# Patient Record
Sex: MALE | Race: WHITE | NOT HISPANIC OR LATINO | Employment: OTHER | URBAN - METROPOLITAN AREA
[De-identification: names, ages, dates, MRNs, and addresses within clinical notes are randomized per-mention and may not be internally consistent; named-entity substitution may affect disease eponyms.]

---

## 2018-07-20 ENCOUNTER — OFFICE VISIT (OUTPATIENT)
Dept: FAMILY MEDICINE | Facility: CLINIC | Age: 58
End: 2018-07-20
Payer: MEDICAID

## 2018-07-20 VITALS
WEIGHT: 233 LBS | RESPIRATION RATE: 12 BRPM | BODY MASS INDEX: 31.56 KG/M2 | TEMPERATURE: 98 F | OXYGEN SATURATION: 95 % | SYSTOLIC BLOOD PRESSURE: 136 MMHG | HEIGHT: 72 IN | DIASTOLIC BLOOD PRESSURE: 72 MMHG | HEART RATE: 103 BPM

## 2018-07-20 DIAGNOSIS — L97.422 DIABETIC ULCER OF LEFT MIDFOOT ASSOCIATED WITH TYPE 2 DIABETES MELLITUS, WITH FAT LAYER EXPOSED: ICD-10-CM

## 2018-07-20 DIAGNOSIS — E11.42 TYPE 2 DIABETES MELLITUS WITH DIABETIC POLYNEUROPATHY, WITHOUT LONG-TERM CURRENT USE OF INSULIN: ICD-10-CM

## 2018-07-20 DIAGNOSIS — E11.621 DIABETIC ULCER OF LEFT MIDFOOT ASSOCIATED WITH TYPE 2 DIABETES MELLITUS, WITH FAT LAYER EXPOSED: ICD-10-CM

## 2018-07-20 DIAGNOSIS — Z00.00 VISIT FOR WELL MAN HEALTH CHECK: Primary | ICD-10-CM

## 2018-07-20 PROBLEM — E11.9 TYPE 2 DIABETES MELLITUS WITHOUT COMPLICATION, WITHOUT LONG-TERM CURRENT USE OF INSULIN: Status: ACTIVE | Noted: 2018-07-20

## 2018-07-20 PROCEDURE — 99386 PREV VISIT NEW AGE 40-64: CPT | Mod: S$PBB,,, | Performed by: FAMILY MEDICINE

## 2018-07-20 PROCEDURE — 99203 OFFICE O/P NEW LOW 30 MIN: CPT | Mod: PBBFAC,PN | Performed by: FAMILY MEDICINE

## 2018-07-20 PROCEDURE — 99999 PR PBB SHADOW E&M-NEW PATIENT-LVL III: CPT | Mod: PBBFAC,,, | Performed by: FAMILY MEDICINE

## 2018-07-20 RX ORDER — PHENYLEPHRINE HCL 10 MG
500 TABLET ORAL DAILY
COMMUNITY

## 2018-07-20 RX ORDER — AMOXICILLIN 500 MG
CAPSULE ORAL DAILY
COMMUNITY

## 2018-07-20 RX ORDER — MELATONIN 10 MG
CAPSULE ORAL
COMMUNITY

## 2018-07-20 RX ORDER — METFORMIN HYDROCHLORIDE 850 MG/1
TABLET ORAL
COMMUNITY
End: 2018-08-29 | Stop reason: SDUPTHER

## 2018-07-20 RX ORDER — LOSARTAN POTASSIUM 25 MG/1
25 TABLET ORAL DAILY
Qty: 90 TABLET | Refills: 3 | Status: SHIPPED | OUTPATIENT
Start: 2018-07-20 | End: 2019-07-01 | Stop reason: SDUPTHER

## 2018-07-20 RX ORDER — PIOGLITAZONEHYDROCHLORIDE 15 MG/1
TABLET ORAL
COMMUNITY
End: 2019-03-07 | Stop reason: SDUPTHER

## 2018-07-20 RX ORDER — MULTIVITAMIN
1 TABLET ORAL DAILY
COMMUNITY

## 2018-07-20 RX ORDER — SIMVASTATIN 20 MG/1
TABLET, FILM COATED ORAL
COMMUNITY
End: 2019-01-25 | Stop reason: SDUPTHER

## 2018-07-20 RX ORDER — FERROUS SULFATE 325(65) MG
325 TABLET ORAL
COMMUNITY

## 2018-07-20 NOTE — PROGRESS NOTES
"Well Man VISIT      CHIEF COMPLAINT  Chief Complaint   Patient presents with    \[facility]\"" Care       HPI  Fermin Henson is a 58 y.o. male who presents for physical.     Social Factors  Tobacco use: No  Ready to Quit: No  Alcohol: No  Intimate partner violence screening  "Do you feel safe in your current relationship?" single  "Have you ever been in a relationship in which your partner frightened you or hurt you?" no  Living Will/POA: No  Regular Exercise: No    Depression  Over the past two weeks, have you felt down, depressed, or hopeless? No  Over the past two weeks, have you felt little interest or pleasure in doing things? No    Reproductive Health  STD screening in last year: deferred  HIV screening: deferred    Screen for Chronic Disease  CHD Risk Factors: advanced age (older than 55 for men, 65 for women), diabetes mellitus, dyslipidemia and obesity (BMI >= 30 kg/m2)  Estimated body mass index is 31.6 kg/m² as calculated from the following:    Height as of this encounter: 6' (1.829 m).    Weight as of this encounter: 105.7 kg (233 lb 0.4 oz).  Dyslipidemia screening needed: order 7/20/18  T2DM screening needed: order 7/20/18  Colonoscopy needed: utd per patient  PSA needed: n/a  AAA screening needed:n/a  Screen men 35 years and older, and men 20 to 34 years of age who have cardiovascular risk factors for dyslipidemia  Begin screening colonoscopies at 50 years of age in men of average risk, and continue until 75 years of age; offer fecal occult blood testing every year, flexible sigmoidoscopy every five years combined with fecal occult blood testing every three years, or colonoscopy every 10 years   The American Urological Association recommends offering PSA testing and digital rectal examination to well-informed men beginning at 40 years of age and continuing until life expectancy is less than 10 years  Screen once with ultrasonography in men 65 to 75 years of age if they have a family history or " have smoked at [id number] cigarettes in their lifetime  Screen men with a sustained blood pressure greater than 135/80 mm Hg for T2DM      Immunizations  stated as up to date, no records available    ALLERGIES and MEDS were verified.   PMHx, PSHx, FHx, SOCIALHx were updated as pertinent.    REVIEW OF SYSTEMS  Review of Systems   Constitutional: Negative.    HENT: Negative.    Eyes: Negative.    Respiratory: Negative.    Cardiovascular: Negative.    Gastrointestinal: Negative.    Genitourinary: Negative.    Musculoskeletal: Negative.    Skin: Negative.    Neurological: Positive for sensory change.         PHYSICAL EXAM  VITAL SIGNS: /72 (BP Location: Right arm, Patient Position: Sitting, BP Method: Medium (Manual))   Pulse 103   Temp 97.8 °F (36.6 °C) (Oral)   Resp 12   Ht 6' (1.829 m)   Wt 105.7 kg (233 lb 0.4 oz)   SpO2 95%   BMI 31.60 kg/m²   GEN: Well developed, Well nourished, No acute distress.  HENT: Normocephalic, Atraumatic, Bilateral external ears normal, Nose normal, Oropharynx moist, No oral exudates.   Eyes: PERRL, EOMI, Conjunctiva normal, No discharge.   Neck: Supple, No tenderness.  Lymphatic: No cervical or supraclavicular lymphadenopathy noted.   Cardiovascular: Normal heart rate, Normal rhythm, No murmurs, No rubs, No gallops.   Thorax & Lungs: Normal breath sounds, No respiratory distress, No wheezing.  Abdomen: Soft, No tenderness, Bowel sounds normal.  Genital: deferred  Skin: Warm, Dry, No erythema, No rash.   Extremities: No edema, No tenderness.       ASSESSMENT/PLAN    Fermin was seen today for establish care.    Diagnoses and all orders for this visit:    Visit for [facility] check  -     CBC auto differential; Future  -     Comprehensive metabolic panel; Future  -     Lipid panel; Future  -     Hemoglobin A1c; Future  -     Microalbumin/creatinine urine ratio; Future  -     losartan (COZAAR) 25 MG tablet; Take 1 tablet (25 mg total) by mouth once daily.    Type 2 diabetes  mellitus with diabetic polyneuropathy, without long-term current use of insulin  -     Comprehensive metabolic panel; Future  -     Lipid panel; Future  -     Hemoglobin A1c; Future  -     Microalbumin/creatinine urine ratio; Future  -     losartan (COZAAR) 25 MG tablet; Take 1 tablet (25 mg total) by mouth once daily.    Diabetic ulcer of left midfoot associated with type 2 diabetes mellitus, with fat layer exposed         FOLLOW UP: 3 months or sooner if needed    See Jean-Baptiste MD

## 2018-07-24 ENCOUNTER — LAB VISIT (OUTPATIENT)
Dept: LAB | Facility: HOSPITAL | Age: 58
End: 2018-07-24
Attending: FAMILY MEDICINE
Payer: MEDICAID

## 2018-07-24 DIAGNOSIS — E11.42 TYPE 2 DIABETES MELLITUS WITH DIABETIC POLYNEUROPATHY, WITHOUT LONG-TERM CURRENT USE OF INSULIN: ICD-10-CM

## 2018-07-24 DIAGNOSIS — Z00.00 VISIT FOR WELL MAN HEALTH CHECK: ICD-10-CM

## 2018-07-24 LAB
ALBUMIN SERPL BCP-MCNC: 3.9 G/DL
ALP SERPL-CCNC: 52 U/L
ALT SERPL W/O P-5'-P-CCNC: 25 U/L
ANION GAP SERPL CALC-SCNC: 8 MMOL/L
AST SERPL-CCNC: 18 U/L
BASOPHILS # BLD AUTO: 0.03 K/UL
BASOPHILS NFR BLD: 0.4 %
BILIRUB SERPL-MCNC: 0.4 MG/DL
BUN SERPL-MCNC: 12 MG/DL
CALCIUM SERPL-MCNC: 9.7 MG/DL
CHLORIDE SERPL-SCNC: 105 MMOL/L
CHOLEST SERPL-MCNC: 130 MG/DL
CHOLEST/HDLC SERPL: 3 {RATIO}
CO2 SERPL-SCNC: 27 MMOL/L
CREAT SERPL-MCNC: 0.9 MG/DL
DIFFERENTIAL METHOD: ABNORMAL
EOSINOPHIL # BLD AUTO: 0.4 K/UL
EOSINOPHIL NFR BLD: 5.7 %
ERYTHROCYTE [DISTWIDTH] IN BLOOD BY AUTOMATED COUNT: 13.8 %
EST. GFR  (AFRICAN AMERICAN): >60 ML/MIN/1.73 M^2
EST. GFR  (NON AFRICAN AMERICAN): >60 ML/MIN/1.73 M^2
ESTIMATED AVG GLUCOSE: 151 MG/DL
GLUCOSE SERPL-MCNC: 148 MG/DL
HBA1C MFR BLD HPLC: 6.9 %
HCT VFR BLD AUTO: 39 %
HDLC SERPL-MCNC: 43 MG/DL
HDLC SERPL: 33.1 %
HGB BLD-MCNC: 13.5 G/DL
LDLC SERPL CALC-MCNC: 59.4 MG/DL
LYMPHOCYTES # BLD AUTO: 2.1 K/UL
LYMPHOCYTES NFR BLD: 31.2 %
MCH RBC QN AUTO: 32.3 PG
MCHC RBC AUTO-ENTMCNC: 34.6 G/DL
MCV RBC AUTO: 93 FL
MONOCYTES # BLD AUTO: 0.6 K/UL
MONOCYTES NFR BLD: 9.1 %
NEUTROPHILS # BLD AUTO: 3.6 K/UL
NEUTROPHILS NFR BLD: 53.6 %
NONHDLC SERPL-MCNC: 87 MG/DL
PLATELET # BLD AUTO: 209 K/UL
PMV BLD AUTO: 9.7 FL
POTASSIUM SERPL-SCNC: 3.8 MMOL/L
PROT SERPL-MCNC: 7.5 G/DL
RBC # BLD AUTO: 4.18 M/UL
SODIUM SERPL-SCNC: 140 MMOL/L
TRIGL SERPL-MCNC: 138 MG/DL
WBC # BLD AUTO: 6.67 K/UL

## 2018-07-24 PROCEDURE — 80061 LIPID PANEL: CPT

## 2018-07-24 PROCEDURE — 83036 HEMOGLOBIN GLYCOSYLATED A1C: CPT

## 2018-07-24 PROCEDURE — 85025 COMPLETE CBC W/AUTO DIFF WBC: CPT

## 2018-07-24 PROCEDURE — 80053 COMPREHEN METABOLIC PANEL: CPT

## 2018-07-24 PROCEDURE — 36415 COLL VENOUS BLD VENIPUNCTURE: CPT | Mod: PN

## 2018-07-26 DIAGNOSIS — Z12.11 COLON CANCER SCREENING: ICD-10-CM

## 2018-07-31 NOTE — PROGRESS NOTES
Please let patient know that he is mildly anemic.  He needs to do his fit kit or get colonoscopy to make sure he is not bleeding from the GI tract    Dr. Jean-Baptiste

## 2018-08-02 ENCOUNTER — TELEPHONE (OUTPATIENT)
Dept: FAMILY MEDICINE | Facility: CLINIC | Age: 58
End: 2018-08-02

## 2018-08-02 NOTE — TELEPHONE ENCOUNTER
----- Message from See Jean-Baptiste MD sent at 7/31/2018  8:32 AM CDT -----  Please let patient know that he is mildly anemic.  He needs to do his fit kit or get colonoscopy to make sure he is not bleeding from the GI tract    Dr. Jean-Baptiste

## 2018-08-02 NOTE — TELEPHONE ENCOUNTER
Patient notified of charted test result with charted recommendation. Patient said that he would prefer to do the FitKit and will  today at the clinic.  .FitKit was given to patient on 8/2/2018 9:56 AM

## 2018-08-07 ENCOUNTER — LAB VISIT (OUTPATIENT)
Dept: LAB | Facility: HOSPITAL | Age: 58
End: 2018-08-07
Attending: FAMILY MEDICINE
Payer: MEDICAID

## 2018-08-07 DIAGNOSIS — Z12.11 COLON CANCER SCREENING: ICD-10-CM

## 2018-08-07 LAB — HEMOCCULT STL QL IA: NEGATIVE

## 2018-08-07 PROCEDURE — 82274 ASSAY TEST FOR BLOOD FECAL: CPT

## 2018-08-29 RX ORDER — METFORMIN HYDROCHLORIDE 850 MG/1
TABLET ORAL
Qty: 90 TABLET | Refills: 0 | Status: SHIPPED | OUTPATIENT
Start: 2018-08-29 | End: 2018-09-24 | Stop reason: SDUPTHER

## 2018-08-29 NOTE — TELEPHONE ENCOUNTER
Spoke with Mechelle Grajeda re: instructions for Metformin.850mg Ok to disp #90  For 30 days. Pt. Is to continue taking 2 tabs in am and 1 tab in pm Per Dr. Jean-Baptiste.

## 2018-08-29 NOTE — TELEPHONE ENCOUNTER
----- Message from Terri Pollard sent at 8/29/2018  9:22 AM CDT -----  Contact: [facility]- [place]   [facility] refill request: [phone]    Metformin     [facility] on [address]

## 2018-09-24 RX ORDER — METFORMIN HYDROCHLORIDE 850 MG/1
TABLET ORAL
Qty: 90 TABLET | Refills: 0 | Status: SHIPPED | OUTPATIENT
Start: 2018-09-24 | End: 2018-10-29 | Stop reason: SDUPTHER

## 2018-10-15 ENCOUNTER — OFFICE VISIT (OUTPATIENT)
Dept: FAMILY MEDICINE | Facility: CLINIC | Age: 58
End: 2018-10-15
Payer: MEDICAID

## 2018-10-15 VITALS
OXYGEN SATURATION: 98 % | RESPIRATION RATE: 16 BRPM | SYSTOLIC BLOOD PRESSURE: 122 MMHG | HEIGHT: 72 IN | TEMPERATURE: 98 F | WEIGHT: 234.38 LBS | DIASTOLIC BLOOD PRESSURE: 68 MMHG | BODY MASS INDEX: 31.74 KG/M2 | HEART RATE: 80 BPM

## 2018-10-15 DIAGNOSIS — E11.42 TYPE 2 DIABETES MELLITUS WITH DIABETIC POLYNEUROPATHY, WITHOUT LONG-TERM CURRENT USE OF INSULIN: Primary | ICD-10-CM

## 2018-10-15 DIAGNOSIS — Z23 IMMUNIZATION DUE: ICD-10-CM

## 2018-10-15 DIAGNOSIS — E11.621 DIABETIC ULCER OF LEFT MIDFOOT ASSOCIATED WITH TYPE 2 DIABETES MELLITUS, WITH FAT LAYER EXPOSED: ICD-10-CM

## 2018-10-15 DIAGNOSIS — L98.9 SKIN LESION: ICD-10-CM

## 2018-10-15 DIAGNOSIS — L97.422 DIABETIC ULCER OF LEFT MIDFOOT ASSOCIATED WITH TYPE 2 DIABETES MELLITUS, WITH FAT LAYER EXPOSED: ICD-10-CM

## 2018-10-15 PROCEDURE — 99214 OFFICE O/P EST MOD 30 MIN: CPT | Mod: S$PBB,,, | Performed by: FAMILY MEDICINE

## 2018-10-15 PROCEDURE — 99214 OFFICE O/P EST MOD 30 MIN: CPT | Mod: PBBFAC,PN | Performed by: FAMILY MEDICINE

## 2018-10-15 PROCEDURE — 99999 PR PBB SHADOW E&M-EST. PATIENT-LVL IV: CPT | Mod: PBBFAC,,, | Performed by: FAMILY MEDICINE

## 2018-10-15 NOTE — PROGRESS NOTES
Routine Office Visit    Patient Name: Fermin Metcalf Peer    : 1960  MRN: [id number]    Subjective:  Fermin is a 58 y.o. male who presents today for:    1. Type 2 dm  Patient presenting today for follow up for type 2 DM.  He has not had any hypoglycemic episodes.  He has been sticking with his diet and taking all of his medications as prescribed.  He was found to be anemic (mildly on last blood work), but did fitkit and it was negative.  No other complaints today    Past Medical History  Past Medical History:   Diagnosis Date    Diabetes mellitus, type 2        Past Surgical History  Past Surgical History:   Procedure Laterality Date    eye  surgeries      several     TOE AMPUTATION      several toes on R foot       Family History  Family History   Problem Relation Age of Onset    No Known Problems Mother     Diabetes Father        Social History  Social History     Socioeconomic History    Marital status: Single     Spouse name: Not on file    Number of children: Not on file    Years of education: Not on file    Highest education level: Not on file   Social Needs    Financial resource strain: Not on file    Food insecurity - worry: Not on file    Food insecurity - inability: Not on file    Transportation needs - medical: Not on file    Transportation needs - non-medical: Not on file   Occupational History    Not on file   Tobacco Use    Smoking status: Never Smoker    Smokeless tobacco: Never Used   Substance and Sexual Activity    Alcohol use: No    Drug use: No    Sexual activity: Not Currently     Partners: Female   Other Topics Concern    Not on file   Social History Narrative    Not on file       Current Medications  Current Outpatient Medications on File Prior to Visit   Medication Sig Dispense Refill    cinnamon bark (CINNAMON) 500 mg capsule Take 500 mg by mouth once daily.      ferrous sulfate 325 mg (65 mg iron) Tab tablet Take 325 mg by mouth daily with breakfast.      fish  oil-omega-3 fatty acids 300-1,000 mg capsule Take by mouth once daily.      losartan (COZAAR) 25 MG tablet Take 1 tablet (25 mg total) by mouth once daily. 90 tablet 3    melatonin 10 mg Cap Take by mouth.      metFORMIN (GLUCOPHAGE) 850 MG tablet TAKE 2 TABLETS BY MOUTH IN THE MORNING AND 1 IN THE EVENING WITH MEALS 90 tablet 0    multivitamin (THERAGRAN) per tablet Take 1 tablet by mouth once daily.      pioglitazone (ACTOS) 15 MG tablet pioglitazone 15 mg tablet      simvastatin (ZOCOR) 20 MG tablet simvastatin 20 mg tablet       No current facility-administered medications on file prior to visit.        Allergies   Review of patient's allergies indicates:  No Known Allergies    Review of Systems (Pertinent positives)  Review of Systems   Constitutional: Negative.    HENT: Negative.    Eyes: Negative.    Respiratory: Negative.    Cardiovascular: Negative.    Gastrointestinal: Negative.    Skin:        +bilateral skin ulcers   Neurological: Positive for sensory change.         /68 (BP Location: Left arm, Patient Position: Sitting, BP Method: Medium (Manual))   Pulse 80   Temp 97.7 °F (36.5 °C) (Oral)   Resp 16   Ht 6' (1.829 m)   Wt 106.3 kg (234 lb 5.6 oz)   SpO2 98%   BMI 31.78 kg/m²     GENERAL APPEARANCE: in no apparent distress and well developed and well nourished  HEENT: PERRLA, EOMI, Sclera clear, anicteric, Oropharynx clear, no lesions, Neck supple with midline trachea  NECK: normal, supple, no adenopathy, thyroid normal in size  RESPIRATORY: appears well, vitals normal, no respiratory distress, acyanotic, normal RR, chest clear, no wheezing, crepitations, rhonchi, normal symmetric air entry  HEART: regular rate and rhythm, S1, S2 normal, no murmur, click, rub or gallop.    ABDOMEN: abdomen is soft without tenderness, no masses, no hernias, no organomegaly, no rebound, no guarding. Suprapubic tenderness absent. No CVA tenderness.  NEUROLOGIC: normal without focal findings, CN II-XII are  intact.    SKIN: no rashes, no wounds, no other lesions  PSYCH: Alert, oriented x 3, thought content appropriate, speech normal, pleasant and cooperative, good eye contact, well groomed    Protective Sensation (w/ 10 gram monofilament):  Right: Decreased  Left: Decreased    Visual Inspection:  Ulcer wrapped on right foot    Pedal Pulses:   Right: Present  Left: Present    Posterior tibialis:   Right:Present  Left: Present      Assessment/Plan:  Fermin Henson is a 58 y.o. male who presents today for :    Fermin was seen today for follow-up.    Diagnoses and all orders for this visit:    Type 2 diabetes mellitus with diabetic polyneuropathy, without long-term current use of insulin  -     Hepatitis C antibody; Future  -     Foot Exam Performed  -     Hemoglobin A1c; Future  -     MICROALBUMIN / CREATININE RATIO URINE; Future    Immunization due  -     (In Office Administered) Pneumococcal Polysaccharide Vaccine (23 Valent) (SQ/IM)  -     Influenza - Quadrivalent (3 years & older) (PF)    Diabetic ulcer of left midfoot associated with type 2 diabetes mellitus, with fat layer exposed    Skin lesion  -     Ambulatory referral to Dermatology    Other orders  -     Cancel: Influenza - Quadrivalent (3 years & older) (PF)      1.  Labs and immunizations to be done next week  2.  Follow up 6 months or sooner if needed  3.  Foot ulcer to be followed by podiatry  4.  Call with concerns    See Jean-Baptiste MD

## 2018-10-19 ENCOUNTER — CLINICAL SUPPORT (OUTPATIENT)
Dept: FAMILY MEDICINE | Facility: CLINIC | Age: 58
End: 2018-10-19
Payer: MEDICAID

## 2018-10-19 ENCOUNTER — LAB VISIT (OUTPATIENT)
Dept: LAB | Facility: HOSPITAL | Age: 58
End: 2018-10-19
Attending: FAMILY MEDICINE
Payer: MEDICAID

## 2018-10-19 DIAGNOSIS — Z23 ENCOUNTER FOR VACCINATION: Primary | ICD-10-CM

## 2018-10-19 DIAGNOSIS — E11.42 TYPE 2 DIABETES MELLITUS WITH DIABETIC POLYNEUROPATHY, WITHOUT LONG-TERM CURRENT USE OF INSULIN: ICD-10-CM

## 2018-10-19 LAB
ESTIMATED AVG GLUCOSE: 151 MG/DL
HBA1C MFR BLD HPLC: 6.9 %

## 2018-10-19 PROCEDURE — 36415 COLL VENOUS BLD VENIPUNCTURE: CPT | Mod: PN

## 2018-10-19 PROCEDURE — 90472 IMMUNIZATION ADMIN EACH ADD: CPT | Mod: PBBFAC,PN

## 2018-10-19 PROCEDURE — 86803 HEPATITIS C AB TEST: CPT

## 2018-10-19 PROCEDURE — 90732 PPSV23 VACC 2 YRS+ SUBQ/IM: CPT | Mod: PBBFAC,PN

## 2018-10-19 PROCEDURE — 83036 HEMOGLOBIN GLYCOSYLATED A1C: CPT

## 2018-10-19 PROCEDURE — 99499 UNLISTED E&M SERVICE: CPT | Mod: S$PBB,,, | Performed by: FAMILY MEDICINE

## 2018-10-19 PROCEDURE — 90686 IIV4 VACC NO PRSV 0.5 ML IM: CPT | Mod: PBBFAC,PN

## 2018-10-22 LAB — HCV AB SERPL QL IA: NEGATIVE

## 2018-10-25 ENCOUNTER — TELEPHONE (OUTPATIENT)
Dept: FAMILY MEDICINE | Facility: CLINIC | Age: 58
End: 2018-10-25

## 2018-10-25 PROBLEM — R80.8 OTHER PROTEINURIA: Status: ACTIVE | Noted: 2018-10-25

## 2018-10-25 NOTE — PROGRESS NOTES
Please let patient know that labs looked stable.  No changes to blood sugars.    Thanks  Dr. Jean-Baptiste

## 2018-10-25 NOTE — TELEPHONE ENCOUNTER
----- Message from See Jean-Baptiste MD sent at 10/25/2018 11:52 AM CDT -----  Please let patient know that labs looked stable.  No changes to blood sugars.    Thanks  Dr. Jean-Baptiste

## 2018-10-29 RX ORDER — METFORMIN HYDROCHLORIDE 850 MG/1
TABLET ORAL
Qty: 90 TABLET | Refills: 0 | Status: SHIPPED | OUTPATIENT
Start: 2018-10-29 | End: 2018-11-26 | Stop reason: SDUPTHER

## 2018-11-02 ENCOUNTER — TELEPHONE (OUTPATIENT)
Dept: FAMILY MEDICINE | Facility: CLINIC | Age: 58
End: 2018-11-02

## 2018-11-26 RX ORDER — METFORMIN HYDROCHLORIDE 850 MG/1
TABLET ORAL
Qty: 90 TABLET | Refills: 0 | Status: SHIPPED | OUTPATIENT
Start: 2018-11-26 | End: 2018-12-31 | Stop reason: SDUPTHER

## 2018-12-11 ENCOUNTER — HOSPITAL ENCOUNTER (EMERGENCY)
Facility: HOSPITAL | Age: 58
Discharge: HOME | End: 2018-12-11
Attending: EMERGENCY MEDICINE
Payer: MEDICAID

## 2018-12-11 VITALS
WEIGHT: 220 LBS | BODY MASS INDEX: 29.8 KG/M2 | HEIGHT: 72 IN | SYSTOLIC BLOOD PRESSURE: 139 MMHG | HEART RATE: 78 BPM | TEMPERATURE: 98 F | RESPIRATION RATE: 18 BRPM | OXYGEN SATURATION: 95 % | DIASTOLIC BLOOD PRESSURE: 70 MMHG

## 2018-12-11 DIAGNOSIS — N30.01 ACUTE CYSTITIS WITH HEMATURIA: ICD-10-CM

## 2018-12-11 DIAGNOSIS — N20.0 RIGHT NEPHROLITHIASIS: Primary | ICD-10-CM

## 2018-12-11 LAB
ALBUMIN SERPL BCP-MCNC: 4.2 G/DL
ALP SERPL-CCNC: 45 U/L
ALT SERPL W/O P-5'-P-CCNC: 28 U/L
ANION GAP SERPL CALC-SCNC: 11 MMOL/L
AST SERPL-CCNC: 24 U/L
BACTERIA #/AREA URNS HPF: ABNORMAL /HPF
BASOPHILS # BLD AUTO: 0.02 K/UL
BASOPHILS NFR BLD: 0.2 %
BILIRUB SERPL-MCNC: 0.6 MG/DL
BILIRUB UR QL STRIP: NEGATIVE
BUN SERPL-MCNC: 15 MG/DL
CALCIUM SERPL-MCNC: 9.4 MG/DL
CHLORIDE SERPL-SCNC: 102 MMOL/L
CLARITY UR: ABNORMAL
CO2 SERPL-SCNC: 23 MMOL/L
COLOR UR: YELLOW
CREAT SERPL-MCNC: 1 MG/DL
DIFFERENTIAL METHOD: ABNORMAL
EOSINOPHIL # BLD AUTO: 0.1 K/UL
EOSINOPHIL NFR BLD: 1 %
ERYTHROCYTE [DISTWIDTH] IN BLOOD BY AUTOMATED COUNT: 13.4 %
EST. GFR  (AFRICAN AMERICAN): >60 ML/MIN/1.73 M^2
EST. GFR  (NON AFRICAN AMERICAN): >60 ML/MIN/1.73 M^2
GLUCOSE SERPL-MCNC: 154 MG/DL
GLUCOSE UR QL STRIP: ABNORMAL
HCT VFR BLD AUTO: 39.8 %
HGB BLD-MCNC: 13.7 G/DL
HGB UR QL STRIP: ABNORMAL
HYALINE CASTS #/AREA URNS LPF: 0 /LPF
KETONES UR QL STRIP: ABNORMAL
LEUKOCYTE ESTERASE UR QL STRIP: ABNORMAL
LIPASE SERPL-CCNC: 32 U/L
LYMPHOCYTES # BLD AUTO: 0.9 K/UL
LYMPHOCYTES NFR BLD: 8.9 %
MCH RBC QN AUTO: 31.7 PG
MCHC RBC AUTO-ENTMCNC: 34.4 G/DL
MCV RBC AUTO: 92 FL
MICROSCOPIC COMMENT: ABNORMAL
MONOCYTES # BLD AUTO: 0.6 K/UL
MONOCYTES NFR BLD: 6.2 %
NEUTROPHILS # BLD AUTO: 8.1 K/UL
NEUTROPHILS NFR BLD: 83.5 %
NITRITE UR QL STRIP: NEGATIVE
PH UR STRIP: 5 [PH] (ref 5–8)
PLATELET # BLD AUTO: 217 K/UL
PMV BLD AUTO: 9.3 FL
POCT GLUCOSE: 156 MG/DL (ref 70–110)
POTASSIUM SERPL-SCNC: 4.4 MMOL/L
PROT SERPL-MCNC: 7.9 G/DL
PROT UR QL STRIP: ABNORMAL
RBC # BLD AUTO: 4.32 M/UL
RBC #/AREA URNS HPF: 55 /HPF (ref 0–4)
SODIUM SERPL-SCNC: 136 MMOL/L
SP GR UR STRIP: 1.02 (ref 1–1.03)
SQUAMOUS #/AREA URNS HPF: 3 /HPF
URN SPEC COLLECT METH UR: ABNORMAL
UROBILINOGEN UR STRIP-ACNC: NEGATIVE EU/DL
WBC # BLD AUTO: 9.65 K/UL
WBC #/AREA URNS HPF: 3 /HPF (ref 0–5)
YEAST URNS QL MICRO: ABNORMAL

## 2018-12-11 PROCEDURE — 25000003 PHARM REV CODE 250: Performed by: NURSE PRACTITIONER

## 2018-12-11 PROCEDURE — 85025 COMPLETE CBC W/AUTO DIFF WBC: CPT

## 2018-12-11 PROCEDURE — 96375 TX/PRO/DX INJ NEW DRUG ADDON: CPT

## 2018-12-11 PROCEDURE — 82962 GLUCOSE BLOOD TEST: CPT

## 2018-12-11 PROCEDURE — 96365 THER/PROPH/DIAG IV INF INIT: CPT

## 2018-12-11 PROCEDURE — 96361 HYDRATE IV INFUSION ADD-ON: CPT

## 2018-12-11 PROCEDURE — 99284 EMERGENCY DEPT VISIT MOD MDM: CPT | Mod: 25

## 2018-12-11 PROCEDURE — 83690 ASSAY OF LIPASE: CPT

## 2018-12-11 PROCEDURE — 80053 COMPREHEN METABOLIC PANEL: CPT

## 2018-12-11 PROCEDURE — 87086 URINE CULTURE/COLONY COUNT: CPT

## 2018-12-11 PROCEDURE — 81000 URINALYSIS NONAUTO W/SCOPE: CPT

## 2018-12-11 PROCEDURE — 63600175 PHARM REV CODE 636 W HCPCS: Performed by: NURSE PRACTITIONER

## 2018-12-11 RX ORDER — HYDROCODONE BITARTRATE AND ACETAMINOPHEN 5; 325 MG/1; MG/1
1 TABLET ORAL EVERY 4 HOURS PRN
Qty: 12 TABLET | Refills: 0 | Status: SHIPPED | OUTPATIENT
Start: 2018-12-11 | End: 2018-12-15 | Stop reason: ALTCHOICE

## 2018-12-11 RX ORDER — CIPROFLOXACIN 500 MG/1
500 TABLET ORAL 2 TIMES DAILY
Qty: 14 TABLET | Refills: 0 | Status: SHIPPED | OUTPATIENT
Start: 2018-12-11 | End: 2018-12-18

## 2018-12-11 RX ORDER — MORPHINE SULFATE 4 MG/ML
4 INJECTION, SOLUTION INTRAMUSCULAR; INTRAVENOUS
Status: COMPLETED | OUTPATIENT
Start: 2018-12-11 | End: 2018-12-11

## 2018-12-11 RX ORDER — TAMSULOSIN HYDROCHLORIDE 0.4 MG/1
0.4 CAPSULE ORAL
Status: COMPLETED | OUTPATIENT
Start: 2018-12-11 | End: 2018-12-11

## 2018-12-11 RX ORDER — ONDANSETRON 2 MG/ML
4 INJECTION INTRAMUSCULAR; INTRAVENOUS
Status: COMPLETED | OUTPATIENT
Start: 2018-12-11 | End: 2018-12-11

## 2018-12-11 RX ORDER — ONDANSETRON 4 MG/1
4 TABLET, FILM COATED ORAL EVERY 6 HOURS PRN
Qty: 12 TABLET | Refills: 0 | OUTPATIENT
Start: 2018-12-11 | End: 2018-12-15

## 2018-12-11 RX ORDER — TAMSULOSIN HYDROCHLORIDE 0.4 MG/1
0.4 CAPSULE ORAL DAILY
Qty: 10 CAPSULE | Refills: 0 | Status: SHIPPED | OUTPATIENT
Start: 2018-12-12 | End: 2019-02-01

## 2018-12-11 RX ADMIN — ONDANSETRON 4 MG: 2 INJECTION INTRAMUSCULAR; INTRAVENOUS at 06:12

## 2018-12-11 RX ADMIN — MORPHINE SULFATE 4 MG: 4 INJECTION INTRAVENOUS at 07:12

## 2018-12-11 RX ADMIN — SODIUM CHLORIDE 1000 ML: 0.9 INJECTION, SOLUTION INTRAVENOUS at 06:12

## 2018-12-11 RX ADMIN — TAMSULOSIN HYDROCHLORIDE 0.4 MG: 0.4 CAPSULE ORAL at 07:12

## 2018-12-11 RX ADMIN — CEFTRIAXONE 1 G: 1 INJECTION, SOLUTION INTRAVENOUS at 06:12

## 2018-12-11 NOTE — ED TRIAGE NOTES
The pt states his vomiting started on Monday. Today the pt had one regular and one diarrhea stool. Denies fever.

## 2018-12-12 DIAGNOSIS — N20.0 NEPHROLITHIASIS: Primary | ICD-10-CM

## 2018-12-12 NOTE — DISCHARGE INSTRUCTIONS
Please follow-up with Urology as soon as possible.      Please return to the Emergency Department for any new or worsening symptoms including:  Difficulty urinating, abdominal pain, fever, chest pain, shortness of breath, loss of consciousness, dizziness, weakness, or any other concerns.     Please follow up with your Primary Care Provider within in the week. If you do not have one, you may contact the one listed on your discharge paperwork or you may also call the [facility] Appointment Desk at [phone] to schedule an appointment with one.     Please take all medication as prescribed.

## 2018-12-13 LAB — BACTERIA UR CULT: NORMAL

## 2018-12-15 ENCOUNTER — HOSPITAL ENCOUNTER (EMERGENCY)
Facility: HOSPITAL | Age: 58
Discharge: HOME | End: 2018-12-15
Attending: EMERGENCY MEDICINE
Payer: MEDICAID

## 2018-12-15 VITALS
HEART RATE: 86 BPM | WEIGHT: 220 LBS | HEIGHT: 72 IN | SYSTOLIC BLOOD PRESSURE: 169 MMHG | DIASTOLIC BLOOD PRESSURE: 84 MMHG | OXYGEN SATURATION: 98 % | RESPIRATION RATE: 19 BRPM | BODY MASS INDEX: 29.8 KG/M2 | TEMPERATURE: 98 F

## 2018-12-15 DIAGNOSIS — N20.1 RIGHT URETERAL CALCULUS: ICD-10-CM

## 2018-12-15 DIAGNOSIS — R10.9 RIGHT FLANK PAIN: Primary | ICD-10-CM

## 2018-12-15 LAB
ALBUMIN SERPL BCP-MCNC: 4.4 G/DL
ALP SERPL-CCNC: 46 U/L
ALT SERPL W/O P-5'-P-CCNC: 27 U/L
ANION GAP SERPL CALC-SCNC: 14 MMOL/L
AST SERPL-CCNC: 20 U/L
BASOPHILS # BLD AUTO: 0.03 K/UL
BASOPHILS NFR BLD: 0.4 %
BILIRUB SERPL-MCNC: 0.5 MG/DL
BUN SERPL-MCNC: 15 MG/DL
CALCIUM SERPL-MCNC: 10 MG/DL
CHLORIDE SERPL-SCNC: 103 MMOL/L
CO2 SERPL-SCNC: 25 MMOL/L
CREAT SERPL-MCNC: 1.1 MG/DL
DIFFERENTIAL METHOD: ABNORMAL
EOSINOPHIL # BLD AUTO: 0.3 K/UL
EOSINOPHIL NFR BLD: 3 %
ERYTHROCYTE [DISTWIDTH] IN BLOOD BY AUTOMATED COUNT: 13.6 %
EST. GFR  (AFRICAN AMERICAN): >60 ML/MIN/1.73 M^2
EST. GFR  (NON AFRICAN AMERICAN): >60 ML/MIN/1.73 M^2
GLUCOSE SERPL-MCNC: 164 MG/DL
HCT VFR BLD AUTO: 39.8 %
HGB BLD-MCNC: 13.6 G/DL
LYMPHOCYTES # BLD AUTO: 1.5 K/UL
LYMPHOCYTES NFR BLD: 18 %
MCH RBC QN AUTO: 31.9 PG
MCHC RBC AUTO-ENTMCNC: 34.2 G/DL
MCV RBC AUTO: 93 FL
MONOCYTES # BLD AUTO: 0.8 K/UL
MONOCYTES NFR BLD: 9.3 %
NEUTROPHILS # BLD AUTO: 5.8 K/UL
NEUTROPHILS NFR BLD: 69.3 %
PLATELET # BLD AUTO: 241 K/UL
PMV BLD AUTO: 9.5 FL
POTASSIUM SERPL-SCNC: 4 MMOL/L
PROT SERPL-MCNC: 8.1 G/DL
RBC # BLD AUTO: 4.26 M/UL
SODIUM SERPL-SCNC: 142 MMOL/L
WBC # BLD AUTO: 8.37 K/UL

## 2018-12-15 PROCEDURE — 96375 TX/PRO/DX INJ NEW DRUG ADDON: CPT

## 2018-12-15 PROCEDURE — 96374 THER/PROPH/DIAG INJ IV PUSH: CPT

## 2018-12-15 PROCEDURE — 85025 COMPLETE CBC W/AUTO DIFF WBC: CPT

## 2018-12-15 PROCEDURE — 63600175 PHARM REV CODE 636 W HCPCS: Performed by: EMERGENCY MEDICINE

## 2018-12-15 PROCEDURE — 80053 COMPREHEN METABOLIC PANEL: CPT

## 2018-12-15 PROCEDURE — 99284 EMERGENCY DEPT VISIT MOD MDM: CPT | Mod: 25

## 2018-12-15 PROCEDURE — 96376 TX/PRO/DX INJ SAME DRUG ADON: CPT

## 2018-12-15 RX ORDER — ONDANSETRON 2 MG/ML
4 INJECTION INTRAMUSCULAR; INTRAVENOUS
Status: COMPLETED | OUTPATIENT
Start: 2018-12-15 | End: 2018-12-15

## 2018-12-15 RX ORDER — HYDROMORPHONE HYDROCHLORIDE 2 MG/ML
1 INJECTION, SOLUTION INTRAMUSCULAR; INTRAVENOUS; SUBCUTANEOUS
Status: COMPLETED | OUTPATIENT
Start: 2018-12-15 | End: 2018-12-15

## 2018-12-15 RX ORDER — OXYCODONE AND ACETAMINOPHEN 10; 325 MG/1; MG/1
1 TABLET ORAL EVERY 4 HOURS PRN
Qty: 18 TABLET | Refills: 0 | Status: SHIPPED | OUTPATIENT
Start: 2018-12-15 | End: 2019-02-01

## 2018-12-15 RX ORDER — ONDANSETRON 4 MG/1
4 TABLET, FILM COATED ORAL EVERY 8 HOURS PRN
Qty: 12 TABLET | Refills: 0 | Status: SHIPPED | OUTPATIENT
Start: 2018-12-15 | End: 2019-02-01

## 2018-12-15 RX ORDER — KETOROLAC TROMETHAMINE 30 MG/ML
15 INJECTION, SOLUTION INTRAMUSCULAR; INTRAVENOUS
Status: COMPLETED | OUTPATIENT
Start: 2018-12-15 | End: 2018-12-15

## 2018-12-15 RX ADMIN — HYDROMORPHONE HYDROCHLORIDE 1 MG: 2 INJECTION INTRAMUSCULAR; INTRAVENOUS; SUBCUTANEOUS at 06:12

## 2018-12-15 RX ADMIN — ONDANSETRON 4 MG: 2 INJECTION INTRAMUSCULAR; INTRAVENOUS at 05:12

## 2018-12-15 RX ADMIN — KETOROLAC TROMETHAMINE 15 MG: 30 INJECTION, SOLUTION INTRAMUSCULAR at 05:12

## 2018-12-15 RX ADMIN — HYDROMORPHONE HYDROCHLORIDE 1 MG: 2 INJECTION INTRAMUSCULAR; INTRAVENOUS; SUBCUTANEOUS at 05:12

## 2018-12-15 NOTE — ED PROVIDER NOTES
"Encounter Date: 12/15/2018    This is a SORT/MSE of a 58 y.o. male presenting to the ED with c/o right ureteral stone. Seen here 12/11 and discharged with medication. Pain worsening. Patient appears uncomfortable. DC'd with Cipro, Flomax, and Norco. Care will be transferred to an alternate provider when patient is roomed for a full evaluation and final disposition. RORO Ordaz, FNP-C 12/15/2018 4:51 PM    SCRIBE #1 NOTE: I, Marcel Ames, am scribing for, and in the presence of,  Taran Almanzar MD. I have scribed the following portions of the note - Other sections scribed: HPI, ROS.       History     Chief Complaint   Patient presents with    Flank Pain     right side flank pain; diagnosed with kidney stone this past week; since last Sunday     CC: Flank Pain    This 58 y.o Male with pmhx of DM and pshx of toe amputation presents to the ED c/o severe (10/10) flank pain with abdominal tenderness and nausea. Pt states that he was diagnosed with kidney stones on 12/11/18 but presented to day because of the severity of his pain. Pt notes that he took his narco at 3:25pm and that "it doesn't do a thing". Pt denies smoking and drinking. Pt denies emesis, fever, chills, headache, chest pain, cough, dysuria and rash. No other symptoms to note.       The history is provided by the patient. No  was used.     Review of patient's allergies indicates:  No Known Allergies  Past Medical History:   Diagnosis Date    Diabetes mellitus, type 2      Past Surgical History:   Procedure Laterality Date    eye  surgeries      several     TOE AMPUTATION      several toes on R foot     Family History   Problem Relation Age of Onset    No Known Problems Mother     Diabetes Father      Social History     Tobacco Use    Smoking status: Never Smoker    Smokeless tobacco: Never Used   Substance Use Topics    Alcohol use: No    Drug use: No     Review of Systems   Constitutional: Negative for chills and fever. "   HENT: Negative for congestion and sore throat.    Eyes: Negative for redness.   Respiratory: Negative for cough.    Cardiovascular: Negative for chest pain.   Gastrointestinal: Positive for nausea. Negative for vomiting.        (+) abdominal tenderness   Genitourinary: Positive for flank pain. Negative for dysuria.   Musculoskeletal: Negative for back pain and neck pain.   Skin: Negative for rash.   Neurological: Negative for headaches.       Physical Exam     Initial Vitals [12/15/18 1652]   BP Pulse Resp Temp SpO2   (!) 199/95 90 20 97.7 °F (36.5 °C) 98 %      MAP       --         Physical Exam  The patient was examined specifically for the following:   General:No significant distress, Good color, Warm and dry. Head and neck:Scalp atraumatic, Neck supple. Neurological:Appropriate conversation, Gross motor deficits. Eyes:Conjugate gaze, Clear corneas. ENT: No epistaxis. Cardiac: Regular rate and rhythm, Grossly normal heart tones. Pulmonary: Wheezing, Rales. Gastrointestinal: Abdominal tenderness, Abdominal distention. Musculoskeletal: Extremity deformity, Apparent pain with range of motion of the joints. Skin: Rash.   The findings on examination were normal except for the following:  There is minimal vague diffuse abdominal tenderness.  There is no guarding rebound mass or distention. Patient's blood pressure is 199/95.  He is afebrile.  The heart rate is 90.  The respiratory rate is 20.  ED Course   Procedures  Labs Reviewed   CBC W/ AUTO DIFFERENTIAL - Abnormal; Notable for the following components:       Result Value    RBC 4.26 (*)     Hemoglobin 13.6 (*)     Hematocrit 39.8 (*)     MCH 31.9 (*)     All other components within normal limits   COMPREHENSIVE METABOLIC PANEL - Abnormal; Notable for the following components:    Glucose 164 (*)     Alkaline Phosphatase 46 (*)     All other components within normal limits          Imaging Results    None       Medical decision making:  Given the above this patient  has a right ureteral calculus.  He has uncontrolled pain.  He is only on hydrocodone.  I will switch him to Percocet 10.  I will refill is nausea medicine.  I will refer him to follow up with Urology.  I find no evidence of infection or renal failure.  The patient has no leukocytosis.  I checked the urine culture from his last visit.  It is negative.                Scribe Attestation:   Scribe #1: I performed the above scribed service and the documentation accurately describes the services I performed. I attest to the accuracy of the note.    Attending Attestation:           Physician Attestation for Scribe:  Physician Attestation Statement for Scribe #1: I, Taran Almanzar MD, reviewed documentation, as scribed by Marcel Ames in my presence, and it is both accurate and complete.                    Clinical Impression:   The primary encounter diagnosis was Right flank pain. A diagnosis of Right ureteral calculus was also pertinent to this visit.                             Taran Almanzar MD  12/15/18 3957

## 2018-12-16 NOTE — DISCHARGE INSTRUCTIONS
Please see the urologist this week.  Please see your primary care doctor on Monday to get a referral for the visit.  Return immediately if you get worse or if new problems develop, especially fever.  Percocet for pain.  Zofran for nausea.  Rest.

## 2018-12-21 ENCOUNTER — OFFICE VISIT (OUTPATIENT)
Dept: FAMILY MEDICINE | Facility: CLINIC | Age: 58
End: 2018-12-21
Payer: MEDICAID

## 2018-12-21 VITALS
HEART RATE: 81 BPM | OXYGEN SATURATION: 96 % | DIASTOLIC BLOOD PRESSURE: 80 MMHG | SYSTOLIC BLOOD PRESSURE: 136 MMHG | TEMPERATURE: 98 F | WEIGHT: 228.81 LBS | BODY MASS INDEX: 30.99 KG/M2 | RESPIRATION RATE: 16 BRPM | HEIGHT: 72 IN

## 2018-12-21 DIAGNOSIS — E11.42 TYPE 2 DIABETES MELLITUS WITH DIABETIC POLYNEUROPATHY, WITHOUT LONG-TERM CURRENT USE OF INSULIN: Primary | ICD-10-CM

## 2018-12-21 PROBLEM — E11.621 DIABETIC ULCER OF LEFT MIDFOOT ASSOCIATED WITH TYPE 2 DIABETES MELLITUS, WITH FAT LAYER EXPOSED: Status: RESOLVED | Noted: 2018-07-20 | Resolved: 2018-12-21

## 2018-12-21 PROBLEM — L97.422 DIABETIC ULCER OF LEFT MIDFOOT ASSOCIATED WITH TYPE 2 DIABETES MELLITUS, WITH FAT LAYER EXPOSED: Status: RESOLVED | Noted: 2018-07-20 | Resolved: 2018-12-21

## 2018-12-21 PROCEDURE — 99999 PR PBB SHADOW E&M-EST. PATIENT-LVL III: CPT | Mod: PBBFAC,,, | Performed by: FAMILY MEDICINE

## 2018-12-21 PROCEDURE — 99213 OFFICE O/P EST LOW 20 MIN: CPT | Mod: PBBFAC,PN | Performed by: FAMILY MEDICINE

## 2018-12-21 PROCEDURE — 99214 OFFICE O/P EST MOD 30 MIN: CPT | Mod: S$PBB,,, | Performed by: FAMILY MEDICINE

## 2018-12-21 NOTE — PROGRESS NOTES
Routine Office Visit    Patient Name: Fermin Metcalf Peer    : 1960  MRN: [id number]    Subjective:  Fermin is a 58 y.o. male who presents today for:    1. Diabetic shoes  Patient presenting today for diabetic shoes.  He was seen by podiatry at [facility] and has recently healed from a diabetic foot ulcer.  He has been taking all medications as prescribed and states blood sugars are doing well.  They have been slightly elevated recently as he currently has a kidney stone.      Past Medical History  Past Medical History:   Diagnosis Date    Diabetes mellitus, type 2        Past Surgical History  Past Surgical History:   Procedure Laterality Date    eye  surgeries      several     TOE AMPUTATION      several toes on R foot       Family History  Family History   Problem Relation Age of Onset    No Known Problems Mother     Diabetes Father        Social History  Social History     Socioeconomic History    Marital status: Single     Spouse name: Not on file    Number of children: Not on file    Years of education: Not on file    Highest education level: Not on file   Social Needs    Financial resource strain: Not on file    Food insecurity - worry: Not on file    Food insecurity - inability: Not on file    Transportation needs - medical: Not on file    Transportation needs - non-medical: Not on file   Occupational History    Not on file   Tobacco Use    Smoking status: Never Smoker    Smokeless tobacco: Never Used   Substance and Sexual Activity    Alcohol use: No    Drug use: No    Sexual activity: Not Currently     Partners: Female   Other Topics Concern    Not on file   Social History Narrative    Not on file       Current Medications  Current Outpatient Medications on File Prior to Visit   Medication Sig Dispense Refill    antiox #8/om3/dha/epa/lut/zeax (PRESERVISION AREDS 2, OMEGA-3, ORAL) Take by mouth.      cinnamon bark (CINNAMON) 500 mg capsule Take 500 mg by mouth once  daily.      ferrous sulfate 325 mg (65 mg iron) Tab tablet Take 325 mg by mouth daily with breakfast.      fish oil-omega-3 fatty acids 300-1,000 mg capsule Take by mouth once daily.      losartan (COZAAR) 25 MG tablet Take 1 tablet (25 mg total) by mouth once daily. 90 tablet 3    melatonin 10 mg Cap Take by mouth.      metFORMIN (GLUCOPHAGE) 850 MG tablet TAKE 2 TABLETS BY MOUTH IN THE MORNING AND 1 IN THE EVENING WITH MEALS 90 tablet 0    multivitamin (THERAGRAN) per tablet Take 1 tablet by mouth once daily.      ondansetron (ZOFRAN) 4 MG tablet Take 1 tablet (4 mg total) by mouth every 8 (eight) hours as needed (Nausea and vomiting). 12 tablet 0    oxyCODONE-acetaminophen (PERCOCET)  mg per tablet Take 1 tablet by mouth every 4 (four) hours as needed for Pain. 18 tablet 0    pioglitazone (ACTOS) 15 MG tablet pioglitazone 15 mg tablet      simvastatin (ZOCOR) 20 MG tablet simvastatin 20 mg tablet      tamsulosin (FLOMAX) 0.4 mg Cap Take 1 capsule (0.4 mg total) by mouth once daily. 10 capsule 0     No current facility-administered medications on file prior to visit.        Allergies   Review of patient's allergies indicates:  No Known Allergies    Review of Systems (Pertinent positives)  Review of Systems   Constitutional: Negative.    HENT: Negative.    Eyes: Negative.    Respiratory: Negative.    Cardiovascular: Negative.    Gastrointestinal: Negative.    Skin: Negative.          /80 (BP Location: Right arm, Patient Position: Sitting, BP Method: Medium (Manual))   Pulse 81   Temp 97.6 °F (36.4 °C) (Oral)   Resp 16   Ht 6' (1.829 m)   Wt 103.8 kg (228 lb 13.4 oz)   SpO2 96%   BMI 31.04 kg/m²     GENERAL APPEARANCE: in no apparent distress and well developed and well nourished  HEENT: PERRL, EOMI, Sclera clear, anicteric, Oropharynx clear, no lesions, Neck supple with midline trachea  NECK: normal, supple, no adenopathy, thyroid normal in size  RESPIRATORY: appears well, vitals  normal, no respiratory distress, acyanotic, normal RR, chest clear, no wheezing, crepitations, rhonchi, normal symmetric air entry  HEART: regular rate and rhythm, S1, S2 normal, no murmur, click, rub or gallop.    ABDOMEN: abdomen is soft without tenderness, no masses, no hernias, no organomegaly, no rebound, no guarding. Suprapubic tenderness absent. No CVA tenderness.  SKIN: no rashes, no wounds, no other lesions  PSYCH: Alert, oriented x 3, thought content appropriate, speech normal, pleasant and cooperative, good eye contact, well groomed    Assessment/Plan:  Fermin Henson is a 58 y.o. male who presents today for :    Fermin was seen today for orders.    Diagnoses and all orders for this visit:    Type 2 diabetes mellitus with diabetic polyneuropathy, without long-term current use of insulin  -     DIABETIC SHOES FOR HOME USE      1.  Cosigned podiatry prescription for diabetic shoes  2.  Follow up with urology for renal stone  3.  Take all medications as prescribed  4.  Follow up as needed      See Jean-Baptiste MD

## 2018-12-28 ENCOUNTER — OFFICE VISIT (OUTPATIENT)
Dept: URGENT CARE | Facility: CLINIC | Age: 58
End: 2018-12-28
Payer: MEDICAID

## 2018-12-28 VITALS
DIASTOLIC BLOOD PRESSURE: 88 MMHG | HEIGHT: 72 IN | SYSTOLIC BLOOD PRESSURE: 150 MMHG | OXYGEN SATURATION: 98 % | BODY MASS INDEX: 30.88 KG/M2 | WEIGHT: 228 LBS | HEART RATE: 107 BPM | TEMPERATURE: 97 F

## 2018-12-28 DIAGNOSIS — J34.0 ABSCESS OF EXTERNAL NOSE: Primary | ICD-10-CM

## 2018-12-28 PROCEDURE — 99214 OFFICE O/P EST MOD 30 MIN: CPT | Mod: S$GLB,,, | Performed by: SURGERY

## 2018-12-28 RX ORDER — MUPIROCIN 20 MG/G
OINTMENT TOPICAL 3 TIMES DAILY
Qty: 1 TUBE | Refills: 0 | Status: SHIPPED | OUTPATIENT
Start: 2018-12-28 | End: 2019-02-01

## 2018-12-28 RX ORDER — SULFAMETHOXAZOLE AND TRIMETHOPRIM 800; 160 MG/1; MG/1
2 TABLET ORAL 2 TIMES DAILY
Qty: 28 TABLET | Refills: 0 | Status: SHIPPED | OUTPATIENT
Start: 2018-12-28 | End: 2019-01-04

## 2018-12-28 RX ORDER — IBUPROFEN 800 MG/1
800 TABLET ORAL EVERY 8 HOURS PRN
Qty: 60 TABLET | Refills: 0 | Status: SHIPPED | OUTPATIENT
Start: 2018-12-28 | End: 2019-03-25 | Stop reason: CLARIF

## 2018-12-28 NOTE — PROGRESS NOTES
Subjective:       Patient ID: Fermin Henson is a 58 y.o. male.    Vitals:  height is 6' (1.829 m) and weight is 103.4 kg (228 lb). His temperature is 97.2 °F (36.2 °C). His blood pressure is 150/88 (abnormal) and his pulse is 107. His oxygen saturation is 98%.     Chief Complaint: Abscess (nose)    Pt reports having an abscess on his nose with redness and tenderness and pain       Abscess   Chronicity:  New  Location:  Face  Associated Symptoms: no fever, no chills  Pain Scale:  6/10  Ineffective treatments: acne med.      Constitution: Negative for chills, fatigue and fever.   HENT: Negative for congestion and sore throat.    Neck: Negative for painful lymph nodes.   Cardiovascular: Negative for chest pain and leg swelling.   Eyes: Negative for double vision and blurred vision.   Respiratory: Negative for cough and shortness of breath.    Gastrointestinal: Negative for nausea, vomiting and diarrhea.   Genitourinary: Negative for dysuria, frequency and urgency.   Musculoskeletal: Negative for joint pain, joint swelling, muscle cramps and muscle ache.   Skin: Positive for abscess. Negative for color change, pale, rash and erythema.   Allergic/Immunologic: Negative for seasonal allergies.   Neurological: Negative for dizziness, history of vertigo, light-headedness, passing out and headaches.   Hematologic/Lymphatic: Negative for swollen lymph nodes, easy bruising/bleeding and history of blood clots. Does not bruise/bleed easily.   Psychiatric/Behavioral: Negative for nervous/anxious, sleep disturbance and depression. The patient is not nervous/anxious.        Objective:      Physical Exam   Constitutional: He is oriented to person, place, and time. He appears well-developed and well-nourished.   HENT:   Head: Normocephalic and atraumatic. Head is without abrasion, without contusion and without laceration.       Right Ear: External ear normal.   Left Ear: External ear normal.   Nose: Nose normal.   Mouth/Throat:  Oropharynx is clear and moist.   Eyes: Conjunctivae, EOM and lids are normal. Pupils are equal, round, and reactive to light.   Neck: Trachea normal, full passive range of motion without pain and phonation normal. Neck supple.   Cardiovascular: Normal rate, regular rhythm and normal heart sounds.   Pulmonary/Chest: Effort normal and breath sounds normal. No stridor. No respiratory distress.   Musculoskeletal: Normal range of motion.   Neurological: He is alert and oriented to person, place, and time.   Skin: Skin is warm, dry and intact. Capillary refill takes less than 2 seconds. No abrasion, no bruising, no burn, no ecchymosis, no laceration, no lesion and no rash noted. No erythema.   Psychiatric: He has a normal mood and affect. His speech is normal and behavior is normal. Judgment and thought content normal. Cognition and memory are normal.   Nursing note and vitals reviewed.      Assessment:       1. Abscess of external nose        Plan:         Abscess of external nose  -     ibuprofen (ADVIL,MOTRIN) 800 MG tablet; Take 1 tablet (800 mg total) by mouth every 8 (eight) hours as needed for Pain.  Dispense: 60 tablet; Refill: 0  -     sulfamethoxazole-trimethoprim 800-160mg (BACTRIM DS) 800-160 mg Tab; Take 2 tablets by mouth 2 (two) times daily. for 7 days  Dispense: 28 tablet; Refill: 0  -     mupirocin (BACTROBAN) 2 % ointment; Apply topically 3 (three) times daily.  Dispense: 1 Tube; Refill: 0            Patient Instructions     Abscess (Antibiotic Treatment Only)  An abscess (sometimes called a boil) happens when bacteria get trapped under the skin and start to grow. Pus forms inside the abscess as the body responds to the bacteria. An abscess can happen with an insect bite, ingrown hair, blocked oil gland, pimple, cyst, or puncture wound.  In the early stages, your wound may be red and tender. For this stage, you may get antibiotics. If the abscess does not get better with antibiotics, it will need to be  drained with a small cut.  Home care  These tips will help you care for your abscess at home:  · Soak the wound in hot water or apply hot packs (small towel soaked in hot water) to the area for 20 minutes at a time. Do this 3 to 4 times a day.  · Do not cut, squeeze, or pop the boil yourself.  · Apply antibiotic cream or ointment to the skin 3 to 4 times a day, unless something else was prescribed. Some ointments include an antibiotic plus a pain reliever.  · If your doctor prescribed antibiotics, do not stop taking them until you have finished the medicine or the doctor tells you to stop.  · You may use an over-the-counter pain medicine to control pain, unless another pain medicine was prescribed. If you have chronic liver or kidney disease or ever had a stomach ulcer or gastrointestinal bleeding, talk with your doctor before using these any of these.  Follow-up care  Follow up with your healthcare provider, or as advised. Check your wound each day for the signs of worsening infection listed below.  When to seek medical advice  Get prompt medical attention if any of these occur:  · An increase in redness or swelling  · Red streaks in the skin leading away from the abscess  · An increase in local pain or swelling  · Fever of 100.4ºF (38ºC) or higher, or as directed by your healthcare provider  · Pus or fluid coming from the abscess  · Boil returns after getting better  Date Last Reviewed: 9/1/2016  © 9710-8437 [employer]. [address] [place] [address], [place] [address]. All rights reserved. This information is not intended as a substitute for professional medical care. Always follow your healthcare professional's instructions.

## 2018-12-28 NOTE — PATIENT INSTRUCTIONS
Abscess (Antibiotic Treatment Only)  An abscess (sometimes called a boil) happens when bacteria get trapped under the skin and start to grow. Pus forms inside the abscess as the body responds to the bacteria. An abscess can happen with an insect bite, ingrown hair, blocked oil gland, pimple, cyst, or puncture wound.  In the early stages, your wound may be red and tender. For this stage, you may get antibiotics. If the abscess does not get better with antibiotics, it will need to be drained with a small cut.  Home care  These tips will help you care for your abscess at home:  · Soak the wound in hot water or apply hot packs (small towel soaked in hot water) to the area for 20 minutes at a time. Do this 3 to 4 times a day.  · Do not cut, squeeze, or pop the boil yourself.  · Apply antibiotic cream or ointment to the skin 3 to 4 times a day, unless something else was prescribed. Some ointments include an antibiotic plus a pain reliever.  · If your doctor prescribed antibiotics, do not stop taking them until you have finished the medicine or the doctor tells you to stop.  · You may use an over-the-counter pain medicine to control pain, unless another pain medicine was prescribed. If you have chronic liver or kidney disease or ever had a stomach ulcer or gastrointestinal bleeding, talk with your doctor before using these any of these.  Follow-up care  Follow up with your healthcare provider, or as advised. Check your wound each day for the signs of worsening infection listed below.  When to seek medical advice  Get prompt medical attention if any of these occur:  · An increase in redness or swelling  · Red streaks in the skin leading away from the abscess  · An increase in local pain or swelling  · Fever of 100.4ºF (38ºC) or higher, or as directed by your healthcare provider  · Pus or fluid coming from the abscess  · Boil returns after getting better  Date Last Reviewed: 9/1/2016  © 0121-7523 [employer] [address] [place] [address]. All rights reserved. This information is not intended as a substitute for professional medical care. Always follow your healthcare professional's instructions.

## 2018-12-31 RX ORDER — METFORMIN HYDROCHLORIDE 850 MG/1
TABLET ORAL
Qty: 90 TABLET | Refills: 0 | Status: SHIPPED | OUTPATIENT
Start: 2018-12-31 | End: 2019-01-26 | Stop reason: SDUPTHER

## 2019-01-25 ENCOUNTER — TELEPHONE (OUTPATIENT)
Dept: FAMILY MEDICINE | Facility: CLINIC | Age: 59
End: 2019-01-25

## 2019-01-25 RX ORDER — SIMVASTATIN 20 MG/1
TABLET, FILM COATED ORAL
Qty: 90 TABLET | Refills: 1 | Status: SHIPPED | OUTPATIENT
Start: 2019-01-25 | End: 2019-01-29 | Stop reason: SDUPTHER

## 2019-01-25 NOTE — TELEPHONE ENCOUNTER
----- Message from Alok Reynolds sent at 1/25/2019 11:20 AM CST -----  Contact: [facility] [phone]  REFILL: simvastatin (ZOCOR) 20 MG tablet    PHARMACY: [id number]  [place] [address]

## 2019-01-25 NOTE — TELEPHONE ENCOUNTER
----- Message from Maliha Don sent at 1/25/2019 12:04 PM CST -----  Contact: [facility]  Is calling to speak with staff to get correct dosage for medication simvastatin (ZOCOR) 20 MG tablet. Please call [phone]

## 2019-01-28 RX ORDER — METFORMIN HYDROCHLORIDE 850 MG/1
TABLET ORAL
Qty: 90 TABLET | Refills: 0 | Status: SHIPPED | OUTPATIENT
Start: 2019-01-28 | End: 2019-02-01

## 2019-01-28 RX ORDER — METFORMIN HYDROCHLORIDE 850 MG/1
TABLET ORAL
Qty: 90 TABLET | Refills: 0 | Status: SHIPPED | OUTPATIENT
Start: 2019-01-28 | End: 2020-07-29

## 2019-01-28 NOTE — TELEPHONE ENCOUNTER
----- Message from Tuyet Goodwin sent at 1/28/2019 10:19 AM CST -----  Contact: [facility] [phone]  Requesting clarification on simvastatin (ZOCOR) 20 MG tablet  .  [facility] - [place] - [address] [facility].  [facility] [facility].  [place] [address]  Phone: [phone] Fax: [phone]

## 2019-01-28 NOTE — TELEPHONE ENCOUNTER
[facility] needs to confirm dosage and directions for Simvastatin. Please advise.

## 2019-01-29 ENCOUNTER — TELEPHONE (OUTPATIENT)
Dept: FAMILY MEDICINE | Facility: CLINIC | Age: 59
End: 2019-01-29

## 2019-01-29 RX ORDER — SIMVASTATIN 20 MG/1
TABLET, FILM COATED ORAL
Qty: 90 TABLET | Refills: 1 | Status: CANCELLED | OUTPATIENT
Start: 2019-01-29

## 2019-01-29 RX ORDER — PIOGLITAZONEHYDROCHLORIDE 15 MG/1
TABLET ORAL
Status: CANCELLED | OUTPATIENT
Start: 2019-01-29

## 2019-01-29 RX ORDER — SIMVASTATIN 20 MG/1
TABLET, FILM COATED ORAL
Qty: 90 TABLET | Refills: 1 | Status: SHIPPED | OUTPATIENT
Start: 2019-01-29 | End: 2019-01-30

## 2019-01-29 NOTE — TELEPHONE ENCOUNTER
----- Message from Priscilla Chacon sent at 1/29/2019  9:53 AM CST -----  Contact: [facility] [phone]  Can the clinic reply in [facility]:       Please refill the medication(s) listed below. The patient can be reached at this phone number (_____) once it is called into the pharmacy.      Medication #1simvastatin (ZOCOR) 20 MG tablet - needs instructions on how to take it. Call pharmacy      Medication #2      Preferred Pharmacy:

## 2019-01-29 NOTE — TELEPHONE ENCOUNTER
----- Message from Esperanza Gambino sent at 1/29/2019  1:46 PM CST -----  Contact: pt  Patient need refill sent to [facility]........ Pioglitazone 15 mg tab   Simvastatin 20mg, pt states [facility] does not have script showing sent 1-25-19.....please advise

## 2019-01-30 ENCOUNTER — TELEPHONE (OUTPATIENT)
Dept: FAMILY MEDICINE | Facility: CLINIC | Age: 59
End: 2019-01-30

## 2019-01-30 DIAGNOSIS — E11.42 TYPE 2 DIABETES MELLITUS WITH DIABETIC POLYNEUROPATHY, WITHOUT LONG-TERM CURRENT USE OF INSULIN: Primary | ICD-10-CM

## 2019-01-30 RX ORDER — SIMVASTATIN 20 MG/1
20 TABLET, FILM COATED ORAL NIGHTLY
Qty: 90 TABLET | Refills: 1 | Status: SHIPPED | OUTPATIENT
Start: 2019-01-30 | End: 2019-08-05 | Stop reason: SDUPTHER

## 2019-01-30 NOTE — TELEPHONE ENCOUNTER
----- Message from Priscilla Dennis sent at 1/30/2019  9:57 AM CST -----  Contact: Pharmacist with [facility] [phone]  Second request    Pharmacist calling to request new RX with directions for [simvastatin (ZOCOR) 20 MG tablet]. Please call to advise. Thank you.    [facility] - [place] - [address] [facility].  [facility] [facility].  [place] [address]  Phone: [phone] Fax: [phone]

## 2019-02-01 ENCOUNTER — LAB VISIT (OUTPATIENT)
Dept: LAB | Facility: HOSPITAL | Age: 59
End: 2019-02-01
Attending: UROLOGY
Payer: MEDICAID

## 2019-02-01 ENCOUNTER — OFFICE VISIT (OUTPATIENT)
Dept: UROLOGY | Facility: CLINIC | Age: 59
End: 2019-02-01
Payer: MEDICAID

## 2019-02-01 VITALS
DIASTOLIC BLOOD PRESSURE: 82 MMHG | SYSTOLIC BLOOD PRESSURE: 132 MMHG | BODY MASS INDEX: 31.62 KG/M2 | WEIGHT: 233.44 LBS | HEIGHT: 72 IN | HEART RATE: 100 BPM

## 2019-02-01 DIAGNOSIS — N13.8 BPH WITH URINARY OBSTRUCTION: ICD-10-CM

## 2019-02-01 DIAGNOSIS — R35.1 NOCTURIA: ICD-10-CM

## 2019-02-01 DIAGNOSIS — N40.1 BPH WITH URINARY OBSTRUCTION: ICD-10-CM

## 2019-02-01 DIAGNOSIS — N20.0 KIDNEY STONE: Primary | ICD-10-CM

## 2019-02-01 DIAGNOSIS — N52.9 ED (ERECTILE DYSFUNCTION) OF ORGANIC ORIGIN: ICD-10-CM

## 2019-02-01 LAB
BILIRUB SERPL-MCNC: NORMAL MG/DL
BLOOD URINE, POC: NORMAL
COLOR, POC UA: YELLOW
GLUCOSE UR QL STRIP: NORMAL
KETONES UR QL STRIP: NORMAL
LEUKOCYTE ESTERASE URINE, POC: NORMAL
NITRITE, POC UA: NORMAL
PH, POC UA: 5
PROTEIN, POC: NORMAL
SPECIFIC GRAVITY, POC UA: 1000
UROBILINOGEN, POC UA: NORMAL

## 2019-02-01 PROCEDURE — 99999 PR PBB SHADOW E&M-EST. PATIENT-LVL III: CPT | Mod: PBBFAC,,, | Performed by: UROLOGY

## 2019-02-01 PROCEDURE — 99204 PR OFFICE/OUTPT VISIT, NEW, LEVL IV, 45-59 MIN: ICD-10-PCS | Mod: S$PBB,,, | Performed by: UROLOGY

## 2019-02-01 PROCEDURE — 81001 URINALYSIS AUTO W/SCOPE: CPT | Mod: PBBFAC | Performed by: UROLOGY

## 2019-02-01 PROCEDURE — 99999 PR PBB SHADOW E&M-EST. PATIENT-LVL III: ICD-10-PCS | Mod: PBBFAC,,, | Performed by: UROLOGY

## 2019-02-01 PROCEDURE — 99213 OFFICE O/P EST LOW 20 MIN: CPT | Mod: PBBFAC | Performed by: UROLOGY

## 2019-02-01 PROCEDURE — 99204 OFFICE O/P NEW MOD 45 MIN: CPT | Mod: S$PBB,,, | Performed by: UROLOGY

## 2019-02-01 PROCEDURE — 84153 ASSAY OF PSA TOTAL: CPT

## 2019-02-01 PROCEDURE — 36415 COLL VENOUS BLD VENIPUNCTURE: CPT

## 2019-02-01 NOTE — PROGRESS NOTES
Subjective:       Patient ID: Fermin Henson is a 58 y.o. male who was referred by See Jean-Baptiste MD    Chief Complaint:   Chief Complaint   Patient presents with    Nephrolithiasis     new pt coming in for kidney stones pain an symptoms now has resolved       Urolithiasis  Patient complains of right flank pain with radiation to the abdomen. Onset of symptoms was abrupt starting 1 month ago with completely resolved course since that time. Patient describes the pain as aching, intermittent and rated as mild. The patient has had no nausea/no vomiting and no diaphoresis. There has been no fever or chills. The patient is not complaining of dysuria or frequency. Risk factors for urolithiasis: family history of stone disease and poor fluid intake.    Benign Prostatic Hyperplasia  He patient reports frequency and nocturia three times a night. He denies urgency. The patient states symptoms are of mild severity. Onset of symptoms was several years ago and was gradual in onset.  He has no personal history and no family history of prostate cancer. He reports a history of kidney stones. He denies flank pain, gross hematuria and recurrent UTI.  He is currently taking no prostate medications.    Erectile Dysfunction  Patient complains of erectile dysfunction. Onset of dysfunction was several years ago and was gradual in onset.  Patient states the nature of difficulty is both attaining and maintaining erection. Full erections occur never. Partial erections occur with intercourse. Libido is not affected. Risk factors for ED include diabetes mellitus. Patient denies history of pelvic radiation. Previous treatment of ED includes none.        ACTIVE MEDICAL ISSUES:  Patient Active Problem List   Diagnosis    Type 2 diabetes mellitus with diabetic polyneuropathy, without long-term current use of insulin    Other proteinuria       PAST MEDICAL HISTORY  Past Medical History:   Diagnosis Date    Diabetes mellitus, type 2      Kidney stones        PAST SURGICAL HISTORY:  Past Surgical History:   Procedure Laterality Date    eye  surgeries      several     TOE AMPUTATION      several toes on R foot       SOCIAL HISTORY:  Social History     Tobacco Use    Smoking status: Never Smoker    Smokeless tobacco: Never Used   Substance Use Topics    Alcohol use: No    Drug use: No       FAMILY HISTORY:  Family History   Problem Relation Age of Onset    No Known Problems Mother     Diabetes Father        ALLERGIES AND MEDICATIONS: updated and reviewed.  Review of patient's allergies indicates:  No Known Allergies  Current Outpatient Medications   Medication Sig    antiox #8/om3/dha/epa/lut/zeax (PRESERVISION AREDS 2, OMEGA-3, ORAL) Take by mouth.    cinnamon bark (CINNAMON) 500 mg capsule Take 500 mg by mouth once daily.    ferrous sulfate 325 mg (65 mg iron) Tab tablet Take 325 mg by mouth daily with breakfast.    fish oil-omega-3 fatty acids 300-1,000 mg capsule Take by mouth once daily.    ibuprofen (ADVIL,MOTRIN) 800 MG tablet Take 1 tablet (800 mg total) by mouth every 8 (eight) hours as needed for Pain.    losartan (COZAAR) 25 MG tablet Take 1 tablet (25 mg total) by mouth once daily.    melatonin 10 mg Cap Take by mouth.    metFORMIN (GLUCOPHAGE) 850 MG tablet TAKE 2 TABLETS BY MOUTH IN THE MORNING AND 1 IN THE EVENING WITH MEALS    pioglitazone (ACTOS) 15 MG tablet pioglitazone 15 mg tablet    simvastatin (ZOCOR) 20 MG tablet Take 1 tablet (20 mg total) by mouth every evening. simvastatin 20 mg tablet    multivitamin (THERAGRAN) per tablet Take 1 tablet by mouth once daily.     No current facility-administered medications for this visit.        Review of Systems   Constitutional: Negative for activity change, fatigue, fever and unexpected weight change.   HENT: Negative for congestion.    Eyes: Negative for redness.   Respiratory: Negative for chest tightness and shortness of breath.    Cardiovascular: Negative for chest  pain and leg swelling.   Gastrointestinal: Negative for abdominal pain, constipation, diarrhea, nausea and vomiting.   Genitourinary: Negative for dysuria, flank pain, frequency, hematuria, penile pain, penile swelling, scrotal swelling, testicular pain and urgency.   Musculoskeletal: Negative for arthralgias and back pain.   Neurological: Negative for dizziness and light-headedness.   Psychiatric/Behavioral: Negative for behavioral problems and confusion. The patient is not nervous/anxious.    All other systems reviewed and are negative.      Objective:      Vitals:    02/01/19 1449   BP: 132/82   Pulse: 100   Weight: 105.9 kg (233 lb 7.5 oz)   Height: 6' (1.829 m)     Physical Exam   Nursing note and vitals reviewed.  Constitutional: He is oriented to person, place, and time. He appears well-developed and well-nourished.   HENT:   Head: Normocephalic.   Eyes: Conjunctivae are normal.   Neck: Normal range of motion. No tracheal deviation present. No thyromegaly present.   Cardiovascular: Normal rate and normal heart sounds.    Pulmonary/Chest: Effort normal and breath sounds normal. No respiratory distress. He has no wheezes.   Abdominal: Soft. He exhibits no distension and no mass. There is no hepatosplenomegaly. There is no tenderness. There is no rebound, no guarding and no CVA tenderness. No hernia. Hernia confirmed negative in the right inguinal area and confirmed negative in the left inguinal area.   Genitourinary: Rectum normal, testes normal and penis normal. Rectal exam shows no external hemorrhoid, no mass and no tenderness. Prostate is enlarged. Prostate is not tender. Right testis shows no mass and no tenderness. Left testis shows no mass and no tenderness.       Musculoskeletal: He exhibits no edema or tenderness.   Lymphadenopathy: No inguinal adenopathy noted on the right or left side.   Neurological: He is alert and oriented to person, place, and time.   Skin: Skin is warm and dry. No rash noted. No  erythema.     Psychiatric: He has a normal mood and affect. His behavior is normal. Judgment and thought content normal.       Urine dipstick shows negative for all components.  Micro exam: negative for WBC's or RBC's.      CT reviewed: right renal pelvic stone, 1 cm. 3mm mid ureteral stone with mild hydroneprhosis    Assessment:       1. Kidney stone    2. BPH with urinary obstruction    3. Nocturia    4. ED (erectile dysfunction) of organic origin          Plan:       1. Kidney stone  TAHIR and KUB to see if he is a candidate for ESWL.    - POCT urinalysis, dipstick or tablet reag  - US Retroperitoneal Complete (Kidney and; Future  - X-Ray Abdomen AP 1 View; Future    2. BPH with urinary obstruction  Flomax when needed  - Prostate Specific Antigen, Diagnostic; Future    3. Nocturia  Limit evening fluids    4. ED (erectile dysfunction) of organic origin  Declines treatment now, Viagra when needed            Follow-up in about 1 week (around 2/8/2019) for Follow up, Review X-ray, Review labs.

## 2019-02-01 NOTE — LETTER
February 1, 2019      See Jean-Baptiste MD  [address] [facility]  [place] [address]           [facility]  [address] [facility] [address]  [place] [address]  Phone: [phone]  Fax: [phone]          Patient: Fermin Henson   MR Number: [id number]   YOB: 1960   Date of Visit: 2/1/2019       Dear Dr. See RAND Page:    Thank you for referring Fermin Henson to me for evaluation. Attached you will find relevant portions of my assessment and plan of care.    If you have questions, please do not hesitate to call me. I look forward to following Fermin Henson along with you.    Sincerely,    WHITNEY Bryant MD    Enclosure  CC:  No Recipients    If you would like to receive this communication electronically, please contact [email or web address] or [phone] to request more information on [employer] Link access.    For providers and/or their staff who would like to refer a patient to [facility], please contact us through our one-stop-shop provider referral line, [facility], at [phone].    If you feel you have received this communication in error or would no longer like to receive these types of communications, please e-mail [email or web address]

## 2019-02-02 LAB — COMPLEXED PSA SERPL-MCNC: 0.33 NG/ML

## 2019-02-07 ENCOUNTER — HOSPITAL ENCOUNTER (OUTPATIENT)
Dept: RADIOLOGY | Facility: HOSPITAL | Age: 59
Discharge: HOME | End: 2019-02-07
Attending: UROLOGY
Payer: MEDICAID

## 2019-02-07 DIAGNOSIS — N20.0 KIDNEY STONE: ICD-10-CM

## 2019-02-07 PROCEDURE — 74018 RADEX ABDOMEN 1 VIEW: CPT | Mod: 26,,, | Performed by: RADIOLOGY

## 2019-02-07 PROCEDURE — 76770 US EXAM ABDO BACK WALL COMP: CPT | Mod: TC

## 2019-02-07 PROCEDURE — 76770 US EXAM ABDO BACK WALL COMP: CPT | Mod: 26,,, | Performed by: RADIOLOGY

## 2019-02-07 PROCEDURE — 74018 XR ABDOMEN AP 1 VIEW: ICD-10-PCS | Mod: 26,,, | Performed by: RADIOLOGY

## 2019-02-07 PROCEDURE — 76770 US RETROPERITONEAL COMPLETE: ICD-10-PCS | Mod: 26,,, | Performed by: RADIOLOGY

## 2019-02-07 PROCEDURE — 74018 RADEX ABDOMEN 1 VIEW: CPT | Mod: TC,FY

## 2019-02-15 ENCOUNTER — OFFICE VISIT (OUTPATIENT)
Dept: UROLOGY | Facility: CLINIC | Age: 59
End: 2019-02-15
Payer: MEDICAID

## 2019-02-15 VITALS
WEIGHT: 233.69 LBS | SYSTOLIC BLOOD PRESSURE: 122 MMHG | BODY MASS INDEX: 31.65 KG/M2 | DIASTOLIC BLOOD PRESSURE: 70 MMHG | HEART RATE: 68 BPM | RESPIRATION RATE: 14 BRPM | HEIGHT: 72 IN

## 2019-02-15 DIAGNOSIS — N13.8 BPH WITH OBSTRUCTION/LOWER URINARY TRACT SYMPTOMS: ICD-10-CM

## 2019-02-15 DIAGNOSIS — N20.0 KIDNEY STONES: Primary | ICD-10-CM

## 2019-02-15 DIAGNOSIS — N40.1 BPH WITH OBSTRUCTION/LOWER URINARY TRACT SYMPTOMS: ICD-10-CM

## 2019-02-15 DIAGNOSIS — R35.1 NOCTURIA MORE THAN TWICE PER NIGHT: ICD-10-CM

## 2019-02-15 PROCEDURE — 99999 PR PBB SHADOW E&M-EST. PATIENT-LVL IV: CPT | Mod: PBBFAC,,, | Performed by: UROLOGY

## 2019-02-15 PROCEDURE — 99214 PR OFFICE/OUTPT VISIT, EST, LEVL IV, 30-39 MIN: ICD-10-PCS | Mod: S$PBB,,, | Performed by: UROLOGY

## 2019-02-15 PROCEDURE — 99214 OFFICE O/P EST MOD 30 MIN: CPT | Mod: S$PBB,,, | Performed by: UROLOGY

## 2019-02-15 PROCEDURE — 99999 PR PBB SHADOW E&M-EST. PATIENT-LVL IV: ICD-10-PCS | Mod: PBBFAC,,, | Performed by: UROLOGY

## 2019-02-15 PROCEDURE — 99214 OFFICE O/P EST MOD 30 MIN: CPT | Mod: PBBFAC | Performed by: UROLOGY

## 2019-02-15 RX ORDER — CIPROFLOXACIN 2 MG/ML
400 INJECTION, SOLUTION INTRAVENOUS
Status: CANCELLED | OUTPATIENT
Start: 2019-02-15

## 2019-02-15 NOTE — H&P
Subjective:       Patient ID: Fermin Henson is a 58 y.o. male who was referred by No ref. provider found    Chief Complaint:   Chief Complaint   Patient presents with    Follow-up     U/S and KUB results       Urolithiasis  Patient complains of right flank pain with radiation to the abdomen. Onset of symptoms was abrupt starting 1 month ago with completely resolved course since that time. Patient describes the pain as aching, intermittent and rated as mild. The patient has had no nausea/no vomiting and no diaphoresis. There has been no fever or chills. The patient is not complaining of dysuria or frequency. Risk factors for urolithiasis: family history of stone disease and poor fluid intake.    He is back with an ultrasound and KUB.  He is no longer having pain.    Benign Prostatic Hyperplasia  He patient reports frequency and nocturia three times a night. He denies urgency. The patient states symptoms are of mild severity. Onset of symptoms was several years ago and was gradual in onset.  He has no personal history and no family history of prostate cancer. He reports a history of kidney stones. He denies flank pain, gross hematuria and recurrent UTI.  He is currently taking no prostate medications.    Erectile Dysfunction  Patient complains of erectile dysfunction. Onset of dysfunction was several years ago and was gradual in onset.  Patient states the nature of difficulty is both attaining and maintaining erection. Full erections occur never. Partial erections occur with intercourse. Libido is not affected. Risk factors for ED include diabetes mellitus. Patient denies history of pelvic radiation. Previous treatment of ED includes none.    ACTIVE MEDICAL ISSUES:  Patient Active Problem List   Diagnosis    Type 2 diabetes mellitus with diabetic polyneuropathy, without long-term current use of insulin    Other proteinuria       PAST MEDICAL HISTORY  Past Medical History:   Diagnosis Date    Diabetes mellitus,  type 2     Kidney stones        PAST SURGICAL HISTORY:  Past Surgical History:   Procedure Laterality Date    eye  surgeries      several     TOE AMPUTATION      several toes on R foot       SOCIAL HISTORY:  Social History     Tobacco Use    Smoking status: Never Smoker    Smokeless tobacco: Never Used   Substance Use Topics    Alcohol use: No    Drug use: No       FAMILY HISTORY:  Family History   Problem Relation Age of Onset    No Known Problems Mother     Diabetes Father        ALLERGIES AND MEDICATIONS: updated and reviewed.  Review of patient's allergies indicates:  No Known Allergies  Current Outpatient Medications   Medication Sig    antiox #8/om3/dha/epa/lut/zeax (PRESERVISION AREDS 2, OMEGA-3, ORAL) Take by mouth.    cinnamon bark (CINNAMON) 500 mg capsule Take 500 mg by mouth once daily.    ferrous sulfate 325 mg (65 mg iron) Tab tablet Take 325 mg by mouth daily with breakfast.    fish oil-omega-3 fatty acids 300-1,000 mg capsule Take by mouth once daily.    ibuprofen (ADVIL,MOTRIN) 800 MG tablet Take 1 tablet (800 mg total) by mouth every 8 (eight) hours as needed for Pain.    losartan (COZAAR) 25 MG tablet Take 1 tablet (25 mg total) by mouth once daily.    melatonin 10 mg Cap Take by mouth.    metFORMIN (GLUCOPHAGE) 850 MG tablet TAKE 2 TABLETS BY MOUTH IN THE MORNING AND 1 IN THE EVENING WITH MEALS    multivitamin (THERAGRAN) per tablet Take 1 tablet by mouth once daily.    pioglitazone (ACTOS) 15 MG tablet pioglitazone 15 mg tablet    simvastatin (ZOCOR) 20 MG tablet Take 1 tablet (20 mg total) by mouth every evening. simvastatin 20 mg tablet     No current facility-administered medications for this visit.        Review of Systems   Constitutional: Negative for activity change, fatigue, fever and unexpected weight change.   HENT: Negative for congestion.    Eyes: Negative for redness.   Respiratory: Negative for chest tightness and shortness of breath.    Cardiovascular: Negative  for chest pain and leg swelling.   Gastrointestinal: Negative for abdominal pain, constipation, diarrhea, nausea and vomiting.   Genitourinary: Negative for dysuria, flank pain, frequency, hematuria, penile pain, penile swelling, scrotal swelling, testicular pain and urgency.   Musculoskeletal: Negative for arthralgias and back pain.   Neurological: Negative for dizziness and light-headedness.   Psychiatric/Behavioral: Negative for behavioral problems and confusion. The patient is not nervous/anxious.    All other systems reviewed and are negative.      Objective:      Vitals:    02/15/19 1419   BP: 122/70   Pulse: 68   Resp: 14   Weight: 106 kg (233 lb 11 oz)   Height: 6' (1.829 m)     Physical Exam   Nursing note and vitals reviewed.  Constitutional: He is oriented to person, place, and time. He appears well-developed and well-nourished.   HENT:   Head: Normocephalic.   Eyes: Conjunctivae are normal.   Neck: Normal range of motion. Neck supple. No tracheal deviation present. No thyromegaly present.   Cardiovascular: Normal rate and normal heart sounds.    Pulmonary/Chest: Effort normal and breath sounds normal. No respiratory distress. He has no wheezes.   Abdominal: Soft. Bowel sounds are normal. There is no hepatosplenomegaly. There is no tenderness. There is no rebound and no CVA tenderness. No hernia.   Musculoskeletal: Normal range of motion. He exhibits no edema or tenderness.   Lymphadenopathy:     He has no cervical adenopathy.   Neurological: He is alert and oriented to person, place, and time.   Skin: Skin is warm and dry. No rash noted. No erythema.     Psychiatric: He has a normal mood and affect. His behavior is normal. Judgment and thought content normal.       Urine dipstick shows negative for all components.  Micro exam: negative for WBC's or RBC's.    TAHIR: hydronephrosis resolved  KUB: hyperdensity on right side consistent with the right renal pelvic stone seen on CT in December.      Assessment:        1. Kidney stones    2. BPH with obstruction/lower urinary tract symptoms    3. Nocturia more than twice per night          Plan:       1. Kidney stones  Right ESWL on Monday 3/18/2019    2. BPH with obstruction/lower urinary tract symptoms  stable    3. Nocturia more than twice per night  Limit evening fluids            Follow-up in about 7 weeks (around 4/5/2019) for Follow up, Review X-ray.

## 2019-02-18 ENCOUNTER — TELEPHONE (OUTPATIENT)
Dept: UROLOGY | Facility: CLINIC | Age: 59
End: 2019-02-18

## 2019-02-19 ENCOUNTER — TELEPHONE (OUTPATIENT)
Dept: UROLOGY | Facility: CLINIC | Age: 59
End: 2019-02-19

## 2019-02-19 NOTE — TELEPHONE ENCOUNTER
----- Message from Jazmyn Juares sent at 2/18/2019 10:54 AM CST -----  Contact: self - [phone]  Pt asking for a call back to ask to r/s procedure on 03/18. He states his ride has jury duty on this date.   
Please advise on a new date for ESWL. Thank you.   
patient accepted 4/1- surgery notified   
Name band;

## 2019-03-07 RX ORDER — PIOGLITAZONEHYDROCHLORIDE 15 MG/1
TABLET ORAL
Qty: 90 TABLET | Refills: 0 | Status: SHIPPED | OUTPATIENT
Start: 2019-03-07 | End: 2019-03-08 | Stop reason: SDUPTHER

## 2019-03-07 NOTE — TELEPHONE ENCOUNTER
----- Message from Brandee Sanders sent at 3/7/2019  8:49 AM CST -----  Contact: [facility]   Patient's pharmacy is requesting a refill on his medication. They need a new script for it.       pioglitazone (ACTOS) 15 MG tablet        [id number]  [address]   [phone] (Phone)  [phone] (Fax)

## 2019-03-07 NOTE — TELEPHONE ENCOUNTER
----- Message from Tyesha Morales sent at 3/7/2019  9:22 AM CST -----  Contact: [facility]  Philip called to clarify dosing instructions. Please call to verify at [phone]        pioglitazone (ACTOS) 15 MG tablet 90 tablet             [id number]  [place], [place] - [address] [facility].

## 2019-03-08 ENCOUNTER — TELEPHONE (OUTPATIENT)
Dept: FAMILY MEDICINE | Facility: CLINIC | Age: 59
End: 2019-03-08

## 2019-03-08 DIAGNOSIS — E11.9 TYPE 2 DIABETES MELLITUS WITHOUT COMPLICATION, WITHOUT LONG-TERM CURRENT USE OF INSULIN: Primary | ICD-10-CM

## 2019-03-08 RX ORDER — PIOGLITAZONEHYDROCHLORIDE 15 MG/1
TABLET ORAL
Qty: 90 TABLET | Refills: 0 | Status: SHIPPED | OUTPATIENT
Start: 2019-03-08 | End: 2019-03-12

## 2019-03-11 ENCOUNTER — TELEPHONE (OUTPATIENT)
Dept: FAMILY MEDICINE | Facility: CLINIC | Age: 59
End: 2019-03-11

## 2019-03-11 DIAGNOSIS — E11.9 TYPE 2 DIABETES MELLITUS WITHOUT COMPLICATION, WITHOUT LONG-TERM CURRENT USE OF INSULIN: ICD-10-CM

## 2019-03-11 NOTE — TELEPHONE ENCOUNTER
----- Message from Brandee Sanders sent at 3/11/2019 11:21 AM CDT -----  Contact: [facility]   Patient's pharmacy need the doctor to call in the directions for patient's medication.         pioglitazone (ACTOS) 15 MG tablet      [id number] - [place]  [address] [facility]

## 2019-03-12 RX ORDER — PIOGLITAZONEHYDROCHLORIDE 15 MG/1
15 TABLET ORAL DAILY
Qty: 90 TABLET | Refills: 0 | Status: SHIPPED | OUTPATIENT
Start: 2019-03-12 | End: 2019-04-17 | Stop reason: SDUPTHER

## 2019-03-25 ENCOUNTER — HOSPITAL ENCOUNTER (OUTPATIENT)
Dept: PREADMISSION TESTING | Facility: HOSPITAL | Age: 59
Discharge: HOME | End: 2019-03-25
Attending: UROLOGY
Payer: MEDICAID

## 2019-03-25 VITALS
SYSTOLIC BLOOD PRESSURE: 141 MMHG | BODY MASS INDEX: 31.18 KG/M2 | HEART RATE: 100 BPM | HEIGHT: 72 IN | WEIGHT: 230.19 LBS | TEMPERATURE: 97 F | OXYGEN SATURATION: 97 % | RESPIRATION RATE: 18 BRPM | DIASTOLIC BLOOD PRESSURE: 85 MMHG

## 2019-03-25 DIAGNOSIS — Z01.818 PREOP TESTING: Primary | ICD-10-CM

## 2019-03-25 DIAGNOSIS — N20.0 KIDNEY STONES: ICD-10-CM

## 2019-03-25 LAB
ANION GAP SERPL CALC-SCNC: 11 MMOL/L (ref 8–16)
BASOPHILS # BLD AUTO: 0.03 K/UL (ref 0–0.2)
BASOPHILS NFR BLD: 0.4 % (ref 0–1.9)
BUN SERPL-MCNC: 16 MG/DL (ref 8–23)
CALCIUM SERPL-MCNC: 10.1 MG/DL (ref 8.7–10.5)
CHLORIDE SERPL-SCNC: 104 MMOL/L (ref 95–110)
CO2 SERPL-SCNC: 26 MMOL/L (ref 23–29)
CREAT SERPL-MCNC: 1 MG/DL (ref 0.5–1.4)
DIFFERENTIAL METHOD: ABNORMAL
EOSINOPHIL # BLD AUTO: 0.4 K/UL (ref 0–0.5)
EOSINOPHIL NFR BLD: 5.9 % (ref 0–8)
ERYTHROCYTE [DISTWIDTH] IN BLOOD BY AUTOMATED COUNT: 13.7 % (ref 11.5–14.5)
EST. GFR  (AFRICAN AMERICAN): >60 ML/MIN/1.73 M^2
EST. GFR  (NON AFRICAN AMERICAN): >60 ML/MIN/1.73 M^2
GLUCOSE SERPL-MCNC: 230 MG/DL (ref 70–110)
HCT VFR BLD AUTO: 39 % (ref 40–54)
HGB BLD-MCNC: 13 G/DL (ref 14–18)
LYMPHOCYTES # BLD AUTO: 1.5 K/UL (ref 1–4.8)
LYMPHOCYTES NFR BLD: 21.1 % (ref 18–48)
MCH RBC QN AUTO: 31.9 PG (ref 27–31)
MCHC RBC AUTO-ENTMCNC: 33.3 G/DL (ref 32–36)
MCV RBC AUTO: 96 FL (ref 82–98)
MONOCYTES # BLD AUTO: 0.7 K/UL (ref 0.3–1)
MONOCYTES NFR BLD: 9.8 % (ref 4–15)
NEUTROPHILS # BLD AUTO: 4.3 K/UL (ref 1.8–7.7)
NEUTROPHILS NFR BLD: 62.8 % (ref 38–73)
PLATELET # BLD AUTO: 206 K/UL (ref 150–350)
PMV BLD AUTO: 9.1 FL (ref 9.2–12.9)
POTASSIUM SERPL-SCNC: 4.6 MMOL/L (ref 3.5–5.1)
RBC # BLD AUTO: 4.08 M/UL (ref 4.6–6.2)
SODIUM SERPL-SCNC: 141 MMOL/L (ref 136–145)
WBC # BLD AUTO: 6.92 K/UL (ref 3.9–12.7)

## 2019-03-25 PROCEDURE — 93005 ELECTROCARDIOGRAM TRACING: CPT

## 2019-03-25 PROCEDURE — 85025 COMPLETE CBC W/AUTO DIFF WBC: CPT

## 2019-03-25 PROCEDURE — 93010 EKG 12-LEAD: ICD-10-PCS | Mod: ,,, | Performed by: INTERNAL MEDICINE

## 2019-03-25 PROCEDURE — 80048 BASIC METABOLIC PNL TOTAL CA: CPT

## 2019-03-25 PROCEDURE — 93010 ELECTROCARDIOGRAM REPORT: CPT | Mod: ,,, | Performed by: INTERNAL MEDICINE

## 2019-03-25 PROCEDURE — 36415 COLL VENOUS BLD VENIPUNCTURE: CPT

## 2019-03-25 NOTE — DISCHARGE INSTRUCTIONS
"Your procedure  is scheduled for _4/1/2019_________.    Call [phone] between 2pm and 5pm on __3/29/2019_____to find out your arrival time for the day of surgery.    Report to Same Day Surgery Unit at ____ AM on the 2nd floor of the hospital.  Use the front entrance of the hospital.  The front doors of the hospital open promptly at 5:30am.  If you need wheelchair assistance, call [phone] from your cell phone, or call "0" from the courtesy phone in the lobby.    Important instructions:   Do not eat or drink after 12 midnight, including water.  It is okay to brush your teeth.  Do not have gum, candy or mints.     Take only these medications with a small swallow of water on the morning of your surgery __none____________    Do not take any diabetic medication on the morning of surgery unless instructed to do so by your doctor or pre op nurse.    Stop taking Aspirin, Ibuprofen, Motrin and Aleve , Fish oil, and Vitamin E for at least 7 days before your surgery. You may use Tylenol unless otherwise instructed by your doctor.         Please shower the night before and the morning of your surgery.       No shaving of procedural area at least 4-5 days before surgery due to increased risk of skin irritation and/or possible infection.     You may wear deodorant only.      Do not wear powder, body lotion or perfume/cologne.     Do not wear any jewelry or have any metal on your body.     You will be asked to remove any dentures or partials for the procedure.     Please bring any documents given to you by your doctor.     If you are going home on the same day of surgery, you must arrange for a family member or a friend to drive you home.  Public transportation is prohibited.  You will not be able to drive home if you were given anesthesia or sedation.     Wear loose fitting clothes allowing for bandages.     Please leave money and valuables home.       You may bring your cell phone.     Call the doctor if fever or " illness should occur before your surgery.    Call [phone] to contact us here if needed.

## 2019-03-31 ENCOUNTER — ANESTHESIA EVENT (OUTPATIENT)
Dept: SURGERY | Facility: HOSPITAL | Age: 59
End: 2019-03-31
Payer: MEDICAID

## 2019-04-01 ENCOUNTER — HOSPITAL ENCOUNTER (OUTPATIENT)
Facility: HOSPITAL | Age: 59
Discharge: HOME | End: 2019-04-01
Attending: UROLOGY | Admitting: UROLOGY
Payer: MEDICAID

## 2019-04-01 ENCOUNTER — ANESTHESIA (OUTPATIENT)
Dept: SURGERY | Facility: HOSPITAL | Age: 59
End: 2019-04-01
Payer: MEDICAID

## 2019-04-01 VITALS
RESPIRATION RATE: 16 BRPM | HEART RATE: 78 BPM | SYSTOLIC BLOOD PRESSURE: 165 MMHG | HEIGHT: 72 IN | OXYGEN SATURATION: 96 % | WEIGHT: 230.19 LBS | BODY MASS INDEX: 31.18 KG/M2 | TEMPERATURE: 98 F | DIASTOLIC BLOOD PRESSURE: 86 MMHG

## 2019-04-01 DIAGNOSIS — Z01.818 PREOP TESTING: ICD-10-CM

## 2019-04-01 DIAGNOSIS — N20.0 KIDNEY STONES: Primary | ICD-10-CM

## 2019-04-01 LAB — POCT GLUCOSE: 162 MG/DL (ref 70–110)

## 2019-04-01 PROCEDURE — 25000003 PHARM REV CODE 250: Performed by: UROLOGY

## 2019-04-01 PROCEDURE — 25000003 PHARM REV CODE 250: Performed by: ANESTHESIOLOGY

## 2019-04-01 PROCEDURE — 50590 PR FRAGMENT KIDNEY STONE/ ESWL: ICD-10-PCS | Mod: RT,,, | Performed by: UROLOGY

## 2019-04-01 PROCEDURE — D9220A PRA ANESTHESIA: Mod: ANES,,, | Performed by: ANESTHESIOLOGY

## 2019-04-01 PROCEDURE — D9220A PRA ANESTHESIA: ICD-10-PCS | Mod: CRNA,,, | Performed by: NURSE ANESTHETIST, CERTIFIED REGISTERED

## 2019-04-01 PROCEDURE — 36000705 HC OR TIME LEV I EA ADD 15 MIN: Performed by: UROLOGY

## 2019-04-01 PROCEDURE — 63600175 PHARM REV CODE 636 W HCPCS: Performed by: UROLOGY

## 2019-04-01 PROCEDURE — 00873 ANES LITHOTRP ESW WO WTR BTH: CPT | Performed by: UROLOGY

## 2019-04-01 PROCEDURE — 71000015 HC POSTOP RECOV 1ST HR: Performed by: UROLOGY

## 2019-04-01 PROCEDURE — 37000008 HC ANESTHESIA 1ST 15 MINUTES: Performed by: UROLOGY

## 2019-04-01 PROCEDURE — 27200651 HC AIRWAY, LMA: Performed by: NURSE ANESTHETIST, CERTIFIED REGISTERED

## 2019-04-01 PROCEDURE — 25000003 PHARM REV CODE 250: Performed by: NURSE ANESTHETIST, CERTIFIED REGISTERED

## 2019-04-01 PROCEDURE — 36000704 HC OR TIME LEV I 1ST 15 MIN: Performed by: UROLOGY

## 2019-04-01 PROCEDURE — 50590 FRAGMENTING OF KIDNEY STONE: CPT | Mod: RT,,, | Performed by: UROLOGY

## 2019-04-01 PROCEDURE — 37000009 HC ANESTHESIA EA ADD 15 MINS: Performed by: UROLOGY

## 2019-04-01 PROCEDURE — D9220A PRA ANESTHESIA: Mod: CRNA,,, | Performed by: NURSE ANESTHETIST, CERTIFIED REGISTERED

## 2019-04-01 PROCEDURE — D9220A PRA ANESTHESIA: ICD-10-PCS | Mod: ANES,,, | Performed by: ANESTHESIOLOGY

## 2019-04-01 PROCEDURE — 82962 GLUCOSE BLOOD TEST: CPT | Performed by: UROLOGY

## 2019-04-01 PROCEDURE — 63600175 PHARM REV CODE 636 W HCPCS: Performed by: NURSE ANESTHETIST, CERTIFIED REGISTERED

## 2019-04-01 PROCEDURE — 71000033 HC RECOVERY, INTIAL HOUR: Performed by: UROLOGY

## 2019-04-01 RX ORDER — LIDOCAINE HCL/PF 100 MG/5ML
SYRINGE (ML) INTRAVENOUS
Status: DISCONTINUED | OUTPATIENT
Start: 2019-04-01 | End: 2019-04-01

## 2019-04-01 RX ORDER — GLYCOPYRROLATE 0.2 MG/ML
INJECTION INTRAMUSCULAR; INTRAVENOUS
Status: DISCONTINUED | OUTPATIENT
Start: 2019-04-01 | End: 2019-04-01

## 2019-04-01 RX ORDER — MIDAZOLAM HYDROCHLORIDE 1 MG/ML
INJECTION, SOLUTION INTRAMUSCULAR; INTRAVENOUS
Status: DISCONTINUED | OUTPATIENT
Start: 2019-04-01 | End: 2019-04-01

## 2019-04-01 RX ORDER — FENTANYL CITRATE 50 UG/ML
INJECTION, SOLUTION INTRAMUSCULAR; INTRAVENOUS
Status: DISCONTINUED | OUTPATIENT
Start: 2019-04-01 | End: 2019-04-01

## 2019-04-01 RX ORDER — PHENAZOPYRIDINE HYDROCHLORIDE 100 MG/1
200 TABLET, FILM COATED ORAL ONCE
Status: COMPLETED | OUTPATIENT
Start: 2019-04-01 | End: 2019-04-01

## 2019-04-01 RX ORDER — METOCLOPRAMIDE HYDROCHLORIDE 5 MG/ML
INJECTION INTRAMUSCULAR; INTRAVENOUS
Status: DISCONTINUED | OUTPATIENT
Start: 2019-04-01 | End: 2019-04-01

## 2019-04-01 RX ORDER — PHENYLEPHRINE HYDROCHLORIDE 10 MG/ML
INJECTION INTRAVENOUS
Status: DISCONTINUED | OUTPATIENT
Start: 2019-04-01 | End: 2019-04-01

## 2019-04-01 RX ORDER — SODIUM CHLORIDE 0.9 % (FLUSH) 0.9 %
10 SYRINGE (ML) INJECTION
Status: DISCONTINUED | OUTPATIENT
Start: 2019-04-01 | End: 2019-04-04 | Stop reason: HOSPADM

## 2019-04-01 RX ORDER — SODIUM CHLORIDE, SODIUM LACTATE, POTASSIUM CHLORIDE, CALCIUM CHLORIDE 600; 310; 30; 20 MG/100ML; MG/100ML; MG/100ML; MG/100ML
INJECTION, SOLUTION INTRAVENOUS CONTINUOUS
Status: DISCONTINUED | OUTPATIENT
Start: 2019-04-01 | End: 2019-04-04 | Stop reason: HOSPADM

## 2019-04-01 RX ORDER — CIPROFLOXACIN 2 MG/ML
400 INJECTION, SOLUTION INTRAVENOUS
Status: COMPLETED | OUTPATIENT
Start: 2019-04-01 | End: 2019-04-01

## 2019-04-01 RX ORDER — HYDROCODONE BITARTRATE AND ACETAMINOPHEN 5; 325 MG/1; MG/1
1 TABLET ORAL EVERY 4 HOURS PRN
Status: DISCONTINUED | OUTPATIENT
Start: 2019-04-01 | End: 2019-04-04 | Stop reason: HOSPADM

## 2019-04-01 RX ORDER — METFORMIN HYDROCHLORIDE 850 MG/1
TABLET ORAL
Qty: 90 TABLET | Refills: 0 | Status: SHIPPED | OUTPATIENT
Start: 2019-04-01 | End: 2019-04-29 | Stop reason: SDUPTHER

## 2019-04-01 RX ORDER — HYDROCODONE BITARTRATE AND ACETAMINOPHEN 5; 325 MG/1; MG/1
1 TABLET ORAL EVERY 6 HOURS PRN
Qty: 30 TABLET | Refills: 0 | Status: SHIPPED | OUTPATIENT
Start: 2019-04-01 | End: 2020-09-11

## 2019-04-01 RX ORDER — PROPOFOL 10 MG/ML
VIAL (ML) INTRAVENOUS
Status: DISCONTINUED | OUTPATIENT
Start: 2019-04-01 | End: 2019-04-01

## 2019-04-01 RX ORDER — HYDROMORPHONE HYDROCHLORIDE 2 MG/ML
0.2 INJECTION, SOLUTION INTRAMUSCULAR; INTRAVENOUS; SUBCUTANEOUS EVERY 5 MIN PRN
Status: DISCONTINUED | OUTPATIENT
Start: 2019-04-01 | End: 2019-04-04 | Stop reason: HOSPADM

## 2019-04-01 RX ADMIN — GLYCOPYRROLATE 0.2 MG: 0.2 INJECTION, SOLUTION INTRAMUSCULAR; INTRAVENOUS at 07:04

## 2019-04-01 RX ADMIN — PROPOFOL 180 MG: 10 INJECTION, EMULSION INTRAVENOUS at 07:04

## 2019-04-01 RX ADMIN — FENTANYL CITRATE 50 MCG: 50 INJECTION INTRAMUSCULAR; INTRAVENOUS at 07:04

## 2019-04-01 RX ADMIN — SODIUM CHLORIDE, SODIUM LACTATE, POTASSIUM CHLORIDE, AND CALCIUM CHLORIDE: .6; .31; .03; .02 INJECTION, SOLUTION INTRAVENOUS at 07:04

## 2019-04-01 RX ADMIN — MIDAZOLAM HYDROCHLORIDE 2 MG: 1 INJECTION, SOLUTION INTRAMUSCULAR; INTRAVENOUS at 07:04

## 2019-04-01 RX ADMIN — LIDOCAINE HYDROCHLORIDE 100 MG: 20 INJECTION, SOLUTION INTRAVENOUS at 07:04

## 2019-04-01 RX ADMIN — GLYCOPYRROLATE 0.1 MG: 0.2 INJECTION, SOLUTION INTRAMUSCULAR; INTRAVENOUS at 07:04

## 2019-04-01 RX ADMIN — PHENAZOPYRIDINE HYDROCHLORIDE 200 MG: 100 TABLET ORAL at 08:04

## 2019-04-01 RX ADMIN — METOCLOPRAMIDE 10 MG: 5 INJECTION, SOLUTION INTRAMUSCULAR; INTRAVENOUS at 07:04

## 2019-04-01 RX ADMIN — CIPROFLOXACIN 400 MG: 2 INJECTION, SOLUTION INTRAVENOUS at 07:04

## 2019-04-01 RX ADMIN — PHENYLEPHRINE HYDROCHLORIDE 100 MG: 10 INJECTION INTRAVENOUS at 07:04

## 2019-04-01 NOTE — ANESTHESIA PREPROCEDURE EVALUATION
04/01/2019    Pre-operative evaluation for Procedure(s) (LRB):  LITHOTRIPSY, ESWL (Right)    Fermin Henson is a 59 y.o. male     Patient Active Problem List   Diagnosis    Type 2 diabetes mellitus with diabetic polyneuropathy, without long-term current use of insulin    Other proteinuria    Kidney stones       Review of patient's allergies indicates:  No Known Allergies    No current facility-administered medications on file prior to encounter.      Current Outpatient Medications on File Prior to Encounter   Medication Sig Dispense Refill    cinnamon bark (CINNAMON) 500 mg capsule Take 500 mg by mouth once daily.      ferrous sulfate 325 mg (65 mg iron) Tab tablet Take 325 mg by mouth daily with breakfast.      losartan (COZAAR) 25 MG tablet Take 1 tablet (25 mg total) by mouth once daily. 90 tablet 3    melatonin 10 mg Cap Take by mouth.      metFORMIN (GLUCOPHAGE) 850 MG tablet TAKE 2 TABLETS BY MOUTH IN THE MORNING AND 1 IN THE EVENING WITH MEALS 90 tablet 0    simvastatin (ZOCOR) 20 MG tablet Take 1 tablet (20 mg total) by mouth every evening. simvastatin 20 mg tablet 90 tablet 1    antiox #8/om3/dha/epa/lut/zeax (PRESERVISION AREDS 2, OMEGA-3, ORAL) Take by mouth.      fish oil-omega-3 fatty acids 300-1,000 mg capsule Take by mouth once daily.      multivitamin (THERAGRAN) per tablet Take 1 tablet by mouth once daily.         Past Surgical History:   Procedure Laterality Date    eye  surgeries      several     TOE AMPUTATION      several toes on R foot       VITALS    Vitals:    04/01/19 0607   BP: (!) 141/74   Pulse: 74   Resp: 18   Temp: 36.9 °C (98.4 °F)         Lab Results   Component Value Date    WBC 6.92 03/25/2019    HGB 13.0 (L) 03/25/2019    HCT 39.0 (L) 03/25/2019    MCV 96 03/25/2019     03/25/2019     BMP  Lab Results   Component Value Date     03/25/2019    K 4.6 03/25/2019     03/25/2019    CO2 26 03/25/2019    BUN 16 03/25/2019    CREATININE 1.0 03/25/2019     CALCIUM 10.1 03/25/2019    ANIONGAP 11 03/25/2019    ESTGFRAFRICA >60 03/25/2019    EGFRNONAA >60 03/25/2019         Anesthesia Evaluation    I have reviewed the Patient Summary Reports.     I have reviewed the Medications.     Review of Systems  Anesthesia Hx:  No problems with previous Anesthesia  History of prior surgery of interest to airway management or planning: Denies Family Hx of Anesthesia complications.   Denies Personal Hx of Anesthesia complications.   Social:  Non-Smoker, No Alcohol Use    EENT/Dental:EENT/Dental Normal   Cardiovascular:   Hypertension Denies MI.    Denies Angina. hyperlipidemia    Renal/:   renal calculi    Hepatic/GI:  Hepatic/GI Normal    Musculoskeletal:  Musculoskeletal Normal    Neurological:   Denies Peripheral Neuropathy    Endocrine:   Diabetes, type 2    Psych:  Psychiatric Normal           Physical Exam  General:  Well nourished    Airway/Jaw/Neck:  Airway Findings: Mouth Opening: Normal Tongue: Normal  Mallampati: III  TM Distance: Normal, at least 6 cm  Jaw/Neck Findings:  Neck ROM: Normal ROM      Dental:  Dental Findings: In tact   Chest/Lungs:  Chest/Lungs Findings: Clear to auscultation, Normal Respiratory Rate     Heart/Vascular:  Heart Findings: Rate: Normal        Mental Status:  Mental Status Findings:  Cooperative, Alert and Oriented         Anesthesia Plan  Type of Anesthesia, risks & benefits discussed:  Anesthesia Type:  general  Patient's Preference:   Intra-op Monitoring Plan: standard ASA monitors  Intra-op Monitoring Plan Comments:   Post Op Pain Control Plan: IV/PO Opioids PRN  Post Op Pain Control Plan Comments:   Induction:   IV  Beta Blocker:  Patient is not currently on a Beta-Blocker (No further documentation required).       Informed Consent: Patient understands risks and agrees with Anesthesia plan.  Questions answered.   ASA Score: 2     Day of Surgery Review of History & Physical: I have interviewed and examined the patient. I have reviewed the  patient's H&P dated:  There are no significant changes.          Ready For Surgery From Anesthesia Perspective.

## 2019-04-01 NOTE — H&P
Subjective:       Patient ID: Fermin Henson is a 58 y.o. male who was referred by No ref. provider found    Chief Complaint:   Chief Complaint   Patient presents with    Follow-up     U/S and KUB results       Urolithiasis  Patient complains of right flank pain with radiation to the abdomen. Onset of symptoms was abrupt starting 1 month ago with completely resolved course since that time. Patient describes the pain as aching, intermittent and rated as mild. The patient has had no nausea/no vomiting and no diaphoresis. There has been no fever or chills. The patient is not complaining of dysuria or frequency. Risk factors for urolithiasis: family history of stone disease and poor fluid intake.    He is back with an ultrasound and KUB.  He is no longer having pain.    Benign Prostatic Hyperplasia  He patient reports frequency and nocturia three times a night. He denies urgency. The patient states symptoms are of mild severity. Onset of symptoms was several years ago and was gradual in onset.  He has no personal history and no family history of prostate cancer. He reports a history of kidney stones. He denies flank pain, gross hematuria and recurrent UTI.  He is currently taking no prostate medications.    Erectile Dysfunction  Patient complains of erectile dysfunction. Onset of dysfunction was several years ago and was gradual in onset.  Patient states the nature of difficulty is both attaining and maintaining erection. Full erections occur never. Partial erections occur with intercourse. Libido is not affected. Risk factors for ED include diabetes mellitus. Patient denies history of pelvic radiation. Previous treatment of ED includes none.    ACTIVE MEDICAL ISSUES:  Patient Active Problem List   Diagnosis    Type 2 diabetes mellitus with diabetic polyneuropathy, without long-term current use of insulin    Other proteinuria       PAST MEDICAL HISTORY  Past Medical History:   Diagnosis Date    Diabetes mellitus,  type 2     Kidney stones        PAST SURGICAL HISTORY:  Past Surgical History:   Procedure Laterality Date    eye  surgeries      several     TOE AMPUTATION      several toes on R foot       SOCIAL HISTORY:  Social History     Tobacco Use    Smoking status: Never Smoker    Smokeless tobacco: Never Used   Substance Use Topics    Alcohol use: No    Drug use: No       FAMILY HISTORY:  Family History   Problem Relation Age of Onset    No Known Problems Mother     Diabetes Father        ALLERGIES AND MEDICATIONS: updated and reviewed.  Review of patient's allergies indicates:  No Known Allergies  Current Outpatient Medications   Medication Sig    antiox #8/om3/dha/epa/lut/zeax (PRESERVISION AREDS 2, OMEGA-3, ORAL) Take by mouth.    cinnamon bark (CINNAMON) 500 mg capsule Take 500 mg by mouth once daily.    ferrous sulfate 325 mg (65 mg iron) Tab tablet Take 325 mg by mouth daily with breakfast.    fish oil-omega-3 fatty acids 300-1,000 mg capsule Take by mouth once daily.    ibuprofen (ADVIL,MOTRIN) 800 MG tablet Take 1 tablet (800 mg total) by mouth every 8 (eight) hours as needed for Pain.    losartan (COZAAR) 25 MG tablet Take 1 tablet (25 mg total) by mouth once daily.    melatonin 10 mg Cap Take by mouth.    metFORMIN (GLUCOPHAGE) 850 MG tablet TAKE 2 TABLETS BY MOUTH IN THE MORNING AND 1 IN THE EVENING WITH MEALS    multivitamin (THERAGRAN) per tablet Take 1 tablet by mouth once daily.    pioglitazone (ACTOS) 15 MG tablet pioglitazone 15 mg tablet    simvastatin (ZOCOR) 20 MG tablet Take 1 tablet (20 mg total) by mouth every evening. simvastatin 20 mg tablet     No current facility-administered medications for this visit.        Review of Systems   Constitutional: Negative for activity change, fatigue, fever and unexpected weight change.   HENT: Negative for congestion.    Eyes: Negative for redness.   Respiratory: Negative for chest tightness and shortness of breath.    Cardiovascular: Negative  for chest pain and leg swelling.   Gastrointestinal: Negative for abdominal pain, constipation, diarrhea, nausea and vomiting.   Genitourinary: Negative for dysuria, flank pain, frequency, hematuria, penile pain, penile swelling, scrotal swelling, testicular pain and urgency.   Musculoskeletal: Negative for arthralgias and back pain.   Neurological: Negative for dizziness and light-headedness.   Psychiatric/Behavioral: Negative for behavioral problems and confusion. The patient is not nervous/anxious.    All other systems reviewed and are negative.      Objective:      Vitals:    02/15/19 1419   BP: 122/70   Pulse: 68   Resp: 14   Weight: 106 kg (233 lb 11 oz)   Height: 6' (1.829 m)     Physical Exam   Nursing note and vitals reviewed.  Constitutional: He is oriented to person, place, and time. He appears well-developed and well-nourished.   HENT:   Head: Normocephalic.   Eyes: Conjunctivae are normal.   Neck: Normal range of motion. Neck supple. No tracheal deviation present. No thyromegaly present.   Cardiovascular: Normal rate and normal heart sounds.    Pulmonary/Chest: Effort normal and breath sounds normal. No respiratory distress. He has no wheezes.   Abdominal: Soft. Bowel sounds are normal. There is no hepatosplenomegaly. There is no tenderness. There is no rebound and no CVA tenderness. No hernia.   Musculoskeletal: Normal range of motion. He exhibits no edema or tenderness.   Lymphadenopathy:     He has no cervical adenopathy.   Neurological: He is alert and oriented to person, place, and time.   Skin: Skin is warm and dry. No rash noted. No erythema.     Psychiatric: He has a normal mood and affect. His behavior is normal. Judgment and thought content normal.       Urine dipstick shows negative for all components.  Micro exam: negative for WBC's or RBC's.    TAHIR: hydronephrosis resolved  KUB: hyperdensity on right side consistent with the right renal pelvic stone seen on CT in December.      Assessment:        1. Kidney stones    2. BPH with obstruction/lower urinary tract symptoms    3. Nocturia more than twice per night          Plan:       1. Kidney stones  Right ESWL on Monday 4/01/2019    2. BPH with obstruction/lower urinary tract symptoms  stable    3. Nocturia more than twice per night  Limit evening fluids            Follow-up in about 7 weeks (around 4/5/2019) for Follow up, Review X-ray.

## 2019-04-01 NOTE — TRANSFER OF CARE
Anesthesia Transfer of Care Note    Patient: Fermin Henson    Procedure(s) Performed: Procedure(s) (LRB):  LITHOTRIPSY, ESWL (Right)    Patient location: PACU    Anesthesia Type: general    Transport from OR: Transported from OR on room air with adequate spontaneous ventilation    Post pain: adequate analgesia    Post assessment: no apparent anesthetic complications and tolerated procedure well    Post vital signs: stable    Level of consciousness: sedated    Nausea/Vomiting: no nausea/vomiting    Complications: none    Transfer of care protocol was followed      Last vitals:   Visit Vitals  Blood Pressure (Abnormal) 152/83 (BP Location: Left arm, Patient Position: Lying)   Pulse 104   Temperature 36.5 °C (97.7 °F) (Oral)   Respiration 16   Height 6' (1.829 m)   Weight 104.4 kg (230 lb 2.6 oz)   Oxygen Saturation 98%   Body Mass Index 31.22 kg/m²

## 2019-04-01 NOTE — DISCHARGE SUMMARY
[facility]  Discharge Note  Short Stay    Admit Date: 4/1/2019    Discharge Date and Time: 04/01/2019 8:07 AM      Attending Physician: WHITNEY Bryant MD     Discharge Provider: EVELIO Bryant    Diagnoses:  Active Hospital Problems    Diagnosis  POA    *Kidney stones [N20.0]  Yes      Resolved Hospital Problems   No resolved problems to display.       Discharged Condition: stable    Hospital Course: Patient was admitted for an outpatient procedure and tolerated the procedure well with no complications.    Final Diagnoses: Same as principal problem.    Disposition: Home or Self Care    Follow up/Patient Instructions:    Medications:  Reconciled Home Medications:      Medication List      START taking these medications    HYDROcodone-acetaminophen 5-325 mg per tablet  Commonly known as:  NORCO  Take 1 tablet by mouth every 6 (six) hours as needed for Pain.        CHANGE how you take these medications    * metFORMIN 850 MG tablet  Commonly known as:  GLUCOPHAGE  TAKE 2 TABLETS BY MOUTH IN THE MORNING AND 1 IN THE EVENING WITH MEALS  What changed:  Another medication with the same name was added. Make sure you understand how and when to take each.     * metFORMIN 850 MG tablet  Commonly known as:  GLUCOPHAGE  TAKE 2 TABLETS BY MOUTH IN THE MORNING AND 1 IN THE EVENING WITH MEALS  What changed:  You were already taking a medication with the same name, and this prescription was added. Make sure you understand how and when to take each.         * This list has 2 medication(s) that are the same as other medications prescribed for you. Read the directions carefully, and ask your doctor or other care provider to review them with you.            CONTINUE taking these medications    CINNAMON 500 mg capsule  Generic drug:  cinnamon bark  Take 500 mg by mouth once daily.     ferrous sulfate 325 mg (65 mg iron) Tab tablet  Commonly known as:  FEOSOL  Take 325 mg by mouth daily with breakfast.     fish oil-omega-3  fatty acids 300-1,000 mg capsule  Take by mouth once daily.     losartan 25 MG tablet  Commonly known as:  COZAAR  Take 1 tablet (25 mg total) by mouth once daily.     melatonin 10 mg Cap  Take by mouth.     multivitamin per tablet  Commonly known as:  THERAGRAN  Take 1 tablet by mouth once daily.     pioglitazone 15 MG tablet  Commonly known as:  ACTOS  Take 1 tablet (15 mg total) by mouth once daily. pioglitazone 15 mg tablet     PRESERVISION AREDS 2 (OMEGA-3) ORAL  Take by mouth.     simvastatin 20 MG tablet  Commonly known as:  ZOCOR  Take 1 tablet (20 mg total) by mouth every evening. simvastatin 20 mg tablet          Discharge Procedure Orders   X-Ray KUB   Standing Status: Future Standing Exp. Date: 04/01/20     Order Specific Question Answer Comments   May the Radiologist modify the order per protocol to meet the clinical needs of the patient? Yes      Diet general     Call MD for:   Order Comments: Significant Hematuria     Follow-up Information     EVELIO Bryant MD. Schedule an appointment as soon as possible for a visit in 3 weeks.    Specialty:  Urology  Why:  Post op Check  Contact information:  [address]  [address] [place], [place] [id number] [phone]                   Discharge Procedure Orders (must include Diet, Follow-up, Activity):   Discharge Procedure Orders (must include Diet, Follow-up, Activity)   X-Ray KUB   Standing Status: Future Standing Exp. Date: 04/01/20     Order Specific Question Answer Comments   May the Radiologist modify the order per protocol to meet the clinical needs of the patient? Yes      Diet general     Call MD for:   Order Comments: Significant Hematuria

## 2019-04-01 NOTE — BRIEF OP NOTE
[facility]  Brief Operative Note     SUMMARY     Surgery Date: 4/1/2019     Surgeon(s) and Role:     * WHITNEY Bryant MD - Primary    Assisting Surgeon: None    Pre-op Diagnosis:  Kidney stones [N20.0]    Post-op Diagnosis:  Post-Op Diagnosis Codes:     * Kidney stones [N20.0]    Procedure(s) (LRB):  LITHOTRIPSY, ESWL (Right)    Anesthesia: General/MAC    Description of the findings of the procedure: Right stone fragmented    Findings/Key Components: as above    Estimated Blood Loss: * No values recorded between 4/1/2019  7:34 AM and 4/1/2019  8:05 AM *         Specimens:   Specimen (12h ago, onward)    None          Discharge Note    SUMMARY     Admit Date: 4/1/2019    Discharge Date and Time:  04/01/2019 8:06 AM    Hospital Course (synopsis of major diagnoses, care, treatment, and services provided during the course of the hospital stay): Patient was admitted for an outpatient procedure and tolerated the procedure well with no complications.      Final Diagnosis: Post-Op Diagnosis Codes:     * Kidney stones [N20.0]    Disposition: Home or Self Care    Follow Up/Patient Instructions:     Medications:  Reconciled Home Medications:      Medication List      START taking these medications    HYDROcodone-acetaminophen 5-325 mg per tablet  Commonly known as:  NORCO  Take 1 tablet by mouth every 6 (six) hours as needed for Pain.        CHANGE how you take these medications    * metFORMIN 850 MG tablet  Commonly known as:  GLUCOPHAGE  TAKE 2 TABLETS BY MOUTH IN THE MORNING AND 1 IN THE EVENING WITH MEALS  What changed:  Another medication with the same name was added. Make sure you understand how and when to take each.     * metFORMIN 850 MG tablet  Commonly known as:  GLUCOPHAGE  TAKE 2 TABLETS BY MOUTH IN THE MORNING AND 1 IN THE EVENING WITH MEALS  What changed:  You were already taking a medication with the same name, and this prescription was added. Make sure you understand how and when to take  each.         * This list has 2 medication(s) that are the same as other medications prescribed for you. Read the directions carefully, and ask your doctor or other care provider to review them with you.            CONTINUE taking these medications    CINNAMON 500 mg capsule  Generic drug:  cinnamon bark  Take 500 mg by mouth once daily.     ferrous sulfate 325 mg (65 mg iron) Tab tablet  Commonly known as:  FEOSOL  Take 325 mg by mouth daily with breakfast.     fish oil-omega-3 fatty acids 300-1,000 mg capsule  Take by mouth once daily.     losartan 25 MG tablet  Commonly known as:  COZAAR  Take 1 tablet (25 mg total) by mouth once daily.     melatonin 10 mg Cap  Take by mouth.     multivitamin per tablet  Commonly known as:  THERAGRAN  Take 1 tablet by mouth once daily.     pioglitazone 15 MG tablet  Commonly known as:  ACTOS  Take 1 tablet (15 mg total) by mouth once daily. pioglitazone 15 mg tablet     PRESERVISION AREDS 2 (OMEGA-3) ORAL  Take by mouth.     simvastatin 20 MG tablet  Commonly known as:  ZOCOR  Take 1 tablet (20 mg total) by mouth every evening. simvastatin 20 mg tablet          Discharge Procedure Orders   X-Ray KUB   Standing Status: Future Standing Exp. Date: 04/01/20     Order Specific Question Answer Comments   May the Radiologist modify the order per protocol to meet the clinical needs of the patient? Yes      Diet general     Call MD for:   Order Comments: Significant Hematuria     Follow-up Information     W Gerry Bryant MD. Schedule an appointment as soon as possible for a visit in 3 weeks.    Specialty:  Urology  Why:  Post op Check  Contact information:  [address]  [address]  [facility] [id number] [phone]

## 2019-04-01 NOTE — DISCHARGE INSTRUCTIONS
DIET: You may resume your home diet. If nausea is present, increase your diet gradually with fluids and bland foods    ACTIVITY LEVEL: You have received sedation or an anesthetic, you may feel sleepy for several hours. Rest until you are more awake. Gradually resume your normal activities    Medications: Pain medication should be taken only if needed and as directed. If antibiotics are prescribed, the medication should be taken until completed. You will be given an updated list of you medications.    No driving, alcoholic beverages or signing legal documents for next 24 hours or while taking pain medication.       CALL THE DOCTOR:    For any obvious bleeding (some dried blood over the incision is normal).      Redness, swelling, foul smell around incision or fever over 101.   Shortness of breath, Coughing up Bloody sputum, Pains or Swelling in your Calves .   Persistent pain or nausea not relieved by medication.    If any unusual problems or difficulties occur contact your doctor. If you cannot contact your doctor but feel your signs and symptoms warrant a physicians attention return to the emergency room.        Fall Prevention  Millions of people fall every year and injure themselves. You may have had anesthesia or sedation which may increase your risk of falling. You may have health issues that put you at an increased risk of falling.     Here are ways to reduce your risk of falling.  ·   · Make your home safe by keeping walkways clear of objects you may trip over.  · Use non-slip pads under rugs. Do not use area rugs or small throw rugs.  · Use non-slip mats in bathtubs and showers.  · Install handrails and lights on staircases.  · Do not walk in poorly lit areas.  · Do not stand on chairs or wobbly ladders.  · Use caution when reaching overhead or looking upward. This position can cause a loss of balance.  · Be sure your shoes fit properly, have non-slip bottoms and are in good condition.   · Wear shoes  both inside and out. Avoid going barefoot or wearing slippers.  · Be cautious when going up and down stairs, curbs, and when walking on uneven sidewalks.  · If your balance is poor, consider using a cane or walker.  · If your fall was related to alcohol use, stop or limit alcohol intake.   · If your fall was related to use of sleeping medicines, talk to your doctor about this. You may need to reduce your dosage at bedtime if you awaken during the night to go to the bathroom.    · To reduce the need for nighttime bathroom trips:  ¨ Avoid drinking fluids for several hours before going to bed  ¨ Empty your bladder before going to bed  ¨ Men can keep a urinal at the bedside  · Stay as active as you can. Balance, flexibility, strength, and endurance all come from exercise. They all play a role in preventing falls. Ask your healthcare provider which types of activity are right for you.  · Get your vision checked on a regular basis.  · If you have pets, know where they are before you stand up or walk so you don't trip over them.  · Use night lights.

## 2019-04-01 NOTE — ANESTHESIA POSTPROCEDURE EVALUATION
Anesthesia Post Evaluation    Patient: Fermin Henson    Procedure(s) Performed: Procedure(s) (LRB):  LITHOTRIPSY, ESWL (Right)    Final Anesthesia Type: general  Patient location during evaluation: PACU  Patient participation: Yes- Able to Participate  Level of consciousness: awake and alert and oriented  Post-procedure vital signs: reviewed and stable  Pain management: adequate  Airway patency: patent  PONV status at discharge: No PONV  Anesthetic complications: no      Cardiovascular status: blood pressure returned to baseline, hemodynamically stable and stable  Respiratory status: unassisted, spontaneous ventilation and room air  Hydration status: euvolemic  Follow-up not needed.          Vitals Value Taken Time   /86 4/1/2019  8:55 AM   Temp 36.4 °C (97.6 °F) 4/1/2019  8:55 AM   Pulse 78 4/1/2019  8:55 AM   Resp 16 4/1/2019  8:55 AM   SpO2 96 % 4/1/2019  8:55 AM         Event Time     Out of Recovery 08:54:40          Pain/Musa Score: Pain Rating Prior to Med Admin: 0 (4/1/2019  8:50 AM)  Musa Score: 10 (4/1/2019  9:45 AM)  Modified Musa Score: 20 (4/1/2019  9:45 AM)

## 2019-04-04 ENCOUNTER — TELEPHONE (OUTPATIENT)
Dept: UROLOGY | Facility: CLINIC | Age: 59
End: 2019-04-04

## 2019-04-04 NOTE — TELEPHONE ENCOUNTER
----- Message from WHITNEY Bryant MD sent at 4/4/2019 10:37 AM CDT -----  Contact: Self   He can take 2 at a time.  Ibuprofen can also help    ----- Message -----  From: Joby Covarrubias, Patient Care Assistant  Sent: 4/4/2019  10:08 AM  To: WHITNEY Bryant MD        ----- Message -----  From: Brandee Sanders  Sent: 4/4/2019   9:35 AM  To: Annette Atkinson Staff    Type:  Needs Medical Advice    Who Called: Self     Symptoms (please be specific): in pain medication not working HYDROcodone-acetaminophen (NORCO) 5-325 mg per tablet    Pharmacy name and phone #:  [id number]  [place] - [address] [facility]. [phone] (Phone)  [phone] (Fax)    Would the patient rather a call back or a response via [facility]? Call     Best Call Back Number: [phone]    Additional Information: patient says the medication is working was given to him at the ED   Oxycodone 10-325mg TB

## 2019-04-04 NOTE — TELEPHONE ENCOUNTER
Spoke to patient to relay message from Dr. Bryant.. Stated he understood and was fine with that.. HAZEL

## 2019-04-05 ENCOUNTER — HOSPITAL ENCOUNTER (OUTPATIENT)
Dept: RADIOLOGY | Facility: HOSPITAL | Age: 59
Discharge: HOME | End: 2019-04-05
Attending: UROLOGY
Payer: MEDICAID

## 2019-04-05 DIAGNOSIS — N20.0 KIDNEY STONES: ICD-10-CM

## 2019-04-05 PROCEDURE — 74018 XR KUB: ICD-10-PCS | Mod: 26,,, | Performed by: RADIOLOGY

## 2019-04-05 PROCEDURE — 74018 RADEX ABDOMEN 1 VIEW: CPT | Mod: TC,FY

## 2019-04-05 PROCEDURE — 74018 RADEX ABDOMEN 1 VIEW: CPT | Mod: 26,,, | Performed by: RADIOLOGY

## 2019-04-08 ENCOUNTER — OFFICE VISIT (OUTPATIENT)
Dept: UROLOGY | Facility: CLINIC | Age: 59
End: 2019-04-08
Payer: MEDICAID

## 2019-04-08 VITALS — HEIGHT: 72 IN | WEIGHT: 230 LBS | BODY MASS INDEX: 31.15 KG/M2

## 2019-04-08 DIAGNOSIS — R35.1 NOCTURIA MORE THAN TWICE PER NIGHT: ICD-10-CM

## 2019-04-08 DIAGNOSIS — N40.0 BPH WITHOUT OBSTRUCTION/LOWER URINARY TRACT SYMPTOMS: ICD-10-CM

## 2019-04-08 DIAGNOSIS — N20.0 KIDNEY STONES: Primary | ICD-10-CM

## 2019-04-08 PROCEDURE — 99999 PR PBB SHADOW E&M-EST. PATIENT-LVL III: CPT | Mod: PBBFAC,,, | Performed by: UROLOGY

## 2019-04-08 PROCEDURE — 99999 PR PBB SHADOW E&M-EST. PATIENT-LVL III: ICD-10-PCS | Mod: PBBFAC,,, | Performed by: UROLOGY

## 2019-04-08 PROCEDURE — 99024 POSTOP FOLLOW-UP VISIT: CPT | Mod: ,,, | Performed by: UROLOGY

## 2019-04-08 PROCEDURE — 81001 URINALYSIS AUTO W/SCOPE: CPT | Mod: PBBFAC | Performed by: UROLOGY

## 2019-04-08 PROCEDURE — 99213 OFFICE O/P EST LOW 20 MIN: CPT | Mod: PBBFAC | Performed by: UROLOGY

## 2019-04-08 PROCEDURE — 99024 PR POST-OP FOLLOW-UP VISIT: ICD-10-PCS | Mod: ,,, | Performed by: UROLOGY

## 2019-04-08 NOTE — PROGRESS NOTES
Subjective:       Patient ID: Fermin Henson is a 59 y.o. male who was referred by No ref. provider found    Chief Complaint:   Chief Complaint   Patient presents with    Nephrolithiasis     post op from eswl with kub       Post-Operative Follow-up  Patient here for post-op follow-up. Patient is 1 week status post Right ESWL.   The patient reports no problems with eating, bowel movements, voiding, or their wound. The patient is not having any pain.      ACTIVE MEDICAL ISSUES:  Patient Active Problem List   Diagnosis    Type 2 diabetes mellitus with diabetic polyneuropathy, without long-term current use of insulin    Other proteinuria    Kidney stones       PAST MEDICAL HISTORY  Past Medical History:   Diagnosis Date    Diabetes mellitus, type 2     Kidney stones        PAST SURGICAL HISTORY:  Past Surgical History:   Procedure Laterality Date    eye  surgeries      several     LITHOTRIPSY, ESWL Right 4/1/2019    [facility]    TOE AMPUTATION      several toes on R foot       SOCIAL HISTORY:  Social History     Tobacco Use    Smoking status: Never Smoker    Smokeless tobacco: Never Used   Substance Use Topics    Alcohol use: No    Drug use: No       FAMILY HISTORY:  Family History   Problem Relation Age of Onset    No Known Problems Mother     Diabetes Father        ALLERGIES AND MEDICATIONS: updated and reviewed.  Review of patient's allergies indicates:  No Known Allergies  Current Outpatient Medications   Medication Sig    antiox #8/om3/dha/epa/lut/zeax (PRESERVISION AREDS 2, OMEGA-3, ORAL) Take by mouth.    cinnamon bark (CINNAMON) 500 mg capsule Take 500 mg by mouth once daily.    ferrous sulfate 325 mg (65 mg iron) Tab tablet Take 325 mg by mouth daily with breakfast.    fish oil-omega-3 fatty acids 300-1,000 mg capsule Take by mouth once daily.    HYDROcodone-acetaminophen (NORCO) 5-325 mg per tablet Take 1 tablet by mouth every 6 (six) hours as needed  for Pain.    losartan (COZAAR) 25 MG tablet Take 1 tablet (25 mg total) by mouth once daily.    melatonin 10 mg Cap Take by mouth.    metFORMIN (GLUCOPHAGE) 850 MG tablet TAKE 2 TABLETS BY MOUTH IN THE MORNING AND 1 IN THE EVENING WITH MEALS    metFORMIN (GLUCOPHAGE) 850 MG tablet TAKE 2 TABLETS BY MOUTH IN THE MORNING AND 1 IN THE EVENING WITH MEALS    multivitamin (THERAGRAN) per tablet Take 1 tablet by mouth once daily.    pioglitazone (ACTOS) 15 MG tablet Take 1 tablet (15 mg total) by mouth once daily. pioglitazone 15 mg tablet    simvastatin (ZOCOR) 20 MG tablet Take 1 tablet (20 mg total) by mouth every evening. simvastatin 20 mg tablet     No current facility-administered medications for this visit.        Review of Systems   Constitutional: Negative for activity change, fatigue, fever and unexpected weight change.   HENT: Negative for congestion.    Eyes: Negative for redness.   Respiratory: Negative for chest tightness and shortness of breath.    Cardiovascular: Negative for chest pain and leg swelling.   Gastrointestinal: Negative for abdominal pain, constipation, diarrhea, nausea and vomiting.   Genitourinary: Negative for dysuria, flank pain, frequency, hematuria, penile pain, penile swelling, scrotal swelling, testicular pain and urgency.   Musculoskeletal: Negative for arthralgias and back pain.   Neurological: Negative for dizziness and light-headedness.   Psychiatric/Behavioral: Negative for behavioral problems and confusion. The patient is not nervous/anxious.    All other systems reviewed and are negative.      Objective:      Vitals:    04/08/19 1431   Weight: 104.3 kg (230 lb)   Height: 6' (1.829 m)     Physical Exam   Nursing note and vitals reviewed.  Constitutional: He is oriented to person, place, and time. He appears well-developed and well-nourished.   HENT:   Head: Normocephalic.   Eyes: Conjunctivae are normal.   Neck: Normal range of motion. Neck supple. No tracheal deviation  present. No thyromegaly present.   Cardiovascular: Normal rate and normal heart sounds.    Pulmonary/Chest: Effort normal and breath sounds normal. No respiratory distress. He has no wheezes.   Abdominal: Soft. Bowel sounds are normal. There is no hepatosplenomegaly. There is no tenderness. There is no rebound and no CVA tenderness. No hernia.   Musculoskeletal: Normal range of motion. He exhibits no edema or tenderness.   Lymphadenopathy:     He has no cervical adenopathy.   Neurological: He is alert and oriented to person, place, and time.   Skin: Skin is warm and dry. No rash noted. No erythema.     Psychiatric: He has a normal mood and affect. His behavior is normal. Judgment and thought content normal.       Urine dipstick shows positive for RBC's.  Micro exam: 50 RBC's per HPF.    KUB reviewed:  No stones    Assessment:       1. Kidney stones    2. BPH without obstruction/lower urinary tract symptoms    3. Nocturia more than twice per night          Plan:       1. Kidney stones  S/p ESWL  TAHIR in a year    2. BPH without obstruction/lower urinary tract symptoms  STELLA and PSA in year    3. Nocturia more than twice per night  stable            Follow up in about 1 year (around 4/8/2020) for Follow up, Review X-ray, Review PSA.

## 2019-04-17 DIAGNOSIS — E11.9 TYPE 2 DIABETES MELLITUS WITHOUT COMPLICATION, WITHOUT LONG-TERM CURRENT USE OF INSULIN: ICD-10-CM

## 2019-04-17 RX ORDER — PIOGLITAZONEHYDROCHLORIDE 15 MG/1
15 TABLET ORAL DAILY
Qty: 90 TABLET | Refills: 0 | Status: SHIPPED | OUTPATIENT
Start: 2019-04-17 | End: 2019-09-03 | Stop reason: SDUPTHER

## 2019-04-18 DIAGNOSIS — E11.9 TYPE 2 DIABETES MELLITUS WITHOUT COMPLICATION, UNSPECIFIED WHETHER LONG TERM INSULIN USE: ICD-10-CM

## 2019-04-29 RX ORDER — METFORMIN HYDROCHLORIDE 850 MG/1
TABLET ORAL
Qty: 90 TABLET | Refills: 0 | Status: SHIPPED | OUTPATIENT
Start: 2019-04-29 | End: 2019-05-13 | Stop reason: SDUPTHER

## 2019-05-03 DIAGNOSIS — E11.9 TYPE 2 DIABETES MELLITUS WITHOUT COMPLICATION: ICD-10-CM

## 2019-05-13 ENCOUNTER — OFFICE VISIT (OUTPATIENT)
Dept: FAMILY MEDICINE | Facility: CLINIC | Age: 59
End: 2019-05-13
Payer: MEDICAID

## 2019-05-13 ENCOUNTER — LAB VISIT (OUTPATIENT)
Dept: LAB | Facility: HOSPITAL | Age: 59
End: 2019-05-13
Attending: FAMILY MEDICINE
Payer: MEDICAID

## 2019-05-13 VITALS
OXYGEN SATURATION: 96 % | WEIGHT: 233.44 LBS | HEART RATE: 105 BPM | SYSTOLIC BLOOD PRESSURE: 128 MMHG | BODY MASS INDEX: 31.62 KG/M2 | DIASTOLIC BLOOD PRESSURE: 77 MMHG | TEMPERATURE: 98 F | RESPIRATION RATE: 17 BRPM | HEIGHT: 72 IN

## 2019-05-13 DIAGNOSIS — Z79.4 TYPE 2 DIABETES MELLITUS WITH MICROALBUMINURIA, WITH LONG-TERM CURRENT USE OF INSULIN: ICD-10-CM

## 2019-05-13 DIAGNOSIS — E11.42 TYPE 2 DIABETES MELLITUS WITH DIABETIC POLYNEUROPATHY, WITHOUT LONG-TERM CURRENT USE OF INSULIN: ICD-10-CM

## 2019-05-13 DIAGNOSIS — E11.42 TYPE 2 DIABETES MELLITUS WITH DIABETIC POLYNEUROPATHY, WITHOUT LONG-TERM CURRENT USE OF INSULIN: Primary | ICD-10-CM

## 2019-05-13 DIAGNOSIS — E11.29 TYPE 2 DIABETES MELLITUS WITH MICROALBUMINURIA, WITH LONG-TERM CURRENT USE OF INSULIN: ICD-10-CM

## 2019-05-13 DIAGNOSIS — R80.9 TYPE 2 DIABETES MELLITUS WITH MICROALBUMINURIA, WITHOUT LONG-TERM CURRENT USE OF INSULIN: ICD-10-CM

## 2019-05-13 DIAGNOSIS — E11.29 TYPE 2 DIABETES MELLITUS WITH MICROALBUMINURIA, WITHOUT LONG-TERM CURRENT USE OF INSULIN: ICD-10-CM

## 2019-05-13 DIAGNOSIS — R80.9 TYPE 2 DIABETES MELLITUS WITH MICROALBUMINURIA, WITH LONG-TERM CURRENT USE OF INSULIN: ICD-10-CM

## 2019-05-13 LAB
ALBUMIN SERPL BCP-MCNC: 4.2 G/DL (ref 3.5–5.2)
ALP SERPL-CCNC: 49 U/L (ref 55–135)
ALT SERPL W/O P-5'-P-CCNC: 23 U/L (ref 10–44)
ANION GAP SERPL CALC-SCNC: 9 MMOL/L (ref 8–16)
AST SERPL-CCNC: 18 U/L (ref 10–40)
BILIRUB SERPL-MCNC: 0.5 MG/DL (ref 0.1–1)
BUN SERPL-MCNC: 12 MG/DL (ref 8–23)
CALCIUM SERPL-MCNC: 10 MG/DL (ref 8.7–10.5)
CHLORIDE SERPL-SCNC: 102 MMOL/L (ref 95–110)
CO2 SERPL-SCNC: 28 MMOL/L (ref 23–29)
CREAT SERPL-MCNC: 1 MG/DL (ref 0.5–1.4)
EST. GFR  (AFRICAN AMERICAN): >60 ML/MIN/1.73 M^2
EST. GFR  (NON AFRICAN AMERICAN): >60 ML/MIN/1.73 M^2
GLUCOSE SERPL-MCNC: 243 MG/DL (ref 70–110)
POTASSIUM SERPL-SCNC: 4.8 MMOL/L (ref 3.5–5.1)
PROT SERPL-MCNC: 7.6 G/DL (ref 6–8.4)
PTH-INTACT SERPL-MCNC: 35.7 PG/ML (ref 9–77)
SODIUM SERPL-SCNC: 139 MMOL/L (ref 136–145)

## 2019-05-13 PROCEDURE — 36415 COLL VENOUS BLD VENIPUNCTURE: CPT | Mod: PN

## 2019-05-13 PROCEDURE — 99214 PR OFFICE/OUTPT VISIT, EST, LEVL IV, 30-39 MIN: ICD-10-PCS | Mod: S$PBB,,, | Performed by: FAMILY MEDICINE

## 2019-05-13 PROCEDURE — 99213 OFFICE O/P EST LOW 20 MIN: CPT | Mod: PBBFAC,PN | Performed by: FAMILY MEDICINE

## 2019-05-13 PROCEDURE — 99999 PR PBB SHADOW E&M-EST. PATIENT-LVL III: ICD-10-PCS | Mod: PBBFAC,,, | Performed by: FAMILY MEDICINE

## 2019-05-13 PROCEDURE — 83970 ASSAY OF PARATHORMONE: CPT

## 2019-05-13 PROCEDURE — 83036 HEMOGLOBIN GLYCOSYLATED A1C: CPT

## 2019-05-13 PROCEDURE — 99999 PR PBB SHADOW E&M-EST. PATIENT-LVL III: CPT | Mod: PBBFAC,,, | Performed by: FAMILY MEDICINE

## 2019-05-13 PROCEDURE — 99214 OFFICE O/P EST MOD 30 MIN: CPT | Mod: S$PBB,,, | Performed by: FAMILY MEDICINE

## 2019-05-13 PROCEDURE — 80053 COMPREHEN METABOLIC PANEL: CPT

## 2019-05-13 NOTE — PROGRESS NOTES
Routine Office Visit    Patient Name: Fermin Metcalf Peer    : 1960  MRN: [id number]    Subjective:  Fermin is a 59 y.o. male who presents today for:    1. Type 2 dm  Patient presenting today for follow up for type 2 DM.  He has not had any hypoglycemic episodes.  He has been sticking with his diet and taking all of his medications as prescribed.  He was found to be anemic (mildly on last blood work), but did fitkit and it was negative.  No other complaints today.  Last labs showed proteinuria.  He states blood sugars were doing well except while having a kidney stone.    Past Medical History  Past Medical History:   Diagnosis Date    Diabetes mellitus, type 2     Kidney stones        Past Surgical History  Past Surgical History:   Procedure Laterality Date    eye  surgeries      several     LITHOTRIPSY, ESWL Right 2019    [facility]    TOE AMPUTATION      several toes on R foot       Family History  Family History   Problem Relation Age of Onset    No Known Problems Mother     Diabetes Father        Social History  Social History     Socioeconomic History    Marital status: Single     Spouse name: Not on file    Number of children: Not on file    Years of education: Not on file    Highest education level: Not on file   Occupational History    Not on file   Social Needs    Financial resource strain: Not on file    Food insecurity:     Worry: Not on file     Inability: Not on file    Transportation needs:     Medical: Not on file     Non-medical: Not on file   Tobacco Use    Smoking status: Never Smoker    Smokeless tobacco: Never Used   Substance and Sexual Activity    Alcohol use: No    Drug use: No    Sexual activity: Not Currently     Partners: Female   Lifestyle    Physical activity:     Days per week: Not on file     Minutes per session: Not on file    Stress: Not on file   Relationships    Social connections:     Talks on phone: Not on file      Gets together: Not on file     Attends [employer] service: Not on file     Active member of club or organization: Not on file     Attends meetings of clubs or organizations: Not on file     Relationship status: Not on file   Other Topics Concern    Not on file   Social History Narrative    Not on file       Current Medications  Current Outpatient Medications on File Prior to Visit   Medication Sig Dispense Refill    antiox #8/om3/dha/epa/lut/zeax (PRESERVISION AREDS 2, OMEGA-3, ORAL) Take by mouth.      cinnamon bark (CINNAMON) 500 mg capsule Take 500 mg by mouth once daily.      ferrous sulfate 325 mg (65 mg iron) Tab tablet Take 325 mg by mouth daily with breakfast.      fish oil-omega-3 fatty acids 300-1,000 mg capsule Take by mouth once daily.      losartan (COZAAR) 25 MG tablet Take 1 tablet (25 mg total) by mouth once daily. 90 tablet 3    melatonin 10 mg Cap Take by mouth.      metFORMIN (GLUCOPHAGE) 850 MG tablet TAKE 2 TABLETS BY MOUTH IN THE MORNING AND 1 IN THE EVENING WITH MEALS 90 tablet 0    multivitamin (THERAGRAN) per tablet Take 1 tablet by mouth once daily.      pioglitazone (ACTOS) 15 MG tablet Take 1 tablet (15 mg total) by mouth once daily. pioglitazone 15 mg tablet 90 tablet 0    simvastatin (ZOCOR) 20 MG tablet Take 1 tablet (20 mg total) by mouth every evening. simvastatin 20 mg tablet 90 tablet 1    HYDROcodone-acetaminophen (NORCO) 5-325 mg per tablet Take 1 tablet by mouth every 6 (six) hours as needed for Pain. 30 tablet 0    [DISCONTINUED] metFORMIN (GLUCOPHAGE) 850 MG tablet TAKE 2 TABLETS BY MOUTH IN THE MORNING AND 1 IN THE EVENING WITH MEALS 90 tablet 0     No current facility-administered medications on file prior to visit.        Allergies   Review of patient's allergies indicates:  No Known Allergies    Review of Systems (Pertinent positives)  Review of Systems   Constitutional: Negative.    HENT: Negative.    Eyes: Negative.    Respiratory: Negative.   "  Cardiovascular: Negative.    Gastrointestinal: Negative.    Genitourinary: Negative.    Skin:        +bilateral skin ulcers   Neurological: Positive for sensory change.         /77 (BP Location: Left arm, Patient Position: Sitting, BP Method: Medium (Automatic))   Pulse 105   Temp 97.9 °F (36.6 °C) (Oral)   Resp 17   Ht 6' 0.01" (1.829 m)   Wt 105.9 kg (233 lb 7.5 oz)   SpO2 96%   BMI 31.66 kg/m²     GENERAL APPEARANCE: in no apparent distress and well developed and well nourished  HEENT: PERRL, EOMI, Sclera clear, anicteric, Oropharynx clear, no lesions, Neck supple with midline trachea  NECK: normal, supple, no adenopathy, thyroid normal in size  RESPIRATORY: appears well, vitals normal, no respiratory distress, acyanotic, normal RR, chest clear, no wheezing, crepitations, rhonchi, normal symmetric air entry  HEART: regular rate and rhythm, S1, S2 normal, no murmur, click, rub or gallop.    ABDOMEN: abdomen is soft without tenderness, no masses, no hernias, no organomegaly, no rebound, no guarding. Suprapubic tenderness absent. No CVA tenderness.  NEUROLOGIC: normal without focal findings, CN II-XII are intact.    SKIN: no rashes, no wounds, no other lesions  PSYCH: Alert, oriented x 3, thought content appropriate, speech normal, pleasant and cooperative, good eye contact, well groomed        Assessment/Plan:  Fermin Henson is a 59 y.o. male who presents today for :    Fermin was seen today for follow-up.    Diagnoses and all orders for this visit:    Type 2 diabetes mellitus with diabetic polyneuropathy, without long-term current use of insulin  -     Comprehensive metabolic panel; Future  -     Hemoglobin A1c; Future  -     Microalbumin/creatinine urine ratio; Future  -     PTH, intact; Future    Type 2 diabetes mellitus with microalbuminuria, with long-term current use of insulin  -     Comprehensive metabolic panel; Future  -     Hemoglobin A1c; Future  -     Microalbumin/creatinine urine " ratio; Future  -     PTH, intact; Future      1.  Labs and immunizations to be done next week  2.  Follow up 6 months or sooner if needed  3.  Renal stones followed by urology  4.  Call with concerns    See Jean-Baptiste MD

## 2019-05-14 LAB
ESTIMATED AVG GLUCOSE: 160 MG/DL (ref 68–131)
HBA1C MFR BLD HPLC: 7.2 % (ref 4–5.6)

## 2019-05-26 NOTE — PROGRESS NOTES
Please let patient know that his blood sugars are running a little higher than normal, but still controlled.  He needs to continue his medication as prescribed and watch his intake of sugars/carbohydrates      Thanks  Dr. Jean-Baptiste

## 2019-05-27 ENCOUNTER — TELEPHONE (OUTPATIENT)
Dept: FAMILY MEDICINE | Facility: CLINIC | Age: 59
End: 2019-05-27

## 2019-05-27 RX ORDER — METFORMIN HYDROCHLORIDE 850 MG/1
TABLET ORAL
Qty: 90 TABLET | Refills: 0 | Status: SHIPPED | OUTPATIENT
Start: 2019-05-27 | End: 2019-06-24 | Stop reason: SDUPTHER

## 2019-05-27 NOTE — TELEPHONE ENCOUNTER
----- Message from See Jean-Baptiste MD sent at 5/26/2019  4:57 PM CDT -----  Please let patient know that his blood sugars are running a little higher than normal, but still controlled.  He needs to continue his medication as prescribed and watch his intake of sugars/carbohydrates      Thanks  Dr. Jean-Baptiste

## 2019-05-27 NOTE — TELEPHONE ENCOUNTER
Spoke with pt informed of results and information. Pt states no further questions or concerns at this time.

## 2019-06-24 RX ORDER — METFORMIN HYDROCHLORIDE 850 MG/1
TABLET ORAL
Qty: 90 TABLET | Refills: 0 | Status: SHIPPED | OUTPATIENT
Start: 2019-06-24 | End: 2019-07-27 | Stop reason: SDUPTHER

## 2019-07-01 DIAGNOSIS — E11.42 TYPE 2 DIABETES MELLITUS WITH DIABETIC POLYNEUROPATHY, WITHOUT LONG-TERM CURRENT USE OF INSULIN: ICD-10-CM

## 2019-07-01 DIAGNOSIS — Z00.00 VISIT FOR WELL MAN HEALTH CHECK: ICD-10-CM

## 2019-07-01 RX ORDER — LOSARTAN POTASSIUM 25 MG/1
TABLET ORAL
Qty: 90 TABLET | Refills: 3 | Status: SHIPPED | OUTPATIENT
Start: 2019-07-01 | End: 2020-07-06

## 2019-07-26 DIAGNOSIS — E11.9 TYPE 2 DIABETES MELLITUS WITHOUT COMPLICATION: ICD-10-CM

## 2019-07-29 RX ORDER — METFORMIN HYDROCHLORIDE 850 MG/1
TABLET ORAL
Qty: 90 TABLET | Refills: 0 | Status: SHIPPED | OUTPATIENT
Start: 2019-07-29 | End: 2019-08-19 | Stop reason: SDUPTHER

## 2019-08-05 ENCOUNTER — PATIENT OUTREACH (OUTPATIENT)
Dept: ADMINISTRATIVE | Facility: HOSPITAL | Age: 59
End: 2019-08-05

## 2019-08-05 DIAGNOSIS — E11.42 TYPE 2 DIABETES MELLITUS WITH DIABETIC POLYNEUROPATHY, WITHOUT LONG-TERM CURRENT USE OF INSULIN: ICD-10-CM

## 2019-08-05 RX ORDER — SIMVASTATIN 20 MG/1
TABLET, FILM COATED ORAL
Qty: 90 TABLET | Refills: 1 | Status: SHIPPED | OUTPATIENT
Start: 2019-08-05 | End: 2020-02-03

## 2019-08-19 ENCOUNTER — OFFICE VISIT (OUTPATIENT)
Dept: FAMILY MEDICINE | Facility: CLINIC | Age: 59
End: 2019-08-19
Payer: MEDICAID

## 2019-08-19 VITALS
TEMPERATURE: 98 F | DIASTOLIC BLOOD PRESSURE: 80 MMHG | BODY MASS INDEX: 32.19 KG/M2 | WEIGHT: 237.63 LBS | OXYGEN SATURATION: 96 % | SYSTOLIC BLOOD PRESSURE: 131 MMHG | HEART RATE: 101 BPM | RESPIRATION RATE: 17 BRPM | HEIGHT: 72 IN

## 2019-08-19 DIAGNOSIS — R80.9 TYPE 2 DIABETES MELLITUS WITH MICROALBUMINURIA, WITHOUT LONG-TERM CURRENT USE OF INSULIN: ICD-10-CM

## 2019-08-19 DIAGNOSIS — E11.29 TYPE 2 DIABETES MELLITUS WITH MICROALBUMINURIA, WITHOUT LONG-TERM CURRENT USE OF INSULIN: ICD-10-CM

## 2019-08-19 DIAGNOSIS — Z00.00 VISIT FOR WELL MAN HEALTH CHECK: Primary | ICD-10-CM

## 2019-08-19 DIAGNOSIS — E11.42 TYPE 2 DIABETES MELLITUS WITH DIABETIC POLYNEUROPATHY, WITHOUT LONG-TERM CURRENT USE OF INSULIN: ICD-10-CM

## 2019-08-19 PROCEDURE — 99999 PR PBB SHADOW E&M-EST. PATIENT-LVL III: CPT | Mod: PBBFAC,,, | Performed by: FAMILY MEDICINE

## 2019-08-19 PROCEDURE — 99999 PR PBB SHADOW E&M-EST. PATIENT-LVL III: ICD-10-PCS | Mod: PBBFAC,,, | Performed by: FAMILY MEDICINE

## 2019-08-19 PROCEDURE — 99213 OFFICE O/P EST LOW 20 MIN: CPT | Mod: PBBFAC,PN | Performed by: FAMILY MEDICINE

## 2019-08-19 PROCEDURE — 99396 PR PREVENTIVE VISIT,EST,40-64: ICD-10-PCS | Mod: S$PBB,,, | Performed by: FAMILY MEDICINE

## 2019-08-19 PROCEDURE — 99396 PREV VISIT EST AGE 40-64: CPT | Mod: S$PBB,,, | Performed by: FAMILY MEDICINE

## 2019-08-19 NOTE — PROGRESS NOTES
"Well Man VISIT      CHIEF COMPLAINT  Chief Complaint   Patient presents with    Follow-up     6 month       HPI  Fermin Henson is a 59 y.o. male who presents for physical.     Social Factors  Tobacco use: No  Ready to Quit: No  Alcohol: No  Intimate partner violence screening  "Do you feel safe in your current relationship?" single  "Have you ever been in a relationship in which your partner frightened you or hurt you?" no  Living Will/POA: No  Regular Exercise: No    Depression  Over the past two weeks, have you felt down, depressed, or hopeless? No  Over the past two weeks, have you felt little interest or pleasure in doing things? No    Reproductive Health  STD screening in last year: deferred  HIV screening: deferred    Screen for Chronic Disease  CHD Risk Factors: advanced age (older than 55 for men, 65 for women), diabetes mellitus, dyslipidemia and obesity (BMI >= 30 kg/m2)  Estimated body mass index is 32.22 kg/m² as calculated from the following:    Height as of this encounter: 6' 0.01" (1.829 m).    Weight as of this encounter: 107.8 kg (237 lb 10.5 oz).  Dyslipidemia screening needed: ordered  T2DM screening needed: ordered  Colonoscopy needed: utd per patient  PSA needed: n/a  AAA screening needed:n/a  Screen men 35 years and older, and men 20 to 34 years of age who have cardiovascular risk factors for dyslipidemia  Begin screening colonoscopies at 50 years of age in men of average risk, and continue until 75 years of age; offer fecal occult blood testing every year, flexible sigmoidoscopy every five years combined with fecal occult blood testing every three years, or colonoscopy every 10 years   The American U[employer] Association recommends offering PSA testing and digital rectal examination to well-informed men beginning at 40 years of age and continuing until life expectancy is less than 10 years  Screen once with ultrasonography in men 65 to 75 years of age if they have a family history or " "have smoked at [id number] cigarettes in their lifetime  Screen men with a sustained blood pressure greater than 135/80 mm Hg for T2DM      Immunizations  stated as up to date, no records available    ALLERGIES and MEDS were verified.   PMHx, PSHx, FHx, SOCIALHx were updated as pertinent.    REVIEW OF SYSTEMS  Review of Systems   Constitutional: Negative.    HENT: Negative.    Eyes: Negative.    Respiratory: Negative.    Cardiovascular: Negative.    Gastrointestinal: Negative.    Genitourinary: Negative.    Musculoskeletal: Negative.    Skin: Negative.    Neurological: Positive for sensory change.         PHYSICAL EXAM  VITAL SIGNS: /80 (BP Location: Left arm, Patient Position: Sitting, BP Method: Large (Automatic))   Pulse 101   Temp 97.6 °F (36.4 °C) (Oral)   Resp 17   Ht 6' 0.01" (1.829 m)   Wt 107.8 kg (237 lb 10.5 oz)   SpO2 96%   BMI 32.22 kg/m²   GEN: Well developed, Well nourished, No acute distress.  HENT: Normocephalic, Atraumatic, Bilateral external ears normal, Nose normal, Oropharynx moist, No oral exudates.   Eyes: PERRL, EOMI, Conjunctiva normal, No discharge.   Neck: Supple, No tenderness.  Lymphatic: No cervical or supraclavicular lymphadenopathy noted.   Cardiovascular: Normal heart rate, Normal rhythm, No murmurs, No rubs, No gallops.   Thorax & Lungs: Normal breath sounds, No respiratory distress, No wheezing.  Abdomen: Soft, No tenderness, Bowel sounds normal.  Genital: deferred  Skin: Warm, Dry, No erythema, No rash.   Extremities: No edema, No tenderness.       ASSESSMENT/PLAN    Fermin was seen today for follow-up.    Diagnoses and all orders for this visit:    Visit for [facility] health check  -     CBC auto differential; Future  -     Comprehensive metabolic panel; Future  -     Lipid panel; Future  -     Urinalysis; Future  -     Microalbumin/creatinine urine ratio; Future  -     Hemoglobin A1c; Future  -     Fecal Immunochemical Test (iFOBT); Future    Type 2 diabetes mellitus " with diabetic polyneuropathy, without long-term current use of insulin    Type 2 diabetes mellitus with microalbuminuria, without long-term current use of insulin  -     Microalbumin/creatinine urine ratio; Future  -     Hemoglobin A1c; Future         FOLLOW UP: 3 months or sooner if needed    See Jean-Baptiste MD

## 2019-08-20 ENCOUNTER — LAB VISIT (OUTPATIENT)
Dept: LAB | Facility: HOSPITAL | Age: 59
End: 2019-08-20
Attending: FAMILY MEDICINE
Payer: MEDICAID

## 2019-08-20 DIAGNOSIS — R80.9 TYPE 2 DIABETES MELLITUS WITH MICROALBUMINURIA, WITHOUT LONG-TERM CURRENT USE OF INSULIN: ICD-10-CM

## 2019-08-20 DIAGNOSIS — Z00.00 VISIT FOR WELL MAN HEALTH CHECK: ICD-10-CM

## 2019-08-20 DIAGNOSIS — E11.29 TYPE 2 DIABETES MELLITUS WITH MICROALBUMINURIA, WITHOUT LONG-TERM CURRENT USE OF INSULIN: ICD-10-CM

## 2019-08-20 LAB
ALBUMIN SERPL BCP-MCNC: 4.2 G/DL (ref 3.5–5.2)
ALP SERPL-CCNC: 47 U/L (ref 55–135)
ALT SERPL W/O P-5'-P-CCNC: 29 U/L (ref 10–44)
ANION GAP SERPL CALC-SCNC: 6 MMOL/L (ref 8–16)
AST SERPL-CCNC: 21 U/L (ref 10–40)
BASOPHILS # BLD AUTO: 0.02 K/UL (ref 0–0.2)
BASOPHILS NFR BLD: 0.3 % (ref 0–1.9)
BILIRUB SERPL-MCNC: 0.6 MG/DL (ref 0.1–1)
BUN SERPL-MCNC: 15 MG/DL (ref 8–23)
CALCIUM SERPL-MCNC: 10 MG/DL (ref 8.7–10.5)
CHLORIDE SERPL-SCNC: 103 MMOL/L (ref 95–110)
CHOLEST SERPL-MCNC: 136 MG/DL (ref 120–199)
CHOLEST/HDLC SERPL: 3.1 {RATIO} (ref 2–5)
CO2 SERPL-SCNC: 31 MMOL/L (ref 23–29)
CREAT SERPL-MCNC: 1 MG/DL (ref 0.5–1.4)
DIFFERENTIAL METHOD: ABNORMAL
EOSINOPHIL # BLD AUTO: 0.3 K/UL (ref 0–0.5)
EOSINOPHIL NFR BLD: 4.7 % (ref 0–8)
ERYTHROCYTE [DISTWIDTH] IN BLOOD BY AUTOMATED COUNT: 13.1 % (ref 11.5–14.5)
EST. GFR  (AFRICAN AMERICAN): >60 ML/MIN/1.73 M^2
EST. GFR  (NON AFRICAN AMERICAN): >60 ML/MIN/1.73 M^2
ESTIMATED AVG GLUCOSE: 171 MG/DL (ref 68–131)
GLUCOSE SERPL-MCNC: 143 MG/DL (ref 70–110)
HBA1C MFR BLD HPLC: 7.6 % (ref 4–5.6)
HCT VFR BLD AUTO: 39.9 % (ref 40–54)
HDLC SERPL-MCNC: 44 MG/DL (ref 40–75)
HDLC SERPL: 32.4 % (ref 20–50)
HGB BLD-MCNC: 13.3 G/DL (ref 14–18)
LDLC SERPL CALC-MCNC: 58.8 MG/DL (ref 63–159)
LYMPHOCYTES # BLD AUTO: 1.9 K/UL (ref 1–4.8)
LYMPHOCYTES NFR BLD: 26.5 % (ref 18–48)
MCH RBC QN AUTO: 32 PG (ref 27–31)
MCHC RBC AUTO-ENTMCNC: 33.3 G/DL (ref 32–36)
MCV RBC AUTO: 96 FL (ref 82–98)
MONOCYTES # BLD AUTO: 0.6 K/UL (ref 0.3–1)
MONOCYTES NFR BLD: 9 % (ref 4–15)
NEUTROPHILS # BLD AUTO: 4.2 K/UL (ref 1.8–7.7)
NEUTROPHILS NFR BLD: 59.8 % (ref 38–73)
NONHDLC SERPL-MCNC: 92 MG/DL
PLATELET # BLD AUTO: 241 K/UL (ref 150–350)
PMV BLD AUTO: 9.6 FL (ref 9.2–12.9)
POTASSIUM SERPL-SCNC: 4.5 MMOL/L (ref 3.5–5.1)
PROT SERPL-MCNC: 7.7 G/DL (ref 6–8.4)
RBC # BLD AUTO: 4.16 M/UL (ref 4.6–6.2)
SODIUM SERPL-SCNC: 140 MMOL/L (ref 136–145)
TRIGL SERPL-MCNC: 166 MG/DL (ref 30–150)
WBC # BLD AUTO: 7.01 K/UL (ref 3.9–12.7)

## 2019-08-20 PROCEDURE — 80061 LIPID PANEL: CPT

## 2019-08-20 PROCEDURE — 83036 HEMOGLOBIN GLYCOSYLATED A1C: CPT

## 2019-08-20 PROCEDURE — 85025 COMPLETE CBC W/AUTO DIFF WBC: CPT

## 2019-08-20 PROCEDURE — 80053 COMPREHEN METABOLIC PANEL: CPT

## 2019-08-20 PROCEDURE — 36415 COLL VENOUS BLD VENIPUNCTURE: CPT | Mod: PN

## 2019-08-21 ENCOUNTER — LAB VISIT (OUTPATIENT)
Dept: LAB | Facility: HOSPITAL | Age: 59
End: 2019-08-21
Attending: FAMILY MEDICINE
Payer: MEDICAID

## 2019-08-21 ENCOUNTER — TELEPHONE (OUTPATIENT)
Dept: FAMILY MEDICINE | Facility: CLINIC | Age: 59
End: 2019-08-21

## 2019-08-21 DIAGNOSIS — Z00.00 VISIT FOR WELL MAN HEALTH CHECK: ICD-10-CM

## 2019-08-21 LAB
LEFT EYE DM RETINOPATHY: NEGATIVE
RIGHT EYE DM RETINOPATHY: NEGATIVE

## 2019-08-21 PROCEDURE — 82274 ASSAY TEST FOR BLOOD FECAL: CPT

## 2019-08-21 NOTE — PROGRESS NOTES
Please let patient know that overall his labs are doing ok, but his blood sugars are trending up.  I need him to resume his walking and to watch his carbohydrate intake.  I would like to see him back in 3 months or sooner if he needs something.  Please remind him he will need a flu shot in September.    Thanks  Dr. Jean-Baptiste

## 2019-08-21 NOTE — TELEPHONE ENCOUNTER
Pt was notified of results and had verbalized understanding. Pt is aware of flu vaccine due in Sept. , and has also schedule 3 month follow up.

## 2019-08-21 NOTE — TELEPHONE ENCOUNTER
----- Message from See Jean-Baptiste MD sent at 8/21/2019  7:45 AM CDT -----  Please let patient know that overall his labs are doing ok, but his blood sugars are trending up.  I need him to resume his walking and to watch his carbohydrate intake.  I would like to see him back in 3 months or sooner if he needs something.  Please remind him he will need a flu shot in September.    Thanks  Dr. Jean-Baptiste

## 2019-08-27 LAB — HEMOCCULT STL QL IA: NEGATIVE

## 2019-09-03 DIAGNOSIS — E11.9 TYPE 2 DIABETES MELLITUS WITHOUT COMPLICATION, WITHOUT LONG-TERM CURRENT USE OF INSULIN: ICD-10-CM

## 2019-09-03 RX ORDER — PIOGLITAZONEHYDROCHLORIDE 15 MG/1
TABLET ORAL
Qty: 90 TABLET | Refills: 0 | Status: SHIPPED | OUTPATIENT
Start: 2019-09-03 | End: 2019-11-12 | Stop reason: SDUPTHER

## 2019-10-23 LAB
LEFT EYE DM RETINOPATHY: NEGATIVE
RIGHT EYE DM RETINOPATHY: NEGATIVE

## 2019-10-25 ENCOUNTER — PATIENT OUTREACH (OUTPATIENT)
Dept: ADMINISTRATIVE | Facility: HOSPITAL | Age: 59
End: 2019-10-25

## 2019-10-28 RX ORDER — METFORMIN HYDROCHLORIDE 850 MG/1
TABLET ORAL
Qty: 90 TABLET | Refills: 2 | Status: SHIPPED | OUTPATIENT
Start: 2019-10-28 | End: 2020-01-27

## 2019-11-12 DIAGNOSIS — E11.9 TYPE 2 DIABETES MELLITUS WITHOUT COMPLICATION, WITHOUT LONG-TERM CURRENT USE OF INSULIN: ICD-10-CM

## 2019-11-12 RX ORDER — PIOGLITAZONEHYDROCHLORIDE 15 MG/1
15 TABLET ORAL DAILY
Qty: 90 TABLET | Refills: 0 | Status: SHIPPED | OUTPATIENT
Start: 2019-11-12 | End: 2020-05-31

## 2019-11-22 ENCOUNTER — OFFICE VISIT (OUTPATIENT)
Dept: FAMILY MEDICINE | Facility: CLINIC | Age: 59
End: 2019-11-22
Payer: MEDICAID

## 2019-11-22 VITALS
RESPIRATION RATE: 17 BRPM | DIASTOLIC BLOOD PRESSURE: 81 MMHG | HEIGHT: 72 IN | HEART RATE: 89 BPM | TEMPERATURE: 98 F | OXYGEN SATURATION: 97 % | BODY MASS INDEX: 31.71 KG/M2 | WEIGHT: 234.13 LBS | SYSTOLIC BLOOD PRESSURE: 137 MMHG

## 2019-11-22 DIAGNOSIS — Z23 IMMUNIZATION DUE: ICD-10-CM

## 2019-11-22 DIAGNOSIS — E11.29 TYPE 2 DIABETES MELLITUS WITH MICROALBUMINURIA, WITHOUT LONG-TERM CURRENT USE OF INSULIN: ICD-10-CM

## 2019-11-22 DIAGNOSIS — R80.9 TYPE 2 DIABETES MELLITUS WITH MICROALBUMINURIA, WITHOUT LONG-TERM CURRENT USE OF INSULIN: ICD-10-CM

## 2019-11-22 DIAGNOSIS — E11.42 TYPE 2 DIABETES MELLITUS WITH DIABETIC POLYNEUROPATHY, WITHOUT LONG-TERM CURRENT USE OF INSULIN: Primary | ICD-10-CM

## 2019-11-22 PROCEDURE — 99999 PR PBB SHADOW E&M-EST. PATIENT-LVL IV: CPT | Mod: PBBFAC,,, | Performed by: FAMILY MEDICINE

## 2019-11-22 PROCEDURE — 99214 PR OFFICE/OUTPT VISIT, EST, LEVL IV, 30-39 MIN: ICD-10-PCS | Mod: S$PBB,,, | Performed by: FAMILY MEDICINE

## 2019-11-22 PROCEDURE — 99999 PR PBB SHADOW E&M-EST. PATIENT-LVL IV: ICD-10-PCS | Mod: PBBFAC,,, | Performed by: FAMILY MEDICINE

## 2019-11-22 PROCEDURE — 99214 OFFICE O/P EST MOD 30 MIN: CPT | Mod: S$PBB,,, | Performed by: FAMILY MEDICINE

## 2019-11-22 PROCEDURE — 99214 OFFICE O/P EST MOD 30 MIN: CPT | Mod: PBBFAC,PN | Performed by: FAMILY MEDICINE

## 2019-11-22 RX ORDER — MUPIROCIN 20 MG/G
OINTMENT TOPICAL
COMMUNITY
End: 2020-09-11

## 2019-11-22 RX ORDER — ATORVASTATIN CALCIUM 40 MG/1
TABLET, FILM COATED ORAL
COMMUNITY
End: 2020-02-03

## 2019-11-22 NOTE — PROGRESS NOTES
Routine Office Visit    Patient Name: Fermin Metcalf Peer    : 1960  MRN: [id number]    Subjective:  Fermin is a 59 y.o. male who presents today for:    1. Type 2 dm  Patient presenting today for follow up for type 2 DM.  He has not had any hypoglycemic episodes.  He has been sticking with his diet and taking all of his medications as prescribed.  No other complaints today.  Last labs showed proteinuria.  He states blood sugars have been elevated at night time with the highest being 188 per patient.    Past Medical History  Past Medical History:   Diagnosis Date    Diabetes mellitus, type 2     Kidney stones        Past Surgical History  Past Surgical History:   Procedure Laterality Date    EXTRACORPOREAL SHOCK WAVE LITHOTRIPSY Right 2019    Procedure: LITHOTRIPSY, ESWL;  Surgeon: WHITNEY Bryant MD;  Location: [facility];  Service: Urology;  Laterality: Right;  RN PREOP 3/25/2019---NEED H/P-----KUB    eye  surgeries      several     TOE AMPUTATION      several toes on R foot       Family History  Family History   Problem Relation Age of Onset    No Known Problems Mother     Diabetes Father        Social History  Social History     Socioeconomic History    Marital status: Single     Spouse name: Not on file    Number of children: Not on file    Years of education: Not on file    Highest education level: Not on file   Occupational History    Not on file   Social Needs    Financial resource strain: Not on file    Food insecurity:     Worry: Not on file     Inability: Not on file    Transportation needs:     Medical: Not on file     Non-medical: Not on file   Tobacco Use    Smoking status: Never Smoker    Smokeless tobacco: Never Used   Substance and Sexual Activity    Alcohol use: No    Drug use: No    Sexual activity: Not Currently     Partners: Female   Lifestyle    Physical activity:     Days per week: Not on file     Minutes per session: Not on file    Stress: Not on file   Relationships     Social connections:     Talks on phone: Not on file     Gets together: Not on file     Attends [employer] service: Not on file     Active member of club or organization: Not on file     Attends meetings of clubs or organizations: Not on file     Relationship status: Not on file   Other Topics Concern    Not on file   Social History Narrative    Not on file       Current Medications  Current Outpatient Medications on File Prior to Visit   Medication Sig Dispense Refill    antiox #8/om3/dha/epa/lut/zeax (PRESERVISION AREDS 2, OMEGA-3, ORAL) Take by mouth.      atorvastatin (LIPITOR) 40 MG tablet atorvastatin 40 mg tablet   Take one tab po QHS for cholesterol; stop Simvastatin      cinnamon bark (CINNAMON) 500 mg capsule Take 500 mg by mouth once daily.      ferrous sulfate 325 mg (65 mg iron) Tab tablet Take 325 mg by mouth daily with breakfast.      fish oil-omega-3 fatty acids 300-1,000 mg capsule Take by mouth once daily.      HYDROcodone-acetaminophen (NORCO) 5-325 mg per tablet Take 1 tablet by mouth every 6 (six) hours as needed for Pain. 30 tablet 0    losartan (COZAAR) 25 MG tablet TAKE 1 TABLET BY MOUTH ONCE DAILY 90 tablet 3    melatonin 10 mg Cap Take by mouth.      metFORMIN (GLUCOPHAGE) 850 MG tablet TAKE 2 TABLETS BY MOUTH IN THE MORNING AND 1 IN THE EVENING WITH MEALS 90 tablet 0    metFORMIN (GLUCOPHAGE) 850 MG tablet TAKE 2 TABLETS BY MOUTH IN THE MORNING AND 1 TABLET  IN THE EVENING WITH MEALS 90 tablet 2    multivitamin (THERAGRAN) per tablet Take 1 tablet by mouth once daily.      mupirocin (BACTROBAN) 2 % ointment 3 (three) times daily Apply as directed to nose      pioglitazone (ACTOS) 15 MG tablet Take 1 tablet (15 mg total) by mouth once daily. 90 tablet 0    simvastatin (ZOCOR) 20 MG tablet TAKE 1 TABLET BY MOUTH ONCE DAILY IN THE EVENING 90 tablet 1     No current facility-administered medications on file prior to visit.        Allergies   Review of patient's allergies  indicates:  No Known Allergies    Review of Systems (Pertinent positives)  Review of Systems   Constitutional: Negative.    HENT: Negative.    Eyes: Negative.    Respiratory: Negative.    Cardiovascular: Negative.    Gastrointestinal: Negative.    Genitourinary: Negative.    Skin:        +bilateral skin ulcers   Neurological: Positive for sensory change.         /81 (BP Location: Left arm, Patient Position: Sitting, BP Method: Medium (Automatic))   Pulse 89   Temp 97.8 °F (36.6 °C) (Oral)   Resp 17   Ht 6' (1.829 m)   Wt 106.2 kg (234 lb 2.1 oz)   SpO2 97%   BMI 31.75 kg/m²     GENERAL APPEARANCE: in no apparent distress and well developed and well nourished  HEENT: PERRL, EOMI, Sclera clear, anicteric, Oropharynx clear, no lesions, Neck supple with midline trachea  NECK: normal, supple, no adenopathy, thyroid normal in size  RESPIRATORY: appears well, vitals normal, no respiratory distress, acyanotic, normal RR, chest clear, no wheezing, crepitations, rhonchi, normal symmetric air entry  HEART: regular rate and rhythm, S1, S2 normal, no murmur, click, rub or gallop.    ABDOMEN: abdomen is soft without tenderness, no masses, no hernias, no organomegaly, no rebound, no guarding. Suprapubic tenderness absent. No CVA tenderness.  NEUROLOGIC: normal without focal findings, CN II-XII are intact.    SKIN: no rashes, no wounds, no other lesions  PSYCH: Alert, oriented x 3, thought content appropriate, speech normal, pleasant and cooperative, good eye contact, well groomed        Assessment/Plan:  Fermin Henson is a 59 y.o. male who presents today for :    Fermin was seen today for follow-up.    Diagnoses and all orders for this visit:    Type 2 diabetes mellitus with diabetic polyneuropathy, without long-term current use of insulin  -     Comprehensive metabolic panel; Future  -     Hemoglobin A1c; Future    Type 2 diabetes mellitus with microalbuminuria, without long-term current use of insulin  -      Comprehensive metabolic panel; Future  -     Hemoglobin A1c; Future      1.  Labs to be done today  2.  Follow up 6 months or sooner if needed  3.  He is to take all medications as needed and will consider trulicity if a1c is no better or if worse  4.  Call with concerns    See Jean-Baptiste MD

## 2019-12-03 ENCOUNTER — TELEPHONE (OUTPATIENT)
Dept: FAMILY MEDICINE | Facility: CLINIC | Age: 59
End: 2019-12-03

## 2019-12-03 NOTE — TELEPHONE ENCOUNTER
Pt contacted in regards to lab results. Pt verbalizes understanding. Pt also educated on the importance of watching carbohydrate intake and taking in enough water throughout the day. Pt verbalizes understanding. 3 month f/u appointment scheduled for 03/09/2020.

## 2019-12-09 DIAGNOSIS — E11.9 TYPE 2 DIABETES MELLITUS WITHOUT COMPLICATION, WITHOUT LONG-TERM CURRENT USE OF INSULIN: ICD-10-CM

## 2019-12-09 RX ORDER — PIOGLITAZONEHYDROCHLORIDE 15 MG/1
TABLET ORAL
Qty: 90 TABLET | Refills: 0 | Status: SHIPPED | OUTPATIENT
Start: 2019-12-09 | End: 2020-09-11

## 2019-12-20 ENCOUNTER — OFFICE VISIT (OUTPATIENT)
Dept: FAMILY MEDICINE | Facility: CLINIC | Age: 59
End: 2019-12-20
Payer: MEDICAID

## 2019-12-20 VITALS
BODY MASS INDEX: 31.83 KG/M2 | DIASTOLIC BLOOD PRESSURE: 80 MMHG | OXYGEN SATURATION: 95 % | HEART RATE: 95 BPM | WEIGHT: 235 LBS | TEMPERATURE: 98 F | SYSTOLIC BLOOD PRESSURE: 138 MMHG | HEIGHT: 72 IN | RESPIRATION RATE: 17 BRPM

## 2019-12-20 DIAGNOSIS — L97.509 TYPE 2 DIABETES MELLITUS WITH FOOT ULCER, WITH LONG-TERM CURRENT USE OF INSULIN: Primary | ICD-10-CM

## 2019-12-20 DIAGNOSIS — E11.42 TYPE 2 DIABETES MELLITUS WITH DIABETIC POLYNEUROPATHY, WITHOUT LONG-TERM CURRENT USE OF INSULIN: ICD-10-CM

## 2019-12-20 DIAGNOSIS — E11.621 TYPE 2 DIABETES MELLITUS WITH FOOT ULCER, WITH LONG-TERM CURRENT USE OF INSULIN: Primary | ICD-10-CM

## 2019-12-20 DIAGNOSIS — Z79.4 TYPE 2 DIABETES MELLITUS WITH FOOT ULCER, WITH LONG-TERM CURRENT USE OF INSULIN: Primary | ICD-10-CM

## 2019-12-20 PROCEDURE — 99999 PR PBB SHADOW E&M-EST. PATIENT-LVL IV: ICD-10-PCS | Mod: PBBFAC,,, | Performed by: FAMILY MEDICINE

## 2019-12-20 PROCEDURE — 99999 PR PBB SHADOW E&M-EST. PATIENT-LVL IV: CPT | Mod: PBBFAC,,, | Performed by: FAMILY MEDICINE

## 2019-12-20 PROCEDURE — 99214 PR OFFICE/OUTPT VISIT, EST, LEVL IV, 30-39 MIN: ICD-10-PCS | Mod: S$PBB,,, | Performed by: FAMILY MEDICINE

## 2019-12-20 PROCEDURE — 99214 OFFICE O/P EST MOD 30 MIN: CPT | Mod: PBBFAC,25,PN | Performed by: FAMILY MEDICINE

## 2019-12-20 PROCEDURE — 99214 OFFICE O/P EST MOD 30 MIN: CPT | Mod: S$PBB,,, | Performed by: FAMILY MEDICINE

## 2019-12-20 RX ORDER — SULFAMETHOXAZOLE AND TRIMETHOPRIM 800; 160 MG/1; MG/1
1 TABLET ORAL 2 TIMES DAILY
Qty: 20 TABLET | Refills: 0 | Status: SHIPPED | OUTPATIENT
Start: 2019-12-20 | End: 2019-12-30

## 2019-12-20 NOTE — PROGRESS NOTES
Routine Office Visit    Patient Name: Fermin Metcalf Peer    : 1960  MRN: [id number]    Subjective:  Fermin is a 59 y.o. male who presents today for:    1. Foot wound  Patient is a 59 year old with type 2 IDDM with ulcer to right foot.  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  He states that almost every year around this time he gets an abscess on his foot.  There has been drainage, but he states it was clear.  No redness of the foot.  He denies fevers, chills, or sweats.  Patient is insensate from neuropathy.  He states that he is concerned because the wound is wet.  He has been putting neosporin on the wound.  He denies any odor from the wound    Past Medical History  Past Medical History:   Diagnosis Date    Diabetes mellitus, type 2     Kidney stones        Past Surgical History  Past Surgical History:   Procedure Laterality Date    EXTRACORPOREAL SHOCK WAVE LITHOTRIPSY Right 2019    Procedure: LITHOTRIPSY, ESWL;  Surgeon: WHITNEY Bryant MD;  Location: [facility];  Service: Urology;  Laterality: Right;  RN PREOP 3/25/2019---NEED H/P-----KUB    eye  surgeries      several     TOE AMPUTATION      several toes on R foot       Family History  Family History   Problem Relation Age of Onset    No Known Problems Mother     Diabetes Father        Social History  Social History     Socioeconomic History    Marital status: Single     Spouse name: Not on file    Number of children: Not on file    Years of education: Not on file    Highest education level: Not on file   Occupational History    Not on file   Social Needs    Financial resource strain: Not on file    Food insecurity:     Worry: Not on file     Inability: Not on file    Transportation needs:     Medical: Not on file     Non-medical: Not on file   Tobacco Use    Smoking status: Never Smoker    Smokeless tobacco: Never Used   Substance and Sexual Activity    Alcohol use: No    Drug use: No     Sexual activity: Not Currently     Partners: Female   Lifestyle    Physical activity:     Days per week: Not on file     Minutes per session: Not on file    Stress: Not on file   Relationships    Social connections:     Talks on phone: Not on file     Gets together: Not on file     Attends [employer] service: Not on file     Active member of club or organization: Not on file     Attends meetings of clubs or organizations: Not on file     Relationship status: Not on file   Other Topics Concern    Not on file   Social History Narrative    Not on file       Current Medications  Current Outpatient Medications on File Prior to Visit   Medication Sig Dispense Refill    antiox #8/om3/dha/epa/lut/zeax (PRESERVISION AREDS 2, OMEGA-3, ORAL) Take by mouth.      atorvastatin (LIPITOR) 40 MG tablet atorvastatin 40 mg tablet   Take one tab po QHS for cholesterol; stop Simvastatin      cinnamon bark (CINNAMON) 500 mg capsule Take 500 mg by mouth once daily.      ferrous sulfate 325 mg (65 mg iron) Tab tablet Take 325 mg by mouth daily with breakfast.      fish oil-omega-3 fatty acids 300-1,000 mg capsule Take by mouth once daily.      HYDROcodone-acetaminophen (NORCO) 5-325 mg per tablet Take 1 tablet by mouth every 6 (six) hours as needed for Pain. 30 tablet 0    losartan (COZAAR) 25 MG tablet TAKE 1 TABLET BY MOUTH ONCE DAILY 90 tablet 3    melatonin 10 mg Cap Take by mouth.      metFORMIN (GLUCOPHAGE) 850 MG tablet TAKE 2 TABLETS BY MOUTH IN THE MORNING AND 1 IN THE EVENING WITH MEALS 90 tablet 0    metFORMIN (GLUCOPHAGE) 850 MG tablet TAKE 2 TABLETS BY MOUTH IN THE MORNING AND 1 TABLET  IN THE EVENING WITH MEALS 90 tablet 2    multivitamin (THERAGRAN) per tablet Take 1 tablet by mouth once daily.      mupirocin (BACTROBAN) 2 % ointment 3 (three) times daily Apply as directed to nose      pioglitazone (ACTOS) 15 MG tablet Take 1 tablet (15 mg total) by mouth once daily. 90 tablet 0    pioglitazone (ACTOS) 15  "MG tablet TAKE 1 TABLET BY MOUTH ONCE DAILY 90 tablet 0    simvastatin (ZOCOR) 20 MG tablet TAKE 1 TABLET BY MOUTH ONCE DAILY IN THE EVENING 90 tablet 1     No current facility-administered medications on file prior to visit.        Allergies   Review of patient's allergies indicates:  No Known Allergies    Review of Systems (Pertinent positives)  Review of Systems   Constitutional: Negative.    HENT: Negative.    Eyes: Negative.    Respiratory: Negative.    Gastrointestinal: Negative.    Musculoskeletal: Negative.    Skin:        +right foot ulcer   Neurological: Positive for sensory change.         /80 (BP Location: Left arm, Patient Position: Sitting, BP Method: Medium (Automatic))   Pulse 95   Temp 97.7 °F (36.5 °C) (Oral)   Resp 17   Ht 6' 0.01" (1.829 m)   Wt 106.6 kg (235 lb 0.2 oz)   SpO2 95%   BMI 31.87 kg/m²     GENERAL APPEARANCE: in no apparent distress and well developed and well nourished  HEENT: PERRL, EOMI, Sclera clear, anicteric, Oropharynx clear, no lesions, Neck supple with midline trachea  NECK: normal, supple, no adenopathy, thyroid normal in size  RESPIRATORY: appears well, vitals normal, no respiratory distress, acyanotic, normal RR, chest clear, no wheezing, crepitations, rhonchi, normal symmetric air entry  HEART: regular rate and rhythm, S1, S2 normal, no murmur, click, rub or gallop.    ABDOMEN: abdomen is soft without tenderness, no masses, no hernias, no organomegaly, no rebound, no guarding. Suprapubic tenderness absent. No CVA tenderness.  NEUROLOGIC: normal without focal findings, CN II-XII are intact.  Insensate to bilateral feet up to superior ankles  Extremities: warm/well perfused.  No abnormal hair patterns.  No clubbing, cyanosis or edema.  n  SKIN: 2 wounds present on plantar surface of right foot just under amputation site.  No active drainage present.  Wound is macerated  PSYCH: Alert, oriented x 3, thought content appropriate, speech normal, pleasant and " cooperative, good eye contact, well groomed; no foul odor    Assessment/Plan:  Fermin Henson is a 59 y.o. male who presents today for :    Fermin was seen today for recurrent skin infections.    Diagnoses and all orders for this visit:    Type 2 diabetes mellitus with foot ulcer, with long-term current use of insulin  -     X-Ray Foot Complete Right; Future  -     Ambulatory referral to Wound Clinic  -     C-reactive protein; Future  -     Sedimentation rate; Future  -     CBC auto differential; Future  -     PREALBUMIN; Future  -     sulfamethoxazole-trimethoprim 800-160mg (BACTRIM DS) 800-160 mg Tab; Take 1 tablet by mouth 2 (two) times daily. for 10 days    Type 2 diabetes mellitus with diabetic polyneuropathy, without long-term current use of insulin      1.  Xray to be done to evaluate bone involvement  2.  Labs to be done today  3.  Bactrim as he states there was an abscess present  4.  Referred to wound care to have wound evaluated  5.  He is to check with [facility] for Vashe or check amazon and clean wounds daily with it  6.  He is to got to the ED should wounds worsen      See Jean-Baptiste MD

## 2020-01-21 ENCOUNTER — HOSPITAL ENCOUNTER (OUTPATIENT)
Dept: WOUND CARE | Facility: HOSPITAL | Age: 60
Discharge: HOME | End: 2020-01-21
Attending: FAMILY MEDICINE
Payer: MEDICAID

## 2020-01-21 DIAGNOSIS — L97.509 TYPE 2 DIABETES WITH SKIN ULCER OF FOOT: ICD-10-CM

## 2020-01-21 DIAGNOSIS — E11.621 TYPE 2 DIABETES WITH SKIN ULCER OF FOOT: ICD-10-CM

## 2020-01-21 PROCEDURE — 87070 CULTURE OTHR SPECIMN AEROBIC: CPT

## 2020-01-21 PROCEDURE — 27201912 HC WOUND CARE DEBRIDEMENT SUPPLIES: Performed by: FAMILY MEDICINE

## 2020-01-21 PROCEDURE — 11042 DBRDMT SUBQ TIS 1ST 20SQCM/<: CPT | Performed by: FAMILY MEDICINE

## 2020-01-21 PROCEDURE — 87186 SC STD MICRODIL/AGAR DIL: CPT

## 2020-01-21 PROCEDURE — 11042 DBRDMT SUBQ TIS 1ST 20SQCM/<: CPT | Mod: ,,, | Performed by: FAMILY MEDICINE

## 2020-01-21 PROCEDURE — 99215 OFFICE O/P EST HI 40 MIN: CPT | Mod: 25,,, | Performed by: FAMILY MEDICINE

## 2020-01-21 PROCEDURE — 99213 OFFICE O/P EST LOW 20 MIN: CPT | Performed by: FAMILY MEDICINE

## 2020-01-21 PROCEDURE — 11042 PR DEBRIDEMENT, SKIN, SUB-Q TISSUE,=<20 SQ CM: ICD-10-PCS | Mod: ,,, | Performed by: FAMILY MEDICINE

## 2020-01-21 PROCEDURE — 87077 CULTURE AEROBIC IDENTIFY: CPT

## 2020-01-21 PROCEDURE — 99215 PR OFFICE/OUTPT VISIT, EST, LEVL V, 40-54 MIN: ICD-10-PCS | Mod: 25,,, | Performed by: FAMILY MEDICINE

## 2020-01-24 LAB — BACTERIA SPEC AEROBE CULT: ABNORMAL

## 2020-01-27 RX ORDER — METFORMIN HYDROCHLORIDE 850 MG/1
TABLET ORAL
Qty: 90 TABLET | Refills: 2 | Status: SHIPPED | OUTPATIENT
Start: 2020-01-27 | End: 2020-04-20

## 2020-01-28 ENCOUNTER — HOSPITAL ENCOUNTER (OUTPATIENT)
Dept: WOUND CARE | Facility: HOSPITAL | Age: 60
Discharge: HOME | End: 2020-01-28
Attending: FAMILY MEDICINE
Payer: MEDICAID

## 2020-01-28 DIAGNOSIS — E11.621 TYPE 2 DIABETES WITH SKIN ULCER OF FOOT: Primary | ICD-10-CM

## 2020-01-28 DIAGNOSIS — L97.509 TYPE 2 DIABETES WITH SKIN ULCER OF FOOT: Primary | ICD-10-CM

## 2020-01-28 PROCEDURE — 99214 PR OFFICE/OUTPT VISIT, EST, LEVL IV, 30-39 MIN: ICD-10-PCS | Mod: ,,, | Performed by: FAMILY MEDICINE

## 2020-01-28 PROCEDURE — 25000003 PHARM REV CODE 250

## 2020-01-28 PROCEDURE — 99214 OFFICE O/P EST MOD 30 MIN: CPT | Mod: ,,, | Performed by: FAMILY MEDICINE

## 2020-01-28 PROCEDURE — 99213 OFFICE O/P EST LOW 20 MIN: CPT | Performed by: FAMILY MEDICINE

## 2020-01-29 ENCOUNTER — PATIENT OUTREACH (OUTPATIENT)
Dept: ADMINISTRATIVE | Facility: HOSPITAL | Age: 60
End: 2020-01-29

## 2020-01-29 ENCOUNTER — TELEPHONE (OUTPATIENT)
Dept: FAMILY MEDICINE | Facility: CLINIC | Age: 60
End: 2020-01-29

## 2020-01-29 DIAGNOSIS — L97.509 TYPE 2 DIABETES WITH SKIN ULCER OF FOOT: Primary | ICD-10-CM

## 2020-01-29 DIAGNOSIS — E11.621 TYPE 2 DIABETES WITH SKIN ULCER OF FOOT: Primary | ICD-10-CM

## 2020-01-29 RX ORDER — SULFAMETHOXAZOLE AND TRIMETHOPRIM 800; 160 MG/1; MG/1
1 TABLET ORAL 2 TIMES DAILY
Qty: 20 TABLET | Refills: 0 | Status: SHIPPED | OUTPATIENT
Start: 2020-01-29 | End: 2020-02-08

## 2020-01-29 NOTE — PROGRESS NOTES
Please let patient know that his culture was positive for staph.  I will send in antibiotics for him to take.    Thanks  Dr. Jean-Baptiste

## 2020-01-29 NOTE — TELEPHONE ENCOUNTER
----- Message from See Jean-Baptiste MD sent at 1/29/2020 10:25 AM CST -----  Please let patient know that his culture was positive for staph.  I will send in antibiotics for him to take.    Thanks  Dr. Jean-Baptiste

## 2020-02-01 DIAGNOSIS — E11.42 TYPE 2 DIABETES MELLITUS WITH DIABETIC POLYNEUROPATHY, WITHOUT LONG-TERM CURRENT USE OF INSULIN: ICD-10-CM

## 2020-02-03 RX ORDER — SIMVASTATIN 20 MG/1
TABLET, FILM COATED ORAL
Qty: 90 TABLET | Refills: 1 | Status: SHIPPED | OUTPATIENT
Start: 2020-02-03 | End: 2020-07-29

## 2020-02-04 ENCOUNTER — HOSPITAL ENCOUNTER (OUTPATIENT)
Dept: WOUND CARE | Facility: HOSPITAL | Age: 60
Discharge: HOME | End: 2020-02-04
Attending: FAMILY MEDICINE
Payer: MEDICAID

## 2020-02-04 DIAGNOSIS — E11.621 TYPE 2 DIABETES WITH SKIN ULCER OF FOOT: Primary | ICD-10-CM

## 2020-02-04 DIAGNOSIS — L97.509 TYPE 2 DIABETES WITH SKIN ULCER OF FOOT: Primary | ICD-10-CM

## 2020-02-04 PROCEDURE — 93922 PR NON-INVAS PHYSIOLOGIC STD EXTREMITY ART 1-2 LEVEL: ICD-10-PCS | Mod: 26,,, | Performed by: FAMILY MEDICINE

## 2020-02-04 PROCEDURE — 11055 PR TRIM HYPERKERATOTIC SKIN LESION, ONE: ICD-10-PCS | Mod: ,,, | Performed by: FAMILY MEDICINE

## 2020-02-04 PROCEDURE — 99214 PR OFFICE/OUTPT VISIT, EST, LEVL IV, 30-39 MIN: ICD-10-PCS | Mod: 25,,, | Performed by: FAMILY MEDICINE

## 2020-02-04 PROCEDURE — 93922 UPR/L XTREMITY ART 2 LEVELS: CPT | Mod: 26,,, | Performed by: FAMILY MEDICINE

## 2020-02-04 PROCEDURE — 93922 UPR/L XTREMITY ART 2 LEVELS: CPT | Performed by: FAMILY MEDICINE

## 2020-02-04 PROCEDURE — 11055 PARING/CUTG B9 HYPRKER LES 1: CPT | Performed by: FAMILY MEDICINE

## 2020-02-04 PROCEDURE — 99214 OFFICE O/P EST MOD 30 MIN: CPT | Mod: 25,,, | Performed by: FAMILY MEDICINE

## 2020-02-04 PROCEDURE — 11055 PARING/CUTG B9 HYPRKER LES 1: CPT | Mod: ,,, | Performed by: FAMILY MEDICINE

## 2020-02-11 ENCOUNTER — HOSPITAL ENCOUNTER (OUTPATIENT)
Dept: WOUND CARE | Facility: HOSPITAL | Age: 60
Discharge: HOME | End: 2020-02-11
Attending: FAMILY MEDICINE
Payer: MEDICAID

## 2020-02-11 DIAGNOSIS — L97.509 TYPE 2 DIABETES WITH SKIN ULCER OF FOOT: Primary | ICD-10-CM

## 2020-02-11 DIAGNOSIS — E11.621 TYPE 2 DIABETES WITH SKIN ULCER OF FOOT: Primary | ICD-10-CM

## 2020-02-11 PROCEDURE — 99214 OFFICE O/P EST MOD 30 MIN: CPT | Mod: 25,,, | Performed by: FAMILY MEDICINE

## 2020-02-11 PROCEDURE — 11042 DBRDMT SUBQ TIS 1ST 20SQCM/<: CPT | Mod: ,,, | Performed by: FAMILY MEDICINE

## 2020-02-11 PROCEDURE — 27201912 HC WOUND CARE DEBRIDEMENT SUPPLIES: Performed by: FAMILY MEDICINE

## 2020-02-11 PROCEDURE — 99214 PR OFFICE/OUTPT VISIT, EST, LEVL IV, 30-39 MIN: ICD-10-PCS | Mod: 25,,, | Performed by: FAMILY MEDICINE

## 2020-02-11 PROCEDURE — 11042 DBRDMT SUBQ TIS 1ST 20SQCM/<: CPT | Performed by: FAMILY MEDICINE

## 2020-02-11 PROCEDURE — 11042 PR DEBRIDEMENT, SKIN, SUB-Q TISSUE,=<20 SQ CM: ICD-10-PCS | Mod: ,,, | Performed by: FAMILY MEDICINE

## 2020-02-18 ENCOUNTER — HOSPITAL ENCOUNTER (OUTPATIENT)
Dept: WOUND CARE | Facility: HOSPITAL | Age: 60
Discharge: HOME | End: 2020-02-18
Attending: FAMILY MEDICINE
Payer: MEDICAID

## 2020-02-18 DIAGNOSIS — E11.621 TYPE 2 DIABETES WITH SKIN ULCER OF FOOT: Primary | ICD-10-CM

## 2020-02-18 DIAGNOSIS — L97.509 TYPE 2 DIABETES WITH SKIN ULCER OF FOOT: Primary | ICD-10-CM

## 2020-02-18 PROCEDURE — 99214 PR OFFICE/OUTPT VISIT, EST, LEVL IV, 30-39 MIN: ICD-10-PCS | Mod: 25,,, | Performed by: FAMILY MEDICINE

## 2020-02-18 PROCEDURE — 11042 PR DEBRIDEMENT, SKIN, SUB-Q TISSUE,=<20 SQ CM: ICD-10-PCS | Mod: ,,, | Performed by: FAMILY MEDICINE

## 2020-02-18 PROCEDURE — 99214 OFFICE O/P EST MOD 30 MIN: CPT | Mod: 25,,, | Performed by: FAMILY MEDICINE

## 2020-02-18 PROCEDURE — 11042 DBRDMT SUBQ TIS 1ST 20SQCM/<: CPT | Mod: ,,, | Performed by: FAMILY MEDICINE

## 2020-02-18 PROCEDURE — 27201912 HC WOUND CARE DEBRIDEMENT SUPPLIES: Performed by: FAMILY MEDICINE

## 2020-02-18 PROCEDURE — 11042 DBRDMT SUBQ TIS 1ST 20SQCM/<: CPT | Performed by: FAMILY MEDICINE

## 2020-02-19 LAB
LEFT EYE DM RETINOPATHY: POSITIVE
RIGHT EYE DM RETINOPATHY: POSITIVE

## 2020-02-24 ENCOUNTER — HOSPITAL ENCOUNTER (OUTPATIENT)
Dept: WOUND CARE | Facility: HOSPITAL | Age: 60
Discharge: HOME | End: 2020-02-24
Attending: FAMILY MEDICINE
Payer: MEDICAID

## 2020-02-24 PROCEDURE — 99213 OFFICE O/P EST LOW 20 MIN: CPT | Performed by: FAMILY MEDICINE

## 2020-03-03 ENCOUNTER — HOSPITAL ENCOUNTER (OUTPATIENT)
Dept: WOUND CARE | Facility: HOSPITAL | Age: 60
Discharge: HOME | End: 2020-03-03
Attending: FAMILY MEDICINE
Payer: MEDICAID

## 2020-03-03 DIAGNOSIS — E11.621 TYPE 2 DIABETES WITH SKIN ULCER OF FOOT: Primary | ICD-10-CM

## 2020-03-03 DIAGNOSIS — L97.509 TYPE 2 DIABETES WITH SKIN ULCER OF FOOT: Primary | ICD-10-CM

## 2020-03-03 PROCEDURE — 11055 PARING/CUTG B9 HYPRKER LES 1: CPT | Performed by: FAMILY MEDICINE

## 2020-03-03 PROCEDURE — 99214 PR OFFICE/OUTPT VISIT, EST, LEVL IV, 30-39 MIN: ICD-10-PCS | Mod: 25,,, | Performed by: FAMILY MEDICINE

## 2020-03-03 PROCEDURE — 99214 OFFICE O/P EST MOD 30 MIN: CPT | Mod: 25,,, | Performed by: FAMILY MEDICINE

## 2020-03-03 PROCEDURE — 11055 PARING/CUTG B9 HYPRKER LES 1: CPT | Mod: RT,,, | Performed by: FAMILY MEDICINE

## 2020-03-03 PROCEDURE — 11055 PR TRIM HYPERKERATOTIC SKIN LESION, ONE: ICD-10-PCS | Mod: RT,,, | Performed by: FAMILY MEDICINE

## 2020-03-04 ENCOUNTER — PATIENT OUTREACH (OUTPATIENT)
Dept: ADMINISTRATIVE | Facility: HOSPITAL | Age: 60
End: 2020-03-04

## 2020-03-09 ENCOUNTER — LAB VISIT (OUTPATIENT)
Dept: LAB | Facility: HOSPITAL | Age: 60
End: 2020-03-09
Attending: FAMILY MEDICINE
Payer: MEDICAID

## 2020-03-09 ENCOUNTER — OFFICE VISIT (OUTPATIENT)
Dept: FAMILY MEDICINE | Facility: CLINIC | Age: 60
End: 2020-03-09
Payer: MEDICAID

## 2020-03-09 VITALS
RESPIRATION RATE: 18 BRPM | HEIGHT: 72 IN | BODY MASS INDEX: 32.25 KG/M2 | OXYGEN SATURATION: 95 % | SYSTOLIC BLOOD PRESSURE: 132 MMHG | WEIGHT: 238.13 LBS | HEART RATE: 85 BPM | DIASTOLIC BLOOD PRESSURE: 84 MMHG

## 2020-03-09 DIAGNOSIS — E11.29 TYPE 2 DIABETES MELLITUS WITH MICROALBUMINURIA, WITHOUT LONG-TERM CURRENT USE OF INSULIN: ICD-10-CM

## 2020-03-09 DIAGNOSIS — E11.42 TYPE 2 DIABETES MELLITUS WITH DIABETIC POLYNEUROPATHY, WITHOUT LONG-TERM CURRENT USE OF INSULIN: ICD-10-CM

## 2020-03-09 DIAGNOSIS — R80.9 TYPE 2 DIABETES MELLITUS WITH MICROALBUMINURIA, WITHOUT LONG-TERM CURRENT USE OF INSULIN: ICD-10-CM

## 2020-03-09 DIAGNOSIS — E11.42 TYPE 2 DIABETES MELLITUS WITH DIABETIC POLYNEUROPATHY, WITHOUT LONG-TERM CURRENT USE OF INSULIN: Primary | ICD-10-CM

## 2020-03-09 DIAGNOSIS — E11.3393 CONTROLLED TYPE 2 DIABETES MELLITUS WITH BOTH EYES AFFECTED BY MODERATE NONPROLIFERATIVE RETINOPATHY WITHOUT MACULAR EDEMA, WITHOUT LONG-TERM CURRENT USE OF INSULIN: ICD-10-CM

## 2020-03-09 LAB
ALBUMIN SERPL BCP-MCNC: 3.9 G/DL (ref 3.5–5.2)
ALP SERPL-CCNC: 57 U/L (ref 55–135)
ALT SERPL W/O P-5'-P-CCNC: 23 U/L (ref 10–44)
ANION GAP SERPL CALC-SCNC: 11 MMOL/L (ref 8–16)
AST SERPL-CCNC: 18 U/L (ref 10–40)
BILIRUB SERPL-MCNC: 0.4 MG/DL (ref 0.1–1)
BUN SERPL-MCNC: 11 MG/DL (ref 8–23)
CALCIUM SERPL-MCNC: 9.5 MG/DL (ref 8.7–10.5)
CHLORIDE SERPL-SCNC: 103 MMOL/L (ref 95–110)
CO2 SERPL-SCNC: 27 MMOL/L (ref 23–29)
CREAT SERPL-MCNC: 1 MG/DL (ref 0.5–1.4)
EST. GFR  (AFRICAN AMERICAN): >60 ML/MIN/1.73 M^2
EST. GFR  (NON AFRICAN AMERICAN): >60 ML/MIN/1.73 M^2
GLUCOSE SERPL-MCNC: 246 MG/DL (ref 70–110)
POTASSIUM SERPL-SCNC: 4.2 MMOL/L (ref 3.5–5.1)
PROT SERPL-MCNC: 7.3 G/DL (ref 6–8.4)
SODIUM SERPL-SCNC: 141 MMOL/L (ref 136–145)

## 2020-03-09 PROCEDURE — 83036 HEMOGLOBIN GLYCOSYLATED A1C: CPT

## 2020-03-09 PROCEDURE — 80053 COMPREHEN METABOLIC PANEL: CPT

## 2020-03-09 PROCEDURE — 99999 PR PBB SHADOW E&M-EST. PATIENT-LVL III: CPT | Mod: PBBFAC,,, | Performed by: FAMILY MEDICINE

## 2020-03-09 PROCEDURE — 99999 PR PBB SHADOW E&M-EST. PATIENT-LVL III: ICD-10-PCS | Mod: PBBFAC,,, | Performed by: FAMILY MEDICINE

## 2020-03-09 PROCEDURE — 99214 OFFICE O/P EST MOD 30 MIN: CPT | Mod: S$PBB,,, | Performed by: FAMILY MEDICINE

## 2020-03-09 PROCEDURE — 99214 PR OFFICE/OUTPT VISIT, EST, LEVL IV, 30-39 MIN: ICD-10-PCS | Mod: S$PBB,,, | Performed by: FAMILY MEDICINE

## 2020-03-09 PROCEDURE — 36415 COLL VENOUS BLD VENIPUNCTURE: CPT | Mod: PN

## 2020-03-09 PROCEDURE — 99213 OFFICE O/P EST LOW 20 MIN: CPT | Mod: PBBFAC,PN | Performed by: FAMILY MEDICINE

## 2020-03-09 NOTE — PROGRESS NOTES
Routine Office Visit    Patient Name: Fermin Metcalf Peer    : 1960  MRN: [id number]    Subjective:  Fermin is a 60 y.o. male who presents today for:    1. Type 2 dm  Patient presenting today for follow up for type 2 DM.  He has not had any hypoglycemic episodes.  He has been sticking with his diet and taking all of his medications as prescribed.  No other complaints today.  Last labs showed stable a1c of 7.6.  He states blood sugars have been elevated over the weekend due to having company in town and not eating properly    Past Medical History  Past Medical History:   Diagnosis Date    Diabetes mellitus, type 2     Kidney stones        Past Surgical History  Past Surgical History:   Procedure Laterality Date    EXTRACORPOREAL SHOCK WAVE LITHOTRIPSY Right 2019    Procedure: LITHOTRIPSY, ESWL;  Surgeon: WHITNEY Bryant MD;  Location: [facility];  Service: Urology;  Laterality: Right;  RN PREOP 3/25/2019---NEED H/P-----KUB    eye  surgeries      several     TOE AMPUTATION      several toes on R foot       Family History  Family History   Problem Relation Age of Onset    No Known Problems Mother     Diabetes Father        Social History  Social History     Socioeconomic History    Marital status: Single     Spouse name: Not on file    Number of children: Not on file    Years of education: Not on file    Highest education level: Not on file   Occupational History    Not on file   Social Needs    Financial resource strain: Not on file    Food insecurity:     Worry: Not on file     Inability: Not on file    Transportation needs:     Medical: Not on file     Non-medical: Not on file   Tobacco Use    Smoking status: Never Smoker    Smokeless tobacco: Never Used   Substance and Sexual Activity    Alcohol use: No    Drug use: No    Sexual activity: Not Currently     Partners: Female   Lifestyle    Physical activity:     Days per week: Not on file     Minutes per session: Not on file    Stress: Not  on file   Relationships    Social connections:     Talks on phone: Not on file     Gets together: Not on file     Attends Jainism service: Not on file     Active member of club or organization: Not on file     Attends meetings of clubs or organizations: Not on file     Relationship status: Not on file   Other Topics Concern    Not on file   Social History Narrative    Not on file       Current Medications  Current Outpatient Medications on File Prior to Visit   Medication Sig Dispense Refill    antiox #8/om3/dha/epa/lut/zeax (PRESERVISION AREDS 2, OMEGA-3, ORAL) Take by mouth.      cinnamon bark (CINNAMON) 500 mg capsule Take 500 mg by mouth once daily.      ferrous sulfate 325 mg (65 mg iron) Tab tablet Take 325 mg by mouth daily with breakfast.      fish oil-omega-3 fatty acids 300-1,000 mg capsule Take by mouth once daily.      HYDROcodone-acetaminophen (NORCO) 5-325 mg per tablet Take 1 tablet by mouth every 6 (six) hours as needed for Pain. 30 tablet 0    losartan (COZAAR) 25 MG tablet TAKE 1 TABLET BY MOUTH ONCE DAILY 90 tablet 3    melatonin 10 mg Cap Take by mouth.      metFORMIN (GLUCOPHAGE) 850 MG tablet TAKE 2 TABLETS BY MOUTH IN THE MORNING AND 1 IN THE EVENING WITH MEALS 90 tablet 0    metFORMIN (GLUCOPHAGE) 850 MG tablet TAKE 2 TABLETS BY MOUTH IN THE MORNING AND 1 IN THE EVENING WITH MEALS 90 tablet 2    multivitamin (THERAGRAN) per tablet Take 1 tablet by mouth once daily.      pioglitazone (ACTOS) 15 MG tablet Take 1 tablet (15 mg total) by mouth once daily. 90 tablet 0    pioglitazone (ACTOS) 15 MG tablet TAKE 1 TABLET BY MOUTH ONCE DAILY 90 tablet 0    simvastatin (ZOCOR) 20 MG tablet TAKE 1 TABLET BY MOUTH ONCE DAILY IN THE EVENING 90 tablet 1    mupirocin (BACTROBAN) 2 % ointment 3 (three) times daily Apply as directed to nose       No current facility-administered medications on file prior to visit.        Allergies   Review of patient's allergies indicates:  No Known  Allergies    Review of Systems (Pertinent positives)  Review of Systems   Constitutional: Negative.    HENT: Negative.    Eyes: Negative.    Respiratory: Negative.    Cardiovascular: Negative.    Gastrointestinal: Negative.    Genitourinary: Negative.    Skin:        +right foot ulcer   Neurological: Positive for sensory change.         /84 (BP Location: Right arm, Patient Position: Sitting, BP Method: Large (Manual))   Pulse 85   Resp 18   Ht 6' (1.829 m)   Wt 108 kg (238 lb 1.6 oz)   SpO2 95%   BMI 32.29 kg/m²     GENERAL APPEARANCE: in no apparent distress and well developed and well nourished  HEENT: PERRL, EOMI, Sclera clear, anicteric, Oropharynx clear, no lesions, Neck supple with midline trachea  NECK: normal, supple, no adenopathy, thyroid normal in size  RESPIRATORY: appears well, vitals normal, no respiratory distress, acyanotic, normal RR, chest clear, no wheezing, crepitations, rhonchi, normal symmetric air entry  HEART: regular rate and rhythm, S1, S2 normal, no murmur, click, rub or gallop.    ABDOMEN: abdomen is soft without tenderness, no masses, no hernias, no organomegaly, no rebound, no guarding. Suprapubic tenderness absent. No CVA tenderness.  NEUROLOGIC: normal without focal findings, CN II-XII are intact.    SKIN: no rashes, no wounds, no other lesions  PSYCH: Alert, oriented x 3, thought content appropriate, speech normal, pleasant and cooperative, good eye contact, well groomed        Assessment/Plan:  Fermin Henson is a 60 y.o. male who presents today for :    Fermin was seen today for follow-up.    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  Labs to be done today  2.  Follow up 6 months or sooner if needed  3.  He is to take all medications as needed and will consider trulicity if a1c is no better or if worse  4.  Call with concerns    See Jean-Baptiste MD

## 2020-03-10 ENCOUNTER — HOSPITAL ENCOUNTER (OUTPATIENT)
Dept: WOUND CARE | Facility: HOSPITAL | Age: 60
Discharge: HOME | End: 2020-03-10
Attending: FAMILY MEDICINE
Payer: MEDICAID

## 2020-03-10 VITALS — TEMPERATURE: 96 F | DIASTOLIC BLOOD PRESSURE: 79 MMHG | SYSTOLIC BLOOD PRESSURE: 132 MMHG | HEART RATE: 70 BPM

## 2020-03-10 DIAGNOSIS — L97.509 TYPE 2 DIABETES WITH SKIN ULCER OF FOOT: Primary | ICD-10-CM

## 2020-03-10 DIAGNOSIS — E11.621 TYPE 2 DIABETES WITH SKIN ULCER OF FOOT: Primary | ICD-10-CM

## 2020-03-10 LAB
ESTIMATED AVG GLUCOSE: 163 MG/DL (ref 68–131)
HBA1C MFR BLD HPLC: 7.3 % (ref 4–5.6)

## 2020-03-10 PROCEDURE — 99214 OFFICE O/P EST MOD 30 MIN: CPT | Mod: 25,,, | Performed by: FAMILY MEDICINE

## 2020-03-10 PROCEDURE — 11055 PR TRIM HYPERKERATOTIC SKIN LESION, ONE: ICD-10-PCS | Mod: ,,, | Performed by: FAMILY MEDICINE

## 2020-03-10 PROCEDURE — 99214 PR OFFICE/OUTPT VISIT, EST, LEVL IV, 30-39 MIN: ICD-10-PCS | Mod: 25,,, | Performed by: FAMILY MEDICINE

## 2020-03-10 PROCEDURE — 99202 OFFICE O/P NEW SF 15 MIN: CPT

## 2020-03-10 PROCEDURE — 11055 PARING/CUTG B9 HYPRKER LES 1: CPT | Mod: ,,, | Performed by: FAMILY MEDICINE

## 2020-03-10 NOTE — PROGRESS NOTES
:[facility]                Progress Note    Subjective:       Patient ID: Fermin Henson is a 60 y.o. male.    Chief Complaint: No chief complaint on file.    This is an established patient in wound care.   Patient presents in the office today for evaluation of the chronic wound.  Updated HPI is noted below.    The periwound appears no maceration noted    The wound appears wound healing, excess callous    Patient denies wound pain, wound drainage    Patients reports no new complaints    Lymphedema status is noncontributory to wound status    Pain at the site of the wound is absent due to being insensate    Contributing factors to current wound state include Diabetes Type 2      Review of Systems   Constitutional: Negative for activity change, appetite change, chills and diaphoresis.   HENT: Negative.    Eyes: Negative.    Respiratory: Negative.    Endocrine: Negative.    Genitourinary: Negative.    Musculoskeletal: Negative.    Neurological: Positive for numbness.   Psychiatric/Behavioral: Negative.          Objective:        Physical Exam   Constitutional: He is oriented to person, place, and time. He appears well-developed and well-nourished.   HENT:   Head: Normocephalic and atraumatic.   Eyes: Pupils are equal, round, and reactive to light. EOM are normal.   Neck: Normal range of motion. Neck supple.   Cardiovascular: Normal rate and regular rhythm.   Pulmonary/Chest: Effort normal. He exhibits no tenderness.   Abdominal: Soft. He exhibits no distension.   Musculoskeletal: Normal range of motion.   Neurological: He is alert and oriented to person, place, and time. A sensory deficit is present.   Skin: Skin is warm and dry.   +DFU       Vitals:    03/10/20 1045   BP: 132/79   Pulse: 70   Temp: 96 °F (35.6 °C)       Assessment:         No diagnosis found.         Wound Diabetic Ulcer plantar Foot #1 (Active)        Pre-existing:    Primary Wound Type: Diabetic ulc    Side:    Orientation: plantar   Location: Foot   Wound/PI Number (optional): #1   Ankle-Brachial Index:    Pulses: presant   Removal Indication and Assessment:    Wound Outcome: Other   (Retired) Wound Type:    (Retired) Wound Length (cm):    (Retired) Wound Width (cm):    (Retired) Depth (cm):    Wound Description (Comments):    Removal Indications:    Dressing Appearance Dry;Intact 3/10/2020 10:46 AM   Drainage Characteristics/Odor Serosanguineous 3/10/2020 10:46 AM   Appearance Pink 3/10/2020 10:46 AM   Tissue loss description Full thickness 3/10/2020 10:46 AM   Periwound Area Intact 3/10/2020 10:46 AM   Wound Edges Open 3/10/2020 10:46 AM   Wound Length (cm) 3 cm 3/10/2020 10:46 AM   Wound Width (cm) 0.7 cm 3/10/2020 10:46 AM   Wound Depth (cm) 0.2 cm 3/10/2020 10:46 AM   Wound Volume (cm^3) 0.42 cm^3 3/10/2020 10:46 AM   Wound Surface Area (cm^2) 2.1 cm^2 3/10/2020 10:46 AM   Care Sterile normal saline;Antimicrobial agent 3/10/2020 10:46 AM   Dressing Sodium chloride impregnated;Collagen;Foam;Cast padding;Elastic bandage 3/10/2020 10:46 AM   Periwound Care Dry periwound area maintained 3/10/2020 10:46 AM   Compression Other (see comments) 3/10/2020 10:46 AM   Off Loading Football dressing 3/10/2020 10:46 AM   Dressing Change Due 03/17/20 3/10/2020 10:46 AM         Paring  Patient wound had excess callous formation, so using an 11 blade excess callous was removed.  No skin was disrupted as there was no bleeding.  Patient tolerated well.    Plan:          Cleanse wound with:  Lidocaine:  Silver nitrate:  Periwound care:  Primary dressing:endoform  Secondary dressing:aquacel foam  Offloading: Football/coban  Edema control:   Frequency:  Home Health:  Other Orders      1.  Patient to leave dressings in place  2.  Monitor blood sugars (last a1c done this week was 7.3)  3.  Follow up 1 week or sooner if needed      See Jean-Baptiste MD

## 2020-03-10 NOTE — PROGRESS NOTES
[facility]                Progress Note    Subjective:       Patient ID: Fermin Henson is a 60 y.o. male.    Chief Complaint: No chief complaint on file.    Pt walked to room no assistance needed. No hair bottom half of lower leg, warm to touch cir <3, pulses palpable anterior and posterior foot. Calf measures 45, ankle 22, no edema, leg colour WNL.Hard callous to medial side of wound pared by       Review of Systems      Objective:        Physical Exam    Vitals:    03/10/20 1045   BP: 132/79   Pulse: 70   Temp: 96 °F (35.6 °C)       Assessment:         No diagnosis found.         Wound Diabetic Ulcer plantar Foot #1 (Active)        Pre-existing:    Primary Wound Type: Diabetic ulc   Side:    Orientation: plantar   Location: Foot   Wound/PI Number (optional): #1   Ankle-Brachial Index:    Pulses: presant   Removal Indication and Assessment:    Wound Outcome: Other   (Retired) Wound Type:    (Retired) Wound Length (cm):    (Retired) Wound Width (cm):    (Retired) Depth (cm):    Wound Description (Comments):    Removal Indications:    Dressing Appearance Dry;Intact 3/10/2020 10:46 AM   Drainage Characteristics/Odor Serosanguineous 3/10/2020 10:46 AM   Appearance Pink 3/10/2020 10:46 AM   Tissue loss description Full thickness 3/10/2020 10:46 AM   Periwound Area Intact 3/10/2020 10:46 AM   Wound Edges Open 3/10/2020 10:46 AM   Wound Length (cm) 3 cm 3/10/2020 10:46 AM   Wound Width (cm) 0.7 cm 3/10/2020 10:46 AM   Wound Depth (cm) 0.2 cm 3/10/2020 10:46 AM   Wound Volume (cm^3) 0.42 cm^3 3/10/2020 10:46 AM   Wound Surface Area (cm^2) 2.1 cm^2 3/10/2020 10:46 AM   Care Sterile normal saline;Antimicrobial agent 3/10/2020 10:46 AM   Dressing Sodium chloride impregnated;Collagen;Foam;Cast padding;Elastic bandage 3/10/2020 10:46 AM   Periwound Care Dry periwound area maintained 3/10/2020 10:46 AM   Compression Other (see comments) 3/10/2020 10:46 AM   Off Loading  Football dressing 3/10/2020 10:46 AM   Dressing Change Due 03/17/20 3/10/2020 10:46 AM           Plan:                    No orders of the defined types were placed in this encounter.

## 2020-03-17 ENCOUNTER — HOSPITAL ENCOUNTER (OUTPATIENT)
Dept: WOUND CARE | Facility: HOSPITAL | Age: 60
Discharge: HOME | End: 2020-03-17
Attending: FAMILY MEDICINE
Payer: MEDICAID

## 2020-03-17 VITALS
RESPIRATION RATE: 16 BRPM | SYSTOLIC BLOOD PRESSURE: 144 MMHG | OXYGEN SATURATION: 97 % | HEART RATE: 97 BPM | DIASTOLIC BLOOD PRESSURE: 76 MMHG | TEMPERATURE: 97 F

## 2020-03-17 DIAGNOSIS — E11.621 TYPE 2 DIABETES WITH SKIN ULCER OF FOOT: ICD-10-CM

## 2020-03-17 DIAGNOSIS — E11.42 TYPE 2 DIABETES MELLITUS WITH DIABETIC POLYNEUROPATHY, WITHOUT LONG-TERM CURRENT USE OF INSULIN: Primary | ICD-10-CM

## 2020-03-17 DIAGNOSIS — L97.509 TYPE 2 DIABETES WITH SKIN ULCER OF FOOT: ICD-10-CM

## 2020-03-17 PROCEDURE — 99214 PR OFFICE/OUTPT VISIT, EST, LEVL IV, 30-39 MIN: ICD-10-PCS | Mod: ,,, | Performed by: FAMILY MEDICINE

## 2020-03-17 PROCEDURE — 99203 OFFICE O/P NEW LOW 30 MIN: CPT | Performed by: FAMILY MEDICINE

## 2020-03-17 PROCEDURE — 99214 OFFICE O/P EST MOD 30 MIN: CPT | Mod: ,,, | Performed by: FAMILY MEDICINE

## 2020-03-24 ENCOUNTER — HOSPITAL ENCOUNTER (OUTPATIENT)
Dept: WOUND CARE | Facility: HOSPITAL | Age: 60
Discharge: HOME | End: 2020-03-24
Attending: FAMILY MEDICINE
Payer: MEDICAID

## 2020-03-24 VITALS — SYSTOLIC BLOOD PRESSURE: 117 MMHG | HEART RATE: 133 BPM | DIASTOLIC BLOOD PRESSURE: 75 MMHG | TEMPERATURE: 97 F

## 2020-03-24 DIAGNOSIS — L97.509 TYPE 2 DIABETES WITH SKIN ULCER OF FOOT: ICD-10-CM

## 2020-03-24 DIAGNOSIS — E11.621 TYPE 2 DIABETES WITH SKIN ULCER OF FOOT: ICD-10-CM

## 2020-03-24 DIAGNOSIS — E11.42 TYPE 2 DIABETES MELLITUS WITH DIABETIC POLYNEUROPATHY, WITHOUT LONG-TERM CURRENT USE OF INSULIN: Primary | ICD-10-CM

## 2020-03-24 PROCEDURE — 11042 DBRDMT SUBQ TIS 1ST 20SQCM/<: CPT | Mod: ,,, | Performed by: FAMILY MEDICINE

## 2020-03-24 PROCEDURE — 99214 OFFICE O/P EST MOD 30 MIN: CPT | Mod: 25,,, | Performed by: FAMILY MEDICINE

## 2020-03-24 PROCEDURE — 99214 PR OFFICE/OUTPT VISIT, EST, LEVL IV, 30-39 MIN: ICD-10-PCS | Mod: 25,,, | Performed by: FAMILY MEDICINE

## 2020-03-24 PROCEDURE — 11042 DEBRIDEMENT: ICD-10-PCS | Mod: ,,, | Performed by: FAMILY MEDICINE

## 2020-03-24 NOTE — PROGRESS NOTES
"[facility]                Progress Note    Subjective:       Patient ID: Fermin Henson is a 60 y.o. male.    Chief Complaint: No chief complaint on file.    Callous reformed periwound.  wounds into two areas during assessment as original area of breakdown (wound #1) deeper. Both areas debrided. Continuing endoform and foam football to offload.   He denies any fevers, chills, or sweats.  There is some slough requiring debridement.      Review of Systems   Constitutional: Negative for activity change, appetite change, chills and fatigue.   HENT: Negative.    Respiratory: Negative.    Cardiovascular: Negative.    Gastrointestinal: Negative for constipation, diarrhea and nausea.   Genitourinary: Negative.    Skin: Positive for wound.   Neurological: Positive for numbness.         Objective:        Physical Exam   Constitutional: He is oriented to person, place, and time. He appears well-developed and well-nourished.   HENT:   Head: Normocephalic and atraumatic.   Eyes: Pupils are equal, round, and reactive to light. Conjunctivae and EOM are normal.   Neck: Normal range of motion. Neck supple.   Cardiovascular: Normal rate and regular rhythm.   Pulmonary/Chest: Effort normal. No stridor. No respiratory distress.   Abdominal: Soft. Bowel sounds are normal.   Musculoskeletal: Normal range of motion.   Neurological: He is alert and oriented to person, place, and time.   Skin:   +DFU to right plantar foot   Psychiatric: He has a normal mood and affect.         Vitals:    03/24/20 1009   BP: 117/75   Pulse: (!) 133   Temp: 96.8 °F (36 °C)     Debridement  Date/Time: 3/24/2020 10:43 AM  Performed by: See Jean-Baptiste MD  Authorized by: See Jean-Baptiste MD     Time out: Immediately prior to procedure a "time out" was called to verify the correct patient, procedure, equipment, support staff and site/side marked as required.    Consent Done?:  Yes (Written)  Local " anesthesia used?: Yes    Local anesthetic:  Topical anesthetic  Anesthetic total (ml):  5    Wound Details:    Location:  Right foot    Location:  Right 1st Metatarsal Head    Type of Debridement:  Excisional       Length (cm):  0.3       Area (sq cm):  0.33       Width (cm):  1.1       Percent Debrided (%):  100       Depth (cm):  0.3       Total Area Debrided (sq cm):  0.33    Depth of debridement:  Subcutaneous tissue    Tissue debrided:  Dermis, Epidermis and Subcutaneous    Devitalized tissue debrided:  Biofilm, Exudate and Slough    Instruments:  Curette    Bleeding:  Minimal  Hemostasis Achieved: Yes    Method Used:  Pressure  Patient tolerance:  Patient tolerated the procedure well with no immediate complications      Assessment:           ICD-10-CM ICD-9-CM   1. Type 2 diabetes mellitus with diabetic polyneuropathy, without long-term current use of insulin E11.42 250.60     357.2   2. Type 2 diabetes with skin ulcer of foot E11.621 250.80    L97.509 707.15            Wound 03/24/20 1019 Diabetic Ulcer plantar Foot #2 (Active)   03/24/20 1019    Pre-existing: Yes   Primary Wound Type: Diabetic ulc   Side: Right   Orientation: plantar   Location: Foot   Wound/PI Number (optional): #2   Ankle-Brachial Index:    Pulses: presant   Removal Indication and Assessment:    Wound Outcome: Other   (Retired) Wound Type:    (Retired) Wound Length (cm):    (Retired) Wound Width (cm):    (Retired) Depth (cm):    Wound Description (Comments):    Removal Indications:    Wound Image   3/24/2020 10:15 AM   Wound WDL ex 3/24/2020 10:15 AM   Dressing Appearance Dry;Intact 3/24/2020 10:15 AM   Drainage Amount Small 3/24/2020 10:15 AM   Drainage Characteristics/Odor Serosanguineous 3/24/2020 10:15 AM   Appearance Red;Pink 3/24/2020 10:15 AM   Tissue loss description Full thickness 3/24/2020 10:15 AM   Black (%), Wound Tissue Color 0 % 3/24/2020 10:15 AM   Red (%), Wound Tissue Color 100 % 3/24/2020 10:15 AM   Yellow (%), Wound  Tissue Color 0 % 3/24/2020 10:15 AM   Periwound Area Intact 3/24/2020 10:15 AM   Wound Edges Callused 3/24/2020 10:15 AM   Wound Length (cm) 0.3 cm 3/24/2020 10:15 AM   Wound Width (cm) 1.1 cm 3/24/2020 10:15 AM   Wound Depth (cm) 0.3 cm 3/24/2020 10:15 AM   Wound Volume (cm^3) 0.1 cm^3 3/24/2020 10:15 AM   Wound Surface Area (cm^2) 0.33 cm^2 3/24/2020 10:15 AM   Care Cleansed with:;Soap and water;Sterile normal saline 3/24/2020 10:15 AM   Dressing Changed 3/24/2020 10:15 AM            Wound 03/24/20 1021 Diabetic Ulcer plantar Foot #1 (Active)   03/24/20 1021    Pre-existing: No   Primary Wound Type: Diabetic ulc   Side: Right   Orientation: plantar   Location: Foot   Wound/PI Number (optional): #1   Ankle-Brachial Index:    Pulses:    Removal Indication and Assessment:    Wound Outcome:    (Retired) Wound Type:    (Retired) Wound Length (cm):    (Retired) Wound Width (cm):    (Retired) Depth (cm):    Wound Description (Comments):    Removal Indications:    Wound Image   3/24/2020 10:21 AM   Wound WDL ex 3/24/2020 10:21 AM   Dressing Appearance Dry;Intact 3/24/2020 10:21 AM   Drainage Amount Small 3/24/2020 10:21 AM   Drainage Characteristics/Odor Serosanguineous 3/24/2020 10:21 AM   Appearance Red 3/24/2020 10:21 AM   Tissue loss description Full thickness 3/24/2020 10:21 AM   Black (%), Wound Tissue Color 0 % 3/24/2020 10:21 AM   Red (%), Wound Tissue Color 100 % 3/24/2020 10:21 AM   Yellow (%), Wound Tissue Color 0 % 3/24/2020 10:21 AM   Periwound Area Intact 3/24/2020 10:21 AM   Wound Edges Callused 3/24/2020 10:21 AM   Wound Length (cm) 0.2 cm 3/24/2020 10:21 AM   Wound Width (cm) 0.5 cm 3/24/2020 10:21 AM   Wound Depth (cm) 0.6 cm 3/24/2020 10:21 AM   Wound Volume (cm^3) 0.06 cm^3 3/24/2020 10:21 AM   Wound Surface Area (cm^2) 0.1 cm^2 3/24/2020 10:21 AM   Care Cleansed with:;Soap and water;Sterile normal saline 3/24/2020 10:21 AM   Dressing Changed 3/24/2020 10:21 AM           Plan:                No  follow-ups on file.     Orders Placed This Encounter   Procedures    Change dressing     Cavilon to periwound. Endoform to wound bed, cover with aquacel foam x 1, dl foam x 1.  Calmoseptine/aquacel foam to dorsal foot. Football drsg (cast padding x3, coban x1).        1.  Patient to keep dressings in place  2.  Call with any concerns  3.  Follow up 1 week  4.  He is to notify clinic before coming of any fevers, chills, sweats, cough, or shortness of breath

## 2020-03-31 ENCOUNTER — HOSPITAL ENCOUNTER (OUTPATIENT)
Dept: WOUND CARE | Facility: HOSPITAL | Age: 60
Discharge: HOME | End: 2020-03-31
Attending: FAMILY MEDICINE
Payer: MEDICAID

## 2020-03-31 VITALS
RESPIRATION RATE: 17 BRPM | TEMPERATURE: 98 F | HEART RATE: 105 BPM | DIASTOLIC BLOOD PRESSURE: 89 MMHG | SYSTOLIC BLOOD PRESSURE: 143 MMHG

## 2020-03-31 DIAGNOSIS — E11.621 TYPE 2 DIABETES WITH SKIN ULCER OF FOOT: Primary | ICD-10-CM

## 2020-03-31 DIAGNOSIS — L97.509 TYPE 2 DIABETES WITH SKIN ULCER OF FOOT: Primary | ICD-10-CM

## 2020-03-31 PROCEDURE — 99214 PR OFFICE/OUTPT VISIT, EST, LEVL IV, 30-39 MIN: ICD-10-PCS | Mod: 25,,, | Performed by: FAMILY MEDICINE

## 2020-03-31 PROCEDURE — 11042 DBRDMT SUBQ TIS 1ST 20SQCM/<: CPT | Performed by: FAMILY MEDICINE

## 2020-03-31 PROCEDURE — 11042 DBRDMT SUBQ TIS 1ST 20SQCM/<: CPT | Mod: ,,, | Performed by: FAMILY MEDICINE

## 2020-03-31 PROCEDURE — 99214 OFFICE O/P EST MOD 30 MIN: CPT | Mod: 25,,, | Performed by: FAMILY MEDICINE

## 2020-03-31 PROCEDURE — 11042 DEBRIDEMENT: ICD-10-PCS | Mod: ,,, | Performed by: FAMILY MEDICINE

## 2020-03-31 PROCEDURE — 27201912 HC WOUND CARE DEBRIDEMENT SUPPLIES: Performed by: FAMILY MEDICINE

## 2020-03-31 NOTE — PROGRESS NOTES
[facility]                Progress Note    Subjective:       Patient ID: Fermin Henson is a 60 y.o. male.    Chief Complaint: Non-healing Wound Follow Up and Diabetic Foot Ulcer    Patient presenting for follow up of DFU on right plantar surface.  There has been no pain as he is insensate.  No drainage or odor.  He has been keeping dressings in place.  He states blood sugars have been stable.  No fevers, chills, sweats, or dizziness      Review of Systems   Constitutional: Negative for activity change, appetite change, chills and diaphoresis.   HENT: Negative.    Eyes: Negative.    Respiratory: Negative.    Cardiovascular: Negative.    Musculoskeletal: Negative.    Skin: Positive for wound.   Neurological: Negative for numbness.   Psychiatric/Behavioral: Negative.          Objective:        Physical Exam   Constitutional: He is oriented to person, place, and time. He appears well-developed and well-nourished.   HENT:   Head: Normocephalic and atraumatic.   Eyes: Pupils are equal, round, and reactive to light. Conjunctivae and EOM are normal.   Neck: Normal range of motion. Neck supple.   Cardiovascular: Normal rate and regular rhythm.   Pulmonary/Chest: Effort normal and breath sounds normal. No respiratory distress.   Abdominal: Soft. He exhibits no distension. There is no tenderness.   Musculoskeletal: Normal range of motion.   Neurological: He is alert and oriented to person, place, and time.   Skin: Skin is warm and dry.   +DFU to right plantar surface   Psychiatric: He has a normal mood and affect.         Vitals:    03/31/20 1001   BP: (!) 143/89   Pulse: 105   Resp: 17   Temp: 97.6 °F (36.4 °C)       Assessment:         No diagnosis found.         Wound 03/24/20 1019 Diabetic Ulcer plantar Foot #2 (Active)   03/24/20 1019    Pre-existing: Yes   Primary Wound Type: Diabetic ulc   Side: Right   Orientation: plantar   Location: Foot   Wound/PI Number (optional):  #2   Ankle-Brachial Index:    Pulses: presant   Removal Indication and Assessment:    Wound Outcome: Other   (Retired) Wound Type:    (Retired) Wound Length (cm):    (Retired) Wound Width (cm):    (Retired) Depth (cm):    Wound Description (Comments):    Removal Indications:    Wound WDL WDL 3/31/2020 10:00 AM   Dressing Appearance Moist drainage;Dry;Intact;Clean 3/31/2020 10:00 AM   Drainage Amount Small 3/31/2020 10:00 AM   Drainage Characteristics/Odor Serosanguineous 3/31/2020 10:00 AM   Appearance Red;Pink;Slough 3/31/2020 10:00 AM   Tissue loss description Full thickness 3/31/2020 10:00 AM   Black (%), Wound Tissue Color 0 % 3/31/2020 10:00 AM   Red (%), Wound Tissue Color 95 % 3/31/2020 10:00 AM   Yellow (%), Wound Tissue Color 5 % 3/31/2020 10:00 AM   Periwound Area Dry 3/31/2020 10:00 AM   Wound Edges Open;Callused 3/31/2020 10:00 AM   Wound Length (cm) 1.3 cm 3/31/2020 10:00 AM   Wound Width (cm) 0.4 cm 3/31/2020 10:00 AM   Wound Depth (cm) 0.3 cm 3/31/2020 10:00 AM   Wound Volume (cm^3) 0.16 cm^3 3/31/2020 10:00 AM   Wound Surface Area (cm^2) 0.52 cm^2 3/31/2020 10:00 AM   Tunneling (depth (cm)/location) 0 3/31/2020 10:00 AM   Undermining (depth (cm)/location) 0 3/31/2020 10:00 AM   Care Soap and water;Sterile normal saline;Wound cleanser 3/31/2020 10:00 AM   Dressing Applied;Collagen;Foam 3/31/2020 10:00 AM   Off Loading Football dressing 3/31/2020 10:00 AM   Dressing Change Due 04/07/20 3/31/2020 10:00 AM            Wound 03/24/20 1021 Diabetic Ulcer plantar Foot #1 (Active)   03/24/20 1021    Pre-existing: No   Primary Wound Type: Diabetic ulc   Side: Right   Orientation: plantar   Location: Foot   Wound/PI Number (optional): #1   Ankle-Brachial Index:    Pulses:    Removal Indication and Assessment:    Wound Outcome:    (Retired) Wound Type:    (Retired) Wound Length (cm):    (Retired) Wound Width (cm):    (Retired) Depth (cm):    Wound Description (Comments):    Removal Indications:    Wound Image    "3/31/2020 10:00 AM   Wound WDL WDL 3/31/2020 10:00 AM   Dressing Appearance Intact;Dry;Clean;Moist drainage 3/31/2020 10:00 AM   Drainage Amount Small 3/31/2020 10:00 AM   Drainage Characteristics/Odor Serosanguineous 3/31/2020 10:00 AM   Appearance Pink;Red;Slough 3/31/2020 10:00 AM   Tissue loss description Full thickness 3/31/2020 10:00 AM   Black (%), Wound Tissue Color 0 % 3/31/2020 10:00 AM   Red (%), Wound Tissue Color 95 % 3/31/2020 10:00 AM   Yellow (%), Wound Tissue Color 5 % 3/31/2020 10:00 AM   Periwound Area Dry 3/31/2020 10:00 AM   Wound Edges Open;Callused 3/31/2020 10:00 AM   Wound Length (cm) 1 cm 3/31/2020 10:00 AM   Wound Width (cm) 0.2 cm 3/31/2020 10:00 AM   Wound Depth (cm) 0.2 cm 3/31/2020 10:00 AM   Wound Volume (cm^3) 0.04 cm^3 3/31/2020 10:00 AM   Wound Surface Area (cm^2) 0.2 cm^2 3/31/2020 10:00 AM   Tunneling (depth (cm)/location) 0 3/31/2020 10:00 AM   Undermining (depth (cm)/location) 0 3/31/2020 10:00 AM   Care Soap and water;Sterile normal saline;Wound cleanser 3/31/2020 10:00 AM   Dressing Applied;Collagen;Foam 3/31/2020 10:00 AM   Off Loading Football dressing 3/31/2020 10:00 AM   Dressing Change Due 04/07/20 3/31/2020 10:00 AM         Debridement  Date/Time: 3/31/2020 10:30 AM  Performed by: See Jean-Baptiste MD  Authorized by: See Jean-Baptiste MD     Time out: Immediately prior to procedure a "time out" was called to verify the correct patient, procedure, equipment, support staff and site/side marked as required.    Consent Done?:  Yes (Written)  Local anesthesia used?: No      Wound Details:    Location:  Right foot    Location:  Right Plantar    Type of Debridement:  Excisional       Length (cm):  1.3       Area (sq cm):  0.52       Width (cm):  0.4       Percent Debrided (%):  100       Depth (cm):  0.3       Total Area Debrided (sq cm):  0.52    Depth of debridement:  Subcutaneous tissue    Tissue debrided:  Dermis and Epidermis    Devitalized tissue debrided:  Callus, Biofilm " and Slough    Instruments:  Curette    Additional wounds:  1    2nd Wound Details:     Location:  Right foot    Location:  Right Plantar    Location:  Right Plantar    Type of Debridement:  Excisional       Length (cm):  1       Area (sq cm):  0.2       Width (cm):  0.2       Percent Debrided (%):  100       Depth (cm):  0.2       Total Area Debrided (sq cm):  0.2    Depth of debridement:  Subcutaneous tissue    Tissue debrided:  Dermis and Epidermis    Devitalized tissue debrided:  Biofilm, Fibrin and Slough    Instruments:  Curette    Bleeding:  None  Patient tolerance:  Patient tolerated the procedure well with no immediate complications      Plan:                    Orders Placed This Encounter   Procedures    Debridement     This order was created via procedure documentation     Standing Status:   Standing     Number of Occurrences:   1        1.  endoform to wound beds  2.  Cover with aquacel foam  3.  Football wrap  4.  Change in 1 week or sooner if needed      See Jean-Baptiste MD

## 2020-03-31 NOTE — PROGRESS NOTES
[facility]                Progress Note    Subjective:       Patient ID: Fermin Henson is a 60 y.o. male.    Chief Complaint: Non-healing Wound Follow Up and Diabetic Foot Ulcer    3/31/2020: F/U visit. Wound improving. Debridement per MD Page. Discussed offloading as much as possible.      Review of Systems      Objective:        Physical Exam    Vitals:    03/31/20 1001   BP: (!) 143/89   Pulse: 105   Resp: 17   Temp: 97.6 °F (36.4 °C)       Assessment:           ICD-10-CM ICD-9-CM   1. Type 2 diabetes with skin ulcer of foot E11.621 250.80    L97.509 707.15            Wound 03/24/20 1019 Diabetic Ulcer plantar Foot #2 (Active)   03/24/20 1019    Pre-existing: Yes   Primary Wound Type: Diabetic ulc   Side: Right   Orientation: plantar   Location: Foot   Wound/PI Number (optional): #2   Ankle-Brachial Index:    Pulses: presant   Removal Indication and Assessment:    Wound Outcome: Other   (Retired) Wound Type:    (Retired) Wound Length (cm):    (Retired) Wound Width (cm):    (Retired) Depth (cm):    Wound Description (Comments):    Removal Indications:    Wound WDL WDL 3/31/2020 10:00 AM   Dressing Appearance Moist drainage;Dry;Intact;Clean 3/31/2020 10:00 AM   Drainage Amount Small 3/31/2020 10:00 AM   Drainage Characteristics/Odor Serosanguineous 3/31/2020 10:00 AM   Appearance Red;Pink;Slough 3/31/2020 10:00 AM   Tissue loss description Full thickness 3/31/2020 10:00 AM   Black (%), Wound Tissue Color 0 % 3/31/2020 10:00 AM   Red (%), Wound Tissue Color 95 % 3/31/2020 10:00 AM   Yellow (%), Wound Tissue Color 5 % 3/31/2020 10:00 AM   Periwound Area Dry 3/31/2020 10:00 AM   Wound Edges Open;Callused 3/31/2020 10:00 AM   Wound Length (cm) 1.3 cm 3/31/2020 10:00 AM   Wound Width (cm) 0.4 cm 3/31/2020 10:00 AM   Wound Depth (cm) 0.3 cm 3/31/2020 10:00 AM   Wound Volume (cm^3) 0.16 cm^3 3/31/2020 10:00 AM   Wound Surface Area (cm^2) 0.52 cm^2 3/31/2020 10:00 AM    Tunneling (depth (cm)/location) 0 3/31/2020 10:00 AM   Undermining (depth (cm)/location) 0 3/31/2020 10:00 AM   Care Soap and water;Sterile normal saline;Wound cleanser 3/31/2020 10:00 AM   Dressing Applied;Collagen;Foam 3/31/2020 10:00 AM   Off Loading Football dressing 3/31/2020 10:00 AM   Dressing Change Due 04/07/20 3/31/2020 10:00 AM            Wound 03/24/20 1021 Diabetic Ulcer plantar Foot #1 (Active)   03/24/20 1021    Pre-existing: No   Primary Wound Type: Diabetic ulc   Side: Right   Orientation: plantar   Location: Foot   Wound/PI Number (optional): #1   Ankle-Brachial Index:    Pulses:    Removal Indication and Assessment:    Wound Outcome:    (Retired) Wound Type:    (Retired) Wound Length (cm):    (Retired) Wound Width (cm):    (Retired) Depth (cm):    Wound Description (Comments):    Removal Indications:    Wound Image   3/31/2020 10:00 AM   Wound WDL WDL 3/31/2020 10:00 AM   Dressing Appearance Intact;Dry;Clean;Moist drainage 3/31/2020 10:00 AM   Drainage Amount Small 3/31/2020 10:00 AM   Drainage Characteristics/Odor Serosanguineous 3/31/2020 10:00 AM   Appearance Pink;Red;Slough 3/31/2020 10:00 AM   Tissue loss description Full thickness 3/31/2020 10:00 AM   Black (%), Wound Tissue Color 0 % 3/31/2020 10:00 AM   Red (%), Wound Tissue Color 95 % 3/31/2020 10:00 AM   Yellow (%), Wound Tissue Color 5 % 3/31/2020 10:00 AM   Periwound Area Dry 3/31/2020 10:00 AM   Wound Edges Open;Callused 3/31/2020 10:00 AM   Wound Length (cm) 1 cm 3/31/2020 10:00 AM   Wound Width (cm) 0.2 cm 3/31/2020 10:00 AM   Wound Depth (cm) 0.2 cm 3/31/2020 10:00 AM   Wound Volume (cm^3) 0.04 cm^3 3/31/2020 10:00 AM   Wound Surface Area (cm^2) 0.2 cm^2 3/31/2020 10:00 AM   Tunneling (depth (cm)/location) 0 3/31/2020 10:00 AM   Undermining (depth (cm)/location) 0 3/31/2020 10:00 AM   Care Soap and water;Sterile normal saline;Wound cleanser 3/31/2020 10:00 AM   Dressing Applied;Collagen;Foam 3/31/2020 10:00 AM   Off Loading  Football dressing 3/31/2020 10:00 AM   Dressing Change Due 04/07/20 3/31/2020 10:00 AM           Plan:                Orders Placed This Encounter   Procedures    Debridement     This order was created via procedure documentation     Standing Status:   Standing     Number of Occurrences:   1    Change dressing     Endoform, aquacel foam X1, celeste foam long X1 to plantar, football dressing (cast padding X3, coban)        No follow-ups on file.

## 2020-04-06 PROCEDURE — 11042 DEBRIDEMENT: ICD-10-PCS | Mod: ,,, | Performed by: FAMILY MEDICINE

## 2020-04-06 PROCEDURE — 11042 DBRDMT SUBQ TIS 1ST 20SQCM/<: CPT | Mod: ,,, | Performed by: FAMILY MEDICINE

## 2020-04-06 NOTE — PROGRESS NOTES
Subjective:       Patient ID: Fermin Henson is a 60 y.o. male.    Chief Complaint: Non-healing Wound Follow Up and Diabetic Foot Ulcer    Patient following up for DFU that has been recurring for years.  He has not been sticking with his diabetic diet, but is taking all medications as directed.  Patient is totally insensate from neuropathy..  Last a1c did show him to be controlled.  Wound has excess callous formation today and slough in wound bed.  No redness or drainage.  No odor from wound.  There has been no fevers, chills, or sweats.  He has been keeping dressings in place, clean and dry.    Review of Systems   Constitutional: Negative.    HENT: Negative.    Eyes: Negative.    Respiratory: Negative.    Cardiovascular: Negative.    Gastrointestinal: Negative.    Genitourinary: Negative.    Musculoskeletal: Negative.    Skin: Positive for wound.   Psychiatric/Behavioral: Negative.        Objective:      Physical Exam   Constitutional: He is oriented to person, place, and time. He appears well-developed and well-nourished.   HENT:   Head: Normocephalic and atraumatic.   Eyes: EOM are normal.   Neck: Normal range of motion. Neck supple.   Cardiovascular: Normal rate and regular rhythm.   Pulmonary/Chest: Effort normal. No stridor. No respiratory distress.   Abdominal: Soft. He exhibits no distension. There is no tenderness.   Musculoskeletal: Normal range of motion.   Neurological: He is alert and oriented to person, place, and time. A sensory deficit is present.   Skin: Skin is warm and dry.   +dfu to right plantar and anterior foot   Psychiatric: He has a normal mood and affect.         Assessment:       1. Type 2 diabetes mellitus with diabetic polyneuropathy, without long-term current use of insulin    2. Type 2 diabetes with skin ulcer of foot           Wound 03/24/20 1019 Diabetic Ulcer plantar Foot #2 (Active)   03/24/20 1019    Pre-existing: Yes   Primary Wound Type: Diabetic ulc   Side: Right  "  Orientation: plantar   Location: Foot   Wound/PI Number (optional): #2   Ankle-Brachial Index:    Pulses: presant   Removal Indication and Assessment:    Wound Outcome: Other   (Retired) Wound Type:    (Retired) Wound Length (cm):    (Retired) Wound Width (cm):    (Retired) Depth (cm):    Wound Description (Comments):    Removal Indications:    Wound Image   3/17/2020 10:00 AM   Dressing Appearance Dry;Intact 3/17/2020 10:00 AM   Drainage Amount Small 3/17/2020 10:00 AM   Drainage Characteristics/Odor Serosanguineous 3/17/2020 10:00 AM   Appearance Red 3/17/2020 10:00 AM   Tissue loss description Full thickness 3/17/2020 10:00 AM   Red (%), Wound Tissue Color 90 % 3/17/2020 10:00 AM   Yellow (%), Wound Tissue Color 10 % 3/17/2020 10:00 AM   Periwound Area Dry;Intact 3/17/2020 10:00 AM   Wound Edges Callused;Defined 3/17/2020 10:00 AM   Wound Length (cm) 2 cm 3/17/2020 10:00 AM   Wound Width (cm) 1 cm 3/17/2020 10:00 AM   Wound Depth (cm) 0.4 cm 3/17/2020 10:00 AM   Wound Volume (cm^3) 0.8 cm^3 3/17/2020 10:00 AM   Wound Surface Area (cm^2) 2 cm^2 3/17/2020 10:00 AM   Care Cleansed with:;Sterile normal saline 3/17/2020 10:00 AM   Dressing Applied;Collagen;Foam 3/17/2020 10:00 AM   Periwound Care Moisture barrier applied 3/17/2020 10:00 AM   Off Loading Football dressing;Off loading shoe 3/17/2020 10:00 AM   Dressing Change Due 03/24/20 3/17/2020 10:00 AM         Debridement  Date/Time: 4/6/2020 9:48 AM  Performed by: See Jean-Baptiste MD  Authorized by: See Jean-Baptiste MD     Time out: Immediately prior to procedure a "time out" was called to verify the correct patient, procedure, equipment, support staff and site/side marked as required.    Consent Done?:  Yes (Written)  Local anesthesia used?: No      Wound Details:    Location:  Right foot    Location:  Right Plantar    Type of Debridement:  Excisional       Length (cm):  2       Area (sq cm):  2       Width (cm):  1       Percent Debrided (%):  100       Depth " (cm):  0.4       Total Area Debrided (sq cm):  2    Depth of debridement:  Subcutaneous tissue    Tissue debrided:  Dermis, Epidermis and Subcutaneous    Devitalized tissue debrided:  Biofilm, Fibrin and Slough    Instruments:  Curette    Bleeding:  Minimal  Hemostasis Achieved: Yes    Method Used:  Pressure  Patient tolerance:  Patient tolerated the procedure well with no immediate complications      Plan:                1.  Dressings applied  2.  Patient tolerated procedure well  3.  Encouraged cutting back on carb intake  4.  Increase protein and healthy fat intake  5.  Call with any concerns  6.  Follow up 1 week or sooner if needed

## 2020-04-07 ENCOUNTER — HOSPITAL ENCOUNTER (OUTPATIENT)
Dept: WOUND CARE | Facility: HOSPITAL | Age: 60
Discharge: HOME | End: 2020-04-07
Attending: FAMILY MEDICINE
Payer: MEDICAID

## 2020-04-07 VITALS
DIASTOLIC BLOOD PRESSURE: 76 MMHG | SYSTOLIC BLOOD PRESSURE: 142 MMHG | RESPIRATION RATE: 20 BRPM | TEMPERATURE: 98 F | HEART RATE: 107 BPM

## 2020-04-07 DIAGNOSIS — L97.509 TYPE 2 DIABETES WITH SKIN ULCER OF FOOT: Primary | ICD-10-CM

## 2020-04-07 DIAGNOSIS — E11.621 TYPE 2 DIABETES WITH SKIN ULCER OF FOOT: Primary | ICD-10-CM

## 2020-04-07 PROCEDURE — 99214 PR OFFICE/OUTPT VISIT, EST, LEVL IV, 30-39 MIN: ICD-10-PCS | Mod: 25,,, | Performed by: FAMILY MEDICINE

## 2020-04-07 PROCEDURE — 99214 OFFICE O/P EST MOD 30 MIN: CPT | Mod: 25,,, | Performed by: FAMILY MEDICINE

## 2020-04-07 PROCEDURE — 11042 DBRDMT SUBQ TIS 1ST 20SQCM/<: CPT | Mod: ,,, | Performed by: FAMILY MEDICINE

## 2020-04-07 PROCEDURE — 11042 DBRDMT SUBQ TIS 1ST 20SQCM/<: CPT | Performed by: FAMILY MEDICINE

## 2020-04-07 PROCEDURE — 11042 DEBRIDEMENT: ICD-10-PCS | Mod: ,,, | Performed by: FAMILY MEDICINE

## 2020-04-07 PROCEDURE — 27201912 HC WOUND CARE DEBRIDEMENT SUPPLIES: Performed by: FAMILY MEDICINE

## 2020-04-07 NOTE — PROGRESS NOTES
"[facility]                Progress Note    Subjective:       Patient ID: Fermin Henson is a 60 y.o. male.    Chief Complaint: Non-healing Wound Follow Up and Diabetic Foot Ulcer    4/7/2020: F/U visit. Discussed offloading as much as possible. Debridement per MD Page.  This is an established patient in wound care.   Patient presents in the office today for evaluation of the chronic wound.  Updated HPI is noted below.    The periwound appears macerated    The wound appears no sign of infection    Patient denies wound pain, wound drainage, fever, chills, sweats    Patients reports no acute changes    Lymphedema status is noncontributory to wound status    Pain at the site of the wound is not present    Contributing factors to current wound state include Diabetes Type 2        Review of Systems   Constitutional: Negative.    HENT: Negative.    Eyes: Negative.    Respiratory: Negative.    Cardiovascular: Negative.    Musculoskeletal: Negative.    Skin: Positive for wound.   Psychiatric/Behavioral: Negative.            Objective:        Physical Exam      Vitals:    04/07/20 1035   BP: (!) 142/76   Pulse: 107   Resp: 20   Temp: 98 °F (36.7 °C)       Assessment:         Debridement  Date/Time: 4/7/2020 3:13 PM  Performed by: See Jean-Baptiste MD  Authorized by: See Jean-Baptiste MD     Time out: Immediately prior to procedure a "time out" was called to verify the correct patient, procedure, equipment, support staff and site/side marked as required.    Consent Done?:  Yes (Written)  Local anesthesia used?: No      Wound Details:    Location:  Right foot    Location:  Right 1st Metatarsal Head    Type of Debridement:  Excisional       Length (cm):  2.5       Area (sq cm):  7.5       Width (cm):  3       Percent Debrided (%):  100       Depth (cm):  0.6       Total Area Debrided (sq cm):  7.5    Depth of debridement:  Subcutaneous tissue    Tissue debrided:  Dermis, Epidermis " and Subcutaneous    Devitalized tissue debrided:  Biofilm, Slough, Fibrin and Callus    Instruments:  Curette    Bleeding:  Minimal  Hemostasis Achieved: Yes    Method Used:  Pressure  Patient tolerance:  Patient tolerated the procedure well with no immediate complications        ICD-10-CM ICD-9-CM   1. Type 2 diabetes with skin ulcer of foot E11.621 250.80    L97.509 707.15            Wound 03/24/20 1021 Diabetic Ulcer plantar Foot #1 (Active)   03/24/20 1021    Pre-existing: No   Primary Wound Type: Diabetic ulc   Side: Right   Orientation: plantar   Location: Foot   Wound/PI Number (optional): #1   Ankle-Brachial Index:    Pulses:    Removal Indication and Assessment:    Wound Outcome:    (Retired) Wound Type:    (Retired) Wound Length (cm):    (Retired) Wound Width (cm):    (Retired) Depth (cm):    Wound Description (Comments):    Removal Indications:    Wound Image    4/7/2020 10:00 AM   Wound WDL WDL 4/7/2020 10:00 AM   Dressing Appearance Intact;Dry;Clean;Moist drainage 4/7/2020 10:00 AM   Drainage Amount Small 4/7/2020 10:00 AM   Drainage Characteristics/Odor Serosanguineous 4/7/2020 10:00 AM   Appearance Pink;Red;Slough 4/7/2020 10:00 AM   Tissue loss description Full thickness 4/7/2020 10:00 AM   Black (%), Wound Tissue Color 0 % 4/7/2020 10:00 AM   Red (%), Wound Tissue Color 90 % 4/7/2020 10:00 AM   Yellow (%), Wound Tissue Color 10 % 4/7/2020 10:00 AM   Periwound Area Macerated 4/7/2020 10:00 AM   Wound Edges Callused;Open 4/7/2020 10:00 AM   Wound Length (cm) 2.5 cm 4/7/2020 10:00 AM   Wound Width (cm) 3 cm 4/7/2020 10:00 AM   Wound Depth (cm) 0.6 cm 4/7/2020 10:00 AM   Wound Volume (cm^3) 4.5 cm^3 4/7/2020 10:00 AM   Wound Surface Area (cm^2) 7.5 cm^2 4/7/2020 10:00 AM   Tunneling (depth (cm)/location) 0 4/7/2020 10:00 AM   Undermining (depth (cm)/location) 0 4/7/2020 10:00 AM   Care Soap and water;Sterile normal saline;Wound cleanser 4/7/2020 10:00 AM   Dressing Applied;Collagen;Foam 4/7/2020 10:00  AM   Off Loading Football dressing 4/7/2020 10:00 AM   Dressing Change Due 04/13/20 4/7/2020 10:00 AM           Plan:                Orders Placed This Encounter   Procedures    Debridement     This order was created via procedure documentation     Standing Status:   Standing     Number of Occurrences:   1    Change dressing     Endoform, aquacel ag foam, celeste foam short X1, football dressing (cast padding X3, coban)        1.  Wound is larger today, but likely has never fully healed as this same area reopens yearly  2.  No odor from wound and no active drainage  3.  No fevers, chill, or sweats  4.  Patient to keep dressings in place and call with any concerns  Follow up in about 6 days (around 4/13/2020) for wound care.

## 2020-04-13 ENCOUNTER — HOSPITAL ENCOUNTER (OUTPATIENT)
Dept: WOUND CARE | Facility: HOSPITAL | Age: 60
Discharge: HOME | End: 2020-04-13
Attending: FAMILY MEDICINE
Payer: MEDICAID

## 2020-04-13 VITALS
RESPIRATION RATE: 18 BRPM | HEART RATE: 88 BPM | TEMPERATURE: 98 F | DIASTOLIC BLOOD PRESSURE: 77 MMHG | SYSTOLIC BLOOD PRESSURE: 120 MMHG

## 2020-04-13 DIAGNOSIS — L97.509 TYPE 2 DIABETES WITH SKIN ULCER OF FOOT: Primary | ICD-10-CM

## 2020-04-13 DIAGNOSIS — E11.621 TYPE 2 DIABETES WITH SKIN ULCER OF FOOT: Primary | ICD-10-CM

## 2020-04-13 PROCEDURE — 99214 OFFICE O/P EST MOD 30 MIN: CPT | Mod: 25,,, | Performed by: FAMILY MEDICINE

## 2020-04-13 PROCEDURE — 11042 DBRDMT SUBQ TIS 1ST 20SQCM/<: CPT | Mod: ,,, | Performed by: FAMILY MEDICINE

## 2020-04-13 PROCEDURE — 11042 DEBRIDEMENT: ICD-10-PCS | Mod: ,,, | Performed by: FAMILY MEDICINE

## 2020-04-13 PROCEDURE — 27201912 HC WOUND CARE DEBRIDEMENT SUPPLIES: Mod: PN | Performed by: FAMILY MEDICINE

## 2020-04-13 PROCEDURE — 99214 PR OFFICE/OUTPT VISIT, EST, LEVL IV, 30-39 MIN: ICD-10-PCS | Mod: 25,,, | Performed by: FAMILY MEDICINE

## 2020-04-13 PROCEDURE — 11042 DBRDMT SUBQ TIS 1ST 20SQCM/<: CPT | Mod: PN | Performed by: FAMILY MEDICINE

## 2020-04-13 NOTE — PROGRESS NOTES
[facility]                Progress Note    Subjective:       Patient ID: Fermin Henson is a 60 y.o. male.    Chief Complaint: No chief complaint on file.    4/13/2020: F/U visit. Callused periwound. Debridement per MD Mahoney.     This is an established patient in wound care.   Patient presents in the office today for evaluation of the chronic wound.  Updated HPI is noted below.    The periwound appears non-erythematous, no maceration noted, non-edematous    The wound appears wound healing, no sign of infection    Patient denies wound warmth, wound drainage, fever, chills, sweats    Patients reports no new symptoms    Lymphedema status is contributory to wound status    Pain at the site of the wound is aching    Contributing factors to current wound state include off-loading limitations        Review of Systems   Constitutional: Negative.    HENT: Negative.    Eyes: Negative.    Respiratory: Negative.    Cardiovascular: Negative.    Musculoskeletal: Negative.    Skin: Positive for wound.   Neurological: Negative.    Hematological: Negative.    All other systems reviewed and are negative.        Objective:        Physical Exam   Constitutional: He is oriented to person, place, and time. He appears well-developed and well-nourished.   HENT:   Head: Normocephalic and atraumatic.   Eyes: Pupils are equal, round, and reactive to light. Conjunctivae and EOM are normal.   Neck: Normal range of motion.   Neurological: He is alert and oriented to person, place, and time.   Skin: Skin is warm and dry.   Please see wound description   Psychiatric: He has a normal mood and affect. His behavior is normal.   Vitals reviewed.        Vitals:    04/13/20 0953   BP: 120/77   Pulse: 88   Resp: 18   Temp: 97.5 °F (36.4 °C)     Debridement  Date/Time: 4/13/2020 1:28 PM  Performed by: Tracy Mahoney MD  Authorized by: Tracy Mahoney MD     Time out: Immediately prior to  "procedure a "time out" was called to verify the correct patient, procedure, equipment, support staff and site/side marked as required.    Consent Done?:  Yes (Written)    Preparation: Patient was prepped and draped in usual sterile fashion    Local anesthesia used?: Yes    Local anesthetic:  Topical anesthetic    Wound Details:    Location:  Right foot    Location:  Right 1st Metatarsal Head    Type of Debridement:  Excisional       Length (cm):  3       Area (sq cm):  6       Width (cm):  2       Percent Debrided (%):  100       Depth (cm):  0.2       Total Area Debrided (sq cm):  6    Depth of debridement:  Subcutaneous tissue    Tissue debrided:  Epidermis and Dermis    Devitalized tissue debrided:  Exudate, Biofilm and Slough    Instruments:  Curette    Bleeding:  Minimal  Hemostasis Achieved: Yes    Method Used:  Pressure  Patient tolerance:  Patient tolerated the procedure well with no immediate complications      Assessment:           ICD-10-CM ICD-9-CM   1. Type 2 diabetes with skin ulcer of foot E11.621 250.80    L97.509 707.15            Wound 03/24/20 1021 Diabetic Ulcer plantar Foot #1 (Active)   03/24/20 1021    Pre-existing: No   Primary Wound Type: Diabetic ulc   Side: Right   Orientation: plantar   Location: Foot   Wound/PI Number (optional): #1   Ankle-Brachial Index:    Pulses:    Removal Indication and Assessment:    Wound Outcome:    (Retired) Wound Type:    (Retired) Wound Length (cm):    (Retired) Wound Width (cm):    (Retired) Depth (cm):    Wound Description (Comments):    Removal Indications:    Wound Image    4/13/2020 10:00 AM   Wound WDL WDL 4/13/2020 10:00 AM   Dressing Appearance Dry;Intact;Clean;Moist drainage 4/13/2020 10:00 AM   Drainage Amount Small 4/13/2020 10:00 AM   Drainage Characteristics/Odor Serosanguineous 4/13/2020 10:00 AM   Appearance Pink;Red 4/13/2020 10:00 AM   Tissue loss description Partial thickness 4/13/2020 10:00 AM   Black (%), Wound Tissue Color 0 % 4/13/2020 " 10:00 AM   Red (%), Wound Tissue Color 100 % 4/13/2020 10:00 AM   Yellow (%), Wound Tissue Color 0 % 4/13/2020 10:00 AM   Periwound Area Dry 4/13/2020 10:00 AM   Wound Edges Callused 4/13/2020 10:00 AM   Wound Length (cm) 3 cm 4/13/2020 10:00 AM   Wound Width (cm) 2 cm 4/13/2020 10:00 AM   Wound Depth (cm) 0.2 cm 4/13/2020 10:00 AM   Wound Volume (cm^3) 1.2 cm^3 4/13/2020 10:00 AM   Wound Surface Area (cm^2) 6 cm^2 4/13/2020 10:00 AM   Tunneling (depth (cm)/location) 0 4/13/2020 10:00 AM   Undermining (depth (cm)/location) 0 4/13/2020 10:00 AM   Care Soap and water;Sterile normal saline;Wound cleanser 4/13/2020 10:00 AM   Dressing Applied;Collagen;Other (see comments);Foam 4/13/2020 10:00 AM   Off Loading Football dressing 4/13/2020 10:00 AM   Dressing Change Due 04/21/20 4/13/2020 10:00 AM           Plan:              Debridement in office.    Continue with current wound therapy     Orders Placed This Encounter   Procedures    Debridement     This order was created via procedure documentation     Standing Status:   Standing     Number of Occurrences:   1    Change dressing     Endoform, mextra short X1, celeste long X1, football dressing (cast padding X3, coban)        Follow up in about 8 days (around 4/21/2020) for wound care.

## 2020-04-20 RX ORDER — METFORMIN HYDROCHLORIDE 850 MG/1
TABLET ORAL
Qty: 90 TABLET | Refills: 0 | Status: SHIPPED | OUTPATIENT
Start: 2020-04-20 | End: 2020-05-25

## 2020-04-21 ENCOUNTER — HOSPITAL ENCOUNTER (OUTPATIENT)
Dept: WOUND CARE | Facility: HOSPITAL | Age: 60
Discharge: HOME | End: 2020-04-21
Attending: FAMILY MEDICINE
Payer: MEDICAID

## 2020-04-21 VITALS
DIASTOLIC BLOOD PRESSURE: 88 MMHG | TEMPERATURE: 98 F | SYSTOLIC BLOOD PRESSURE: 146 MMHG | RESPIRATION RATE: 20 BRPM | HEART RATE: 108 BPM

## 2020-04-21 DIAGNOSIS — E11.621 TYPE 2 DIABETES WITH SKIN ULCER OF FOOT: Primary | ICD-10-CM

## 2020-04-21 DIAGNOSIS — L97.509 TYPE 2 DIABETES WITH SKIN ULCER OF FOOT: Primary | ICD-10-CM

## 2020-04-21 PROCEDURE — 99214 PR OFFICE/OUTPT VISIT, EST, LEVL IV, 30-39 MIN: ICD-10-PCS | Mod: ,,, | Performed by: FAMILY MEDICINE

## 2020-04-21 PROCEDURE — 99213 OFFICE O/P EST LOW 20 MIN: CPT | Mod: PN | Performed by: FAMILY MEDICINE

## 2020-04-21 PROCEDURE — 99214 OFFICE O/P EST MOD 30 MIN: CPT | Mod: ,,, | Performed by: FAMILY MEDICINE

## 2020-04-21 NOTE — PROGRESS NOTES
[facility]                Progress Note    Subjective:       Patient ID: Fermin Henson is a 60 y.o. male.    Chief Complaint: No chief complaint on file.    F/u wound care visit with MD Jean-Baptiste. Wound unchanged. MD Jean-Baptiste assessed wound. Wound healed. Patient referred to podiatrist. RTC for wound check in one week.  Patient has been offloading as directed and keeping football dressing in place.  He states blood sugars are staying in 130's to 140's mg/dl.  He is insensate from diabetic neuropathy      Review of Systems   Constitutional: Negative.    HENT: Negative.    Eyes: Negative.    Respiratory: Negative.    Cardiovascular: Negative.    Gastrointestinal: Negative.    Musculoskeletal: Negative.    Skin: Positive for wound.   Neurological: Positive for numbness.   Psychiatric/Behavioral: Negative.          Objective:        Physical Exam   Constitutional: He appears well-developed and well-nourished.   HENT:   Head: Normocephalic and atraumatic.   Eyes: Pupils are equal, round, and reactive to light. Conjunctivae and EOM are normal.   Neck: Normal range of motion.   Cardiovascular: Normal rate and regular rhythm.   Pulmonary/Chest: Effort normal and breath sounds normal.   Abdominal: Soft. Bowel sounds are normal.   Musculoskeletal: Normal range of motion.   Neurological: He is alert. A sensory deficit is present.   Skin: Skin is warm and dry.   +DFU to right foot mostly healed, but will need close monitoring to make sure it has not reopened         Vitals:    04/21/20 1028   BP: (!) 146/88   Pulse: 108   Resp: 20   Temp: 97.5 °F (36.4 °C)       Assessment:           ICD-10-CM ICD-9-CM   1. Type 2 diabetes with skin ulcer of foot E11.621 250.80    L97.509 707.15       [REMOVED]      Wound 03/24/20 1021 Diabetic Ulcer Right plantar Foot #1 (Removed)   03/24/20 1021    Pre-existing: No   Primary Wound Type: Diabetic [facility]   Side: Right   Orientation: plantar   Location:  Foot   Wound/PI Number (optional): #1   Ankle-Brachial Index:    Pulses:    Removal Indication and Assessment:    Wound Outcome: Healed   (Retired) Wound Type:    (Retired) Wound Length (cm):    (Retired) Wound Width (cm):    (Retired) Depth (cm):    Wound Description (Comments):    Removal Indications:    Removed 04/21/20 1044   Wound Image   4/21/2020 10:00 AM   Dressing Appearance Dried drainage;Intact 4/21/2020 10:00 AM   Drainage Amount Moderate 4/21/2020 10:00 AM   Drainage Characteristics/Odor Serosanguineous 4/21/2020 10:00 AM   Appearance Black;Red;Yellow 4/21/2020 10:00 AM   Tissue loss description Full thickness 4/21/2020 10:00 AM   Black (%), Wound Tissue Color 10 % 4/21/2020 10:00 AM   Red (%), Wound Tissue Color 70 % 4/21/2020 10:00 AM   Yellow (%), Wound Tissue Color 20 % 4/21/2020 10:00 AM   Periwound Area Intact 4/21/2020 10:00 AM   Wound Edges Irregular 4/21/2020 10:00 AM   Care Cleansed with:;Soap and water;Sterile normal saline 4/21/2020 10:00 AM   Dressing Changed 4/21/2020 10:00 AM           Plan:                No orders of the defined types were placed in this encounter.  1.  Football foot for protection  2.  Follow up 1 week to make sure wound stays closed  3.  Patient will need to see podiatry for routine diabetic foot care     Follow up in about 6 days (around 4/27/2020).

## 2020-04-28 ENCOUNTER — HOSPITAL ENCOUNTER (OUTPATIENT)
Dept: WOUND CARE | Facility: HOSPITAL | Age: 60
Discharge: HOME | End: 2020-04-28
Attending: FAMILY MEDICINE
Payer: MEDICAID

## 2020-04-28 VITALS
DIASTOLIC BLOOD PRESSURE: 79 MMHG | HEART RATE: 95 BPM | RESPIRATION RATE: 17 BRPM | SYSTOLIC BLOOD PRESSURE: 135 MMHG | TEMPERATURE: 96 F

## 2020-04-28 DIAGNOSIS — L97.509 TYPE 2 DIABETES WITH SKIN ULCER OF FOOT: Primary | ICD-10-CM

## 2020-04-28 DIAGNOSIS — E11.621 TYPE 2 DIABETES WITH SKIN ULCER OF FOOT: Primary | ICD-10-CM

## 2020-04-28 PROCEDURE — 99214 PR OFFICE/OUTPT VISIT, EST, LEVL IV, 30-39 MIN: ICD-10-PCS | Mod: ,,, | Performed by: FAMILY MEDICINE

## 2020-04-28 PROCEDURE — 11055 PARING/CUTG B9 HYPRKER LES 1: CPT | Performed by: FAMILY MEDICINE

## 2020-04-28 PROCEDURE — 99214 OFFICE O/P EST MOD 30 MIN: CPT | Mod: ,,, | Performed by: FAMILY MEDICINE

## 2020-04-28 NOTE — PROGRESS NOTES
"[facility]                Progress Note    Subjective:       Patient ID: Fermin Henson is a 60 y.o. male.    Chief Complaint: Non-healing Wound Follow Up and Diabetic Foot Ulcer    4/28/2020: F/U visit. Pared callus. Wound underneath, macerated periwound. Discussed offloading as much as possible. Excess callous hid plantar wound that continues to be superficial.  Mild serous drainage.  No odor and no pus.  No fevers, chills, sweats, or increased pain.        Review of Systems   Constitutional: Negative.    HENT: Negative.    Eyes: Negative.    Respiratory: Negative.    Cardiovascular: Negative.    Gastrointestinal: Negative.    Genitourinary: Negative.    Musculoskeletal: Negative.    Skin: Positive for wound.         Objective:        Physical Exam   Constitutional: He is oriented to person, place, and time. He appears well-developed and well-nourished.   HENT:   Head: Normocephalic and atraumatic.   Eyes: Pupils are equal, round, and reactive to light. EOM are normal.   Neck: Normal range of motion.   Cardiovascular: Normal rate and regular rhythm.   Abdominal: Soft. Bowel sounds are normal.   Musculoskeletal: He exhibits edema and deformity.   Neurological: He is alert and oriented to person, place, and time. A sensory deficit is present.   Skin: Skin is warm and dry.   +DFU to right foot        Vitals:    04/28/20 1027   BP: 135/79   Pulse: 95   Resp: 17   Temp: 96.2 °F (35.7 °C)     Debridement  Date/Time: 5/1/2020 3:01 PM  Performed by: See Jean-Baptiste MD  Authorized by: See Jean-Baptiste MD     Time out: Immediately prior to procedure a "time out" was called to verify the correct patient, procedure, equipment, support staff and site/side marked as required.    Consent Done?:  Yes (Written)  Local anesthesia used?: No      Wound Details:    Location:  Right foot    Location:  Right 1st Metatarsal Head    Type of Debridement:  Non-excisional       Length (cm):  " 2.1       Area (sq cm):  2.1       Width (cm):  1       Percent Debrided (%):  100       Depth (cm):  0.1       Total Area Debrided (sq cm):  2.1    Depth of debridement:  Epidermis/Dermis    Tissue debrided:  Dermis and Epidermis    Instruments:  Curette    Bleeding:  None  Patient tolerance:  Patient tolerated the procedure well with no immediate complications        Assessment:           ICD-10-CM ICD-9-CM   1. Type 2 diabetes with skin ulcer of foot E11.621 250.80    L97.509 707.15            Wound Diabetic Ulcer Right plantar Toe, first #1 (Active)        Pre-existing: Yes   Primary Wound Type: Diabetic ulc   Side: Right   Orientation: plantar   Location: Toe, first   Wound/PI Number (optional): #1   Ankle-Brachial Index:    Pulses:    Removal Indication and Assessment:    Wound Outcome:    (Retired) Wound Type:    (Retired) Wound Length (cm):    (Retired) Wound Width (cm):    (Retired) Depth (cm):    Wound Description (Comments):    Removal Indications:    Wound Image   4/28/2020 10:00 AM   Wound WDL WDL 4/28/2020 10:00 AM   Dressing Appearance Dry;Intact;Moist drainage;Clean 4/28/2020 10:00 AM   Drainage Amount Small 4/28/2020 10:00 AM   Drainage Characteristics/Odor Serosanguineous 4/28/2020 10:00 AM   Appearance Pink;Red;White 4/28/2020 10:00 AM   Tissue loss description Partial thickness 4/28/2020 10:00 AM   Black (%), Wound Tissue Color 0 % 4/28/2020 10:00 AM   Red (%), Wound Tissue Color 100 % 4/28/2020 10:00 AM   Yellow (%), Wound Tissue Color 0 % 4/28/2020 10:00 AM   Periwound Area Macerated 4/28/2020 10:00 AM   Wound Edges Callused 4/28/2020 10:00 AM   Wound Length (cm) 0.6 cm 4/28/2020 10:00 AM   Wound Width (cm) 1 cm 4/28/2020 10:00 AM   Wound Depth (cm) 0.1 cm 4/28/2020 10:00 AM   Wound Volume (cm^3) 0.06 cm^3 4/28/2020 10:00 AM   Wound Surface Area (cm^2) 0.6 cm^2 4/28/2020 10:00 AM   Tunneling (depth (cm)/location) 0 4/28/2020 10:00 AM   Undermining (depth (cm)/location) 0 4/28/2020 10:00 AM    Care Soap and water;Sterile normal saline;Wound cleanser 4/28/2020 10:00 AM   Dressing Applied;Other (see comments);Collagen;Foam 4/28/2020 10:00 AM   Periwound Care Other (see comments) 4/28/2020 10:00 AM   Off Loading Football dressing 4/28/2020 10:00 AM   Dressing Change Due 05/05/20 4/28/2020 10:00 AM           Plan:                Orders Placed This Encounter   Procedures    Debridement     This order was created via procedure documentation     Standing Status:   Standing     Number of Occurrences:   1    Change dressing     Gentian violet, U-shaped jamil pads X2, endoform, iodosorb, aquacel foam X1, celeste foam long X2, football dressing (cast padding X3, coban)        Follow up in about 1 week (around 5/5/2020) for wound care.     1.  Wound offloaded with jamil pad and covered with football  2.  Call if symptoms worsen  3.  Follow up as needed    See Jean-Baptiste MD

## 2020-05-01 PROCEDURE — 97597 DEBRIDEMENT: ICD-10-PCS | Mod: ,,, | Performed by: FAMILY MEDICINE

## 2020-05-01 PROCEDURE — 97597 DBRDMT OPN WND 1ST 20 CM/<: CPT | Mod: ,,, | Performed by: FAMILY MEDICINE

## 2020-05-05 ENCOUNTER — HOSPITAL ENCOUNTER (OUTPATIENT)
Dept: WOUND CARE | Facility: HOSPITAL | Age: 60
Discharge: HOME | End: 2020-05-05
Attending: FAMILY MEDICINE
Payer: MEDICAID

## 2020-05-05 VITALS
RESPIRATION RATE: 17 BRPM | TEMPERATURE: 98 F | SYSTOLIC BLOOD PRESSURE: 164 MMHG | DIASTOLIC BLOOD PRESSURE: 85 MMHG | HEART RATE: 107 BPM

## 2020-05-05 DIAGNOSIS — E11.621 TYPE 2 DIABETES WITH SKIN ULCER OF FOOT: Primary | ICD-10-CM

## 2020-05-05 DIAGNOSIS — L97.509 TYPE 2 DIABETES WITH SKIN ULCER OF FOOT: Primary | ICD-10-CM

## 2020-05-05 PROCEDURE — 99214 PR OFFICE/OUTPT VISIT, EST, LEVL IV, 30-39 MIN: ICD-10-PCS | Mod: 25,,, | Performed by: FAMILY MEDICINE

## 2020-05-05 PROCEDURE — 11055 PARING/CUTG B9 HYPRKER LES 1: CPT | Performed by: FAMILY MEDICINE

## 2020-05-05 PROCEDURE — 99214 OFFICE O/P EST MOD 30 MIN: CPT | Mod: 25,,, | Performed by: FAMILY MEDICINE

## 2020-05-05 PROCEDURE — 97597 DEBRIDEMENT: ICD-10-PCS | Mod: ,,, | Performed by: FAMILY MEDICINE

## 2020-05-05 PROCEDURE — 97597 DBRDMT OPN WND 1ST 20 CM/<: CPT | Mod: ,,, | Performed by: FAMILY MEDICINE

## 2020-05-05 NOTE — PROGRESS NOTES
"[facility]                Progress Note    Subjective:       Patient ID: Fermin Henson is a 60 y.o. male.    Chief Complaint: Non-healing Wound Follow Up and Diabetic Foot Ulcer    5/5/2020: F/U visit. Wound is healed. Paring of callus. Pt is discharged from clinic. He has no new wounds or rashes.  He has cut back on processed sugars such as orange juice and has been offloading as directed.  He still has not called to schedule an appointment with podiatry      Review of Systems   Constitutional: Negative.    HENT: Negative.    Eyes: Negative.    Respiratory: Negative.    Gastrointestinal: Negative.    Endocrine: Negative for polydipsia, polyphagia and polyuria.   Musculoskeletal: Negative.    Neurological: Positive for numbness.   Psychiatric/Behavioral: Negative.          Objective:        Physical Exam   Constitutional: He is oriented to person, place, and time. He appears well-developed and well-nourished.   HENT:   Head: Normocephalic and atraumatic.   Eyes: Pupils are equal, round, and reactive to light. Conjunctivae and EOM are normal.   Neck: Normal range of motion. Neck supple.   Cardiovascular: Normal rate and regular rhythm.   Pulmonary/Chest: Effort normal. No stridor. No respiratory distress.   Abdominal: Soft. He exhibits no distension. There is no tenderness.   Musculoskeletal: Normal range of motion.   Neurological: He is alert and oriented to person, place, and time. A sensory deficit is present.   Skin: Skin is warm and dry.   +callous to right plantar surface       Vitals:    05/05/20 0952   BP: (!) 164/85   Pulse: 107   Resp: 17   Temp: 97.6 °F (36.4 °C)     Debridement  Date/Time: 5/5/2020 3:03 PM  Performed by: See Jean-Baptiste MD  Authorized by: See Jean-Baptiste MD     Time out: Immediately prior to procedure a "time out" was called to verify the correct patient, procedure, equipment, support staff and site/side marked as required.    Consent " Done?:  Yes (Written)  Local anesthesia used?: No      Wound Details:    Location:  Right foot    Location:  Right Plantar    Type of Debridement:  Non-excisional       Length (cm):  2       Area (sq cm):  6       Width (cm):  3       Percent Debrided (%):  100       Depth (cm):  0       Total Area Debrided (sq cm):  6    Depth of debridement:  Epidermis/Dermis    Devitalized tissue debrided:  Callus    Instruments:  Blade    Bleeding:  None  Patient tolerance:  Patient tolerated the procedure well with no immediate complications        Assessment:           ICD-10-CM ICD-9-CM   1. Type 2 diabetes with skin ulcer of foot E11.621 250.80    L97.509 707.15       [REMOVED]      Wound Diabetic Ulcer Right plantar Toe, first #1 (Removed)        Pre-existing: Yes   Primary Wound Type: Diabetic ulc   Side: Right   Orientation: plantar   Location: Toe, first   Wound/PI Number (optional): #1   Ankle-Brachial Index:    Pulses:    Removal Indication and Assessment:    Wound Outcome: Healed   (Retired) Wound Type:    (Retired) Wound Length (cm):    (Retired) Wound Width (cm):    (Retired) Depth (cm):    Wound Description (Comments):    Removal Indications:    Removed 05/05/20 1026   Wound Image   5/5/2020 10:00 AM   Wound WDL WDL 5/5/2020 10:00 AM   Dressing Appearance Dry 5/5/2020 10:00 AM   Drainage Amount None 5/5/2020 10:00 AM   Appearance Epithelialization 5/5/2020 10:00 AM   Tissue loss description Not applicable 5/5/2020 10:00 AM   Black (%), Wound Tissue Color 0 % 5/5/2020 10:00 AM   Red (%), Wound Tissue Color 0 % 5/5/2020 10:00 AM   Yellow (%), Wound Tissue Color 0 % 5/5/2020 10:00 AM   Periwound Area Dry 5/5/2020 10:00 AM   Wound Edges Callused 5/5/2020 10:00 AM   Wound Length (cm) 0 cm 5/5/2020 10:00 AM   Wound Width (cm) 0 cm 5/5/2020 10:00 AM   Wound Depth (cm) 0 cm 5/5/2020 10:00 AM   Wound Volume (cm^3) 0 cm^3 5/5/2020 10:00 AM   Wound Surface Area (cm^2) 0 cm^2 5/5/2020 10:00 AM   Tunneling (depth (cm)/location)  0 5/5/2020 10:00 AM   Undermining (depth (cm)/location) 0 5/5/2020 10:00 AM   Care Soap and water;Sterile normal saline;Wound cleanser 5/5/2020 10:00 AM           Plan:            1.  Wound healed  2.  Follow up with podiatry  3.  Follow up with PCP next month for routine labs  4.  Call with any concerns  5.  Continue monitoring  Blood sugars as directed      See Jean-Baptiste MD

## 2020-05-25 RX ORDER — METFORMIN HYDROCHLORIDE 850 MG/1
TABLET ORAL
Qty: 90 TABLET | Refills: 0 | Status: SHIPPED | OUTPATIENT
Start: 2020-05-25 | End: 2020-07-01

## 2020-05-30 DIAGNOSIS — E11.9 TYPE 2 DIABETES MELLITUS WITHOUT COMPLICATION, WITHOUT LONG-TERM CURRENT USE OF INSULIN: ICD-10-CM

## 2020-05-31 RX ORDER — PIOGLITAZONEHYDROCHLORIDE 15 MG/1
TABLET ORAL
Qty: 90 TABLET | Refills: 0 | Status: SHIPPED | OUTPATIENT
Start: 2020-05-31 | End: 2020-08-31

## 2020-08-04 ENCOUNTER — HOSPITAL ENCOUNTER (OUTPATIENT)
Dept: WOUND CARE | Facility: HOSPITAL | Age: 60
Discharge: HOME | End: 2020-08-04
Attending: FAMILY MEDICINE
Payer: MEDICAID

## 2020-08-04 VITALS — HEART RATE: 102 BPM | SYSTOLIC BLOOD PRESSURE: 149 MMHG | DIASTOLIC BLOOD PRESSURE: 75 MMHG | TEMPERATURE: 98 F

## 2020-08-04 DIAGNOSIS — E11.621 TYPE 2 DIABETES WITH SKIN ULCER OF FOOT: ICD-10-CM

## 2020-08-04 DIAGNOSIS — L97.509 TYPE 2 DIABETES WITH SKIN ULCER OF FOOT: ICD-10-CM

## 2020-08-04 DIAGNOSIS — E11.42 TYPE 2 DIABETES MELLITUS WITH DIABETIC POLYNEUROPATHY, WITHOUT LONG-TERM CURRENT USE OF INSULIN: ICD-10-CM

## 2020-08-04 PROCEDURE — 27201912 HC WOUND CARE DEBRIDEMENT SUPPLIES: Performed by: FAMILY MEDICINE

## 2020-08-04 PROCEDURE — 11042 DBRDMT SUBQ TIS 1ST 20SQCM/<: CPT | Performed by: FAMILY MEDICINE

## 2020-08-04 PROCEDURE — 11042 DBRDMT SUBQ TIS 1ST 20SQCM/<: CPT | Mod: ,,, | Performed by: FAMILY MEDICINE

## 2020-08-04 PROCEDURE — 11042 DEBRIDEMENT: ICD-10-PCS | Mod: ,,, | Performed by: FAMILY MEDICINE

## 2020-08-04 PROCEDURE — 99214 OFFICE O/P EST MOD 30 MIN: CPT | Mod: 25,,, | Performed by: FAMILY MEDICINE

## 2020-08-04 PROCEDURE — 99214 PR OFFICE/OUTPT VISIT, EST, LEVL IV, 30-39 MIN: ICD-10-PCS | Mod: 25,,, | Performed by: FAMILY MEDICINE

## 2020-08-04 NOTE — PROGRESS NOTES
[facility]                Progress Note    Subjective:       Patient ID: Fermin Henson is a 60 y.o. male.    Chief Complaint: No chief complaint on file.    Patient ambulated to exam chair without assistance or pain. Neuropathy. Healed Right Plantar foot in wound clinic previously. Re-opened some time in June per patient. Sock in place without dressing. Some drainage noted to sock. No malodor. Red tissue without avascular growth. 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 Patient unsure how any of the wounds started. Will start collagen with focus on offloading.  Last a1c was 7.3 in March of this year.  He states his blood sugars have been staying between 120-140mg/dl.        Review of Systems   Constitutional: Negative.    HENT: Negative.    Eyes: Negative.    Respiratory: Negative.    Cardiovascular: Negative.    Gastrointestinal: Negative.    Musculoskeletal: Negative.    Neurological: Negative.    Psychiatric/Behavioral: Negative.          Objective:        Physical Exam  Vitals signs reviewed.   Constitutional:       Appearance: Normal appearance.   HENT:      Head: Normocephalic and atraumatic.      Right Ear: External ear normal.      Left Ear: External ear normal.      Nose: Nose normal.      Mouth/Throat:      Mouth: Mucous membranes are moist.   Eyes:      Extraocular Movements: Extraocular movements intact.      Pupils: Pupils are equal, round, and reactive to light.   Neck:      Musculoskeletal: Normal range of motion and neck supple.   Cardiovascular:      Rate and Rhythm: Normal rate and regular rhythm.      Pulses: Normal pulses.      Heart sounds: Normal heart sounds.   Pulmonary:      Effort: Pulmonary effort is normal. No respiratory distress.      Breath sounds: No wheezing.   Abdominal:      General: There is no distension.      Palpations: Abdomen is soft.      Tenderness: There is no abdominal tenderness.  "  Musculoskeletal: Normal range of motion.   Skin:     General: Skin is warm and dry.      Comments: +dfu to plantar surface of right foot with excess callous formation and slough in wound bed   Neurological:      Mental Status: He is alert and oriented to person, place, and time.      Sensory: Sensory deficit present.   Psychiatric:         Mood and Affect: Mood normal.         Behavior: Behavior normal.           Vitals:    08/04/20 1311   BP: (!) 149/75   Pulse: 102   Temp: 97.7 °F (36.5 °C)     Debridement    Date/Time: 8/4/2020 2:01 PM  Performed by: See Jean-Baptiste MD  Authorized by: See Jean-Baptiste MD     Time out: Immediately prior to procedure a "time out" was called to verify the correct patient, procedure, equipment, support staff and site/side marked as required.    Consent Done?:  Yes (Written)  Local anesthesia used?: No      Wound Details:    Location:  Right foot    Location:  Right Plantar       Length (cm):  1.1       Area (sq cm):  0.99       Width (cm):  0.9       Percent Debrided (%):  100       Depth (cm):  0.3       Total Area Debrided (sq cm):  0.99    Depth of debridement:  Subcutaneous tissue    Tissue debrided:  Epidermis, Dermis and Subcutaneous    Devitalized tissue debrided:  Biofilm, Fibrin and Slough    Instruments:  Curette    Bleeding:  Minimal  Hemostasis Achieved: Yes    Method Used:  Pressure  Patient tolerance:  Patient tolerated the procedure well with no immediate complications      Assessment:           ICD-10-CM ICD-9-CM   1. Type 2 diabetes mellitus with diabetic polyneuropathy, without long-term current use of insulin  E11.42 250.60     357.2   2. Type 2 diabetes with skin ulcer of foot  E11.621 250.80    L97.509 707.15            Wound 08/04/20 1312 Diabetic Ulcer Right posterior Foot (Active)   08/04/20 1312    Pre-existing: No   Primary Wound Type: Diabetic ulc   Side: Right   Orientation: posterior   Location: Foot   Wound Number (optional):    Ankle-Brachial Index:  "   Pulses:    Removal Indication and Assessment:    Wound Outcome:    (Retired) Wound Type:    (Retired) Wound Length (cm):    (Retired) Wound Width (cm):    (Retired) Depth (cm):    Wound Description (Comments):    Removal Indications:    Wound Image   08/04/20 1300   Wound WDL ex 08/04/20 1300   Dressing Appearance No dressing 08/04/20 1300   Drainage Amount Small 08/04/20 1300   Drainage Characteristics/Odor Serosanguineous 08/04/20 1300   Appearance Red 08/04/20 1300   Tissue loss description Full thickness 08/04/20 1300   Black (%), Wound Tissue Color 0 % 08/04/20 1300   Red (%), Wound Tissue Color 100 % 08/04/20 1300   Yellow (%), Wound Tissue Color 0 % 08/04/20 1300   Periwound Area Intact 08/04/20 1300   Wound Edges Callused 08/04/20 1300   Wound Length (cm) 1.1 cm 08/04/20 1300   Wound Width (cm) 0.9 cm 08/04/20 1300   Wound Depth (cm) 0.25 cm 08/04/20 1300   Wound Volume (cm^3) 0.25 cm^3 08/04/20 1300   Wound Surface Area (cm^2) 0.99 cm^2 08/04/20 1300   Care Cleansed with:;Soap and water;Sterile normal saline 08/04/20 1300   Dressing Applied 08/04/20 1300   Off Loading Football dressing 08/04/20 1300            Wound 08/04/20 1318 Diabetic Ulcer Right distal Toe, first (Active)   08/04/20 1318    Pre-existing: Yes   Primary Wound Type: Diabetic ulc   Side: Right   Orientation: distal   Location: Toe, first   Wound Number (optional):    Ankle-Brachial Index:    Pulses:    Removal Indication and Assessment:    Wound Outcome:    (Retired) Wound Type:    (Retired) Wound Length (cm):    (Retired) Wound Width (cm):    (Retired) Depth (cm):    Wound Description (Comments):    Removal Indications:    Wound Image   08/04/20 1300   Wound WDL ex 08/04/20 1300   Dressing Appearance Dried drainage 08/04/20 1300   Drainage Amount Small 08/04/20 1300   Drainage Characteristics/Odor Serosanguineous 08/04/20 1300   Appearance Pink;Red;Black;Eschar 08/04/20 1300   Tissue loss description Partial thickness 08/04/20 1300    Black (%), Wound Tissue Color 30 % 08/04/20 1300   Red (%), Wound Tissue Color 70 % 08/04/20 1300   Yellow (%), Wound Tissue Color 0 % 08/04/20 1300   Periwound Area Scar tissue 08/04/20 1300   Wound Edges Undefined 08/04/20 1300   Wound Length (cm) 0.5 cm 08/04/20 1300   Wound Width (cm) 0.3 cm 08/04/20 1300   Wound Depth (cm) 0.15 cm 08/04/20 1300   Wound Volume (cm^3) 0.02 cm^3 08/04/20 1300   Wound Surface Area (cm^2) 0.15 cm^2 08/04/20 1300   Care Cleansed with:;Soap and water;Sterile normal saline 08/04/20 1300   Dressing Applied 08/04/20 1300   Off Loading Football dressing 08/04/20 1300           Plan:                Orders Placed This Encounter   Procedures    Ambulatory referral/consult to Wound Clinic     Standing Status:   Standing     Number of Occurrences:   1     Referral Priority:   Routine     Referral Type:   Consultation     Referral Reason:   Specialty Services Required     Requested Specialty:   Wound Care     Number of Visits Requested:   1    Change dressing     Clean wounds with saline. Apply promogran. Cover with aquacel foam x 1 and dl foam long x 2 wrapped around from plantar to dorsal foot and secure with cast padding x 3 and coban. Leave in place x 1 week.        Follow up in about 1 week (around 8/11/2020) for wound care.     See Jean-Baptiste MD

## 2020-08-11 ENCOUNTER — HOSPITAL ENCOUNTER (OUTPATIENT)
Dept: WOUND CARE | Facility: HOSPITAL | Age: 60
Discharge: HOME | End: 2020-08-11
Attending: FAMILY MEDICINE
Payer: MEDICAID

## 2020-08-11 VITALS
TEMPERATURE: 98 F | SYSTOLIC BLOOD PRESSURE: 129 MMHG | RESPIRATION RATE: 17 BRPM | HEART RATE: 101 BPM | DIASTOLIC BLOOD PRESSURE: 74 MMHG

## 2020-08-11 DIAGNOSIS — L97.509 TYPE 2 DIABETES WITH SKIN ULCER OF FOOT: Primary | ICD-10-CM

## 2020-08-11 DIAGNOSIS — E11.621 TYPE 2 DIABETES WITH SKIN ULCER OF FOOT: Primary | ICD-10-CM

## 2020-08-11 PROCEDURE — 11042 DBRDMT SUBQ TIS 1ST 20SQCM/<: CPT | Mod: ,,, | Performed by: FAMILY MEDICINE

## 2020-08-11 PROCEDURE — 99214 PR OFFICE/OUTPT VISIT, EST, LEVL IV, 30-39 MIN: ICD-10-PCS | Mod: 25,,, | Performed by: FAMILY MEDICINE

## 2020-08-11 PROCEDURE — 99214 OFFICE O/P EST MOD 30 MIN: CPT | Mod: 25,,, | Performed by: FAMILY MEDICINE

## 2020-08-11 PROCEDURE — 11042 DBRDMT SUBQ TIS 1ST 20SQCM/<: CPT | Performed by: FAMILY MEDICINE

## 2020-08-11 PROCEDURE — 27201912 HC WOUND CARE DEBRIDEMENT SUPPLIES: Performed by: FAMILY MEDICINE

## 2020-08-11 PROCEDURE — 11042 DEBRIDEMENT: ICD-10-PCS | Mod: ,,, | Performed by: FAMILY MEDICINE

## 2020-08-11 NOTE — PROGRESS NOTES
"[facility]                Progress Note    Subjective:       Patient ID: Fermin Henson is a 60 y.o. male.    Chief Complaint: Non-healing Wound Follow Up and Diabetic Foot Ulcer    08/11/2020: F/U visit. Patient denies any pain in wound.  There has been no fevers, chills, or sweats.  He states blood sugars have been "fine" at home.  He does not offload as directed stating he has to take care of his mother.  He is using his adali when ambulating.  Dressings have stayed, clean, dry, and in tact.      Review of Systems   Constitutional: Negative.    HENT: Negative.    Eyes: Negative.    Respiratory: Negative.    Cardiovascular: Negative.    Gastrointestinal: Negative.    Musculoskeletal: Negative for arthralgias.   Skin: Positive for wound.   Neurological: Positive for numbness.   Psychiatric/Behavioral: Negative.          Objective:        Physical Exam  Vitals signs reviewed.   Constitutional:       Appearance: Normal appearance.   HENT:      Head: Normocephalic and atraumatic.      Right Ear: External ear normal.      Left Ear: External ear normal.      Nose: Nose normal.      Mouth/Throat:      Mouth: Mucous membranes are moist.      Pharynx: Oropharynx is clear.   Eyes:      Extraocular Movements: Extraocular movements intact.      Pupils: Pupils are equal, round, and reactive to light.   Neck:      Musculoskeletal: Normal range of motion and neck supple.   Cardiovascular:      Rate and Rhythm: Normal rate and regular rhythm.   Pulmonary:      Effort: Pulmonary effort is normal. No respiratory distress.      Breath sounds: No wheezing.   Abdominal:      General: There is no distension.      Palpations: Abdomen is soft.      Tenderness: There is no abdominal tenderness.   Skin:     General: Skin is warm and dry.      Comments: +DFU to plantar surface of right foot with excess slough and callous formation   Neurological:      Mental Status: He is alert and oriented " "to person, place, and time.      Sensory: Sensory deficit present.   Psychiatric:         Mood and Affect: Mood normal.         Behavior: Behavior normal.           Vitals:    08/11/20 0853   BP: 129/74   Pulse: 101   Resp: 17   Temp: 98.2 °F (36.8 °C)     Debridement    Date/Time: 8/11/2020 10:20 AM  Performed by: See Jean-Baptiste MD  Authorized by: See Jean-Baptiste MD     Time out: Immediately prior to procedure a "time out" was called to verify the correct patient, procedure, equipment, support staff and site/side marked as required.    Consent Done?:  Yes (Verbal)  Local anesthesia used?: No      Wound Details:    Location:  Right foot    Location:  Right Plantar       Length (cm):  1       Area (sq cm):  1       Width (cm):  1       Percent Debrided (%):  100       Depth (cm):  0.1       Total Area Debrided (sq cm):  1    Depth of debridement:  Subcutaneous tissue    Tissue debrided:  Dermis, Epidermis and Subcutaneous    Devitalized tissue debrided:  Biofilm, Fibrin and Slough    Instruments:  Curette    Bleeding:  Minimal  Hemostasis Achieved: Yes    Method Used:  Pressure and Silver Nitrate  Patient tolerance:  Patient tolerated the procedure well with no immediate complications      Assessment:           ICD-10-CM ICD-9-CM   1. Type 2 diabetes with skin ulcer of foot  E11.621 250.80    L97.509 707.15            Wound 08/04/20 1312 Diabetic Ulcer Right posterior Foot (Active)   08/04/20 1312    Pre-existing: No   Primary Wound Type: Diabetic ulc   Side: Right   Orientation: posterior   Location: Foot   Wound Number (optional):    Ankle-Brachial Index:    Pulses:    Removal Indication and Assessment:    Wound Outcome:    (Retired) Wound Type:    (Retired) Wound Length (cm):    (Retired) Wound Width (cm):    (Retired) Depth (cm):    Wound Description (Comments):    Removal Indications:    Wound Image   08/11/20 0800   Wound WDL ex 08/11/20 0800   Dressing Appearance Intact;Moist drainage 08/11/20 0800 "   Drainage Amount Small 08/11/20 0800   Drainage Characteristics/Odor Serosanguineous 08/11/20 0800   Appearance Pink;Red;White;Yellow 08/11/20 0800   Tissue loss description Partial thickness 08/11/20 0800   Black (%), Wound Tissue Color 0 % 08/11/20 0800   Red (%), Wound Tissue Color 90 % 08/11/20 0800   Yellow (%), Wound Tissue Color 10 % 08/11/20 0800   Periwound Area Intact;Macerated 08/11/20 0800   Wound Edges Callused 08/11/20 0800   Wound Length (cm) 1 cm 08/11/20 0800   Wound Width (cm) 1 cm 08/11/20 0800   Wound Depth (cm) 0.1 cm 08/11/20 0800   Wound Volume (cm^3) 0.1 cm^3 08/11/20 0800   Wound Surface Area (cm^2) 1 cm^2 08/11/20 0800   Tunneling (depth (cm)/location) 0 08/11/20 0800   Undermining (depth (cm)/location) 0 08/11/20 0800   Care Soap and water;Sterile normal saline;Wound cleanser 08/11/20 0800   Dressing Applied;Collagen;Foam 08/11/20 0800   Off Loading Football dressing 08/11/20 0800   Dressing Change Due 08/18/20 08/11/20 0800       [REMOVED]      Wound 08/04/20 1318 Diabetic Ulcer Right distal Toe, first (Removed)   08/04/20 1318    Pre-existing: Yes   Primary Wound Type: Diabetic ulc   Side: Right   Orientation: distal   Location: Toe, first   Wound Number (optional):    Ankle-Brachial Index:    Pulses:    Removal Indication and Assessment: closed/resurfaced   Wound Outcome: Healed   (Retired) Wound Type:    (Retired) Wound Length (cm):    (Retired) Wound Width (cm):    (Retired) Depth (cm):    Wound Description (Comments):    Removal Indications:    Removed 08/11/20 0930   Wound Image   08/11/20 0800   Wound WDL ex 08/11/20 0800   Dressing Appearance Intact;Moist drainage 08/11/20 0800   Drainage Amount None 08/11/20 0800   Appearance Dry 08/11/20 0800   Tissue loss description Not applicable 08/11/20 0800   Black (%), Wound Tissue Color 0 % 08/11/20 0800   Red (%), Wound Tissue Color 0 % 08/11/20 0800   Yellow (%), Wound Tissue Color 0 % 08/11/20 0800   Periwound Area Dry 08/11/20 0800    Wound Edges Callused 08/11/20 0800   Wound Length (cm) 0 cm 08/11/20 0800   Wound Width (cm) 0 cm 08/11/20 0800   Wound Depth (cm) 0 cm 08/11/20 0800   Wound Volume (cm^3) 0 cm^3 08/11/20 0800   Wound Surface Area (cm^2) 0 cm^2 08/11/20 0800   Tunneling (depth (cm)/location) 0 08/11/20 0800   Undermining (depth (cm)/location) 0 08/11/20 0800   Care Soap and water;Sterile normal saline;Wound cleanser 08/11/20 0800           Plan:                Orders Placed This Encounter   Procedures    Debridement     This order was created via procedure documentation     Standing Status:   Standing     Number of Occurrences:   1    Change dressing     Liz, aquacel foam X1, celeste foam long X2, football dressing (cast padding X3, coban)        Follow up in about 1 week (around 8/18/2020) for wound care.     See Jean-Baptiste MD

## 2020-08-18 ENCOUNTER — HOSPITAL ENCOUNTER (OUTPATIENT)
Dept: WOUND CARE | Facility: HOSPITAL | Age: 60
Discharge: HOME | End: 2020-08-18
Attending: FAMILY MEDICINE
Payer: MEDICAID

## 2020-08-18 VITALS — SYSTOLIC BLOOD PRESSURE: 131 MMHG | TEMPERATURE: 97 F | HEART RATE: 89 BPM | DIASTOLIC BLOOD PRESSURE: 71 MMHG

## 2020-08-18 DIAGNOSIS — L97.509 TYPE 2 DIABETES WITH SKIN ULCER OF FOOT: Primary | ICD-10-CM

## 2020-08-18 DIAGNOSIS — E11.621 TYPE 2 DIABETES WITH SKIN ULCER OF FOOT: Primary | ICD-10-CM

## 2020-08-18 PROCEDURE — 99214 PR OFFICE/OUTPT VISIT, EST, LEVL IV, 30-39 MIN: ICD-10-PCS | Mod: 25,,, | Performed by: FAMILY MEDICINE

## 2020-08-18 PROCEDURE — 11043 DBRDMT MUSC&/FSCA 1ST 20/<: CPT | Mod: ,,, | Performed by: FAMILY MEDICINE

## 2020-08-18 PROCEDURE — 11043 DEBRIDEMENT: ICD-10-PCS | Mod: ,,, | Performed by: FAMILY MEDICINE

## 2020-08-18 PROCEDURE — 27201912 HC WOUND CARE DEBRIDEMENT SUPPLIES: Performed by: FAMILY MEDICINE

## 2020-08-18 PROCEDURE — 99214 OFFICE O/P EST MOD 30 MIN: CPT | Mod: 25,,, | Performed by: FAMILY MEDICINE

## 2020-08-18 PROCEDURE — 11042 DBRDMT SUBQ TIS 1ST 20SQCM/<: CPT | Performed by: FAMILY MEDICINE

## 2020-08-18 NOTE — PROGRESS NOTES
[facility]                Progress Note    Subjective:       Patient ID: Fermin Henson is a 60 y.o. male.    Chief Complaint: No chief complaint on file.    Walked to clinic unaided. Complains of back pain after twisting wrong last weekend but no pain in wound. There has been no excess drainage seen on outside of dressing.  He continues to ambulate regularly.  No fevers, chills, or sweats.  No URI symptoms.  No odor from wound      Review of Systems   Constitutional: Negative.    HENT: Negative.    Eyes: Negative.    Respiratory: Negative.    Cardiovascular: Negative.    Gastrointestinal: Negative.    Neurological: Positive for numbness.   Psychiatric/Behavioral: Negative.          Objective:        Physical Exam  Vitals signs reviewed.   Constitutional:       Appearance: Normal appearance.   HENT:      Head: Normocephalic and atraumatic.      Right Ear: External ear normal.      Left Ear: External ear normal.      Nose: Nose normal.      Mouth/Throat:      Mouth: Mucous membranes are moist.      Pharynx: Oropharynx is clear.   Eyes:      Extraocular Movements: Extraocular movements intact.      Pupils: Pupils are equal, round, and reactive to light.   Neck:      Musculoskeletal: Normal range of motion.   Cardiovascular:      Rate and Rhythm: Normal rate and regular rhythm.      Pulses: Normal pulses.      Heart sounds: Normal heart sounds.   Pulmonary:      Effort: Pulmonary effort is normal. No respiratory distress.      Breath sounds: No wheezing.   Abdominal:      General: There is no distension.      Palpations: Abdomen is soft.      Tenderness: There is no abdominal tenderness.   Musculoskeletal: Normal range of motion.   Skin:     General: Skin is warm and dry.      Comments: +DFU to plantar surface of right foot with excess slough and callous formation   Neurological:      Mental Status: He is alert.           Vitals:    08/18/20 0858   BP: 131/71   Pulse:  "89   Temp: 96.8 °F (36 °C)     Debridement    Date/Time: 8/18/2020 10:49 AM  Performed by: See Jean-Baptiste MD  Authorized by: See Jean-Baptiste MD     Time out: Immediately prior to procedure a "time out" was called to verify the correct patient, procedure, equipment, support staff and site/side marked as required.    Consent Done?:  Yes (Written)  Local anesthesia used?: No      Wound Details:    Location:  Right foot    Location:  Right Plantar    Type of Debridement:  Excisional       Length (cm):  0.8       Area (sq cm):  0.64       Width (cm):  0.8       Percent Debrided (%):  100       Depth (cm):  0.3       Total Area Debrided (sq cm):  0.64    Depth of debridement:  Subcutaneous tissue    Tissue debrided:  Bone, Dermis and Subcutaneous    Devitalized tissue debrided:  Biofilm, Callus, Slough and Fibrin    Instruments:  Curette    Bleeding:  Minimal  Hemostasis Achieved: Yes    Method Used:  Pressure  Patient tolerance:  Patient tolerated the procedure well with no immediate complications      Assessment:           ICD-10-CM ICD-9-CM   1. Type 2 diabetes with skin ulcer of foot  E11.621 250.80    L97.509 707.15            Wound 08/04/20 1312 Diabetic Ulcer Right posterior Foot (Active)   08/04/20 1312    Pre-existing: No   Primary Wound Type: Diabetic ulc   Side: Right   Orientation: posterior   Location: Foot   Wound Number (optional):    Ankle-Brachial Index:    Pulses:    Removal Indication and Assessment:    Wound Outcome:    (Retired) Wound Type:    (Retired) Wound Length (cm):    (Retired) Wound Width (cm):    (Retired) Depth (cm):    Wound Description (Comments):    Removal Indications:    Wound Image   08/18/20 0900   Wound WDL ex 08/18/20 0900   Dressing Appearance Intact;Clean 08/18/20 0900   Drainage Amount Scant 08/18/20 0900   Drainage Characteristics/Odor Serosanguineous 08/18/20 0900   Appearance Red 08/18/20 0900   Tissue loss description Partial thickness 08/18/20 0900   Black (%), Wound " Tissue Color 10 % 08/18/20 0900   Red (%), Wound Tissue Color 90 % 08/18/20 0900   Periwound Area Dry 08/18/20 0900   Wound Edges Callused 08/18/20 0900   Wound Length (cm) 0.8 cm 08/18/20 0900   Wound Width (cm) 0.8 cm 08/18/20 0900   Wound Depth (cm) 0.3 cm 08/18/20 0900   Wound Volume (cm^3) 0.19 cm^3 08/18/20 0900   Wound Surface Area (cm^2) 0.64 cm^2 08/18/20 0900   Care Cleansed with:;Antimicrobial agent;Sterile normal saline 08/18/20 0900   Off Loading Football dressing;Off loading shoe 08/18/20 0900   Dressing Change Due 08/25/20 08/18/20 0900           Plan:                Orders Placed This Encounter   Procedures    Change dressing     Liz, aquacel foam X1, celeste foam long X2, football dressing (cast padding X3, coban)        Follow up in about 1 week (around 8/25/2020).     See Jean-Baptiste MD

## 2020-08-19 PROBLEM — L97.412 DIABETIC ULCER OF RIGHT MIDFOOT ASSOCIATED WITH TYPE 2 DIABETES MELLITUS, WITH FAT LAYER EXPOSED: Status: ACTIVE | Noted: 2020-01-21

## 2020-08-25 ENCOUNTER — HOSPITAL ENCOUNTER (OUTPATIENT)
Dept: WOUND CARE | Facility: HOSPITAL | Age: 60
Discharge: HOME | End: 2020-08-25
Attending: FAMILY MEDICINE
Payer: MEDICAID

## 2020-08-25 VITALS
RESPIRATION RATE: 18 BRPM | HEART RATE: 73 BPM | SYSTOLIC BLOOD PRESSURE: 158 MMHG | TEMPERATURE: 98 F | DIASTOLIC BLOOD PRESSURE: 77 MMHG

## 2020-08-25 DIAGNOSIS — L97.412 DIABETIC ULCER OF RIGHT MIDFOOT ASSOCIATED WITH TYPE 2 DIABETES MELLITUS, WITH FAT LAYER EXPOSED: Primary | ICD-10-CM

## 2020-08-25 DIAGNOSIS — E11.621 DIABETIC ULCER OF RIGHT MIDFOOT ASSOCIATED WITH TYPE 2 DIABETES MELLITUS, WITH FAT LAYER EXPOSED: Primary | ICD-10-CM

## 2020-08-25 PROCEDURE — 97597 DBRDMT OPN WND 1ST 20 CM/<: CPT | Mod: ,,, | Performed by: FAMILY MEDICINE

## 2020-08-25 PROCEDURE — 99214 OFFICE O/P EST MOD 30 MIN: CPT | Mod: 25,,, | Performed by: FAMILY MEDICINE

## 2020-08-25 PROCEDURE — 11055 PARING/CUTG B9 HYPRKER LES 1: CPT | Performed by: FAMILY MEDICINE

## 2020-08-25 PROCEDURE — 97597 DEBRIDEMENT: ICD-10-PCS | Mod: ,,, | Performed by: FAMILY MEDICINE

## 2020-08-25 PROCEDURE — 99214 PR OFFICE/OUTPT VISIT, EST, LEVL IV, 30-39 MIN: ICD-10-PCS | Mod: 25,,, | Performed by: FAMILY MEDICINE

## 2020-08-25 NOTE — PROGRESS NOTES
[facility]                Progress Note    Subjective:       Patient ID: Fermin Henson is a 60 y.o. male.    Chief Complaint: Non-healing Wound Follow Up    Pt arrived to [facility]; ambulating with out any issues. Pt denies any fever, chills, flu-like symptoms, cough, sob, cp, ha, gi symptoms, or loss of taste/smell; pt reports practicing social distancing as much as possible & only going out into public when needed. Pt reports football drsg got wet on Saturday when taking a shower & his cast cover leaked. Pt removed the football drsg & was covering wound with bandaid; changing bandaid out multiple times a day.      Review of Systems   Constitutional: Negative.    HENT: Negative.    Eyes: Negative.    Respiratory: Negative.    Cardiovascular: Negative.    Gastrointestinal: Negative.    Musculoskeletal: Negative.    Skin: Positive for wound.   Neurological: Positive for numbness.   Psychiatric/Behavioral: Negative.          Objective:        Physical Exam  Constitutional:       Appearance: Normal appearance.   HENT:      Head: Normocephalic and atraumatic.      Nose: Nose normal.      Mouth/Throat:      Mouth: Mucous membranes are moist.      Pharynx: Oropharynx is clear.   Eyes:      Extraocular Movements: Extraocular movements intact.      Pupils: Pupils are equal, round, and reactive to light.   Neck:      Musculoskeletal: Normal range of motion and neck supple.   Cardiovascular:      Rate and Rhythm: Normal rate and regular rhythm.   Pulmonary:      Effort: Pulmonary effort is normal. No respiratory distress.      Breath sounds: No wheezing.   Abdominal:      General: There is no distension.      Palpations: Abdomen is soft.      Tenderness: There is no abdominal tenderness.   Musculoskeletal: Normal range of motion.      Comments: +diabetic FU to plantar surface of right foot with excess callous formation requiring paring.    Skin:     General: Skin is warm and dry.  "  Neurological:      Mental Status: He is alert.           Vitals:    08/25/20 0902   BP: (!) 158/77   Pulse: 73   Resp: 18   Temp: 97.5 °F (36.4 °C)     Debridement    Date/Time: 8/25/2020 10:10 AM  Performed by: See Jean-Baptiste MD  Authorized by: See Jean-Baptiste MD     Time out: Immediately prior to procedure a "time out" was called to verify the correct patient, procedure, equipment, support staff and site/side marked as required.    Consent Done?:  Yes (Written)  Local anesthesia used?: No      Wound Details:    Location:  Right foot    Location:  Right Plantar    Type of Debridement:  Non-excisional       Length (cm):  4       Area (sq cm):  12       Width (cm):  3       Percent Debrided (%):  100       Depth (cm):  0       Total Area Debrided (sq cm):  12    Depth of debridement:  Epidermis/Dermis    Devitalized tissue debrided:  Callus    Instruments:  Blade    Bleeding:  None  Patient tolerance:  Patient tolerated the procedure well with no immediate complications      Assessment:           ICD-10-CM ICD-9-CM   1. Diabetic ulcer of right midfoot associated with type 2 diabetes mellitus, with fat layer exposed  E11.621 250.80    L97.412 707.14            Wound 08/04/20 1312 Diabetic Ulcer Right posterior Foot (Active)   08/04/20 1312    Pre-existing: No   Primary Wound Type: Diabetic ulc   Side: Right   Orientation: posterior   Location: Foot   Wound Number (optional):    Ankle-Brachial Index:    Pulses:    Removal Indication and Assessment:    Wound Outcome:    (Retired) Wound Type:    (Retired) Wound Length (cm):    (Retired) Wound Width (cm):    (Retired) Depth (cm):    Wound Description (Comments):    Removal Indications:    Wound Image   08/25/20 0900   Wound WDL ex 08/25/20 0900   Dressing Appearance No dressing;other (see comments) 08/25/20 0900   Drainage Amount Moderate 08/25/20 0900   Drainage Characteristics/Odor Serosanguineous 08/25/20 0900   Appearance Red 08/25/20 0900   Tissue loss " description Full thickness 08/25/20 0900   Black (%), Wound Tissue Color 0 % 08/25/20 0900   Red (%), Wound Tissue Color 100 % 08/25/20 0900   Yellow (%), Wound Tissue Color 0 % 08/25/20 0900   Periwound Area Intact 08/25/20 0900   Wound Edges Callused;Defined 08/25/20 0900   Wound Length (cm) 0.8 cm 08/25/20 0900   Wound Width (cm) 0.8 cm 08/25/20 0900   Wound Depth (cm) 0.2 cm 08/25/20 0900   Wound Volume (cm^3) 0.13 cm^3 08/25/20 0900   Wound Surface Area (cm^2) 0.64 cm^2 08/25/20 0900   Tunneling (depth (cm)/location) 0 08/25/20 0900   Undermining (depth (cm)/location) 0 08/25/20 0900   Care Cleansed with:;Sterile normal saline 08/25/20 0900   Dressing Applied;Collagen;Foam 08/25/20 0900   Off Loading Football dressing;Off loading shoe 08/25/20 0900           Plan:                Orders Placed This Encounter   Procedures    Change dressing     Clean wound with NS. Endoform to wound bed, cover with Mextra, Foam x2-3. Football drsg (cast padding x3, coban x1). Pt to return in 1 wk.        Follow up in about 1 week (around 9/1/2020) for Wound Care.     See Jean-Baptiste MD

## 2020-08-28 DIAGNOSIS — E11.9 TYPE 2 DIABETES MELLITUS WITHOUT COMPLICATION: ICD-10-CM

## 2020-09-01 ENCOUNTER — HOSPITAL ENCOUNTER (OUTPATIENT)
Dept: WOUND CARE | Facility: HOSPITAL | Age: 60
Discharge: HOME | End: 2020-09-01
Attending: FAMILY MEDICINE
Payer: MEDICAID

## 2020-09-01 VITALS — DIASTOLIC BLOOD PRESSURE: 76 MMHG | SYSTOLIC BLOOD PRESSURE: 159 MMHG | TEMPERATURE: 96 F | HEART RATE: 80 BPM

## 2020-09-01 DIAGNOSIS — L97.412 DIABETIC ULCER OF RIGHT MIDFOOT ASSOCIATED WITH TYPE 2 DIABETES MELLITUS, WITH FAT LAYER EXPOSED: Primary | ICD-10-CM

## 2020-09-01 DIAGNOSIS — E11.621 DIABETIC ULCER OF RIGHT MIDFOOT ASSOCIATED WITH TYPE 2 DIABETES MELLITUS, WITH FAT LAYER EXPOSED: Primary | ICD-10-CM

## 2020-09-01 PROCEDURE — 11042 DBRDMT SUBQ TIS 1ST 20SQCM/<: CPT | Performed by: FAMILY MEDICINE

## 2020-09-01 PROCEDURE — 11042 DEBRIDEMENT: ICD-10-PCS | Mod: ,,, | Performed by: FAMILY MEDICINE

## 2020-09-01 PROCEDURE — 99214 PR OFFICE/OUTPT VISIT, EST, LEVL IV, 30-39 MIN: ICD-10-PCS | Mod: 25,,, | Performed by: FAMILY MEDICINE

## 2020-09-01 PROCEDURE — 99214 OFFICE O/P EST MOD 30 MIN: CPT | Mod: 25,,, | Performed by: FAMILY MEDICINE

## 2020-09-01 PROCEDURE — 11042 DBRDMT SUBQ TIS 1ST 20SQCM/<: CPT | Mod: ,,, | Performed by: FAMILY MEDICINE

## 2020-09-01 PROCEDURE — 99213 OFFICE O/P EST LOW 20 MIN: CPT | Performed by: FAMILY MEDICINE

## 2020-09-01 NOTE — PROGRESS NOTES
[facility]                Progress Note    Subjective:       Patient ID: Fermin Henson is a 60 y.o. male.    Chief Complaint: No chief complaint on file.    Walked to clinic unaided. Football intact with scant drainage. No edema. No new complaints.  He continues to not offload as directed.  No pain as he in insensate.         Review of Systems   Constitutional: Negative.    HENT: Negative.    Eyes: Negative.    Respiratory: Negative.    Cardiovascular: Negative.    Gastrointestinal: Negative.    Skin: Positive for wound.   Neurological: Positive for numbness.         Objective:        Physical Exam  Vitals signs reviewed.   Constitutional:       Appearance: Normal appearance.   HENT:      Head: Normocephalic and atraumatic.      Right Ear: External ear normal.      Left Ear: External ear normal.      Nose: Nose normal.      Mouth/Throat:      Mouth: Mucous membranes are moist.      Pharynx: Oropharynx is clear.   Eyes:      Extraocular Movements: Extraocular movements intact.      Pupils: Pupils are equal, round, and reactive to light.   Neck:      Musculoskeletal: Normal range of motion and neck supple.   Cardiovascular:      Rate and Rhythm: Normal rate and regular rhythm.   Pulmonary:      Effort: Pulmonary effort is normal. No respiratory distress.      Breath sounds: No wheezing.   Abdominal:      General: There is no distension.      Palpations: Abdomen is soft.      Tenderness: There is no abdominal tenderness.   Musculoskeletal:      Right lower leg: No edema.      Left lower leg: No edema.   Skin:     General: Skin is warm and dry.      Comments: +DFU to plantar surface with excess callous and slough   Neurological:      Mental Status: He is alert and oriented to person, place, and time.   Psychiatric:         Mood and Affect: Mood normal.         Behavior: Behavior normal.           Vitals:    09/01/20 0912   BP: (!) 159/76   Pulse: 80   Temp: 96.2 °F  "(35.7 °C)     Debridement    Date/Time: 9/1/2020 12:05 PM  Performed by: See Jean-Baptiste MD  Authorized by: See Jean-Baptiste MD     Time out: Immediately prior to procedure a "time out" was called to verify the correct patient, procedure, equipment, support staff and site/side marked as required.    Consent Done?:  Yes (Written)  Local anesthesia used?: No      Wound Details:    Location:  Right foot    Location:  Right Plantar    Type of Debridement:  Excisional       Length (cm):  0.9       Area (sq cm):  0.9       Width (cm):  1       Percent Debrided (%):  100       Depth (cm):  0.2       Total Area Debrided (sq cm):  0.9    Depth of debridement:  Subcutaneous tissue    Tissue debrided:  Subcutaneous, Epidermis and Dermis    Devitalized tissue debrided:  Biofilm, Callus, Fibrin and Slough    Instruments:  Curette    Bleeding:  Minimal  Hemostasis Achieved: Yes    Method Used:  Pressure  Patient tolerance:  Patient tolerated the procedure well with no immediate complications      Assessment:           ICD-10-CM ICD-9-CM   1. Diabetic ulcer of right midfoot associated with type 2 diabetes mellitus, with fat layer exposed  E11.621 250.80    L97.412 707.14            Wound 08/04/20 1312 Diabetic Ulcer Right posterior Foot (Active)   08/04/20 1312    Pre-existing: No   Primary Wound Type: Diabetic ulc   Side: Right   Orientation: posterior   Location: Foot   Wound Number (optional):    Ankle-Brachial Index:    Pulses:    Removal Indication and Assessment:    Wound Outcome:    (Retired) Wound Type:    (Retired) Wound Length (cm):    (Retired) Wound Width (cm):    (Retired) Depth (cm):    Wound Description (Comments):    Removal Indications:    Wound Image   09/01/20 0900   Wound WDL ex 09/01/20 0900   Dressing Appearance Dry;Intact 09/01/20 0900   Drainage Amount Moderate 09/01/20 0900   Drainage Characteristics/Odor Clear 09/01/20 0900   Appearance Pink 09/01/20 0900   Tissue loss description Partial thickness " 09/01/20 0900   Red (%), Wound Tissue Color 100 % 09/01/20 0900   Periwound Area Dry 09/01/20 0900   Wound Edges Callused 09/01/20 0900   Wound Length (cm) 0.9 cm 09/01/20 0900   Wound Width (cm) 1 cm 09/01/20 0900   Wound Depth (cm) 0.2 cm 09/01/20 0900   Wound Volume (cm^3) 0.18 cm^3 09/01/20 0900   Wound Surface Area (cm^2) 0.9 cm^2 09/01/20 0900   Care Cleansed with:;Antimicrobial agent;Sterile normal saline 09/01/20 0900   Dressing Changed 09/01/20 0900   Off Loading Off loading shoe 09/01/20 0900   Dressing Change Due 09/08/20 09/01/20 0900           Plan:                Orders Placed This Encounter   Procedures    Debridement     This order was created via procedure documentation     Standing Status:   Standing     Number of Occurrences:   1    Change dressing     Clean wound with NS. Liz to wound bed Foam x2. Football drsg (cast padding x3, coban x1). Pt to return in 1 wk.        Follow up in about 1 week (around 9/8/2020).

## 2020-09-04 DIAGNOSIS — Z12.11 COLON CANCER SCREENING: ICD-10-CM

## 2020-09-08 ENCOUNTER — HOSPITAL ENCOUNTER (OUTPATIENT)
Dept: WOUND CARE | Facility: HOSPITAL | Age: 60
Discharge: HOME | End: 2020-09-08
Attending: FAMILY MEDICINE
Payer: MEDICAID

## 2020-09-08 VITALS
SYSTOLIC BLOOD PRESSURE: 154 MMHG | DIASTOLIC BLOOD PRESSURE: 80 MMHG | RESPIRATION RATE: 20 BRPM | HEART RATE: 80 BPM | TEMPERATURE: 96 F

## 2020-09-08 DIAGNOSIS — L97.412 DIABETIC ULCER OF RIGHT MIDFOOT ASSOCIATED WITH TYPE 2 DIABETES MELLITUS, WITH FAT LAYER EXPOSED: Primary | ICD-10-CM

## 2020-09-08 DIAGNOSIS — E11.621 DIABETIC ULCER OF RIGHT MIDFOOT ASSOCIATED WITH TYPE 2 DIABETES MELLITUS, WITH FAT LAYER EXPOSED: Primary | ICD-10-CM

## 2020-09-08 PROCEDURE — 11042 DEBRIDEMENT: ICD-10-PCS | Mod: ,,, | Performed by: FAMILY MEDICINE

## 2020-09-08 PROCEDURE — 11042 DBRDMT SUBQ TIS 1ST 20SQCM/<: CPT | Mod: ,,, | Performed by: FAMILY MEDICINE

## 2020-09-08 PROCEDURE — 27201912 HC WOUND CARE DEBRIDEMENT SUPPLIES: Performed by: FAMILY MEDICINE

## 2020-09-08 PROCEDURE — 99214 OFFICE O/P EST MOD 30 MIN: CPT | Mod: 25,,, | Performed by: FAMILY MEDICINE

## 2020-09-08 PROCEDURE — 99214 PR OFFICE/OUTPT VISIT, EST, LEVL IV, 30-39 MIN: ICD-10-PCS | Mod: 25,,, | Performed by: FAMILY MEDICINE

## 2020-09-08 PROCEDURE — 11042 DBRDMT SUBQ TIS 1ST 20SQCM/<: CPT | Performed by: FAMILY MEDICINE

## 2020-09-08 NOTE — PROGRESS NOTES
[facility]                Progress Note    Subjective:       Patient ID: Fermin Henson is a 60 y.o. male.    Chief Complaint: Wound Check    F/u wound care visit. Patient ambulatory with no c/o pain at present. Dressing to right foot intact with moderate amount of serosanguineous drainage noted. Wound improving.   This is an established patient in wound care.   Patient presents in the office today for evaluation of the chronic wound.  Updated HPI is noted below.    The periwound appears non-erythematous, no maceration noted, non-edematous    The wound appears wound healing    Patient denies wound pain, fever, chills, sweats, nausea, vomiting    Patients reports no acute changes in symptoms    Lymphedema status is noncontributory to wound status    Pain at the site of the wound is none at this time    Contributing factors to current wound state include Diabetes Type 2      Review of Systems      Objective:        Physical Exam      Vitals:    09/08/20 0953   BP: (!) 154/80   Pulse: 80   Resp: 20   Temp: 96.2 °F (35.7 °C)     Debridement    Date/Time: 9/8/2020 10:30 AM  Performed by: See Jean-Baptiste MD  Authorized by: See Jean-Baptiste MD     Local anesthesia used?: No      Wound Details:    Location:  Right foot    Location:  Right Plantar       Length (cm):  0.6       Area (sq cm):  0.6       Width (cm):  1       Percent Debrided (%):  100       Depth (cm):  0.3       Total Area Debrided (sq cm):  0.6    Depth of debridement:  Subcutaneous tissue    Tissue debrided:  Dermis, Epidermis and Subcutaneous    Devitalized tissue debrided:  Biofilm, Fibrin, Slough and Callus    Instruments:  Curette and Blade    Bleeding:  Minimal  Hemostasis Achieved: Yes    Method Used:  Pressure  Patient tolerance:  Patient tolerated the procedure well with no immediate complications      Assessment:           ICD-10-CM ICD-9-CM   1. Diabetic ulcer of right midfoot associated with type  2 diabetes mellitus, with fat layer exposed  E11.621 250.80    L97.412 707.14            Wound 08/04/20 1312 Diabetic Ulcer Right posterior Foot (Active)   08/04/20 1312    Pre-existing: No   Primary Wound Type: Diabetic ulc   Side: Right   Orientation: posterior   Location: Foot   Wound Number (optional):    Ankle-Brachial Index:    Pulses:    Removal Indication and Assessment:    Wound Outcome:    (Retired) Wound Type:    (Retired) Wound Length (cm):    (Retired) Wound Width (cm):    (Retired) Depth (cm):    Wound Description (Comments):    Removal Indications:    Wound Image   09/08/20 0900   Wound WDL ex 09/08/20 0900   Dressing Appearance Intact;Dried drainage 09/08/20 0900   Drainage Amount Moderate 09/08/20 0900   Drainage Characteristics/Odor Serosanguineous 09/08/20 0900   Appearance Pink;Red 09/08/20 0900   Tissue loss description Partial thickness 09/08/20 0900   Black (%), Wound Tissue Color 0 % 09/08/20 0900   Red (%), Wound Tissue Color 100 % 09/08/20 0900   Yellow (%), Wound Tissue Color 0 % 09/08/20 0900   Periwound Area Macerated 09/08/20 0900   Wound Edges Open 09/08/20 0900   Wound Length (cm) 0.6 cm 09/08/20 0900   Wound Width (cm) 1 cm 09/08/20 0900   Wound Depth (cm) 0.3 cm 09/08/20 0900   Wound Volume (cm^3) 0.18 cm^3 09/08/20 0900   Wound Surface Area (cm^2) 0.6 cm^2 09/08/20 0900   Care Cleansed with:;Soap and water;Sterile normal saline 09/08/20 0900   Dressing Applied;Foam 09/08/20 0900   Off Loading Football dressing 09/08/20 0900   Dressing Change Due 09/15/20 09/08/20 0900           Plan:                Orders Placed This Encounter   Procedures    Debridement     This order was created via procedure documentation     Standing Status:   Standing     Number of Occurrences:   1    Change dressing     Clean wound with NS. Endoform to wound bed, aquacel foam x1, Gigi Foam long x2. Football drsg (cast padding x3, coban x1).        Follow up in about 1 week (around 9/15/2020), or if  symptoms worsen or fail to improve.     See Jean-Baptiste MD

## 2020-09-11 ENCOUNTER — OFFICE VISIT (OUTPATIENT)
Dept: FAMILY MEDICINE | Facility: CLINIC | Age: 60
End: 2020-09-11
Payer: MEDICAID

## 2020-09-11 VITALS
HEART RATE: 93 BPM | TEMPERATURE: 97 F | WEIGHT: 231.5 LBS | HEIGHT: 72 IN | OXYGEN SATURATION: 97 % | SYSTOLIC BLOOD PRESSURE: 132 MMHG | BODY MASS INDEX: 31.36 KG/M2 | DIASTOLIC BLOOD PRESSURE: 82 MMHG | RESPIRATION RATE: 18 BRPM

## 2020-09-11 DIAGNOSIS — E11.3393 CONTROLLED TYPE 2 DIABETES MELLITUS WITH BOTH EYES AFFECTED BY MODERATE NONPROLIFERATIVE RETINOPATHY WITHOUT MACULAR EDEMA, WITHOUT LONG-TERM CURRENT USE OF INSULIN: ICD-10-CM

## 2020-09-11 DIAGNOSIS — E11.42 TYPE 2 DIABETES MELLITUS WITH DIABETIC POLYNEUROPATHY, WITHOUT LONG-TERM CURRENT USE OF INSULIN: ICD-10-CM

## 2020-09-11 DIAGNOSIS — Z00.00 VISIT FOR WELL MAN HEALTH CHECK: Primary | ICD-10-CM

## 2020-09-11 DIAGNOSIS — E11.29 TYPE 2 DIABETES MELLITUS WITH MICROALBUMINURIA, WITHOUT LONG-TERM CURRENT USE OF INSULIN: ICD-10-CM

## 2020-09-11 DIAGNOSIS — L97.412 DIABETIC ULCER OF RIGHT MIDFOOT ASSOCIATED WITH TYPE 2 DIABETES MELLITUS, WITH FAT LAYER EXPOSED: ICD-10-CM

## 2020-09-11 DIAGNOSIS — R80.9 TYPE 2 DIABETES MELLITUS WITH MICROALBUMINURIA, WITHOUT LONG-TERM CURRENT USE OF INSULIN: ICD-10-CM

## 2020-09-11 DIAGNOSIS — E11.621 DIABETIC ULCER OF RIGHT MIDFOOT ASSOCIATED WITH TYPE 2 DIABETES MELLITUS, WITH FAT LAYER EXPOSED: ICD-10-CM

## 2020-09-11 PROCEDURE — 99999 PR PBB SHADOW E&M-EST. PATIENT-LVL IV: ICD-10-PCS | Mod: PBBFAC,,, | Performed by: FAMILY MEDICINE

## 2020-09-11 PROCEDURE — 99396 PR PREVENTIVE VISIT,EST,40-64: ICD-10-PCS | Mod: S$PBB,,, | Performed by: FAMILY MEDICINE

## 2020-09-11 PROCEDURE — 99999 PR PBB SHADOW E&M-EST. PATIENT-LVL IV: CPT | Mod: PBBFAC,,, | Performed by: FAMILY MEDICINE

## 2020-09-11 PROCEDURE — 99396 PREV VISIT EST AGE 40-64: CPT | Mod: S$PBB,,, | Performed by: FAMILY MEDICINE

## 2020-09-11 PROCEDURE — 99214 OFFICE O/P EST MOD 30 MIN: CPT | Mod: PBBFAC,PN | Performed by: FAMILY MEDICINE

## 2020-09-11 NOTE — PROGRESS NOTES
"Well Man VISIT      CHIEF COMPLAINT  Chief Complaint   Patient presents with    Follow-up       HPI  Fermin Henson is a 60 y.o. male who presents for physical.     Social Factors  Tobacco use: No  Ready to Quit: No  Alcohol: No  Intimate partner violence screening  "Do you feel safe in your current relationship?" single  "Have you ever been in a relationship in which your partner frightened you or hurt you?" no  Living Will/POA: No  Regular Exercise: No    Depression  Over the past two weeks, have you felt down, depressed, or hopeless? No  Over the past two weeks, have you felt little interest or pleasure in doing things? No    Reproductive Health  STD screening in last year: deferred  HIV screening: deferred    Screen for Chronic Disease  CHD Risk Factors: advanced age (older than 55 for men, 65 for women), diabetes mellitus, dyslipidemia and obesity (BMI >= 30 kg/m2)  Estimated body mass index is 31.39 kg/m² as calculated from the following:    Height as of this encounter: 6' (1.829 m).    Weight as of this encounter: 105 kg (231 lb 7.7 oz).  Dyslipidemia screening needed: ordered  T2DM screening needed: ordered  Colonoscopy needed: fitkit ordered  PSA needed: ordered  AAA screening needed:n/a  Screen men 35 years and older, and men 20 to 34 years of age who have cardiovascular risk factors for dyslipidemia  Begin screening colonoscopies at 50 years of age in men of average risk, and continue until 75 years of age; offer fecal occult blood testing every year, flexible sigmoidoscopy every five years combined with fecal occult blood testing every three years, or colonoscopy every 10 years   The American U[employer] Association recommends offering PSA testing and digital rectal examination to well-informed men beginning at 40 years of age and continuing until life expectancy is less than 10 years  Screen once with ultrasonography in men 65 to 75 years of age if they have a family history or have smoked at " [id number] cigarettes in their lifetime  Screen men with a sustained blood pressure greater than 135/80 mm Hg for T2DM      Immunizations  stated as up to date, no records available    ALLERGIES and MEDS were verified.   PMHx, PSHx, FHx, SOCIALHx were updated as pertinent.    REVIEW OF SYSTEMS  Review of Systems   Constitutional: Negative.    HENT: Negative.    Eyes: Negative.    Respiratory: Negative.    Cardiovascular: Negative.    Gastrointestinal: Negative.    Genitourinary: Negative.    Musculoskeletal: Negative.    Skin: Negative.    Neurological: Positive for sensory change.         PHYSICAL EXAM  VITAL SIGNS: /82   Pulse 93   Temp 97.1 °F (36.2 °C)   Resp 18   Ht 6' (1.829 m)   Wt 105 kg (231 lb 7.7 oz)   SpO2 97%   BMI 31.39 kg/m²   GEN: Well developed, Well nourished, No acute distress.  HENT: Normocephalic, Atraumatic, Bilateral external ears normal, Nose normal, Oropharynx moist, No oral exudates.   Eyes: PERRL, EOMI, Conjunctiva normal, No discharge.   Neck: Supple, No tenderness.  Lymphatic: No cervical or supraclavicular lymphadenopathy noted.   Cardiovascular: Normal heart rate, Normal rhythm, No murmurs, No rubs, No gallops.   Thorax & Lungs: Normal breath sounds, No respiratory distress, No wheezing.  Abdomen: Soft, No tenderness, Bowel sounds normal.  Genital: deferred  Skin: Warm, Dry, No erythema, No rash.   Extremities: No edema, No tenderness.       ASSESSMENT/PLAN    Fermin was seen today for follow-up.    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

## 2020-09-15 ENCOUNTER — HOSPITAL ENCOUNTER (OUTPATIENT)
Dept: WOUND CARE | Facility: HOSPITAL | Age: 60
Discharge: HOME | End: 2020-09-15
Attending: FAMILY MEDICINE
Payer: MEDICAID

## 2020-09-15 VITALS — HEART RATE: 79 BPM | TEMPERATURE: 98 F | SYSTOLIC BLOOD PRESSURE: 147 MMHG | DIASTOLIC BLOOD PRESSURE: 70 MMHG

## 2020-09-15 DIAGNOSIS — E11.621 DIABETIC ULCER OF RIGHT MIDFOOT ASSOCIATED WITH TYPE 2 DIABETES MELLITUS, WITH FAT LAYER EXPOSED: Primary | ICD-10-CM

## 2020-09-15 DIAGNOSIS — L97.412 DIABETIC ULCER OF RIGHT MIDFOOT ASSOCIATED WITH TYPE 2 DIABETES MELLITUS, WITH FAT LAYER EXPOSED: Primary | ICD-10-CM

## 2020-09-15 PROCEDURE — 99214 OFFICE O/P EST MOD 30 MIN: CPT | Mod: 25,,, | Performed by: FAMILY MEDICINE

## 2020-09-15 PROCEDURE — 99214 PR OFFICE/OUTPT VISIT, EST, LEVL IV, 30-39 MIN: ICD-10-PCS | Mod: 25,,, | Performed by: FAMILY MEDICINE

## 2020-09-15 PROCEDURE — 11042 DEBRIDEMENT: ICD-10-PCS | Mod: ,,, | Performed by: FAMILY MEDICINE

## 2020-09-15 PROCEDURE — 27201912 HC WOUND CARE DEBRIDEMENT SUPPLIES: Performed by: FAMILY MEDICINE

## 2020-09-15 PROCEDURE — 11042 DBRDMT SUBQ TIS 1ST 20SQCM/<: CPT | Mod: ,,, | Performed by: FAMILY MEDICINE

## 2020-09-15 PROCEDURE — 11042 DBRDMT SUBQ TIS 1ST 20SQCM/<: CPT | Performed by: FAMILY MEDICINE

## 2020-09-15 NOTE — PROGRESS NOTES
[facility]                Progress Note    Subjective:       Patient ID: Fermin Henson is a 60 y.o. male.    Chief Complaint: No chief complaint on file.    Walked to clinic after having bld drawn. No complaints of pain. Wound measures smaller and less callus around plantar area, Wound debrided. Continue football.  There has been no increased drainage.  No fevers, chills, or sweats      Review of Systems   Constitutional: Negative.    HENT: Negative.    Eyes: Negative.    Respiratory: Negative.    Cardiovascular: Negative.    Gastrointestinal: Negative.    Musculoskeletal: Negative.    Neurological: Positive for numbness.         Objective:        Physical Exam  Constitutional:       Appearance: Normal appearance.   HENT:      Head: Normocephalic and atraumatic.      Right Ear: External ear normal.      Left Ear: External ear normal.      Nose: Nose normal.      Mouth/Throat:      Mouth: Mucous membranes are dry.   Eyes:      Extraocular Movements: Extraocular movements intact.      Pupils: Pupils are equal, round, and reactive to light.   Neck:      Musculoskeletal: Normal range of motion and neck supple.   Cardiovascular:      Rate and Rhythm: Normal rate and regular rhythm.   Pulmonary:      Effort: Pulmonary effort is normal. No respiratory distress.      Breath sounds: No wheezing.   Abdominal:      General: There is no distension.      Palpations: Abdomen is soft.      Tenderness: There is no abdominal tenderness.   Musculoskeletal:      Right lower leg: No edema.   Skin:     General: Skin is warm and dry.      Comments: +DFU to right plantar with slough and callous present   Neurological:      General: No focal deficit present.      Mental Status: He is alert and oriented to person, place, and time.         Vitals:    09/15/20 0956   BP: (!) 147/70   Pulse: 79   Temp: 97.7 °F (36.5 °C)     Debridement    Date/Time: 9/15/2020 10:50 AM  Performed by: See RAND  "MD Jerilyn  Authorized by: See Jean-Baptiste MD     Time out: Immediately prior to procedure a "time out" was called to verify the correct patient, procedure, equipment, support staff and site/side marked as required.    Consent Done?:  Yes (Written)  Local anesthesia used?: No      Wound Details:    Location:  Right foot    Location:  Right Plantar    Type of Debridement:  Excisional       Length (cm):  0.5       Area (sq cm):  0.35       Width (cm):  0.7       Percent Debrided (%):  100       Depth (cm):  0.2       Total Area Debrided (sq cm):  0.35    Depth of debridement:  Subcutaneous tissue    Tissue debrided:  Dermis, Epidermis and Subcutaneous    Devitalized tissue debrided:  Biofilm, Callus, Slough and Fibrin    Instruments:  Curette    Bleeding:  Minimal  Hemostasis Achieved: Yes    Method Used:  Pressure  Patient tolerance:  Patient tolerated the procedure well with no immediate complications        Assessment:           ICD-10-CM ICD-9-CM   1. Diabetic ulcer of right midfoot associated with type 2 diabetes mellitus, with fat layer exposed  E11.621 250.80    L97.412 707.14            Wound 08/04/20 1312 Diabetic Ulcer Right posterior Foot (Active)   08/04/20 1312    Pre-existing: No   Primary Wound Type: Diabetic ulc   Side: Right   Orientation: posterior   Location: Foot   Wound Number (optional):    Ankle-Brachial Index:    Pulses:    Removal Indication and Assessment:    Wound Outcome:    (Retired) Wound Type:    (Retired) Wound Length (cm):    (Retired) Wound Width (cm):    (Retired) Depth (cm):    Wound Description (Comments):    Removal Indications:    Wound Image   09/15/20 1000   Wound WDL ex 09/15/20 1000   Dressing Appearance Dry 09/15/20 1000   Drainage Amount Small 09/15/20 1000   Drainage Characteristics/Odor Clear 09/15/20 1000   Appearance Pink 09/15/20 1000   Tissue loss description Partial thickness 09/15/20 1000   Red (%), Wound Tissue Color 100 % 09/15/20 1000   Periwound Area Intact " 09/15/20 1000   Wound Length (cm) 0.5 cm 09/15/20 1000   Wound Width (cm) 0.7 cm 09/15/20 1000   Wound Depth (cm) 0.2 cm 09/15/20 1000   Wound Volume (cm^3) 0.07 cm^3 09/15/20 1000   Wound Surface Area (cm^2) 0.35 cm^2 09/15/20 1000   Care Cleansed with:;Antimicrobial agent;Sterile normal saline 09/15/20 1000   Off Loading Football dressing;Off loading shoe 09/15/20 1000   Dressing Change Due 09/22/20 09/15/20 1000           Plan:                Orders Placed This Encounter   Procedures    Debridement     This order was created via procedure documentation     Standing Status:   Standing     Number of Occurrences:   1    Change dressing     Endoform, Aquacel foam, Gigi foam long and x 3 cast padding/Coban football. Lou        Follow up in about 1 week (around 9/22/2020).     See Jean-Baptiste MD

## 2020-09-22 ENCOUNTER — HOSPITAL ENCOUNTER (OUTPATIENT)
Dept: WOUND CARE | Facility: HOSPITAL | Age: 60
Discharge: HOME | End: 2020-09-22
Attending: FAMILY MEDICINE
Payer: MEDICAID

## 2020-09-22 VITALS
SYSTOLIC BLOOD PRESSURE: 157 MMHG | RESPIRATION RATE: 20 BRPM | TEMPERATURE: 98 F | HEART RATE: 85 BPM | DIASTOLIC BLOOD PRESSURE: 75 MMHG

## 2020-09-22 DIAGNOSIS — E11.621 DIABETIC ULCER OF RIGHT MIDFOOT ASSOCIATED WITH TYPE 2 DIABETES MELLITUS, WITH FAT LAYER EXPOSED: Primary | ICD-10-CM

## 2020-09-22 DIAGNOSIS — L97.412 DIABETIC ULCER OF RIGHT MIDFOOT ASSOCIATED WITH TYPE 2 DIABETES MELLITUS, WITH FAT LAYER EXPOSED: Primary | ICD-10-CM

## 2020-09-22 PROCEDURE — 27201912 HC WOUND CARE DEBRIDEMENT SUPPLIES: Performed by: FAMILY MEDICINE

## 2020-09-22 PROCEDURE — 99214 OFFICE O/P EST MOD 30 MIN: CPT | Mod: ,,, | Performed by: FAMILY MEDICINE

## 2020-09-22 PROCEDURE — 99214 PR OFFICE/OUTPT VISIT, EST, LEVL IV, 30-39 MIN: ICD-10-PCS | Mod: ,,, | Performed by: FAMILY MEDICINE

## 2020-09-22 PROCEDURE — 11042 DBRDMT SUBQ TIS 1ST 20SQCM/<: CPT | Performed by: FAMILY MEDICINE

## 2020-09-22 NOTE — PROGRESS NOTES
[facility]                Progress Note    Subjective:       Patient ID: Fermin Henson is a 60 y.o. male.    Chief Complaint: Wound Check    F/u wound care visit. Patient ambulatory with no c/o pain or discomfort at present. Wound dressing to right foot intact with moderate amount of serosanguineous drainage noted. He denies any new symptoms.  He does not offload as directed.  No fevers, chills, or sweats.  No odor from wound that he has noticed.      Review of Systems   Constitutional: Negative.    HENT: Negative.    Eyes: Negative.    Respiratory: Negative.    Cardiovascular: Negative.    Gastrointestinal: Negative.    Skin: Positive for wound.   Neurological: Positive for numbness.   Psychiatric/Behavioral: Negative.          Objective:        Physical Exam  Vitals signs reviewed.   Constitutional:       Appearance: Normal appearance.   HENT:      Head: Normocephalic and atraumatic.      Right Ear: External ear normal.      Left Ear: External ear normal.      Nose: Nose normal.      Mouth/Throat:      Mouth: Mucous membranes are moist.      Pharynx: Oropharynx is clear.   Eyes:      Extraocular Movements: Extraocular movements intact.      Pupils: Pupils are equal, round, and reactive to light.   Neck:      Musculoskeletal: Normal range of motion and neck supple.   Cardiovascular:      Rate and Rhythm: Normal rate and regular rhythm.   Pulmonary:      Effort: Pulmonary effort is normal. No respiratory distress.      Breath sounds: No wheezing.   Abdominal:      General: There is no distension.      Palpations: Abdomen is soft.      Tenderness: There is no abdominal tenderness.   Skin:     General: Skin is warm and dry.      Comments: +rifht DFU to plantar surface with excess slough and callous   Neurological:      Mental Status: He is alert and oriented to person, place, and time.      Sensory: Sensory deficit present.           Vitals:    09/22/20 0959   BP: (!)  "157/75   Pulse: 85   Resp: 20   Temp: 97.9 °F (36.6 °C)     Debridement    Date/Time: 9/22/2020 9:25 PM  Performed by: See Jean-Baptiste MD  Authorized by: See Jean-Baptiste MD     Time out: Immediately prior to procedure a "time out" was called to verify the correct patient, procedure, equipment, support staff and site/side marked as required.    Consent Done?:  Yes (Written)  Local anesthesia used?: No      Wound Details:    Location:  Right foot    Location:  Right Plantar       Length (cm):  0.3       Area (sq cm):  0.21       Width (cm):  0.7       Percent Debrided (%):  0       Depth (cm):  0.2       Total Area Debrided (sq cm):  0    Depth of debridement:  Subcutaneous tissue    Tissue debrided:  Dermis and Epidermis    Devitalized tissue debrided:  Callus, Fibrin, Slough and Biofilm    Instruments:  Curette    Bleeding:  Minimal  Hemostasis Achieved: Yes    Method Used:  Pressure  Patient tolerance:  Patient tolerated the procedure well with no immediate complications      Assessment:           ICD-10-CM ICD-9-CM   1. Diabetic ulcer of right midfoot associated with type 2 diabetes mellitus, with fat layer exposed  E11.621 250.80    L97.412 707.14            Wound 08/04/20 1312 Diabetic Ulcer Right posterior Foot (Active)   08/04/20 1312    Pre-existing: No   Primary Wound Type: Diabetic ulc   Side: Right   Orientation: posterior   Location: Foot   Wound Number (optional):    Ankle-Brachial Index:    Pulses:    Removal Indication and Assessment:    Wound Outcome:    (Retired) Wound Type:    (Retired) Wound Length (cm):    (Retired) Wound Width (cm):    (Retired) Depth (cm):    Wound Description (Comments):    Removal Indications:    Wound Image   09/22/20 1000   Wound WDL ex 09/22/20 1000   Dressing Appearance Intact;Dried drainage 09/22/20 1000   Drainage Amount Moderate 09/22/20 1000   Drainage Characteristics/Odor Serosanguineous 09/22/20 1000   Appearance Pink;Red 09/22/20 1000   Tissue loss description " Partial thickness 09/22/20 1000   Black (%), Wound Tissue Color 0 % 09/22/20 1000   Red (%), Wound Tissue Color 100 % 09/22/20 1000   Yellow (%), Wound Tissue Color 0 % 09/22/20 1000   Periwound Area Dry 09/22/20 1000   Wound Edges Callused 09/22/20 1000   Wound Length (cm) 0.3 cm 09/22/20 1000   Wound Width (cm) 0.7 cm 09/22/20 1000   Wound Depth (cm) 0.2 cm 09/22/20 1000   Wound Volume (cm^3) 0.04 cm^3 09/22/20 1000   Wound Surface Area (cm^2) 0.21 cm^2 09/22/20 1000   Care Cleansed with:;Soap and water;Sterile normal saline 09/22/20 1000   Dressing Applied;Collagen;Foam 09/22/20 1000   Off Loading Football dressing 09/22/20 1000   Dressing Change Due 09/29/20 09/22/20 1000           Plan:                Orders Placed This Encounter   Procedures    Change dressing     Clean with NS apply Endoform to wound bed, cover with Aquacel foam, Gigi foam long, football dressing (cast padding x3, coban), darco        Follow up in about 1 week (around 9/29/2020) for wound care.       See Jean-Baptiste MD

## 2020-09-29 ENCOUNTER — HOSPITAL ENCOUNTER (OUTPATIENT)
Dept: WOUND CARE | Facility: HOSPITAL | Age: 60
Discharge: HOME | End: 2020-09-29
Attending: FAMILY MEDICINE
Payer: MEDICAID

## 2020-09-29 VITALS
RESPIRATION RATE: 17 BRPM | HEART RATE: 94 BPM | SYSTOLIC BLOOD PRESSURE: 133 MMHG | TEMPERATURE: 97 F | DIASTOLIC BLOOD PRESSURE: 74 MMHG

## 2020-09-29 DIAGNOSIS — L97.412 DIABETIC ULCER OF RIGHT MIDFOOT ASSOCIATED WITH TYPE 2 DIABETES MELLITUS, WITH FAT LAYER EXPOSED: Primary | ICD-10-CM

## 2020-09-29 DIAGNOSIS — E11.621 DIABETIC ULCER OF RIGHT MIDFOOT ASSOCIATED WITH TYPE 2 DIABETES MELLITUS, WITH FAT LAYER EXPOSED: Primary | ICD-10-CM

## 2020-09-29 PROCEDURE — 27201912 HC WOUND CARE DEBRIDEMENT SUPPLIES: Performed by: FAMILY MEDICINE

## 2020-09-29 PROCEDURE — 11042 DBRDMT SUBQ TIS 1ST 20SQCM/<: CPT | Performed by: FAMILY MEDICINE

## 2020-09-29 PROCEDURE — 11042 DBRDMT SUBQ TIS 1ST 20SQCM/<: CPT | Mod: ,,, | Performed by: FAMILY MEDICINE

## 2020-09-29 PROCEDURE — 99214 PR OFFICE/OUTPT VISIT, EST, LEVL IV, 30-39 MIN: ICD-10-PCS | Mod: 25,,, | Performed by: FAMILY MEDICINE

## 2020-09-29 PROCEDURE — 11042 DEBRIDEMENT: ICD-10-PCS | Mod: ,,, | Performed by: FAMILY MEDICINE

## 2020-09-29 PROCEDURE — 99214 OFFICE O/P EST MOD 30 MIN: CPT | Mod: 25,,, | Performed by: FAMILY MEDICINE

## 2020-09-29 NOTE — PROGRESS NOTES
[facility]                Progress Note    Subjective:       Patient ID: Fermin Henson is a 60 y.o. male.    Chief Complaint: Non-healing Wound Follow Up and Diabetic Foot Ulcer    09/29/2020: F/U visit. Wound is stable. Callused periwound as well as macerated under callous.  Slough present requiring debridement Tunnel at 1.8cm located at 9. Debridement performed. Discussed offloading as much as possible.      Review of Systems   Constitutional: Negative.    HENT: Negative.    Eyes: Negative.    Respiratory: Negative.    Cardiovascular: Negative.    Gastrointestinal: Negative.    Musculoskeletal: Negative for myalgias.   Skin: Positive for wound.   Neurological: Positive for numbness.   Psychiatric/Behavioral: Negative.          Objective:        Physical Exam  Constitutional:       Appearance: Normal appearance.   HENT:      Head: Normocephalic and atraumatic.      Right Ear: External ear normal.      Left Ear: External ear normal.      Nose: Nose normal.      Mouth/Throat:      Pharynx: Oropharynx is clear.   Eyes:      Extraocular Movements: Extraocular movements intact.      Pupils: Pupils are equal, round, and reactive to light.   Neck:      Musculoskeletal: Normal range of motion and neck supple.   Cardiovascular:      Rate and Rhythm: Normal rate and regular rhythm.      Pulses: Normal pulses.   Pulmonary:      Effort: Pulmonary effort is normal.      Breath sounds: No wheezing.   Abdominal:      General: There is no distension.      Palpations: Abdomen is soft.   Musculoskeletal: Normal range of motion.      Right lower leg: No edema.      Left lower leg: No edema.   Skin:     General: Skin is warm and dry.      Comments: +DFU plantar surface of right foot with maceration, slough, and callous   Neurological:      Mental Status: He is alert and oriented to person, place, and time.      Sensory: Sensory deficit present.   Psychiatric:         Mood and Affect:  "Mood normal.         Behavior: Behavior normal.           Vitals:    09/29/20 0957   BP: 133/74   Pulse: 94   Resp: 17   Temp: 97.3 °F (36.3 °C)     Debridement    Date/Time: 9/29/2020 10:24 AM  Performed by: See Jean-Baptiste MD  Authorized by: See Jean-Baptiste MD     Time out: Immediately prior to procedure a "time out" was called to verify the correct patient, procedure, equipment, support staff and site/side marked as required.    Consent Done?:  Yes (Written)  Local anesthesia used?: No      Wound Details:    Location:  Right foot    Location:  Right Plantar    Type of Debridement:  Excisional       Length (cm):  0.4       Area (sq cm):  0.24       Width (cm):  0.6       Percent Debrided (%):  100       Depth (cm):  0.4       Total Area Debrided (sq cm):  0.24    Depth of debridement:  Subcutaneous tissue    Tissue debrided:  Dermis, Epidermis and Subcutaneous    Devitalized tissue debrided:  Biofilm, Fibrin, Slough and Callus    Instruments:  Curette    Bleeding:  Minimal  Hemostasis Achieved: Yes    Method Used:  Pressure  Patient tolerance:  Patient tolerated the procedure well with no immediate complications      Assessment:           ICD-10-CM ICD-9-CM   1. Diabetic ulcer of right midfoot associated with type 2 diabetes mellitus, with fat layer exposed  E11.621 250.80    L97.412 707.14            Wound 08/04/20 1312 Diabetic Ulcer Right posterior Foot (Active)   08/04/20 1312    Pre-existing: No   Primary Wound Type: Diabetic ulc   Side: Right   Orientation: posterior   Location: Foot   Wound Number (optional):    Ankle-Brachial Index:    Pulses:    Removal Indication and Assessment:    Wound Outcome:    (Retired) Wound Type:    (Retired) Wound Length (cm):    (Retired) Wound Width (cm):    (Retired) Depth (cm):    Wound Description (Comments):    Removal Indications:    Wound Image   09/29/20 1000   Wound WDL ex 09/29/20 1000   Dressing Appearance Intact;Moist drainage 09/29/20 1000   Drainage Amount " Moderate 09/29/20 1000   Drainage Characteristics/Odor Serosanguineous 09/29/20 1000   Appearance Red;Pink;Dry 09/29/20 1000   Tissue loss description Partial thickness 09/29/20 1000   Black (%), Wound Tissue Color 0 % 09/29/20 1000   Red (%), Wound Tissue Color 100 % 09/29/20 1000   Yellow (%), Wound Tissue Color 0 % 09/29/20 1000   Periwound Area Dry 09/29/20 1000   Wound Edges Callused 09/29/20 1000   Wound Length (cm) 0.4 cm 09/29/20 1000   Wound Width (cm) 0.6 cm 09/29/20 1000   Wound Depth (cm) 0.4 cm 09/29/20 1000   Wound Volume (cm^3) 0.1 cm^3 09/29/20 1000   Wound Surface Area (cm^2) 0.24 cm^2 09/29/20 1000   Tunneling (depth (cm)/location) 1.8cm depth located at 9 09/29/20 1000   Undermining (depth (cm)/location) 0 09/29/20 1000   Care Soap and water;Sterile normal saline;Wound cleanser 09/29/20 1000   Dressing Applied;Collagen;Foam 09/29/20 1000   Periwound Care Other (see comments) 09/29/20 1000   Off Loading Football dressing 09/29/20 1000   Dressing Change Due 10/06/20 09/29/20 1000           Plan:                Orders Placed This Encounter   Procedures    Debridement     This order was created via procedure documentation     Standing Status:   Standing     Number of Occurrences:   1    Change dressing     Gentian violet periwound, endoform to wound bed, aquacel foam X1, celeste foam long X1, football dressing (cast padding X3, coban)        Follow up in about 1 week (around 10/6/2020) for wound care.     See Jean-Baptiste MD

## 2020-10-06 ENCOUNTER — HOSPITAL ENCOUNTER (OUTPATIENT)
Dept: WOUND CARE | Facility: HOSPITAL | Age: 60
Discharge: HOME | End: 2020-10-06
Attending: FAMILY MEDICINE
Payer: MEDICAID

## 2020-10-06 VITALS
TEMPERATURE: 98 F | DIASTOLIC BLOOD PRESSURE: 73 MMHG | SYSTOLIC BLOOD PRESSURE: 144 MMHG | HEART RATE: 105 BPM | RESPIRATION RATE: 17 BRPM

## 2020-10-06 DIAGNOSIS — L97.412 DIABETIC ULCER OF RIGHT MIDFOOT ASSOCIATED WITH TYPE 2 DIABETES MELLITUS, WITH FAT LAYER EXPOSED: Primary | ICD-10-CM

## 2020-10-06 DIAGNOSIS — E11.621 DIABETIC ULCER OF RIGHT MIDFOOT ASSOCIATED WITH TYPE 2 DIABETES MELLITUS, WITH FAT LAYER EXPOSED: Primary | ICD-10-CM

## 2020-10-06 PROCEDURE — 27201912 HC WOUND CARE DEBRIDEMENT SUPPLIES: Performed by: FAMILY MEDICINE

## 2020-10-06 PROCEDURE — 99214 PR OFFICE/OUTPT VISIT, EST, LEVL IV, 30-39 MIN: ICD-10-PCS | Mod: 25,,, | Performed by: FAMILY MEDICINE

## 2020-10-06 PROCEDURE — 11042 DBRDMT SUBQ TIS 1ST 20SQCM/<: CPT | Mod: ,,, | Performed by: FAMILY MEDICINE

## 2020-10-06 PROCEDURE — 11042 DBRDMT SUBQ TIS 1ST 20SQCM/<: CPT | Performed by: FAMILY MEDICINE

## 2020-10-06 PROCEDURE — 11042 DEBRIDEMENT: ICD-10-PCS | Mod: ,,, | Performed by: FAMILY MEDICINE

## 2020-10-06 PROCEDURE — 99214 OFFICE O/P EST MOD 30 MIN: CPT | Mod: 25,,, | Performed by: FAMILY MEDICINE

## 2020-10-06 NOTE — PROGRESS NOTES
[facility]                Progress Note    Subjective:       Patient ID: Fermin Henson is a 60 y.o. male.    Chief Complaint: Non-healing Wound Follow Up and Diabetic Foot Ulcer    10/6/2020: F/U visit. Wound is improving and measuring smaller. Callused periwound. Discussed offloading as much as possible.  No pain as he is insensate.  No fevers, chills, or sweats.  He has kept dressings in place, clean, and dry.  Most recent A1c shows him to be well controlled at 6.6.        Review of Systems   Constitutional: Negative.    HENT: Negative.    Eyes: Negative.    Respiratory: Negative.    Cardiovascular: Negative.    Gastrointestinal: Negative.    Musculoskeletal: Negative.    Skin: Positive for wound.   Neurological: Positive for numbness.   Psychiatric/Behavioral: Negative.          Objective:        Physical Exam  Constitutional:       Appearance: Normal appearance.   HENT:      Head: Normocephalic and atraumatic.      Right Ear: External ear normal.      Left Ear: External ear normal.      Nose: Nose normal.      Mouth/Throat:      Mouth: Mucous membranes are moist.      Pharynx: Oropharynx is clear.   Eyes:      Extraocular Movements: Extraocular movements intact.      Pupils: Pupils are equal, round, and reactive to light.   Neck:      Musculoskeletal: Normal range of motion and neck supple.   Cardiovascular:      Rate and Rhythm: Normal rate and regular rhythm.   Pulmonary:      Effort: Pulmonary effort is normal. No respiratory distress.      Breath sounds: No wheezing.   Abdominal:      General: There is no distension.      Palpations: Abdomen is soft.      Tenderness: There is no abdominal tenderness.   Musculoskeletal: Normal range of motion.      Right lower leg: Edema present.      Left lower leg: Edema present.   Skin:     General: Skin is warm and dry.      Comments: +right plantar DFU with callous formation and slough; no pus present.  No odor  "  Neurological:      Mental Status: He is alert and oriented to person, place, and time. Mental status is at baseline.      Sensory: Sensory deficit present.           Vitals:    10/06/20 0956   BP: (!) 144/73   Pulse: 105   Resp: 17   Temp: 97.7 °F (36.5 °C)     Debridement    Date/Time: 10/6/2020 12:50 PM  Performed by: See Jean-Baptiste MD  Authorized by: See Jean-Baptiste MD     Time out: Immediately prior to procedure a "time out" was called to verify the correct patient, procedure, equipment, support staff and site/side marked as required.    Consent Done?:  Yes (Written)  Local anesthesia used?: Yes    Local anesthetic:  Topical anesthetic  Anesthetic total (ml):  5    Wound Details:    Location:  Right foot    Location:  Right Plantar    Type of Debridement:  Excisional       Length (cm):  0.4       Area (sq cm):  0.24       Width (cm):  0.6       Percent Debrided (%):  100       Depth (cm):  0.2       Total Area Debrided (sq cm):  0.24    Depth of debridement:  Subcutaneous tissue    Tissue debrided:  Dermis, Epidermis and Subcutaneous    Devitalized tissue debrided:  Biofilm, Fibrin and Slough    Instruments:  Curette    Bleeding:  Minimal  Hemostasis Achieved: Yes    Method Used:  Pressure  Patient tolerance:  Patient tolerated the procedure well with no immediate complications      Assessment:           ICD-10-CM ICD-9-CM   1. Diabetic ulcer of right midfoot associated with type 2 diabetes mellitus, with fat layer exposed  E11.621 250.80    L97.412 707.14            Wound 08/04/20 1312 Diabetic Ulcer Right plantar Foot (Active)   08/04/20 1312    Pre-existing: No   Primary Wound Type: Diabetic ulc   Side: Right   Orientation: plantar   Location: Foot   Wound Number (optional):    Ankle-Brachial Index:    Pulses:    Removal Indication and Assessment:    Wound Outcome:    (Retired) Wound Type:    (Retired) Wound Length (cm):    (Retired) Wound Width (cm):    (Retired) Depth (cm):    Wound Description " (Comments):    Removal Indications:    Wound Image   10/06/20 0900   Wound WDL ex 10/06/20 0900   Dressing Appearance Intact;Moist drainage 10/06/20 0900   Drainage Amount Moderate 10/06/20 0900   Drainage Characteristics/Odor Serosanguineous 10/06/20 0900   Appearance Red;Pink;White 10/06/20 0900   Tissue loss description Full thickness 10/06/20 0900   Black (%), Wound Tissue Color 0 % 10/06/20 0900   Red (%), Wound Tissue Color 100 % 10/06/20 0900   Yellow (%), Wound Tissue Color 0 % 10/06/20 0900   Periwound Area Macerated 10/06/20 0900   Wound Edges Callused 10/06/20 0900   Wound Length (cm) 0.4 cm 10/06/20 0900   Wound Width (cm) 0.6 cm 10/06/20 0900   Wound Depth (cm) 0.15 cm 10/06/20 0900   Wound Volume (cm^3) 0.04 cm^3 10/06/20 0900   Wound Surface Area (cm^2) 0.24 cm^2 10/06/20 0900   Tunneling (depth (cm)/location) 0 10/06/20 0900   Undermining (depth (cm)/location) 0 10/06/20 0900   Care Soap and water;Sterile normal saline;Wound cleanser 10/06/20 0900   Dressing Applied;Collagen;Foam 10/06/20 0900   Off Loading Football dressing 10/06/20 0900   Dressing Change Due 10/13/20 10/06/20 0900           Plan:                Orders Placed This Encounter   Procedures    Debridement     This order was created via procedure documentation     Standing Status:   Standing     Number of Occurrences:   1    Change dressing     endoform to wound bed, aquacel foam X1, celeste foam long perpendicular X2, football dressing (cast padding X3, coban)        Follow up in about 1 week (around 10/13/2020) for wound care.     See Jean-Baptiste MD

## 2020-10-13 ENCOUNTER — HOSPITAL ENCOUNTER (OUTPATIENT)
Dept: WOUND CARE | Facility: HOSPITAL | Age: 60
Discharge: HOME | End: 2020-10-13
Attending: FAMILY MEDICINE
Payer: MEDICAID

## 2020-10-13 VITALS — DIASTOLIC BLOOD PRESSURE: 76 MMHG | HEART RATE: 86 BPM | TEMPERATURE: 97 F | SYSTOLIC BLOOD PRESSURE: 148 MMHG

## 2020-10-13 DIAGNOSIS — L97.412 DIABETIC ULCER OF RIGHT MIDFOOT ASSOCIATED WITH TYPE 2 DIABETES MELLITUS, WITH FAT LAYER EXPOSED: Primary | ICD-10-CM

## 2020-10-13 DIAGNOSIS — E11.621 DIABETIC ULCER OF RIGHT MIDFOOT ASSOCIATED WITH TYPE 2 DIABETES MELLITUS, WITH FAT LAYER EXPOSED: Primary | ICD-10-CM

## 2020-10-13 PROCEDURE — 11042 DEBRIDEMENT: ICD-10-PCS | Mod: ,,, | Performed by: FAMILY MEDICINE

## 2020-10-13 PROCEDURE — 99214 PR OFFICE/OUTPT VISIT, EST, LEVL IV, 30-39 MIN: ICD-10-PCS | Mod: 25,,, | Performed by: FAMILY MEDICINE

## 2020-10-13 PROCEDURE — 11042 DBRDMT SUBQ TIS 1ST 20SQCM/<: CPT | Mod: ,,, | Performed by: FAMILY MEDICINE

## 2020-10-13 PROCEDURE — 11042 DBRDMT SUBQ TIS 1ST 20SQCM/<: CPT | Performed by: FAMILY MEDICINE

## 2020-10-13 PROCEDURE — 99214 OFFICE O/P EST MOD 30 MIN: CPT | Mod: 25,,, | Performed by: FAMILY MEDICINE

## 2020-10-13 PROCEDURE — 27201912 HC WOUND CARE DEBRIDEMENT SUPPLIES: Performed by: FAMILY MEDICINE

## 2020-10-13 NOTE — PROGRESS NOTES
[facility]                Progress Note    Subjective:       Patient ID: Fermin Henson is a 60 y.o. male.    Chief Complaint: No chief complaint on file.    Walked to clinic unaided. Dressing intact no drainage through cast padding. No feeling in foot.  He denies any fevers, chills or sweats.  He has not gotten dressings wet.  He has been trying to do better at offloading      Review of Systems   Constitutional: Negative.    HENT: Negative.    Eyes: Negative.    Respiratory: Negative.    Cardiovascular: Negative.    Gastrointestinal: Negative.    Musculoskeletal: Negative.    Skin: Positive for wound.   Neurological: Positive for numbness.         Objective:        Physical Exam  Vitals signs reviewed.   Constitutional:       Appearance: Normal appearance.   HENT:      Head: Normocephalic and atraumatic.      Right Ear: External ear normal.      Left Ear: External ear normal.      Nose: Nose normal.      Mouth/Throat:      Mouth: Mucous membranes are moist.      Pharynx: Oropharynx is clear.   Eyes:      Extraocular Movements: Extraocular movements intact.      Pupils: Pupils are equal, round, and reactive to light.   Neck:      Musculoskeletal: Normal range of motion and neck supple.   Cardiovascular:      Rate and Rhythm: Normal rate and regular rhythm.   Pulmonary:      Effort: Pulmonary effort is normal. No respiratory distress.      Breath sounds: No wheezing.   Abdominal:      General: There is no distension.      Palpations: Abdomen is soft.      Tenderness: There is no abdominal tenderness.   Musculoskeletal:         General: No tenderness or deformity.   Skin:     General: Skin is warm and dry.      Comments: +DFU to plantar surface of right foot with slough, but no maceration   Neurological:      Mental Status: He is alert and oriented to person, place, and time.      Sensory: Sensory deficit present.   Psychiatric:         Mood and Affect: Mood normal.     "     Behavior: Behavior normal.           Vitals:    10/13/20 0950   BP: (!) 148/76   Pulse: 86   Temp: 97.2 °F (36.2 °C)     Debridement    Date/Time: 10/13/2020 1:55 PM  Performed by: See Jean-Baptiste MD  Authorized by: See Jean-Baptiste MD     Time out: Immediately prior to procedure a "time out" was called to verify the correct patient, procedure, equipment, support staff and site/side marked as required.    Consent Done?:  Yes (Written)  Local anesthesia used?: No      Wound Details:    Location:  Right foot    Location:  Right Plantar    Type of Debridement:  Excisional       Length (cm):  0.3       Area (sq cm):  0.15       Width (cm):  0.5       Percent Debrided (%):  100       Depth (cm):  0.2       Total Area Debrided (sq cm):  0.15    Depth of debridement:  Subcutaneous tissue    Tissue debrided:  Dermis, Epidermis and Subcutaneous    Devitalized tissue debrided:  Biofilm, Fibrin and Slough    Instruments:  Curette    Bleeding:  Minimal  Hemostasis Achieved: Yes    Method Used:  Pressure  Patient tolerance:  Patient tolerated the procedure well with no immediate complications      Assessment:           ICD-10-CM ICD-9-CM   1. Diabetic ulcer of right midfoot associated with type 2 diabetes mellitus, with fat layer exposed  E11.621 250.80    L97.412 707.14            Wound 08/04/20 1312 Diabetic Ulcer Right plantar Foot (Active)   08/04/20 1312    Pre-existing: No   Primary Wound Type: Diabetic ulc   Side: Right   Orientation: plantar   Location: Foot   Wound Number (optional):    Ankle-Brachial Index:    Pulses:    Removal Indication and Assessment:    Wound Outcome:    (Retired) Wound Type:    (Retired) Wound Length (cm):    (Retired) Wound Width (cm):    (Retired) Depth (cm):    Wound Description (Comments):    Removal Indications:    Wound Image   10/13/20 0900   Wound WDL ex 10/13/20 0900   Dressing Appearance Dry 10/13/20 0900   Drainage Amount Small 10/13/20 0900   Drainage Characteristics/Odor Clear " 10/13/20 0900   Appearance Pink 10/13/20 0900   Tissue loss description Partial thickness 10/13/20 0900   Red (%), Wound Tissue Color 100 % 10/13/20 0900   Periwound Area Intact 10/13/20 0900   Wound Edges Defined 10/13/20 0900   Wound Length (cm) 0.3 cm 10/13/20 0900   Wound Width (cm) 0.5 cm 10/13/20 0900   Wound Depth (cm) 0.2 cm 10/13/20 0900   Wound Volume (cm^3) 0.03 cm^3 10/13/20 0900   Wound Surface Area (cm^2) 0.15 cm^2 10/13/20 0900   Care Cleansed with:;Antimicrobial agent;Sterile normal saline 10/13/20 0900   Dressing Changed 10/13/20 0900   Off Loading Off loading shoe 10/13/20 0900   Dressing Change Due 10/20/20 10/13/20 0900           Plan:                Orders Placed This Encounter   Procedures    Debridement     This order was created via procedure documentation     Standing Status:   Standing     Number of Occurrences:   1    Change dressing     Liz, Aquacel foam, two long foam adarsh perpindicular in fashion with three cast padding and Coban, Maryann        Follow up in about 1 week (around 10/20/2020).     See Jean-Baptiste MD

## 2020-10-15 ENCOUNTER — TELEPHONE (OUTPATIENT)
Dept: FAMILY MEDICINE | Facility: CLINIC | Age: 60
End: 2020-10-15

## 2020-10-15 DIAGNOSIS — Z20.822 CLOSE EXPOSURE TO COVID-19 VIRUS: Primary | ICD-10-CM

## 2020-10-15 NOTE — TELEPHONE ENCOUNTER
----- Message from Sarah Beth Hutton sent at 10/15/2020  2:57 PM CDT -----  Regarding: Patient Call Back  Contact: Patient  Type: Patient Call Back    Who called: Patient     What is the request in detail: Pt is requesting a covid test. 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     Can the clinic reply by [facility]?    Would the patient rather a call back or a response via [facility]? Call back     Best call back number: [phone]

## 2020-10-16 ENCOUNTER — LAB VISIT (OUTPATIENT)
Dept: FAMILY MEDICINE | Facility: CLINIC | Age: 60
End: 2020-10-16
Payer: MEDICAID

## 2020-10-16 DIAGNOSIS — Z20.822 CLOSE EXPOSURE TO COVID-19 VIRUS: ICD-10-CM

## 2020-10-16 PROCEDURE — U0003 INFECTIOUS AGENT DETECTION BY NUCLEIC ACID (DNA OR RNA); SEVERE ACUTE RESPIRATORY SYNDROME CORONAVIRUS 2 (SARS-COV-2) (CORONAVIRUS DISEASE [COVID-19]), AMPLIFIED PROBE TECHNIQUE, MAKING USE OF HIGH THROUGHPUT TECHNOLOGIES AS DESCRIBED BY CMS-2020-01-R: HCPCS

## 2020-10-17 DIAGNOSIS — U07.1 COVID-19 VIRUS DETECTED: ICD-10-CM

## 2020-10-17 LAB — SARS-COV-2 RNA RESP QL NAA+PROBE: DETECTED

## 2020-10-18 NOTE — PROGRESS NOTES
Good morning,  Please let patient know that his covid test was positive.    Thanks  Dr. Jean-Baptiste

## 2020-10-19 ENCOUNTER — TELEPHONE (OUTPATIENT)
Dept: FAMILY MEDICINE | Facility: CLINIC | Age: 60
End: 2020-10-19

## 2020-10-19 NOTE — TELEPHONE ENCOUNTER
----- Message from See Jean-Baptiste MD sent at 10/18/2020  3:17 PM CDT -----  Good morning,  Please let patient know that his covid test was positive.    Thanks  Dr. Jean-Baptiste

## 2020-10-19 NOTE — TELEPHONE ENCOUNTER
Pt voice understanding. Pt aware to drink a lot of fluids and contact office if symptoms get worse  AND  go to the ER if he have serve SOB

## 2020-11-03 ENCOUNTER — HOSPITAL ENCOUNTER (OUTPATIENT)
Dept: WOUND CARE | Facility: HOSPITAL | Age: 60
Discharge: HOME | End: 2020-11-03
Attending: FAMILY MEDICINE
Payer: MEDICAID

## 2020-11-03 VITALS
TEMPERATURE: 98 F | RESPIRATION RATE: 20 BRPM | HEART RATE: 75 BPM | DIASTOLIC BLOOD PRESSURE: 79 MMHG | SYSTOLIC BLOOD PRESSURE: 159 MMHG

## 2020-11-03 DIAGNOSIS — L97.412 DIABETIC ULCER OF RIGHT MIDFOOT ASSOCIATED WITH TYPE 2 DIABETES MELLITUS, WITH FAT LAYER EXPOSED: Primary | ICD-10-CM

## 2020-11-03 DIAGNOSIS — E11.621 DIABETIC ULCER OF RIGHT MIDFOOT ASSOCIATED WITH TYPE 2 DIABETES MELLITUS, WITH FAT LAYER EXPOSED: Primary | ICD-10-CM

## 2020-11-03 PROCEDURE — 27201912 HC WOUND CARE DEBRIDEMENT SUPPLIES: Performed by: FAMILY MEDICINE

## 2020-11-03 PROCEDURE — 11042 DBRDMT SUBQ TIS 1ST 20SQCM/<: CPT | Performed by: FAMILY MEDICINE

## 2020-11-03 PROCEDURE — 99214 PR OFFICE/OUTPT VISIT, EST, LEVL IV, 30-39 MIN: ICD-10-PCS | Mod: ,,, | Performed by: FAMILY MEDICINE

## 2020-11-03 PROCEDURE — 11042 DEBRIDEMENT: ICD-10-PCS | Mod: ,,, | Performed by: FAMILY MEDICINE

## 2020-11-03 PROCEDURE — 11042 DBRDMT SUBQ TIS 1ST 20SQCM/<: CPT | Mod: ,,, | Performed by: FAMILY MEDICINE

## 2020-11-03 PROCEDURE — 99214 OFFICE O/P EST MOD 30 MIN: CPT | Mod: ,,, | Performed by: FAMILY MEDICINE
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
## 2020-11-10 ENCOUNTER — CLINICAL SUPPORT (OUTPATIENT)
Dept: FAMILY MEDICINE | Facility: CLINIC | Age: 60
End: 2020-11-10
Payer: MEDICAID

## 2020-11-10 ENCOUNTER — HOSPITAL ENCOUNTER (OUTPATIENT)
Dept: WOUND CARE | Facility: HOSPITAL | Age: 60
Discharge: HOME | End: 2020-11-10
Attending: FAMILY MEDICINE
Payer: MEDICAID

## 2020-11-10 VITALS — HEART RATE: 95 BPM | TEMPERATURE: 97 F | DIASTOLIC BLOOD PRESSURE: 78 MMHG | SYSTOLIC BLOOD PRESSURE: 167 MMHG

## 2020-11-10 DIAGNOSIS — E11.621 DIABETIC ULCER OF RIGHT MIDFOOT ASSOCIATED WITH TYPE 2 DIABETES MELLITUS, WITH FAT LAYER EXPOSED: Primary | ICD-10-CM

## 2020-11-10 DIAGNOSIS — Z23 NEED FOR INFLUENZA VACCINATION: Primary | ICD-10-CM

## 2020-11-10 DIAGNOSIS — L97.412 DIABETIC ULCER OF RIGHT MIDFOOT ASSOCIATED WITH TYPE 2 DIABETES MELLITUS, WITH FAT LAYER EXPOSED: Primary | ICD-10-CM

## 2020-11-10 PROCEDURE — 99499 NO LOS: ICD-10-PCS | Mod: S$PBB,,, | Performed by: FAMILY MEDICINE

## 2020-11-10 PROCEDURE — 99211 OFF/OP EST MAY X REQ PHY/QHP: CPT | Mod: PBBFAC,25,PN

## 2020-11-10 PROCEDURE — 99214 OFFICE O/P EST MOD 30 MIN: CPT | Mod: 25,,, | Performed by: FAMILY MEDICINE

## 2020-11-10 PROCEDURE — 11042 DBRDMT SUBQ TIS 1ST 20SQCM/<: CPT | Performed by: FAMILY MEDICINE

## 2020-11-10 PROCEDURE — 90686 IIV4 VACC NO PRSV 0.5 ML IM: CPT | Mod: PBBFAC,PN

## 2020-11-10 PROCEDURE — 11042 DBRDMT SUBQ TIS 1ST 20SQCM/<: CPT | Mod: ,,, | Performed by: FAMILY MEDICINE

## 2020-11-10 PROCEDURE — 99999 PR PBB SHADOW E&M-EST. PATIENT-LVL I: CPT | Mod: PBBFAC,,,

## 2020-11-10 PROCEDURE — 99214 PR OFFICE/OUTPT VISIT, EST, LEVL IV, 30-39 MIN: ICD-10-PCS | Mod: 25,,, | Performed by: FAMILY MEDICINE

## 2020-11-10 PROCEDURE — 99499 UNLISTED E&M SERVICE: CPT | Mod: S$PBB,,, | Performed by: FAMILY MEDICINE

## 2020-11-10 PROCEDURE — 99999 PR PBB SHADOW E&M-EST. PATIENT-LVL I: ICD-10-PCS | Mod: PBBFAC,,,

## 2020-11-10 PROCEDURE — 27201912 HC WOUND CARE DEBRIDEMENT SUPPLIES: Performed by: FAMILY MEDICINE

## 2020-11-10 PROCEDURE — 11042 DEBRIDEMENT: ICD-10-PCS | Mod: ,,, | Performed by: FAMILY MEDICINE

## 2020-11-10 NOTE — PROGRESS NOTES
[facility]                Progress Note    Subjective:       Patient ID: Fermin Henson is a 60 y.o. male.    Chief Complaint: No chief complaint on file.    Walked to unit unaided. Drainage brown and minimum. Epithelial tissue migrating in on bottom of wound Base of wound pink. Two wounds measured as clustered as close without any undermining or tunneling.  No pain in wounds as he is insensate.  No fevers, chills, or sweats.  He has kept dressings clean, dry, and in place      Review of Systems   Constitutional: Negative.    HENT: Negative.    Eyes: Negative.    Respiratory: Negative.    Cardiovascular: Negative.    Gastrointestinal: Negative.    Genitourinary: Negative.    Skin: Positive for wound.   Neurological: Positive for numbness.   Psychiatric/Behavioral: Negative.          Objective:        Physical Exam  Constitutional:       Appearance: Normal appearance.   HENT:      Head: Normocephalic and atraumatic.      Right Ear: External ear normal.      Left Ear: External ear normal.      Mouth/Throat:      Mouth: Mucous membranes are moist.      Pharynx: Oropharynx is clear.   Eyes:      Extraocular Movements: Extraocular movements intact.      Pupils: Pupils are equal, round, and reactive to light.   Neck:      Musculoskeletal: Normal range of motion and neck supple.   Cardiovascular:      Rate and Rhythm: Normal rate and regular rhythm.   Pulmonary:      Effort: Pulmonary effort is normal. No respiratory distress.      Breath sounds: No wheezing.   Abdominal:      General: There is no distension.      Palpations: Abdomen is soft.      Tenderness: There is no abdominal tenderness.   Musculoskeletal:      Right lower leg: No edema.      Left lower leg: No edema.   Skin:     General: Skin is warm and dry.      Comments: +DFU to right foot with excess slough and minimal maceration   Neurological:      Mental Status: He is alert and oriented to person, place, and  "time.      Sensory: Sensory deficit present.           Vitals:    11/10/20 0952   BP: (!) 167/78   Pulse: 95   Temp: 97.4 °F (36.3 °C)     Debridement    Date/Time: 11/10/2020 1:34 PM  Performed by: See Jean-Baptiste MD  Authorized by: See Jean-Baptiste MD     Time out: Immediately prior to procedure a "time out" was called to verify the correct patient, procedure, equipment, support staff and site/side marked as required.    Consent Done?:  Yes (Written)  Local anesthesia used?: No      Wound Details:    Location:  Right foot    Location:  Right 1st Metatarsal Head    Type of Debridement:  Excisional       Length (cm):  3       Area (sq cm):  3       Width (cm):  1       Percent Debrided (%):  100       Depth (cm):  0.3       Total Area Debrided (sq cm):  3    Depth of debridement:  Subcutaneous tissue    Tissue debrided:  Dermis, Epidermis and Subcutaneous    Devitalized tissue debrided:  Biofilm, Fibrin and Slough    Instruments:  Curette    Bleeding:  Minimal  Hemostasis Achieved: Yes    Method Used:  Pressure  Patient tolerance:  Patient tolerated the procedure well with no immediate complications      Assessment:           ICD-10-CM ICD-9-CM   1. Diabetic ulcer of right midfoot associated with type 2 diabetes mellitus, with fat layer exposed  E11.621 250.80    L97.412 707.14            Wound 08/04/20 1312 Diabetic Ulcer Right plantar Foot (Active)   08/04/20 1312    Pre-existing: No   Primary Wound Type: Diabetic ulc   Side: Right   Orientation: plantar   Location: Foot   Wound Number (optional):    Ankle-Brachial Index:    Pulses:    Removal Indication and Assessment:    Wound Outcome:    (Retired) Wound Type:    (Retired) Wound Length (cm):    (Retired) Wound Width (cm):    (Retired) Depth (cm):    Wound Description (Comments):    Removal Indications:    Wound Image   11/10/20 1000   Wound WDL ex 11/10/20 1000   Dressing Appearance Dry 11/10/20 1000   Drainage Amount Small 11/10/20 1000   Drainage " Characteristics/Odor Brown 11/10/20 1000   Appearance Pink 11/10/20 1000   Tissue loss description Partial thickness 11/10/20 1000   Red (%), Wound Tissue Color 100 % 11/10/20 1000   Periwound Area Dry 11/10/20 1000   Wound Length (cm) 3 cm 11/10/20 1000   Wound Width (cm) 1 cm 11/10/20 1000   Wound Depth (cm) 0.3 cm 11/10/20 1000   Wound Volume (cm^3) 0.9 cm^3 11/10/20 1000   Wound Surface Area (cm^2) 3 cm^2 11/10/20 1000   Care Cleansed with:;Antimicrobial agent;Sterile normal saline 11/10/20 1000   Dressing Changed 11/10/20 1000   Off Loading Football dressing;Off loading shoe 11/10/20 1000   Dressing Change Due 11/17/20 11/10/20 1000           Plan:                Orders Placed This Encounter   Procedures    Change dressing     Clean with NS, gentian violet to white area, Liz to wound bed, mextra, dl foam short x2, football dressing (cast padding x3, coban), darco        Follow up in about 1 week (around 11/17/2020).       See Jean-Baptiste MD

## 2020-11-10 NOTE — PROGRESS NOTES
Pt in clinic for flu vaccine. Name and  verified. Allergies reviewed. Administered flu vaccine to pt in right deltoid. Pt tolerated well.

## 2020-11-17 ENCOUNTER — HOSPITAL ENCOUNTER (OUTPATIENT)
Dept: WOUND CARE | Facility: HOSPITAL | Age: 60
Discharge: HOME | End: 2020-11-17
Attending: FAMILY MEDICINE
Payer: MEDICAID

## 2020-11-17 VITALS — HEART RATE: 99 BPM | TEMPERATURE: 97 F | SYSTOLIC BLOOD PRESSURE: 139 MMHG | DIASTOLIC BLOOD PRESSURE: 83 MMHG

## 2020-11-17 DIAGNOSIS — E11.621 DIABETIC ULCER OF RIGHT MIDFOOT ASSOCIATED WITH TYPE 2 DIABETES MELLITUS, WITH FAT LAYER EXPOSED: Primary | ICD-10-CM

## 2020-11-17 DIAGNOSIS — L97.412 DIABETIC ULCER OF RIGHT MIDFOOT ASSOCIATED WITH TYPE 2 DIABETES MELLITUS, WITH FAT LAYER EXPOSED: Primary | ICD-10-CM

## 2020-11-17 PROCEDURE — 11042 DEBRIDEMENT: ICD-10-PCS | Mod: ,,, | Performed by: FAMILY MEDICINE

## 2020-11-17 PROCEDURE — 99214 OFFICE O/P EST MOD 30 MIN: CPT | Mod: ,,, | Performed by: FAMILY MEDICINE

## 2020-11-17 PROCEDURE — 11042 DBRDMT SUBQ TIS 1ST 20SQCM/<: CPT | Mod: ,,, | Performed by: FAMILY MEDICINE

## 2020-11-17 PROCEDURE — 99214 PR OFFICE/OUTPT VISIT, EST, LEVL IV, 30-39 MIN: ICD-10-PCS | Mod: ,,, | Performed by: FAMILY MEDICINE

## 2020-11-17 NOTE — PROGRESS NOTES
[facility]                Progress Note    Subjective:       Patient ID: Fermin Henson is a 60 y.o. male.    Chief Complaint: No chief complaint on file.    Walked to clinic unaided. Football intact no excessive drainage. Heavy callus around wound with small crack throughout. Slight odor from wound with no redness.  No pain as he is insensate.  He has been on his feet due to taking care of his mom, but states he is offloading when possible.        Review of Systems   Constitutional: Negative.    HENT: Negative.    Eyes: Negative.    Respiratory: Negative.    Cardiovascular: Negative.    Gastrointestinal: Negative.    Endocrine: Negative.    Musculoskeletal: Negative.    Skin: Positive for wound.   Neurological: Positive for numbness.   Psychiatric/Behavioral: Negative.          Objective:        Physical Exam  Vitals signs reviewed.   Constitutional:       Appearance: Normal appearance.   HENT:      Head: Normocephalic and atraumatic.      Right Ear: External ear normal.      Left Ear: External ear normal.      Mouth/Throat:      Mouth: Mucous membranes are moist.      Pharynx: Oropharynx is clear.   Eyes:      Extraocular Movements: Extraocular movements intact.      Conjunctiva/sclera: Conjunctivae normal.      Pupils: Pupils are equal, round, and reactive to light.   Neck:      Musculoskeletal: Normal range of motion and neck supple.   Cardiovascular:      Rate and Rhythm: Normal rate and regular rhythm.   Pulmonary:      Effort: Pulmonary effort is normal. No respiratory distress.      Breath sounds: No wheezing.   Abdominal:      General: There is no distension.      Palpations: Abdomen is soft.      Tenderness: There is no abdominal tenderness.   Skin:     General: Skin is warm and dry.      Comments: +right plantar DFU with excess callous and slough   Neurological:      Mental Status: He is alert and oriented to person, place, and time.      Sensory: Sensory  "deficit present.           Vitals:    11/17/20 1006   BP: 139/83   Pulse: 99   Temp: 97.1 °F (36.2 °C)     Debridement    Date/Time: 11/17/2020 2:08 PM  Performed by: See Jean-Baptiste MD  Authorized by: See Jean-Baptiste MD     Time out: Immediately prior to procedure a "time out" was called to verify the correct patient, procedure, equipment, support staff and site/side marked as required.    Consent Done?:  Yes (Written)  Local anesthesia used?: No      Wound Details:    Location:  Right foot    Location:  Right Plantar       Length (cm):  0.7       Area (sq cm):  0.49       Width (cm):  0.7       Percent Debrided (%):  100       Depth (cm):  0.3       Total Area Debrided (sq cm):  0.49    Depth of debridement:  Subcutaneous tissue    Tissue debrided:  Dermis, Epidermis and Subcutaneous    Devitalized tissue debrided:  Callus, Fibrin and Slough    Instruments:  Curette    Bleeding:  Minimal  Hemostasis Achieved: Yes    Method Used:  Pressure  Patient tolerance:  Patient tolerated the procedure well with no immediate complications      Assessment:           ICD-10-CM ICD-9-CM   1. Diabetic ulcer of right midfoot associated with type 2 diabetes mellitus, with fat layer exposed  E11.621 250.80    L97.412 707.14            Wound 08/04/20 1312 Diabetic Ulcer Right plantar Foot (Active)   08/04/20 1312    Pre-existing: No   Primary Wound Type: Diabetic ulc   Side: Right   Orientation: plantar   Location: Foot   Wound Number (optional):    Ankle-Brachial Index:    Pulses:    Removal Indication and Assessment:    Wound Outcome:    (Retired) Wound Type:    (Retired) Wound Length (cm):    (Retired) Wound Width (cm):    (Retired) Depth (cm):    Wound Description (Comments):    Removal Indications:    Wound Image   11/17/20 1000   Wound WDL ex 11/17/20 1000   Dressing Appearance Dry 11/17/20 1000   Drainage Characteristics/Odor Clear 11/17/20 1000   Appearance Pink 11/17/20 1000   Tissue loss description Partial thickness " 11/17/20 1000   Red (%), Wound Tissue Color 100 % 11/17/20 1000   Periwound Area Dry 11/17/20 1000   Wound Edges Defined 11/17/20 1000   Wound Length (cm) 0.7 cm 11/17/20 1000   Wound Width (cm) 0.7 cm 11/17/20 1000   Wound Depth (cm) 0.3 cm 11/17/20 1000   Wound Volume (cm^3) 0.15 cm^3 11/17/20 1000   Wound Surface Area (cm^2) 0.49 cm^2 11/17/20 1000   Undermining (depth (cm)/location) 6-8 0.2 11/17/20 1000   Care Cleansed with:;Antimicrobial agent;Sterile normal saline 11/17/20 1000   Dressing Changed 11/17/20 1000   Dressing Change Due 11/24/20 11/17/20 1000           Plan:                Orders Placed This Encounter   Procedures    Change dressing     Endoform, Aquacel foam, Micah foam x2 cast padding/coban        Follow up in about 1 week (around 11/24/2020).     See Jean-Baptiste MD

## 2020-11-24 ENCOUNTER — HOSPITAL ENCOUNTER (OUTPATIENT)
Dept: WOUND CARE | Facility: HOSPITAL | Age: 60
Discharge: HOME | End: 2020-11-24
Attending: FAMILY MEDICINE
Payer: MEDICAID

## 2020-11-24 VITALS — DIASTOLIC BLOOD PRESSURE: 77 MMHG | TEMPERATURE: 98 F | SYSTOLIC BLOOD PRESSURE: 155 MMHG | HEART RATE: 89 BPM

## 2020-11-24 DIAGNOSIS — L97.412 DIABETIC ULCER OF RIGHT MIDFOOT ASSOCIATED WITH TYPE 2 DIABETES MELLITUS, WITH FAT LAYER EXPOSED: Primary | ICD-10-CM

## 2020-11-24 DIAGNOSIS — E11.621 DIABETIC ULCER OF RIGHT MIDFOOT ASSOCIATED WITH TYPE 2 DIABETES MELLITUS, WITH FAT LAYER EXPOSED: Primary | ICD-10-CM

## 2020-11-24 PROCEDURE — 11042 DBRDMT SUBQ TIS 1ST 20SQCM/<: CPT | Mod: ,,, | Performed by: FAMILY MEDICINE

## 2020-11-24 PROCEDURE — 27201912 HC WOUND CARE DEBRIDEMENT SUPPLIES: Performed by: FAMILY MEDICINE

## 2020-11-24 PROCEDURE — 99214 OFFICE O/P EST MOD 30 MIN: CPT | Mod: 25,,, | Performed by: FAMILY MEDICINE

## 2020-11-24 PROCEDURE — 11042 DEBRIDEMENT: ICD-10-PCS | Mod: ,,, | Performed by: FAMILY MEDICINE

## 2020-11-24 PROCEDURE — 11042 DBRDMT SUBQ TIS 1ST 20SQCM/<: CPT | Performed by: FAMILY MEDICINE

## 2020-11-24 PROCEDURE — 99214 PR OFFICE/OUTPT VISIT, EST, LEVL IV, 30-39 MIN: ICD-10-PCS | Mod: 25,,, | Performed by: FAMILY MEDICINE

## 2020-11-24 NOTE — PROGRESS NOTES
[facility]                Progress Note    Subjective:       Patient ID: Fermin Henson is a 60 y.o. male.    Chief Complaint: No chief complaint on file.    Walked to unit unaided. No complaints of pain or discomfort. Thick callus around wound, some undermining 6-9 on clock. Scant drainage clear in nature. Crack in skin, radiating from wound, healing. Base mof wound red and unchanged from last week. No edema or maceration. No fevers, chills, or sweats    n        Review of Systems   Constitutional: Negative.    HENT: Negative.    Eyes: Negative.    Respiratory: Negative.    Cardiovascular: Negative.    Genitourinary: Negative.    Musculoskeletal: Negative.    Skin: Positive for wound.   Neurological: Positive for numbness.         Objective:        Physical Exam  Vitals signs reviewed.   Constitutional:       Appearance: Normal appearance.   HENT:      Head: Normocephalic.      Right Ear: External ear normal.      Left Ear: External ear normal.      Mouth/Throat:      Mouth: Mucous membranes are moist.      Pharynx: Oropharynx is clear.   Eyes:      Conjunctiva/sclera: Conjunctivae normal.      Pupils: Pupils are equal, round, and reactive to light.   Neck:      Musculoskeletal: Normal range of motion and neck supple.   Cardiovascular:      Rate and Rhythm: Normal rate.   Pulmonary:      Effort: Pulmonary effort is normal. No respiratory distress.      Breath sounds: No wheezing.   Abdominal:      General: There is no distension.      Palpations: Abdomen is soft.      Tenderness: There is no abdominal tenderness.   Skin:     General: Skin is warm and dry.      Comments: +DFU to right plantar with excess slough and callous   Neurological:      Mental Status: He is alert and oriented to person, place, and time.      Sensory: Sensory deficit present.           Vitals:    11/24/20 1001   BP: (!) 155/77   Pulse: 89   Temp: 98 °F (36.7 °C)       Assessment:            "ICD-10-CM ICD-9-CM   1. Diabetic ulcer of right midfoot associated with type 2 diabetes mellitus, with fat layer exposed  E11.621 250.80    L97.412 707.14            Wound 08/04/20 1312 Diabetic Ulcer Right plantar Foot (Active)   08/04/20 1312    Pre-existing: No   Primary Wound Type: Diabetic ulc   Side: Right   Orientation: plantar   Location: Foot   Wound Number (optional):    Ankle-Brachial Index:    Pulses:    Removal Indication and Assessment:    Wound Outcome:    (Retired) Wound Type:    (Retired) Wound Length (cm):    (Retired) Wound Width (cm):    (Retired) Depth (cm):    Wound Description (Comments):    Removal Indications:    Wound Image   11/24/20 1000   Wound WDL ex 11/24/20 1000   Dressing Appearance Dry 11/24/20 1000   Drainage Amount Small 11/24/20 1000   Drainage Characteristics/Odor Clear 11/24/20 1000   Appearance Red 11/24/20 1000   Tissue loss description Partial thickness 11/24/20 1000   Red (%), Wound Tissue Color 100 % 11/24/20 1000   Periwound Area Dry 11/24/20 1000   Wound Edges Defined 11/24/20 1000   Wound Length (cm) 0.5 cm 11/24/20 1000   Wound Width (cm) 0.5 cm 11/24/20 1000   Wound Depth (cm) 0.3 cm 11/24/20 1000   Wound Volume (cm^3) 0.08 cm^3 11/24/20 1000   Wound Surface Area (cm^2) 0.25 cm^2 11/24/20 1000   Care Cleansed with:;Antimicrobial agent;Sterile normal saline 11/24/20 1000   Off Loading Football dressing;Off loading shoe 11/24/20 1000   Dressing Change Due 12/01/20 11/24/20 1000           Debridement    Date/Time: 11/24/2020 2:44 PM  Performed by: See Jean-Baptiste MD  Authorized by: See Jean-Baptiste MD     Time out: Immediately prior to procedure a "time out" was called to verify the correct patient, procedure, equipment, support staff and site/side marked as required.    Consent Done?:  Yes (Written)  Local anesthesia used?: No      Wound Details:    Location:  Right foot    Location:  Right Plantar    Type of Debridement:  Excisional       Length (cm):  0.5       Area " (sq cm):  0.25       Width (cm):  0.5       Percent Debrided (%):  100       Depth (cm):  0.3       Total Area Debrided (sq cm):  0.25    Depth of debridement:  Subcutaneous tissue    Tissue debrided:  Dermis, Epidermis and Subcutaneous    Devitalized tissue debrided:  Biofilm, Slough and Fibrin    Instruments:  Curette    Bleeding:  Minimal  Hemostasis Achieved: Yes    Method Used:  Pressure  Patient tolerance:  Patient tolerated the procedure well with no immediate complications      Plan:                Orders Placed This Encounter   Procedures    Change dressing     Endoform, Aquacel foam, Micah foam x2 cast padding/coban        Follow up in about 1 week (around 12/1/2020).     See Jean-Baptiste MD

## 2020-12-01 ENCOUNTER — HOSPITAL ENCOUNTER (OUTPATIENT)
Dept: WOUND CARE | Facility: HOSPITAL | Age: 60
Discharge: HOME | End: 2020-12-01
Attending: FAMILY MEDICINE
Payer: MEDICAID

## 2020-12-01 VITALS — SYSTOLIC BLOOD PRESSURE: 130 MMHG | TEMPERATURE: 98 F | HEART RATE: 106 BPM | DIASTOLIC BLOOD PRESSURE: 85 MMHG

## 2020-12-01 DIAGNOSIS — L97.412 DIABETIC ULCER OF RIGHT MIDFOOT ASSOCIATED WITH TYPE 2 DIABETES MELLITUS, WITH FAT LAYER EXPOSED: Primary | ICD-10-CM

## 2020-12-01 DIAGNOSIS — E11.621 DIABETIC ULCER OF RIGHT MIDFOOT ASSOCIATED WITH TYPE 2 DIABETES MELLITUS, WITH FAT LAYER EXPOSED: Primary | ICD-10-CM

## 2020-12-01 PROCEDURE — 11042 DBRDMT SUBQ TIS 1ST 20SQCM/<: CPT | Mod: ,,, | Performed by: FAMILY MEDICINE

## 2020-12-01 PROCEDURE — 27201912 HC WOUND CARE DEBRIDEMENT SUPPLIES: Performed by: FAMILY MEDICINE

## 2020-12-01 PROCEDURE — 99214 OFFICE O/P EST MOD 30 MIN: CPT | Mod: 25,,, | Performed by: FAMILY MEDICINE

## 2020-12-01 PROCEDURE — 11042 DEBRIDEMENT: ICD-10-PCS | Mod: ,,, | Performed by: FAMILY MEDICINE

## 2020-12-01 PROCEDURE — 11042 DBRDMT SUBQ TIS 1ST 20SQCM/<: CPT | Performed by: FAMILY MEDICINE

## 2020-12-01 PROCEDURE — 99214 PR OFFICE/OUTPT VISIT, EST, LEVL IV, 30-39 MIN: ICD-10-PCS | Mod: 25,,, | Performed by: FAMILY MEDICINE

## 2020-12-01 NOTE — PROGRESS NOTES
[facility]                Progress Note    Subjective:       Patient ID: Fermin Henson is a 60 y.o. male.    Chief Complaint: No chief complaint on file.    Walked to clinic unaided. Callus less than last week and remains dry. Wound measuring bigger after debridment last week. Appears less deep, pink at base no slough. Drainage clear, no odor or edema. He continues to not offload stating he has to take care of his elderly mother and cannot let her do a lot of things on her own.      Review of Systems   Constitutional: Negative.    HENT: Negative.    Eyes: Negative.    Respiratory: Negative.    Cardiovascular: Positive for leg swelling. Negative for chest pain and palpitations.   Gastrointestinal: Negative.    Musculoskeletal: Positive for joint swelling.   Skin: Positive for wound.   Neurological: Positive for numbness.   Psychiatric/Behavioral: Negative.          Objective:        Physical Exam  Vitals signs reviewed.   Constitutional:       Appearance: Normal appearance.   HENT:      Head: Normocephalic and atraumatic.      Right Ear: External ear normal.      Left Ear: External ear normal.      Nose: Nose normal.      Mouth/Throat:      Mouth: Mucous membranes are moist.      Pharynx: Oropharynx is clear.   Eyes:      Extraocular Movements: Extraocular movements intact.      Conjunctiva/sclera: Conjunctivae normal.      Pupils: Pupils are equal, round, and reactive to light.   Neck:      Musculoskeletal: Normal range of motion and neck supple.   Cardiovascular:      Rate and Rhythm: Normal rate and regular rhythm.   Pulmonary:      Effort: Pulmonary effort is normal. No respiratory distress.      Breath sounds: No wheezing.   Abdominal:      General: There is no distension.      Palpations: Abdomen is soft.      Tenderness: There is no abdominal tenderness.   Skin:     General: Skin is warm and dry.      Comments: +right plantar DFU with excess slough and callous  "formation   Neurological:      Mental Status: He is alert and oriented to person, place, and time.      Sensory: Sensory deficit present.           Vitals:    12/01/20 0955   BP: 130/85   Pulse: 106   Temp: 97.7 °F (36.5 °C)     Debridement    Date/Time: 12/1/2020 1:08 PM  Performed by: See Jean-Baptiste MD  Authorized by: See Jean-Baptiste MD     Time out: Immediately prior to procedure a "time out" was called to verify the correct patient, procedure, equipment, support staff and site/side marked as required.    Consent Done?:  Yes (Written)  Local anesthesia used?: Yes    Local anesthetic:  Topical anesthetic  Anesthetic total (ml):  5    Wound Details:    Location:  Right foot    Location:  Right Plantar       Length (cm):  0.7       Area (sq cm):  0.49       Width (cm):  0.7       Percent Debrided (%):  100       Depth (cm):  0.3       Total Area Debrided (sq cm):  0.49    Depth of debridement:  Subcutaneous tissue    Tissue debrided:  Dermis, Epidermis and Subcutaneous    Devitalized tissue debrided:  Biofilm, Callus, Fibrin and Slough    Instruments:  Curette    Bleeding:  Minimal  Hemostasis Achieved: Yes    Method Used:  Pressure  Patient tolerance:  Patient tolerated the procedure well with no immediate complications      Assessment:           ICD-10-CM ICD-9-CM   1. Diabetic ulcer of right midfoot associated with type 2 diabetes mellitus, with fat layer exposed  E11.621 250.80    L97.412 707.14            Wound 08/04/20 1312 Diabetic Ulcer Right plantar Foot (Active)   08/04/20 1312    Pre-existing: No   Primary Wound Type: Diabetic ulc   Side: Right   Orientation: plantar   Location: Foot   Wound Number (optional):    Ankle-Brachial Index:    Pulses:    Removal Indication and Assessment:    Wound Outcome:    (Retired) Wound Type:    (Retired) Wound Length (cm):    (Retired) Wound Width (cm):    (Retired) Depth (cm):    Wound Description (Comments):    Removal Indications:    Wound Image   12/01/20 0900 "   Wound WDL ex 12/01/20 0900   Dressing Appearance Dry;Intact 12/01/20 0900   Drainage Amount Moderate 12/01/20 0900   Drainage Characteristics/Odor Brown 12/01/20 0900   Appearance Red 12/01/20 0900   Tissue loss description Partial thickness 12/01/20 0900   Red (%), Wound Tissue Color 100 % 12/01/20 0900   Periwound Area Dry 12/01/20 0900   Wound Length (cm) 0.7 cm 12/01/20 0900   Wound Width (cm) 0.7 cm 12/01/20 0900   Wound Depth (cm) 0.3 cm 12/01/20 0900   Wound Volume (cm^3) 0.15 cm^3 12/01/20 0900   Wound Surface Area (cm^2) 0.49 cm^2 12/01/20 0900   Care Cleansed with:;Antimicrobial agent;Sterile normal saline 12/01/20 0900   Off Loading Football dressing;Off loading shoe 12/01/20 0900   Dressing Change Due 12/08/20 12/01/20 0900           Plan:                Orders Placed This Encounter   Procedures    Change dressing     Endoform, Aquacel foam, Micah foam x2 cast padding/coban        Follow up in about 1 week (around 12/8/2020).     See Jean-Baptiste MD

## 2020-12-08 ENCOUNTER — HOSPITAL ENCOUNTER (OUTPATIENT)
Dept: WOUND CARE | Facility: HOSPITAL | Age: 60
Discharge: HOME | End: 2020-12-08
Attending: FAMILY MEDICINE
Payer: MEDICAID

## 2020-12-08 VITALS — TEMPERATURE: 98 F | HEART RATE: 80 BPM | DIASTOLIC BLOOD PRESSURE: 70 MMHG | SYSTOLIC BLOOD PRESSURE: 150 MMHG

## 2020-12-08 DIAGNOSIS — E11.621 DIABETIC ULCER OF RIGHT MIDFOOT ASSOCIATED WITH TYPE 2 DIABETES MELLITUS, WITH FAT LAYER EXPOSED: Primary | ICD-10-CM

## 2020-12-08 DIAGNOSIS — L97.412 DIABETIC ULCER OF RIGHT MIDFOOT ASSOCIATED WITH TYPE 2 DIABETES MELLITUS, WITH FAT LAYER EXPOSED: Primary | ICD-10-CM

## 2020-12-08 PROCEDURE — 99214 PR OFFICE/OUTPT VISIT, EST, LEVL IV, 30-39 MIN: ICD-10-PCS | Mod: ,,, | Performed by: FAMILY MEDICINE

## 2020-12-08 PROCEDURE — 99213 OFFICE O/P EST LOW 20 MIN: CPT | Performed by: FAMILY MEDICINE

## 2020-12-08 PROCEDURE — 99214 OFFICE O/P EST MOD 30 MIN: CPT | Mod: ,,, | Performed by: FAMILY MEDICINE

## 2020-12-08 NOTE — PROGRESS NOTES
[facility]                Progress Note    Subjective:       Patient ID: Fermin Henson is a 60 y.o. male.    Chief Complaint: No chief complaint on file.    Walked to clinic unaided. Football intact no drainage through dsg. Mod sang/serous drainage contained within dsg. Callus distal extending down to plantar area with 0.4mm punched out looking wound pink in color. Crack that was extending from wound now closed. No debridement this week and change dsg to promogram and foam football.      Review of Systems   Constitutional: Negative.    HENT: Negative.    Eyes: Negative.    Respiratory: Negative.    Cardiovascular: Negative.    Gastrointestinal: Negative.    Skin: Positive for wound.   Neurological: Positive for numbness.   Psychiatric/Behavioral: Negative.          Objective:        Physical Exam  Constitutional:       Appearance: Normal appearance.   HENT:      Head: Normocephalic and atraumatic.      Right Ear: External ear normal.      Left Ear: External ear normal.      Mouth/Throat:      Mouth: Mucous membranes are moist.      Pharynx: Oropharynx is clear.   Eyes:      Extraocular Movements: Extraocular movements intact.      Conjunctiva/sclera: Conjunctivae normal.      Pupils: Pupils are equal, round, and reactive to light.   Neck:      Musculoskeletal: Normal range of motion and neck supple.   Cardiovascular:      Rate and Rhythm: Normal rate and regular rhythm.   Pulmonary:      Effort: Pulmonary effort is normal. No respiratory distress.      Breath sounds: No wheezing.   Abdominal:      General: There is no distension.      Palpations: Abdomen is soft.      Tenderness: There is no abdominal tenderness.   Musculoskeletal:      Right lower leg: No edema.      Left lower leg: No edema.   Skin:     General: Skin is warm and dry.      Comments: + right plantar dfu without slough or maceration; excess callous anterior to wound   Neurological:      Mental  Status: He is alert.   Psychiatric:         Mood and Affect: Mood normal.         Behavior: Behavior normal.         Vitals:    12/08/20 1005   BP: (!) 150/70   Pulse: 80   Temp: 97.8 °F (36.6 °C)       Assessment:           ICD-10-CM ICD-9-CM   1. Diabetic ulcer of right midfoot associated with type 2 diabetes mellitus, with fat layer exposed  E11.621 250.80    L97.412 707.14            Wound 08/04/20 1312 Diabetic Ulcer Right plantar Foot (Active)   08/04/20 1312    Pre-existing: No   Primary Wound Type: Diabetic ulc   Side: Right   Orientation: plantar   Location: Foot   Wound Number (optional):    Ankle-Brachial Index:    Pulses:    Removal Indication and Assessment:    Wound Outcome:    (Retired) Wound Type:    (Retired) Wound Length (cm):    (Retired) Wound Width (cm):    (Retired) Depth (cm):    Wound Description (Comments):    Removal Indications:    Wound Image   12/08/20 1000   Wound WDL ex 12/08/20 1000   Dressing Appearance Dry;Intact 12/08/20 1000   Drainage Amount Moderate 12/08/20 1000   Drainage Characteristics/Odor Serosanguineous 12/08/20 1000   Appearance Pink 12/08/20 1000   Tissue loss description Partial thickness 12/08/20 1000   Red (%), Wound Tissue Color 100 % 12/08/20 1000   Periwound Area Dry 12/08/20 1000   Wound Edges Callused 12/08/20 1000   Wound Length (cm) 0.4 cm 12/08/20 1000   Wound Width (cm) 0.4 cm 12/08/20 1000   Wound Depth (cm) 0.3 cm 12/08/20 1000   Wound Volume (cm^3) 0.05 cm^3 12/08/20 1000   Wound Surface Area (cm^2) 0.16 cm^2 12/08/20 1000   Care Cleansed with:;Antimicrobial agent;Sterile normal saline 12/08/20 1000   Off Loading Football dressing 12/08/20 1000   Dressing Change Due 12/15/20 12/08/20 1000           Plan:                Orders Placed This Encounter   Procedures    Change dressing     Promogram, Aquacel foam and long Micah foam over distal foot. Three cast padding/coban darco.        Follow up in about 1 week (around 12/15/2020).     See Jean-Baptiste,  MD

## 2020-12-15 ENCOUNTER — HOSPITAL ENCOUNTER (OUTPATIENT)
Dept: WOUND CARE | Facility: HOSPITAL | Age: 60
Discharge: HOME | End: 2020-12-15
Attending: FAMILY MEDICINE
Payer: MEDICAID

## 2020-12-15 VITALS — DIASTOLIC BLOOD PRESSURE: 76 MMHG | TEMPERATURE: 98 F | SYSTOLIC BLOOD PRESSURE: 161 MMHG

## 2020-12-15 DIAGNOSIS — E11.621 DIABETIC ULCER OF RIGHT MIDFOOT ASSOCIATED WITH TYPE 2 DIABETES MELLITUS, WITH FAT LAYER EXPOSED: Primary | ICD-10-CM

## 2020-12-15 DIAGNOSIS — L97.412 DIABETIC ULCER OF RIGHT MIDFOOT ASSOCIATED WITH TYPE 2 DIABETES MELLITUS, WITH FAT LAYER EXPOSED: Primary | ICD-10-CM

## 2020-12-15 PROCEDURE — 99214 PR OFFICE/OUTPT VISIT, EST, LEVL IV, 30-39 MIN: ICD-10-PCS | Mod: 25,,, | Performed by: FAMILY MEDICINE

## 2020-12-15 PROCEDURE — 99214 OFFICE O/P EST MOD 30 MIN: CPT | Mod: 25,,, | Performed by: FAMILY MEDICINE

## 2020-12-15 PROCEDURE — 27201912 HC WOUND CARE DEBRIDEMENT SUPPLIES: Performed by: FAMILY MEDICINE

## 2020-12-15 PROCEDURE — 11042 DBRDMT SUBQ TIS 1ST 20SQCM/<: CPT | Mod: ,,, | Performed by: FAMILY MEDICINE

## 2020-12-15 PROCEDURE — 11042 DBRDMT SUBQ TIS 1ST 20SQCM/<: CPT | Performed by: FAMILY MEDICINE

## 2020-12-15 PROCEDURE — 11042 DEBRIDEMENT: ICD-10-PCS | Mod: ,,, | Performed by: FAMILY MEDICINE

## 2020-12-15 NOTE — PROGRESS NOTES
[facility]                Progress Note    Subjective:       Patient ID: Fermin Henson is a 60 y.o. male.    Chief Complaint: No chief complaint on file.    Walked to clinic unaided. Wound has small amt serous/sang drainage.  Wound bed pink. Callus continues to surround wound. Wound measuring smaller than last week.  He continues to be on his feet on a regular basis.  No fevers, chills, or sweats      Review of Systems   Constitutional: Negative.    HENT: Negative.    Eyes: Negative.    Respiratory: Negative.    Cardiovascular: Negative.    Gastrointestinal: Negative.    Skin: Positive for wound.   Neurological: Negative.          Objective:        Physical Exam  Vitals signs reviewed.   Constitutional:       Appearance: Normal appearance.   HENT:      Head: Normocephalic and atraumatic.      Right Ear: External ear normal.      Left Ear: External ear normal.      Mouth/Throat:      Mouth: Mucous membranes are moist.      Pharynx: Oropharynx is clear.   Eyes:      Extraocular Movements: Extraocular movements intact.      Conjunctiva/sclera: Conjunctivae normal.      Pupils: Pupils are equal, round, and reactive to light.   Neck:      Musculoskeletal: Normal range of motion and neck supple.   Cardiovascular:      Rate and Rhythm: Normal rate and regular rhythm.   Pulmonary:      Effort: Pulmonary effort is normal. No respiratory distress.      Breath sounds: No wheezing.   Abdominal:      General: There is no distension.      Palpations: Abdomen is soft.      Tenderness: There is no abdominal tenderness.   Musculoskeletal:      Right lower leg: No edema.      Left lower leg: No edema.   Skin:     General: Skin is warm and dry.      Comments: +DFU to plantar right foot with excess callous and slough   Neurological:      Mental Status: He is alert and oriented to person, place, and time.      Sensory: Sensory deficit present.   Psychiatric:         Mood and Affect:  "Mood normal.         Behavior: Behavior normal.           Vitals:    12/15/20 1007   BP: (!) 161/76   Temp: 97.7 °F (36.5 °C)     Debridement    Date/Time: 12/15/2020 12:55 PM  Performed by: See Jean-Baptiste MD  Authorized by: See Jean-Baptiste MD     Time out: Immediately prior to procedure a "time out" was called to verify the correct patient, procedure, equipment, support staff and site/side marked as required.    Consent Done?:  Yes (Written)  Local anesthesia used?: No      Wound Details:    Location:  Right foot    Location:  Right Plantar    Type of Debridement:  Excisional       Length (cm):  0.2       Area (sq cm):  0.04       Width (cm):  0.2       Percent Debrided (%):  100       Depth (cm):  0.3       Total Area Debrided (sq cm):  0.04    Depth of debridement:  Subcutaneous tissue    Tissue debrided:  Dermis, Epidermis and Subcutaneous    Devitalized tissue debrided:  Biofilm, Callus, Fibrin and Slough    Instruments:  Curette    Bleeding:  Minimal  Hemostasis Achieved: Yes    Method Used:  Pressure  Patient tolerance:  Patient tolerated the procedure well with no immediate complications      Assessment:           ICD-10-CM ICD-9-CM   1. Diabetic ulcer of right midfoot associated with type 2 diabetes mellitus, with fat layer exposed  E11.621 250.80    L97.412 707.14            Wound 08/04/20 1312 Diabetic Ulcer Right plantar Foot (Active)   08/04/20 1312    Pre-existing: No   Primary Wound Type: Diabetic ulc   Side: Right   Orientation: plantar   Location: Foot   Wound Number (optional):    Ankle-Brachial Index:    Pulses:    Removal Indication and Assessment:    Wound Outcome:    (Retired) Wound Type:    (Retired) Wound Length (cm):    (Retired) Wound Width (cm):    (Retired) Depth (cm):    Wound Description (Comments):    Removal Indications:    Wound Image   12/15/20 1000   Wound WDL ex 12/15/20 1000   Dressing Appearance Dry 12/15/20 1000   Drainage Amount Moderate 12/15/20 1000   Drainage " Characteristics/Odor Serosanguineous 12/15/20 1000   Appearance Pink 12/15/20 1000   Tissue loss description Partial thickness 12/15/20 1000   Red (%), Wound Tissue Color 100 % 12/15/20 1000   Periwound Area Dry 12/15/20 1000   Wound Edges Callused 12/15/20 1000   Wound Length (cm) 0.2 cm 12/15/20 1000   Wound Width (cm) 0.2 cm 12/15/20 1000   Wound Depth (cm) 0.3 cm 12/15/20 1000   Wound Volume (cm^3) 0.01 cm^3 12/15/20 1000   Wound Surface Area (cm^2) 0.04 cm^2 12/15/20 1000   Care Cleansed with:;Antimicrobial agent;Sterile normal saline 12/15/20 1000   Off Loading Football dressing 12/15/20 1000   Dressing Change Due 12/15/20 12/15/20 1000           Plan:                Orders Placed This Encounter   Procedures    Debridement     This order was created via procedure documentation     Standing Status:   Standing     Number of Occurrences:   1    Change dressing     Promogram, Aquacel foam and long Micah foam over distal foot. Three cast padding/coban darco        Follow up in about 1 week (around 12/22/2020).     See Jean-Baptiste MD

## 2020-12-22 ENCOUNTER — HOSPITAL ENCOUNTER (OUTPATIENT)
Dept: WOUND CARE | Facility: HOSPITAL | Age: 60
Discharge: HOME | End: 2020-12-22
Attending: FAMILY MEDICINE
Payer: MEDICAID

## 2020-12-22 VITALS — TEMPERATURE: 97 F | DIASTOLIC BLOOD PRESSURE: 83 MMHG | SYSTOLIC BLOOD PRESSURE: 136 MMHG | HEART RATE: 96 BPM

## 2020-12-22 DIAGNOSIS — E11.621 DIABETIC ULCER OF RIGHT MIDFOOT ASSOCIATED WITH TYPE 2 DIABETES MELLITUS, WITH FAT LAYER EXPOSED: Primary | ICD-10-CM

## 2020-12-22 DIAGNOSIS — L97.412 DIABETIC ULCER OF RIGHT MIDFOOT ASSOCIATED WITH TYPE 2 DIABETES MELLITUS, WITH FAT LAYER EXPOSED: Primary | ICD-10-CM

## 2020-12-22 PROCEDURE — 11042 DBRDMT SUBQ TIS 1ST 20SQCM/<: CPT | Mod: ,,, | Performed by: FAMILY MEDICINE

## 2020-12-22 PROCEDURE — 11042 DBRDMT SUBQ TIS 1ST 20SQCM/<: CPT | Performed by: FAMILY MEDICINE

## 2020-12-22 PROCEDURE — 99214 PR OFFICE/OUTPT VISIT, EST, LEVL IV, 30-39 MIN: ICD-10-PCS | Mod: 25,,, | Performed by: FAMILY MEDICINE

## 2020-12-22 PROCEDURE — 11042 DEBRIDEMENT: ICD-10-PCS | Mod: ,,, | Performed by: FAMILY MEDICINE

## 2020-12-22 PROCEDURE — 99214 OFFICE O/P EST MOD 30 MIN: CPT | Mod: 25,,, | Performed by: FAMILY MEDICINE

## 2020-12-22 PROCEDURE — 27201912 HC WOUND CARE DEBRIDEMENT SUPPLIES: Performed by: FAMILY MEDICINE

## 2020-12-22 NOTE — PROGRESS NOTES
[facility]                Progress Note    Subjective:       Patient ID: Fermin Henson is a 60 y.o. male.    Chief Complaint: No chief complaint on file.    Dressing intact with serous sang drainage. Wound measuring bigger but debridement done last week. Much dry skin around wound. Wound bed 100% red, no slough or maceration.  He has no pain in wound.  No odor from wound      Review of Systems   Constitutional: Negative.    HENT: Negative.    Eyes: Negative.    Respiratory: Negative.    Gastrointestinal: Negative.    Musculoskeletal: Negative.    Skin: Positive for wound.   Neurological: Positive for numbness.         Objective:        Physical Exam  Vitals signs reviewed.   Constitutional:       Appearance: Normal appearance.   HENT:      Head: Normocephalic and atraumatic.      Right Ear: External ear normal.      Left Ear: External ear normal.      Mouth/Throat:      Mouth: Mucous membranes are moist.      Pharynx: Oropharynx is clear.   Eyes:      Extraocular Movements: Extraocular movements intact.      Conjunctiva/sclera: Conjunctivae normal.      Pupils: Pupils are equal, round, and reactive to light.   Cardiovascular:      Rate and Rhythm: Normal rate and regular rhythm.   Pulmonary:      Effort: Pulmonary effort is normal. No respiratory distress.      Breath sounds: No wheezing.   Abdominal:      General: There is no distension.      Palpations: Abdomen is soft.      Tenderness: There is no abdominal tenderness.   Skin:     General: Skin is warm and dry.      Comments: Right DFU without pain or active drainage   Neurological:      Mental Status: He is alert and oriented to person, place, and time.      Sensory: Sensory deficit present.           Vitals:    12/22/20 0955   BP: 136/83   Pulse: 96   Temp: 97 °F (36.1 °C)     Debridement    Date/Time: 12/22/2020 11:05 AM  Performed by: See Jean-Baptiste MD  Authorized by: See Jean-Baptiste MD     Time out:  "Immediately prior to procedure a "time out" was called to verify the correct patient, procedure, equipment, support staff and site/side marked as required.    Local anesthesia used?: No      Wound Details:    Location:  Right foot    Location:  Right Plantar    Type of Debridement:  Excisional       Length (cm):  0.4       Area (sq cm):  0.16       Width (cm):  0.4       Percent Debrided (%):  100       Depth (cm):  0.3       Total Area Debrided (sq cm):  0.16    Depth of debridement:  Subcutaneous tissue    Tissue debrided:  Dermis, Epidermis and Subcutaneous    Devitalized tissue debrided:  Biofilm, Fibrin and Slough    Instruments:  Curette    Bleeding:  Minimal  Hemostasis Achieved: Yes    Method Used:  Pressure  Patient tolerance:  Patient tolerated the procedure well with no immediate complications      Assessment:           ICD-10-CM ICD-9-CM   1. Diabetic ulcer of right midfoot associated with type 2 diabetes mellitus, with fat layer exposed  E11.621 250.80    L97.412 707.14            Wound 08/04/20 1312 Diabetic Ulcer Right plantar Foot (Active)   08/04/20 1312    Pre-existing: No   Primary Wound Type: Diabetic ulc   Side: Right   Orientation: plantar   Location: Foot   Wound Number (optional):    Ankle-Brachial Index:    Pulses:    Removal Indication and Assessment:    Wound Outcome:    (Retired) Wound Type:    (Retired) Wound Length (cm):    (Retired) Wound Width (cm):    (Retired) Depth (cm):    Wound Description (Comments):    Removal Indications:    Wound Image   12/22/20 1000   Wound WDL ex 12/22/20 1000   Drainage Amount Moderate 12/22/20 1000   Drainage Characteristics/Odor Serosanguineous 12/22/20 1000   Appearance Red 12/22/20 1000   Tissue loss description Partial thickness 12/22/20 1000   Red (%), Wound Tissue Color 100 % 12/22/20 1000   Periwound Area Dry 12/22/20 1000   Wound Length (cm) 0.4 cm 12/22/20 1000   Wound Width (cm) 0.4 cm 12/22/20 1000   Wound Depth (cm) 0.3 cm 12/22/20 1000 "   Wound Volume (cm^3) 0.05 cm^3 12/22/20 1000   Wound Surface Area (cm^2) 0.16 cm^2 12/22/20 1000   Care Cleansed with:;Antimicrobial agent;Sterile normal saline 12/22/20 1000   Off Loading Football dressing 12/22/20 1000   Dressing Change Due 12/29/20 12/22/20 1000           Plan:                Orders Placed This Encounter   Procedures    Debridement     This order was created via procedure documentation     Standing Status:   Standing     Number of Occurrences:   1    Change dressing     Promogram, Aquacel foam and long Micah foam over distal foot. Three cast padding/coban darco        Follow up in about 1 week (around 12/29/2020).     See Jean-Baptiste MD

## 2020-12-29 ENCOUNTER — HOSPITAL ENCOUNTER (OUTPATIENT)
Dept: WOUND CARE | Facility: HOSPITAL | Age: 60
Discharge: HOME | End: 2020-12-29
Attending: FAMILY MEDICINE
Payer: MEDICAID

## 2020-12-29 VITALS — TEMPERATURE: 98 F | HEART RATE: 87 BPM | DIASTOLIC BLOOD PRESSURE: 77 MMHG | SYSTOLIC BLOOD PRESSURE: 155 MMHG

## 2020-12-29 DIAGNOSIS — E11.621 DIABETIC ULCER OF RIGHT MIDFOOT ASSOCIATED WITH TYPE 2 DIABETES MELLITUS, WITH FAT LAYER EXPOSED: Primary | ICD-10-CM

## 2020-12-29 DIAGNOSIS — L97.412 DIABETIC ULCER OF RIGHT MIDFOOT ASSOCIATED WITH TYPE 2 DIABETES MELLITUS, WITH FAT LAYER EXPOSED: Primary | ICD-10-CM

## 2020-12-29 PROCEDURE — 27201912 HC WOUND CARE DEBRIDEMENT SUPPLIES: Performed by: FAMILY MEDICINE

## 2020-12-29 PROCEDURE — 11042 DBRDMT SUBQ TIS 1ST 20SQCM/<: CPT | Performed by: FAMILY MEDICINE

## 2020-12-29 PROCEDURE — 99214 OFFICE O/P EST MOD 30 MIN: CPT | Mod: 25,,, | Performed by: FAMILY MEDICINE

## 2020-12-29 PROCEDURE — 11042 DEBRIDEMENT: ICD-10-PCS | Mod: ,,, | Performed by: FAMILY MEDICINE

## 2020-12-29 PROCEDURE — 11042 DBRDMT SUBQ TIS 1ST 20SQCM/<: CPT | Mod: ,,, | Performed by: FAMILY MEDICINE

## 2020-12-29 PROCEDURE — 99214 PR OFFICE/OUTPT VISIT, EST, LEVL IV, 30-39 MIN: ICD-10-PCS | Mod: 25,,, | Performed by: FAMILY MEDICINE

## 2020-12-29 NOTE — PROGRESS NOTES
[facility]                Progress Note    Subjective:       Patient ID: Fermin Henson is a 60 y.o. male.    Chief Complaint: No chief complaint on file.    Walked to clinic. Football dsg dry and intact. Min drainage, wound unchanged in size but may be shallower than last week. Callus remains around wound although less than last week, extra foam added to football.      Review of Systems   Constitutional: Negative.    HENT: Negative.    Eyes: Negative.    Respiratory: Negative.    Cardiovascular: Negative.    Gastrointestinal: Negative.    Genitourinary: Negative.    Musculoskeletal: Negative.    Skin: Positive for wound.   Neurological: Positive for numbness.   Psychiatric/Behavioral: Negative.          Objective:        Physical Exam  Vitals signs reviewed.   Constitutional:       Appearance: Normal appearance.   HENT:      Head: Normocephalic and atraumatic.      Right Ear: External ear normal.      Left Ear: External ear normal.      Mouth/Throat:      Mouth: Mucous membranes are moist.   Eyes:      Extraocular Movements: Extraocular movements intact.      Conjunctiva/sclera: Conjunctivae normal.      Pupils: Pupils are equal, round, and reactive to light.   Neck:      Musculoskeletal: Normal range of motion and neck supple.   Cardiovascular:      Rate and Rhythm: Normal rate and regular rhythm.   Pulmonary:      Effort: Pulmonary effort is normal. No respiratory distress.      Breath sounds: No wheezing.   Abdominal:      General: There is no distension.      Palpations: Abdomen is soft.      Tenderness: There is no abdominal tenderness.   Musculoskeletal:      Right lower leg: No edema.      Left lower leg: No edema.   Skin:     General: Skin is warm and dry.      Comments: +dfu with excess callous and slough to right plantar; no maceration   Neurological:      Mental Status: He is alert and oriented to person, place, and time.      Sensory: Sensory deficit  "present.           Vitals:    12/29/20 0951   BP: (!) 155/77   Pulse: 87   Temp: 97.5 °F (36.4 °C)     Debridement    Date/Time: 12/29/2020 11:09 AM  Performed by: See Jean-Baptiste MD  Authorized by: See Jean-Baptiste MD     Time out: Immediately prior to procedure a "time out" was called to verify the correct patient, procedure, equipment, support staff and site/side marked as required.    Consent Done?:  Yes (Written)  Local anesthesia used?: No      Wound Details:    Location:  Right foot    Location:  Right Plantar       Length (cm):  0.4       Area (sq cm):  0.16       Width (cm):  0.4       Percent Debrided (%):  100       Depth (cm):  0.3       Total Area Debrided (sq cm):  0.16    Depth of debridement:  Subcutaneous tissue    Tissue debrided:  Dermis, Epidermis and Subcutaneous    Devitalized tissue debrided:  Biofilm, Callus, Fibrin and Slough    Instruments:  Curette    Bleeding:  Minimal  Hemostasis Achieved: Yes    Method Used:  Pressure  Patient tolerance:  Patient tolerated the procedure well with no immediate complications      Assessment:           ICD-10-CM ICD-9-CM   1. Diabetic ulcer of right midfoot associated with type 2 diabetes mellitus, with fat layer exposed  E11.621 250.80    L97.412 707.14            Wound 08/04/20 1312 Diabetic Ulcer Right plantar Foot (Active)   08/04/20 1312    Pre-existing: No   Primary Wound Type: Diabetic ulc   Side: Right   Orientation: plantar   Location: Foot   Wound Number (optional):    Ankle-Brachial Index:    Pulses:    Removal Indication and Assessment:    Wound Outcome:    (Retired) Wound Type:    (Retired) Wound Length (cm):    (Retired) Wound Width (cm):    (Retired) Depth (cm):    Wound Description (Comments):    Removal Indications:    Wound Image   12/29/20 1000   Wound WDL ex 12/29/20 1000   Drainage Amount Small 12/29/20 1000   Drainage Characteristics/Odor Serosanguineous 12/29/20 1000   Tissue loss description Partial thickness 12/29/20 1000   Red " (%), Wound Tissue Color 100 % 12/29/20 1000   Periwound Area Dry 12/29/20 1000   Wound Edges Callused 12/29/20 1000   Wound Length (cm) 0.4 cm 12/29/20 1000   Wound Width (cm) 0.4 cm 12/29/20 1000   Wound Depth (cm) 0.3 cm 12/29/20 1000   Wound Volume (cm^3) 0.05 cm^3 12/29/20 1000   Wound Surface Area (cm^2) 0.16 cm^2 12/29/20 1000   Care Cleansed with:;Antimicrobial agent;Sterile normal saline 12/29/20 1000   Off Loading Football dressing 12/29/20 1000   Dressing Change Due 01/05/21 12/29/20 1000           Plan:                Orders Placed This Encounter   Procedures    Change dressing     Promogram, Aquacel foam and long Micah foam over distal foot. Three cast padding/coban darco        Follow up in about 1 week (around 1/5/2021).

## 2021-01-05 ENCOUNTER — HOSPITAL ENCOUNTER (OUTPATIENT)
Dept: WOUND CARE | Facility: HOSPITAL | Age: 61
Discharge: HOME | End: 2021-01-05
Attending: FAMILY MEDICINE
Payer: MEDICAID

## 2021-01-05 VITALS
DIASTOLIC BLOOD PRESSURE: 76 MMHG | TEMPERATURE: 97 F | SYSTOLIC BLOOD PRESSURE: 144 MMHG | RESPIRATION RATE: 17 BRPM | HEART RATE: 89 BPM

## 2021-01-05 DIAGNOSIS — L97.412 DIABETIC ULCER OF RIGHT MIDFOOT ASSOCIATED WITH TYPE 2 DIABETES MELLITUS, WITH FAT LAYER EXPOSED: Primary | ICD-10-CM

## 2021-01-05 DIAGNOSIS — E11.621 DIABETIC ULCER OF RIGHT MIDFOOT ASSOCIATED WITH TYPE 2 DIABETES MELLITUS, WITH FAT LAYER EXPOSED: Primary | ICD-10-CM

## 2021-01-05 PROCEDURE — 11042 DBRDMT SUBQ TIS 1ST 20SQCM/<: CPT | Mod: ,,, | Performed by: FAMILY MEDICINE

## 2021-01-05 PROCEDURE — 11042 DBRDMT SUBQ TIS 1ST 20SQCM/<: CPT | Performed by: FAMILY MEDICINE

## 2021-01-05 PROCEDURE — 99214 OFFICE O/P EST MOD 30 MIN: CPT | Mod: 25,,, | Performed by: FAMILY MEDICINE

## 2021-01-05 PROCEDURE — 99214 PR OFFICE/OUTPT VISIT, EST, LEVL IV, 30-39 MIN: ICD-10-PCS | Mod: 25,,, | Performed by: FAMILY MEDICINE

## 2021-01-05 PROCEDURE — 11042 DEBRIDEMENT: ICD-10-PCS | Mod: ,,, | Performed by: FAMILY MEDICINE

## 2021-01-05 PROCEDURE — 27201912 HC WOUND CARE DEBRIDEMENT SUPPLIES: Performed by: FAMILY MEDICINE

## 2021-01-12 ENCOUNTER — HOSPITAL ENCOUNTER (OUTPATIENT)
Dept: WOUND CARE | Facility: HOSPITAL | Age: 61
Discharge: HOME | End: 2021-01-12
Attending: FAMILY MEDICINE
Payer: MEDICAID

## 2021-01-12 VITALS — TEMPERATURE: 98 F | SYSTOLIC BLOOD PRESSURE: 141 MMHG | HEART RATE: 96 BPM | DIASTOLIC BLOOD PRESSURE: 78 MMHG

## 2021-01-12 DIAGNOSIS — E11.621 DIABETIC ULCER OF RIGHT MIDFOOT ASSOCIATED WITH TYPE 2 DIABETES MELLITUS, WITH FAT LAYER EXPOSED: Primary | ICD-10-CM

## 2021-01-12 DIAGNOSIS — L97.412 DIABETIC ULCER OF RIGHT MIDFOOT ASSOCIATED WITH TYPE 2 DIABETES MELLITUS, WITH FAT LAYER EXPOSED: Primary | ICD-10-CM

## 2021-01-12 PROCEDURE — 11042 DBRDMT SUBQ TIS 1ST 20SQCM/<: CPT | Mod: ,,, | Performed by: FAMILY MEDICINE

## 2021-01-12 PROCEDURE — 27201912 HC WOUND CARE DEBRIDEMENT SUPPLIES: Performed by: FAMILY MEDICINE

## 2021-01-12 PROCEDURE — 11042 DBRDMT SUBQ TIS 1ST 20SQCM/<: CPT | Performed by: FAMILY MEDICINE

## 2021-01-12 PROCEDURE — 99214 OFFICE O/P EST MOD 30 MIN: CPT | Mod: 25,,, | Performed by: FAMILY MEDICINE

## 2021-01-12 PROCEDURE — 11042 DEBRIDEMENT: ICD-10-PCS | Mod: ,,, | Performed by: FAMILY MEDICINE

## 2021-01-12 PROCEDURE — 99214 PR OFFICE/OUTPT VISIT, EST, LEVL IV, 30-39 MIN: ICD-10-PCS | Mod: 25,,, | Performed by: FAMILY MEDICINE

## 2021-01-19 ENCOUNTER — HOSPITAL ENCOUNTER (OUTPATIENT)
Dept: WOUND CARE | Facility: HOSPITAL | Age: 61
Discharge: HOME | End: 2021-01-19
Attending: FAMILY MEDICINE
Payer: MEDICAID

## 2021-01-19 VITALS — TEMPERATURE: 98 F | HEART RATE: 78 BPM | DIASTOLIC BLOOD PRESSURE: 78 MMHG | SYSTOLIC BLOOD PRESSURE: 129 MMHG

## 2021-01-19 DIAGNOSIS — E11.621 DIABETIC ULCER OF RIGHT MIDFOOT ASSOCIATED WITH TYPE 2 DIABETES MELLITUS, WITH FAT LAYER EXPOSED: Primary | ICD-10-CM

## 2021-01-19 DIAGNOSIS — L97.412 DIABETIC ULCER OF RIGHT MIDFOOT ASSOCIATED WITH TYPE 2 DIABETES MELLITUS, WITH FAT LAYER EXPOSED: Primary | ICD-10-CM

## 2021-01-19 PROCEDURE — 99214 PR OFFICE/OUTPT VISIT, EST, LEVL IV, 30-39 MIN: ICD-10-PCS | Mod: 25,,, | Performed by: FAMILY MEDICINE

## 2021-01-19 PROCEDURE — 11042 DBRDMT SUBQ TIS 1ST 20SQCM/<: CPT | Performed by: PHYSICAL THERAPIST

## 2021-01-19 PROCEDURE — 99214 OFFICE O/P EST MOD 30 MIN: CPT | Mod: 25,,, | Performed by: FAMILY MEDICINE

## 2021-01-19 PROCEDURE — 11042 DBRDMT SUBQ TIS 1ST 20SQCM/<: CPT | Mod: ,,, | Performed by: FAMILY MEDICINE

## 2021-01-19 PROCEDURE — 27201912 HC WOUND CARE DEBRIDEMENT SUPPLIES: Performed by: PHYSICAL THERAPIST

## 2021-01-19 PROCEDURE — 11042 DEBRIDEMENT: ICD-10-PCS | Mod: ,,, | Performed by: FAMILY MEDICINE

## 2021-01-26 ENCOUNTER — HOSPITAL ENCOUNTER (OUTPATIENT)
Dept: WOUND CARE | Facility: HOSPITAL | Age: 61
Discharge: HOME | End: 2021-01-26
Attending: FAMILY MEDICINE
Payer: MEDICAID

## 2021-01-26 VITALS — SYSTOLIC BLOOD PRESSURE: 129 MMHG | HEART RATE: 89 BPM | DIASTOLIC BLOOD PRESSURE: 71 MMHG | TEMPERATURE: 98 F

## 2021-01-26 DIAGNOSIS — L97.412 DIABETIC ULCER OF RIGHT MIDFOOT ASSOCIATED WITH TYPE 2 DIABETES MELLITUS, WITH FAT LAYER EXPOSED: Primary | ICD-10-CM

## 2021-01-26 DIAGNOSIS — E11.621 DIABETIC ULCER OF RIGHT MIDFOOT ASSOCIATED WITH TYPE 2 DIABETES MELLITUS, WITH FAT LAYER EXPOSED: Primary | ICD-10-CM

## 2021-01-26 PROCEDURE — 99214 PR OFFICE/OUTPT VISIT, EST, LEVL IV, 30-39 MIN: ICD-10-PCS | Mod: 25,,, | Performed by: FAMILY MEDICINE

## 2021-01-26 PROCEDURE — 27201912 HC WOUND CARE DEBRIDEMENT SUPPLIES: Performed by: FAMILY MEDICINE

## 2021-01-26 PROCEDURE — 11042 DBRDMT SUBQ TIS 1ST 20SQCM/<: CPT | Mod: ,,, | Performed by: FAMILY MEDICINE

## 2021-01-26 PROCEDURE — 11042 DBRDMT SUBQ TIS 1ST 20SQCM/<: CPT | Performed by: FAMILY MEDICINE

## 2021-01-26 PROCEDURE — 27201912 HC WOUND CARE DEBRIDEMENT SUPPLIES

## 2021-01-26 PROCEDURE — 99214 OFFICE O/P EST MOD 30 MIN: CPT | Mod: 25,,, | Performed by: FAMILY MEDICINE

## 2021-01-26 PROCEDURE — 11042 DEBRIDEMENT: ICD-10-PCS | Mod: ,,, | Performed by: FAMILY MEDICINE

## 2021-01-26 PROCEDURE — 11042 DBRDMT SUBQ TIS 1ST 20SQCM/<: CPT

## 2021-02-02 ENCOUNTER — HOSPITAL ENCOUNTER (OUTPATIENT)
Dept: WOUND CARE | Facility: HOSPITAL | Age: 61
Discharge: HOME | End: 2021-02-02
Attending: FAMILY MEDICINE
Payer: MEDICAID

## 2021-02-02 VITALS — HEART RATE: 102 BPM | SYSTOLIC BLOOD PRESSURE: 133 MMHG | DIASTOLIC BLOOD PRESSURE: 95 MMHG | TEMPERATURE: 98 F

## 2021-02-02 DIAGNOSIS — E11.42 TYPE 2 DIABETES MELLITUS WITH DIABETIC POLYNEUROPATHY, WITHOUT LONG-TERM CURRENT USE OF INSULIN: ICD-10-CM

## 2021-02-02 DIAGNOSIS — L97.412 DIABETIC ULCER OF RIGHT MIDFOOT ASSOCIATED WITH TYPE 2 DIABETES MELLITUS, WITH FAT LAYER EXPOSED: Primary | ICD-10-CM

## 2021-02-02 DIAGNOSIS — E11.621 DIABETIC ULCER OF RIGHT MIDFOOT ASSOCIATED WITH TYPE 2 DIABETES MELLITUS, WITH FAT LAYER EXPOSED: Primary | ICD-10-CM

## 2021-02-02 PROCEDURE — 99214 OFFICE O/P EST MOD 30 MIN: CPT | Mod: 25,,, | Performed by: FAMILY MEDICINE

## 2021-02-02 PROCEDURE — 87205 SMEAR GRAM STAIN: CPT

## 2021-02-02 PROCEDURE — 11042 DBRDMT SUBQ TIS 1ST 20SQCM/<: CPT | Performed by: FAMILY MEDICINE

## 2021-02-02 PROCEDURE — 87070 CULTURE OTHR SPECIMN AEROBIC: CPT

## 2021-02-02 PROCEDURE — 11042 DBRDMT SUBQ TIS 1ST 20SQCM/<: CPT | Mod: ,,, | Performed by: FAMILY MEDICINE

## 2021-02-02 PROCEDURE — 11042 DEBRIDEMENT: ICD-10-PCS | Mod: ,,, | Performed by: FAMILY MEDICINE

## 2021-02-02 PROCEDURE — 99214 PR OFFICE/OUTPT VISIT, EST, LEVL IV, 30-39 MIN: ICD-10-PCS | Mod: 25,,, | Performed by: FAMILY MEDICINE

## 2021-02-02 PROCEDURE — 27201912 HC WOUND CARE DEBRIDEMENT SUPPLIES: Performed by: FAMILY MEDICINE

## 2021-02-02 PROCEDURE — 87077 CULTURE AEROBIC IDENTIFY: CPT

## 2021-02-02 PROCEDURE — 87186 SC STD MICRODIL/AGAR DIL: CPT

## 2021-02-04 ENCOUNTER — TELEPHONE (OUTPATIENT)
Dept: WOUND CARE | Facility: HOSPITAL | Age: 61
End: 2021-02-04

## 2021-02-04 DIAGNOSIS — L97.412 DIABETIC ULCER OF RIGHT MIDFOOT ASSOCIATED WITH TYPE 2 DIABETES MELLITUS, WITH FAT LAYER EXPOSED: Primary | ICD-10-CM

## 2021-02-04 DIAGNOSIS — E11.621 DIABETIC ULCER OF RIGHT MIDFOOT ASSOCIATED WITH TYPE 2 DIABETES MELLITUS, WITH FAT LAYER EXPOSED: Primary | ICD-10-CM

## 2021-02-04 LAB
BACTERIA TISS AEROBE CULT: ABNORMAL
BACTERIA TISS AEROBE CULT: ABNORMAL
GRAM STN SPEC: ABNORMAL
GRAM STN SPEC: ABNORMAL

## 2021-02-04 RX ORDER — DOXYCYCLINE 100 MG/1
100 CAPSULE ORAL EVERY 12 HOURS
Qty: 20 CAPSULE | Refills: 0 | Status: SHIPPED | OUTPATIENT
Start: 2021-02-04 | End: 2021-02-14

## 2021-02-09 ENCOUNTER — HOSPITAL ENCOUNTER (OUTPATIENT)
Dept: WOUND CARE | Facility: HOSPITAL | Age: 61
Discharge: HOME | End: 2021-02-09
Attending: FAMILY MEDICINE
Payer: MEDICAID

## 2021-02-09 VITALS
HEART RATE: 104 BPM | SYSTOLIC BLOOD PRESSURE: 143 MMHG | DIASTOLIC BLOOD PRESSURE: 72 MMHG | TEMPERATURE: 97 F | RESPIRATION RATE: 17 BRPM

## 2021-02-09 DIAGNOSIS — L97.412 DIABETIC ULCER OF RIGHT MIDFOOT ASSOCIATED WITH TYPE 2 DIABETES MELLITUS, WITH FAT LAYER EXPOSED: Primary | ICD-10-CM

## 2021-02-09 DIAGNOSIS — E11.621 DIABETIC ULCER OF RIGHT MIDFOOT ASSOCIATED WITH TYPE 2 DIABETES MELLITUS, WITH FAT LAYER EXPOSED: Primary | ICD-10-CM

## 2021-02-09 PROCEDURE — 11042 DBRDMT SUBQ TIS 1ST 20SQCM/<: CPT | Performed by: FAMILY MEDICINE

## 2021-02-09 PROCEDURE — 99214 PR OFFICE/OUTPT VISIT, EST, LEVL IV, 30-39 MIN: ICD-10-PCS | Mod: 25,,, | Performed by: FAMILY MEDICINE

## 2021-02-09 PROCEDURE — 11042 DEBRIDEMENT: ICD-10-PCS | Mod: ,,, | Performed by: FAMILY MEDICINE

## 2021-02-09 PROCEDURE — 11042 DBRDMT SUBQ TIS 1ST 20SQCM/<: CPT | Mod: ,,, | Performed by: FAMILY MEDICINE

## 2021-02-09 PROCEDURE — 99214 OFFICE O/P EST MOD 30 MIN: CPT | Mod: 25,,, | Performed by: FAMILY MEDICINE

## 2021-02-09 PROCEDURE — 27201912 HC WOUND CARE DEBRIDEMENT SUPPLIES: Performed by: FAMILY MEDICINE

## 2021-02-19 NOTE — ED TRIAGE NOTES
58 y.o. Male presents to the ED with chief complaint of flank pain. Patient states flank pain began x1 week ago. Patient reports pain localized to right flank. Patient does have hx of kidney stones and DM. Patient states he was here Tuesday for similar symptoms and given Norco with no relief. Patient denies recent injury. Patient resting in bed in NAD. Side rails up x2. AAOx4   PAIN

## 2021-02-23 ENCOUNTER — HOSPITAL ENCOUNTER (OUTPATIENT)
Dept: WOUND CARE | Facility: HOSPITAL | Age: 61
Discharge: HOME | End: 2021-02-23
Attending: FAMILY MEDICINE
Payer: MEDICAID

## 2021-02-23 VITALS — HEART RATE: 80 BPM | TEMPERATURE: 97 F | SYSTOLIC BLOOD PRESSURE: 152 MMHG | DIASTOLIC BLOOD PRESSURE: 75 MMHG

## 2021-02-23 DIAGNOSIS — E11.621 DIABETIC ULCER OF RIGHT MIDFOOT ASSOCIATED WITH TYPE 2 DIABETES MELLITUS, WITH FAT LAYER EXPOSED: Primary | ICD-10-CM

## 2021-02-23 DIAGNOSIS — L97.412 DIABETIC ULCER OF RIGHT MIDFOOT ASSOCIATED WITH TYPE 2 DIABETES MELLITUS, WITH FAT LAYER EXPOSED: Primary | ICD-10-CM

## 2021-02-23 PROCEDURE — 11042 DBRDMT SUBQ TIS 1ST 20SQCM/<: CPT | Mod: ,,, | Performed by: FAMILY MEDICINE

## 2021-02-23 PROCEDURE — 99214 OFFICE O/P EST MOD 30 MIN: CPT | Mod: 25,,, | Performed by: FAMILY MEDICINE

## 2021-02-23 PROCEDURE — 11042 DEBRIDEMENT: ICD-10-PCS | Mod: ,,, | Performed by: FAMILY MEDICINE

## 2021-02-23 PROCEDURE — 11042 DBRDMT SUBQ TIS 1ST 20SQCM/<: CPT | Performed by: FAMILY MEDICINE

## 2021-02-23 PROCEDURE — 99214 PR OFFICE/OUTPT VISIT, EST, LEVL IV, 30-39 MIN: ICD-10-PCS | Mod: 25,,, | Performed by: FAMILY MEDICINE

## 2021-02-23 PROCEDURE — 27201912 HC WOUND CARE DEBRIDEMENT SUPPLIES: Performed by: FAMILY MEDICINE

## 2021-02-26 ENCOUNTER — PATIENT OUTREACH (OUTPATIENT)
Dept: ADMINISTRATIVE | Facility: HOSPITAL | Age: 61
End: 2021-02-26

## 2021-03-02 ENCOUNTER — HOSPITAL ENCOUNTER (OUTPATIENT)
Dept: WOUND CARE | Facility: HOSPITAL | Age: 61
Discharge: HOME | End: 2021-03-02
Attending: FAMILY MEDICINE
Payer: MEDICAID

## 2021-03-02 VITALS
TEMPERATURE: 98 F | RESPIRATION RATE: 20 BRPM | DIASTOLIC BLOOD PRESSURE: 77 MMHG | HEART RATE: 106 BPM | SYSTOLIC BLOOD PRESSURE: 134 MMHG

## 2021-03-02 DIAGNOSIS — E11.621 DIABETIC ULCER OF RIGHT MIDFOOT ASSOCIATED WITH TYPE 2 DIABETES MELLITUS, WITH FAT LAYER EXPOSED: Primary | ICD-10-CM

## 2021-03-02 DIAGNOSIS — L97.412 DIABETIC ULCER OF RIGHT MIDFOOT ASSOCIATED WITH TYPE 2 DIABETES MELLITUS, WITH FAT LAYER EXPOSED: Primary | ICD-10-CM

## 2021-03-02 PROCEDURE — 99214 PR OFFICE/OUTPT VISIT, EST, LEVL IV, 30-39 MIN: ICD-10-PCS | Mod: 25,,, | Performed by: FAMILY MEDICINE

## 2021-03-02 PROCEDURE — 11042 DBRDMT SUBQ TIS 1ST 20SQCM/<: CPT | Mod: ,,, | Performed by: FAMILY MEDICINE

## 2021-03-02 PROCEDURE — 99214 OFFICE O/P EST MOD 30 MIN: CPT | Mod: 25,,, | Performed by: FAMILY MEDICINE

## 2021-03-02 PROCEDURE — 27201912 HC WOUND CARE DEBRIDEMENT SUPPLIES: Performed by: FAMILY MEDICINE

## 2021-03-02 PROCEDURE — 11042 DEBRIDEMENT: ICD-10-PCS | Mod: ,,, | Performed by: FAMILY MEDICINE

## 2021-03-02 PROCEDURE — 11042 DBRDMT SUBQ TIS 1ST 20SQCM/<: CPT | Performed by: FAMILY MEDICINE

## 2021-03-09 ENCOUNTER — HOSPITAL ENCOUNTER (OUTPATIENT)
Dept: RADIOLOGY | Facility: HOSPITAL | Age: 61
Discharge: HOME | End: 2021-03-09
Attending: FAMILY MEDICINE
Payer: MEDICAID

## 2021-03-09 ENCOUNTER — HOSPITAL ENCOUNTER (OUTPATIENT)
Dept: WOUND CARE | Facility: HOSPITAL | Age: 61
Discharge: HOME | End: 2021-03-09
Attending: FAMILY MEDICINE
Payer: MEDICAID

## 2021-03-09 VITALS — TEMPERATURE: 97 F | DIASTOLIC BLOOD PRESSURE: 70 MMHG | HEART RATE: 80 BPM | SYSTOLIC BLOOD PRESSURE: 140 MMHG

## 2021-03-09 DIAGNOSIS — E11.621 DIABETIC ULCER OF RIGHT MIDFOOT ASSOCIATED WITH TYPE 2 DIABETES MELLITUS, WITH FAT LAYER EXPOSED: Primary | ICD-10-CM

## 2021-03-09 DIAGNOSIS — L97.412 DIABETIC ULCER OF RIGHT MIDFOOT ASSOCIATED WITH TYPE 2 DIABETES MELLITUS, WITH FAT LAYER EXPOSED: ICD-10-CM

## 2021-03-09 DIAGNOSIS — L97.412 DIABETIC ULCER OF RIGHT MIDFOOT ASSOCIATED WITH TYPE 2 DIABETES MELLITUS, WITH FAT LAYER EXPOSED: Primary | ICD-10-CM

## 2021-03-09 DIAGNOSIS — E11.621 DIABETIC ULCER OF RIGHT MIDFOOT ASSOCIATED WITH TYPE 2 DIABETES MELLITUS, WITH FAT LAYER EXPOSED: ICD-10-CM

## 2021-03-09 PROCEDURE — 99214 OFFICE O/P EST MOD 30 MIN: CPT | Mod: 25,,, | Performed by: FAMILY MEDICINE

## 2021-03-09 PROCEDURE — 73630 X-RAY EXAM OF FOOT: CPT | Mod: TC,FY,RT

## 2021-03-09 PROCEDURE — 11042 DBRDMT SUBQ TIS 1ST 20SQCM/<: CPT | Mod: ,,, | Performed by: FAMILY MEDICINE

## 2021-03-09 PROCEDURE — 73630 X-RAY EXAM OF FOOT: CPT | Mod: 26,RT,, | Performed by: RADIOLOGY

## 2021-03-09 PROCEDURE — 99214 PR OFFICE/OUTPT VISIT, EST, LEVL IV, 30-39 MIN: ICD-10-PCS | Mod: 25,,, | Performed by: FAMILY MEDICINE

## 2021-03-09 PROCEDURE — 27201912 HC WOUND CARE DEBRIDEMENT SUPPLIES: Performed by: FAMILY MEDICINE

## 2021-03-09 PROCEDURE — 73630 XR FOOT COMPLETE 3 VIEW RIGHT: ICD-10-PCS | Mod: 26,RT,, | Performed by: RADIOLOGY

## 2021-03-09 PROCEDURE — 11042 DEBRIDEMENT: ICD-10-PCS | Mod: ,,, | Performed by: FAMILY MEDICINE

## 2021-03-09 PROCEDURE — 11042 DBRDMT SUBQ TIS 1ST 20SQCM/<: CPT | Performed by: FAMILY MEDICINE

## 2021-03-10 ENCOUNTER — TELEPHONE (OUTPATIENT)
Dept: FAMILY MEDICINE | Facility: CLINIC | Age: 61
End: 2021-03-10

## 2021-03-16 ENCOUNTER — HOSPITAL ENCOUNTER (OUTPATIENT)
Dept: WOUND CARE | Facility: HOSPITAL | Age: 61
Discharge: HOME | End: 2021-03-16
Attending: FAMILY MEDICINE
Payer: MEDICAID

## 2021-03-16 VITALS — HEART RATE: 94 BPM | TEMPERATURE: 98 F | DIASTOLIC BLOOD PRESSURE: 79 MMHG | SYSTOLIC BLOOD PRESSURE: 163 MMHG

## 2021-03-16 DIAGNOSIS — E11.621 DIABETIC ULCER OF RIGHT MIDFOOT ASSOCIATED WITH TYPE 2 DIABETES MELLITUS, WITH FAT LAYER EXPOSED: Primary | ICD-10-CM

## 2021-03-16 DIAGNOSIS — L97.412 DIABETIC ULCER OF RIGHT MIDFOOT ASSOCIATED WITH TYPE 2 DIABETES MELLITUS, WITH FAT LAYER EXPOSED: Primary | ICD-10-CM

## 2021-03-16 PROCEDURE — 11042 DEBRIDEMENT: ICD-10-PCS | Mod: ,,, | Performed by: FAMILY MEDICINE

## 2021-03-16 PROCEDURE — 11042 DBRDMT SUBQ TIS 1ST 20SQCM/<: CPT | Performed by: FAMILY MEDICINE

## 2021-03-16 PROCEDURE — 99214 OFFICE O/P EST MOD 30 MIN: CPT | Mod: 25,,, | Performed by: FAMILY MEDICINE

## 2021-03-16 PROCEDURE — 11042 DBRDMT SUBQ TIS 1ST 20SQCM/<: CPT | Mod: ,,, | Performed by: FAMILY MEDICINE

## 2021-03-16 PROCEDURE — 99214 PR OFFICE/OUTPT VISIT, EST, LEVL IV, 30-39 MIN: ICD-10-PCS | Mod: 25,,, | Performed by: FAMILY MEDICINE

## 2021-03-16 PROCEDURE — 27201912 HC WOUND CARE DEBRIDEMENT SUPPLIES: Performed by: FAMILY MEDICINE

## 2021-03-23 ENCOUNTER — HOSPITAL ENCOUNTER (OUTPATIENT)
Dept: CARDIOLOGY | Facility: HOSPITAL | Age: 61
Discharge: HOME | End: 2021-03-23
Attending: FAMILY MEDICINE
Payer: MEDICAID

## 2021-03-23 ENCOUNTER — HOSPITAL ENCOUNTER (OUTPATIENT)
Dept: WOUND CARE | Facility: HOSPITAL | Age: 61
Discharge: HOME | End: 2021-03-23
Attending: FAMILY MEDICINE
Payer: MEDICAID

## 2021-03-23 VITALS — DIASTOLIC BLOOD PRESSURE: 68 MMHG | TEMPERATURE: 97 F | HEART RATE: 81 BPM | SYSTOLIC BLOOD PRESSURE: 144 MMHG

## 2021-03-23 DIAGNOSIS — L97.412 DIABETIC ULCER OF RIGHT MIDFOOT ASSOCIATED WITH TYPE 2 DIABETES MELLITUS, WITH FAT LAYER EXPOSED: ICD-10-CM

## 2021-03-23 DIAGNOSIS — E11.621 DIABETIC ULCER OF RIGHT MIDFOOT ASSOCIATED WITH TYPE 2 DIABETES MELLITUS, WITH FAT LAYER EXPOSED: Primary | ICD-10-CM

## 2021-03-23 DIAGNOSIS — L97.412 DIABETIC ULCER OF RIGHT MIDFOOT ASSOCIATED WITH TYPE 2 DIABETES MELLITUS, WITH FAT LAYER EXPOSED: Primary | ICD-10-CM

## 2021-03-23 DIAGNOSIS — E11.621 DIABETIC ULCER OF RIGHT MIDFOOT ASSOCIATED WITH TYPE 2 DIABETES MELLITUS, WITH FAT LAYER EXPOSED: ICD-10-CM

## 2021-03-23 LAB
LEFT ANT TIBIAL SYS PSV: 82 CM/S
LEFT ARM BP: 135 MMHG
LEFT CFA PSV: 81 CM/S
LEFT EXTERNAL ILIAC PSV: 86 CM/S
LEFT PERONEAL SYS PSV: 54 CM/S
LEFT POPLITEAL PSV: 62 CM/S
LEFT POST TIBIAL SYS PSV: 116 CM/S
LEFT PROFUNDA SYS PSV: 54 CM/S
LEFT SUPER FEMORAL DIST SYS PSV: 67 CM/S
LEFT SUPER FEMORAL MID SYS PSV: 143 CM/S
LEFT SUPER FEMORAL OSTIAL SYS PSV: 89 CM/S
LEFT SUPER FEMORAL PROX SYS PSV: 114 CM/S
LEFT TBI: 1.04
LEFT TIB/PER TRUNK SYS PSV: 69 CM/S
LEFT TOE PRESSURE: 140 MMHG
RIGHT ANT TIBIAL SYS PSV: 75 CM/S
RIGHT ARM BP: 129 MMHG
RIGHT CFA PSV: 67 CM/S
RIGHT EXTERNAL ILLIAC PSV: 85 CM/S
RIGHT PERONEAL SYS PSV: 40 CM/S
RIGHT POPLITEAL PSV: 68 CM/S
RIGHT POST TIBIAL SYS PSV: 98 CM/S
RIGHT PROFUNDA SYS PSV: 47 CM/S
RIGHT SUPER FEMORAL DIST SYS PSV: 86 CM/S
RIGHT SUPER FEMORAL MID SYS PSV: 90 CM/S
RIGHT SUPER FEMORAL OSTIAL SYS PSV: 82 CM/S
RIGHT SUPER FEMORAL PROX SYS PSV: 92 CM/S
RIGHT TIB/PER TRUNK SYS PSV: 83 CM/S

## 2021-03-23 PROCEDURE — 93998 UNLISTD NONINVAS VASC DX STD: CPT | Mod: ,,, | Performed by: INTERNAL MEDICINE

## 2021-03-23 PROCEDURE — 11042 DBRDMT SUBQ TIS 1ST 20SQCM/<: CPT | Mod: ,,, | Performed by: FAMILY MEDICINE

## 2021-03-23 PROCEDURE — 93970 EXTREMITY STUDY: CPT | Mod: 26,,, | Performed by: INTERNAL MEDICINE

## 2021-03-23 PROCEDURE — 93925 LOWER EXTREMITY STUDY: CPT

## 2021-03-23 PROCEDURE — 93925 LOWER EXTREMITY STUDY: CPT | Mod: 26,,, | Performed by: INTERNAL MEDICINE

## 2021-03-23 PROCEDURE — 99214 PR OFFICE/OUTPT VISIT, EST, LEVL IV, 30-39 MIN: ICD-10-PCS | Mod: 25,,, | Performed by: FAMILY MEDICINE

## 2021-03-23 PROCEDURE — 93998 CV US TOE PRESSURES ONLY (CUPID ONLY): ICD-10-PCS | Mod: ,,, | Performed by: INTERNAL MEDICINE

## 2021-03-23 PROCEDURE — 93998 UNLISTD NONINVAS VASC DX STD: CPT

## 2021-03-23 PROCEDURE — 93970 EXTREMITY STUDY: CPT | Mod: 50

## 2021-03-23 PROCEDURE — 11042 DEBRIDEMENT: ICD-10-PCS | Mod: ,,, | Performed by: FAMILY MEDICINE

## 2021-03-23 PROCEDURE — 93925 CV US DOPPLER ARTERIAL LEGS BILATERAL (CUPID ONLY): ICD-10-PCS | Mod: 26,,, | Performed by: INTERNAL MEDICINE

## 2021-03-23 PROCEDURE — 27201912 HC WOUND CARE DEBRIDEMENT SUPPLIES: Performed by: FAMILY MEDICINE

## 2021-03-23 PROCEDURE — 99214 OFFICE O/P EST MOD 30 MIN: CPT | Mod: 25,,, | Performed by: FAMILY MEDICINE

## 2021-03-23 PROCEDURE — 11042 DBRDMT SUBQ TIS 1ST 20SQCM/<: CPT | Performed by: FAMILY MEDICINE

## 2021-03-23 PROCEDURE — 93970 CV US DOPPLER VENOUS LEGS BILATERAL (CUPID ONLY): ICD-10-PCS | Mod: 26,,, | Performed by: INTERNAL MEDICINE

## 2021-03-30 ENCOUNTER — HOSPITAL ENCOUNTER (OUTPATIENT)
Dept: WOUND CARE | Facility: HOSPITAL | Age: 61
Discharge: HOME | End: 2021-03-30
Attending: FAMILY MEDICINE
Payer: MEDICAID

## 2021-03-30 VITALS
HEART RATE: 94 BPM | BODY MASS INDEX: 31.36 KG/M2 | WEIGHT: 231.5 LBS | DIASTOLIC BLOOD PRESSURE: 71 MMHG | HEIGHT: 72 IN | TEMPERATURE: 98 F | SYSTOLIC BLOOD PRESSURE: 140 MMHG

## 2021-03-30 DIAGNOSIS — L97.412 DIABETIC ULCER OF RIGHT MIDFOOT ASSOCIATED WITH TYPE 2 DIABETES MELLITUS, WITH FAT LAYER EXPOSED: Primary | ICD-10-CM

## 2021-03-30 DIAGNOSIS — E11.621 DIABETIC ULCER OF RIGHT MIDFOOT ASSOCIATED WITH TYPE 2 DIABETES MELLITUS, WITH FAT LAYER EXPOSED: Primary | ICD-10-CM

## 2021-03-30 PROCEDURE — 11042 DBRDMT SUBQ TIS 1ST 20SQCM/<: CPT | Mod: ,,, | Performed by: FAMILY MEDICINE

## 2021-03-30 PROCEDURE — 27201912 HC WOUND CARE DEBRIDEMENT SUPPLIES: Performed by: FAMILY MEDICINE

## 2021-03-30 PROCEDURE — 99214 OFFICE O/P EST MOD 30 MIN: CPT | Mod: 25,,, | Performed by: FAMILY MEDICINE

## 2021-03-30 PROCEDURE — 11042 DEBRIDEMENT: ICD-10-PCS | Mod: ,,, | Performed by: FAMILY MEDICINE

## 2021-03-30 PROCEDURE — 11042 DBRDMT SUBQ TIS 1ST 20SQCM/<: CPT | Performed by: FAMILY MEDICINE

## 2021-03-30 PROCEDURE — 99214 PR OFFICE/OUTPT VISIT, EST, LEVL IV, 30-39 MIN: ICD-10-PCS | Mod: 25,,, | Performed by: FAMILY MEDICINE

## 2021-04-06 ENCOUNTER — HOSPITAL ENCOUNTER (OUTPATIENT)
Dept: WOUND CARE | Facility: HOSPITAL | Age: 61
Discharge: HOME | End: 2021-04-06
Attending: FAMILY MEDICINE
Payer: MEDICAID

## 2021-04-06 VITALS — SYSTOLIC BLOOD PRESSURE: 129 MMHG | DIASTOLIC BLOOD PRESSURE: 74 MMHG | TEMPERATURE: 98 F | HEART RATE: 110 BPM

## 2021-04-06 DIAGNOSIS — E11.621 DIABETIC ULCER OF RIGHT MIDFOOT ASSOCIATED WITH TYPE 2 DIABETES MELLITUS, WITH FAT LAYER EXPOSED: Primary | ICD-10-CM

## 2021-04-06 DIAGNOSIS — L97.412 DIABETIC ULCER OF RIGHT MIDFOOT ASSOCIATED WITH TYPE 2 DIABETES MELLITUS, WITH FAT LAYER EXPOSED: Primary | ICD-10-CM

## 2021-04-06 PROCEDURE — 99214 PR OFFICE/OUTPT VISIT, EST, LEVL IV, 30-39 MIN: ICD-10-PCS | Mod: 25,,, | Performed by: FAMILY MEDICINE

## 2021-04-06 PROCEDURE — 27201912 HC WOUND CARE DEBRIDEMENT SUPPLIES: Performed by: FAMILY MEDICINE

## 2021-04-06 PROCEDURE — 11042 DBRDMT SUBQ TIS 1ST 20SQCM/<: CPT | Mod: ,,, | Performed by: FAMILY MEDICINE

## 2021-04-06 PROCEDURE — 11042 DEBRIDEMENT: ICD-10-PCS | Mod: ,,, | Performed by: FAMILY MEDICINE

## 2021-04-06 PROCEDURE — 11042 DBRDMT SUBQ TIS 1ST 20SQCM/<: CPT | Performed by: FAMILY MEDICINE

## 2021-04-06 PROCEDURE — 99214 OFFICE O/P EST MOD 30 MIN: CPT | Mod: 25,,, | Performed by: FAMILY MEDICINE

## 2021-04-13 ENCOUNTER — HOSPITAL ENCOUNTER (OUTPATIENT)
Dept: WOUND CARE | Facility: HOSPITAL | Age: 61
Discharge: HOME | End: 2021-04-13
Attending: FAMILY MEDICINE
Payer: MEDICAID

## 2021-04-13 VITALS — DIASTOLIC BLOOD PRESSURE: 82 MMHG | HEART RATE: 100 BPM | SYSTOLIC BLOOD PRESSURE: 143 MMHG | TEMPERATURE: 97 F

## 2021-04-13 DIAGNOSIS — E11.621 DIABETIC ULCER OF RIGHT MIDFOOT ASSOCIATED WITH TYPE 2 DIABETES MELLITUS, WITH FAT LAYER EXPOSED: Primary | ICD-10-CM

## 2021-04-13 DIAGNOSIS — L97.412 DIABETIC ULCER OF RIGHT MIDFOOT ASSOCIATED WITH TYPE 2 DIABETES MELLITUS, WITH FAT LAYER EXPOSED: Primary | ICD-10-CM

## 2021-04-13 PROCEDURE — 11042 DEBRIDEMENT: ICD-10-PCS | Mod: ,,, | Performed by: FAMILY MEDICINE

## 2021-04-13 PROCEDURE — 11042 DBRDMT SUBQ TIS 1ST 20SQCM/<: CPT | Performed by: FAMILY MEDICINE

## 2021-04-13 PROCEDURE — 11042 DBRDMT SUBQ TIS 1ST 20SQCM/<: CPT | Mod: ,,, | Performed by: FAMILY MEDICINE

## 2021-04-13 PROCEDURE — 27201912 HC WOUND CARE DEBRIDEMENT SUPPLIES: Performed by: FAMILY MEDICINE

## 2021-04-13 PROCEDURE — 99214 PR OFFICE/OUTPT VISIT, EST, LEVL IV, 30-39 MIN: ICD-10-PCS | Mod: 25,,, | Performed by: FAMILY MEDICINE

## 2021-04-13 PROCEDURE — 99214 OFFICE O/P EST MOD 30 MIN: CPT | Mod: 25,,, | Performed by: FAMILY MEDICINE

## 2021-04-14 LAB
LEFT EYE DM RETINOPATHY: POSITIVE
RIGHT EYE DM RETINOPATHY: POSITIVE

## 2021-04-20 ENCOUNTER — PATIENT OUTREACH (OUTPATIENT)
Dept: ADMINISTRATIVE | Facility: HOSPITAL | Age: 61
End: 2021-04-20

## 2021-04-20 ENCOUNTER — HOSPITAL ENCOUNTER (OUTPATIENT)
Dept: WOUND CARE | Facility: HOSPITAL | Age: 61
Discharge: HOME | End: 2021-04-20
Attending: FAMILY MEDICINE
Payer: MEDICAID

## 2021-04-20 VITALS
RESPIRATION RATE: 20 BRPM | SYSTOLIC BLOOD PRESSURE: 150 MMHG | TEMPERATURE: 97 F | HEART RATE: 84 BPM | DIASTOLIC BLOOD PRESSURE: 72 MMHG

## 2021-04-20 DIAGNOSIS — L97.412 DIABETIC ULCER OF RIGHT MIDFOOT ASSOCIATED WITH TYPE 2 DIABETES MELLITUS, WITH FAT LAYER EXPOSED: Primary | ICD-10-CM

## 2021-04-20 DIAGNOSIS — E11.621 DIABETIC ULCER OF RIGHT MIDFOOT ASSOCIATED WITH TYPE 2 DIABETES MELLITUS, WITH FAT LAYER EXPOSED: Primary | ICD-10-CM

## 2021-04-20 PROCEDURE — 27201912 HC WOUND CARE DEBRIDEMENT SUPPLIES: Performed by: FAMILY MEDICINE

## 2021-04-20 PROCEDURE — 99214 PR OFFICE/OUTPT VISIT, EST, LEVL IV, 30-39 MIN: ICD-10-PCS | Mod: 25,,, | Performed by: FAMILY MEDICINE

## 2021-04-20 PROCEDURE — 11042 DBRDMT SUBQ TIS 1ST 20SQCM/<: CPT | Performed by: FAMILY MEDICINE

## 2021-04-20 PROCEDURE — 11042 DBRDMT SUBQ TIS 1ST 20SQCM/<: CPT | Mod: ,,, | Performed by: FAMILY MEDICINE

## 2021-04-20 PROCEDURE — 99214 OFFICE O/P EST MOD 30 MIN: CPT | Mod: 25,,, | Performed by: FAMILY MEDICINE

## 2021-04-20 PROCEDURE — 11042 DEBRIDEMENT: ICD-10-PCS | Mod: ,,, | Performed by: FAMILY MEDICINE

## 2021-04-27 ENCOUNTER — HOSPITAL ENCOUNTER (OUTPATIENT)
Dept: WOUND CARE | Facility: HOSPITAL | Age: 61
Discharge: HOME | End: 2021-04-27
Attending: FAMILY MEDICINE
Payer: MEDICAID

## 2021-04-27 VITALS — SYSTOLIC BLOOD PRESSURE: 140 MMHG | HEART RATE: 100 BPM | TEMPERATURE: 97 F | DIASTOLIC BLOOD PRESSURE: 80 MMHG

## 2021-04-27 DIAGNOSIS — L97.412 DIABETIC ULCER OF RIGHT MIDFOOT ASSOCIATED WITH TYPE 2 DIABETES MELLITUS, WITH FAT LAYER EXPOSED: Primary | ICD-10-CM

## 2021-04-27 DIAGNOSIS — E11.621 DIABETIC ULCER OF RIGHT MIDFOOT ASSOCIATED WITH TYPE 2 DIABETES MELLITUS, WITH FAT LAYER EXPOSED: Primary | ICD-10-CM

## 2021-04-27 PROCEDURE — 27201912 HC WOUND CARE DEBRIDEMENT SUPPLIES: Performed by: PHYSICAL THERAPIST

## 2021-04-27 PROCEDURE — 11042 DBRDMT SUBQ TIS 1ST 20SQCM/<: CPT | Performed by: PHYSICAL THERAPIST

## 2021-04-27 PROCEDURE — 99214 PR OFFICE/OUTPT VISIT, EST, LEVL IV, 30-39 MIN: ICD-10-PCS | Mod: 25,,, | Performed by: FAMILY MEDICINE

## 2021-04-27 PROCEDURE — 11042 DEBRIDEMENT: ICD-10-PCS | Mod: ,,, | Performed by: FAMILY MEDICINE

## 2021-04-27 PROCEDURE — 11042 DBRDMT SUBQ TIS 1ST 20SQCM/<: CPT | Mod: ,,, | Performed by: FAMILY MEDICINE

## 2021-04-27 PROCEDURE — 99214 OFFICE O/P EST MOD 30 MIN: CPT | Mod: 25,,, | Performed by: FAMILY MEDICINE

## 2021-05-04 ENCOUNTER — HOSPITAL ENCOUNTER (OUTPATIENT)
Dept: WOUND CARE | Facility: HOSPITAL | Age: 61
Discharge: HOME | End: 2021-05-04
Attending: FAMILY MEDICINE
Payer: MEDICAID

## 2021-05-04 VITALS — TEMPERATURE: 98 F | DIASTOLIC BLOOD PRESSURE: 68 MMHG | SYSTOLIC BLOOD PRESSURE: 137 MMHG | HEART RATE: 103 BPM

## 2021-05-04 DIAGNOSIS — E11.42 TYPE 2 DIABETES MELLITUS WITH DIABETIC POLYNEUROPATHY, WITHOUT LONG-TERM CURRENT USE OF INSULIN: ICD-10-CM

## 2021-05-04 DIAGNOSIS — E11.621 DIABETIC ULCER OF RIGHT MIDFOOT ASSOCIATED WITH TYPE 2 DIABETES MELLITUS, WITH FAT LAYER EXPOSED: Primary | ICD-10-CM

## 2021-05-04 DIAGNOSIS — L97.412 DIABETIC ULCER OF RIGHT MIDFOOT ASSOCIATED WITH TYPE 2 DIABETES MELLITUS, WITH FAT LAYER EXPOSED: Primary | ICD-10-CM

## 2021-05-04 PROCEDURE — 11042 DBRDMT SUBQ TIS 1ST 20SQCM/<: CPT | Performed by: FAMILY MEDICINE

## 2021-05-04 PROCEDURE — 27201912 HC WOUND CARE DEBRIDEMENT SUPPLIES: Performed by: FAMILY MEDICINE

## 2021-05-04 PROCEDURE — 11042 DEBRIDEMENT: ICD-10-PCS | Mod: ,,, | Performed by: FAMILY MEDICINE

## 2021-05-04 PROCEDURE — 11042 DBRDMT SUBQ TIS 1ST 20SQCM/<: CPT | Mod: ,,, | Performed by: FAMILY MEDICINE

## 2021-05-04 PROCEDURE — 99214 PR OFFICE/OUTPT VISIT, EST, LEVL IV, 30-39 MIN: ICD-10-PCS | Mod: 25,,, | Performed by: FAMILY MEDICINE

## 2021-05-04 PROCEDURE — 99214 OFFICE O/P EST MOD 30 MIN: CPT | Mod: 25,,, | Performed by: FAMILY MEDICINE

## 2021-05-05 DIAGNOSIS — E11.9 TYPE 2 DIABETES MELLITUS WITHOUT COMPLICATION: ICD-10-CM

## 2021-05-11 ENCOUNTER — HOSPITAL ENCOUNTER (OUTPATIENT)
Dept: WOUND CARE | Facility: HOSPITAL | Age: 61
Discharge: HOME | End: 2021-05-11
Attending: FAMILY MEDICINE
Payer: MEDICAID

## 2021-05-11 VITALS
SYSTOLIC BLOOD PRESSURE: 134 MMHG | HEART RATE: 92 BPM | DIASTOLIC BLOOD PRESSURE: 67 MMHG | TEMPERATURE: 98 F | RESPIRATION RATE: 20 BRPM

## 2021-05-11 DIAGNOSIS — E11.621 DIABETIC ULCER OF RIGHT MIDFOOT ASSOCIATED WITH TYPE 2 DIABETES MELLITUS, WITH FAT LAYER EXPOSED: Primary | ICD-10-CM

## 2021-05-11 DIAGNOSIS — L97.412 DIABETIC ULCER OF RIGHT MIDFOOT ASSOCIATED WITH TYPE 2 DIABETES MELLITUS, WITH FAT LAYER EXPOSED: Primary | ICD-10-CM

## 2021-05-11 PROCEDURE — 11042 DEBRIDEMENT: ICD-10-PCS | Mod: ,,, | Performed by: FAMILY MEDICINE

## 2021-05-11 PROCEDURE — 99214 OFFICE O/P EST MOD 30 MIN: CPT | Mod: 25,,, | Performed by: FAMILY MEDICINE

## 2021-05-11 PROCEDURE — 27201912 HC WOUND CARE DEBRIDEMENT SUPPLIES: Performed by: FAMILY MEDICINE

## 2021-05-11 PROCEDURE — 99214 PR OFFICE/OUTPT VISIT, EST, LEVL IV, 30-39 MIN: ICD-10-PCS | Mod: 25,,, | Performed by: FAMILY MEDICINE

## 2021-05-11 PROCEDURE — 11042 DBRDMT SUBQ TIS 1ST 20SQCM/<: CPT | Mod: ,,, | Performed by: FAMILY MEDICINE

## 2021-05-11 PROCEDURE — 11042 DBRDMT SUBQ TIS 1ST 20SQCM/<: CPT | Performed by: FAMILY MEDICINE

## 2021-05-18 ENCOUNTER — HOSPITAL ENCOUNTER (OUTPATIENT)
Dept: WOUND CARE | Facility: HOSPITAL | Age: 61
Discharge: HOME | End: 2021-05-18
Attending: FAMILY MEDICINE
Payer: MEDICAID

## 2021-05-18 VITALS — HEART RATE: 80 BPM | TEMPERATURE: 97 F | SYSTOLIC BLOOD PRESSURE: 138 MMHG | DIASTOLIC BLOOD PRESSURE: 78 MMHG

## 2021-05-18 DIAGNOSIS — E11.621 DIABETIC ULCER OF RIGHT MIDFOOT ASSOCIATED WITH TYPE 2 DIABETES MELLITUS, WITH FAT LAYER EXPOSED: Primary | ICD-10-CM

## 2021-05-18 DIAGNOSIS — L97.412 DIABETIC ULCER OF RIGHT MIDFOOT ASSOCIATED WITH TYPE 2 DIABETES MELLITUS, WITH FAT LAYER EXPOSED: Primary | ICD-10-CM

## 2021-05-18 PROCEDURE — 11042 DBRDMT SUBQ TIS 1ST 20SQCM/<: CPT | Performed by: FAMILY MEDICINE

## 2021-05-18 PROCEDURE — 11042 DBRDMT SUBQ TIS 1ST 20SQCM/<: CPT | Mod: ,,, | Performed by: FAMILY MEDICINE

## 2021-05-18 PROCEDURE — 27201912 HC WOUND CARE DEBRIDEMENT SUPPLIES: Performed by: FAMILY MEDICINE

## 2021-05-18 PROCEDURE — 99214 OFFICE O/P EST MOD 30 MIN: CPT | Mod: 25,,, | Performed by: FAMILY MEDICINE

## 2021-05-18 PROCEDURE — 99214 PR OFFICE/OUTPT VISIT, EST, LEVL IV, 30-39 MIN: ICD-10-PCS | Mod: 25,,, | Performed by: FAMILY MEDICINE

## 2021-05-18 PROCEDURE — 11042 DEBRIDEMENT: ICD-10-PCS | Mod: ,,, | Performed by: FAMILY MEDICINE

## 2021-05-25 ENCOUNTER — HOSPITAL ENCOUNTER (OUTPATIENT)
Dept: WOUND CARE | Facility: HOSPITAL | Age: 61
Discharge: HOME | End: 2021-05-25
Attending: FAMILY MEDICINE
Payer: MEDICAID

## 2021-05-25 VITALS
HEART RATE: 99 BPM | TEMPERATURE: 98 F | DIASTOLIC BLOOD PRESSURE: 58 MMHG | RESPIRATION RATE: 16 BRPM | SYSTOLIC BLOOD PRESSURE: 116 MMHG

## 2021-05-25 DIAGNOSIS — L97.412 DIABETIC ULCER OF RIGHT MIDFOOT ASSOCIATED WITH TYPE 2 DIABETES MELLITUS, WITH FAT LAYER EXPOSED: Primary | ICD-10-CM

## 2021-05-25 DIAGNOSIS — E11.621 DIABETIC ULCER OF RIGHT MIDFOOT ASSOCIATED WITH TYPE 2 DIABETES MELLITUS, WITH FAT LAYER EXPOSED: Primary | ICD-10-CM

## 2021-05-25 PROCEDURE — 11042 DBRDMT SUBQ TIS 1ST 20SQCM/<: CPT | Performed by: FAMILY MEDICINE

## 2021-05-25 PROCEDURE — 99214 PR OFFICE/OUTPT VISIT, EST, LEVL IV, 30-39 MIN: ICD-10-PCS | Mod: 25,,, | Performed by: FAMILY MEDICINE

## 2021-05-25 PROCEDURE — 27201912 HC WOUND CARE DEBRIDEMENT SUPPLIES: Performed by: FAMILY MEDICINE

## 2021-05-25 PROCEDURE — 99214 OFFICE O/P EST MOD 30 MIN: CPT | Mod: 25,,, | Performed by: FAMILY MEDICINE

## 2021-05-25 PROCEDURE — 11042 DBRDMT SUBQ TIS 1ST 20SQCM/<: CPT | Mod: ,,, | Performed by: FAMILY MEDICINE

## 2021-05-25 PROCEDURE — 11042 DEBRIDEMENT: ICD-10-PCS | Mod: ,,, | Performed by: FAMILY MEDICINE

## 2021-06-01 ENCOUNTER — HOSPITAL ENCOUNTER (OUTPATIENT)
Dept: WOUND CARE | Facility: HOSPITAL | Age: 61
Discharge: HOME | End: 2021-06-01
Attending: FAMILY MEDICINE
Payer: MEDICAID

## 2021-06-01 VITALS
TEMPERATURE: 98 F | DIASTOLIC BLOOD PRESSURE: 67 MMHG | HEIGHT: 72 IN | HEART RATE: 97 BPM | WEIGHT: 231.5 LBS | BODY MASS INDEX: 31.36 KG/M2 | SYSTOLIC BLOOD PRESSURE: 133 MMHG

## 2021-06-01 DIAGNOSIS — E11.621 DIABETIC ULCER OF RIGHT MIDFOOT ASSOCIATED WITH TYPE 2 DIABETES MELLITUS, WITH FAT LAYER EXPOSED: Primary | ICD-10-CM

## 2021-06-01 DIAGNOSIS — L97.412 DIABETIC ULCER OF RIGHT MIDFOOT ASSOCIATED WITH TYPE 2 DIABETES MELLITUS, WITH FAT LAYER EXPOSED: Primary | ICD-10-CM

## 2021-06-01 PROCEDURE — 99214 PR OFFICE/OUTPT VISIT, EST, LEVL IV, 30-39 MIN: ICD-10-PCS | Mod: 25,,, | Performed by: FAMILY MEDICINE

## 2021-06-01 PROCEDURE — 99214 OFFICE O/P EST MOD 30 MIN: CPT | Mod: 25,,, | Performed by: FAMILY MEDICINE

## 2021-06-01 PROCEDURE — 11042 DBRDMT SUBQ TIS 1ST 20SQCM/<: CPT

## 2021-06-01 PROCEDURE — 11042 DEBRIDEMENT: ICD-10-PCS | Mod: ,,, | Performed by: FAMILY MEDICINE

## 2021-06-01 PROCEDURE — 11042 DBRDMT SUBQ TIS 1ST 20SQCM/<: CPT | Mod: ,,, | Performed by: FAMILY MEDICINE

## 2021-06-07 ENCOUNTER — PATIENT OUTREACH (OUTPATIENT)
Dept: ADMINISTRATIVE | Facility: HOSPITAL | Age: 61
End: 2021-06-07

## 2021-06-08 ENCOUNTER — HOSPITAL ENCOUNTER (OUTPATIENT)
Dept: WOUND CARE | Facility: HOSPITAL | Age: 61
Discharge: HOME | End: 2021-06-08
Attending: FAMILY MEDICINE
Payer: MEDICAID

## 2021-06-08 VITALS — TEMPERATURE: 98 F | HEART RATE: 108 BPM | SYSTOLIC BLOOD PRESSURE: 157 MMHG | DIASTOLIC BLOOD PRESSURE: 77 MMHG

## 2021-06-08 DIAGNOSIS — L97.412 DIABETIC ULCER OF RIGHT MIDFOOT ASSOCIATED WITH TYPE 2 DIABETES MELLITUS, WITH FAT LAYER EXPOSED: Primary | ICD-10-CM

## 2021-06-08 DIAGNOSIS — E11.42 TYPE 2 DIABETES MELLITUS WITH DIABETIC POLYNEUROPATHY, WITHOUT LONG-TERM CURRENT USE OF INSULIN: ICD-10-CM

## 2021-06-08 DIAGNOSIS — E11.621 DIABETIC ULCER OF RIGHT MIDFOOT ASSOCIATED WITH TYPE 2 DIABETES MELLITUS, WITH FAT LAYER EXPOSED: Primary | ICD-10-CM

## 2021-06-08 PROCEDURE — 11042 DBRDMT SUBQ TIS 1ST 20SQCM/<: CPT | Mod: ,,, | Performed by: FAMILY MEDICINE

## 2021-06-08 PROCEDURE — 11042 DBRDMT SUBQ TIS 1ST 20SQCM/<: CPT

## 2021-06-08 PROCEDURE — 27201912 HC WOUND CARE DEBRIDEMENT SUPPLIES: Performed by: FAMILY MEDICINE

## 2021-06-08 PROCEDURE — 99214 PR OFFICE/OUTPT VISIT, EST, LEVL IV, 30-39 MIN: ICD-10-PCS | Mod: 25,,, | Performed by: FAMILY MEDICINE

## 2021-06-08 PROCEDURE — 11042 DEBRIDEMENT: ICD-10-PCS | Mod: ,,, | Performed by: FAMILY MEDICINE

## 2021-06-08 PROCEDURE — 11042 DBRDMT SUBQ TIS 1ST 20SQCM/<: CPT | Performed by: FAMILY MEDICINE

## 2021-06-08 PROCEDURE — 27201912 HC WOUND CARE DEBRIDEMENT SUPPLIES

## 2021-06-08 PROCEDURE — 99214 OFFICE O/P EST MOD 30 MIN: CPT | Mod: 25,,, | Performed by: FAMILY MEDICINE

## 2021-06-15 ENCOUNTER — HOSPITAL ENCOUNTER (OUTPATIENT)
Dept: WOUND CARE | Facility: HOSPITAL | Age: 61
Discharge: HOME | End: 2021-06-15
Attending: FAMILY MEDICINE
Payer: MEDICAID

## 2021-06-15 VITALS — HEART RATE: 80 BPM | SYSTOLIC BLOOD PRESSURE: 120 MMHG | TEMPERATURE: 97 F | DIASTOLIC BLOOD PRESSURE: 60 MMHG

## 2021-06-15 DIAGNOSIS — L97.412 DIABETIC ULCER OF RIGHT MIDFOOT ASSOCIATED WITH TYPE 2 DIABETES MELLITUS, WITH FAT LAYER EXPOSED: Primary | ICD-10-CM

## 2021-06-15 DIAGNOSIS — E11.621 DIABETIC ULCER OF RIGHT MIDFOOT ASSOCIATED WITH TYPE 2 DIABETES MELLITUS, WITH FAT LAYER EXPOSED: Primary | ICD-10-CM

## 2021-06-15 PROCEDURE — 11042 DBRDMT SUBQ TIS 1ST 20SQCM/<: CPT | Performed by: FAMILY MEDICINE

## 2021-06-15 PROCEDURE — 99214 PR OFFICE/OUTPT VISIT, EST, LEVL IV, 30-39 MIN: ICD-10-PCS | Mod: 25,,, | Performed by: FAMILY MEDICINE

## 2021-06-15 PROCEDURE — 11042 DBRDMT SUBQ TIS 1ST 20SQCM/<: CPT | Mod: ,,, | Performed by: FAMILY MEDICINE

## 2021-06-15 PROCEDURE — 11042 DEBRIDEMENT: ICD-10-PCS | Mod: ,,, | Performed by: FAMILY MEDICINE

## 2021-06-15 PROCEDURE — 99214 OFFICE O/P EST MOD 30 MIN: CPT | Mod: 25,,, | Performed by: FAMILY MEDICINE

## 2021-06-15 PROCEDURE — 27201912 HC WOUND CARE DEBRIDEMENT SUPPLIES: Performed by: FAMILY MEDICINE

## 2021-06-22 ENCOUNTER — HOSPITAL ENCOUNTER (OUTPATIENT)
Dept: WOUND CARE | Facility: HOSPITAL | Age: 61
Discharge: HOME | End: 2021-06-22
Attending: FAMILY MEDICINE
Payer: MEDICAID

## 2021-06-22 VITALS
RESPIRATION RATE: 20 BRPM | TEMPERATURE: 98 F | DIASTOLIC BLOOD PRESSURE: 77 MMHG | HEART RATE: 96 BPM | SYSTOLIC BLOOD PRESSURE: 139 MMHG

## 2021-06-22 DIAGNOSIS — L97.412 DIABETIC ULCER OF RIGHT MIDFOOT ASSOCIATED WITH TYPE 2 DIABETES MELLITUS, WITH FAT LAYER EXPOSED: Primary | ICD-10-CM

## 2021-06-22 DIAGNOSIS — E11.621 DIABETIC ULCER OF RIGHT MIDFOOT ASSOCIATED WITH TYPE 2 DIABETES MELLITUS, WITH FAT LAYER EXPOSED: Primary | ICD-10-CM

## 2021-06-22 PROCEDURE — 11042 DBRDMT SUBQ TIS 1ST 20SQCM/<: CPT | Performed by: FAMILY MEDICINE

## 2021-06-22 PROCEDURE — 11042 DEBRIDEMENT: ICD-10-PCS | Mod: ,,, | Performed by: FAMILY MEDICINE

## 2021-06-22 PROCEDURE — 11042 DBRDMT SUBQ TIS 1ST 20SQCM/<: CPT | Mod: ,,, | Performed by: FAMILY MEDICINE

## 2021-06-22 PROCEDURE — 99214 PR OFFICE/OUTPT VISIT, EST, LEVL IV, 30-39 MIN: ICD-10-PCS | Mod: 25,,, | Performed by: FAMILY MEDICINE

## 2021-06-22 PROCEDURE — 27201912 HC WOUND CARE DEBRIDEMENT SUPPLIES: Performed by: FAMILY MEDICINE

## 2021-06-22 PROCEDURE — 99214 OFFICE O/P EST MOD 30 MIN: CPT | Mod: 25,,, | Performed by: FAMILY MEDICINE

## 2021-06-29 ENCOUNTER — HOSPITAL ENCOUNTER (OUTPATIENT)
Dept: WOUND CARE | Facility: HOSPITAL | Age: 61
Discharge: HOME | End: 2021-06-29
Attending: FAMILY MEDICINE
Payer: MEDICAID

## 2021-06-29 VITALS
HEART RATE: 97 BPM | TEMPERATURE: 98 F | RESPIRATION RATE: 20 BRPM | SYSTOLIC BLOOD PRESSURE: 135 MMHG | DIASTOLIC BLOOD PRESSURE: 75 MMHG

## 2021-06-29 DIAGNOSIS — L97.412 DIABETIC ULCER OF RIGHT MIDFOOT ASSOCIATED WITH TYPE 2 DIABETES MELLITUS, WITH FAT LAYER EXPOSED: Primary | ICD-10-CM

## 2021-06-29 DIAGNOSIS — E11.621 DIABETIC ULCER OF RIGHT MIDFOOT ASSOCIATED WITH TYPE 2 DIABETES MELLITUS, WITH FAT LAYER EXPOSED: Primary | ICD-10-CM

## 2021-06-29 PROCEDURE — 99214 OFFICE O/P EST MOD 30 MIN: CPT | Mod: 25,,, | Performed by: FAMILY MEDICINE

## 2021-06-29 PROCEDURE — 11042 DEBRIDEMENT: ICD-10-PCS | Mod: ,,, | Performed by: FAMILY MEDICINE

## 2021-06-29 PROCEDURE — 11042 DBRDMT SUBQ TIS 1ST 20SQCM/<: CPT | Performed by: FAMILY MEDICINE

## 2021-06-29 PROCEDURE — 11042 DBRDMT SUBQ TIS 1ST 20SQCM/<: CPT | Mod: ,,, | Performed by: FAMILY MEDICINE

## 2021-06-29 PROCEDURE — 27201912 HC WOUND CARE DEBRIDEMENT SUPPLIES: Performed by: FAMILY MEDICINE

## 2021-06-29 PROCEDURE — 99214 PR OFFICE/OUTPT VISIT, EST, LEVL IV, 30-39 MIN: ICD-10-PCS | Mod: 25,,, | Performed by: FAMILY MEDICINE

## 2021-07-03 NOTE — ED PROVIDER NOTES
Encounter Date: 12/11/2018    SCRIBE #1 NOTE: I, Eliazar Torres, am scribing for, and in the presence of,  Jazmyn Stevens NP. I have scribed the following portions of the note - Other sections scribed: HPI, ELINOR.       History     Chief Complaint   Patient presents with    Back Pain     right lumbar back pain x 3 days    Vomiting     N/V begining today     CC: Back Pain ; Emesis    59 y/o male with DM type II presents to the ED c/o acute onset atraumatic R lumbar back pain and emesis that started at 12AM yesterday. The pain is severe (10/10) and intermittent. He only reports mild pain currently. The patient states that yesterday morning, he noticed the R lumbar back pain, which he thought was due to constipation. The patient attempted to have a BM; however, he had x1 ep of emesis, which resolved his back pain at the time. Today, the patient had x3 more ep of emesis, which he states mildly alleviates the back pain. The patient attempted a laxative today, which caused him to have BM this morning. The patient has been taking Metformin for the past 11 yrs. The patient reports CBG recently of 130-140. The patient denies hx of abdominal surgeries. The patient denies any recent trauma, injury, fall, or heavy lifting. The patient denies dysuria, urinary frequency, hematuria, testicular pain, scrotal swelling, chest pain, SOB, diarrhea, fever, chills, nasal congestion, cough, sore throat, abdominal pain, melena, or hematemesis. No other symptoms reported.      The history is provided by the patient. No  was used.     Review of patient's allergies indicates:  No Known Allergies  Past Medical History:   Diagnosis Date    Diabetes mellitus, type 2      Past Surgical History:   Procedure Laterality Date    eye  surgeries      several     TOE AMPUTATION      several toes on R foot     Family History   Problem Relation Age of Onset    No Known Problems Mother     Diabetes Father      Social History      Tobacco Use    Smoking status: Never Smoker    Smokeless tobacco: Never Used   Substance Use Topics    Alcohol use: No    Drug use: No     Review of Systems   Constitutional: Negative for chills and fever.   HENT: Negative for congestion, ear pain, rhinorrhea and sore throat.    Eyes: Negative for redness.   Respiratory: Negative for cough and shortness of breath.    Cardiovascular: Negative for chest pain.   Gastrointestinal: Positive for nausea and vomiting (x3 ep today). Negative for abdominal pain, constipation and diarrhea.        (-) melena  (-) hematemesis   Genitourinary: Negative for decreased urine volume, difficulty urinating, dysuria, frequency, hematuria, scrotal swelling, testicular pain and urgency.   Musculoskeletal: Positive for back pain (R lumbar). Negative for neck pain.   Skin: Negative for rash.   Neurological: Negative for headaches.   Psychiatric/Behavioral: Negative for confusion.   All other systems reviewed and are negative.      Physical Exam     Initial Vitals [12/11/18 1646]   BP Pulse Resp Temp SpO2   (!) 164/100 70 18 98.6 °F (37 °C) 98 %      MAP       --         Physical Exam    Vitals reviewed.  Constitutional: He appears well-developed and well-nourished. He is not diaphoretic.  Non-toxic appearance. He does not have a sickly appearance. He does not appear ill. No distress.   HENT:   Head: Normocephalic and atraumatic.   Right Ear: External ear normal.   Left Ear: External ear normal.   Neck: Normal range of motion.   Cardiovascular: Normal rate, regular rhythm and normal heart sounds.   Pulmonary/Chest: Effort normal and breath sounds normal. No respiratory distress.   Abdominal: Soft. Normal appearance. There is no hepatosplenomegaly. There is no tenderness. There is no rigidity, no rebound, no guarding, no CVA tenderness, no tenderness at McBurney's point and negative Smith's sign.   Musculoskeletal: Normal range of motion.   Diabetic foot wounds to the right foot.  No  significant erythema.  No overlying warmth.  No discharge or drainage. Good pulses present.   Neurological: He is alert and oriented to person, place, and time. GCS eye subscore is 4. GCS verbal subscore is 5. GCS motor subscore is 6.   Skin: Skin is warm, dry and intact. No rash noted. No erythema.   Psychiatric: He has a normal mood and affect. Thought content normal.         ED Course   Procedures  Labs Reviewed   URINALYSIS, REFLEX TO URINE CULTURE - Abnormal; Notable for the following components:       Result Value    Appearance, UA Hazy (*)     Protein, UA 1+ (*)     Glucose, UA 3+ (*)     Ketones, UA Trace (*)     Occult Blood UA 3+ (*)     Leukocytes, UA 1+ (*)     All other components within normal limits    Narrative:     Preferred Collection Type->Urine, Clean Catch   CBC W/ AUTO DIFFERENTIAL - Abnormal; Notable for the following components:    RBC 4.32 (*)     Hemoglobin 13.7 (*)     Hematocrit 39.8 (*)     MCH 31.7 (*)     Gran # (ANC) 8.1 (*)     Lymph # 0.9 (*)     Gran% 83.5 (*)     Lymph% 8.9 (*)     All other components within normal limits   COMPREHENSIVE METABOLIC PANEL - Abnormal; Notable for the following components:    Glucose 154 (*)     Alkaline Phosphatase 45 (*)     All other components within normal limits   URINALYSIS MICROSCOPIC - Abnormal; Notable for the following components:    RBC, UA 55 (*)     Bacteria, UA Few (*)     Yeast, UA Rare (*)     All other components within normal limits    Narrative:     Preferred Collection Type->Urine, Clean Catch   POCT GLUCOSE - Abnormal; Notable for the following components:    POCT Glucose 156 (*)     All other components within normal limits   CULTURE, URINE   LIPASE          Imaging Results          CT Renal Stone Study ABD Pelvis WO (Final result)  Result time 12/11/18 18:15:57    Final result by Clyde Peguero MD (12/11/18 18:15:57)                 Impression:      1. Moderate right hydroureteronephrosis secondary to a 0.3 cm calculus  within the proximal right ureter.  There is additional right nonobstructive nephrolithiasis.  Given the degree of renal inflammation, correlation with urinalysis is recommended to exclude superimposed infection.  2. Several additional findings above.      Electronically signed by: Clyde Peguero MD  Date:    12/11/2018  Time:    18:15             Narrative:    EXAMINATION:  CT RENAL STONE STUDY ABD PELVIS WO    CLINICAL HISTORY:  Flank pain, stone disease suspected;    TECHNIQUE:  Low dose axial images, sagittal and coronal reformations were obtained from the lung bases to the pubic symphysis.  Contrast was not administered.    COMPARISON:  None    FINDINGS:  Images of the lower thorax are remarkable for bilateral dependent atelectasis/scarring.  There is calcification in the distribution of the coronary arteries.    The liver, spleen, pancreas, gallbladder and adrenal glands have a grossly unremarkable noncontrast appearance.  There is no biliary dilation or abdominal ascites.  No significant abdominal lymphadenopathy.    There is left perinephric fat stranding, nonspecific noting no hydronephrosis or nephrolithiasis on the left.  The left ureter is unremarkable without calculi seen.  There is diffuse right perinephric inflammation and mild to moderate right hydroureteronephrosis, most likely secondary to a 0.3 cm calculus within the proximal right ureter.  Distal to this, the ureter returns to normal caliber without additional calculi seen.  Within the proximal right renal collecting system, there is a 0.8 cm calculus.  The urinary bladder is grossly unremarkable.  The prostate is not enlarged.    The distal large bowel is decompressed.  The terminal ileum and appendix/appendiceal stump are unremarkable.  The small bowel is grossly unremarkable.  There is atherosclerotic calcification of the aorta and its branches.  No focal organized pelvic fluid collection.    There are a few scattered presumed osseous bone  islands.  Degenerative changes are noted of the spine.  No significant inguinal lymphadenopathy.                                 Medical Decision Making:   ED Management:  This is an evaluation of a 58 y.o. male that presents to the Emergency Department for right lumbar back pain with associated N/V. Physical Exam shows a non-toxic, afebrile, and well appearing male. He currently denies any abdominal pain at this time. On examination the abdomen is flat without distention.  There is no surface trauma, scars, incisions.  Bowel sounds are present in all 4 quadrants.  No tenderness, guarding, rigidity to palpation.  No tenderness, masses, pulsation in epigastric area.  No organomegaly.  Negative Smith sign.  No periumbilical tenderness.  No rebound in the lower quadrants.  Nontender over McBurney's point.  No suprapubic tenderness or distention.  Good femoral pulses bilaterally.  No CVA tenderness.    Vital Signs Are Reassuring.  RESULTS:   Urinalysis with 3+ occult blood.  1+ leukocytes.  Microscopic UA with 53 red blood cells, 3 white blood cells, few bacteria, rare budding yeast.  Urine culture is pending.  CT abdomen pelvis with moderate right hydroureter nephrosis secondary to 0.3 cm calculus within the right proximal ureter.  No other acute findings.   No renal dysfunction on CMP. creatinine 1.0.  BUN 15.  Patient is at afebrile.  No leukocytosis on CBC.  Doubt systemic infection or urosepsis.  Vital signs are stable.  Given 1 g IV Rocephin in ED.  Will culture urine and send patient home on ciprofloxacin for infected stone.  Pain well controlled.  He is tolerating oral intake.  He will follow up with Urology as soon as possible.  ED return precautions given.    My overall impression is nephrolithiasis. I considered, but at this time, do not suspect abdominal aortic aneurysm, appendicitis, pancreatitis, mesenteric ischemia, obstruction, cholecystitis, peptic ulcer disease, diverticulitis, colitis, volvulus,  hernia.     This case was discussed with Dr. Sparrow who is in agreement with my assessment and plan.             Scribe Attestation:   Scribe #1: I performed the above scribed service and the documentation accurately describes the services I performed. I attest to the accuracy of the note.    Attending Attestation:   Physician Attestation Statement for Resident:  As the supervising MD  I agree with the above history. -:   As the supervising MD I agree with the above PE.    As the supervising MD I agree with the above treatment, course, plan, and disposition.   -: I have staffed the patient with the midlevel provider and was available for consultation in the department. I have guided the treatment plan and agree with the care provided.            Physician Attestation for Scribe:  Physician Attestation Statement for Scribe #1: I, Jazmyn Gaviira - SCOT, reviewed documentation, as scribed by Eliazar Torres in my presence, and it is both accurate and complete.                   Clinical Impression:   The primary encounter diagnosis was Right nephrolithiasis. A diagnosis of Acute cystitis with hematuria was also pertinent to this visit.      Disposition:   Disposition: Discharged  Condition: Stable                        Jazmyn Gaviria NP  12/12/18 3894       Florencia Sparrow MD  12/13/18 1132     03-Jul-2021 05:58

## 2021-07-06 ENCOUNTER — HOSPITAL ENCOUNTER (OUTPATIENT)
Dept: WOUND CARE | Facility: HOSPITAL | Age: 61
Discharge: HOME | End: 2021-07-06
Attending: FAMILY MEDICINE
Payer: MEDICAID

## 2021-07-06 VITALS
HEART RATE: 91 BPM | SYSTOLIC BLOOD PRESSURE: 130 MMHG | HEIGHT: 72 IN | BODY MASS INDEX: 31.36 KG/M2 | OXYGEN SATURATION: 99 % | WEIGHT: 231.5 LBS | DIASTOLIC BLOOD PRESSURE: 76 MMHG | TEMPERATURE: 98 F

## 2021-07-06 DIAGNOSIS — E11.3393 CONTROLLED TYPE 2 DIABETES MELLITUS WITH BOTH EYES AFFECTED BY MODERATE NONPROLIFERATIVE RETINOPATHY WITHOUT MACULAR EDEMA, WITHOUT LONG-TERM CURRENT USE OF INSULIN: Primary | ICD-10-CM

## 2021-07-06 DIAGNOSIS — L97.412 DIABETIC ULCER OF RIGHT MIDFOOT ASSOCIATED WITH TYPE 2 DIABETES MELLITUS, WITH FAT LAYER EXPOSED: ICD-10-CM

## 2021-07-06 DIAGNOSIS — E11.621 DIABETIC ULCER OF RIGHT MIDFOOT ASSOCIATED WITH TYPE 2 DIABETES MELLITUS, WITH FAT LAYER EXPOSED: ICD-10-CM

## 2021-07-06 PROCEDURE — 11042 DEBRIDEMENT: ICD-10-PCS | Mod: ,,, | Performed by: FAMILY MEDICINE

## 2021-07-06 PROCEDURE — 99214 OFFICE O/P EST MOD 30 MIN: CPT | Mod: 25,,, | Performed by: FAMILY MEDICINE

## 2021-07-06 PROCEDURE — 11042 DBRDMT SUBQ TIS 1ST 20SQCM/<: CPT | Performed by: FAMILY MEDICINE

## 2021-07-06 PROCEDURE — 27201912 HC WOUND CARE DEBRIDEMENT SUPPLIES: Performed by: FAMILY MEDICINE

## 2021-07-06 PROCEDURE — 99214 PR OFFICE/OUTPT VISIT, EST, LEVL IV, 30-39 MIN: ICD-10-PCS | Mod: 25,,, | Performed by: FAMILY MEDICINE

## 2021-07-06 PROCEDURE — 11042 DBRDMT SUBQ TIS 1ST 20SQCM/<: CPT | Mod: ,,, | Performed by: FAMILY MEDICINE

## 2021-07-13 ENCOUNTER — HOSPITAL ENCOUNTER (OUTPATIENT)
Dept: WOUND CARE | Facility: HOSPITAL | Age: 61
Discharge: HOME | End: 2021-07-13
Attending: FAMILY MEDICINE
Payer: MEDICAID

## 2021-07-13 VITALS — SYSTOLIC BLOOD PRESSURE: 140 MMHG | DIASTOLIC BLOOD PRESSURE: 79 MMHG | HEART RATE: 79 BPM

## 2021-07-13 DIAGNOSIS — E11.621 DIABETIC ULCER OF RIGHT MIDFOOT ASSOCIATED WITH TYPE 2 DIABETES MELLITUS, WITH FAT LAYER EXPOSED: Primary | ICD-10-CM

## 2021-07-13 DIAGNOSIS — L97.412 DIABETIC ULCER OF RIGHT MIDFOOT ASSOCIATED WITH TYPE 2 DIABETES MELLITUS, WITH FAT LAYER EXPOSED: Primary | ICD-10-CM

## 2021-07-13 DIAGNOSIS — E11.42 TYPE 2 DIABETES MELLITUS WITH DIABETIC POLYNEUROPATHY, WITHOUT LONG-TERM CURRENT USE OF INSULIN: ICD-10-CM

## 2021-07-13 DIAGNOSIS — Z00.00 VISIT FOR WELL MAN HEALTH CHECK: ICD-10-CM

## 2021-07-13 PROCEDURE — 99214 PR OFFICE/OUTPT VISIT, EST, LEVL IV, 30-39 MIN: ICD-10-PCS | Mod: 25,,, | Performed by: FAMILY MEDICINE

## 2021-07-13 PROCEDURE — 11042 DBRDMT SUBQ TIS 1ST 20SQCM/<: CPT | Performed by: FAMILY MEDICINE

## 2021-07-13 PROCEDURE — 11042 DBRDMT SUBQ TIS 1ST 20SQCM/<: CPT | Mod: ,,, | Performed by: FAMILY MEDICINE

## 2021-07-13 PROCEDURE — 27201912 HC WOUND CARE DEBRIDEMENT SUPPLIES: Performed by: FAMILY MEDICINE

## 2021-07-13 PROCEDURE — 99214 OFFICE O/P EST MOD 30 MIN: CPT | Mod: 25,,, | Performed by: FAMILY MEDICINE

## 2021-07-13 PROCEDURE — 11042 DEBRIDEMENT: ICD-10-PCS | Mod: ,,, | Performed by: FAMILY MEDICINE

## 2021-07-13 RX ORDER — LOSARTAN POTASSIUM 25 MG/1
25 TABLET ORAL DAILY
Qty: 30 TABLET | Refills: 5 | Status: SHIPPED | OUTPATIENT
Start: 2021-07-13 | End: 2021-08-08

## 2021-07-20 ENCOUNTER — HOSPITAL ENCOUNTER (OUTPATIENT)
Dept: WOUND CARE | Facility: HOSPITAL | Age: 61
Discharge: HOME | End: 2021-07-20
Attending: FAMILY MEDICINE
Payer: MEDICAID

## 2021-07-20 VITALS — SYSTOLIC BLOOD PRESSURE: 130 MMHG | HEART RATE: 68 BPM | TEMPERATURE: 97 F | DIASTOLIC BLOOD PRESSURE: 70 MMHG

## 2021-07-20 DIAGNOSIS — E11.621 DIABETIC ULCER OF RIGHT MIDFOOT ASSOCIATED WITH TYPE 2 DIABETES MELLITUS, WITH FAT LAYER EXPOSED: Primary | ICD-10-CM

## 2021-07-20 DIAGNOSIS — L97.412 DIABETIC ULCER OF RIGHT MIDFOOT ASSOCIATED WITH TYPE 2 DIABETES MELLITUS, WITH FAT LAYER EXPOSED: Primary | ICD-10-CM

## 2021-07-20 PROCEDURE — 11056 PARNG/CUTG B9 HYPRKR LES 2-4: CPT | Performed by: FAMILY MEDICINE

## 2021-07-20 PROCEDURE — 99214 OFFICE O/P EST MOD 30 MIN: CPT | Mod: 25,,, | Performed by: FAMILY MEDICINE

## 2021-07-20 PROCEDURE — 11042 DEBRIDEMENT: ICD-10-PCS | Mod: ,,, | Performed by: FAMILY MEDICINE

## 2021-07-20 PROCEDURE — 11042 DBRDMT SUBQ TIS 1ST 20SQCM/<: CPT | Performed by: FAMILY MEDICINE

## 2021-07-20 PROCEDURE — 11056 PARNG/CUTG B9 HYPRKR LES 2-4: CPT

## 2021-07-20 PROCEDURE — 99214 PR OFFICE/OUTPT VISIT, EST, LEVL IV, 30-39 MIN: ICD-10-PCS | Mod: 25,,, | Performed by: FAMILY MEDICINE

## 2021-07-20 PROCEDURE — 11042 DBRDMT SUBQ TIS 1ST 20SQCM/<: CPT | Mod: ,,, | Performed by: FAMILY MEDICINE

## 2021-07-27 ENCOUNTER — HOSPITAL ENCOUNTER (OUTPATIENT)
Dept: WOUND CARE | Facility: HOSPITAL | Age: 61
Discharge: HOME | End: 2021-07-27
Attending: FAMILY MEDICINE
Payer: MEDICAID

## 2021-07-27 VITALS
DIASTOLIC BLOOD PRESSURE: 75 MMHG | SYSTOLIC BLOOD PRESSURE: 137 MMHG | TEMPERATURE: 98 F | RESPIRATION RATE: 20 BRPM | HEART RATE: 107 BPM

## 2021-07-27 DIAGNOSIS — L97.412 DIABETIC ULCER OF RIGHT MIDFOOT ASSOCIATED WITH TYPE 2 DIABETES MELLITUS, WITH FAT LAYER EXPOSED: Primary | ICD-10-CM

## 2021-07-27 DIAGNOSIS — E11.621 DIABETIC ULCER OF RIGHT MIDFOOT ASSOCIATED WITH TYPE 2 DIABETES MELLITUS, WITH FAT LAYER EXPOSED: Primary | ICD-10-CM

## 2021-07-27 PROCEDURE — 99212 OFFICE O/P EST SF 10 MIN: CPT | Performed by: FAMILY MEDICINE

## 2021-07-27 PROCEDURE — 99214 OFFICE O/P EST MOD 30 MIN: CPT | Mod: ,,, | Performed by: FAMILY MEDICINE

## 2021-07-27 PROCEDURE — 99214 PR OFFICE/OUTPT VISIT, EST, LEVL IV, 30-39 MIN: ICD-10-PCS | Mod: ,,, | Performed by: FAMILY MEDICINE

## 2021-08-23 ENCOUNTER — TELEPHONE (OUTPATIENT)
Dept: FAMILY MEDICINE | Facility: CLINIC | Age: 61
End: 2021-08-23

## 2021-09-07 ENCOUNTER — HOSPITAL ENCOUNTER (OUTPATIENT)
Dept: WOUND CARE | Facility: HOSPITAL | Age: 61
Discharge: HOME | End: 2021-09-07
Attending: FAMILY MEDICINE
Payer: MEDICAID

## 2021-09-07 VITALS — DIASTOLIC BLOOD PRESSURE: 77 MMHG | HEART RATE: 106 BPM | TEMPERATURE: 98 F | SYSTOLIC BLOOD PRESSURE: 145 MMHG

## 2021-09-07 DIAGNOSIS — L97.412 DIABETIC ULCER OF RIGHT MIDFOOT ASSOCIATED WITH TYPE 2 DIABETES MELLITUS, WITH FAT LAYER EXPOSED: Primary | ICD-10-CM

## 2021-09-07 DIAGNOSIS — E11.621 DIABETIC ULCER OF RIGHT MIDFOOT ASSOCIATED WITH TYPE 2 DIABETES MELLITUS, WITH FAT LAYER EXPOSED: Primary | ICD-10-CM

## 2021-09-07 PROCEDURE — 99214 OFFICE O/P EST MOD 30 MIN: CPT | Mod: 25,,, | Performed by: FAMILY MEDICINE

## 2021-09-07 PROCEDURE — 11042 DBRDMT SUBQ TIS 1ST 20SQCM/<: CPT | Mod: ,,, | Performed by: FAMILY MEDICINE

## 2021-09-07 PROCEDURE — 99214 PR OFFICE/OUTPT VISIT, EST, LEVL IV, 30-39 MIN: ICD-10-PCS | Mod: 25,,, | Performed by: FAMILY MEDICINE

## 2021-09-07 PROCEDURE — 11042 DBRDMT SUBQ TIS 1ST 20SQCM/<: CPT | Performed by: FAMILY MEDICINE

## 2021-09-07 PROCEDURE — 27201912 HC WOUND CARE DEBRIDEMENT SUPPLIES: Performed by: FAMILY MEDICINE

## 2021-09-07 PROCEDURE — 11042 DEBRIDEMENT: ICD-10-PCS | Mod: ,,, | Performed by: FAMILY MEDICINE

## 2021-09-14 ENCOUNTER — HOSPITAL ENCOUNTER (OUTPATIENT)
Dept: WOUND CARE | Facility: HOSPITAL | Age: 61
Discharge: HOME | End: 2021-09-14
Attending: FAMILY MEDICINE
Payer: MEDICAID

## 2021-09-14 VITALS — TEMPERATURE: 98 F | HEART RATE: 104 BPM | DIASTOLIC BLOOD PRESSURE: 78 MMHG | SYSTOLIC BLOOD PRESSURE: 142 MMHG

## 2021-09-14 DIAGNOSIS — L97.412 DIABETIC ULCER OF RIGHT MIDFOOT ASSOCIATED WITH TYPE 2 DIABETES MELLITUS, WITH FAT LAYER EXPOSED: Primary | ICD-10-CM

## 2021-09-14 DIAGNOSIS — E11.621 DIABETIC ULCER OF RIGHT MIDFOOT ASSOCIATED WITH TYPE 2 DIABETES MELLITUS, WITH FAT LAYER EXPOSED: Primary | ICD-10-CM

## 2021-09-14 PROCEDURE — 11042 DEBRIDEMENT: ICD-10-PCS | Mod: ,,, | Performed by: FAMILY MEDICINE

## 2021-09-14 PROCEDURE — 99214 OFFICE O/P EST MOD 30 MIN: CPT | Mod: 25,,, | Performed by: FAMILY MEDICINE

## 2021-09-14 PROCEDURE — 27201912 HC WOUND CARE DEBRIDEMENT SUPPLIES: Performed by: FAMILY MEDICINE

## 2021-09-14 PROCEDURE — 11042 DBRDMT SUBQ TIS 1ST 20SQCM/<: CPT | Performed by: FAMILY MEDICINE

## 2021-09-14 PROCEDURE — 99214 PR OFFICE/OUTPT VISIT, EST, LEVL IV, 30-39 MIN: ICD-10-PCS | Mod: 25,,, | Performed by: FAMILY MEDICINE

## 2021-09-14 PROCEDURE — 11042 DBRDMT SUBQ TIS 1ST 20SQCM/<: CPT | Mod: ,,, | Performed by: FAMILY MEDICINE

## 2021-09-15 LAB
LEFT EYE DM RETINOPATHY: POSITIVE
RIGHT EYE DM RETINOPATHY: POSITIVE

## 2021-09-21 ENCOUNTER — HOSPITAL ENCOUNTER (OUTPATIENT)
Dept: WOUND CARE | Facility: HOSPITAL | Age: 61
Discharge: HOME | End: 2021-09-21
Attending: FAMILY MEDICINE
Payer: MEDICAID

## 2021-09-21 VITALS — DIASTOLIC BLOOD PRESSURE: 72 MMHG | TEMPERATURE: 98 F | SYSTOLIC BLOOD PRESSURE: 150 MMHG | HEART RATE: 81 BPM

## 2021-09-21 DIAGNOSIS — E11.42 TYPE 2 DIABETES MELLITUS WITH DIABETIC POLYNEUROPATHY, WITHOUT LONG-TERM CURRENT USE OF INSULIN: Primary | ICD-10-CM

## 2021-09-21 DIAGNOSIS — E11.3393 CONTROLLED TYPE 2 DIABETES MELLITUS WITH BOTH EYES AFFECTED BY MODERATE NONPROLIFERATIVE RETINOPATHY WITHOUT MACULAR EDEMA, WITHOUT LONG-TERM CURRENT USE OF INSULIN: ICD-10-CM

## 2021-09-21 DIAGNOSIS — E11.621 DIABETIC ULCER OF RIGHT MIDFOOT ASSOCIATED WITH TYPE 2 DIABETES MELLITUS, WITH FAT LAYER EXPOSED: ICD-10-CM

## 2021-09-21 DIAGNOSIS — L97.412 DIABETIC ULCER OF RIGHT MIDFOOT ASSOCIATED WITH TYPE 2 DIABETES MELLITUS, WITH FAT LAYER EXPOSED: ICD-10-CM

## 2021-09-21 PROCEDURE — 27201912 HC WOUND CARE DEBRIDEMENT SUPPLIES: Performed by: FAMILY MEDICINE

## 2021-09-21 PROCEDURE — 11042 DBRDMT SUBQ TIS 1ST 20SQCM/<: CPT | Mod: ,,, | Performed by: FAMILY MEDICINE

## 2021-09-21 PROCEDURE — 11042 DEBRIDEMENT: ICD-10-PCS | Mod: ,,, | Performed by: FAMILY MEDICINE

## 2021-09-21 PROCEDURE — 99214 PR OFFICE/OUTPT VISIT, EST, LEVL IV, 30-39 MIN: ICD-10-PCS | Mod: 25,,, | Performed by: FAMILY MEDICINE

## 2021-09-21 PROCEDURE — 11042 DBRDMT SUBQ TIS 1ST 20SQCM/<: CPT | Performed by: FAMILY MEDICINE

## 2021-09-21 PROCEDURE — 99214 OFFICE O/P EST MOD 30 MIN: CPT | Mod: 25,,, | Performed by: FAMILY MEDICINE

## 2021-09-23 DIAGNOSIS — E11.9 TYPE 2 DIABETES MELLITUS WITHOUT COMPLICATION: ICD-10-CM

## 2021-09-28 ENCOUNTER — HOSPITAL ENCOUNTER (OUTPATIENT)
Dept: WOUND CARE | Facility: HOSPITAL | Age: 61
Discharge: HOME | End: 2021-09-28
Attending: FAMILY MEDICINE
Payer: MEDICAID

## 2021-09-28 VITALS — SYSTOLIC BLOOD PRESSURE: 151 MMHG | HEART RATE: 86 BPM | TEMPERATURE: 98 F | DIASTOLIC BLOOD PRESSURE: 76 MMHG

## 2021-09-28 DIAGNOSIS — E11.42 TYPE 2 DIABETES MELLITUS WITH DIABETIC POLYNEUROPATHY, WITHOUT LONG-TERM CURRENT USE OF INSULIN: ICD-10-CM

## 2021-09-28 DIAGNOSIS — L97.412 DIABETIC ULCER OF RIGHT MIDFOOT ASSOCIATED WITH TYPE 2 DIABETES MELLITUS, WITH FAT LAYER EXPOSED: Primary | ICD-10-CM

## 2021-09-28 DIAGNOSIS — E11.621 DIABETIC ULCER OF RIGHT MIDFOOT ASSOCIATED WITH TYPE 2 DIABETES MELLITUS, WITH FAT LAYER EXPOSED: Primary | ICD-10-CM

## 2021-09-28 PROCEDURE — 97597 DBRDMT OPN WND 1ST 20 CM/<: CPT | Mod: ,,, | Performed by: FAMILY MEDICINE

## 2021-09-28 PROCEDURE — 11042 DBRDMT SUBQ TIS 1ST 20SQCM/<: CPT | Performed by: FAMILY MEDICINE

## 2021-09-28 PROCEDURE — 99214 OFFICE O/P EST MOD 30 MIN: CPT | Mod: 25,,, | Performed by: FAMILY MEDICINE

## 2021-09-28 PROCEDURE — 99214 PR OFFICE/OUTPT VISIT, EST, LEVL IV, 30-39 MIN: ICD-10-PCS | Mod: 25,,, | Performed by: FAMILY MEDICINE

## 2021-09-28 PROCEDURE — 97597 DEBRIDEMENT: ICD-10-PCS | Mod: ,,, | Performed by: FAMILY MEDICINE

## 2021-09-28 PROCEDURE — 27201912 HC WOUND CARE DEBRIDEMENT SUPPLIES: Performed by: FAMILY MEDICINE

## 2021-10-04 ENCOUNTER — PATIENT OUTREACH (OUTPATIENT)
Dept: ADMINISTRATIVE | Facility: HOSPITAL | Age: 61
End: 2021-10-04

## 2021-10-05 ENCOUNTER — HOSPITAL ENCOUNTER (OUTPATIENT)
Dept: WOUND CARE | Facility: HOSPITAL | Age: 61
Discharge: HOME | End: 2021-10-05
Attending: FAMILY MEDICINE
Payer: MEDICAID

## 2021-10-05 VITALS — SYSTOLIC BLOOD PRESSURE: 139 MMHG | TEMPERATURE: 98 F | DIASTOLIC BLOOD PRESSURE: 65 MMHG | HEART RATE: 87 BPM

## 2021-10-05 DIAGNOSIS — E11.621 DIABETIC ULCER OF RIGHT MIDFOOT ASSOCIATED WITH TYPE 2 DIABETES MELLITUS, WITH FAT LAYER EXPOSED: ICD-10-CM

## 2021-10-05 DIAGNOSIS — E11.42 TYPE 2 DIABETES MELLITUS WITH DIABETIC POLYNEUROPATHY, WITHOUT LONG-TERM CURRENT USE OF INSULIN: Primary | ICD-10-CM

## 2021-10-05 DIAGNOSIS — L97.412 DIABETIC ULCER OF RIGHT MIDFOOT ASSOCIATED WITH TYPE 2 DIABETES MELLITUS, WITH FAT LAYER EXPOSED: ICD-10-CM

## 2021-10-05 PROCEDURE — 11042 DEBRIDEMENT: ICD-10-PCS | Mod: ,,, | Performed by: FAMILY MEDICINE

## 2021-10-05 PROCEDURE — 99214 OFFICE O/P EST MOD 30 MIN: CPT | Mod: 25,,, | Performed by: FAMILY MEDICINE

## 2021-10-05 PROCEDURE — 99214 PR OFFICE/OUTPT VISIT, EST, LEVL IV, 30-39 MIN: ICD-10-PCS | Mod: 25,,, | Performed by: FAMILY MEDICINE

## 2021-10-05 PROCEDURE — 11042 DBRDMT SUBQ TIS 1ST 20SQCM/<: CPT | Mod: ,,, | Performed by: FAMILY MEDICINE

## 2021-10-05 PROCEDURE — 27201912 HC WOUND CARE DEBRIDEMENT SUPPLIES: Performed by: FAMILY MEDICINE

## 2021-10-05 PROCEDURE — 11042 DBRDMT SUBQ TIS 1ST 20SQCM/<: CPT | Performed by: FAMILY MEDICINE

## 2021-10-12 ENCOUNTER — HOSPITAL ENCOUNTER (OUTPATIENT)
Dept: WOUND CARE | Facility: HOSPITAL | Age: 61
Discharge: HOME | End: 2021-10-12
Attending: FAMILY MEDICINE
Payer: MEDICAID

## 2021-10-12 VITALS — TEMPERATURE: 97 F | DIASTOLIC BLOOD PRESSURE: 73 MMHG | SYSTOLIC BLOOD PRESSURE: 157 MMHG | HEART RATE: 82 BPM

## 2021-10-12 DIAGNOSIS — E11.42 TYPE 2 DIABETES MELLITUS WITH DIABETIC POLYNEUROPATHY, WITHOUT LONG-TERM CURRENT USE OF INSULIN: Primary | ICD-10-CM

## 2021-10-12 DIAGNOSIS — E11.621 DIABETIC ULCER OF RIGHT MIDFOOT ASSOCIATED WITH TYPE 2 DIABETES MELLITUS, WITH FAT LAYER EXPOSED: ICD-10-CM

## 2021-10-12 DIAGNOSIS — L97.412 DIABETIC ULCER OF RIGHT MIDFOOT ASSOCIATED WITH TYPE 2 DIABETES MELLITUS, WITH FAT LAYER EXPOSED: ICD-10-CM

## 2021-10-12 PROCEDURE — 99214 PR OFFICE/OUTPT VISIT, EST, LEVL IV, 30-39 MIN: ICD-10-PCS | Mod: 25,,, | Performed by: FAMILY MEDICINE

## 2021-10-12 PROCEDURE — 87186 SC STD MICRODIL/AGAR DIL: CPT | Performed by: FAMILY MEDICINE

## 2021-10-12 PROCEDURE — 11042 DBRDMT SUBQ TIS 1ST 20SQCM/<: CPT | Performed by: FAMILY MEDICINE

## 2021-10-12 PROCEDURE — 11042 DBRDMT SUBQ TIS 1ST 20SQCM/<: CPT | Mod: ,,, | Performed by: FAMILY MEDICINE

## 2021-10-12 PROCEDURE — 11042 DEBRIDEMENT: ICD-10-PCS | Mod: ,,, | Performed by: FAMILY MEDICINE

## 2021-10-12 PROCEDURE — 87070 CULTURE OTHR SPECIMN AEROBIC: CPT | Performed by: FAMILY MEDICINE

## 2021-10-12 PROCEDURE — 27201912 HC WOUND CARE DEBRIDEMENT SUPPLIES: Performed by: FAMILY MEDICINE

## 2021-10-12 PROCEDURE — 99214 OFFICE O/P EST MOD 30 MIN: CPT | Mod: 25,,, | Performed by: FAMILY MEDICINE

## 2021-10-12 PROCEDURE — 87077 CULTURE AEROBIC IDENTIFY: CPT | Performed by: FAMILY MEDICINE

## 2021-10-15 LAB — BACTERIA SPEC AEROBE CULT: ABNORMAL

## 2021-10-19 ENCOUNTER — HOSPITAL ENCOUNTER (OUTPATIENT)
Dept: WOUND CARE | Facility: HOSPITAL | Age: 61
Discharge: HOME | End: 2021-10-19
Attending: FAMILY MEDICINE
Payer: MEDICAID

## 2021-10-19 VITALS — SYSTOLIC BLOOD PRESSURE: 166 MMHG | HEART RATE: 80 BPM | TEMPERATURE: 97 F | DIASTOLIC BLOOD PRESSURE: 74 MMHG

## 2021-10-19 DIAGNOSIS — L97.412 DIABETIC ULCER OF RIGHT MIDFOOT ASSOCIATED WITH TYPE 2 DIABETES MELLITUS, WITH FAT LAYER EXPOSED: ICD-10-CM

## 2021-10-19 DIAGNOSIS — E11.42 TYPE 2 DIABETES MELLITUS WITH DIABETIC POLYNEUROPATHY, WITHOUT LONG-TERM CURRENT USE OF INSULIN: Primary | ICD-10-CM

## 2021-10-19 DIAGNOSIS — E11.621 TYPE 2 DIABETES WITH SKIN ULCER OF FOOT: ICD-10-CM

## 2021-10-19 DIAGNOSIS — L97.509 TYPE 2 DIABETES WITH SKIN ULCER OF FOOT: ICD-10-CM

## 2021-10-19 DIAGNOSIS — E11.621 DIABETIC ULCER OF RIGHT MIDFOOT ASSOCIATED WITH TYPE 2 DIABETES MELLITUS, WITH FAT LAYER EXPOSED: ICD-10-CM

## 2021-10-19 PROCEDURE — 11042 DEBRIDEMENT: ICD-10-PCS | Mod: ,,, | Performed by: FAMILY MEDICINE

## 2021-10-19 PROCEDURE — 99214 OFFICE O/P EST MOD 30 MIN: CPT | Mod: 25,,, | Performed by: FAMILY MEDICINE

## 2021-10-19 PROCEDURE — 99214 PR OFFICE/OUTPT VISIT, EST, LEVL IV, 30-39 MIN: ICD-10-PCS | Mod: 25,,, | Performed by: FAMILY MEDICINE

## 2021-10-19 PROCEDURE — 11042 DBRDMT SUBQ TIS 1ST 20SQCM/<: CPT | Performed by: FAMILY MEDICINE

## 2021-10-19 PROCEDURE — 27201912 HC WOUND CARE DEBRIDEMENT SUPPLIES: Performed by: FAMILY MEDICINE

## 2021-10-19 PROCEDURE — 11042 DBRDMT SUBQ TIS 1ST 20SQCM/<: CPT | Mod: ,,, | Performed by: FAMILY MEDICINE

## 2021-10-26 ENCOUNTER — HOSPITAL ENCOUNTER (OUTPATIENT)
Dept: WOUND CARE | Facility: HOSPITAL | Age: 61
Discharge: HOME | End: 2021-10-26
Attending: FAMILY MEDICINE
Payer: MEDICAID

## 2021-10-26 VITALS — DIASTOLIC BLOOD PRESSURE: 80 MMHG | HEART RATE: 94 BPM | SYSTOLIC BLOOD PRESSURE: 148 MMHG | TEMPERATURE: 98 F

## 2021-10-26 DIAGNOSIS — E11.621 TYPE 2 DIABETES WITH SKIN ULCER OF FOOT: ICD-10-CM

## 2021-10-26 DIAGNOSIS — L97.509 TYPE 2 DIABETES WITH SKIN ULCER OF FOOT: ICD-10-CM

## 2021-10-26 DIAGNOSIS — E11.621 DIABETIC ULCER OF RIGHT MIDFOOT ASSOCIATED WITH TYPE 2 DIABETES MELLITUS, WITH FAT LAYER EXPOSED: ICD-10-CM

## 2021-10-26 DIAGNOSIS — E11.42 TYPE 2 DIABETES MELLITUS WITH DIABETIC POLYNEUROPATHY, WITHOUT LONG-TERM CURRENT USE OF INSULIN: Primary | ICD-10-CM

## 2021-10-26 DIAGNOSIS — L97.412 DIABETIC ULCER OF RIGHT MIDFOOT ASSOCIATED WITH TYPE 2 DIABETES MELLITUS, WITH FAT LAYER EXPOSED: ICD-10-CM

## 2021-10-26 PROCEDURE — 27201912 HC WOUND CARE DEBRIDEMENT SUPPLIES: Performed by: FAMILY MEDICINE

## 2021-10-26 PROCEDURE — 11042 DEBRIDEMENT: ICD-10-PCS | Mod: ,,, | Performed by: FAMILY MEDICINE

## 2021-10-26 PROCEDURE — 11042 DBRDMT SUBQ TIS 1ST 20SQCM/<: CPT | Performed by: FAMILY MEDICINE

## 2021-10-26 PROCEDURE — 99214 PR OFFICE/OUTPT VISIT, EST, LEVL IV, 30-39 MIN: ICD-10-PCS | Mod: 25,,, | Performed by: FAMILY MEDICINE

## 2021-10-26 PROCEDURE — 11042 DBRDMT SUBQ TIS 1ST 20SQCM/<: CPT | Mod: ,,, | Performed by: FAMILY MEDICINE

## 2021-10-26 PROCEDURE — 99214 OFFICE O/P EST MOD 30 MIN: CPT | Mod: 25,,, | Performed by: FAMILY MEDICINE

## 2021-11-02 ENCOUNTER — HOSPITAL ENCOUNTER (OUTPATIENT)
Dept: WOUND CARE | Facility: HOSPITAL | Age: 61
Discharge: HOME | End: 2021-11-02
Attending: FAMILY MEDICINE
Payer: MEDICAID

## 2021-11-02 VITALS — DIASTOLIC BLOOD PRESSURE: 75 MMHG | TEMPERATURE: 98 F | SYSTOLIC BLOOD PRESSURE: 161 MMHG | HEART RATE: 85 BPM

## 2021-11-02 DIAGNOSIS — E11.42 TYPE 2 DIABETES MELLITUS WITH DIABETIC POLYNEUROPATHY, WITHOUT LONG-TERM CURRENT USE OF INSULIN: Primary | ICD-10-CM

## 2021-11-02 DIAGNOSIS — E11.621 TYPE 2 DIABETES WITH SKIN ULCER OF FOOT: ICD-10-CM

## 2021-11-02 DIAGNOSIS — E11.621 DIABETIC ULCER OF RIGHT MIDFOOT ASSOCIATED WITH TYPE 2 DIABETES MELLITUS, WITH FAT LAYER EXPOSED: ICD-10-CM

## 2021-11-02 DIAGNOSIS — L97.412 DIABETIC ULCER OF RIGHT MIDFOOT ASSOCIATED WITH TYPE 2 DIABETES MELLITUS, WITH FAT LAYER EXPOSED: ICD-10-CM

## 2021-11-02 DIAGNOSIS — L97.509 TYPE 2 DIABETES WITH SKIN ULCER OF FOOT: ICD-10-CM

## 2021-11-02 PROCEDURE — 11042 DBRDMT SUBQ TIS 1ST 20SQCM/<: CPT | Mod: ,,, | Performed by: FAMILY MEDICINE

## 2021-11-02 PROCEDURE — 99214 PR OFFICE/OUTPT VISIT, EST, LEVL IV, 30-39 MIN: ICD-10-PCS | Mod: 25,,, | Performed by: FAMILY MEDICINE

## 2021-11-02 PROCEDURE — 27201912 HC WOUND CARE DEBRIDEMENT SUPPLIES: Performed by: FAMILY MEDICINE

## 2021-11-02 PROCEDURE — 99214 OFFICE O/P EST MOD 30 MIN: CPT | Mod: 25,,, | Performed by: FAMILY MEDICINE

## 2021-11-02 PROCEDURE — 11042 DEBRIDEMENT: ICD-10-PCS | Mod: ,,, | Performed by: FAMILY MEDICINE

## 2021-11-02 PROCEDURE — 11042 DBRDMT SUBQ TIS 1ST 20SQCM/<: CPT | Performed by: FAMILY MEDICINE

## 2021-11-09 ENCOUNTER — HOSPITAL ENCOUNTER (OUTPATIENT)
Dept: WOUND CARE | Facility: HOSPITAL | Age: 61
Discharge: HOME | End: 2021-11-09
Attending: FAMILY MEDICINE
Payer: MEDICAID

## 2021-11-09 VITALS
HEART RATE: 97 BPM | TEMPERATURE: 98 F | DIASTOLIC BLOOD PRESSURE: 70 MMHG | RESPIRATION RATE: 20 BRPM | SYSTOLIC BLOOD PRESSURE: 136 MMHG

## 2021-11-09 DIAGNOSIS — L97.412 DIABETIC ULCER OF RIGHT MIDFOOT ASSOCIATED WITH TYPE 2 DIABETES MELLITUS, WITH FAT LAYER EXPOSED: Primary | ICD-10-CM

## 2021-11-09 DIAGNOSIS — E11.621 DIABETIC ULCER OF RIGHT MIDFOOT ASSOCIATED WITH TYPE 2 DIABETES MELLITUS, WITH FAT LAYER EXPOSED: Primary | ICD-10-CM

## 2021-11-09 PROCEDURE — 27201912 HC WOUND CARE DEBRIDEMENT SUPPLIES: Performed by: FAMILY MEDICINE

## 2021-11-09 PROCEDURE — 11042 DBRDMT SUBQ TIS 1ST 20SQCM/<: CPT | Mod: ,,, | Performed by: FAMILY MEDICINE

## 2021-11-09 PROCEDURE — 11042 DBRDMT SUBQ TIS 1ST 20SQCM/<: CPT | Performed by: FAMILY MEDICINE

## 2021-11-09 PROCEDURE — 11042 DEBRIDEMENT: ICD-10-PCS | Mod: ,,, | Performed by: FAMILY MEDICINE

## 2021-11-09 PROCEDURE — 99214 OFFICE O/P EST MOD 30 MIN: CPT | Mod: 25,,, | Performed by: FAMILY MEDICINE

## 2021-11-09 PROCEDURE — 99214 PR OFFICE/OUTPT VISIT, EST, LEVL IV, 30-39 MIN: ICD-10-PCS | Mod: 25,,, | Performed by: FAMILY MEDICINE

## 2021-11-10 DIAGNOSIS — Z12.11 COLON CANCER SCREENING: ICD-10-CM

## 2021-11-11 ENCOUNTER — OFFICE VISIT (OUTPATIENT)
Dept: FAMILY MEDICINE | Facility: CLINIC | Age: 61
End: 2021-11-11
Payer: MEDICAID

## 2021-11-11 VITALS
WEIGHT: 232.13 LBS | OXYGEN SATURATION: 96 % | SYSTOLIC BLOOD PRESSURE: 120 MMHG | HEIGHT: 72 IN | RESPIRATION RATE: 16 BRPM | BODY MASS INDEX: 31.44 KG/M2 | HEART RATE: 94 BPM | DIASTOLIC BLOOD PRESSURE: 82 MMHG

## 2021-11-11 DIAGNOSIS — E11.3393 CONTROLLED TYPE 2 DIABETES MELLITUS WITH BOTH EYES AFFECTED BY MODERATE NONPROLIFERATIVE RETINOPATHY WITHOUT MACULAR EDEMA, WITHOUT LONG-TERM CURRENT USE OF INSULIN: ICD-10-CM

## 2021-11-11 DIAGNOSIS — R80.8 OTHER PROTEINURIA: ICD-10-CM

## 2021-11-11 DIAGNOSIS — E66.09 CLASS 1 OBESITY DUE TO EXCESS CALORIES WITH SERIOUS COMORBIDITY AND BODY MASS INDEX (BMI) OF 31.0 TO 31.9 IN ADULT: ICD-10-CM

## 2021-11-11 DIAGNOSIS — E11.621 DIABETIC ULCER OF RIGHT MIDFOOT ASSOCIATED WITH TYPE 2 DIABETES MELLITUS, WITH FAT LAYER EXPOSED: ICD-10-CM

## 2021-11-11 DIAGNOSIS — R80.9 TYPE 2 DIABETES MELLITUS WITH MICROALBUMINURIA, WITHOUT LONG-TERM CURRENT USE OF INSULIN: ICD-10-CM

## 2021-11-11 DIAGNOSIS — L97.412 DIABETIC ULCER OF RIGHT MIDFOOT ASSOCIATED WITH TYPE 2 DIABETES MELLITUS, WITH FAT LAYER EXPOSED: ICD-10-CM

## 2021-11-11 DIAGNOSIS — E11.42 TYPE 2 DIABETES MELLITUS WITH DIABETIC POLYNEUROPATHY, WITHOUT LONG-TERM CURRENT USE OF INSULIN: ICD-10-CM

## 2021-11-11 DIAGNOSIS — Z00.00 VISIT FOR WELL MAN HEALTH CHECK: Primary | ICD-10-CM

## 2021-11-11 DIAGNOSIS — E11.29 TYPE 2 DIABETES MELLITUS WITH MICROALBUMINURIA, WITHOUT LONG-TERM CURRENT USE OF INSULIN: ICD-10-CM

## 2021-11-11 PROBLEM — E66.811 CLASS 1 OBESITY DUE TO EXCESS CALORIES WITH SERIOUS COMORBIDITY AND BODY MASS INDEX (BMI) OF 31.0 TO 31.9 IN ADULT: Status: ACTIVE | Noted: 2021-11-11

## 2021-11-11 PROCEDURE — 99999 PR PBB SHADOW E&M-EST. PATIENT-LVL III: ICD-10-PCS | Mod: PBBFAC,,, | Performed by: FAMILY MEDICINE

## 2021-11-11 PROCEDURE — 99396 PR PREVENTIVE VISIT,EST,40-64: ICD-10-PCS | Mod: S$PBB,,, | Performed by: FAMILY MEDICINE

## 2021-11-11 PROCEDURE — 99999 PR PBB SHADOW E&M-EST. PATIENT-LVL III: CPT | Mod: PBBFAC,,, | Performed by: FAMILY MEDICINE

## 2021-11-11 PROCEDURE — 99213 OFFICE O/P EST LOW 20 MIN: CPT | Mod: PBBFAC,PN | Performed by: FAMILY MEDICINE

## 2021-11-11 PROCEDURE — 90471 IMMUNIZATION ADMIN: CPT | Mod: PBBFAC,PN

## 2021-11-11 PROCEDURE — 99396 PREV VISIT EST AGE 40-64: CPT | Mod: S$PBB,,, | Performed by: FAMILY MEDICINE

## 2021-11-11 RX ORDER — EMPAGLIFLOZIN 10 MG/1
10 TABLET, FILM COATED ORAL DAILY
Qty: 30 TABLET | Refills: 1 | Status: SHIPPED | OUTPATIENT
Start: 2021-11-11 | End: 2022-01-12

## 2021-11-16 ENCOUNTER — HOSPITAL ENCOUNTER (OUTPATIENT)
Dept: WOUND CARE | Facility: HOSPITAL | Age: 61
Discharge: HOME | End: 2021-11-16
Attending: FAMILY MEDICINE
Payer: MEDICAID

## 2021-11-16 VITALS
SYSTOLIC BLOOD PRESSURE: 144 MMHG | HEART RATE: 87 BPM | RESPIRATION RATE: 20 BRPM | TEMPERATURE: 98 F | DIASTOLIC BLOOD PRESSURE: 74 MMHG

## 2021-11-16 DIAGNOSIS — L97.412 DIABETIC ULCER OF RIGHT MIDFOOT ASSOCIATED WITH TYPE 2 DIABETES MELLITUS, WITH FAT LAYER EXPOSED: Primary | ICD-10-CM

## 2021-11-16 DIAGNOSIS — E11.621 DIABETIC ULCER OF RIGHT MIDFOOT ASSOCIATED WITH TYPE 2 DIABETES MELLITUS, WITH FAT LAYER EXPOSED: Primary | ICD-10-CM

## 2021-11-16 PROCEDURE — 99214 OFFICE O/P EST MOD 30 MIN: CPT | Mod: 25,,, | Performed by: FAMILY MEDICINE

## 2021-11-16 PROCEDURE — 11042 DBRDMT SUBQ TIS 1ST 20SQCM/<: CPT | Mod: ,,, | Performed by: FAMILY MEDICINE

## 2021-11-16 PROCEDURE — 11042 DEBRIDEMENT: ICD-10-PCS | Mod: ,,, | Performed by: FAMILY MEDICINE

## 2021-11-16 PROCEDURE — 11042 DBRDMT SUBQ TIS 1ST 20SQCM/<: CPT | Performed by: FAMILY MEDICINE

## 2021-11-16 PROCEDURE — 27201912 HC WOUND CARE DEBRIDEMENT SUPPLIES: Performed by: FAMILY MEDICINE

## 2021-11-16 PROCEDURE — 99214 PR OFFICE/OUTPT VISIT, EST, LEVL IV, 30-39 MIN: ICD-10-PCS | Mod: 25,,, | Performed by: FAMILY MEDICINE

## 2021-11-17 ENCOUNTER — TELEPHONE (OUTPATIENT)
Dept: FAMILY MEDICINE | Facility: CLINIC | Age: 61
End: 2021-11-17
Payer: MEDICAID

## 2021-11-17 LAB
LEFT EYE DM RETINOPATHY: POSITIVE
RIGHT EYE DM RETINOPATHY: POSITIVE

## 2021-11-23 ENCOUNTER — PATIENT OUTREACH (OUTPATIENT)
Dept: ADMINISTRATIVE | Facility: HOSPITAL | Age: 61
End: 2021-11-23
Payer: MEDICAID

## 2021-11-23 ENCOUNTER — HOSPITAL ENCOUNTER (OUTPATIENT)
Dept: WOUND CARE | Facility: HOSPITAL | Age: 61
Discharge: HOME | End: 2021-11-23
Attending: FAMILY MEDICINE
Payer: MEDICAID

## 2021-11-23 VITALS
DIASTOLIC BLOOD PRESSURE: 73 MMHG | RESPIRATION RATE: 20 BRPM | SYSTOLIC BLOOD PRESSURE: 137 MMHG | TEMPERATURE: 98 F | HEART RATE: 117 BPM

## 2021-11-23 DIAGNOSIS — L97.412 DIABETIC ULCER OF RIGHT MIDFOOT ASSOCIATED WITH TYPE 2 DIABETES MELLITUS, WITH FAT LAYER EXPOSED: Primary | ICD-10-CM

## 2021-11-23 DIAGNOSIS — E11.621 DIABETIC ULCER OF RIGHT MIDFOOT ASSOCIATED WITH TYPE 2 DIABETES MELLITUS, WITH FAT LAYER EXPOSED: Primary | ICD-10-CM

## 2021-11-23 PROCEDURE — 99214 OFFICE O/P EST MOD 30 MIN: CPT | Mod: 25,,, | Performed by: FAMILY MEDICINE

## 2021-11-23 PROCEDURE — 11042 DBRDMT SUBQ TIS 1ST 20SQCM/<: CPT | Mod: ,,, | Performed by: FAMILY MEDICINE

## 2021-11-23 PROCEDURE — 99214 PR OFFICE/OUTPT VISIT, EST, LEVL IV, 30-39 MIN: ICD-10-PCS | Mod: 25,,, | Performed by: FAMILY MEDICINE

## 2021-11-23 PROCEDURE — 27201912 HC WOUND CARE DEBRIDEMENT SUPPLIES: Performed by: FAMILY MEDICINE

## 2021-11-23 PROCEDURE — 11042 DBRDMT SUBQ TIS 1ST 20SQCM/<: CPT | Performed by: FAMILY MEDICINE

## 2021-11-23 PROCEDURE — 11042 DEBRIDEMENT: ICD-10-PCS | Mod: ,,, | Performed by: FAMILY MEDICINE

## 2021-11-30 ENCOUNTER — HOSPITAL ENCOUNTER (OUTPATIENT)
Dept: WOUND CARE | Facility: HOSPITAL | Age: 61
Discharge: HOME | End: 2021-11-30
Attending: FAMILY MEDICINE
Payer: MEDICAID

## 2021-11-30 DIAGNOSIS — L97.412 DIABETIC ULCER OF RIGHT MIDFOOT ASSOCIATED WITH TYPE 2 DIABETES MELLITUS, WITH FAT LAYER EXPOSED: Primary | ICD-10-CM

## 2021-11-30 DIAGNOSIS — E11.621 DIABETIC ULCER OF RIGHT MIDFOOT ASSOCIATED WITH TYPE 2 DIABETES MELLITUS, WITH FAT LAYER EXPOSED: Primary | ICD-10-CM

## 2021-11-30 PROCEDURE — 99214 PR OFFICE/OUTPT VISIT, EST, LEVL IV, 30-39 MIN: ICD-10-PCS | Mod: ,,, | Performed by: FAMILY MEDICINE

## 2021-11-30 PROCEDURE — 99214 OFFICE O/P EST MOD 30 MIN: CPT | Mod: ,,, | Performed by: FAMILY MEDICINE

## 2021-11-30 PROCEDURE — 99215 OFFICE O/P EST HI 40 MIN: CPT | Performed by: FAMILY MEDICINE

## 2021-12-07 ENCOUNTER — HOSPITAL ENCOUNTER (OUTPATIENT)
Dept: WOUND CARE | Facility: HOSPITAL | Age: 61
Discharge: HOME | End: 2021-12-07
Attending: FAMILY MEDICINE
Payer: MEDICAID

## 2021-12-07 VITALS — SYSTOLIC BLOOD PRESSURE: 148 MMHG | DIASTOLIC BLOOD PRESSURE: 72 MMHG | TEMPERATURE: 98 F | HEART RATE: 86 BPM

## 2021-12-07 DIAGNOSIS — E11.621 TYPE 2 DIABETES WITH SKIN ULCER OF FOOT: Primary | ICD-10-CM

## 2021-12-07 DIAGNOSIS — L97.509 TYPE 2 DIABETES WITH SKIN ULCER OF FOOT: Primary | ICD-10-CM

## 2021-12-07 PROCEDURE — 99214 PR OFFICE/OUTPT VISIT, EST, LEVL IV, 30-39 MIN: ICD-10-PCS | Mod: 25,,, | Performed by: FAMILY MEDICINE

## 2021-12-07 PROCEDURE — 11042 DBRDMT SUBQ TIS 1ST 20SQCM/<: CPT | Performed by: FAMILY MEDICINE

## 2021-12-07 PROCEDURE — 11042 DEBRIDEMENT: ICD-10-PCS | Mod: ,,, | Performed by: FAMILY MEDICINE

## 2021-12-07 PROCEDURE — 99214 OFFICE O/P EST MOD 30 MIN: CPT | Mod: 25,,, | Performed by: FAMILY MEDICINE

## 2021-12-07 PROCEDURE — 11042 DBRDMT SUBQ TIS 1ST 20SQCM/<: CPT | Mod: ,,, | Performed by: FAMILY MEDICINE

## 2021-12-07 PROCEDURE — 27201912 HC WOUND CARE DEBRIDEMENT SUPPLIES: Performed by: FAMILY MEDICINE

## 2021-12-14 ENCOUNTER — HOSPITAL ENCOUNTER (OUTPATIENT)
Dept: WOUND CARE | Facility: HOSPITAL | Age: 61
Discharge: HOME | End: 2021-12-14
Attending: FAMILY MEDICINE
Payer: MEDICAID

## 2021-12-14 VITALS — SYSTOLIC BLOOD PRESSURE: 136 MMHG | TEMPERATURE: 98 F | DIASTOLIC BLOOD PRESSURE: 70 MMHG | HEART RATE: 78 BPM

## 2021-12-14 DIAGNOSIS — L97.509 TYPE 2 DIABETES WITH SKIN ULCER OF FOOT: Primary | ICD-10-CM

## 2021-12-14 DIAGNOSIS — E11.621 TYPE 2 DIABETES WITH SKIN ULCER OF FOOT: Primary | ICD-10-CM

## 2021-12-14 PROCEDURE — 11042 DBRDMT SUBQ TIS 1ST 20SQCM/<: CPT | Performed by: FAMILY MEDICINE

## 2021-12-14 PROCEDURE — 11042 DEBRIDEMENT: ICD-10-PCS | Mod: ,,, | Performed by: FAMILY MEDICINE

## 2021-12-14 PROCEDURE — 27201912 HC WOUND CARE DEBRIDEMENT SUPPLIES: Performed by: FAMILY MEDICINE

## 2021-12-14 PROCEDURE — 11042 DBRDMT SUBQ TIS 1ST 20SQCM/<: CPT | Mod: ,,, | Performed by: FAMILY MEDICINE

## 2021-12-14 PROCEDURE — 99214 PR OFFICE/OUTPT VISIT, EST, LEVL IV, 30-39 MIN: ICD-10-PCS | Mod: 25,,, | Performed by: FAMILY MEDICINE

## 2021-12-14 PROCEDURE — 99214 OFFICE O/P EST MOD 30 MIN: CPT | Mod: 25,,, | Performed by: FAMILY MEDICINE

## 2021-12-21 ENCOUNTER — HOSPITAL ENCOUNTER (OUTPATIENT)
Dept: WOUND CARE | Facility: HOSPITAL | Age: 61
Discharge: HOME | End: 2021-12-21
Attending: FAMILY MEDICINE
Payer: MEDICAID

## 2021-12-21 VITALS — TEMPERATURE: 97 F | HEART RATE: 90 BPM | SYSTOLIC BLOOD PRESSURE: 138 MMHG | DIASTOLIC BLOOD PRESSURE: 73 MMHG

## 2021-12-21 DIAGNOSIS — E11.621 TYPE 2 DIABETES WITH SKIN ULCER OF FOOT: Primary | ICD-10-CM

## 2021-12-21 DIAGNOSIS — E11.621 DIABETIC ULCER OF RIGHT MIDFOOT ASSOCIATED WITH TYPE 2 DIABETES MELLITUS, WITH FAT LAYER EXPOSED: ICD-10-CM

## 2021-12-21 DIAGNOSIS — L97.509 TYPE 2 DIABETES WITH SKIN ULCER OF FOOT: Primary | ICD-10-CM

## 2021-12-21 DIAGNOSIS — L97.412 DIABETIC ULCER OF RIGHT MIDFOOT ASSOCIATED WITH TYPE 2 DIABETES MELLITUS, WITH FAT LAYER EXPOSED: ICD-10-CM

## 2021-12-21 PROCEDURE — 87186 SC STD MICRODIL/AGAR DIL: CPT | Performed by: FAMILY MEDICINE

## 2021-12-21 PROCEDURE — 87077 CULTURE AEROBIC IDENTIFY: CPT | Performed by: FAMILY MEDICINE

## 2021-12-21 PROCEDURE — 99214 PR OFFICE/OUTPT VISIT, EST, LEVL IV, 30-39 MIN: ICD-10-PCS | Mod: 25,,, | Performed by: FAMILY MEDICINE

## 2021-12-21 PROCEDURE — 99214 OFFICE O/P EST MOD 30 MIN: CPT | Mod: 25,,, | Performed by: FAMILY MEDICINE

## 2021-12-21 PROCEDURE — 27201912 HC WOUND CARE DEBRIDEMENT SUPPLIES: Performed by: FAMILY MEDICINE

## 2021-12-21 PROCEDURE — 87070 CULTURE OTHR SPECIMN AEROBIC: CPT | Performed by: FAMILY MEDICINE

## 2021-12-21 PROCEDURE — 11042 DBRDMT SUBQ TIS 1ST 20SQCM/<: CPT | Performed by: FAMILY MEDICINE

## 2021-12-21 PROCEDURE — 11042 DBRDMT SUBQ TIS 1ST 20SQCM/<: CPT | Mod: ,,, | Performed by: FAMILY MEDICINE

## 2021-12-21 PROCEDURE — 11042 DEBRIDEMENT: ICD-10-PCS | Mod: ,,, | Performed by: FAMILY MEDICINE

## 2021-12-25 LAB — BACTERIA SPEC AEROBE CULT: ABNORMAL

## 2021-12-26 DIAGNOSIS — E11.621 DIABETIC ULCER OF RIGHT MIDFOOT ASSOCIATED WITH TYPE 2 DIABETES MELLITUS, WITH FAT LAYER EXPOSED: Primary | ICD-10-CM

## 2021-12-26 DIAGNOSIS — L97.412 DIABETIC ULCER OF RIGHT MIDFOOT ASSOCIATED WITH TYPE 2 DIABETES MELLITUS, WITH FAT LAYER EXPOSED: Primary | ICD-10-CM

## 2021-12-26 RX ORDER — DOXYCYCLINE 100 MG/1
100 CAPSULE ORAL EVERY 12 HOURS
Qty: 20 CAPSULE | Refills: 0 | Status: SHIPPED | OUTPATIENT
Start: 2021-12-26 | End: 2022-01-05

## 2021-12-28 ENCOUNTER — HOSPITAL ENCOUNTER (OUTPATIENT)
Dept: WOUND CARE | Facility: HOSPITAL | Age: 61
Discharge: HOME | End: 2021-12-28
Attending: FAMILY MEDICINE
Payer: MEDICAID

## 2021-12-28 ENCOUNTER — TELEPHONE (OUTPATIENT)
Dept: FAMILY MEDICINE | Facility: CLINIC | Age: 61
End: 2021-12-28
Payer: MEDICAID

## 2021-12-28 VITALS
TEMPERATURE: 98 F | DIASTOLIC BLOOD PRESSURE: 96 MMHG | SYSTOLIC BLOOD PRESSURE: 128 MMHG | HEART RATE: 96 BPM | RESPIRATION RATE: 16 BRPM

## 2021-12-28 DIAGNOSIS — E11.42 TYPE 2 DIABETES MELLITUS WITH DIABETIC POLYNEUROPATHY, WITHOUT LONG-TERM CURRENT USE OF INSULIN: Primary | ICD-10-CM

## 2021-12-28 PROCEDURE — 99214 OFFICE O/P EST MOD 30 MIN: CPT | Mod: 25,,, | Performed by: FAMILY MEDICINE

## 2021-12-28 PROCEDURE — 99215 OFFICE O/P EST HI 40 MIN: CPT | Performed by: FAMILY MEDICINE

## 2021-12-28 PROCEDURE — 11042 DBRDMT SUBQ TIS 1ST 20SQCM/<: CPT | Performed by: FAMILY MEDICINE

## 2021-12-28 PROCEDURE — 11042 DBRDMT SUBQ TIS 1ST 20SQCM/<: CPT | Mod: ,,, | Performed by: FAMILY MEDICINE

## 2021-12-28 PROCEDURE — 27201912 HC WOUND CARE DEBRIDEMENT SUPPLIES: Performed by: FAMILY MEDICINE

## 2021-12-28 PROCEDURE — 99214 PR OFFICE/OUTPT VISIT, EST, LEVL IV, 30-39 MIN: ICD-10-PCS | Mod: 25,,, | Performed by: FAMILY MEDICINE

## 2021-12-28 PROCEDURE — 11042 DEBRIDEMENT: ICD-10-PCS | Mod: ,,, | Performed by: FAMILY MEDICINE

## 2022-01-04 ENCOUNTER — HOSPITAL ENCOUNTER (OUTPATIENT)
Dept: WOUND CARE | Facility: HOSPITAL | Age: 62
Discharge: HOME | End: 2022-01-04
Attending: FAMILY MEDICINE
Payer: MEDICAID

## 2022-01-04 VITALS — DIASTOLIC BLOOD PRESSURE: 66 MMHG | HEART RATE: 100 BPM | SYSTOLIC BLOOD PRESSURE: 132 MMHG | TEMPERATURE: 98 F

## 2022-01-04 DIAGNOSIS — E11.42 TYPE 2 DIABETES MELLITUS WITH DIABETIC POLYNEUROPATHY, WITHOUT LONG-TERM CURRENT USE OF INSULIN: Primary | ICD-10-CM

## 2022-01-04 PROCEDURE — 99212 OFFICE O/P EST SF 10 MIN: CPT | Performed by: FAMILY MEDICINE

## 2022-01-04 PROCEDURE — 99213 OFFICE O/P EST LOW 20 MIN: CPT | Performed by: FAMILY MEDICINE

## 2022-01-04 PROCEDURE — 99214 PR OFFICE/OUTPT VISIT, EST, LEVL IV, 30-39 MIN: ICD-10-PCS | Mod: ,,, | Performed by: FAMILY MEDICINE

## 2022-01-04 PROCEDURE — 99214 OFFICE O/P EST MOD 30 MIN: CPT | Mod: ,,, | Performed by: FAMILY MEDICINE

## 2022-01-04 NOTE — PROGRESS NOTES
[facility]                Progress Note    Subjective:       Patient ID: Fermin Henson is a 61 y.o. male.    Chief Complaint: No chief complaint on file.    Follow up wound care visit. Patient ambulated to exam room without assistance, prescribed CAM boot on RLE. No c/o pain at present. He states he has done better about staying off his foot.  There has been no new injuries that he can recall.         Review of Systems   Constitutional: Negative.    HENT: Negative.    Eyes: Negative.    Respiratory: Negative.    Cardiovascular: Negative.    Gastrointestinal: Negative.    Skin: Positive for wound.   Psychiatric/Behavioral: Negative.          Objective:        Physical Exam  Constitutional:       Appearance: Normal appearance.   HENT:      Head: Normocephalic and atraumatic.      Right Ear: External ear normal.      Left Ear: External ear normal.      Mouth/Throat:      Mouth: Mucous membranes are moist.      Pharynx: Oropharynx is clear.   Eyes:      Extraocular Movements: Extraocular movements intact.      Conjunctiva/sclera: Conjunctivae normal.      Pupils: Pupils are equal, round, and reactive to light.   Pulmonary:      Effort: Pulmonary effort is normal. No respiratory distress.   Abdominal:      General: There is no distension.      Palpations: Abdomen is soft.   Skin:     General: Skin is warm and dry.      Comments: +DFU to right foot without slough or maceration   Neurological:      Mental Status: He is alert and oriented to person, place, and time.      Sensory: Sensory deficit present.         Vitals:    01/04/22 0945   BP: 132/66   Pulse: 100   Temp: 97.8 °F (36.6 °C)       Assessment:           ICD-10-CM ICD-9-CM   1. Type 2 diabetes mellitus with diabetic polyneuropathy, without long-term current use of insulin  E11.42 250.60     357.2            Wound 05/04/21 1057 Diabetic Ulcer Right plantar Foot (Active)   05/04/21 1057    Pre-existing: Yes    Primary Wound Type: Diabetic ulc   Side: Right   Orientation: plantar   Location: Foot   Wound Number:    Ankle-Brachial Index:    Pulses:    Removal Indication and Assessment:    Wound Outcome:    (Retired) Wound Type:    (Retired) Wound Length (cm):    (Retired) Wound Width (cm):    (Retired) Depth (cm):    Wound Description (Comments):    Removal Indications:    Wound Image   01/04/22 0900   Wound WDL ex 01/04/22 0900   Dressing Appearance Intact;Dried drainage 01/04/22 0900   Drainage Amount Scant 01/04/22 0900   Drainage Characteristics/Odor Brown;No odor 01/04/22 0900   Appearance Pink;Epithelialization 01/04/22 0900   Red (%), Wound Tissue Color 100 % 01/04/22 0900   Periwound Area Dry 01/04/22 0900   Wound Edges Callused 01/04/22 0900   Wound Length (cm) 0.3 cm 01/04/22 0900   Wound Width (cm) 0.3 cm 01/04/22 0900   Wound Depth (cm) 0.2 cm 01/04/22 0900   Wound Volume (cm^3) 0.018 cm^3 01/04/22 0900   Wound Surface Area (cm^2) 0.09 cm^2 01/04/22 0900   Care Cleansed with:;Antimicrobial agent;Sterile normal saline 01/04/22 0900   Dressing Changed;Collagen 01/04/22 0900   Periwound Care Dry periwound area maintained 01/04/22 0900   Off Loading Football dressing;Off loading shoe 01/04/22 0900   Dressing Change Due 01/11/22 01/04/22 0900            Wound 12/14/21 1000 Traumatic Right anterior;dorsal Foot (Active)   12/14/21 1000    Pre-existing: Yes   Primary Wound Type: Traumatic   Side: Right   Orientation: anterior;dorsal   Location: Foot   Wound Number:    Ankle-Brachial Index:    Pulses:    Removal Indication and Assessment:    Wound Outcome:    (Retired) Wound Type:    (Retired) Wound Length (cm):    (Retired) Wound Width (cm):    (Retired) Depth (cm):    Wound Description (Comments):    Removal Indications:    Wound Image   01/04/22 0900   Wound WDL ex 01/04/22 0900   Dressing Appearance Intact;Dry 01/04/22 0900   Drainage Amount None 01/04/22 0900   Appearance Pink 01/04/22 0900   Red (%), Wound Tissue  Color 100 % 01/04/22 0900   Periwound Area Dry;Intact 01/04/22 0900   Wound Edges Defined 01/04/22 0900   Wound Length (cm) 2 cm 01/04/22 0900   Wound Width (cm) 2 cm 01/04/22 0900   Wound Surface Area (cm^2) 4 cm^2 01/04/22 0900   Care Cleansed with:;Antimicrobial agent;Sterile normal saline 01/04/22 0900   Dressing Changed 01/04/22 0900   Periwound Care Moisture barrier applied 01/04/22 0900   Off Loading Football dressing;Off loading shoe 01/04/22 0900   Dressing Change Due 01/11/22 01/04/22 0900           Plan:                Orders Placed This Encounter   Procedures    Change dressing     Clean with NS. Gentian Violet to Dorsal aspect. Endoform to wound bed (Plantar). Football (x 3 cast padding), Coban. CAM boot.     Dressing dry and intact. Right Dorsal and Right Plantar Foot wounds measuring smaller overall.   Follow up in about 1 week (around 1/11/2022) for wound care.     See Jean-Baptiste MD

## 2022-01-05 DIAGNOSIS — E11.9 TYPE 2 DIABETES MELLITUS WITHOUT COMPLICATION, WITHOUT LONG-TERM CURRENT USE OF INSULIN: ICD-10-CM

## 2022-01-05 NOTE — TELEPHONE ENCOUNTER
No new care gaps identified.  Powered by [employer]. Reference number: [id number].   1/05/2022 1:32:15 PM CST

## 2022-01-06 RX ORDER — PIOGLITAZONEHYDROCHLORIDE 15 MG/1
TABLET ORAL
Qty: 30 TABLET | Refills: 0 | OUTPATIENT
Start: 2022-01-06

## 2022-01-06 NOTE — TELEPHONE ENCOUNTER
Quick DC. Request already responded to by other means (e.g. phone or fax)   Refill Authorization Note   Fermin Henson  is requesting a refill authorization.  Brief Assessment and Rationale for Refill:  Quick Discontinue  Medication Therapy Plan:  QUICK DC: DUPLICATE     Medication Reconciliation Completed:  No      Comments:   Pended Medication(s)       Requested Prescriptions     Pending Prescriptions Disp Refills    pioglitazone (ACTOS) 15 MG tablet [Pharmacy Med Name: Pioglitazone HCl 15 MG Oral Tablet] 30 tablet 0     Sig: Take 1 tablet by mouth once daily        Duplicate Pended Encounter(s)/ Last Prescribed Details: (includes pharmacy & prescriber details)   pioglitazone (ACTOS) 15 MG tablet 30 tablet 0 1/3/2022  No   Sig: Take 1 tablet by mouth once daily   Sent to pharmacy as: pioglitazone (ACTOS) 15 MG tablet   Class: Normal   Order: [id number]   Date/Time Signed: 1/3/2022 07:24       E-Prescribing Status: Receipt confirmed by pharmacy (1/3/2022  7:24 AM CST)       Ordering Encounter Report    Associated Reports   View Encounter                Note composed:11:31 AM 01/06/2022

## 2022-01-10 DIAGNOSIS — R80.9 TYPE 2 DIABETES MELLITUS WITH MICROALBUMINURIA, WITHOUT LONG-TERM CURRENT USE OF INSULIN: ICD-10-CM

## 2022-01-10 DIAGNOSIS — Z00.00 VISIT FOR WELL MAN HEALTH CHECK: ICD-10-CM

## 2022-01-10 DIAGNOSIS — E11.29 TYPE 2 DIABETES MELLITUS WITH MICROALBUMINURIA, WITHOUT LONG-TERM CURRENT USE OF INSULIN: ICD-10-CM

## 2022-01-10 DIAGNOSIS — E11.42 TYPE 2 DIABETES MELLITUS WITH DIABETIC POLYNEUROPATHY, WITHOUT LONG-TERM CURRENT USE OF INSULIN: ICD-10-CM

## 2022-01-10 NOTE — TELEPHONE ENCOUNTER
No new care gaps identified.  Powered by [employer]. Reference number: [id number].   1/10/2022 5:31:43 AM CST

## 2022-01-11 ENCOUNTER — HOSPITAL ENCOUNTER (OUTPATIENT)
Dept: WOUND CARE | Facility: HOSPITAL | Age: 62
Discharge: HOME | End: 2022-01-11
Attending: FAMILY MEDICINE
Payer: MEDICAID

## 2022-01-11 ENCOUNTER — LAB VISIT (OUTPATIENT)
Dept: PRIMARY CARE CLINIC | Facility: OTHER | Age: 62
End: 2022-01-11
Attending: INTERNAL MEDICINE
Payer: MEDICAID

## 2022-01-11 DIAGNOSIS — L97.509 TYPE 2 DIABETES WITH SKIN ULCER OF FOOT: Primary | ICD-10-CM

## 2022-01-11 DIAGNOSIS — Z20.822 ENCOUNTER FOR LABORATORY TESTING FOR COVID-19 VIRUS: ICD-10-CM

## 2022-01-11 DIAGNOSIS — E11.621 TYPE 2 DIABETES WITH SKIN ULCER OF FOOT: Primary | ICD-10-CM

## 2022-01-11 PROCEDURE — 27201912 HC WOUND CARE DEBRIDEMENT SUPPLIES: Performed by: FAMILY MEDICINE

## 2022-01-11 PROCEDURE — 99499 NO LOS: ICD-10-PCS | Mod: ,,, | Performed by: FAMILY MEDICINE

## 2022-01-11 PROCEDURE — U0003 INFECTIOUS AGENT DETECTION BY NUCLEIC ACID (DNA OR RNA); SEVERE ACUTE RESPIRATORY SYNDROME CORONAVIRUS 2 (SARS-COV-2) (CORONAVIRUS DISEASE [COVID-19]), AMPLIFIED PROBE TECHNIQUE, MAKING USE OF HIGH THROUGHPUT TECHNOLOGIES AS DESCRIBED BY CMS-2020-01-R: HCPCS | Performed by: INTERNAL MEDICINE

## 2022-01-11 PROCEDURE — 11042 DEBRIDEMENT: ICD-10-PCS | Mod: ,,, | Performed by: FAMILY MEDICINE

## 2022-01-11 PROCEDURE — 11042 DBRDMT SUBQ TIS 1ST 20SQCM/<: CPT | Mod: ,,, | Performed by: FAMILY MEDICINE

## 2022-01-11 PROCEDURE — 11042 DBRDMT SUBQ TIS 1ST 20SQCM/<: CPT | Performed by: FAMILY MEDICINE

## 2022-01-11 PROCEDURE — 99499 UNLISTED E&M SERVICE: CPT | Mod: ,,, | Performed by: FAMILY MEDICINE

## 2022-01-11 NOTE — PROGRESS NOTES
[facility]                Progress Note    Subjective:       Patient ID: Fermin Henson is a 61 y.o. male.    Chief Complaint: No chief complaint on file.    Walked to clinic unaided wearing CAM boot. He has no pain as he is insensate.  No fevers, chills, or sweats.  No odor from wound he has noticed.      Review of Systems   Constitutional: Negative.    HENT: Negative.    Eyes: Negative.    Respiratory: Negative.    Cardiovascular: Negative.    Gastrointestinal: Negative.    Skin: Positive for wound.   Psychiatric/Behavioral: Negative.          Objective:        Physical Exam  Constitutional:       Appearance: Normal appearance.   HENT:      Head: Normocephalic and atraumatic.      Right Ear: External ear normal.      Left Ear: External ear normal.      Mouth/Throat:      Mouth: Mucous membranes are moist.      Pharynx: Oropharynx is clear.   Eyes:      Extraocular Movements: Extraocular movements intact.      Conjunctiva/sclera: Conjunctivae normal.      Pupils: Pupils are equal, round, and reactive to light.   Cardiovascular:      Rate and Rhythm: Normal rate and regular rhythm.   Pulmonary:      Effort: Pulmonary effort is normal. No respiratory distress.   Abdominal:      General: There is no distension.      Palpations: Abdomen is soft.      Tenderness: There is no abdominal tenderness.   Musculoskeletal:         General: Normal range of motion.      Right lower leg: No edema.      Left lower leg: No edema.   Skin:     General: Skin is warm and dry.      Comments: +right plantar DFU with slough and excess callous formation   Neurological:      Mental Status: He is alert and oriented to person, place, and time. Mental status is at baseline.           There were no vitals filed for this visit.      Debridement    Date/Time: 1/11/2022 10:32 AM  Performed by: See Jean-Baptiste MD  Authorized by: See Jean-Baptiste MD     Time out: Immediately prior to procedure a  ""time out" was called to verify the correct patient, procedure, equipment, support staff and site/side marked as required.    Consent Done?:  Yes (Written)  Local anesthesia used?: No      Wound Details:    Location:  Right foot    Location:  Right Plantar    Type of Debridement:  Excisional       Length (cm):  0.3       Area (sq cm):  0.09       Width (cm):  0.3       Percent Debrided (%):  100       Depth (cm):  0.2       Total Area Debrided (sq cm):  0.09    Depth of debridement:  Subcutaneous tissue    Tissue debrided:  Epidermis, Dermis and Subcutaneous    Devitalized tissue debrided:  Biofilm, Callus, Fibrin and Slough    Instruments:  Curette    Bleeding:  Minimal  Hemostasis Achieved: Yes    Method Used:  Pressure  Patient tolerance:  Patient tolerated the procedure well with no immediate complications      Assessment:           ICD-10-CM ICD-9-CM   1. Type 2 diabetes with skin ulcer of foot  E11.621 250.80    L97.509 707.15            Wound 05/04/21 1057 Diabetic Ulcer Right plantar Foot (Active)   05/04/21 1057    Pre-existing: Yes   Primary Wound Type: Diabetic ulc   Side: Right   Orientation: plantar   Location: Foot   Wound Number:    Ankle-Brachial Index:    Pulses:    Removal Indication and Assessment:    Wound Outcome:    (Retired) Wound Type:    (Retired) Wound Length (cm):    (Retired) Wound Width (cm):    (Retired) Depth (cm):    Wound Description (Comments):    Removal Indications:    Wound Image   01/11/22 1100   Wound WDL ex 01/11/22 1100   Dressing Appearance Dry;Intact 01/11/22 1100   Drainage Amount None 01/11/22 1100   Appearance [place] 01/11/22 1100   Red (%), Wound Tissue Color 100 % 01/11/22 1100   Periwound Area Dry 01/11/22 1100   Wound Edges Callused 01/11/22 1100   Wound Length (cm) 0.3 cm 01/11/22 1100   Wound Width (cm) 0.3 cm 01/11/22 1100   Wound Depth (cm) 0.2 cm 01/11/22 1100   Wound Volume (cm^3) 0.018 cm^3 01/11/22 1100   Wound Surface Area (cm^2) 0.09 cm^2 01/11/22 1100   Care " Cleansed with:;Antimicrobial agent;Sterile normal saline 01/11/22 1100   Dressing Changed 01/11/22 1100   Periwound Care Dry periwound area maintained 01/11/22 1100   Off Loading Football dressing;Off loading shoe 01/11/22 1100   Dressing Change Due 01/18/22 01/11/22 1100            Wound 12/14/21 1000 Traumatic Right anterior;dorsal Foot (Active)   12/14/21 1000    Pre-existing: Yes   Primary Wound Type: Traumatic   Side: Right   Orientation: anterior;dorsal   Location: Foot   Wound Number:    Ankle-Brachial Index:    Pulses:    Removal Indication and Assessment:    Wound Outcome:    (Retired) Wound Type:    (Retired) Wound Length (cm):    (Retired) Wound Width (cm):    (Retired) Depth (cm):    Wound Description (Comments):    Removal Indications:    Wound Length (cm) 0 cm 01/11/22 1100   Wound Width (cm) 0 cm 01/11/22 1100   Wound Surface Area (cm^2) 0 cm^2 01/11/22 1100           Plan:                Orders Placed This Encounter   Procedures    Debridement     This order was created via procedure documentation     Standing Status:   Standing     Number of Occurrences:   1    Change dressing     Clean with NS. Gentian Violet to Dorsal aspect. Endoform to wound bed (Plantar). Football (x 3 cast padding), Coban. CAM boot.     Heavilly callused periwound and wound pale pink. Wound unchanged in size. Dorsal foot not open but red, painted with Gentian Violet   Follow up in about 1 week (around 1/18/2022).     See Jean-Baptiste MD

## 2022-01-12 DIAGNOSIS — R80.9 TYPE 2 DIABETES MELLITUS WITH MICROALBUMINURIA, WITHOUT LONG-TERM CURRENT USE OF INSULIN: ICD-10-CM

## 2022-01-12 DIAGNOSIS — E11.42 TYPE 2 DIABETES MELLITUS WITH DIABETIC POLYNEUROPATHY, WITHOUT LONG-TERM CURRENT USE OF INSULIN: ICD-10-CM

## 2022-01-12 DIAGNOSIS — E11.29 TYPE 2 DIABETES MELLITUS WITH MICROALBUMINURIA, WITHOUT LONG-TERM CURRENT USE OF INSULIN: ICD-10-CM

## 2022-01-12 DIAGNOSIS — E11.9 TYPE 2 DIABETES MELLITUS WITHOUT COMPLICATION, WITHOUT LONG-TERM CURRENT USE OF INSULIN: ICD-10-CM

## 2022-01-12 RX ORDER — EMPAGLIFLOZIN 10 MG/1
TABLET, FILM COATED ORAL
Qty: 90 TABLET | Refills: 1 | Status: SHIPPED | OUTPATIENT
Start: 2022-01-12 | End: 2022-07-15

## 2022-01-12 RX ORDER — LOSARTAN POTASSIUM 25 MG/1
TABLET ORAL
Qty: 90 TABLET | Refills: 3 | Status: SHIPPED | OUTPATIENT
Start: 2022-01-12 | End: 2022-11-28

## 2022-01-12 NOTE — TELEPHONE ENCOUNTER
Refill Routing Note   Medication(s) are not appropriate for processing by [facility] for the following reason(s):      - Medication is a new start (<3 months)    OR action(s):  Defer  Approve       Medication Therapy Plan: Jardiance-new start 11/11/21; defer  --->Care Gap information included in message below if applicable.   Medication reconciliation completed: No   Automatic Epic Generated Protocol Data:    Orders Placed This Encounter    losartan (COZAAR) 25 MG tablet      Requested Prescriptions   Pending Prescriptions Disp Refills    JARDIANCE 10 mg tablet [Pharmacy Med Name: Jardiance 10 MG Oral Tablet] 90 tablet 1     Sig: Take 1 tablet by mouth once daily       Endocrinology:  Diabetes - SGLT2 Inhibitors Passed - 1/10/2022  5:31 AM        Passed - Patient is at least 18 years old        Passed - BP > 90/50 mmH     BP Readings from Last 1 Encounters:   01/04/22 132/66               Passed - Valid encounter within last 15 months     Recent Visits  Date Type Provider Dept   11/11/21 Office Visit See Jean-Baptiste MD [facility]   09/11/20 Office Visit See Jean-Baptiste MD [facility]   03/09/20 Office Visit See Jean-Baptiste MD [facility] [place] [facility]   Showing recent visits within past 720 days and meeting all other requirements  Future Appointments  No visits were found meeting these conditions.  Showing future appointments within next 150 days and meeting all other requirements      Future Appointments              In 6 days See Jean-Baptiste MD [facility]    In 4 months See Jean-Baptiste MD [facility]                Passed - Cr is 1.39 or below and within 360 days     Lab Results   Component Value Date    CREATININE 0.9 11/16/2021    CREATININE 0.8 03/09/2021    CREATININE 0.9 09/15/2020              Passed - HBA1C within 180 days     Lab Results   Component Value Date    HGBA1C 6.8  (H) 11/16/2021    HGBA1C 6.8 (H) 03/09/2021    HGBA1C 6.6 (H) 09/15/2020              Passed - eGFR is 45 or above and within 360 days     Lab Results   Component Value Date    EGFRNONAA >60 11/16/2021    EGFRNONAA >60 03/09/2021    EGFRNONAA >60 09/15/2020                 Signed Prescriptions Disp Refills    losartan (COZAAR) 25 MG tablet 90 tablet 3     Sig: Take 1 tablet by mouth once daily       Cardiovascular:  Angiotensin Receptor Blockers Passed - 1/10/2022  5:31 AM        Passed - Patient is at least 18 years old        Passed - Last BP in normal range within 360 days     BP Readings from Last 1 Encounters:   01/04/22 132/66               Passed - Valid encounter within last 15 months     Recent Visits  Date Type Provider Dept   11/11/21 Office Visit See Jean-Baptiste MD [facility]   09/11/20 Office Visit See Jean-Baptiste MD [facility]   03/09/20 Office Visit See Jean-Baptiste MD [facility]   Showing recent visits within past 720 days and meeting all other requirements  Future Appointments  No visits were found meeting these conditions.  Showing future appointments within next 150 days and meeting all other requirements      Future Appointments              In 6 days See Jean-Baptiste MD [facility]    In 4 months See Jean-Baptiste MD [facility]                Passed - Cr is 1.39 or below and within 360 days     Lab Results   Component Value Date    CREATININE 0.9 11/16/2021    CREATININE 0.8 03/09/2021    CREATININE 0.9 09/15/2020              Passed - K is 5.2 or below and within 360 days     Potassium   Date Value Ref Range Status   11/16/2021 4.3 3.5 - 5.1 mmol/L Final   03/09/2021 4.4 3.5 - 5.1 mmol/L Final   09/15/2020 4.3 3.5 - 5.1 mmol/L Final              Passed - eGFR within 360 days     Lab Results   Component Value Date    EGFRNONAA >60 11/16/2021    EGFRNONAA >60  03/09/2021    EGFRNONAA >60 09/15/2020                      Appointments  past 12m or future 3m with PCP    Date Provider   Last Visit   11/11/2021 See Jean-Baptiste MD   Next Visit   1/18/2022 See Jean-Baptiste MD   ED visits in past 90 days: 0        Note composed:8:24 AM 01/12/2022

## 2022-01-12 NOTE — TELEPHONE ENCOUNTER
No new care gaps identified.  Powered by [employer]. Reference number: [id number].   1/12/2022 10:05:39 AM CST

## 2022-01-13 DIAGNOSIS — U07.1 COVID-19 VIRUS DETECTED: ICD-10-CM

## 2022-01-13 LAB
SARS-COV-2 RNA RESP QL NAA+PROBE: DETECTED
SARS-COV-2- CYCLE NUMBER: 16

## 2022-01-14 RX ORDER — PIOGLITAZONEHYDROCHLORIDE 15 MG/1
15 TABLET ORAL DAILY
Qty: 90 TABLET | Refills: 1 | Status: SHIPPED | OUTPATIENT
Start: 2022-01-14 | End: 2022-08-01

## 2022-01-14 RX ORDER — EMPAGLIFLOZIN 10 MG/1
TABLET, FILM COATED ORAL
Qty: 30 TABLET | Refills: 0 | OUTPATIENT
Start: 2022-01-14

## 2022-01-14 NOTE — TELEPHONE ENCOUNTER
Refill Authorization Note   Fermin Henson  is requesting a refill authorization.  Brief Assessment and Rationale for Refill:  Approve  Quick Discontinue     Medication Therapy Plan:       Medication Reconciliation Completed: No   Comments:   --->Care Gap information included below if applicable.   Orders Placed This Encounter    pioglitazone (ACTOS) 15 MG tablet      Requested Prescriptions   Signed Prescriptions Disp Refills    pioglitazone (ACTOS) 15 MG tablet 90 tablet 1     Sig: Take 1 tablet (15 mg total) by mouth once daily.       Endocrinology:  Diabetes - Glitazones - pioglitazone Passed - 1/12/2022  5:56 PM        Passed - Patient is at least 18 years old        Passed - Patient does not have a history of Heart Failure        Passed - Valid encounter within last 15 months     Recent Visits  Date Type Provider Dept   11/11/21 Office Visit See Jean-Baptiste MD [facility]/ Internal Med   09/11/20 Office Visit See Jean-Baptiste MD [facility]/ Internal Med   03/09/20 Office Visit See Jean-Baptiste MD [facility]/ Internal Med   Showing recent visits within past 720 days and meeting all other requirements  Future Appointments  No visits were found meeting these conditions.  Showing future appointments within next 150 days and meeting all other requirements      Future Appointments              In 4 days [facility]    In 4 months See Jean-Baptiste MD [facility]                Passed - HBA1C within 180 days     Lab Results   Component Value Date    HGBA1C 6.8 (H) 11/16/2021    HGBA1C 6.8 (H) 03/09/2021    HGBA1C 6.6 (H) 09/15/2020              Passed - ALT is 131 or below and within 360 days     ALT   Date Value Ref Range Status   11/16/2021 20 10 - 44 U/L Final   03/09/2021 21 10 - 44 U/L Final   09/15/2020 22 10 - 44 U/L Final              Passed - AST is 119 or below and within 360 days     AST   Date Value Ref  Range Status   11/16/2021 18 10 - 40 U/L Final   03/09/2021 15 10 - 40 U/L Final   09/15/2020 17 10 - 40 U/L Final               Refused Prescriptions Disp Refills    JARDIANCE 10 mg tablet [Pharmacy Med Name: Jardiance 10 MG Oral Tablet] 30 tablet 0     Sig: Take 1 tablet by mouth once daily       Endocrinology:  Diabetes - SGLT2 Inhibitors Passed - 1/12/2022  5:56 PM        Passed - Patient is at least 18 years old        Passed - BP > 90/50 mmH     BP Readings from Last 1 Encounters:   01/04/22 132/66               Passed - Valid encounter within last 15 months     Recent Visits  Date Type Provider Dept   11/11/21 Office Visit See Jean-Baptiste MD [facility]   09/11/20 Office Visit See Jean-Baptiste MD [facility] [place] [facility]   03/09/20 Office Visit See Jean-Baptiste MD [facility]   Showing recent visits within past 720 days and meeting all other requirements  Future Appointments  No visits were found meeting these conditions.  Showing future appointments within next 150 days and meeting all other requirements      Future Appointments              In 4 days [facility]    In 4 months See Jean-Baptiste MD [facility]                Passed - Cr is 1.39 or below and within 360 days     Lab Results   Component Value Date    CREATININE 0.9 11/16/2021    CREATININE 0.8 03/09/2021    CREATININE 0.9 09/15/2020              Passed - HBA1C within 180 days     Lab Results   Component Value Date    HGBA1C 6.8 (H) 11/16/2021    HGBA1C 6.8 (H) 03/09/2021    HGBA1C 6.6 (H) 09/15/2020              Passed - eGFR is 45 or above and within 360 days     Lab Results   Component Value Date    EGFRNONAA >60 11/16/2021    EGFRNONAA >60 03/09/2021    EGFRNONAA >60 09/15/2020                    Appointments  past 12m or future 3m with PCP    Date Provider   Last Visit   11/11/2021 See Jean-Baptiste MD   Next  Visit   1/18/2022 Dominic Jean-Baptiste MD   ED visits in past 90 days: 0     Note composed:10:30 AM 01/14/2022         Pended Medication(s)   Requested Prescriptions     Signed Prescriptions Disp Refills    pioglitazone (ACTOS) 15 MG tablet 90 tablet 1     Sig: Take 1 tablet (15 mg total) by mouth once daily.     Authorizing Provider: DOMINIC JEAN-BAPTISTE     Ordering User: KATIE GU     Refused Prescriptions Disp Refills    JARDIANCE 10 mg tablet [Pharmacy Med Name: Jardiance 10 MG Oral Tablet] 30 tablet 0     Sig: Take 1 tablet by mouth once daily     Refused By: KATIE UG     Reason for Refusal: Request already responded to by other means (e.g. phone or fax)        Duplicate Pended Encounter(s)/ Last Prescribed Details:    [id number] [place] - [address]   [address] [address]   Phone:  [phone]  Fax:  [phone]   PROSPER #:  --   KARTHIK Reason: --       Outpatient Medication Detail     Disp Refills Start End KARTHIK   JARDIANCE 10 mg tablet 90 tablet 1 1/12/2022  No   Sig: Take 1 tablet by mouth once daily   Sent to pharmacy as: JARDIANCE 10 mg tablet   Class: Normal   Notes to Pharmacy: Please inactivate all prior scripts with same name and strength including any scripts on hold.   Order: [id number]   Date/Time Signed: 1/12/2022 11:50       E-Prescribing Status: Receipt confirmed by pharmacy (1/12/2022 11:50 AM CST)     Ordering Encounter Report    Associated Reports   View Encounter              Note composed:10:31 AM 01/14/2022

## 2022-01-18 ENCOUNTER — HOSPITAL ENCOUNTER (OUTPATIENT)
Dept: WOUND CARE | Facility: HOSPITAL | Age: 62
Discharge: HOME | End: 2022-01-18
Attending: FAMILY MEDICINE
Payer: MEDICAID

## 2022-01-18 VITALS — TEMPERATURE: 98 F | DIASTOLIC BLOOD PRESSURE: 75 MMHG | HEART RATE: 87 BPM | SYSTOLIC BLOOD PRESSURE: 143 MMHG

## 2022-01-18 DIAGNOSIS — E11.621 TYPE 2 DIABETES WITH SKIN ULCER OF FOOT: Primary | ICD-10-CM

## 2022-01-18 DIAGNOSIS — L97.509 TYPE 2 DIABETES WITH SKIN ULCER OF FOOT: Primary | ICD-10-CM

## 2022-01-18 PROCEDURE — 99499 UNLISTED E&M SERVICE: CPT | Mod: ,,, | Performed by: FAMILY MEDICINE

## 2022-01-18 PROCEDURE — 11042 DEBRIDEMENT: ICD-10-PCS | Mod: ,,, | Performed by: FAMILY MEDICINE

## 2022-01-18 PROCEDURE — 99499 NO LOS: ICD-10-PCS | Mod: ,,, | Performed by: FAMILY MEDICINE

## 2022-01-18 PROCEDURE — 11042 DBRDMT SUBQ TIS 1ST 20SQCM/<: CPT | Performed by: FAMILY MEDICINE

## 2022-01-18 PROCEDURE — 27201912 HC WOUND CARE DEBRIDEMENT SUPPLIES: Performed by: FAMILY MEDICINE

## 2022-01-18 PROCEDURE — 11042 DBRDMT SUBQ TIS 1ST 20SQCM/<: CPT | Mod: ,,, | Performed by: FAMILY MEDICINE

## 2022-01-18 NOTE — PROGRESS NOTES
[facility]                Progress Note    Subjective:       Patient ID: Fermin Henson is a 61 y.o. male.    Chief Complaint: No chief complaint on file.    Follow up wound care visit. Patient walked to exam room unaided. Prescribed CAM boot on RLE. No c/o pain at present. He did test positive for covid last week after his visit here.  There has been no fevers, chills, or URI symptoms.  He is limiting his time on his feet.      Review of Systems   Constitutional: Negative.    HENT: Negative.    Eyes: Negative.    Respiratory: Negative.    Cardiovascular: Negative.    Gastrointestinal: Negative.    Skin: Positive for wound.   Psychiatric/Behavioral: Negative.          Objective:        Physical Exam  Vitals reviewed.   Constitutional:       Appearance: Normal appearance.   HENT:      Head: Normocephalic and atraumatic.      Right Ear: External ear normal.      Left Ear: External ear normal.      Mouth/Throat:      Mouth: Mucous membranes are moist.      Pharynx: Oropharynx is clear.   Eyes:      Conjunctiva/sclera: Conjunctivae normal.      Pupils: Pupils are equal, round, and reactive to light.   Cardiovascular:      Rate and Rhythm: Normal rate.   Pulmonary:      Effort: Pulmonary effort is normal. No respiratory distress.   Abdominal:      General: There is no distension.      Palpations: Abdomen is soft.   Musculoskeletal:      Right lower leg: No edema.      Left lower leg: No edema.   Skin:     General: Skin is warm and dry.      Comments: +DFU to right plantar with excess callous and slough   Neurological:      Mental Status: He is alert and oriented to person, place, and time. Mental status is at baseline.           Vitals:    01/18/22 1003   BP: (!) 143/75   Pulse: 87   Temp: 97.5 °F (36.4 °C)     Debridement    Date/Time: 1/18/2022 9:49 AM  Performed by: See Jean-Baptiste MD  Authorized by: See Jean-Baptiste MD     Time out: Immediately prior to procedure a  ""time out" was called to verify the correct patient, procedure, equipment, support staff and site/side marked as required.    Consent Done?:  Yes (Written)  Local anesthesia used?: No      Wound Details:    Location:  Right foot    Location:  Right Plantar    Type of Debridement:  Excisional       Length (cm):  0.2       Area (sq cm):  0.04       Width (cm):  0.2       Percent Debrided (%):  100       Depth (cm):  0.1       Total Area Debrided (sq cm):  0.04    Depth of debridement:  Subcutaneous tissue    Tissue debrided:  Dermis, Epidermis and Subcutaneous    Devitalized tissue debrided:  Biofilm, Callus, Fibrin and Slough    Instruments:  Curette    Bleeding:  Minimal  Hemostasis Achieved: Yes    Method Used:  Pressure  Patient tolerance:  Patient tolerated the procedure well with no immediate complications      Assessment:           ICD-10-CM ICD-9-CM   1. Type 2 diabetes with skin ulcer of foot  E11.621 250.80    L97.509 707.15            Wound 05/04/21 1057 Diabetic Ulcer Right plantar Foot (Active)   05/04/21 1057    Pre-existing: Yes   Primary Wound Type: Diabetic ulc   Side: Right   Orientation: plantar   Location: Foot   Wound Number:    Ankle-Brachial Index:    Pulses:    Removal Indication and Assessment:    Wound Outcome:    (Retired) Wound Type:    (Retired) Wound Length (cm):    (Retired) Wound Width (cm):    (Retired) Depth (cm):    Wound Description (Comments):    Removal Indications:    Wound Image    01/18/22 1000   Wound WDL ex 01/18/22 1000   Dressing Appearance Dry;Intact 01/18/22 1000   Drainage Amount Scant 01/18/22 1000   Drainage Characteristics/Odor Serosanguineous;No odor 01/18/22 1000   Appearance Pink 01/18/22 1000   Red (%), Wound Tissue Color 100 % 01/18/22 1000   Periwound Area Dry 01/18/22 1000   Wound Edges Callused 01/18/22 1000   Wound Length (cm) 0.2 cm 01/18/22 1000   Wound Width (cm) 0.2 cm 01/18/22 1000   Wound Depth (cm) 0.2 cm 01/18/22 1000   Wound Volume (cm^3) 0.008 cm^3 " 01/18/22 1000   Wound Surface Area (cm^2) 0.04 cm^2 01/18/22 1000   Care Cleansed with:;Antimicrobial agent;Sterile normal saline 01/18/22 1000   Dressing Changed 01/18/22 1000   Periwound Care Dry periwound area maintained 01/18/22 1000   Off Loading Football dressing;Off loading shoe 01/18/22 1000   Dressing Change Due 01/25/22 01/18/22 1000            Wound 12/14/21 1000 Traumatic Right anterior;dorsal Foot (Active)   12/14/21 1000    Pre-existing: Yes   Primary Wound Type: Traumatic   Side: Right   Orientation: anterior;dorsal   Location: Foot   Wound Number:    Ankle-Brachial Index:    Pulses:    Removal Indication and Assessment:    Wound Outcome:    (Retired) Wound Type:    (Retired) Wound Length (cm):    (Retired) Wound Width (cm):    (Retired) Depth (cm):    Wound Description (Comments):    Removal Indications:    Wound Image   01/18/22 1000   Wound WDL WDL 01/18/22 1000   Dressing Appearance Intact;Dry 01/18/22 1000   Drainage Amount None 01/18/22 1000   Appearance Intact;Pandora 01/18/22 1000   Periwound Area Dry;Intact 01/18/22 1000   Wound Length (cm) 0 cm 01/18/22 1000   Wound Width (cm) 0 cm 01/18/22 1000   Wound Depth (cm) 0 cm 01/18/22 1000   Wound Volume (cm^3) 0 cm^3 01/18/22 1000   Wound Surface Area (cm^2) 0 cm^2 01/18/22 1000   Tunneling (depth (cm)/location) 0 01/18/22 1000   Undermining (depth (cm)/location) 0 01/18/22 1000   Care Cleansed with:;Antimicrobial agent;Sterile normal saline 01/18/22 1000   Dressing Removed 01/18/22 1000   Periwound Care Moisture barrier applied 01/18/22 1000   Off Loading Football dressing;Off loading shoe 01/18/22 1000   Dressing Change Due 01/25/22 01/18/22 1000           Plan:                Orders Placed This Encounter   Procedures    Debridement     This order was created via procedure documentation     Standing Status:   Standing     Number of Occurrences:   1    Change dressing     Clean with NS. Gentian Violet to Dorsal aspect. Promogran to wound bed  (Plantar). Football (x 3 cast padding), Coban. CAM boot.        Follow up in about 1 week (around 1/25/2022) for wound care.     See Jean-Baptiste MD

## 2022-01-25 ENCOUNTER — HOSPITAL ENCOUNTER (OUTPATIENT)
Dept: WOUND CARE | Facility: HOSPITAL | Age: 62
Discharge: HOME | End: 2022-01-25
Attending: FAMILY MEDICINE
Payer: MEDICAID

## 2022-01-25 VITALS — TEMPERATURE: 98 F | SYSTOLIC BLOOD PRESSURE: 131 MMHG | HEART RATE: 80 BPM | DIASTOLIC BLOOD PRESSURE: 67 MMHG

## 2022-01-25 DIAGNOSIS — E11.621 DIABETIC ULCER OF RIGHT MIDFOOT ASSOCIATED WITH TYPE 2 DIABETES MELLITUS, WITH FAT LAYER EXPOSED: Primary | ICD-10-CM

## 2022-01-25 DIAGNOSIS — L97.412 DIABETIC ULCER OF RIGHT MIDFOOT ASSOCIATED WITH TYPE 2 DIABETES MELLITUS, WITH FAT LAYER EXPOSED: Primary | ICD-10-CM

## 2022-01-25 PROCEDURE — 11042 DBRDMT SUBQ TIS 1ST 20SQCM/<: CPT | Mod: ,,, | Performed by: FAMILY MEDICINE

## 2022-01-25 PROCEDURE — 99499 UNLISTED E&M SERVICE: CPT | Mod: ,,, | Performed by: FAMILY MEDICINE

## 2022-01-25 PROCEDURE — 11042 DBRDMT SUBQ TIS 1ST 20SQCM/<: CPT | Performed by: FAMILY MEDICINE

## 2022-01-25 PROCEDURE — 11042 DEBRIDEMENT: ICD-10-PCS | Mod: ,,, | Performed by: FAMILY MEDICINE

## 2022-01-25 PROCEDURE — 27201912 HC WOUND CARE DEBRIDEMENT SUPPLIES: Performed by: FAMILY MEDICINE

## 2022-01-25 PROCEDURE — 99499 NO LOS: ICD-10-PCS | Mod: ,,, | Performed by: FAMILY MEDICINE

## 2022-01-25 NOTE — PROGRESS NOTES
"[facility]                Progress Note    Subjective:       Patient ID: Fermin Henson is a 61 y.o. male.    Chief Complaint: No chief complaint on file.    Walked to clinic unaided. No complaints pain. Dorsal foot remains healed. He states he stayed off his foot a lot of last week.  He is insensate, so no complaints of pain.  No fevers, chills, or sweats      Review of Systems   Constitutional: Negative.    HENT: Negative.    Eyes: Negative.    Respiratory: Negative.    Cardiovascular: Negative.    Gastrointestinal: Negative.    Skin: Positive for wound.   Neurological: Positive for numbness.   Psychiatric/Behavioral: Negative.          Objective:        Physical Exam  Constitutional:       Appearance: Normal appearance.   HENT:      Head: Normocephalic and atraumatic.      Right Ear: External ear normal.      Left Ear: External ear normal.      Mouth/Throat:      Mouth: Mucous membranes are moist.      Pharynx: Oropharynx is clear.   Eyes:      Extraocular Movements: Extraocular movements intact.      Conjunctiva/sclera: Conjunctivae normal.      Pupils: Pupils are equal, round, and reactive to light.   Cardiovascular:      Rate and Rhythm: Normal rate and regular rhythm.   Pulmonary:      Effort: Pulmonary effort is normal. No respiratory distress.   Abdominal:      General: There is no distension.      Palpations: Abdomen is soft.      Tenderness: There is no abdominal tenderness.   Skin:     General: Skin is warm and dry.      Comments: +DFU to plantar of right foot   Neurological:      Mental Status: He is alert and oriented to person, place, and time. Mental status is at baseline.           Vitals:    01/25/22 1016   BP: 131/67   Pulse: 80   Temp: 97.7 °F (36.5 °C)     Debridement    Date/Time: 1/25/2022 9:35 AM  Performed by: See Jean-Baptiste MD  Authorized by: See Jean-Baptiste MD     Time out: Immediately prior to procedure a "time out" was called to verify " the correct patient, procedure, equipment, support staff and site/side marked as required.    Consent Done?:  Yes (Written)  Local anesthesia used?: No      Wound Details:    Location:  Right foot    Location:  Right Plantar    Type of Debridement:  Excisional       Length (cm):  0.3       Area (sq cm):  0.09       Width (cm):  0.3       Percent Debrided (%):  100       Depth (cm):  0.2       Total Area Debrided (sq cm):  0.09    Depth of debridement:  Subcutaneous tissue    Tissue debrided:  Dermis, Epidermis and Subcutaneous    Devitalized tissue debrided:  Biofilm, Fibrin and Slough    Instruments:  Curette    Bleeding:  Minimal  Hemostasis Achieved: Yes    Method Used:  Pressure  Patient tolerance:  Patient tolerated the procedure well with no immediate complications      Assessment:           ICD-10-CM ICD-9-CM   1. Diabetic ulcer of right midfoot associated with type 2 diabetes mellitus, with fat layer exposed  E11.621 250.80    L97.412 707.14            Wound 05/04/21 1057 Diabetic Ulcer Right plantar Foot (Active)   05/04/21 1057    Pre-existing: Yes   Primary Wound Type: Diabetic ulc   Side: Right   Orientation: plantar   Location: Foot   Wound Number:    Ankle-Brachial Index:    Pulses:    Removal Indication and Assessment:    Wound Outcome:    (Retired) Wound Type:    (Retired) Wound Length (cm):    (Retired) Wound Width (cm):    (Retired) Depth (cm):    Wound Description (Comments):    Removal Indications:    Wound Image   01/25/22 1000   Wound WDL ex 01/25/22 1000   Dressing Appearance Dry;Intact 01/25/22 1000   Drainage Amount Scant 01/25/22 1000   Drainage Characteristics/Odor Serosanguineous 01/25/22 1000   Appearance Red 01/25/22 1000   Red (%), Wound Tissue Color 100 % 01/25/22 1000   Periwound Area Dry 01/25/22 1000   Wound Edges Callused 01/25/22 1000   Wound Length (cm) 0.3 cm 01/25/22 1000   Wound Width (cm) 0.3 cm 01/25/22 1000   Wound Depth (cm) 0.2 cm 01/25/22 1000   Wound Volume (cm^3)  0.018 cm^3 01/25/22 1000   Wound Surface Area (cm^2) 0.09 cm^2 01/25/22 1000   Care Cleansed with:;Antimicrobial agent;Sterile normal saline 01/25/22 1000   Dressing Changed 01/25/22 1000   Periwound Care Dry periwound area maintained 01/25/22 1000   Off Loading Football dressing;Off loading shoe 01/25/22 1000   Dressing Change Due 02/01/22 01/25/22 1000           Plan:                Orders Placed This Encounter   Procedures    Debridement     This order was created via procedure documentation     Standing Status:   Standing     Number of Occurrences:   1    Change dressing     Clean with NS. Gentian Violet to Dorsal aspect. Promogran to wound bed (Plantar). Football (x 3 cast padding), Coban. CAM boot.        Follow up in about 1 week (around 2/1/2022).     See Jean-Baptiste MD

## 2022-01-29 DIAGNOSIS — E11.42 TYPE 2 DIABETES MELLITUS WITH DIABETIC POLYNEUROPATHY, WITHOUT LONG-TERM CURRENT USE OF INSULIN: ICD-10-CM

## 2022-01-29 DIAGNOSIS — E11.621 TYPE 2 DIABETES WITH SKIN ULCER OF FOOT: ICD-10-CM

## 2022-01-29 DIAGNOSIS — L97.509 TYPE 2 DIABETES WITH SKIN ULCER OF FOOT: ICD-10-CM

## 2022-01-29 NOTE — TELEPHONE ENCOUNTER
No new care gaps identified.  Powered by [employer]. Reference number: [id number].   1/29/2022 8:39:13 AM CST

## 2022-02-01 ENCOUNTER — HOSPITAL ENCOUNTER (OUTPATIENT)
Dept: WOUND CARE | Facility: HOSPITAL | Age: 62
Discharge: HOME | End: 2022-02-01
Attending: FAMILY MEDICINE
Payer: MEDICAID

## 2022-02-01 VITALS — SYSTOLIC BLOOD PRESSURE: 140 MMHG | HEART RATE: 72 BPM | DIASTOLIC BLOOD PRESSURE: 77 MMHG | TEMPERATURE: 98 F

## 2022-02-01 DIAGNOSIS — L97.509 TYPE 2 DIABETES WITH SKIN ULCER OF FOOT: ICD-10-CM

## 2022-02-01 DIAGNOSIS — E11.621 TYPE 2 DIABETES WITH SKIN ULCER OF FOOT: ICD-10-CM

## 2022-02-01 DIAGNOSIS — E11.621 DIABETIC ULCER OF RIGHT MIDFOOT ASSOCIATED WITH TYPE 2 DIABETES MELLITUS, WITH FAT LAYER EXPOSED: Primary | ICD-10-CM

## 2022-02-01 DIAGNOSIS — L97.412 DIABETIC ULCER OF RIGHT MIDFOOT ASSOCIATED WITH TYPE 2 DIABETES MELLITUS, WITH FAT LAYER EXPOSED: Primary | ICD-10-CM

## 2022-02-01 PROCEDURE — 11042 DBRDMT SUBQ TIS 1ST 20SQCM/<: CPT | Performed by: FAMILY MEDICINE

## 2022-02-01 PROCEDURE — 99499 UNLISTED E&M SERVICE: CPT | Mod: ,,, | Performed by: FAMILY MEDICINE

## 2022-02-01 PROCEDURE — 99499 NO LOS: ICD-10-PCS | Mod: ,,, | Performed by: FAMILY MEDICINE

## 2022-02-01 PROCEDURE — 11042 DEBRIDEMENT: ICD-10-PCS | Mod: ,,, | Performed by: FAMILY MEDICINE

## 2022-02-01 PROCEDURE — 99212 OFFICE O/P EST SF 10 MIN: CPT | Performed by: FAMILY MEDICINE

## 2022-02-01 PROCEDURE — 27201912 HC WOUND CARE DEBRIDEMENT SUPPLIES: Performed by: FAMILY MEDICINE

## 2022-02-01 PROCEDURE — 11042 DBRDMT SUBQ TIS 1ST 20SQCM/<: CPT | Mod: ,,, | Performed by: FAMILY MEDICINE

## 2022-02-01 NOTE — PROGRESS NOTES
"[facility]                Progress Note    Subjective:       Patient ID: Fermin Henson is a 61 y.o. male.    Chief Complaint: No chief complaint on file.    Follow up wound care visit. Patient ambulated to exam room without assistance, prescribed CAM boot to RLE. No c/o pain at present. Dressing intact with small amount of serosanguineous dried drainage. Right Plantar Foot wound has large amount of callus around edge, patient denies increasing his walking over the weekend,       Review of Systems   Constitutional: Negative.    HENT: Negative.    Eyes: Negative.    Respiratory: Negative.    Cardiovascular: Negative.    Gastrointestinal: Negative.    Skin: Positive for wound.   Psychiatric/Behavioral: Negative.          Objective:        Physical Exam  Constitutional:       Appearance: Normal appearance.   HENT:      Head: Normocephalic and atraumatic.      Mouth/Throat:      Mouth: Mucous membranes are moist.      Pharynx: Oropharynx is clear.   Eyes:      Extraocular Movements: Extraocular movements intact.      Conjunctiva/sclera: Conjunctivae normal.      Pupils: Pupils are equal, round, and reactive to light.   Cardiovascular:      Rate and Rhythm: Normal rate and regular rhythm.   Pulmonary:      Effort: Pulmonary effort is normal. No respiratory distress.   Abdominal:      General: There is no distension.      Palpations: Abdomen is soft.   Skin:     General: Skin is warm and dry.      Comments: +dfu to right plantar with some maceration; excess callous formation   Neurological:      Mental Status: He is alert and oriented to person, place, and time. Mental status is at baseline.           Vitals:    02/01/22 1040   BP: (!) 140/77   Pulse: 72   Temp: 97.5 °F (36.4 °C)     Debridement    Date/Time: 2/1/2022 9:40 AM  Performed by: See Jean-Baptiste MD  Authorized by: See Jean-Baptiste MD     Time out: Immediately prior to procedure a "time out" was called to verify " the correct patient, procedure, equipment, support staff and site/side marked as required.    Consent Done?:  Yes (Written)  Local anesthesia used?: No      Wound Details:    Location:  Right foot    Location:  Right Plantar       Length (cm):  0.5       Area (sq cm):  0.05       Width (cm):  0.1       Percent Debrided (%):  100       Depth (cm):  0.2       Total Area Debrided (sq cm):  0.05    Depth of debridement:  Subcutaneous tissue    Tissue debrided:  Dermis, Epidermis and Subcutaneous    Devitalized tissue debrided:  Biofilm, Callus, Slough and Fibrin    Instruments:  Curette    Bleeding:  Minimal  Hemostasis Achieved: Yes    Method Used:  Pressure  Patient tolerance:  Patient tolerated the procedure well with no immediate complications      Assessment:           ICD-10-CM ICD-9-CM   1. Diabetic ulcer of right midfoot associated with type 2 diabetes mellitus, with fat layer exposed  E11.621 250.80    L97.412 707.14   2. Type 2 diabetes with skin ulcer of foot  E11.621 250.80    L97.509 707.15            Wound 05/04/21 1057 Diabetic Ulcer Right plantar Foot (Active)   05/04/21 1057    Pre-existing: Yes   Primary Wound Type: Diabetic ulc   Side: Right   Orientation: plantar   Location: Foot   Wound Number:    Ankle-Brachial Index:    Pulses:    Removal Indication and Assessment:    Wound Outcome:    (Retired) Wound Type:    (Retired) Wound Length (cm):    (Retired) Wound Width (cm):    (Retired) Depth (cm):    Wound Description (Comments):    Removal Indications:    Wound Image    02/01/22 1000   Wound WDL ex 02/01/22 1000   Dressing Appearance Intact;Dried drainage 02/01/22 1000   Drainage Amount Scant 02/01/22 1000   Drainage Characteristics/Odor Serosanguineous 02/01/22 1000   Appearance Red 02/01/22 1000   Tissue loss description Full thickness 02/01/22 1000   Red (%), Wound Tissue Color 100 % 02/01/22 1000   Periwound Area Dry 02/01/22 1000   Wound Edges Callused 02/01/22 1000   Wound Length (cm) 0.5 cm  02/01/22 1000   Wound Width (cm) 0.1 cm 02/01/22 1000   Wound Depth (cm) 0.2 cm 02/01/22 1000   Wound Volume (cm^3) 0.01 cm^3 02/01/22 1000   Wound Surface Area (cm^2) 0.05 cm^2 02/01/22 1000   Care Cleansed with:;Antimicrobial agent;Sterile normal saline 02/01/22 1000   Dressing Changed 02/01/22 1000   Periwound Care Moisture barrier applied 02/01/22 1000   Off Loading Football dressing;Off loading shoe 02/01/22 1000   Dressing Change Due 02/08/22 02/01/22 1000           Plan:                Orders Placed This Encounter   Procedures    Debridement     This order was created via procedure documentation     Standing Status:   Standing     Number of Occurrences:   1    Change dressing     Clean with NS. Gentian Violet to Dorsal aspect. Drawtex to wound bed (Plantar). Football (x 3 cast padding), Coban. CAM boot.        Follow up in about 1 week (around 2/8/2022) for wound care.     See Jean-Baptiste MD

## 2022-02-03 DIAGNOSIS — E11.621 TYPE 2 DIABETES WITH SKIN ULCER OF FOOT: ICD-10-CM

## 2022-02-03 DIAGNOSIS — E11.42 TYPE 2 DIABETES MELLITUS WITH DIABETIC POLYNEUROPATHY, WITHOUT LONG-TERM CURRENT USE OF INSULIN: ICD-10-CM

## 2022-02-03 DIAGNOSIS — L97.509 TYPE 2 DIABETES WITH SKIN ULCER OF FOOT: ICD-10-CM

## 2022-02-03 RX ORDER — METFORMIN HYDROCHLORIDE 850 MG/1
TABLET ORAL
Qty: 90 TABLET | Refills: 0 | Status: SHIPPED | OUTPATIENT
Start: 2022-02-03 | End: 2022-02-08 | Stop reason: SDUPTHER

## 2022-02-03 NOTE — TELEPHONE ENCOUNTER
No new care gaps identified.  Powered by [employer]. Reference number: [id number].   2/03/2022 10:24:42 AM CST

## 2022-02-07 RX ORDER — METFORMIN HYDROCHLORIDE 850 MG/1
TABLET ORAL
Qty: 90 TABLET | Refills: 0 | OUTPATIENT
Start: 2022-02-07

## 2022-02-07 NOTE — TELEPHONE ENCOUNTER
Quick DC. Request already responded to by other means (e.g. phone or fax)   This medication has been reordered by PCP already.  Will remove duplicate and close out encounter.

## 2022-02-08 ENCOUNTER — HOSPITAL ENCOUNTER (OUTPATIENT)
Dept: WOUND CARE | Facility: HOSPITAL | Age: 62
Discharge: HOME | End: 2022-02-08
Attending: FAMILY MEDICINE
Payer: MEDICAID

## 2022-02-08 VITALS — TEMPERATURE: 97 F | HEART RATE: 89 BPM | SYSTOLIC BLOOD PRESSURE: 132 MMHG | DIASTOLIC BLOOD PRESSURE: 68 MMHG

## 2022-02-08 DIAGNOSIS — L97.412 DIABETIC ULCER OF RIGHT MIDFOOT ASSOCIATED WITH TYPE 2 DIABETES MELLITUS, WITH FAT LAYER EXPOSED: ICD-10-CM

## 2022-02-08 DIAGNOSIS — L97.509 TYPE 2 DIABETES WITH SKIN ULCER OF FOOT: ICD-10-CM

## 2022-02-08 DIAGNOSIS — L97.509 TYPE 2 DIABETES WITH SKIN ULCER OF FOOT: Primary | ICD-10-CM

## 2022-02-08 DIAGNOSIS — E11.42 TYPE 2 DIABETES MELLITUS WITH DIABETIC POLYNEUROPATHY, WITHOUT LONG-TERM CURRENT USE OF INSULIN: ICD-10-CM

## 2022-02-08 DIAGNOSIS — E11.621 TYPE 2 DIABETES WITH SKIN ULCER OF FOOT: Primary | ICD-10-CM

## 2022-02-08 DIAGNOSIS — E11.621 TYPE 2 DIABETES WITH SKIN ULCER OF FOOT: ICD-10-CM

## 2022-02-08 DIAGNOSIS — E11.621 DIABETIC ULCER OF RIGHT MIDFOOT ASSOCIATED WITH TYPE 2 DIABETES MELLITUS, WITH FAT LAYER EXPOSED: ICD-10-CM

## 2022-02-08 PROCEDURE — 11042 DBRDMT SUBQ TIS 1ST 20SQCM/<: CPT | Performed by: FAMILY MEDICINE

## 2022-02-08 PROCEDURE — 11042 DBRDMT SUBQ TIS 1ST 20SQCM/<: CPT | Mod: ,,, | Performed by: FAMILY MEDICINE

## 2022-02-08 PROCEDURE — 99499 NO LOS: ICD-10-PCS | Mod: ,,, | Performed by: FAMILY MEDICINE

## 2022-02-08 PROCEDURE — 27201912 HC WOUND CARE DEBRIDEMENT SUPPLIES: Performed by: FAMILY MEDICINE

## 2022-02-08 PROCEDURE — 11042 DEBRIDEMENT: ICD-10-PCS | Mod: ,,, | Performed by: FAMILY MEDICINE

## 2022-02-08 PROCEDURE — 99499 UNLISTED E&M SERVICE: CPT | Mod: ,,, | Performed by: FAMILY MEDICINE

## 2022-02-08 RX ORDER — METFORMIN HYDROCHLORIDE 850 MG/1
TABLET ORAL
Qty: 90 TABLET | Refills: 0 | Status: SHIPPED | OUTPATIENT
Start: 2022-02-08 | End: 2022-04-04

## 2022-02-08 NOTE — TELEPHONE ENCOUNTER
No new care gaps identified.  Powered by [employer]. Reference number: [id number].   2/08/2022 11:46:23 AM CST

## 2022-02-08 NOTE — PROGRESS NOTES
"[facility]                Progress Note    Subjective:       Patient ID: Fermin Henson is a 61 y.o. male.    Chief Complaint: No chief complaint on file.    Follow up wound care visit. Patient ambulated to exam room without assistance, prescribed CAM boot to RLE. No c/o pain at present. There has been no falls and denies any drainage through dressing.      Review of Systems   Constitutional: Negative.    HENT: Negative.    Eyes: Negative.    Respiratory: Negative.    Cardiovascular: Negative.    Gastrointestinal: Negative.    Skin: Positive for wound.   Psychiatric/Behavioral: Negative.          Objective:        Physical Exam  Constitutional:       Appearance: Normal appearance.   HENT:      Head: Normocephalic and atraumatic.      Right Ear: External ear normal.      Left Ear: External ear normal.      Mouth/Throat:      Mouth: Mucous membranes are moist.      Pharynx: Oropharynx is clear.   Eyes:      Extraocular Movements: Extraocular movements intact.      Conjunctiva/sclera: Conjunctivae normal.      Pupils: Pupils are equal, round, and reactive to light.   Cardiovascular:      Rate and Rhythm: Normal rate and regular rhythm.   Pulmonary:      Effort: Pulmonary effort is normal.   Abdominal:      General: There is no distension.      Palpations: Abdomen is soft.   Skin:     General: Skin is warm and dry.      Comments: +DFU to right plantar with callous and some maceration   Neurological:      Mental Status: He is alert.           Vitals:    02/08/22 0946   BP: 132/68   Pulse: 89   Temp: 97.4 °F (36.3 °C)     Debridement    Date/Time: 2/8/2022 9:39 AM  Performed by: See Jean-Baptiste MD  Authorized by: See Jean-Baptiste MD     Time out: Immediately prior to procedure a "time out" was called to verify the correct patient, procedure, equipment, support staff and site/side marked as required.    Consent Done?:  Yes (Written)  Local anesthesia used?: No      Wound " Details:    Location:  Right foot    Location:  Right Plantar    Type of Debridement:  Excisional       Length (cm):  0.3       Area (sq cm):  0.03       Width (cm):  0.1       Percent Debrided (%):  100       Depth (cm):  0.2       Total Area Debrided (sq cm):  0.03    Depth of debridement:  Subcutaneous tissue    Tissue debrided:  Dermis, Epidermis and Subcutaneous    Devitalized tissue debrided:  Callus, Fibrin, Slough and Biofilm    Instruments:  Curette    Bleeding:  Minimal  Hemostasis Achieved: Yes    Method Used:  Pressure  Patient tolerance:  Patient tolerated the procedure well with no immediate complications      Assessment:           ICD-10-CM ICD-9-CM   1. Type 2 diabetes with skin ulcer of foot  E11.621 250.80    L97.509 707.15   2. Diabetic ulcer of right midfoot associated with type 2 diabetes mellitus, with fat layer exposed  E11.621 250.80    L97.412 707.14            Wound 05/04/21 1057 Diabetic Ulcer Right plantar Foot (Active)   05/04/21 1057    Pre-existing: Yes   Primary Wound Type: Diabetic ulc   Side: Right   Orientation: plantar   Location: Foot   Wound Number:    Ankle-Brachial Index:    Pulses:    Removal Indication and Assessment:    Wound Outcome:    (Retired) Wound Type:    (Retired) Wound Length (cm):    (Retired) Wound Width (cm):    (Retired) Depth (cm):    Wound Description (Comments):    Removal Indications:    Wound Image    02/08/22 0900   Wound WDL ex 02/08/22 0900   Dressing Appearance Intact;Dried drainage 02/08/22 0900   Drainage Amount Scant 02/08/22 0900   Drainage Characteristics/Odor Serosanguineous 02/08/22 0900   Appearance Red 02/08/22 0900   Tissue loss description Full thickness 02/08/22 0900   Red (%), Wound Tissue Color 100 % 02/08/22 0900   Periwound Area Dry 02/08/22 0900   Wound Edges Callused 02/08/22 0900   Wound Length (cm) 0.3 cm 02/08/22 0900   Wound Width (cm) 0.1 cm 02/08/22 0900   Wound Depth (cm) 0.2 cm 02/08/22 0900   Wound Volume (cm^3) 0.006 cm^3  02/08/22 0900   Wound Surface Area (cm^2) 0.03 cm^2 02/08/22 0900   Care Cleansed with:;Antimicrobial agent;Sterile normal saline 02/08/22 0900   Dressing Changed 02/08/22 0900   Periwound Care Moisture barrier applied 02/08/22 0900   Off Loading Football dressing;Off loading shoe 02/08/22 0900   Dressing Change Due 02/15/22 02/08/22 0900           Plan:                Orders Placed This Encounter   Procedures    Debridement     This order was created via procedure documentation     Standing Status:   Standing     Number of Occurrences:   1    Change dressing     Clean with NS. Gentian Violet to Dorsal and periwound of Plantar Foot. Endoform to wound bed then Drawtex. Football (x 3 cast padding), Coban. CAM boot.        Follow up in about 1 week (around 2/15/2022) for wound care.     See Jean-Baptiste MD

## 2022-02-15 ENCOUNTER — HOSPITAL ENCOUNTER (OUTPATIENT)
Dept: WOUND CARE | Facility: HOSPITAL | Age: 62
Discharge: HOME | End: 2022-02-15
Attending: FAMILY MEDICINE
Payer: MEDICAID

## 2022-02-15 VITALS
SYSTOLIC BLOOD PRESSURE: 130 MMHG | TEMPERATURE: 97 F | HEART RATE: 92 BPM | RESPIRATION RATE: 20 BRPM | DIASTOLIC BLOOD PRESSURE: 67 MMHG

## 2022-02-15 DIAGNOSIS — E11.621 TYPE 2 DIABETES WITH SKIN ULCER OF FOOT: Primary | ICD-10-CM

## 2022-02-15 DIAGNOSIS — L97.509 TYPE 2 DIABETES WITH SKIN ULCER OF FOOT: Primary | ICD-10-CM

## 2022-02-15 PROCEDURE — 99499 NO LOS: ICD-10-PCS | Mod: ,,, | Performed by: FAMILY MEDICINE

## 2022-02-15 PROCEDURE — 11042 DEBRIDEMENT: ICD-10-PCS | Mod: ,,, | Performed by: FAMILY MEDICINE

## 2022-02-15 PROCEDURE — 99499 UNLISTED E&M SERVICE: CPT | Mod: ,,, | Performed by: FAMILY MEDICINE

## 2022-02-15 PROCEDURE — 27201912 HC WOUND CARE DEBRIDEMENT SUPPLIES: Performed by: FAMILY MEDICINE

## 2022-02-15 PROCEDURE — 11042 DBRDMT SUBQ TIS 1ST 20SQCM/<: CPT | Mod: ,,, | Performed by: FAMILY MEDICINE

## 2022-02-15 PROCEDURE — 11042 DBRDMT SUBQ TIS 1ST 20SQCM/<: CPT | Performed by: FAMILY MEDICINE

## 2022-02-15 NOTE — PROGRESS NOTES
"[facility]                Progress Note    Subjective:       Patient ID: Fermin Henson is a 61 y.o. male.    Chief Complaint: Wound Check    F/u wound care visit. Patient ambulatory to exam room with no difficulty noted. Patient denies pain at present. Wound dressing to right foot intact with moderate amount of drainage noted. There has been no fevers, chills, or sweats.  He states he did not do excessive walking.      Review of Systems   Constitutional: Negative.    HENT: Negative.    Eyes: Negative.    Respiratory: Negative.    Cardiovascular: Negative.    Gastrointestinal: Negative.    Endocrine: Negative.    Skin: Positive for wound.         Objective:        Physical Exam  Constitutional:       Appearance: Normal appearance.   HENT:      Head: Normocephalic and atraumatic.      Mouth/Throat:      Mouth: Mucous membranes are moist.      Pharynx: Oropharynx is clear.   Eyes:      Extraocular Movements: Extraocular movements intact.      Conjunctiva/sclera: Conjunctivae normal.      Pupils: Pupils are equal, round, and reactive to light.   Cardiovascular:      Rate and Rhythm: Normal rate and regular rhythm.   Abdominal:      General: There is no distension.      Palpations: Abdomen is soft.   Skin:     General: Skin is warm and dry.      Comments: +DFU to right plantar with callous and slough   Neurological:      Mental Status: He is alert and oriented to person, place, and time.      Sensory: Sensory deficit present.           Vitals:    02/15/22 0952   BP: 130/67   Pulse: 92   Resp: 20   Temp: 97.2 °F (36.2 °C)     Debridement    Date/Time: 2/15/2022 9:49 AM  Performed by: See Jean-Baptiste MD  Authorized by: See Jean-Baptiste MD     Time out: Immediately prior to procedure a "time out" was called to verify the correct patient, procedure, equipment, support staff and site/side marked as required.    Consent Done?:  Yes (Written)  Local anesthesia used?: No  "     Wound Details:    Location:  Right foot    Location:  Right Plantar    Type of Debridement:  Excisional       Length (cm):  0.6       Area (sq cm):  0.24       Width (cm):  0.4       Percent Debrided (%):  100       Depth (cm):  0.3       Total Area Debrided (sq cm):  0.24    Depth of debridement:  Subcutaneous tissue    Tissue debrided:  Dermis, Epidermis and Subcutaneous    Devitalized tissue debrided:  Biofilm, Callus, Slough and Fibrin    Instruments:  Curette    Bleeding:  Minimal  Hemostasis Achieved: Yes    Method Used:  Pressure  Patient tolerance:  Patient tolerated the procedure well with no immediate complications      Assessment:           ICD-10-CM ICD-9-CM   1. Type 2 diabetes with skin ulcer of foot  E11.621 250.80    L97.509 707.15            Wound 05/04/21 1057 Diabetic Ulcer Right plantar Foot (Active)   05/04/21 1057    Pre-existing: Yes   Primary Wound Type: Diabetic ulc   Side: Right   Orientation: plantar   Location: Foot   Wound Number:    Ankle-Brachial Index:    Pulses:    Removal Indication and Assessment:    Wound Outcome:    (Retired) Wound Type:    (Retired) Wound Length (cm):    (Retired) Wound Width (cm):    (Retired) Depth (cm):    Wound Description (Comments):    Removal Indications:    Wound Image   02/15/22 0900   Dressing Appearance Intact;Dried drainage 02/15/22 0900   Drainage Amount Moderate 02/15/22 0900   Drainage Characteristics/Odor Serosanguineous 02/15/22 0900   Appearance Red 02/15/22 0900   Tissue loss description Full thickness 02/15/22 0900   Black (%), Wound Tissue Color 0 % 02/15/22 0900   Red (%), Wound Tissue Color 100 % 02/15/22 0900   Yellow (%), Wound Tissue Color 0 % 02/15/22 0900   Periwound Area Dry 02/15/22 0900   Wound Edges Callused 02/15/22 0900   Wound Length (cm) 0.6 cm 02/15/22 0900   Wound Width (cm) 0.3 cm 02/15/22 0900   Wound Depth (cm) 0.4 cm 02/15/22 0900   Wound Volume (cm^3) 0.072 cm^3 02/15/22 0900   Wound Surface Area (cm^2) 0.18 cm^2  02/15/22 0900   Undermining (depth (cm)/location) 0.5cm @ 6 o'clock 02/15/22 0900   Care Cleansed with:;Soap and water;Sterile normal saline 02/15/22 0900   Dressing Changed 02/15/22 0900   Periwound Care Moisture barrier applied 02/15/22 0900   Off Loading Football dressing;Off loading shoe 02/15/22 0900   Dressing Change Due 02/22/22 02/15/22 0900           Plan:                Orders Placed This Encounter   Procedures    Debridement     This order was created via procedure documentation     Standing Status:   Standing     Number of Occurrences:   1    Change dressing     Clean with NS. Gentian violet to periwound. Endoform to wound bed, drawtex cut to fit. Gigi foam long x1. Football dressing (cast padding x3, coban). Cam boot        Follow up in about 1 week (around 2/22/2022) for wound care.     See Jean-Baptiste MD

## 2022-02-16 ENCOUNTER — PATIENT OUTREACH (OUTPATIENT)
Dept: ADMINISTRATIVE | Facility: HOSPITAL | Age: 62
End: 2022-02-16
Payer: MEDICAID

## 2022-02-16 LAB
LEFT EYE DM RETINOPATHY: POSITIVE
RIGHT EYE DM RETINOPATHY: POSITIVE

## 2022-02-22 ENCOUNTER — HOSPITAL ENCOUNTER (OUTPATIENT)
Dept: WOUND CARE | Facility: HOSPITAL | Age: 62
Discharge: HOME | End: 2022-02-22
Attending: FAMILY MEDICINE
Payer: MEDICAID

## 2022-02-22 VITALS — HEART RATE: 87 BPM | TEMPERATURE: 98 F | SYSTOLIC BLOOD PRESSURE: 145 MMHG | DIASTOLIC BLOOD PRESSURE: 73 MMHG

## 2022-02-22 DIAGNOSIS — E11.621 DIABETIC ULCER OF RIGHT MIDFOOT ASSOCIATED WITH TYPE 2 DIABETES MELLITUS, WITH FAT LAYER EXPOSED: Primary | ICD-10-CM

## 2022-02-22 DIAGNOSIS — E11.621 TYPE 2 DIABETES WITH SKIN ULCER OF FOOT: ICD-10-CM

## 2022-02-22 DIAGNOSIS — L97.509 TYPE 2 DIABETES WITH SKIN ULCER OF FOOT: ICD-10-CM

## 2022-02-22 DIAGNOSIS — L97.412 DIABETIC ULCER OF RIGHT MIDFOOT ASSOCIATED WITH TYPE 2 DIABETES MELLITUS, WITH FAT LAYER EXPOSED: Primary | ICD-10-CM

## 2022-02-22 PROCEDURE — 11042 DBRDMT SUBQ TIS 1ST 20SQCM/<: CPT | Performed by: FAMILY MEDICINE

## 2022-02-22 PROCEDURE — 27201912 HC WOUND CARE DEBRIDEMENT SUPPLIES: Performed by: FAMILY MEDICINE

## 2022-02-22 PROCEDURE — 99499 UNLISTED E&M SERVICE: CPT | Mod: ,,, | Performed by: FAMILY MEDICINE

## 2022-02-22 PROCEDURE — 11042 DBRDMT SUBQ TIS 1ST 20SQCM/<: CPT | Mod: ,,, | Performed by: FAMILY MEDICINE

## 2022-02-22 PROCEDURE — 99499 NO LOS: ICD-10-PCS | Mod: ,,, | Performed by: FAMILY MEDICINE

## 2022-02-22 PROCEDURE — 11042 DEBRIDEMENT: ICD-10-PCS | Mod: ,,, | Performed by: FAMILY MEDICINE

## 2022-02-22 NOTE — PROGRESS NOTES
"[facility]                Progress Note    Subjective:       Patient ID: Fermin Henson is a 62 y.o. male.    Chief Complaint: No chief complaint on file.    Follow up wound care visit. Patient ambulated to exam room without assistance, with prescribed CAM boot on RLE. No c/o pain at present. He states he has been offloading.  No fevers, chills, or sweats.      Review of Systems   Constitutional: Negative.    HENT: Negative.    Eyes: Negative.    Respiratory: Negative.    Cardiovascular: Negative.    Gastrointestinal: Negative.    Skin: Positive for wound.   Neurological: Positive for numbness.         Objective:        Physical Exam  Constitutional:       Appearance: Normal appearance.   HENT:      Head: Normocephalic and atraumatic.      Mouth/Throat:      Mouth: Mucous membranes are moist.      Pharynx: Oropharynx is clear.   Eyes:      Extraocular Movements: Extraocular movements intact.      Conjunctiva/sclera: Conjunctivae normal.      Pupils: Pupils are equal, round, and reactive to light.   Cardiovascular:      Rate and Rhythm: Normal rate and regular rhythm.   Pulmonary:      Effort: Pulmonary effort is normal. No respiratory distress.   Abdominal:      General: There is no distension.      Palpations: Abdomen is soft.   Musculoskeletal:      Right lower leg: No edema.      Left lower leg: No edema.   Skin:     General: Skin is warm and dry.      Comments: +DFU to right plantar with slough and callous   Neurological:      Mental Status: He is alert.           Vitals:    02/22/22 1010   BP: (!) 145/73   Pulse: 87   Temp: 97.7 °F (36.5 °C)     Debridement    Date/Time: 2/22/2022 9:41 AM  Performed by: See Jean-Baptiste MD  Authorized by: See Jean-Bpatiste MD     Time out: Immediately prior to procedure a "time out" was called to verify the correct patient, procedure, equipment, support staff and site/side marked as required.    Consent Done?:  Yes " (Written)  Local anesthesia used?: No      Wound Details:    Location:  Right foot    Location:  Right Plantar    Type of Debridement:  Excisional       Length (cm):  0.4       Area (sq cm):  0.12       Width (cm):  0.3       Percent Debrided (%):  100       Depth (cm):  0.2       Total Area Debrided (sq cm):  0.12    Depth of debridement:  Subcutaneous tissue    Tissue debrided:  Dermis, Epidermis and Subcutaneous    Devitalized tissue debrided:  Biofilm, Callus, Fibrin and Slough    Instruments:  Curette    Bleeding:  Minimal  Hemostasis Achieved: Yes    Method Used:  Pressure  Patient tolerance:  Patient tolerated the procedure well with no immediate complications      Assessment:           ICD-10-CM ICD-9-CM   1. Diabetic ulcer of right midfoot associated with type 2 diabetes mellitus, with fat layer exposed  E11.621 250.80    L97.412 707.14   2. Type 2 diabetes with skin ulcer of foot  E11.621 250.80    L97.509 707.15            Wound 05/04/21 1057 Diabetic Ulcer Right plantar Foot (Active)   05/04/21 1057    Pre-existing: Yes   Primary Wound Type: Diabetic ulc   Side: Right   Orientation: plantar   Location: Foot   Wound Number:    Ankle-Brachial Index:    Pulses:    Removal Indication and Assessment:    Wound Outcome:    (Retired) Wound Type:    (Retired) Wound Length (cm):    (Retired) Wound Width (cm):    (Retired) Depth (cm):    Wound Description (Comments):    Removal Indications:    Wound Image    02/22/22 1000   Wound WDL ex 02/22/22 1000   Dressing Appearance Intact;Dried drainage 02/22/22 1000   Drainage Amount Small 02/22/22 1000   Drainage Characteristics/Odor Serosanguineous 02/22/22 1000   Appearance Red;Pink;Epithelialization 02/22/22 1000   Tissue loss description Full thickness 02/22/22 1000   Red (%), Wound Tissue Color 100 % 02/22/22 1000   Periwound Area Dry 02/22/22 1000   Wound Edges Callused 02/22/22 1000   Wound Length (cm) 0.4 cm 02/22/22 1000   Wound Width (cm) 0.3 cm 02/22/22 1000    Wound Depth (cm) 0.2 cm 02/22/22 1000   Wound Volume (cm^3) 0.024 cm^3 02/22/22 1000   Wound Surface Area (cm^2) 0.12 cm^2 02/22/22 1000   Care Cleansed with:;Antimicrobial agent;Sterile normal saline 02/22/22 1000   Dressing Changed 02/22/22 1000   Periwound Care Moisture barrier applied 02/22/22 1000   Off Loading Football dressing;Off loading shoe 02/22/22 1000   Dressing Change Due 03/01/22 02/22/22 1000           Plan:                Orders Placed This Encounter   Procedures    Debridement     This order was created via procedure documentation     Standing Status:   Standing     Number of Occurrences:   1    Change dressing     Clean with NS. Endoform to wound bed, Optifoam. Gigi foam long x1. Football dressing (cast padding x3, Coban). CAM boot.        Follow up in about 8 days (around 3/2/2022) for wound care.     See Jean-Baptiste MD

## 2022-03-02 ENCOUNTER — HOSPITAL ENCOUNTER (OUTPATIENT)
Dept: WOUND CARE | Facility: HOSPITAL | Age: 62
Discharge: HOME | End: 2022-03-02
Attending: FAMILY MEDICINE
Payer: MEDICAID

## 2022-03-02 VITALS — DIASTOLIC BLOOD PRESSURE: 78 MMHG | HEART RATE: 80 BPM | SYSTOLIC BLOOD PRESSURE: 138 MMHG | TEMPERATURE: 97 F

## 2022-03-02 DIAGNOSIS — E11.621 TYPE 2 DIABETES WITH SKIN ULCER OF FOOT: Primary | ICD-10-CM

## 2022-03-02 DIAGNOSIS — L97.509 TYPE 2 DIABETES WITH SKIN ULCER OF FOOT: Primary | ICD-10-CM

## 2022-03-02 PROCEDURE — 99214 OFFICE O/P EST MOD 30 MIN: CPT | Mod: 25,,, | Performed by: FAMILY MEDICINE

## 2022-03-02 PROCEDURE — 11042 DBRDMT SUBQ TIS 1ST 20SQCM/<: CPT | Performed by: FAMILY MEDICINE

## 2022-03-02 PROCEDURE — 27201912 HC WOUND CARE DEBRIDEMENT SUPPLIES: Performed by: FAMILY MEDICINE

## 2022-03-02 PROCEDURE — 11042 DEBRIDEMENT: ICD-10-PCS | Mod: ,,, | Performed by: FAMILY MEDICINE

## 2022-03-02 PROCEDURE — 99214 PR OFFICE/OUTPT VISIT, EST, LEVL IV, 30-39 MIN: ICD-10-PCS | Mod: 25,,, | Performed by: FAMILY MEDICINE

## 2022-03-02 PROCEDURE — 11042 DBRDMT SUBQ TIS 1ST 20SQCM/<: CPT | Mod: ,,, | Performed by: FAMILY MEDICINE

## 2022-03-02 NOTE — PROGRESS NOTES
[facility]                Progress Note    Subjective:       Patient ID: Fermin Henson is a 62 y.o. male.    Chief Complaint: Wound Check    Walked to clinic unaided. No complaints pain as usual. Dsg dry and intact no drainage. Callus covering plantar site and redness over dorsal foot. Wouns revealed after debridment and callus removal wich is unchanged in size. Extra foam applied as well as Peg assist without fron cover as unable to fit with CAM boot. Extra foam applied to proect third distal toe that was callused and folded over foot to cover dorsal foot.    This is an established patient in wound care.   Patient presents in the office today for evaluation of the chronic wound.  Updated HPI is noted below.    The periwound appears non-erythematous, no maceration noted, non-edematous    The wound appears stable. No odor. Serous drainage. periwound callus.     Patient denies fever, chills    Patients reports wound has been there since a biopsy     Lymphedema status is noncontributory to wound status    Pain at the site of the wound is n/a ; neuropathy     Contributing factors to current wound state include Diabetes Type 2, off-loading limitations, Lymphedema      Review of Systems   Constitutional: Negative for activity change, appetite change and fever.   HENT: Negative for congestion.    Respiratory: Negative for shortness of breath and wheezing.    Cardiovascular: Negative for chest pain and leg swelling.   Gastrointestinal: Negative for abdominal pain, nausea and vomiting.   Skin: Positive for wound.   Neurological: Negative for dizziness and headaches.   Psychiatric/Behavioral: The patient is not nervous/anxious.          Objective:        Physical Exam  Vitals and nursing note reviewed.   Constitutional:       Appearance: He is well-developed.   HENT:      Head: Normocephalic and atraumatic.   Eyes:      Conjunctiva/sclera: Conjunctivae normal.   Pulmonary:       Effort: Pulmonary effort is normal.   Abdominal:      General: There is no distension.      Palpations: Abdomen is soft.   Musculoskeletal:         General: Normal range of motion.      Cervical back: Normal range of motion and neck supple.   Skin:     Comments: See wound description   Neurological:      Mental Status: He is alert and oriented to person, place, and time.   Psychiatric:         Behavior: Behavior normal.           Vitals:    03/02/22 0959   BP: 138/78   Pulse: 80   Temp: 97 °F (36.1 °C)       Assessment:           ICD-10-CM ICD-9-CM   1. Type 2 diabetes with skin ulcer of foot  E11.621 250.80    L97.509 707.15            Wound 05/04/21 1057 Diabetic Ulcer Right plantar Foot (Active)   05/04/21 1057    Pre-existing: Yes   Primary Wound Type: Diabetic ulc   Side: Right   Orientation: plantar   Location: Foot   Wound Number:    Ankle-Brachial Index:    Pulses:    Removal Indication and Assessment:    Wound Outcome:    (Retired) Wound Type:    (Retired) Wound Length (cm):    (Retired) Wound Width (cm):    (Retired) Depth (cm):    Wound Description (Comments):    Removal Indications:    Wound Image   03/02/22 1000   Red (%), Wound Tissue Color 100 % 03/02/22 1000   Periwound Area Intact 03/02/22 1000   Wound Edges Callused 03/02/22 1000   Wound Length (cm) 2 cm 03/02/22 1000   Wound Width (cm) 2 cm 03/02/22 1000   Wound Depth (cm) 0.2 cm 03/02/22 1000   Wound Volume (cm^3) 0.8 cm^3 03/02/22 1000   Wound Surface Area (cm^2) 4 cm^2 03/02/22 1000   Care Cleansed with:;Antimicrobial agent;Sterile normal saline 03/02/22 1000   Dressing Changed 03/02/22 1000   Off Loading Off loading shoe;Football dressing 03/02/22 1000   Dressing Change Due 03/09/22 03/02/22 1000            Wound 03/02/22 1037 Diabetic Ulcer Right anterior Foot (Active)   03/02/22 1037    Pre-existing: Yes   Primary Wound Type: Diabetic ulc   Side: Right   Orientation: anterior   Location: Foot   Wound Number:    Ankle-Brachial Index:     Pulses:    Removal Indication and Assessment:    Wound Outcome:    (Retired) Wound Type:    (Retired) Wound Length (cm):    (Retired) Wound Width (cm):    (Retired) Depth (cm):    Wound Description (Comments):    Removal Indications:    Wound Image   03/02/22 1000   Wound WDL ex 03/02/22 1000   Dressing Appearance Dry;Intact 03/02/22 1000   Drainage Amount Scant 03/02/22 1000   Drainage Characteristics/Odor Clear 03/02/22 1000   Appearance Intact 03/02/22 1000   Tissue loss description Partial thickness 03/02/22 1000   Red (%), Wound Tissue Color 100 % 03/02/22 1000   Periwound Area Dry;Intact 03/02/22 1000   Wound Edges Defined 03/02/22 1000   Wound Length (cm) 0.5 cm 03/02/22 1000   Wound Width (cm) 0.5 cm 03/02/22 1000   Wound Depth (cm) 0.1 cm 03/02/22 1000   Wound Volume (cm^3) 0.025 cm^3 03/02/22 1000   Wound Surface Area (cm^2) 0.25 cm^2 03/02/22 1000   Care Cleansed with:;Antimicrobial agent;Sterile normal saline 03/02/22 1000   Dressing Change Due 03/09/22 03/02/22 1000           Debridement    Date/Time: 3/2/2022 9:39 AM  Performed by: Little Chun MD  Authorized by: Little Chun MD     Consent Done?:  Yes (Written)  Local anesthesia used?: No      Wound Details:    Location:  Right foot    Location:  Right Dorsal Foot    Type of Debridement:  Excisional       Length (cm):  0.5       Area (sq cm):  0.25       Width (cm):  0.5       Percent Debrided (%):  100       Depth (cm):  0.1       Total Area Debrided (sq cm):  0.25    Depth of debridement:  Subcutaneous tissue    Tissue debrided:  Subcutaneous    Devitalized tissue debrided:  Slough    Instruments:  Curette    Bleeding:  Minimal  Hemostasis Achieved: Yes    Method Used:  Pressure  Patient tolerance:  Patient tolerated the procedure well with no immediate complications      Plan:            1. Debridement needed and Done today.   2. Keep dressing clean and intact  3. Continue with wound care orders and plan as noted in orders.   4. Continue to  follow current medication regimen as per pcp   5. Call for any questions / concerns.         Orders Placed This Encounter   Procedures    Change dressing     Clean with NS. Endoform to wound bed on dorsal foot and plantar, Optifoam. Giig foam long x2 from plantar to dorsal foot . Football dressing (cast padding x3, Coban). CAM boot.Peg assist Medium        Follow up in about 1 week (around 3/9/2022).

## 2022-03-08 ENCOUNTER — HOSPITAL ENCOUNTER (OUTPATIENT)
Dept: WOUND CARE | Facility: HOSPITAL | Age: 62
Discharge: HOME | End: 2022-03-08
Attending: FAMILY MEDICINE
Payer: MEDICAID

## 2022-03-08 VITALS
HEART RATE: 90 BPM | SYSTOLIC BLOOD PRESSURE: 136 MMHG | TEMPERATURE: 97 F | DIASTOLIC BLOOD PRESSURE: 76 MMHG | RESPIRATION RATE: 20 BRPM

## 2022-03-08 DIAGNOSIS — L97.412 DIABETIC ULCER OF RIGHT MIDFOOT ASSOCIATED WITH TYPE 2 DIABETES MELLITUS, WITH FAT LAYER EXPOSED: Primary | ICD-10-CM

## 2022-03-08 DIAGNOSIS — E11.621 DIABETIC ULCER OF RIGHT MIDFOOT ASSOCIATED WITH TYPE 2 DIABETES MELLITUS, WITH FAT LAYER EXPOSED: Primary | ICD-10-CM

## 2022-03-08 PROCEDURE — 99499 UNLISTED E&M SERVICE: CPT | Mod: ,,, | Performed by: FAMILY MEDICINE

## 2022-03-08 PROCEDURE — 11042 DBRDMT SUBQ TIS 1ST 20SQCM/<: CPT | Performed by: FAMILY MEDICINE

## 2022-03-08 PROCEDURE — 11042 DBRDMT SUBQ TIS 1ST 20SQCM/<: CPT | Mod: ,,, | Performed by: FAMILY MEDICINE

## 2022-03-08 PROCEDURE — 27201912 HC WOUND CARE DEBRIDEMENT SUPPLIES: Performed by: FAMILY MEDICINE

## 2022-03-08 PROCEDURE — 99499 NO LOS: ICD-10-PCS | Mod: ,,, | Performed by: FAMILY MEDICINE

## 2022-03-08 PROCEDURE — 11042 DEBRIDEMENT: ICD-10-PCS | Mod: ,,, | Performed by: FAMILY MEDICINE

## 2022-03-08 NOTE — PROGRESS NOTES
"[facility]                Progress Note    Subjective:       Patient ID: Fermin Henson is a 62 y.o. male.    Chief Complaint: Wound Check    F/u wound care visit. Patient ambulatory to exam room with no difficulty noted. Patient denies pain or discomfort at present. Wound dressing to right foot intact with no drainage noted. No pain as he is insensate.  He has no known recent trauma to the foot.        Review of Systems   Constitutional: Negative.    HENT: Negative.    Eyes: Negative.    Respiratory: Negative.    Cardiovascular: Negative.    Gastrointestinal: Negative.    Skin: Positive for wound.   Psychiatric/Behavioral: Negative.          Objective:        Physical Exam  Constitutional:       Appearance: Normal appearance.   HENT:      Head: Normocephalic and atraumatic.      Mouth/Throat:      Mouth: Mucous membranes are moist.      Pharynx: Oropharynx is clear.   Eyes:      Extraocular Movements: Extraocular movements intact.      Conjunctiva/sclera: Conjunctivae normal.      Pupils: Pupils are equal, round, and reactive to light.   Cardiovascular:      Rate and Rhythm: Normal rate and regular rhythm.   Abdominal:      General: There is no distension.      Palpations: Abdomen is soft.   Musculoskeletal:      Right lower leg: No edema.      Left lower leg: No edema.   Skin:     General: Skin is warm and dry.      Comments: +DFU to right plantar with excess callous and slough  +pressure injury to right second toe   Neurological:      Mental Status: He is alert and oriented to person, place, and time. Mental status is at baseline.           Vitals:    03/08/22 0849   BP: 136/76   Pulse: 90   Resp: 20   Temp: 97.3 °F (36.3 °C)     Debridement    Date/Time: 3/8/2022 8:45 AM  Performed by: See Jean-Baptiste MD  Authorized by: See Jean-Baptiste MD     Time out: Immediately prior to procedure a "time out" was called to verify the correct patient, procedure, equipment, " support staff and site/side marked as required.    Consent Done?:  Yes (Written)  Local anesthesia used?: No      Wound Details:    Location:  Right foot    Location:  Right Plantar    Type of Debridement:  Excisional       Length (cm):  0.1       Area (sq cm):  0.01       Width (cm):  0.1       Percent Debrided (%):  100       Depth (cm):  0.3       Total Area Debrided (sq cm):  0.01    Depth of debridement:  Subcutaneous tissue    Tissue debrided:  Epidermis, Dermis and Subcutaneous    Devitalized tissue debrided:  Biofilm, Callus and Slough    Instruments:  Curette    Bleeding:  Minimal  Hemostasis Achieved: Yes    Method Used:  Pressure  Patient tolerance:  Patient tolerated the procedure well with no immediate complications      Assessment:           ICD-10-CM ICD-9-CM   1. Diabetic ulcer of right midfoot associated with type 2 diabetes mellitus, with fat layer exposed  E11.621 250.80    L97.412 707.14            Wound 05/04/21 1057 Diabetic Ulcer Right plantar Foot (Active)   05/04/21 1057    Pre-existing: Yes   Primary Wound Type: Diabetic ulc   Side: Right   Orientation: plantar   Location: Foot   Wound Number:    Ankle-Brachial Index:    Pulses:    Removal Indication and Assessment:    Wound Outcome:    (Retired) Wound Type:    (Retired) Wound Length (cm):    (Retired) Wound Width (cm):    (Retired) Depth (cm):    Wound Description (Comments):    Removal Indications:    Wound Image   03/08/22 0800   Wound WDL ex 03/08/22 0800   Dressing Appearance Dry;Intact 03/08/22 0800   Drainage Amount None 03/08/22 0800   Appearance [place] 03/08/22 0800   Tissue loss description Partial thickness 03/08/22 0800   Black (%), Wound Tissue Color 0 % 03/08/22 0800   Red (%), Wound Tissue Color 100 % 03/08/22 0800   Yellow (%), Wound Tissue Color 0 % 03/08/22 0800   Periwound Area Dry;Intact 03/08/22 0800   Wound Edges Callused 03/08/22 0800   Wound Length (cm) 0.1 cm 03/08/22 0800   Wound Width (cm) 0.1 cm 03/08/22 0800    Wound Depth (cm) 0.1 cm 03/08/22 0800   Wound Volume (cm^3) 0.001 cm^3 03/08/22 0800   Wound Surface Area (cm^2) 0.01 cm^2 03/08/22 0800   Care Cleansed with:;Soap and water;Sterile normal saline 03/08/22 0800   Dressing Changed 03/08/22 0800   Off Loading Football dressing;Off loading shoe 03/08/22 0800   Dressing Change Due 03/15/22 03/08/22 0800            Wound 03/02/22 1037 Diabetic Ulcer Right anterior Foot (Active)   03/02/22 1037    Pre-existing: Yes   Primary Wound Type: Diabetic ulc   Side: Right   Orientation: anterior   Location: Foot   Wound Number:    Ankle-Brachial Index:    Pulses:    Removal Indication and Assessment:    Wound Outcome:    (Retired) Wound Type:    (Retired) Wound Length (cm):    (Retired) Wound Width (cm):    (Retired) Depth (cm):    Wound Description (Comments):    Removal Indications:    Wound Image   03/08/22 0800   Wound WDL ex 03/08/22 0800   Dressing Appearance Dry;Intact 03/08/22 0800   Drainage Amount None 03/08/22 0800   Appearance Red 03/08/22 0800   Tissue loss description Partial thickness 03/08/22 0800   Black (%), Wound Tissue Color 0 % 03/08/22 0800   Red (%), Wound Tissue Color 100 % 03/08/22 0800   Yellow (%), Wound Tissue Color 0 % 03/08/22 0800   Periwound Area Scar tissue 03/08/22 0800   Wound Edges Open 03/08/22 0800   Wound Length (cm) 1.5 cm 03/08/22 0800   Wound Width (cm) 1 cm 03/08/22 0800   Wound Depth (cm) 0.1 cm 03/08/22 0800   Wound Volume (cm^3) 0.15 cm^3 03/08/22 0800   Wound Surface Area (cm^2) 1.5 cm^2 03/08/22 0800   Care Cleansed with:;Soap and water;Sterile normal saline 03/08/22 0800   Dressing Changed 03/08/22 0800   Off Loading Football dressing;Off loading shoe 03/08/22 0800   Dressing Change Due 03/15/22 03/08/22 0800           Plan:                Orders Placed This Encounter   Procedures    Change dressing     Clean with NS. Gigi foam short x1 to right anterior foot over 2nd distal toe secured with medipore tape. Gigi foam long x1  to plantar foot. Football dressing (cast padding x3, coban). Cam boot.      Wound to right plantar measuring smaller. Wound to right anterior foot measuring larger. Wound care done per order. RTC in one week.  Follow up in about 1 week (around 3/15/2022) for wound care.

## 2022-03-15 ENCOUNTER — HOSPITAL ENCOUNTER (OUTPATIENT)
Dept: WOUND CARE | Facility: HOSPITAL | Age: 62
Discharge: HOME | End: 2022-03-15
Attending: FAMILY MEDICINE
Payer: MEDICAID

## 2022-03-15 VITALS
SYSTOLIC BLOOD PRESSURE: 144 MMHG | OXYGEN SATURATION: 98 % | TEMPERATURE: 97 F | BODY MASS INDEX: 31.48 KG/M2 | HEIGHT: 72 IN | DIASTOLIC BLOOD PRESSURE: 76 MMHG | HEART RATE: 90 BPM

## 2022-03-15 DIAGNOSIS — L97.412 DIABETIC ULCER OF RIGHT MIDFOOT ASSOCIATED WITH TYPE 2 DIABETES MELLITUS, WITH FAT LAYER EXPOSED: Primary | ICD-10-CM

## 2022-03-15 DIAGNOSIS — E11.621 DIABETIC ULCER OF RIGHT MIDFOOT ASSOCIATED WITH TYPE 2 DIABETES MELLITUS, WITH FAT LAYER EXPOSED: Primary | ICD-10-CM

## 2022-03-15 PROCEDURE — 99499 UNLISTED E&M SERVICE: CPT | Mod: ,,, | Performed by: FAMILY MEDICINE

## 2022-03-15 PROCEDURE — 99499 NO LOS: ICD-10-PCS | Mod: ,,, | Performed by: FAMILY MEDICINE

## 2022-03-15 PROCEDURE — 11042 DBRDMT SUBQ TIS 1ST 20SQCM/<: CPT | Performed by: FAMILY MEDICINE

## 2022-03-15 PROCEDURE — 11042 DBRDMT SUBQ TIS 1ST 20SQCM/<: CPT | Mod: ,,, | Performed by: FAMILY MEDICINE

## 2022-03-15 PROCEDURE — 27201912 HC WOUND CARE DEBRIDEMENT SUPPLIES: Performed by: FAMILY MEDICINE

## 2022-03-15 PROCEDURE — 11042 DEBRIDEMENT: ICD-10-PCS | Mod: ,,, | Performed by: FAMILY MEDICINE

## 2022-03-15 NOTE — PROGRESS NOTES
[facility]                Progress Note    Subjective:       Patient ID: Fermin Henson is a 62 y.o. male.    Chief Complaint: No chief complaint on file.    F/u wound care visit. Patient ambulatory to exam room with cam boot on and no difficulty noted. Patient denies pain or discomfort at present. Wound dressing to right foot intact with no drainage noted. He states he is not leaving his house much right now, so not doing a lot of walking due to his mom now being on oxygen and he doesn't want to leave her by herself      Review of Systems   Constitutional: Negative.    HENT: Negative.    Eyes: Negative.    Respiratory: Negative.    Cardiovascular: Negative.    Gastrointestinal: Negative.    Skin: Positive for wound.   Psychiatric/Behavioral: Negative.          Objective:        Physical Exam      Vitals:    03/15/22 0952   BP: (!) 144/76   Pulse: 90   Temp: 97.1 °F (36.2 °C)     Debridement    Date/Time: 3/15/2022 9:37 AM  Performed by: See Jean-Baptiste MD  Authorized by: See Jean-Baptiste MD     Consent Done?:  Yes (Written)  Local anesthesia used?: No      Wound Details:    Location:  Right foot    Location:  Right 2nd Toe    Type of Debridement:  Excisional       Length (cm):  0.1       Area (sq cm):  0.01       Width (cm):  0.1       Percent Debrided (%):  100       Depth (cm):  0.1       Total Area Debrided (sq cm):  0.01    Depth of debridement:  Subcutaneous tissue    Tissue debrided:  Dermis, Epidermis and Subcutaneous    Devitalized tissue debrided:  Callus and Fibrin    Instruments:  Curette    Bleeding:  Minimal  Hemostasis Achieved: Yes    Method Used:  Pressure  Patient tolerance:  Patient tolerated the procedure well with no immediate complications      Assessment:           ICD-10-CM ICD-9-CM   1. Diabetic ulcer of right midfoot associated with type 2 diabetes mellitus, with fat layer exposed  E11.621 250.80    L97.412 707.14            Wound 05/04/21  1057 Diabetic Ulcer Right plantar Foot (Active)   05/04/21 1057    Pre-existing: Yes   Primary Wound Type: Diabetic ulc   Side: Right   Orientation: plantar   Location: Foot   Wound Number:    Ankle-Brachial Index:    Pulses:    Removal Indication and Assessment:    Wound Outcome:    (Retired) Wound Type:    (Retired) Wound Length (cm):    (Retired) Wound Width (cm):    (Retired) Depth (cm):    Wound Description (Comments):    Removal Indications:    Wound Image    03/15/22 1000   Wound WDL ex 03/15/22 1000   Dressing Appearance Dry;Intact 03/15/22 1000   Drainage Amount None 03/15/22 1000   Appearance [place] 03/15/22 1000   Periwound Area Dry;Intact 03/15/22 1000   Wound Edges Callused 03/15/22 1000   Wound Length (cm) 0 cm 03/15/22 1000   Wound Width (cm) 0 cm 03/15/22 1000   Wound Depth (cm) 0 cm 03/15/22 1000   Wound Volume (cm^3) 0 cm^3 03/15/22 1000   Wound Surface Area (cm^2) 0 cm^2 03/15/22 1000   Care Cleansed with:;Soap and water;Sterile normal saline 03/15/22 1000   Dressing Changed 03/15/22 1000   Off Loading Football dressing;Off loading shoe 03/15/22 1000   Dressing Change Due 03/22/22 03/15/22 1000            Wound 03/02/22 1037 Diabetic Ulcer Right anterior Foot (Active)   03/02/22 1037    Pre-existing: Yes   Primary Wound Type: Diabetic ulc   Side: Right   Orientation: anterior   Location: Foot   Wound Number:    Ankle-Brachial Index:    Pulses:    Removal Indication and Assessment:    Wound Outcome:    (Retired) Wound Type:    (Retired) Wound Length (cm):    (Retired) Wound Width (cm):    (Retired) Depth (cm):    Wound Description (Comments):    Removal Indications:    Wound Image   03/15/22 1000   Wound WDL ex 03/15/22 1000   Dressing Appearance Dry;Intact 03/15/22 1000   Drainage Amount None 03/15/22 1000   Appearance Red 03/15/22 1000   Tissue loss description Partial thickness 03/15/22 1000   Black (%), Wound Tissue Color 0 % 03/15/22 1000   Red (%), Wound Tissue Color 100 % 03/15/22 1000    Yellow (%), Wound Tissue Color 0 % 03/15/22 1000   Periwound Area Scar tissue;Dry 03/15/22 1000   Wound Edges Defined 03/15/22 1000   Wound Length (cm) 1 cm 03/15/22 1000   Wound Width (cm) 1 cm 03/15/22 1000   Wound Depth (cm) 0.1 cm 03/15/22 1000   Wound Volume (cm^3) 0.1 cm^3 03/15/22 1000   Wound Surface Area (cm^2) 1 cm^2 03/15/22 1000   Care Cleansed with:;Soap and water;Sterile normal saline 03/15/22 1000   Dressing Changed 03/15/22 1000   Off Loading Football dressing;Off loading shoe 03/15/22 1000   Dressing Change Due 03/22/22 03/15/22 1000            Wound 03/15/22 1000 Right distal Toe, third (Active)   03/15/22 1000    Pre-existing:    Primary Wound Type:    Side: Right   Orientation: distal   Location: Toe, third   Wound Number:    Ankle-Brachial Index:    Pulses:    Removal Indication and Assessment:    Wound Outcome:    (Retired) Wound Type:    (Retired) Wound Length (cm):    (Retired) Wound Width (cm):    (Retired) Depth (cm):    Wound Description (Comments):    Removal Indications:    Wound Image    03/15/22 1000   Wound WDL ex 03/15/22 1000   Dressing Appearance No dressing 03/15/22 1000   Drainage Amount None 03/15/22 1000   Appearance [place] 03/15/22 1000   Black (%), Wound Tissue Color 0 % 03/15/22 1000   Red (%), Wound Tissue Color 100 % 03/15/22 1000   Yellow (%), Wound Tissue Color 0 % 03/15/22 1000   Periwound Area Dry;Intact 03/15/22 1000   Wound Edges Callused 03/15/22 1000   Wound Length (cm) 0.1 cm 03/15/22 1000   Wound Width (cm) 0.1 cm 03/15/22 1000   Wound Depth (cm) 0.1 cm 03/15/22 1000   Wound Volume (cm^3) 0.001 cm^3 03/15/22 1000   Wound Surface Area (cm^2) 0.01 cm^2 03/15/22 1000   Care Cleansed with:;Soap and water;Sterile normal saline 03/15/22 1000   Dressing Applied 03/15/22 1000   Off Loading Football dressing;Off loading shoe 03/15/22 1000   Dressing Change Due 03/22/22 03/15/22 1000           Plan:                Orders Placed This Encounter   Procedures    Change  dressing     Clean with NS. Endform to right 3rd distal toe, gigi foam strip secured with medipore tape. Gigi foam short x1 to right anterior foot, gigi foam long x1 to plantar foot. Football dressing x3, coban. Cam boot.     Wound to right anterior foot improving, measuring smaller. Wound to right plantar foot is healed. Small reddened area noted to right distal 3rd toe. Wound care done per order. RTC in one week.   Follow up in about 1 week (around 3/22/2022) for wound care.     See Jean-Baptiste MD

## 2022-03-22 ENCOUNTER — HOSPITAL ENCOUNTER (OUTPATIENT)
Dept: WOUND CARE | Facility: HOSPITAL | Age: 62
Discharge: HOME | End: 2022-03-22
Attending: FAMILY MEDICINE
Payer: MEDICAID

## 2022-03-22 VITALS — SYSTOLIC BLOOD PRESSURE: 135 MMHG | TEMPERATURE: 98 F | DIASTOLIC BLOOD PRESSURE: 72 MMHG | HEART RATE: 80 BPM

## 2022-03-22 DIAGNOSIS — L97.412 DIABETIC ULCER OF RIGHT MIDFOOT ASSOCIATED WITH TYPE 2 DIABETES MELLITUS, WITH FAT LAYER EXPOSED: Primary | ICD-10-CM

## 2022-03-22 DIAGNOSIS — E11.621 DIABETIC ULCER OF RIGHT MIDFOOT ASSOCIATED WITH TYPE 2 DIABETES MELLITUS, WITH FAT LAYER EXPOSED: Primary | ICD-10-CM

## 2022-03-22 DIAGNOSIS — E11.621 TYPE 2 DIABETES WITH SKIN ULCER OF FOOT: ICD-10-CM

## 2022-03-22 DIAGNOSIS — L97.509 TYPE 2 DIABETES WITH SKIN ULCER OF FOOT: ICD-10-CM

## 2022-03-22 PROCEDURE — 99214 OFFICE O/P EST MOD 30 MIN: CPT | Mod: ,,, | Performed by: FAMILY MEDICINE

## 2022-03-22 PROCEDURE — 99213 OFFICE O/P EST LOW 20 MIN: CPT | Performed by: FAMILY MEDICINE

## 2022-03-22 PROCEDURE — 99214 PR OFFICE/OUTPT VISIT, EST, LEVL IV, 30-39 MIN: ICD-10-PCS | Mod: ,,, | Performed by: FAMILY MEDICINE

## 2022-03-22 NOTE — PROGRESS NOTES
[facility]                Progress Note    Subjective:       Patient ID: Fermin Henson is a 62 y.o. male.    Chief Complaint: Wound Check    Follow up wound care visit. Patient ambulated to exam room with CAM boot on RLE. No c/o pain at present. Dressing intact, with moderate amount of dried drainage to Right Anterior Foot. Right Plantar Foot wound still appears closed. Right Anterior Foot wound is measuring larger, it weeps after cleansing. Right 3rd Distal toe wound has callus covering wound bed.     Wound care done as per order, RTC in 1 week    This is an established patient in wound care.   Patient presents in the office today for evaluation of the chronic wound.  Updated HPI is noted below.    The periwound appears non-erythematous, no maceration noted, edematous    The wound appears wound healing; limited to skin break down. No odor. Serous drainage.     Patient denies fever, chills    Patients reports wound pain    Lymphedema status is contributory to wound status    Pain at the site of the wound is aching    Contributing factors to current wound state include Diabetes Type 2, Hypertension, Lymphedema      Review of Systems   Constitutional: Negative for activity change, appetite change and fever.   HENT: Negative for congestion.    Respiratory: Negative for shortness of breath and wheezing.    Cardiovascular: Negative for chest pain and leg swelling.   Gastrointestinal: Negative for abdominal pain, nausea and vomiting.   Skin: Positive for wound.   Neurological: Negative for dizziness and headaches.   Psychiatric/Behavioral: The patient is not nervous/anxious.          Objective:        Physical Exam  Vitals and nursing note reviewed.   Constitutional:       Appearance: He is well-developed.   HENT:      Head: Normocephalic and atraumatic.   Eyes:      Conjunctiva/sclera: Conjunctivae normal.   Pulmonary:      Effort: Pulmonary effort is normal.    Abdominal:      General: There is no distension.      Palpations: Abdomen is soft.   Musculoskeletal:         General: Normal range of motion.      Cervical back: Normal range of motion and neck supple.   Skin:     Comments: See wound description   Neurological:      Mental Status: He is alert and oriented to person, place, and time.   Psychiatric:         Behavior: Behavior normal.         Vitals:    03/22/22 1045   BP: 135/72   Pulse: 80   Temp: 97.6 °F (36.4 °C)       Assessment:           ICD-10-CM ICD-9-CM   1. Diabetic ulcer of right midfoot associated with type 2 diabetes mellitus, with fat layer exposed  E11.621 250.80    L97.412 707.14   2. Type 2 diabetes with skin ulcer of foot  E11.621 250.80    L97.509 707.15            Wound 05/04/21 1057 Diabetic Ulcer Right plantar Foot (Active)   05/04/21 1057    Pre-existing: Yes   Primary Wound Type: Diabetic ulc   Side: Right   Orientation: plantar   Location: Foot   Wound Number:    Ankle-Brachial Index:    Pulses:    Removal Indication and Assessment:    Wound Outcome:    (Retired) Wound Type:    (Retired) Wound Length (cm):    (Retired) Wound Width (cm):    (Retired) Depth (cm):    Wound Description (Comments):    Removal Indications:    Wound Image   03/22/22 1000   Wound WDL ex 03/22/22 1000   Dressing Appearance Dry;Intact 03/22/22 1000   Drainage Amount None 03/22/22 1000   Appearance Epithelialization 03/22/22 1000   Periwound Area Dry;Intact 03/22/22 1000   Wound Edges Callused 03/22/22 1000   Wound Length (cm) 0 cm 03/22/22 1000   Wound Width (cm) 0 cm 03/22/22 1000   Wound Depth (cm) 0 cm 03/22/22 1000   Wound Volume (cm^3) 0 cm^3 03/22/22 1000   Wound Surface Area (cm^2) 0 cm^2 03/22/22 1000   Care Cleansed with:;Antimicrobial agent;Sterile normal saline 03/22/22 1000   Dressing Changed 03/22/22 1000   Off Loading Football dressing;Off loading shoe 03/22/22 1000   Dressing Change Due 03/29/22 03/22/22 1000            Wound 03/02/22 1037 Diabetic  Ulcer Right anterior Foot (Active)   03/02/22 1037    Pre-existing: Yes   Primary Wound Type: Diabetic ulc   Side: Right   Orientation: anterior   Location: Foot   Wound Number:    Ankle-Brachial Index:    Pulses:    Removal Indication and Assessment:    Wound Outcome:    (Retired) Wound Type:    (Retired) Wound Length (cm):    (Retired) Wound Width (cm):    (Retired) Depth (cm):    Wound Description (Comments):    Removal Indications:    Wound Image   03/22/22 1000   Wound WDL ex 03/22/22 1000   Dressing Appearance Intact;Dried drainage 03/22/22 1000   Drainage Amount Moderate 03/22/22 1000   Drainage Characteristics/Odor Serosanguineous 03/22/22 1000   Appearance Red 03/22/22 1000   Tissue loss description Partial thickness 03/22/22 1000   Red (%), Wound Tissue Color 100 % 03/22/22 1000   Periwound Area Scar tissue;Redness 03/22/22 1000   Wound Edges Defined 03/22/22 1000   Wound Length (cm) 2.7 cm 03/22/22 1000   Wound Width (cm) 2 cm 03/22/22 1000   Wound Depth (cm) 0.1 cm 03/22/22 1000   Wound Volume (cm^3) 0.54 cm^3 03/22/22 1000   Wound Surface Area (cm^2) 5.4 cm^2 03/22/22 1000   Care Cleansed with:;Antimicrobial agent;Sterile normal saline 03/22/22 1000   Dressing Changed 03/22/22 1000   Periwound Care Moisture barrier applied 03/22/22 1000   Off Loading Football dressing;Off loading shoe 03/22/22 1000   Dressing Change Due 03/29/22 03/22/22 1000            Wound 03/15/22 1000 Right distal Toe, third (Active)   03/15/22 1000    Pre-existing:    Primary Wound Type:    Side: Right   Orientation: distal   Location: Toe, third   Wound Number:    Ankle-Brachial Index:    Pulses:    Removal Indication and Assessment:    Wound Outcome:    (Retired) Wound Type:    (Retired) Wound Length (cm):    (Retired) Wound Width (cm):    (Retired) Depth (cm):    Wound Description (Comments):    Removal Indications:    Wound Image   03/22/22 1000   Wound WDL ex 03/22/22 1000   Dressing Appearance Intact;Dry 03/22/22 1000    Drainage Amount None 03/22/22 1000   Appearance Maroon 03/22/22 1000   Red (%), Wound Tissue Color 100 % 03/22/22 1000   Periwound Area Dry;Intact 03/22/22 1000   Wound Edges Callused 03/22/22 1000   Wound Length (cm) 0.1 cm 03/22/22 1000   Wound Width (cm) 0.1 cm 03/22/22 1000   Wound Surface Area (cm^2) 0.01 cm^2 03/22/22 1000   Care Cleansed with:;Antimicrobial agent;Sterile normal saline 03/22/22 1000   Dressing Changed 03/22/22 1000   Periwound Care Moisture barrier applied 03/22/22 1000   Off Loading Football dressing;Off loading shoe 03/22/22 1000   Dressing Change Due 03/29/22 03/22/22 1000           Plan:            1. Debridement not needed and not Done today.   2. Recommend off-loading   3. Recommend to eat protein   4. Keep dressing clean and intact  5. Continue with wound care orders and plan as noted in orders.   6. Continue to follow current medication regimen as per pcp   7. Call for any questions / concerns.           Orders Placed This Encounter   Procedures    Change dressing     Clean with NS. Gentian Violet to Right 3rd distal toe and Right Anterior Foot. Gigi foam short x1 to Right Anterior Foot, Gigi foam long x1 to plantar foot. Football dressing (cast padding x3), Coban. Cam boot.        Follow up in about 1 week (around 3/29/2022) for wound care.

## 2022-03-29 ENCOUNTER — HOSPITAL ENCOUNTER (OUTPATIENT)
Dept: WOUND CARE | Facility: HOSPITAL | Age: 62
Discharge: HOME | End: 2022-03-29
Attending: FAMILY MEDICINE
Payer: MEDICAID

## 2022-03-29 VITALS — TEMPERATURE: 97 F | DIASTOLIC BLOOD PRESSURE: 70 MMHG | SYSTOLIC BLOOD PRESSURE: 130 MMHG | HEART RATE: 80 BPM

## 2022-03-29 DIAGNOSIS — E11.621 TYPE 2 DIABETES WITH SKIN ULCER OF FOOT: Primary | ICD-10-CM

## 2022-03-29 DIAGNOSIS — L97.509 TYPE 2 DIABETES WITH SKIN ULCER OF FOOT: Primary | ICD-10-CM

## 2022-03-29 PROCEDURE — 99214 OFFICE O/P EST MOD 30 MIN: CPT | Performed by: FAMILY MEDICINE

## 2022-03-29 PROCEDURE — 99214 OFFICE O/P EST MOD 30 MIN: CPT | Mod: ,,, | Performed by: FAMILY MEDICINE

## 2022-03-29 PROCEDURE — 99214 PR OFFICE/OUTPT VISIT, EST, LEVL IV, 30-39 MIN: ICD-10-PCS | Mod: ,,, | Performed by: FAMILY MEDICINE

## 2022-03-29 NOTE — PROGRESS NOTES
[facility]                Progress Note    Subjective:       Patient ID: Fermin Henson is a 62 y.o. male.    Chief Complaint: No chief complaint on file.    Walked to clinic unaided wearing CAM boot. Patient has no new complaints.  He has been trying to offload, but does continue to take care of his elderly mother and is on his feet more than he should be.  No fevers, chills, or sweats.        Review of Systems   Constitutional: Negative.    HENT: Negative.    Eyes: Negative.    Respiratory: Negative.    Cardiovascular: Negative.    Gastrointestinal: Negative.    Genitourinary: Negative.    Skin: Positive for wound.   Neurological: Negative.          Objective:        Physical Exam  Constitutional:       Appearance: Normal appearance.   HENT:      Head: Normocephalic and atraumatic.      Mouth/Throat:      Mouth: Mucous membranes are moist.      Pharynx: Oropharynx is clear.   Eyes:      Extraocular Movements: Extraocular movements intact.      Conjunctiva/sclera: Conjunctivae normal.      Pupils: Pupils are equal, round, and reactive to light.   Cardiovascular:      Rate and Rhythm: Normal rate and regular rhythm.   Abdominal:      Palpations: Abdomen is soft.   Musculoskeletal:      Cervical back: Normal range of motion and neck supple.   Skin:     General: Skin is warm and dry.      Comments: +raised erythematous area to right dorsal foot   Neurological:      Mental Status: He is alert and oriented to person, place, and time. Mental status is at baseline.         Vitals:    03/29/22 1336   BP: 130/70   Pulse: 80   Temp: 97.2 °F (36.2 °C)       Assessment:           ICD-10-CM ICD-9-CM   1. Type 2 diabetes with skin ulcer of foot  E11.621 250.80    L97.509 707.15            Wound 03/02/22 1037 Diabetic Ulcer Right anterior Foot (Active)   03/02/22 1037    Pre-existing: Yes   Primary Wound Type: Diabetic ulc   Side: Right   Orientation: anterior   Location: Foot    Wound Number:    Ankle-Brachial Index:    Pulses:    Removal Indication and Assessment:    Wound Outcome:    (Retired) Wound Type:    (Retired) Wound Length (cm):    (Retired) Wound Width (cm):    (Retired) Depth (cm):    Wound Description (Comments):    Removal Indications:    Wound Image   03/29/22 1300   Wound WDL ex 03/29/22 1300   Drainage Amount Scant 03/29/22 1300   Drainage Characteristics/Odor Serosanguineous 03/29/22 1300   Appearance Red 03/29/22 1300   Tissue loss description Partial thickness 03/29/22 1300   Red (%), Wound Tissue Color 100 % 03/29/22 1300   Periwound Area Scar tissue 03/29/22 1300   Wound Edges Defined 03/29/22 1300   Wound Length (cm) 3 cm 03/29/22 1300   Wound Width (cm) 3 cm 03/29/22 1300   Wound Depth (cm) 0.1 cm 03/29/22 1300   Wound Volume (cm^3) 0.9 cm^3 03/29/22 1300   Wound Surface Area (cm^2) 9 cm^2 03/29/22 1300   Care Cleansed with:;Antimicrobial agent;Sterile normal saline 03/29/22 1300   Dressing Changed 03/29/22 1300            Wound 03/15/22 1000 Right distal Toe, third (Active)   03/15/22 1000    Pre-existing:    Primary Wound Type:    Side: Right   Orientation: distal   Location: Toe, third   Wound Number:    Ankle-Brachial Index:    Pulses:    Removal Indication and Assessment:    Wound Outcome:    (Retired) Wound Type:    (Retired) Wound Length (cm):    (Retired) Wound Width (cm):    (Retired) Depth (cm):    Wound Description (Comments):    Removal Indications:    Wound WDL WDL 03/29/22 1300   Dressing Appearance Dry;Intact 03/29/22 1300   Wound Length (cm) 0 cm 03/29/22 1300   Wound Width (cm) 0 cm 03/29/22 1300   Wound Depth (cm) 0 cm 03/29/22 1300   Wound Volume (cm^3) 0 cm^3 03/29/22 1300   Wound Surface Area (cm^2) 0 cm^2 03/29/22 1300   Care Cleansed with:;Antimicrobial agent;Sterile normal saline 03/29/22 1300   Dressing Changed 03/29/22 1300           Plan:                Orders Placed This Encounter   Procedures    Change dressing     Tegafoam to dorsal  foot may change if off or after 5 days      Plantar foot remains healed as well as distal toe. Dorsal foot continues to get larger and dermatology referral. Will follow up PRN  Follow up if symptoms worsen or fail to improve.     See Jean-Baptiste MD

## 2022-04-12 ENCOUNTER — HOSPITAL ENCOUNTER (OUTPATIENT)
Dept: WOUND CARE | Facility: HOSPITAL | Age: 62
Discharge: HOME | End: 2022-04-12
Attending: FAMILY MEDICINE
Payer: MEDICAID

## 2022-04-12 ENCOUNTER — HOSPITAL ENCOUNTER (OUTPATIENT)
Dept: RADIOLOGY | Facility: HOSPITAL | Age: 62
Discharge: HOME | End: 2022-04-12
Attending: FAMILY MEDICINE
Payer: MEDICAID

## 2022-04-12 ENCOUNTER — TELEPHONE (OUTPATIENT)
Dept: FAMILY MEDICINE | Facility: CLINIC | Age: 62
End: 2022-04-12
Payer: MEDICAID

## 2022-04-12 VITALS — SYSTOLIC BLOOD PRESSURE: 140 MMHG | TEMPERATURE: 98 F | DIASTOLIC BLOOD PRESSURE: 75 MMHG | HEART RATE: 98 BPM

## 2022-04-12 DIAGNOSIS — L03.115 CELLULITIS OF RIGHT FOOT: Primary | ICD-10-CM

## 2022-04-12 DIAGNOSIS — L97.509 TYPE 2 DIABETES WITH SKIN ULCER OF FOOT: Primary | ICD-10-CM

## 2022-04-12 DIAGNOSIS — E11.621 DIABETIC ULCER OF RIGHT MIDFOOT ASSOCIATED WITH TYPE 2 DIABETES MELLITUS, WITH FAT LAYER EXPOSED: ICD-10-CM

## 2022-04-12 DIAGNOSIS — L97.412 DIABETIC ULCER OF RIGHT MIDFOOT ASSOCIATED WITH TYPE 2 DIABETES MELLITUS, WITH FAT LAYER EXPOSED: ICD-10-CM

## 2022-04-12 DIAGNOSIS — E11.621 TYPE 2 DIABETES WITH SKIN ULCER OF FOOT: ICD-10-CM

## 2022-04-12 DIAGNOSIS — L97.509 TYPE 2 DIABETES WITH SKIN ULCER OF FOOT: ICD-10-CM

## 2022-04-12 DIAGNOSIS — E11.42 TYPE 2 DIABETES MELLITUS WITH DIABETIC POLYNEUROPATHY, WITHOUT LONG-TERM CURRENT USE OF INSULIN: ICD-10-CM

## 2022-04-12 DIAGNOSIS — E11.621 TYPE 2 DIABETES WITH SKIN ULCER OF FOOT: Primary | ICD-10-CM

## 2022-04-12 PROCEDURE — 11042 DBRDMT SUBQ TIS 1ST 20SQCM/<: CPT | Performed by: FAMILY MEDICINE

## 2022-04-12 PROCEDURE — 73630 X-RAY EXAM OF FOOT: CPT | Mod: TC,FY,RT

## 2022-04-12 PROCEDURE — 27201912 HC WOUND CARE DEBRIDEMENT SUPPLIES: Performed by: FAMILY MEDICINE

## 2022-04-12 PROCEDURE — 99214 OFFICE O/P EST MOD 30 MIN: CPT | Mod: 25,,, | Performed by: FAMILY MEDICINE

## 2022-04-12 PROCEDURE — 99214 PR OFFICE/OUTPT VISIT, EST, LEVL IV, 30-39 MIN: ICD-10-PCS | Mod: 25,,, | Performed by: FAMILY MEDICINE

## 2022-04-12 PROCEDURE — 73630 XR FOOT COMPLETE 3 VIEW RIGHT: ICD-10-PCS | Mod: 26,RT,, | Performed by: RADIOLOGY

## 2022-04-12 PROCEDURE — 11042 DBRDMT SUBQ TIS 1ST 20SQCM/<: CPT | Mod: ,,, | Performed by: FAMILY MEDICINE

## 2022-04-12 PROCEDURE — 11042 DEBRIDEMENT: ICD-10-PCS | Mod: ,,, | Performed by: FAMILY MEDICINE

## 2022-04-12 PROCEDURE — 73630 X-RAY EXAM OF FOOT: CPT | Mod: 26,RT,, | Performed by: RADIOLOGY

## 2022-04-12 RX ORDER — SULFAMETHOXAZOLE AND TRIMETHOPRIM 800; 160 MG/1; MG/1
1 TABLET ORAL 2 TIMES DAILY
Qty: 20 TABLET | Refills: 0 | Status: SHIPPED | OUTPATIENT
Start: 2022-04-12 | End: 2022-04-22

## 2022-04-12 NOTE — PROGRESS NOTES
[facility]                Progress Note    Subjective:       Patient ID: Fermin Henson is a 62 y.o. male.    Chief Complaint: No chief complaint on file.    Follow up wound care visit. Patient ambulated to exam room without assistance, prescribed Darco shoe on Right Foot. C/o pain to Right Leg 2/10 at present. He denies pain to Right foot, but foot appears warm and has +2 non pitting edema. He applied a band-aid to Right Plantar Foot wound to cover. Right Anterior Foot and Right Plantar Foot wounds have reopened. Right 5th Distal Toe has a new wound.    Wound care done as per order, RTC in 1 week    MD note:  patient following up today for chronic DFU to right plantar.  He states he offloads as directed, but continues to form excess callous.  There has been no fevers, chills, or sweats.  He has been keeping his dressings in place, clean, and dry      Review of Systems   Constitutional: Negative.    HENT: Negative.     Eyes: Negative.    Respiratory: Negative.     Cardiovascular: Negative.    Gastrointestinal: Negative.    Skin:  Positive for wound.   Psychiatric/Behavioral: Negative.         Objective:        Physical Exam  Constitutional:       Appearance: Normal appearance.   HENT:      Head: Normocephalic and atraumatic.      Right Ear: External ear normal.      Left Ear: External ear normal.      Mouth/Throat:      Mouth: Mucous membranes are moist.      Pharynx: Oropharynx is clear.   Eyes:      Extraocular Movements: Extraocular movements intact.      Conjunctiva/sclera: Conjunctivae normal.      Pupils: Pupils are equal, round, and reactive to light.   Abdominal:      General: There is no distension.      Palpations: Abdomen is soft.   Skin:     General: Skin is warm and dry.      Comments: Excoriated skin to dorsum of right foot with some serous drainage  +plantar DFU with excess callous and slough   Neurological:      Mental Status: He is alert and oriented  "to person, place, and time.      Sensory: Sensory deficit present.       Vitals:    04/12/22 1425   BP: (!) 140/75   Pulse: 98   Temp: 97.7 °F (36.5 °C)     Debridement    Date/Time: 4/12/2022 1:48 PM  Performed by: See Jean-Baptiste MD  Authorized by: See Jean-Baptiste MD     Time out: Immediately prior to procedure a "time out" was called to verify the correct patient, procedure, equipment, support staff and site/side marked as required.    Consent Done?:  Yes (Written)  Local anesthesia used?: No      Wound Details:    Location:  Right foot    Location:  Right Plantar    Type of Debridement:  Excisional       Length (cm):  0.8       Area (sq cm):  0.56       Width (cm):  0.7       Percent Debrided (%):  100       Depth (cm):  0.2       Total Area Debrided (sq cm):  0.56    Depth of debridement:  Subcutaneous tissue    Tissue debrided:  Dermis, Epidermis and Subcutaneous    Devitalized tissue debrided:  Biofilm, Fibrin and Slough    Instruments:  Curette    Bleeding:  Minimal  Hemostasis Achieved: Yes    Method Used:  Pressure  Patient tolerance:  Patient tolerated the procedure well with no immediate complications    Assessment:           ICD-10-CM ICD-9-CM   1. Type 2 diabetes with skin ulcer of foot  E11.621 250.80    L97.509 707.15   2. Diabetic ulcer of right midfoot associated with type 2 diabetes mellitus, with fat layer exposed  E11.621 250.80    L97.412 707.14   3. Type 2 diabetes mellitus with diabetic polyneuropathy, without long-term current use of insulin  E11.42 250.60     357.2            Wound 04/12/22 1426 Diabetic Ulcer Right plantar Foot (Active)   04/12/22 1426    Pre-existing: Yes   Primary Wound Type: Diabetic ulc   Side: Right   Orientation: plantar   Location: Foot   Wound Number:    Ankle-Brachial Index:    Pulses:    Removal Indication and Assessment:    Wound Outcome: Healed   (Retired) Wound Type:    (Retired) Wound Length (cm):    (Retired) Wound Width (cm):    (Retired) Depth (cm):  "   Wound Description (Comments):    Removal Indications:    Wound Image    04/12/22 1400   Wound WDL ex 04/12/22 1400   Dressing Appearance Intact;Moist drainage 04/12/22 1400   Drainage Amount Moderate 04/12/22 1400   Drainage Characteristics/Odor Serosanguineous;No odor 04/12/22 1400   Appearance Pink 04/12/22 1400   Tissue loss description Partial thickness 04/12/22 1400   Red (%), Wound Tissue Color 100 % 04/12/22 1400   Periwound Area Dry 04/12/22 1400   Wound Edges Open 04/12/22 1400   Wound Length (cm) 0.8 cm 04/12/22 1400   Wound Width (cm) 0.7 cm 04/12/22 1400   Wound Depth (cm) 0.2 cm 04/12/22 1400   Wound Volume (cm^3) 0.112 cm^3 04/12/22 1400   Wound Surface Area (cm^2) 0.56 cm^2 04/12/22 1400   Care Cleansed with:;Antimicrobial agent;Sterile normal saline 04/12/22 1400   Dressing Applied 04/12/22 1400   Periwound Care Skin barrier film applied 04/12/22 1400   Off Loading Football dressing;Off loading shoe 04/12/22 1400   Dressing Change Due 04/19/22 04/12/22 1400       [REMOVED]      Wound 03/02/22 1037 Diabetic Ulcer Right anterior Foot (Removed)   03/02/22 1037    Pre-existing: Yes   Primary Wound Type: Diabetic ulc   Side: Right   Orientation: anterior   Location: Foot   Wound Number:    Ankle-Brachial Index:    Pulses:    Removal Indication and Assessment:    Wound Outcome: Healed   (Retired) Wound Type:    (Retired) Wound Length (cm):    (Retired) Wound Width (cm):    (Retired) Depth (cm):    Wound Description (Comments):    Removal Indications:    Removed 07/12/22 1009   Wound Image   04/12/22 1400   Wound WDL ex 04/12/22 1400   Dressing Appearance Open to air 04/12/22 1400   Drainage Amount Scant 04/12/22 1400   Drainage Characteristics/Odor Serous;No odor 04/12/22 1400   Appearance Pink;Moist 04/12/22 1400   Tissue loss description Not applicable 04/12/22 1400   Periwound Area Swelling;[place] 04/12/22 1400   Wound Edges Irregular;Open 04/12/22 1400   Wound Length (cm) 2 cm 04/12/22 1400   Wound  Width (cm) 2.5 cm 04/12/22 1400   Wound Depth (cm) 0.1 cm 04/12/22 1400   Wound Volume (cm^3) 0.5 cm^3 04/12/22 1400   Wound Surface Area (cm^2) 5 cm^2 04/12/22 1400   Care Cleansed with:;Antimicrobial agent;Sterile normal saline 04/12/22 1400   Dressing Applied 04/12/22 1400   Periwound Care Skin barrier film applied 04/12/22 1400   Off Loading Football dressing;Off loading shoe 04/12/22 1400   Dressing Change Due 04/19/22 04/12/22 1400       [REMOVED]      Wound 04/12/22 1433 Traumatic Right distal Toe, fifth (Removed)   04/12/22 1433    Pre-existing: No   Primary Wound Type: Traumatic   Side: Right   Orientation: distal   Location: Toe, fifth   Wound Number:    Ankle-Brachial Index:    Pulses:    Removal Indication and Assessment:    Wound Outcome: Healed   (Retired) Wound Type:    (Retired) Wound Length (cm):    (Retired) Wound Width (cm):    (Retired) Depth (cm):    Wound Description (Comments):    Removal Indications:    Removed 04/26/22 1409   Wound Image   04/12/22 1400   Wound WDL ex 04/12/22 1400   Dressing Appearance Open to air 04/12/22 1400   Drainage Amount Scant 04/12/22 1400   Drainage Characteristics/Odor Sanguineous;Bleeding controlled 04/12/22 1400   Appearance Red 04/12/22 1400   Tissue loss description Partial thickness 04/12/22 1400   Red (%), Wound Tissue Color 100 % 04/12/22 1400   Periwound Area [place];Dry 04/12/22 1400   Wound Edges Open 04/12/22 1400   Wound Length (cm) 0.3 cm 04/12/22 1400   Wound Width (cm) 0.4 cm 04/12/22 1400   Wound Depth (cm) 0.1 cm 04/12/22 1400   Wound Volume (cm^3) 0.012 cm^3 04/12/22 1400   Wound Surface Area (cm^2) 0.12 cm^2 04/12/22 1400   Care Cleansed with:;Antimicrobial agent;Sterile normal saline 04/12/22 1400   Dressing Applied 04/12/22 1400   Periwound Care Skin barrier film applied 04/12/22 1400   Off Loading Football dressing;Off loading shoe 04/12/22 1400   Dressing Change Due 04/19/22 04/12/22 1400           Plan:                Orders Placed This  Encounter   Procedures    Debridement     This order was created via procedure documentation     Standing Status:   Standing     Number of Occurrences:   1    X-Ray Foot Complete Right     Standing Status:   Future     Number of Occurrences:   1     Standing Expiration Date:   4/12/2023     Order Specific Question:   Reason for Exam:     Answer:   DFU to right plantar foot at 1st met head    CBC Auto Differential     Standing Status:   Future     Number of Occurrences:   1     Standing Expiration Date:   4/12/2023    Comprehensive Metabolic Panel     Standing Status:   Future     Number of Occurrences:   1     Standing Expiration Date:   4/12/2023    C-Reactive Protein     Standing Status:   Future     Number of Occurrences:   1     Standing Expiration Date:   4/12/2023    Sedimentation rate     Standing Status:   Future     Number of Occurrences:   1     Standing Expiration Date:   6/11/2023    Hemoglobin A1C     Standing Status:   Future     Number of Occurrences:   1     Standing Expiration Date:   4/12/2023    Change dressing     Clean with NS. Iodoflex to Plantar Foot and 5th Toe wound bed. Benzoin to periwound's and Dorsal foot wound bed. Gigi foam short x 3 (long n/a). Football dressing (cast padding x 3), Coban. Darco.        Follow up in about 1 week (around 4/19/2022) for wound care.     See Jean-Baptiste MD

## 2022-04-12 NOTE — PROGRESS NOTES
Please let patient know that white count is elevated, so I have sent in antibiotics for him to start ASAP.  He needs to let me know or go to the ED for any fevers, chills, or sweats    Thanks  Dr. Jean-Baptiste

## 2022-04-12 NOTE — TELEPHONE ENCOUNTER
Pt notified of Doctor's instructions below. Pt verbalized understanding. Pt states the only symptom he had was a 5 min episode of chills earlier today.

## 2022-04-12 NOTE — TELEPHONE ENCOUNTER
----- Message from See Jean-Baptiste MD sent at 4/12/2022  4:46 PM CDT -----  Please let patient know that white count is elevated, so I have sent in antibiotics for him to start ASAP.  He needs to let me know or go to the ED for any fevers, chills, or sweats    Thanks  Dr. Jean-Baptiste

## 2022-04-19 ENCOUNTER — HOSPITAL ENCOUNTER (OUTPATIENT)
Dept: WOUND CARE | Facility: HOSPITAL | Age: 62
Discharge: HOME | End: 2022-04-19
Attending: FAMILY MEDICINE
Payer: MEDICAID

## 2022-04-19 VITALS — TEMPERATURE: 98 F | RESPIRATION RATE: 18 BRPM

## 2022-04-19 DIAGNOSIS — L97.412 DIABETIC ULCER OF RIGHT MIDFOOT ASSOCIATED WITH TYPE 2 DIABETES MELLITUS, WITH FAT LAYER EXPOSED: Primary | ICD-10-CM

## 2022-04-19 DIAGNOSIS — E11.621 DIABETIC ULCER OF RIGHT MIDFOOT ASSOCIATED WITH TYPE 2 DIABETES MELLITUS, WITH FAT LAYER EXPOSED: Primary | ICD-10-CM

## 2022-04-19 PROCEDURE — 11042 DBRDMT SUBQ TIS 1ST 20SQCM/<: CPT | Mod: ,,, | Performed by: FAMILY MEDICINE

## 2022-04-19 PROCEDURE — 11042 DBRDMT SUBQ TIS 1ST 20SQCM/<: CPT | Performed by: FAMILY MEDICINE

## 2022-04-19 PROCEDURE — 27201912 HC WOUND CARE DEBRIDEMENT SUPPLIES: Performed by: FAMILY MEDICINE

## 2022-04-19 PROCEDURE — 99499 UNLISTED E&M SERVICE: CPT | Mod: ,,, | Performed by: FAMILY MEDICINE

## 2022-04-19 PROCEDURE — 11042 DEBRIDEMENT: ICD-10-PCS | Mod: ,,, | Performed by: FAMILY MEDICINE

## 2022-04-19 PROCEDURE — 99499 NO LOS: ICD-10-PCS | Mod: ,,, | Performed by: FAMILY MEDICINE

## 2022-04-19 NOTE — PROGRESS NOTES
[facility]                Progress Note    Subjective:       Patient ID: Fermin Henson is a 62 y.o. male.    Chief Complaint: Wound Check    F/u wound care visit. Patient ambulatory to exam room with no c/o pain or discomfort at present.  Wound dressing to right foot intact. Wound to right plantar foot measuring smaller with small amount of drainage noted to dressing. Wound to right distal 5th toe measuring smaller and no drainage noted. Right dorsal foot area dry with some redness noted and small fluid filled blister noted to medial distal dorsal foot. Wound care done per order. RTC in one week.    MD note:  Patient states his foot looks a lot better this week compared to last week.  He is currently on Bactrim DS after WBC's and inflammatory markers were elevated.  No evidence of bone involvement seen on xray as of last week.  He denies any fevers, chills, or sweats.  He states he has been keeping bandages in tact, clean and dry.        Review of Systems   Constitutional: Negative.    HENT: Negative.    Eyes: Negative.    Respiratory: Negative.    Cardiovascular: Negative.    Gastrointestinal: Negative.    Skin: Positive for wound.   Neurological: Positive for numbness.   Psychiatric/Behavioral: Negative.          Objective:        Physical Exam  Constitutional:       Appearance: Normal appearance.   HENT:      Head: Normocephalic and atraumatic.      Right Ear: External ear normal.      Left Ear: External ear normal.      Mouth/Throat:      Mouth: Mucous membranes are moist.      Pharynx: Oropharynx is clear.   Eyes:      Extraocular Movements: Extraocular movements intact.      Conjunctiva/sclera: Conjunctivae normal.      Pupils: Pupils are equal, round, and reactive to light.   Cardiovascular:      Rate and Rhythm: Normal rate and regular rhythm.   Pulmonary:      Effort: Pulmonary effort is normal. No respiratory distress.   Abdominal:      General: There is  no distension.      Palpations: Abdomen is soft.   Skin:     General: Skin is warm and dry.      Comments: +wound to right forefoot with slough present  +DFU to right plantar without slough, but does have callous   Neurological:      Mental Status: He is alert.           Vitals:    04/19/22 1436   Resp: 18   Temp: 97.7 °F (36.5 °C)     Debridement    Date/Time: 4/19/2022 1:36 PM  Performed by: See Jean-Baptiste MD  Authorized by: See Jean-Baptiste MD     Consent Done?:  Yes (Written)  Local anesthesia used?: No      Wound Details:    Location:  Right foot    Location:  Right 1st Metatarsal Head    Type of Debridement:  Excisional       Length (cm):  0.3       Area (sq cm):  0.12       Width (cm):  0.4       Percent Debrided (%):  100       Depth (cm):  0.9       Total Area Debrided (sq cm):  0.12    Depth of debridement:  Subcutaneous tissue    Tissue debrided:  Dermis, Epidermis and Subcutaneous    Devitalized tissue debrided:  Biofilm, Fibrin and Slough    Instruments:  Curette    Bleeding:  Minimal  Hemostasis Achieved: Yes    Method Used:  Pressure  Patient tolerance:  Patient tolerated the procedure well with no immediate complications      Assessment:           ICD-10-CM ICD-9-CM   1. Diabetic ulcer of right midfoot associated with type 2 diabetes mellitus, with fat layer exposed  E11.621 250.80    L97.412 707.14            Wound 03/02/22 1037 Diabetic Ulcer Right anterior Foot (Active)   03/02/22 1037    Pre-existing: Yes   Primary Wound Type: Diabetic ulc   Side: Right   Orientation: anterior   Location: Foot   Wound Number:    Ankle-Brachial Index:    Pulses:    Removal Indication and Assessment:    Wound Outcome:    (Retired) Wound Type:    (Retired) Wound Length (cm):    (Retired) Wound Width (cm):    (Retired) Depth (cm):    Wound Description (Comments):    Removal Indications:    Wound Image   04/19/22 1300   Wound WDL ex 04/19/22 1300   Dressing Appearance Intact;Dried drainage 04/19/22 1300   Drainage  Amount Small 04/19/22 1300   Appearance Pink;Dry 04/19/22 1300   Tissue loss description Partial thickness 04/19/22 1300   Black (%), Wound Tissue Color 0 % 04/19/22 1300   Red (%), Wound Tissue Color 95 % 04/19/22 1300   Yellow (%), Wound Tissue Color 5 % 04/19/22 1300   Periwound Area Dry;Intact 04/19/22 1300   Wound Edges Defined 04/19/22 1300   Wound Length (cm) 0.3 cm 04/19/22 1300   Wound Width (cm) 0.4 cm 04/19/22 1300   Wound Depth (cm) 0.9 cm 04/19/22 1300   Wound Volume (cm^3) 0.108 cm^3 04/19/22 1300   Wound Surface Area (cm^2) 0.12 cm^2 04/19/22 1300   Tunneling (depth (cm)/location) 1.6cm @ 12 o'clock 04/19/22 1300   Care Cleansed with:;Soap and water;Sterile normal saline 04/19/22 1300   Dressing Changed 04/19/22 1300   Periwound Care Skin barrier film applied 04/19/22 1300   Off Loading Football dressing;Off loading shoe 04/19/22 1300   Dressing Change Due 04/26/22 04/19/22 1300            Wound 04/12/22 1426 Diabetic Ulcer Right plantar Foot (Active)   04/12/22 1426    Pre-existing: Yes   Primary Wound Type: Diabetic ulc   Side: Right   Orientation: plantar   Location: Foot   Wound Number:    Ankle-Brachial Index:    Pulses:    Removal Indication and Assessment:    Wound Outcome:    (Retired) Wound Type:    (Retired) Wound Length (cm):    (Retired) Wound Width (cm):    (Retired) Depth (cm):    Wound Description (Comments):    Removal Indications:    Wound Image   04/19/22 1300   Wound WDL ex 04/19/22 1300   Dressing Appearance Intact;Moist drainage 04/19/22 1300   Drainage Amount Small 04/19/22 1300   Drainage Characteristics/Odor Serosanguineous 04/19/22 1300   Appearance Red;Epithelialization 04/19/22 1300   Tissue loss description Partial thickness 04/19/22 1300   Black (%), Wound Tissue Color 0 % 04/19/22 1300   Red (%), Wound Tissue Color 100 % 04/19/22 1300   Yellow (%), Wound Tissue Color 0 % 04/19/22 1300   Periwound Area Dry;Intact 04/19/22 1300   Wound Edges Callused 04/19/22 1300   Wound  Length (cm) 0.3 cm 04/19/22 1300   Wound Width (cm) 0.3 cm 04/19/22 1300   Wound Depth (cm) 0.1 cm 04/19/22 1300   Wound Volume (cm^3) 0.009 cm^3 04/19/22 1300   Wound Surface Area (cm^2) 0.09 cm^2 04/19/22 1300   Care Cleansed with:;Soap and water;Sterile normal saline 04/19/22 1300   Dressing Changed 04/19/22 1300   Periwound Care Dry periwound area maintained 04/19/22 1300   Off Loading Football dressing;Off loading shoe 04/19/22 1300   Dressing Change Due 04/26/22 04/19/22 1300            Wound 04/12/22 1433 Traumatic Right distal Toe, fifth (Active)   04/12/22 1433    Pre-existing: No   Primary Wound Type: Traumatic   Side: Right   Orientation: distal   Location: Toe, fifth   Wound Number:    Ankle-Brachial Index:    Pulses:    Removal Indication and Assessment:    Wound Outcome:    (Retired) Wound Type:    (Retired) Wound Length (cm):    (Retired) Wound Width (cm):    (Retired) Depth (cm):    Wound Description (Comments):    Removal Indications:    Wound Image   04/19/22 1300   Wound WDL ex 04/19/22 1300   Dressing Appearance Dry;Intact 04/19/22 1300   Drainage Amount None 04/19/22 1300   Appearance Maroon 04/19/22 1300   Black (%), Wound Tissue Color 0 % 04/19/22 1300   Red (%), Wound Tissue Color 100 % 04/19/22 1300   Yellow (%), Wound Tissue Color 0 % 04/19/22 1300   Periwound Area Dry;Intact 04/19/22 1300   Wound Edges Defined 04/19/22 1300   Wound Length (cm) 0.2 cm 04/19/22 1300   Wound Width (cm) 0.3 cm 04/19/22 1300   Wound Depth (cm) 0.1 cm 04/19/22 1300   Wound Volume (cm^3) 0.006 cm^3 04/19/22 1300   Wound Surface Area (cm^2) 0.06 cm^2 04/19/22 1300   Care Cleansed with:;Soap and water;Sterile normal saline 04/19/22 1300   Dressing Changed 04/19/22 1300   Off Loading Football dressing;Off loading shoe 04/19/22 1300   Dressing Change Due 04/26/22 04/19/22 1300           Plan:                Orders Placed This Encounter   Procedures    Debridement     This order was created via procedure  documentation     Standing Status:   Standing     Number of Occurrences:   1    Change dressing     Clean with NS. Benzoin to periwound. Iodoflex/Endoform to Plantar Foot and dorsal foot.  Gigi foam long x 1 to plantar foot, short x1 to dorsal foot. Football dressing (cast padding x 3), Coban. Darco.        Follow up in about 1 week (around 4/26/2022) for wound care visit.     See Jean-Baptiste MD

## 2022-04-26 ENCOUNTER — HOSPITAL ENCOUNTER (OUTPATIENT)
Dept: WOUND CARE | Facility: HOSPITAL | Age: 62
Discharge: HOME | End: 2022-04-26
Attending: FAMILY MEDICINE
Payer: MEDICAID

## 2022-04-26 VITALS — TEMPERATURE: 98 F | SYSTOLIC BLOOD PRESSURE: 125 MMHG | DIASTOLIC BLOOD PRESSURE: 68 MMHG | HEART RATE: 101 BPM

## 2022-04-26 DIAGNOSIS — L97.412 DIABETIC ULCER OF RIGHT MIDFOOT ASSOCIATED WITH TYPE 2 DIABETES MELLITUS, WITH FAT LAYER EXPOSED: Primary | ICD-10-CM

## 2022-04-26 DIAGNOSIS — E11.621 DIABETIC ULCER OF RIGHT MIDFOOT ASSOCIATED WITH TYPE 2 DIABETES MELLITUS, WITH FAT LAYER EXPOSED: Primary | ICD-10-CM

## 2022-04-26 DIAGNOSIS — L03.115 CELLULITIS OF RIGHT FOOT: ICD-10-CM

## 2022-04-26 PROCEDURE — 11042 DBRDMT SUBQ TIS 1ST 20SQCM/<: CPT | Performed by: FAMILY MEDICINE

## 2022-04-26 PROCEDURE — 99499 NO LOS: ICD-10-PCS | Mod: ,,, | Performed by: FAMILY MEDICINE

## 2022-04-26 PROCEDURE — 11042 DEBRIDEMENT: ICD-10-PCS | Mod: ,,, | Performed by: FAMILY MEDICINE

## 2022-04-26 PROCEDURE — 99499 UNLISTED E&M SERVICE: CPT | Mod: ,,, | Performed by: FAMILY MEDICINE

## 2022-04-26 PROCEDURE — 27201912 HC WOUND CARE DEBRIDEMENT SUPPLIES: Performed by: FAMILY MEDICINE

## 2022-04-26 PROCEDURE — 11042 DBRDMT SUBQ TIS 1ST 20SQCM/<: CPT | Mod: ,,, | Performed by: FAMILY MEDICINE

## 2022-04-26 NOTE — PROGRESS NOTES
[facility]                Progress Note    Subjective:       Patient ID: Fermin Henson is a 62 y.o. male.    Chief Complaint: No chief complaint on file.    Follow up wound care visit. Patient ambulated to exam room without assistance, prescribed Darco on Right Foot. No c/o pain at present. Dressing intact, Right Dorsal Foot has a scant amount of dried serosanguineous, yellow, non-malodor drainage; Right Plantar Foot has a small amount of dried serosanguineous, non-malodor drainage; and Right Distal Foot has a moderate amount of serosanguineous, non-malodor drainage noted. Right 5th Distal toe wound appears healed. Right Dorsal Foot is measuring smaller with a pin-point area of moisture. New Right Distal Foot wound that appeared last week, still has tunneling at 12 o'clock, but measuring smaller in length and width. Right Plantar Foot wound measuring slightly larger in (0.2 cm) length, but continues to have epithelization to center of wound bed.     Patient reports completing his oral ABX therapy on Friday, 04/22/22.    Wound care done as per order, RTC in 1 week.    MD note:  Patient presenting today for follow up of right foot wounds. He states that he has been staying off the foot and has been keeping dressings in place, clean, and dry.  There has been no drainage seen on outside of bandage.  No fevers, chills, or sweats.        Review of Systems   Constitutional: Negative.    HENT: Negative.    Eyes: Negative.    Respiratory: Negative.    Cardiovascular: Negative.    Gastrointestinal: Negative.    Skin: Positive for wound.   Neurological: Positive for numbness.   Psychiatric/Behavioral: Negative.          Objective:        Physical Exam  Constitutional:       Appearance: Normal appearance.   HENT:      Head: Normocephalic and atraumatic.      Mouth/Throat:      Mouth: Mucous membranes are moist.   Eyes:      Extraocular Movements: Extraocular movements intact.  "     Conjunctiva/sclera: Conjunctivae normal.      Pupils: Pupils are equal, round, and reactive to light.   Cardiovascular:      Rate and Rhythm: Normal rate and regular rhythm.   Pulmonary:      Effort: Pulmonary effort is normal. No respiratory distress.   Abdominal:      General: There is no distension.      Palpations: Abdomen is soft.   Skin:     General: Skin is warm and dry.      Comments: +anterior DFU and plantar DFU  Plantar DFU has excess slough     Neurological:      Mental Status: He is alert.           Vitals:    04/26/22 1343   BP: 125/68   Pulse: 101   Temp: 97.8 °F (36.6 °C)     Debridement    Date/Time: 4/26/2022 1:31 PM  Performed by: See Jean-Baptiste MD  Authorized by: See Jean-Baptiste MD     Time out: Immediately prior to procedure a "time out" was called to verify the correct patient, procedure, equipment, support staff and site/side marked as required.    Consent Done?:  Yes (Written)  Local anesthesia used?: No      Wound Details:    Location:  Right foot    Location:  Right Plantar    Type of Debridement:  Excisional       Length (cm):  0.2       Area (sq cm):  0.04       Width (cm):  0.2       Percent Debrided (%):  100       Depth (cm):  0.1       Total Area Debrided (sq cm):  0.04    Depth of debridement:  Subcutaneous tissue    Tissue debrided:  Dermis, Epidermis and Subcutaneous    Devitalized tissue debrided:  Biofilm, Fibrin and Slough    Instruments:  Curette    Bleeding:  Minimal  Hemostasis Achieved: Yes    Method Used:  Pressure  Patient tolerance:  Patient tolerated the procedure well with no immediate complications      Assessment:           ICD-10-CM ICD-9-CM   1. Diabetic ulcer of right midfoot associated with type 2 diabetes mellitus, with fat layer exposed  E11.621 250.80    L97.412 707.14   2. Cellulitis of right foot  L03.115 682.7            Wound 03/02/22 1037 Diabetic Ulcer Right anterior Foot (Active)   03/02/22 1037    Pre-existing: Yes   Primary Wound Type: Diabetic " ulc   Side: Right   Orientation: anterior   Location: Foot   Wound Number:    Ankle-Brachial Index:    Pulses:    Removal Indication and Assessment:    Wound Outcome:    (Retired) Wound Type:    (Retired) Wound Length (cm):    (Retired) Wound Width (cm):    (Retired) Depth (cm):    Wound Description (Comments):    Removal Indications:    Wound Image   04/26/22 1300   Wound WDL ex 04/26/22 1300   Dressing Appearance Intact;Dried drainage 04/26/22 1300   Drainage Amount Scant 04/26/22 1300   Drainage Characteristics/Odor Serous;No odor 04/26/22 1300   Appearance Pink;Dry 04/26/22 1300   Tissue loss description Partial thickness 04/26/22 1300   Red (%), Wound Tissue Color 100 % 04/26/22 1300   Periwound Area Dry;Intact;[place] 04/26/22 1300   Wound Edges Defined 04/26/22 1300   Wound Length (cm) 0.2 cm 04/26/22 1300   Wound Width (cm) 0.2 cm 04/26/22 1300   Wound Depth (cm) 0.1 cm 04/26/22 1300   Wound Volume (cm^3) 0.004 cm^3 04/26/22 1300   Wound Surface Area (cm^2) 0.04 cm^2 04/26/22 1300   Tunneling (depth (cm)/location) 0 04/26/22 1300   Care Cleansed with:;Antimicrobial agent;Sterile normal saline 04/26/22 1300   Dressing Changed 04/26/22 1300   Periwound Care Skin barrier film applied 04/26/22 1300   Off Loading Football dressing;Off loading shoe 04/26/22 1300   Dressing Change Due 05/03/22 04/26/22 1300            Wound 04/12/22 1426 Diabetic Ulcer Right plantar Foot (Active)   04/12/22 1426    Pre-existing: Yes   Primary Wound Type: Diabetic [facility]   Side: Right   Orientation: plantar   Location: Foot   Wound Number:    Ankle-Brachial Index:    Pulses:    Removal Indication and Assessment:    Wound Outcome:    (Retired) Wound Type:    (Retired) Wound Length (cm):    (Retired) Wound Width (cm):    (Retired) Depth (cm):    Wound Description (Comments):    Removal Indications:    Wound Image    04/26/22 1300   Wound WDL ex 04/26/22 1300   Dressing Appearance Intact;Dried drainage 04/26/22 1300   Drainage Amount Small  04/26/22 1300   Drainage Characteristics/Odor Serosanguineous;Yellow;No odor 04/26/22 1300   Appearance Red;Pink;Epithelialization 04/26/22 1300   Tissue loss description Partial thickness 04/26/22 1300   Red (%), Wound Tissue Color 100 % 04/26/22 1300   Periwound Area Dry;Intact 04/26/22 1300   Wound Edges Callused 04/26/22 1300   Wound Length (cm) 0.5 cm 04/26/22 1300   Wound Width (cm) 0.3 cm 04/26/22 1300   Wound Depth (cm) 0.2 cm 04/26/22 1300   Wound Volume (cm^3) 0.03 cm^3 04/26/22 1300   Wound Surface Area (cm^2) 0.15 cm^2 04/26/22 1300   Care Cleansed with:;Antimicrobial agent;Sterile normal saline 04/26/22 1300   Dressing Changed 04/26/22 1300   Periwound Care Skin barrier film applied 04/26/22 1300   Off Loading Football dressing;Off loading shoe 04/26/22 1300   Dressing Change Due 05/03/22 04/26/22 1300            Wound 04/19/22 Diabetic Ulcer Right distal Foot (Active)   04/19/22     Pre-existing: No   Primary Wound Type: Diabetic ulc   Side: Right   Orientation: distal   Location: Foot   Wound Number:    Ankle-Brachial Index:    Pulses:    Removal Indication and Assessment:    Wound Outcome:    (Retired) Wound Type:    (Retired) Wound Length (cm):    (Retired) Wound Width (cm):    (Retired) Depth (cm):    Wound Description (Comments):    Removal Indications:    Wound Image   04/26/22 1300   Wound WDL ex 04/26/22 1300   Dressing Appearance Intact;Moist drainage 04/26/22 1300   Drainage Amount Moderate 04/26/22 1300   Drainage Characteristics/Odor Sanguineous;No odor;Bleeding controlled 04/26/22 1300   Appearance Red 04/26/22 1300   Tissue loss description Partial thickness 04/26/22 1300   Red (%), Wound Tissue Color 100 % 04/26/22 1300   Periwound Area Dry;[place] 04/26/22 1300   Wound Edges Open 04/26/22 1300   Wound Length (cm) 0.3 cm 04/26/22 1300   Wound Width (cm) 0.3 cm 04/26/22 1300   Wound Depth (cm) 1 cm 04/26/22 1300   Wound Volume (cm^3) 0.09 cm^3 04/26/22 1300   Wound Surface Area (cm^2) 0.09  cm^2 04/26/22 1300   Tunneling (depth (cm)/location) 1.2 cm @ 12 o'clock 04/26/22 1300   Care Cleansed with:;Antimicrobial agent;Sterile normal saline 04/26/22 1300   Dressing Applied 04/26/22 1300   Packing packed with 04/26/22 1300   Packing Inserted  1 04/26/22 1300   Periwound Care Skin barrier film applied 04/26/22 1300   Off Loading Football dressing;Off loading shoe 04/26/22 1300   Dressing Change Due 05/03/22 04/26/22 1300       [REMOVED]      Wound 03/15/22 1000 Right distal Toe, third (Removed)   03/15/22 1000    Pre-existing:    Primary Wound Type:    Side: Right   Orientation: distal   Location: Toe, third   Wound Number:    Ankle-Brachial Index:    Pulses:    Removal Indication and Assessment:    Wound Outcome: Healed   (Retired) Wound Type:    (Retired) Wound Length (cm):    (Retired) Wound Width (cm):    (Retired) Depth (cm):    Wound Description (Comments):    Removal Indications:    Removed 04/26/22 1440   Wound WDL WDL 04/26/22 1300   Appearance Closed/resurfaced 04/26/22 1300   Wound Length (cm) 0 cm 04/26/22 1300   Wound Width (cm) 0 cm 04/26/22 1300   Wound Depth (cm) 0 cm 04/26/22 1300   Wound Volume (cm^3) 0 cm^3 04/26/22 1300   Wound Surface Area (cm^2) 0 cm^2 04/26/22 1300   Tunneling (depth (cm)/location) 0 04/26/22 1300   Undermining (depth (cm)/location) 0 04/26/22 1300   Care Cleansed with:;Antimicrobial agent;Sterile normal saline 04/26/22 1300       [REMOVED]      Wound 04/12/22 1433 Traumatic Right distal Toe, fifth (Removed)   04/12/22 1433    Pre-existing: No   Primary Wound Type: Traumatic   Side: Right   Orientation: distal   Location: Toe, fifth   Wound Number:    Ankle-Brachial Index:    Pulses:    Removal Indication and Assessment:    Wound Outcome: Healed   (Retired) Wound Type:    (Retired) Wound Length (cm):    (Retired) Wound Width (cm):    (Retired) Depth (cm):    Wound Description (Comments):    Removal Indications:    Removed 04/26/22 1409   Wound Image   04/26/22 1300    Wound WDL WDL 04/26/22 1300   Dressing Appearance Intact;Dry 04/26/22 1300   Drainage Amount None 04/26/22 1300   Appearance Closed/resurfaced 04/26/22 1300   Tissue loss description Not applicable 04/26/22 1300   Wound Edges Approximated 04/26/22 1300   Wound Length (cm) 0 cm 04/26/22 1300   Wound Width (cm) 0 cm 04/26/22 1300   Wound Depth (cm) 0 cm 04/26/22 1300   Wound Volume (cm^3) 0 cm^3 04/26/22 1300   Wound Surface Area (cm^2) 0 cm^2 04/26/22 1300   Tunneling (depth (cm)/location) 0 04/26/22 1300   Undermining (depth (cm)/location) 0 04/26/22 1300   Care Cleansed with:;Antimicrobial agent;Sterile normal saline 04/26/22 1300   Dressing Removed 04/26/22 1300           Plan:                Orders Placed This Encounter   Procedures    Debridement     This order was created via procedure documentation     Standing Status:   Standing     Number of Occurrences:   1    Change dressing     Clean with NS. Cavilon/Benzoin to periwound's. Iodoflex to Right Plantar Foot. Endoform inserted into Right Distal foot wound. Gigi foam long x 1 to plantar foot, short x1 to dorsal foot. Football dressing (cast padding x 3), Coban. Darco.        Follow up in about 1 week (around 5/3/2022) for wound care.     See Jean-Baptiste MD

## 2022-05-03 ENCOUNTER — HOSPITAL ENCOUNTER (OUTPATIENT)
Dept: WOUND CARE | Facility: HOSPITAL | Age: 62
Discharge: HOME | End: 2022-05-03
Attending: FAMILY MEDICINE
Payer: MEDICAID

## 2022-05-03 VITALS — HEART RATE: 104 BPM | SYSTOLIC BLOOD PRESSURE: 131 MMHG | DIASTOLIC BLOOD PRESSURE: 74 MMHG | TEMPERATURE: 98 F

## 2022-05-03 DIAGNOSIS — L97.412 DIABETIC ULCER OF RIGHT MIDFOOT ASSOCIATED WITH TYPE 2 DIABETES MELLITUS, WITH FAT LAYER EXPOSED: Primary | ICD-10-CM

## 2022-05-03 DIAGNOSIS — L03.115 CELLULITIS OF RIGHT FOOT: ICD-10-CM

## 2022-05-03 DIAGNOSIS — E11.621 DIABETIC ULCER OF RIGHT MIDFOOT ASSOCIATED WITH TYPE 2 DIABETES MELLITUS, WITH FAT LAYER EXPOSED: Primary | ICD-10-CM

## 2022-05-03 PROCEDURE — 27201912 HC WOUND CARE DEBRIDEMENT SUPPLIES: Performed by: FAMILY MEDICINE

## 2022-05-03 PROCEDURE — 11042 DBRDMT SUBQ TIS 1ST 20SQCM/<: CPT | Performed by: FAMILY MEDICINE

## 2022-05-03 PROCEDURE — 99499 NO LOS: ICD-10-PCS | Mod: ,,, | Performed by: FAMILY MEDICINE

## 2022-05-03 PROCEDURE — 11042 DBRDMT SUBQ TIS 1ST 20SQCM/<: CPT | Mod: ,,, | Performed by: FAMILY MEDICINE

## 2022-05-03 PROCEDURE — 99499 UNLISTED E&M SERVICE: CPT | Mod: ,,, | Performed by: FAMILY MEDICINE

## 2022-05-03 PROCEDURE — 11042 DEBRIDEMENT: ICD-10-PCS | Mod: ,,, | Performed by: FAMILY MEDICINE

## 2022-05-03 NOTE — PROGRESS NOTES
[facility]                Progress Note    Subjective:       Patient ID: Fermin Henson is a 62 y.o. male.    Chief Complaint: No chief complaint on file.    Follow up wound care visit. Patient ambulated to exam room without assistance. No c/o pain at present. Dressing intact with scant amount of serosanguineous drainage to Dorsal aspect, moderate amount of serosanguineous, yellow, non-malodor from Distal and no drainage from Plantar.      Wound care done as per order, RTC in 1 week.    MD note:  Patient has no new complaints.  There has been no fevers, chills or sweats.  No pain in foot as he is insensate  He has finished abx.  No new concerns, but he states that when dressings were removed he thinks his skin on the top of his foot is darker than normal      Review of Systems   Constitutional: Negative.    HENT: Negative.    Eyes: Negative.    Respiratory: Negative.    Cardiovascular: Negative.    Gastrointestinal: Negative.    Skin: Positive for wound.   Psychiatric/Behavioral: Negative.          Objective:        Physical Exam  Constitutional:       Appearance: Normal appearance.   HENT:      Head: Normocephalic and atraumatic.      Right Ear: External ear normal.      Left Ear: External ear normal.      Mouth/Throat:      Mouth: Mucous membranes are moist.      Pharynx: Oropharynx is clear.   Eyes:      Extraocular Movements: Extraocular movements intact.      Conjunctiva/sclera: Conjunctivae normal.      Pupils: Pupils are equal, round, and reactive to light.   Cardiovascular:      Rate and Rhythm: Normal rate and regular rhythm.   Abdominal:      General: There is no distension.      Palpations: Abdomen is soft.   Skin:     General: Skin is warm and dry.      Comments: +wound on anterior foot is healing  +plantar wound stable   Neurological:      Mental Status: He is alert.           Vitals:    05/03/22 1050   BP: 131/74   Pulse: 104   Temp: 97.8 °F (36.6 °C)  "    Debridement    Date/Time: 5/3/2022 10:36 AM  Performed by: See Jean-Baptiste MD  Authorized by: See Jean-Baptiste MD     Time out: Immediately prior to procedure a "time out" was called to verify the correct patient, procedure, equipment, support staff and site/side marked as required.    Consent Done?:  Yes (Written)  Local anesthesia used?: No      Wound Details:    Location:  Right foot    Location:  Right Plantar       Length (cm):  0.4       Area (sq cm):  0.12       Width (cm):  0.3       Percent Debrided (%):  100       Depth (cm):  0.2       Total Area Debrided (sq cm):  0.12    Depth of debridement:  Subcutaneous tissue    Tissue debrided:  Dermis, Epidermis and Subcutaneous    Devitalized tissue debrided:  Biofilm, Callus, Slough and Fibrin    Instruments:  Curette    Bleeding:  Minimal  Hemostasis Achieved: Yes    Method Used:  Pressure  Patient tolerance:  Patient tolerated the procedure well with no immediate complications      Assessment:           ICD-10-CM ICD-9-CM   1. Diabetic ulcer of right midfoot associated with type 2 diabetes mellitus, with fat layer exposed  E11.621 250.80    L97.412 707.14   2. Cellulitis of right foot  L03.115 682.7            Wound 03/02/22 1037 Diabetic Ulcer Right anterior Foot (Active)   03/02/22 1037    Pre-existing: Yes   Primary Wound Type: Diabetic ulc   Side: Right   Orientation: anterior   Location: Foot   Wound Number:    Ankle-Brachial Index:    Pulses:    Removal Indication and Assessment:    Wound Outcome:    (Retired) Wound Type:    (Retired) Wound Length (cm):    (Retired) Wound Width (cm):    (Retired) Depth (cm):    Wound Description (Comments):    Removal Indications:    Wound Image   05/03/22 1000   Wound WDL ex 05/03/22 1000   Dressing Appearance Intact;Dry 05/03/22 1000   Drainage Amount None 05/03/22 1000   Appearance Red;Dry 05/03/22 1000   Periwound Area [place] 05/03/22 1000   Wound Edges Approximated 05/03/22 1000   Wound Length (cm) 0 cm 05/03/22 " 1000   Wound Width (cm) 0 cm 05/03/22 1000   Wound Depth (cm) 0 cm 05/03/22 1000   Wound Volume (cm^3) 0 cm^3 05/03/22 1000   Wound Surface Area (cm^2) 0 cm^2 05/03/22 1000   Tunneling (depth (cm)/location) 0 05/03/22 1000   Undermining (depth (cm)/location) 0 05/03/22 1000   Care Cleansed with:;Antimicrobial agent;Sterile normal saline 05/03/22 1000   Dressing Changed 05/03/22 1000   Periwound Care Skin barrier film applied 05/03/22 1000   Off Loading Football dressing;Off loading shoe 05/03/22 1000   Dressing Change Due 05/10/22 05/03/22 1000            Wound 04/12/22 1426 Diabetic Ulcer Right plantar Foot (Active)   04/12/22 1426    Pre-existing: Yes   Primary Wound Type: Diabetic ulc   Side: Right   Orientation: plantar   Location: Foot   Wound Number:    Ankle-Brachial Index:    Pulses:    Removal Indication and Assessment:    Wound Outcome:    (Retired) Wound Type:    (Retired) Wound Length (cm):    (Retired) Wound Width (cm):    (Retired) Depth (cm):    Wound Description (Comments):    Removal Indications:    Wound Image    05/03/22 1000   Wound WDL ex 05/03/22 1000   Dressing Appearance Intact;Moist drainage 05/03/22 1000   Drainage Amount Small 05/03/22 1000   Drainage Characteristics/Odor Serosanguineous;No odor 05/03/22 1000   Appearance Red;Epithelialization;Moist 05/03/22 1000   Tissue loss description Partial thickness 05/03/22 1000   Red (%), Wound Tissue Color 100 % 05/03/22 1000   Wound Length (cm) 0.4 cm 05/03/22 1000   Wound Width (cm) 0.3 cm 05/03/22 1000   Wound Depth (cm) 0.2 cm 05/03/22 1000   Wound Volume (cm^3) 0.024 cm^3 05/03/22 1000   Wound Surface Area (cm^2) 0.12 cm^2 05/03/22 1000   Care Cleansed with:;Antimicrobial agent;Sterile normal saline;Debrided 05/03/22 1000   Dressing Changed 05/03/22 1000   Periwound Care Skin barrier film applied 05/03/22 1000   Off Loading Football dressing;Off loading shoe 05/03/22 1000            Wound 04/19/22 Diabetic Ulcer Right distal Foot (Active)    04/19/22     Pre-existing: No   Primary Wound Type: Diabetic ulc   Side: Right   Orientation: distal   Location: Foot   Wound Number:    Ankle-Brachial Index:    Pulses:    Removal Indication and Assessment:    Wound Outcome:    (Retired) Wound Type:    (Retired) Wound Length (cm):    (Retired) Wound Width (cm):    (Retired) Depth (cm):    Wound Description (Comments):    Removal Indications:    Wound Image   05/03/22 1000   Wound WDL ex 05/03/22 1000   Dressing Appearance Intact;Moist drainage 05/03/22 1000   Drainage Amount Moderate 05/03/22 1000   Drainage Characteristics/Odor Serosanguineous;Yellow;No odor 05/03/22 1000   Appearance Red;Epithelialization 05/03/22 1000   Tissue loss description Partial thickness 05/03/22 1000   Red (%), Wound Tissue Color 100 % 05/03/22 1000   Wound Length (cm) 0.2 cm 05/03/22 1000   Wound Width (cm) 0.2 cm 05/03/22 1000   Wound Depth (cm) 0.4 cm 05/03/22 1000   Wound Volume (cm^3) 0.016 cm^3 05/03/22 1000   Wound Surface Area (cm^2) 0.04 cm^2 05/03/22 1000   Care Cleansed with:;Antimicrobial agent;Sterile normal saline 05/03/22 1000   Dressing Changed 05/03/22 1000   Periwound Care Moisture barrier applied 05/03/22 1000   Off Loading Football dressing;Off loading shoe 05/03/22 1000   Dressing Change Due 05/10/22 05/03/22 1000           Plan:                Orders Placed This Encounter   Procedures    Debridement     This order was created via procedure documentation     Standing Status:   Standing     Number of Occurrences:   1    Change dressing     Clean with NS. Cavilon/Benzoin to periwound's. Iodoflex to Right Plantar Foot. Endoform inserted into Right Distal foot wound. Gigi foam long x 1 to plantar foot, short x1 to dorsal foot. Football dressing (cast padding x 3), Coban. Darco.        Follow up in about 1 week (around 5/10/2022) for wound care.     See Jean-Baptiste MD

## 2022-05-10 ENCOUNTER — TELEPHONE (OUTPATIENT)
Dept: WOUND CARE | Facility: HOSPITAL | Age: 62
End: 2022-05-10
Payer: MEDICAID

## 2022-05-10 ENCOUNTER — HOSPITAL ENCOUNTER (OUTPATIENT)
Dept: WOUND CARE | Facility: HOSPITAL | Age: 62
Discharge: HOME | End: 2022-05-10
Attending: FAMILY MEDICINE
Payer: MEDICAID

## 2022-05-10 VITALS — TEMPERATURE: 98 F | DIASTOLIC BLOOD PRESSURE: 76 MMHG | HEART RATE: 86 BPM | SYSTOLIC BLOOD PRESSURE: 140 MMHG

## 2022-05-10 DIAGNOSIS — L97.412 DIABETIC ULCER OF RIGHT MIDFOOT ASSOCIATED WITH TYPE 2 DIABETES MELLITUS, WITH FAT LAYER EXPOSED: Primary | ICD-10-CM

## 2022-05-10 DIAGNOSIS — E11.621 TYPE 2 DIABETES WITH SKIN ULCER OF FOOT: ICD-10-CM

## 2022-05-10 DIAGNOSIS — E11.621 DIABETIC ULCER OF RIGHT MIDFOOT ASSOCIATED WITH TYPE 2 DIABETES MELLITUS, WITH FAT LAYER EXPOSED: Primary | ICD-10-CM

## 2022-05-10 DIAGNOSIS — L97.509 TYPE 2 DIABETES WITH SKIN ULCER OF FOOT: ICD-10-CM

## 2022-05-10 PROCEDURE — 11042 DBRDMT SUBQ TIS 1ST 20SQCM/<: CPT | Mod: ,,, | Performed by: FAMILY MEDICINE

## 2022-05-10 PROCEDURE — 99499 UNLISTED E&M SERVICE: CPT | Mod: ,,, | Performed by: FAMILY MEDICINE

## 2022-05-10 PROCEDURE — 11042 DEBRIDEMENT: ICD-10-PCS | Mod: ,,, | Performed by: FAMILY MEDICINE

## 2022-05-10 PROCEDURE — 99499 NO LOS: ICD-10-PCS | Mod: ,,, | Performed by: FAMILY MEDICINE

## 2022-05-10 PROCEDURE — 11042 DBRDMT SUBQ TIS 1ST 20SQCM/<: CPT | Performed by: FAMILY MEDICINE

## 2022-05-10 PROCEDURE — 27201912 HC WOUND CARE DEBRIDEMENT SUPPLIES: Performed by: FAMILY MEDICINE

## 2022-05-10 NOTE — PROGRESS NOTES
[facility]                Progress Note    Subjective:       Patient ID: Fermin Henson is a 62 y.o. male.    Chief Complaint: No chief complaint on file.    Follow up wound care visit. Patient ambulated to exam room without assistance, prescribed Darco sheo on Right Foot. No c/o pain at present. Dressing intact with scant amount of serosanguineous drainage to Right Distal and Plantar aspects of Foot. No drainage to Dorsal aspect of foot. Right Dorsal Foot wound measuring smaller in (0.1 cm) length, (0.1 cm) width and (0.2 cm) depth. Right Plantar Foot wound measuring smaller in (0.2 cm) width.     Wound care done as per order, RTC in 1 week.      MD note:  Patient presenting today for follow up of DFU to right forefoot and plantar foot.  He states he has not been walking excessively.  He states that at this time there has been pain.  He denies any fevers, chills, or sweat.  He has kept dressings, clean, dry, and in place      Review of Systems   Constitutional: Negative.    HENT: Negative.    Eyes: Negative.    Respiratory: Negative.    Cardiovascular: Negative.    Gastrointestinal: Negative.    Skin: Positive for wound.   Neurological: Positive for numbness.   Psychiatric/Behavioral: Negative.          Objective:        Physical Exam  Constitutional:       Appearance: Normal appearance.   HENT:      Head: Normocephalic and atraumatic.      Right Ear: External ear normal.      Left Ear: External ear normal.      Mouth/Throat:      Mouth: Mucous membranes are moist.      Pharynx: Oropharynx is clear.   Eyes:      Extraocular Movements: Extraocular movements intact.      Pupils: Pupils are equal, round, and reactive to light.   Cardiovascular:      Rate and Rhythm: Normal rate and regular rhythm.   Pulmonary:      Effort: Pulmonary effort is normal. No respiratory distress.   Abdominal:      General: There is no distension.      Palpations: Abdomen is soft.   Skin:     " General: Skin is warm and dry.      Comments: +dfu to forefoot almost healed as is the plantar wound  +plantar wound has excess callous and some slough present   Neurological:      Mental Status: He is alert.           Vitals:    05/10/22 1328   BP: (!) 140/76   Pulse: 86   Temp: 97.6 °F (36.4 °C)     Debridement    Date/Time: 5/10/2022 12:38 PM  Performed by: See Jean-Baptiste MD  Authorized by: See Jean-Baptiste MD     Time out: Immediately prior to procedure a "time out" was called to verify the correct patient, procedure, equipment, support staff and site/side marked as required.    Consent Done?:  Yes (Written)  Local anesthesia used?: No      Wound Details:    Location:  Right foot    Location:  Right Plantar    Type of Debridement:  Excisional       Length (cm):  0.4       Area (sq cm):  0.04       Width (cm):  0.1       Percent Debrided (%):  100       Depth (cm):  0.2       Total Area Debrided (sq cm):  0.04    Depth of debridement:  Subcutaneous tissue    Tissue debrided:  Dermis, Epidermis and Subcutaneous    Devitalized tissue debrided:  Biofilm, Fibrin, Slough and Callus    Instruments:  Curette    Bleeding:  Minimal  Hemostasis Achieved: Yes    Method Used:  Pressure  Patient tolerance:  Patient tolerated the procedure well with no immediate complications      Assessment:           ICD-10-CM ICD-9-CM   1. Diabetic ulcer of right midfoot associated with type 2 diabetes mellitus, with fat layer exposed  E11.621 250.80    L97.412 707.14   2. Type 2 diabetes with skin ulcer of foot  E11.621 250.80    L97.509 707.15            Wound 03/02/22 1037 Diabetic Ulcer Right anterior Foot (Active)   03/02/22 1037    Pre-existing: Yes   Primary Wound Type: Diabetic ulc   Side: Right   Orientation: anterior   Location: Foot   Wound Number:    Ankle-Brachial Index:    Pulses:    Removal Indication and Assessment:    Wound Outcome:    (Retired) Wound Type:    (Retired) Wound Length (cm):    (Retired) Wound Width (cm):  "   (Retired) Depth (cm):    Wound Description (Comments):    Removal Indications:    Wound Image   05/10/22 1300   Wound WDL ex 05/10/22 1300   Dressing Appearance Intact;Dry 05/10/22 1300   Drainage Amount None 05/10/22 1300   Appearance Dry;[place] 05/10/22 1300   Periwound Area [place];Dry 05/10/22 1300   Wound Length (cm) 0 cm 05/10/22 1300   Wound Width (cm) 0 cm 05/10/22 1300   Wound Depth (cm) 0 cm 05/10/22 1300   Wound Volume (cm^3) 0 cm^3 05/10/22 1300   Wound Surface Area (cm^2) 0 cm^2 05/10/22 1300   Tunneling (depth (cm)/location) 0 05/10/22 1300   Undermining (depth (cm)/location) 0 05/10/22 1300   Care Cleansed with:;Antimicrobial agent;Sterile normal saline 05/10/22 1300   Dressing Changed 05/10/22 1300   Periwound Care Moisture barrier applied 05/10/22 1300   Off Loading Football dressing;Off loading shoe 05/10/22 1300   Dressing Change Due 05/17/22 05/10/22 1300            Wound 04/12/22 1426 Diabetic Ulcer Right plantar Foot (Active)   04/12/22 1426    Pre-existing: Yes   Primary Wound Type: Diabetic ulc   Side: Right   Orientation: plantar   Location: Foot   Wound Number:    Ankle-Brachial Index:    Pulses:    Removal Indication and Assessment:    Wound Outcome:    (Retired) Wound Type:    (Retired) Wound Length (cm):    (Retired) Wound Width (cm):    (Retired) Depth (cm):    Wound Description (Comments):    Removal Indications:    Wound Image    05/10/22 1300   Wound WDL ex 05/10/22 1300   Dressing Appearance Intact;Dried drainage 05/10/22 1300   Drainage Amount [place] 05/10/22 1300   Drainage Characteristics/Odor Serosanguineous;No odor 05/10/22 1300   Appearance Red 05/10/22 1300   Tissue loss description Partial thickness 05/10/22 1300   Red (%), Wound Tissue Color 100 % 05/10/22 1300   Periwound Area Intact;Dry 05/10/22 1300   Wound Edges Callused 05/10/22 1300   Wound Length (cm) 0.4 cm 05/10/22 1300   Wound Width (cm) 0.1 cm 05/10/22 1300   Wound Depth (cm) 0.2 cm 05/10/22 1300   Wound Volume  (cm^3) 0.008 cm^3 05/10/22 1300   Wound Surface Area (cm^2) 0.04 cm^2 05/10/22 1300   Care Cleansed with:;Antimicrobial agent;Sterile normal saline 05/10/22 1300   Dressing Changed 05/10/22 1300   Periwound Care Skin barrier film applied 05/10/22 1300   Off Loading Football dressing;Off loading shoe 05/10/22 1300   Dressing Change Due 05/17/22 05/10/22 1300            Wound 04/19/22 Diabetic Ulcer Right distal Foot (Active)   04/19/22     Pre-existing: No   Primary Wound Type: Diabetic ulc   Side: Right   Orientation: distal   Location: Foot   Wound Number:    Ankle-Brachial Index:    Pulses:    Removal Indication and Assessment:    Wound Outcome:    (Retired) Wound Type:    (Retired) Wound Length (cm):    (Retired) Wound Width (cm):    (Retired) Depth (cm):    Wound Description (Comments):    Removal Indications:    Wound Image   05/10/22 1300   Wound WDL ex 05/10/22 1300   Dressing Appearance Intact;Moist drainage 05/10/22 1300   Drainage Amount [place] 05/10/22 1300   Drainage Characteristics/Odor Serosanguineous;No odor 05/10/22 1300   Appearance Red;Moist;Epithelialization 05/10/22 1300   Tissue loss description Partial thickness 05/10/22 1300   Red (%), Wound Tissue Color 100 % 05/10/22 1300   Periwound Area Intact;Dry;[place] 05/10/22 1300   Wound Edges Open 05/10/22 1300   Wound Length (cm) 0.1 cm 05/10/22 1300   Wound Width (cm) 0.1 cm 05/10/22 1300   Wound Depth (cm) 0.2 cm 05/10/22 1300   Wound Volume (cm^3) 0.002 cm^3 05/10/22 1300   Wound Surface Area (cm^2) 0.01 cm^2 05/10/22 1300   Care Cleansed with:;Antimicrobial agent;Sterile normal saline 05/10/22 1300   Dressing Changed 05/10/22 1300   Periwound Care Moisture barrier applied 05/10/22 1300   Off Loading Football dressing;Off loading shoe 05/10/22 1300   Dressing Change Due 05/17/22 05/10/22 1300           Plan:                Orders Placed This Encounter   Procedures    Debridement     This order was created via procedure documentation     Standing  Status:   Standing     Number of Occurrences:   1    Change dressing     Clean with NS. Cavilon/Benzoin to periwound's. Iodoflex to Right Plantar Foot. Endoform inserted into Right Distal foot wound. Gigi foam long x 1 to plantar foot, short x1 to dorsal foot. Football dressing (cast padding x 3), Coban. Darco.        Follow up in about 1 week (around 5/17/2022) for wound care.     See Jean-Baptiste MD

## 2022-05-14 DIAGNOSIS — E11.42 TYPE 2 DIABETES MELLITUS WITH DIABETIC POLYNEUROPATHY, WITHOUT LONG-TERM CURRENT USE OF INSULIN: ICD-10-CM

## 2022-05-14 NOTE — TELEPHONE ENCOUNTER
No new care gaps identified.  [facility] Embedded Care Gaps. Reference number: [id number]. 5/14/2022   8:48:44 AM BIGGT

## 2022-05-16 RX ORDER — SIMVASTATIN 20 MG/1
TABLET, FILM COATED ORAL
Qty: 90 TABLET | Refills: 1 | Status: SHIPPED | OUTPATIENT
Start: 2022-05-16 | End: 2022-11-15

## 2022-05-16 NOTE — TELEPHONE ENCOUNTER
Refill Authorization Note   Fermin Henson  is requesting a refill authorization.  Brief Assessment and Rationale for Refill:  Approve     Medication Therapy Plan:  FOV;    Medication Reconciliation Completed: No   Comments:     No Care Gaps recommended.     Note composed:8:14 AM 05/16/2022

## 2022-05-17 ENCOUNTER — HOSPITAL ENCOUNTER (OUTPATIENT)
Dept: WOUND CARE | Facility: HOSPITAL | Age: 62
Discharge: HOME | End: 2022-05-17
Attending: FAMILY MEDICINE
Payer: MEDICAID

## 2022-05-17 ENCOUNTER — TELEPHONE (OUTPATIENT)
Dept: FAMILY MEDICINE | Facility: CLINIC | Age: 62
End: 2022-05-17

## 2022-05-17 VITALS — DIASTOLIC BLOOD PRESSURE: 67 MMHG | HEART RATE: 70 BPM | SYSTOLIC BLOOD PRESSURE: 127 MMHG | TEMPERATURE: 97 F

## 2022-05-17 DIAGNOSIS — E11.621 DIABETIC ULCER OF RIGHT MIDFOOT ASSOCIATED WITH TYPE 2 DIABETES MELLITUS, WITH FAT LAYER EXPOSED: Primary | ICD-10-CM

## 2022-05-17 DIAGNOSIS — L97.412 DIABETIC ULCER OF RIGHT MIDFOOT ASSOCIATED WITH TYPE 2 DIABETES MELLITUS, WITH FAT LAYER EXPOSED: Primary | ICD-10-CM

## 2022-05-17 PROCEDURE — 27201912 HC WOUND CARE DEBRIDEMENT SUPPLIES: Performed by: FAMILY MEDICINE

## 2022-05-17 PROCEDURE — 11042 DBRDMT SUBQ TIS 1ST 20SQCM/<: CPT | Performed by: FAMILY MEDICINE

## 2022-05-17 PROCEDURE — 99499 NO LOS: ICD-10-PCS | Mod: ,,, | Performed by: FAMILY MEDICINE

## 2022-05-17 PROCEDURE — 11042 DBRDMT SUBQ TIS 1ST 20SQCM/<: CPT | Mod: ,,, | Performed by: FAMILY MEDICINE

## 2022-05-17 PROCEDURE — 99499 UNLISTED E&M SERVICE: CPT | Mod: ,,, | Performed by: FAMILY MEDICINE

## 2022-05-17 PROCEDURE — 11042 DEBRIDEMENT: ICD-10-PCS | Mod: ,,, | Performed by: FAMILY MEDICINE

## 2022-05-17 NOTE — PROGRESS NOTES
[facility]                Progress Note    Subjective:       Patient ID: Fermin Henson is a 62 y.o. male.    Chief Complaint: No chief complaint on file.    R foot wrapped and no drainage. Loose dry skin over dorsal foot and removed to reveal o.1mm wound. Plantar wound covered by hard callus and debridement revealed 0.3 x 0.2 sized wound Endoform added to dressing and abd pad placed over foot as foam irritating skin.    MD note:  Patient states he has kept dressings in place, clean, and dry.  He states he is ambulating less.  No new concerns.  No fevers, chills, or sweats.        Review of Systems   Constitutional: Negative.    HENT: Negative.    Eyes: Negative.    Respiratory: Negative.    Cardiovascular: Negative.    Gastrointestinal: Negative.    Skin: Positive for wound.   Neurological: Positive for numbness.         Objective:        Physical Exam  Constitutional:       Appearance: Normal appearance.   HENT:      Head: Normocephalic and atraumatic.      Right Ear: External ear normal.      Left Ear: External ear normal.      Mouth/Throat:      Mouth: Mucous membranes are moist.      Pharynx: Oropharynx is clear.   Eyes:      Extraocular Movements: Extraocular movements intact.      Conjunctiva/sclera: Conjunctivae normal.      Pupils: Pupils are equal, round, and reactive to light.   Cardiovascular:      Rate and Rhythm: Normal rate and regular rhythm.   Pulmonary:      Effort: Pulmonary effort is normal. No respiratory distress.   Abdominal:      General: There is no distension.      Palpations: Abdomen is soft.   Musculoskeletal:      Right lower leg: No edema.      Left lower leg: No edema.      Comments: +diabetic ulcer to right plantar   Neurological:      Mental Status: He is alert.           Vitals:    05/17/22 0954   BP: 127/67   Pulse: 70   Temp: 97.2 °F (36.2 °C)     Debridement    Date/Time: 5/17/2022 9:31 AM  Performed by: See Jean-Baptiste,  "MD  Authorized by: See JeanB-aptiste MD     Time out: Immediately prior to procedure a "time out" was called to verify the correct patient, procedure, equipment, support staff and site/side marked as required.    Consent Done?:  Yes (Written)  Local anesthesia used?: No      Wound Details:    Location:  Right foot    Location:  Right Plantar    Type of Debridement:  Excisional       Length (cm):  0.3       Area (sq cm):  0.06       Width (cm):  0.2       Percent Debrided (%):  100       Depth (cm):  0.1       Total Area Debrided (sq cm):  0.06    Depth of debridement:  Subcutaneous tissue    Tissue debrided:  Dermis, Epidermis and Subcutaneous    Devitalized tissue debrided:  Callus, Fibrin and Slough    Instruments:  Curette    Bleeding:  Minimal  Hemostasis Achieved: Yes    Method Used:  Pressure  Patient tolerance:  Patient tolerated the procedure well with no immediate complications      Assessment:           ICD-10-CM ICD-9-CM   1. Diabetic ulcer of right midfoot associated with type 2 diabetes mellitus, with fat layer exposed  E11.621 250.80    L97.412 707.14            Wound 04/12/22 1426 Diabetic Ulcer Right plantar Foot (Active)   04/12/22 1426    Pre-existing: Yes   Primary Wound Type: Diabetic ulc   Side: Right   Orientation: plantar   Location: Foot   Wound Number:    Ankle-Brachial Index:    Pulses:    Removal Indication and Assessment:    Wound Outcome:    (Retired) Wound Type:    (Retired) Wound Length (cm):    (Retired) Wound Width (cm):    (Retired) Depth (cm):    Wound Description (Comments):    Removal Indications:    Wound Image   05/17/22 0900   Wound WDL ex 05/17/22 0900   Dressing Appearance Dry;Intact 05/17/22 0900   Drainage Amount None 05/17/22 0900   Tissue loss description Partial thickness 05/17/22 0900   Periwound Area Dry;Intact 05/17/22 0900   Wound Edges Callused 05/17/22 0900   Wound Length (cm) 0.3 cm 05/17/22 0900   Wound Width (cm) 0.2 cm 05/17/22 0900   Wound Depth (cm) 0.1 cm " 05/17/22 0900   Wound Volume (cm^3) 0.006 cm^3 05/17/22 0900   Wound Surface Area (cm^2) 0.06 cm^2 05/17/22 0900   Care Cleansed with:;Antimicrobial agent;Sterile normal saline 05/17/22 0900   Dressing Changed 05/17/22 0900   Periwound Care Skin barrier film applied 05/17/22 0900   Off Loading Football dressing;Off loading shoe 05/17/22 0900   Dressing Change Due 05/24/22 05/17/22 0900            Wound 04/19/22 Diabetic Ulcer Right distal Foot (Active)   04/19/22     Pre-existing: No   Primary Wound Type: Diabetic ulc   Side: Right   Orientation: distal   Location: Foot   Wound Number:    Ankle-Brachial Index:    Pulses:    Removal Indication and Assessment:    Wound Outcome:    (Retired) Wound Type:    (Retired) Wound Length (cm):    (Retired) Wound Width (cm):    (Retired) Depth (cm):    Wound Description (Comments):    Removal Indications:    Wound Image   05/17/22 0900   Wound WDL ex 05/17/22 0900   Dressing Appearance Dry;Intact 05/17/22 0900   Drainage Amount None 05/17/22 0900   Appearance Red;Epithelialization 05/17/22 0900   Red (%), Wound Tissue Color 100 % 05/17/22 0900   Periwound Area Dry;Intact 05/17/22 0900           Plan:                Orders Placed This Encounter   Procedures    Debridement     This order was created via procedure documentation     Standing Status:   Standing     Number of Occurrences:   1    Change dressing     Clean with NS. Cavilon/Benzoin to periwound's. Abd pad to dorsal footEndoform to Right Plantar Foot. Endoform inserted into Right Distal foot wound. Gigi foam long x 2 to plantar foot and over dorsal foot,  Football dressing (cast padding x 3), Coban. Darco        Follow up in about 1 week (around 5/24/2022).     See Jean-Baptiste MD

## 2022-05-18 ENCOUNTER — OFFICE VISIT (OUTPATIENT)
Dept: FAMILY MEDICINE | Facility: CLINIC | Age: 62
End: 2022-05-18
Payer: MEDICAID

## 2022-05-18 VITALS
HEART RATE: 94 BPM | HEIGHT: 72 IN | BODY MASS INDEX: 30.16 KG/M2 | OXYGEN SATURATION: 96 % | DIASTOLIC BLOOD PRESSURE: 74 MMHG | RESPIRATION RATE: 18 BRPM | WEIGHT: 222.69 LBS | SYSTOLIC BLOOD PRESSURE: 128 MMHG | TEMPERATURE: 98 F

## 2022-05-18 DIAGNOSIS — E11.621 DIABETIC ULCER OF RIGHT MIDFOOT ASSOCIATED WITH TYPE 2 DIABETES MELLITUS, WITH FAT LAYER EXPOSED: ICD-10-CM

## 2022-05-18 DIAGNOSIS — R80.9 TYPE 2 DIABETES MELLITUS WITH MICROALBUMINURIA, WITHOUT LONG-TERM CURRENT USE OF INSULIN: ICD-10-CM

## 2022-05-18 DIAGNOSIS — Z23 NEED FOR SHINGLES VACCINE: ICD-10-CM

## 2022-05-18 DIAGNOSIS — L97.509 TYPE 2 DIABETES WITH SKIN ULCER OF FOOT: ICD-10-CM

## 2022-05-18 DIAGNOSIS — E11.29 TYPE 2 DIABETES MELLITUS WITH MICROALBUMINURIA, WITHOUT LONG-TERM CURRENT USE OF INSULIN: ICD-10-CM

## 2022-05-18 DIAGNOSIS — E11.42 TYPE 2 DIABETES MELLITUS WITH DIABETIC POLYNEUROPATHY, WITHOUT LONG-TERM CURRENT USE OF INSULIN: Primary | ICD-10-CM

## 2022-05-18 DIAGNOSIS — E11.621 TYPE 2 DIABETES WITH SKIN ULCER OF FOOT: ICD-10-CM

## 2022-05-18 DIAGNOSIS — L97.412 DIABETIC ULCER OF RIGHT MIDFOOT ASSOCIATED WITH TYPE 2 DIABETES MELLITUS, WITH FAT LAYER EXPOSED: ICD-10-CM

## 2022-05-18 DIAGNOSIS — E11.3393 CONTROLLED TYPE 2 DIABETES MELLITUS WITH BOTH EYES AFFECTED BY MODERATE NONPROLIFERATIVE RETINOPATHY WITHOUT MACULAR EDEMA, WITHOUT LONG-TERM CURRENT USE OF INSULIN: ICD-10-CM

## 2022-05-18 DIAGNOSIS — E66.09 CLASS 1 OBESITY DUE TO EXCESS CALORIES WITH SERIOUS COMORBIDITY AND BODY MASS INDEX (BMI) OF 30.0 TO 30.9 IN ADULT: ICD-10-CM

## 2022-05-18 PROCEDURE — 3078F DIAST BP <80 MM HG: CPT | Mod: CPTII,,, | Performed by: FAMILY MEDICINE

## 2022-05-18 PROCEDURE — 4010F PR ACE/ARB THEARPY RXD/TAKEN: ICD-10-PCS | Mod: CPTII,,, | Performed by: FAMILY MEDICINE

## 2022-05-18 PROCEDURE — 3074F SYST BP LT 130 MM HG: CPT | Mod: CPTII,,, | Performed by: FAMILY MEDICINE

## 2022-05-18 PROCEDURE — 3078F PR MOST RECENT DIASTOLIC BLOOD PRESSURE < 80 MM HG: ICD-10-PCS | Mod: CPTII,,, | Performed by: FAMILY MEDICINE

## 2022-05-18 PROCEDURE — 99215 OFFICE O/P EST HI 40 MIN: CPT | Mod: PBBFAC,PO | Performed by: FAMILY MEDICINE

## 2022-05-18 PROCEDURE — 4010F ACE/ARB THERAPY RXD/TAKEN: CPT | Mod: CPTII,,, | Performed by: FAMILY MEDICINE

## 2022-05-18 PROCEDURE — 3008F BODY MASS INDEX DOCD: CPT | Mod: CPTII,,, | Performed by: FAMILY MEDICINE

## 2022-05-18 PROCEDURE — 99999 PR PBB SHADOW E&M-EST. PATIENT-LVL V: ICD-10-PCS | Mod: PBBFAC,,, | Performed by: FAMILY MEDICINE

## 2022-05-18 PROCEDURE — 1160F PR REVIEW ALL MEDS BY PRESCRIBER/CLIN PHARMACIST DOCUMENTED: ICD-10-PCS | Mod: CPTII,,, | Performed by: FAMILY MEDICINE

## 2022-05-18 PROCEDURE — 99999 PR PBB SHADOW E&M-EST. PATIENT-LVL V: CPT | Mod: PBBFAC,,, | Performed by: FAMILY MEDICINE

## 2022-05-18 PROCEDURE — 99214 OFFICE O/P EST MOD 30 MIN: CPT | Mod: S$PBB,,, | Performed by: FAMILY MEDICINE

## 2022-05-18 PROCEDURE — 1159F MED LIST DOCD IN RCRD: CPT | Mod: CPTII,,, | Performed by: FAMILY MEDICINE

## 2022-05-18 PROCEDURE — 3074F PR MOST RECENT SYSTOLIC BLOOD PRESSURE < 130 MM HG: ICD-10-PCS | Mod: CPTII,,, | Performed by: FAMILY MEDICINE

## 2022-05-18 PROCEDURE — 3044F PR MOST RECENT HEMOGLOBIN A1C LEVEL <7.0%: ICD-10-PCS | Mod: CPTII,,, | Performed by: FAMILY MEDICINE

## 2022-05-18 PROCEDURE — 99214 PR OFFICE/OUTPT VISIT, EST, LEVL IV, 30-39 MIN: ICD-10-PCS | Mod: S$PBB,,, | Performed by: FAMILY MEDICINE

## 2022-05-18 PROCEDURE — 90750 HZV VACC RECOMBINANT IM: CPT | Mod: PBBFAC,PO

## 2022-05-18 PROCEDURE — 1159F PR MEDICATION LIST DOCUMENTED IN MEDICAL RECORD: ICD-10-PCS | Mod: CPTII,,, | Performed by: FAMILY MEDICINE

## 2022-05-18 PROCEDURE — 3008F PR BODY MASS INDEX (BMI) DOCUMENTED: ICD-10-PCS | Mod: CPTII,,, | Performed by: FAMILY MEDICINE

## 2022-05-18 PROCEDURE — 1160F RVW MEDS BY RX/DR IN RCRD: CPT | Mod: CPTII,,, | Performed by: FAMILY MEDICINE

## 2022-05-18 PROCEDURE — 3044F HG A1C LEVEL LT 7.0%: CPT | Mod: CPTII,,, | Performed by: FAMILY MEDICINE

## 2022-05-18 NOTE — PROGRESS NOTES
Routine Office Visit    Patient Name: Fermin Metcalf Peer    : 1960  MRN: [id number]    Subjective:  Fermin is a 62 y.o. male who presents today for:    1. Type 2 dm  Patient presenting today for follow up of type 2 DM.  He has been taking his medication as prescribed.  No hypoglycemia.  He is currently being treated for diabetic foot ulcer.      Past Medical History  Past Medical History:   Diagnosis Date    Diabetes mellitus, type 2     Kidney stones        Past Surgical History  Past Surgical History:   Procedure Laterality Date    EXTRACORPOREAL SHOCK WAVE LITHOTRIPSY Right 2019    Procedure: LITHOTRIPSY, ESWL;  Surgeon: WHITNEY Bryant MD;  Location: [facility];  Service: Urology;  Laterality: Right;  RN PREOP 3/25/2019---NEED H/P-----KUB    eye  surgeries      several     TOE AMPUTATION      several toes on R foot       Family History  Family History   Problem Relation Age of Onset    No Known Problems Mother     Diabetes Father     Diabetes Sister     Drug abuse Brother        Social History  Social History     Socioeconomic History    Marital status: Single   Tobacco Use    Smoking status: Never Smoker    Smokeless tobacco: Never Used   Substance and Sexual Activity    Alcohol use: No    Drug use: No    Sexual activity: Not Currently       Current Medications  Current Outpatient Medications on File Prior to Visit   Medication Sig Dispense Refill    antiox #8/om3/dha/epa/lut/zeax (PRESERVISION AREDS 2, OMEGA-3, ORAL) Take by mouth.      cinnamon bark 500 mg capsule Take 500 mg by mouth once daily.      ferrous sulfate 325 mg (65 mg iron) Tab tablet Take 325 mg by mouth daily with breakfast.      fish oil-omega-3 fatty acids 300-1,000 mg capsule Take by mouth once daily.      JARDIANCE 10 mg tablet Take 1 tablet by mouth once daily 90 tablet 1    losartan (COZAAR) 25 MG tablet Take 1 tablet by mouth once daily 90 tablet 3    melatonin 10 mg Cap Take by mouth.      metFORMIN  (GLUCOPHAGE) 850 MG tablet TAKE 2 TABLETS BY MOUTH ONCE DAILY IN THE MORNING AND 1 WITH EVENING MEAL 270 tablet 2    multivitamin (THERAGRAN) per tablet Take 1 tablet by mouth once daily.      pioglitazone (ACTOS) 15 MG tablet Take 1 tablet (15 mg total) by mouth once daily. 90 tablet 1    simvastatin (ZOCOR) 20 MG tablet TAKE 1 TABLET BY MOUTH ONCE DAILY IN THE EVENING 90 tablet 1     No current facility-administered medications on file prior to visit.       Allergies   Review of patient's allergies indicates:  No Known Allergies    Review of Systems (Pertinent positives)  Review of Systems   Constitutional: Negative.    HENT: Negative.    Eyes: Negative.    Respiratory: Negative.    Cardiovascular: Negative.    Gastrointestinal: Negative.    Musculoskeletal: Negative.    Skin:        +right dfu     Neurological: Negative.          /74   Pulse 94   Temp 97.9 °F (36.6 °C) (Oral)   Resp 18   Ht 6' (1.829 m)   Wt 101 kg (222 lb 10.6 oz)   SpO2 96%   BMI 30.20 kg/m²     GENERAL APPEARANCE: in no apparent distress and well developed and well nourished  HEENT: PERRL, EOMI, Sclera clear, anicteric, Oropharynx clear, no lesions, Neck supple with midline trachea  NECK: normal, supple, no adenopathy, thyroid normal in size  RESPIRATORY: appears well, vitals normal, no respiratory distress, acyanotic, normal RR, chest clear, no wheezing, crepitations, rhonchi, normal symmetric air entry  HEART: regular rate and rhythm, S1, S2 normal, no murmur, click, rub or gallop.    ABDOMEN: abdomen is soft without tenderness, no masses, no hernias, no organomegaly, no rebound, no guarding. Suprapubic tenderness absent. No CVA tenderness.  NEUROLOGIC: normal without focal findings, CN II-XII are intact.    Extremities: warm/well perfused.  No abnormal hair patterns.  No clubbing, cyanosis or edema.    SKIN: no rashes, no wounds, no other lesions  PSYCH: Alert, oriented x 3, thought content appropriate, speech normal,  pleasant and cooperative, good eye contact, well groomed    Protective Sensation (w/ 10 gram monofilament):  Right: Decreased  Left: Decreased    Visual Inspection:  Ulceration -  Right     Pedal Pulses:   Right: Diminished  Left: Diminished    Posterior tibialis:   Right:Present  Left: Present      Assessment/Plan:  Fermin Henson is a 62 y.o. male who presents today for :    Fermin was seen today for follow-up and diabetes.    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

## 2022-05-24 ENCOUNTER — HOSPITAL ENCOUNTER (OUTPATIENT)
Dept: WOUND CARE | Facility: HOSPITAL | Age: 62
Discharge: HOME | End: 2022-05-24
Attending: FAMILY MEDICINE
Payer: MEDICAID

## 2022-05-24 VITALS
DIASTOLIC BLOOD PRESSURE: 67 MMHG | TEMPERATURE: 98 F | SYSTOLIC BLOOD PRESSURE: 126 MMHG | HEART RATE: 108 BPM | RESPIRATION RATE: 18 BRPM

## 2022-05-24 DIAGNOSIS — E11.621 TYPE 2 DIABETES WITH SKIN ULCER OF FOOT: Primary | ICD-10-CM

## 2022-05-24 DIAGNOSIS — L97.509 TYPE 2 DIABETES WITH SKIN ULCER OF FOOT: Primary | ICD-10-CM

## 2022-05-24 PROCEDURE — 11042 DEBRIDEMENT: ICD-10-PCS | Mod: ,,, | Performed by: FAMILY MEDICINE

## 2022-05-24 PROCEDURE — 99499 NO LOS: ICD-10-PCS | Mod: ,,, | Performed by: FAMILY MEDICINE

## 2022-05-24 PROCEDURE — 99499 UNLISTED E&M SERVICE: CPT | Mod: ,,, | Performed by: FAMILY MEDICINE

## 2022-05-24 PROCEDURE — 11042 DBRDMT SUBQ TIS 1ST 20SQCM/<: CPT | Mod: ,,, | Performed by: FAMILY MEDICINE

## 2022-05-24 PROCEDURE — 11042 DBRDMT SUBQ TIS 1ST 20SQCM/<: CPT | Performed by: FAMILY MEDICINE

## 2022-05-24 PROCEDURE — 27201912 HC WOUND CARE DEBRIDEMENT SUPPLIES: Performed by: FAMILY MEDICINE

## 2022-05-24 NOTE — PROGRESS NOTES
[facility]                Progress Note    Subjective:       Patient ID: Fermin Henson is a 62 y.o. male.    Chief Complaint: Non-healing Wound Follow Up    Pt arrived to [facility], ambulated without any issues. Pt denies any fever, chills, or flu-like symptoms; denies pain/discomfort. Pt reports not having any issues with drsg since last visit. Depth to rt plantar wound noted to increase in size with underminding. MD completed debridement; see post debridement image. Pt will return to [facility] in 1wk.     MD note:  Patient following up for DFU to plantar foot.  No new wounds.  No pain as he is insensate.  He states he is not walking more than usual.  He has not noticed an odor from wound      Review of Systems   Constitutional: Negative.    HENT: Negative.    Eyes: Negative.    Respiratory: Negative.    Cardiovascular: Negative.    Gastrointestinal: Negative.    Skin: Positive for wound.   Psychiatric/Behavioral: Negative.          Objective:        Physical Exam  Constitutional:       Appearance: Normal appearance.   HENT:      Head: Normocephalic and atraumatic.      Mouth/Throat:      Mouth: Mucous membranes are moist.      Pharynx: Oropharynx is clear.   Eyes:      Extraocular Movements: Extraocular movements intact.      Conjunctiva/sclera: Conjunctivae normal.      Pupils: Pupils are equal, round, and reactive to light.   Cardiovascular:      Rate and Rhythm: Normal rate and regular rhythm.   Abdominal:      General: There is no distension.      Palpations: Abdomen is soft.   Skin:     General: Skin is warm and dry.      Comments: +DFU with calllous and slough   Neurological:      Mental Status: He is alert and oriented to person, place, and time.      Sensory: Sensory deficit present.           Vitals:    05/24/22 1005   BP: 126/67   Pulse: 108   Resp: 18   Temp: 98.1 °F (36.7 °C)     Debridement    Date/Time: 5/24/2022 9:29 AM  Performed by: See Jean-Baptiste  "MD  Authorized by: See Jean-Baptiste MD     Time out: Immediately prior to procedure a "time out" was called to verify the correct patient, procedure, equipment, support staff and site/side marked as required.    Consent Done?:  Yes (Written)  Local anesthesia used?: No      Wound Details:    Location:  Right foot    Location:  Right Plantar    Type of Debridement:  Excisional       Length (cm):  0.4       Area (sq cm):  0.08       Width (cm):  0.2       Percent Debrided (%):  100       Depth (cm):  0.5       Total Area Debrided (sq cm):  0.08    Depth of debridement:  Subcutaneous tissue    Tissue debrided:  Dermis, Epidermis and Subcutaneous    Devitalized tissue debrided:  Biofilm, Callus, Fibrin and Slough    Instruments:  Curette    Bleeding:  Minimal  Hemostasis Achieved: Yes    Method Used:  Pressure  Patient tolerance:  Patient tolerated the procedure well with no immediate complications      Assessment:           ICD-10-CM ICD-9-CM   1. Type 2 diabetes with skin ulcer of foot  E11.621 250.80    L97.509 707.15            Wound 03/02/22 1037 Diabetic Ulcer Right anterior Foot (Active)   03/02/22 1037    Pre-existing: Yes   Primary Wound Type: Diabetic ulc   Side: Right   Orientation: anterior   Location: Foot   Wound Number:    Ankle-Brachial Index:    Pulses:    Removal Indication and Assessment:    Wound Outcome:    (Retired) Wound Type:    (Retired) Wound Length (cm):    (Retired) Wound Width (cm):    (Retired) Depth (cm):    Wound Description (Comments):    Removal Indications:    Wound Image   05/24/22 1000   Wound WDL ex 05/24/22 1000   Dressing Appearance Dry;Intact;Dried drainage 05/24/22 1000   Drainage Amount Small 05/24/22 1000   Drainage Characteristics/Odor Serous 05/24/22 1000   Appearance Red 05/24/22 1000   Tissue loss description Partial thickness 05/24/22 1000   Black (%), Wound Tissue Color 0 % 05/24/22 1000   Red (%), Wound Tissue Color 100 % 05/24/22 1000   Yellow (%), Wound Tissue Color 0 " % 05/24/22 1000   Periwound Area Dry;Intact 05/24/22 1000   Wound Edges Irregular 05/24/22 1000   Wound Length (cm) 3.2 cm 05/24/22 1000   Wound Width (cm) 2.5 cm 05/24/22 1000   Wound Depth (cm) 0.1 cm 05/24/22 1000   Wound Volume (cm^3) 0.8 cm^3 05/24/22 1000   Wound Surface Area (cm^2) 8 cm^2 05/24/22 1000   Tunneling (depth (cm)/location) 0 05/24/22 1000   Undermining (depth (cm)/location) 0 05/24/22 1000   Care Cleansed with:;Sterile normal saline 05/24/22 1000   Periwound Care Skin barrier film applied 05/24/22 1000   Off Loading Football dressing;Off loading shoe 05/24/22 1000            Wound 04/12/22 1426 Diabetic Ulcer Right plantar Foot (Active)   04/12/22 1426    Pre-existing: Yes   Primary Wound Type: Diabetic ulc   Side: Right   Orientation: plantar   Location: Foot   Wound Number:    Ankle-Brachial Index:    Pulses:    Removal Indication and Assessment:    Wound Outcome:    (Retired) Wound Type:    (Retired) Wound Length (cm):    (Retired) Wound Width (cm):    (Retired) Depth (cm):    Wound Description (Comments):    Removal Indications:    Wound Image    05/24/22 1000   Wound WDL ex 05/24/22 1000   Dressing Appearance Dry;Intact;Moist drainage 05/24/22 1000   Drainage Amount Small 05/24/22 1000   Drainage Characteristics/Odor Serosanguineous 05/24/22 1000   Appearance Red 05/24/22 1000   Tissue loss description Full thickness 05/24/22 1000   Black (%), Wound Tissue Color 0 % 05/24/22 1000   Red (%), Wound Tissue Color 100 % 05/24/22 1000   Yellow (%), Wound Tissue Color 0 % 05/24/22 1000   Periwound Area Dry 05/24/22 1000   Wound Edges Callused 05/24/22 1000   Wound Length (cm) 0.4 cm 05/24/22 1000   Wound Width (cm) 0.2 cm 05/24/22 1000   Wound Depth (cm) 0.5 cm 05/24/22 1000   Wound Volume (cm^3) 0.04 cm^3 05/24/22 1000   Wound Surface Area (cm^2) 0.08 cm^2 05/24/22 1000   Tunneling (depth (cm)/location) 0 05/24/22 1000   Undermining (depth (cm)/location) 0.5cm/circumferncial 05/24/22 1000   Care  Cleansed with:;Sterile normal saline 05/24/22 1000   Dressing Applied 05/24/22 1000   Periwound Care Skin barrier film applied;Other (see comments) 05/24/22 1000   Off Loading Football dressing;Off loading shoe 05/24/22 1000            Wound 04/19/22 Diabetic Ulcer Right distal Foot (Active)   04/19/22     Pre-existing: No   Primary Wound Type: Diabetic ulc   Side: Right   Orientation: distal   Location: Foot   Wound Number:    Ankle-Brachial Index:    Pulses:    Removal Indication and Assessment:    Wound Outcome:    (Retired) Wound Type:    (Retired) Wound Length (cm):    (Retired) Wound Width (cm):    (Retired) Depth (cm):    Wound Description (Comments):    Removal Indications:    Wound Image   05/24/22 1000   Wound WDL ex 05/24/22 1000   Dressing Appearance Dry;Intact;Clean 05/24/22 1000   Drainage Amount None 05/24/22 1000   Appearance Epithelialization 05/24/22 1000   Black (%), Wound Tissue Color 0 % 05/24/22 1000   Red (%), Wound Tissue Color 0 % 05/24/22 1000   Yellow (%), Wound Tissue Color 0 % 05/24/22 1000   Periwound Area Intact 05/24/22 1000   Wound Length (cm) 0 cm 05/24/22 1000   Wound Width (cm) 0 cm 05/24/22 1000   Wound Depth (cm) 0 cm 05/24/22 1000   Wound Volume (cm^3) 0 cm^3 05/24/22 1000   Wound Surface Area (cm^2) 0 cm^2 05/24/22 1000   Tunneling (depth (cm)/location) 0 05/24/22 1000   Undermining (depth (cm)/location) 0 05/24/22 1000   Care Sterile normal saline 05/24/22 1000   Dressing Applied;Foam 05/24/22 1000   Periwound Care Skin barrier film applied 05/24/22 1000   Off Loading Football dressing;Off loading shoe 05/24/22 1000           Plan:                Orders Placed This Encounter   Procedures    Change dressing     Clean with NS. Cavilon/Benzoin to periwound's. Plantar foot: U shaped ap pad, Endoform to wound bed then backed by HB transfer. Dorsal foot: HB transfer. Gigi foam long to plantar foot and over dorsal foot. Football dressing (cast padding x 3), Coban. Darco. Pt will  return to [facility] in 1 wk.        Follow up in about 1 week (around 5/31/2022) for Wound Care.     See Jean-Baptiste MD

## 2022-05-31 ENCOUNTER — HOSPITAL ENCOUNTER (OUTPATIENT)
Dept: WOUND CARE | Facility: HOSPITAL | Age: 62
Discharge: HOME | End: 2022-05-31
Attending: FAMILY MEDICINE
Payer: MEDICAID

## 2022-05-31 VITALS — SYSTOLIC BLOOD PRESSURE: 130 MMHG | DIASTOLIC BLOOD PRESSURE: 78 MMHG | TEMPERATURE: 98 F | HEART RATE: 102 BPM

## 2022-05-31 DIAGNOSIS — L97.412 DIABETIC ULCER OF RIGHT MIDFOOT ASSOCIATED WITH TYPE 2 DIABETES MELLITUS, WITH FAT LAYER EXPOSED: ICD-10-CM

## 2022-05-31 DIAGNOSIS — E11.621 DIABETIC ULCER OF RIGHT MIDFOOT ASSOCIATED WITH TYPE 2 DIABETES MELLITUS, WITH FAT LAYER EXPOSED: ICD-10-CM

## 2022-05-31 DIAGNOSIS — L97.509 TYPE 2 DIABETES WITH SKIN ULCER OF FOOT: Primary | ICD-10-CM

## 2022-05-31 DIAGNOSIS — E11.621 TYPE 2 DIABETES WITH SKIN ULCER OF FOOT: Primary | ICD-10-CM

## 2022-05-31 PROCEDURE — 11042 DBRDMT SUBQ TIS 1ST 20SQCM/<: CPT | Performed by: FAMILY MEDICINE

## 2022-05-31 PROCEDURE — 99499 NO LOS: ICD-10-PCS | Mod: ,,, | Performed by: FAMILY MEDICINE

## 2022-05-31 PROCEDURE — 11042 DEBRIDEMENT: ICD-10-PCS | Mod: ,,, | Performed by: FAMILY MEDICINE

## 2022-05-31 PROCEDURE — 11042 DBRDMT SUBQ TIS 1ST 20SQCM/<: CPT | Mod: ,,, | Performed by: FAMILY MEDICINE

## 2022-05-31 PROCEDURE — 99499 UNLISTED E&M SERVICE: CPT | Mod: ,,, | Performed by: FAMILY MEDICINE

## 2022-05-31 NOTE — PROGRESS NOTES
"[facility]                Progress Note    Subjective:       Patient ID: Fermin Henson is a 62 y.o. male.    Chief Complaint: No chief complaint on file.    Follow up wound care visit. Patient ambulated to exam room unaided, prescribed Darco shoe on Right Foot. No c/o pain at present. Dressing intact with scant to small amounts of serosanguineous drainage from Right Distal and Plantar aspects of Foot. Right Distal Foot wound still appears healed. Right Dorsal Foot wound measuring smaller in (2.7 cm) length, (2 cm) width. Right Plantar Foot wound measuring larger in (1.6 cm) length and (2.3 cm) width, patient denies walking more, however callus is thicker and appears a blister opened beneath.     Wound care done as per order, RTC in 1 week.    MD note:  Patient following up for right DFU.  He states he has not been walking more than usual, but wound has enlarged.  He states the dressing "didn't feel right".  He states he did leave the dressing alone despite it not feeling right.  There has been no fevers, chills, or sweats.  He is insensate due to peripheral neuropathy.      Review of Systems   Constitutional: Negative.    HENT: Negative.    Eyes: Negative.    Respiratory: Negative.    Cardiovascular: Negative.    Gastrointestinal: Negative.    Skin: Positive for wound.   Neurological: Positive for numbness.   Psychiatric/Behavioral: Negative.          Objective:        Physical Exam  Constitutional:       Appearance: Normal appearance.   HENT:      Head: Normocephalic and atraumatic.      Right Ear: External ear normal.      Left Ear: External ear normal.      Mouth/Throat:      Mouth: Mucous membranes are moist.      Pharynx: Oropharynx is clear.   Eyes:      Extraocular Movements: Extraocular movements intact.      Conjunctiva/sclera: Conjunctivae normal.      Pupils: Pupils are equal, round, and reactive to light.   Cardiovascular:      Rate and Rhythm: Normal " "rate and regular rhythm.   Pulmonary:      Effort: Pulmonary effort is normal. No respiratory distress.      Breath sounds: No wheezing.   Abdominal:      General: There is no distension.      Palpations: Abdomen is soft.   Skin:     General: Skin is warm and dry.      Comments: +dfu to right plantar with maceration and slough/callous   Neurological:      Mental Status: He is alert.           Vitals:    05/31/22 0951   BP: 130/78   Pulse: 102   Temp: 97.7 °F (36.5 °C)     Debridement    Date/Time: 5/31/2022 9:40 AM  Performed by: See Jean-Baptiste MD  Authorized by: See Jean-Baptiste MD     Time out: Immediately prior to procedure a "time out" was called to verify the correct patient, procedure, equipment, support staff and site/side marked as required.    Consent Done?:  Yes (Written)  Local anesthesia used?: No      Wound Details:    Location:  Right foot    Location:  Right Plantar    Type of Debridement:  Excisional       Length (cm):  2       Area (sq cm):  5       Width (cm):  2.5       Percent Debrided (%):  50       Depth (cm):  0.3       Total Area Debrided (sq cm):  2.5    Depth of debridement:  Subcutaneous tissue    Tissue debrided:  Dermis, Epidermis and Subcutaneous    Devitalized tissue debrided:  Biofilm, Callus, Fibrin and Slough    Instruments:  Curette      Assessment:           ICD-10-CM ICD-9-CM   1. Type 2 diabetes with skin ulcer of foot  E11.621 250.80    L97.509 707.15   2. Diabetic ulcer of right midfoot associated with type 2 diabetes mellitus, with fat layer exposed  E11.621 250.80    L97.412 707.14            Wound 03/02/22 1037 Diabetic Ulcer Right anterior Foot (Active)   03/02/22 1037    Pre-existing: Yes   Primary Wound Type: Diabetic ulc   Side: Right   Orientation: anterior   Location: Foot   Wound Number:    Ankle-Brachial Index:    Pulses:    Removal Indication and Assessment:    Wound Outcome:    (Retired) Wound Type:    (Retired) Wound Length (cm):    (Retired) Wound Width (cm):  "   (Retired) Depth (cm):    Wound Description (Comments):    Removal Indications:    Wound Image   05/31/22 0900   Wound WDL ex 05/31/22 0900   Dressing Appearance Intact;Dried drainage 05/31/22 0900   Drainage Amount Scant 05/31/22 0900   Drainage Characteristics/Odor Serosanguineous;No odor 05/31/22 0900   Appearance Red;Moist 05/31/22 0900   Tissue loss description Partial thickness 05/31/22 0900   Red (%), Wound Tissue Color 100 % 05/31/22 0900   Periwound Area Dry;Intact;[place] 05/31/22 0900   Wound Edges Jagged 05/31/22 0900   Wound Length (cm) 0.5 cm 05/31/22 0900   Wound Width (cm) 0.5 cm 05/31/22 0900   Wound Depth (cm) 0.1 cm 05/31/22 0900   Wound Volume (cm^3) 0.025 cm^3 05/31/22 0900   Wound Surface Area (cm^2) 0.25 cm^2 05/31/22 0900   Care Cleansed with:;Antimicrobial agent;Sterile normal saline 05/31/22 0900   Periwound Care Skin barrier film applied 05/31/22 0900   Off Loading Football dressing;Off loading shoe 05/31/22 0900   Dressing Change Due 06/07/22 05/31/22 0900            Wound 04/12/22 1426 Diabetic Ulcer Right plantar Foot (Active)   04/12/22 1426    Pre-existing: Yes   Primary Wound Type: Diabetic ulc   Side: Right   Orientation: plantar   Location: Foot   Wound Number:    Ankle-Brachial Index:    Pulses:    Removal Indication and Assessment:    Wound Outcome:    (Retired) Wound Type:    (Retired) Wound Length (cm):    (Retired) Wound Width (cm):    (Retired) Depth (cm):    Wound Description (Comments):    Removal Indications:    Wound Image    05/31/22 0900   Wound WDL ex 05/31/22 0900   Dressing Appearance Intact;Dried drainage 05/31/22 0900   Drainage Amount Small 05/31/22 0900   Drainage Characteristics/Odor Serosanguineous 05/31/22 0900   Appearance Red;Pink;Moist 05/31/22 0900   Tissue loss description Partial thickness 05/31/22 0900   Red (%), Wound Tissue Color 100 % 05/31/22 0900   Periwound Area Dry 05/31/22 0900   Wound Edges Callused 05/31/22 0900   Wound Length (cm) 2 cm 05/31/22  0900   Wound Width (cm) 2.5 cm 05/31/22 0900   Wound Depth (cm) 0.3 cm 05/31/22 0900   Wound Volume (cm^3) 1.5 cm^3 05/31/22 0900   Wound Surface Area (cm^2) 5 cm^2 05/31/22 0900   Care Cleansed with:;Antimicrobial agent;Sterile normal saline 05/31/22 0900   Dressing Changed 05/31/22 0900   Periwound Care Skin barrier film applied 05/31/22 0900   Off Loading Football dressing;Off loading shoe 05/31/22 0900   Dressing Change Due 06/07/22 05/31/22 0900       [REMOVED]      Wound 04/19/22 Diabetic Ulcer Right distal Foot (Removed)   04/19/22     Pre-existing: No   Primary Wound Type: Diabetic ulc   Side: Right   Orientation: distal   Location: Foot   Wound Number:    Ankle-Brachial Index:    Pulses:    Removal Indication and Assessment:    Wound Outcome: Healed   (Retired) Wound Type:    (Retired) Wound Length (cm):    (Retired) Wound Width (cm):    (Retired) Depth (cm):    Wound Description (Comments):    Removal Indications:    Removed 05/31/22    Wound Image   05/31/22 0900   Wound WDL WDL 05/31/22 0900   Dressing Appearance Intact;Dry 05/31/22 0900   Drainage Amount None 05/31/22 0900   Appearance Closed/resurfaced 05/31/22 0900   Wound Length (cm) 0 cm 05/31/22 0900   Wound Width (cm) 0 cm 05/31/22 0900   Wound Depth (cm) 0 cm 05/31/22 0900   Wound Volume (cm^3) 0 cm^3 05/31/22 0900   Wound Surface Area (cm^2) 0 cm^2 05/31/22 0900   Tunneling (depth (cm)/location) 0 05/31/22 0900   Undermining (depth (cm)/location) 0 05/31/22 0900   Care Cleansed with:;Antimicrobial agent;Sterile normal saline 05/31/22 0900   Dressing Removed 05/31/22 0900           Plan:                Orders Placed This Encounter   Procedures    Debridement     This order was created via procedure documentation     Standing Status:   Standing     Number of Occurrences:   1    Change dressing     Clean with NS. Cavilon to periwound's. Plantar foot: Hydrofera Blue transfer then Drawtex. Dorsal foot: HB transfer then Gigi Foam short. Gigi  foam long to plantar foot and over distal foot. Football dressing (cast padding x 3), Coban. Darco. Pt will return to [facility] in 1 wk.        Follow up in about 1 week (around 6/7/2022) for wound care.     See Jean-Baptiste MD

## 2022-06-07 ENCOUNTER — HOSPITAL ENCOUNTER (OUTPATIENT)
Dept: WOUND CARE | Facility: HOSPITAL | Age: 62
Discharge: HOME | End: 2022-06-07
Attending: FAMILY MEDICINE
Payer: MEDICAID

## 2022-06-07 VITALS — DIASTOLIC BLOOD PRESSURE: 67 MMHG | TEMPERATURE: 98 F | HEART RATE: 94 BPM | SYSTOLIC BLOOD PRESSURE: 125 MMHG

## 2022-06-07 DIAGNOSIS — L97.509 TYPE 2 DIABETES WITH SKIN ULCER OF FOOT: Primary | ICD-10-CM

## 2022-06-07 DIAGNOSIS — L97.412 DIABETIC ULCER OF RIGHT MIDFOOT ASSOCIATED WITH TYPE 2 DIABETES MELLITUS, WITH FAT LAYER EXPOSED: ICD-10-CM

## 2022-06-07 DIAGNOSIS — E11.621 DIABETIC ULCER OF RIGHT MIDFOOT ASSOCIATED WITH TYPE 2 DIABETES MELLITUS, WITH FAT LAYER EXPOSED: ICD-10-CM

## 2022-06-07 DIAGNOSIS — E11.621 TYPE 2 DIABETES WITH SKIN ULCER OF FOOT: Primary | ICD-10-CM

## 2022-06-07 PROCEDURE — 11042 DBRDMT SUBQ TIS 1ST 20SQCM/<: CPT | Mod: ,,, | Performed by: FAMILY MEDICINE

## 2022-06-07 PROCEDURE — 99499 UNLISTED E&M SERVICE: CPT | Mod: ,,, | Performed by: FAMILY MEDICINE

## 2022-06-07 PROCEDURE — 99499 NO LOS: ICD-10-PCS | Mod: ,,, | Performed by: FAMILY MEDICINE

## 2022-06-07 PROCEDURE — 11042 DEBRIDEMENT: ICD-10-PCS | Mod: ,,, | Performed by: FAMILY MEDICINE

## 2022-06-07 PROCEDURE — 11042 DBRDMT SUBQ TIS 1ST 20SQCM/<: CPT | Performed by: FAMILY MEDICINE

## 2022-06-07 NOTE — PROGRESS NOTES
[facility]                Progress Note    Subjective:       Patient ID: Fermin Henson is a 62 y.o. male.    Chief Complaint: Wound Check    Follow up wound care visit. Patient ambulated to exam room unaided. No c/o pain at present. Dressing intact with a scant amount of dried serosanguineous, non-malodor drainage from plantar wound. Right Anterior Foot wound has epithelization and appears dry. Right Plantar Foot wound measuring smaller overall, blistered areas from previous appear to be healing.      Wound care done as per order, RTC in 1 week to see MD.     MD note  Patient following up today for DFU to right foot.  No pain, fevers, chills, or sweats.  There has been no new wounds he is aware of.  He is not offloading as directed.        Review of Systems   Constitutional: Negative.    HENT: Negative.    Eyes: Negative.    Respiratory: Negative.    Cardiovascular: Negative.    Skin: Positive for wound.   Psychiatric/Behavioral: Negative.          Objective:        Physical Exam  Constitutional:       Appearance: Normal appearance.   HENT:      Head: Normocephalic and atraumatic.      Right Ear: External ear normal.      Left Ear: External ear normal.      Mouth/Throat:      Mouth: Mucous membranes are moist.      Pharynx: Oropharynx is clear.   Eyes:      Extraocular Movements: Extraocular movements intact.      Conjunctiva/sclera: Conjunctivae normal.      Pupils: Pupils are equal, round, and reactive to light.   Cardiovascular:      Rate and Rhythm: Normal rate and regular rhythm.   Abdominal:      General: There is no distension.      Palpations: Abdomen is soft.   Skin:     General: Skin is warm and dry.      Comments: +dfu with callous and purulent drainage   Neurological:      Mental Status: He is alert.           Vitals:    06/07/22 1110   BP: 125/67   Pulse: 94   Temp: 97.6 °F (36.4 °C)     Debridement    Date/Time: 6/7/2022 10:27 AM  Performed by: See  "GIORGI Jean-Baptiste MD  Authorized by: See Jean-Baptiste MD     Time out: Immediately prior to procedure a "time out" was called to verify the correct patient, procedure, equipment, support staff and site/side marked as required.    Consent Done?:  Yes (Written)  Local anesthesia used?: No      Wound Details:    Location:  Right foot    Location:  Right Plantar    Type of Debridement:  Excisional       Length (cm):  0.2       Area (sq cm):  0.04       Width (cm):  0.2       Percent Debrided (%):  100       Depth (cm):  0.1       Total Area Debrided (sq cm):  0.04    Depth of debridement:  Subcutaneous tissue    Tissue debrided:  Dermis, Epidermis and Subcutaneous    Devitalized tissue debrided:  Biofilm, Callus and Fibrin    Instruments:  Curette    Bleeding:  Minimal  Hemostasis Achieved: Yes    Method Used:  Pressure  Patient tolerance:  Patient tolerated the procedure well with no immediate complications        Assessment:           ICD-10-CM ICD-9-CM   1. Type 2 diabetes with skin ulcer of foot  E11.621 250.80    L97.509 707.15   2. Diabetic ulcer of right midfoot associated with type 2 diabetes mellitus, with fat layer exposed  E11.621 250.80    L97.412 707.14            Wound 03/02/22 1037 Diabetic Ulcer Right anterior Foot (Active)   03/02/22 1037    Pre-existing: Yes   Primary Wound Type: Diabetic ulc   Side: Right   Orientation: anterior   Location: Foot   Wound Number:    Ankle-Brachial Index:    Pulses:    Removal Indication and Assessment:    Wound Outcome:    (Retired) Wound Type:    (Retired) Wound Length (cm):    (Retired) Wound Width (cm):    (Retired) Depth (cm):    Wound Description (Comments):    Removal Indications:    Wound Image   06/07/22 1100   Wound WDL ex 06/07/22 1100   Dressing Appearance Intact;Dry 06/07/22 1100   Drainage Amount None 06/07/22 1100   Appearance Epithelialization;Pink;Dry 06/07/22 1100   Wound Length (cm) 0 cm 06/07/22 1100   Wound Width (cm) 0 cm 06/07/22 1100   Wound Depth (cm) 0 " cm 06/07/22 1100   Wound Volume (cm^3) 0 cm^3 06/07/22 1100   Wound Surface Area (cm^2) 0 cm^2 06/07/22 1100   Tunneling (depth (cm)/location) 0 06/07/22 1100   Undermining (depth (cm)/location) 0 06/07/22 1100   Care Cleansed with:;Antimicrobial agent;Sterile normal saline 06/07/22 1100   Dressing Changed 06/07/22 1100   Periwound Care Skin barrier film applied 06/07/22 1100   Off Loading Football dressing;Off loading shoe 06/07/22 1100   Dressing Change Due 06/14/22 06/07/22 1100            Wound 04/12/22 1426 Diabetic Ulcer Right plantar Foot (Active)   04/12/22 1426    Pre-existing: Yes   Primary Wound Type: Diabetic ulc   Side: Right   Orientation: plantar   Location: Foot   Wound Number:    Ankle-Brachial Index:    Pulses:    Removal Indication and Assessment:    Wound Outcome:    (Retired) Wound Type:    (Retired) Wound Length (cm):    (Retired) Wound Width (cm):    (Retired) Depth (cm):    Wound Description (Comments):    Removal Indications:    Wound Image    06/07/22 1100   Wound WDL ex 06/07/22 1100   Dressing Appearance Intact;Dried drainage 06/07/22 1100   Drainage Amount Scant 06/07/22 1100   Drainage Characteristics/Odor Serosanguineous 06/07/22 1100   Appearance Red;Moist 06/07/22 1100   Tissue loss description Partial thickness 06/07/22 1100   Red (%), Wound Tissue Color 100 % 06/07/22 1100   Periwound Area Dry 06/07/22 1100   Wound Edges Defined;Callused 06/07/22 1100   Wound Length (cm) 0.2 cm 06/07/22 1100   Wound Width (cm) 0.2 cm 06/07/22 1100   Wound Depth (cm) 0.1 cm 06/07/22 1100   Wound Volume (cm^3) 0.004 cm^3 06/07/22 1100   Wound Surface Area (cm^2) 0.04 cm^2 06/07/22 1100   Care Cleansed with:;Antimicrobial agent;Sterile normal saline 06/07/22 1100   Dressing Changed 06/07/22 1100   Periwound Care Skin barrier film applied 06/07/22 1100   Off Loading Football dressing;Off loading shoe 06/07/22 1100   Dressing Change Due 06/14/22 06/07/22 1100           Plan:                Orders Placed  This Encounter   Procedures    Debridement     This order was created via procedure documentation     Standing Status:   Standing     Number of Occurrences:   1    Change dressing     Clean with NS. Cavilon to periwound's. Plantar foot: Endoform (moistened with Normal Saline) to wound bed then Drawtex. Gigi foam (long x 2) to plantar foot and over distal foot. Football dressing (cast padding x 3), Coban. Darco. Pt will return to [facility] in 1 wk.        Follow up in about 1 week (around 6/14/2022) for wound care.     See Jean-Baptiste MD

## 2022-06-14 ENCOUNTER — HOSPITAL ENCOUNTER (OUTPATIENT)
Dept: WOUND CARE | Facility: HOSPITAL | Age: 62
Discharge: HOME | End: 2022-06-14
Attending: FAMILY MEDICINE
Payer: MEDICAID

## 2022-06-14 VITALS
SYSTOLIC BLOOD PRESSURE: 119 MMHG | DIASTOLIC BLOOD PRESSURE: 69 MMHG | RESPIRATION RATE: 20 BRPM | TEMPERATURE: 98 F | HEART RATE: 89 BPM

## 2022-06-14 DIAGNOSIS — E11.621 TYPE 2 DIABETES WITH SKIN ULCER OF FOOT: Primary | ICD-10-CM

## 2022-06-14 DIAGNOSIS — L97.509 TYPE 2 DIABETES WITH SKIN ULCER OF FOOT: Primary | ICD-10-CM

## 2022-06-14 PROCEDURE — 99214 OFFICE O/P EST MOD 30 MIN: CPT | Mod: ,,, | Performed by: FAMILY MEDICINE

## 2022-06-14 PROCEDURE — 99213 OFFICE O/P EST LOW 20 MIN: CPT | Performed by: FAMILY MEDICINE

## 2022-06-14 PROCEDURE — 99214 PR OFFICE/OUTPT VISIT, EST, LEVL IV, 30-39 MIN: ICD-10-PCS | Mod: ,,, | Performed by: FAMILY MEDICINE

## 2022-06-14 NOTE — PROGRESS NOTES
[facility]                Progress Note    Subjective:       Patient ID: Fermin Henson is a 62 y.o. male.    Chief Complaint: Wound Check    F/u wound care visit. Patient ambulatory to exam room with no c/o pain or discomfort at present. Wound dressing to right foot intact with no drainage noted. Wound to right plantar foot improving, measuring smaller with callus around wound edges. Wound to right anterior foot remains healed. Wound care done per order. RTC in one week.      Review of Systems   Constitutional: Negative.    HENT: Negative.    Eyes: Negative.    Respiratory: Negative.    Cardiovascular: Negative.    Gastrointestinal: Negative.    Skin: Positive for wound.   Psychiatric/Behavioral: Negative.          Objective:        Physical Exam  Constitutional:       Appearance: Normal appearance.   HENT:      Head: Normocephalic and atraumatic.      Right Ear: External ear normal.      Left Ear: External ear normal.      Mouth/Throat:      Mouth: Mucous membranes are moist.      Pharynx: Oropharynx is clear.   Eyes:      Extraocular Movements: Extraocular movements intact.      Conjunctiva/sclera: Conjunctivae normal.      Pupils: Pupils are equal, round, and reactive to light.   Cardiovascular:      Rate and Rhythm: Normal rate and regular rhythm.   Pulmonary:      Effort: Pulmonary effort is normal. No respiratory distress.   Abdominal:      General: There is no distension.      Palpations: Abdomen is soft.   Musculoskeletal:      Cervical back: Normal range of motion and neck supple.      Right lower leg: Edema present.      Left lower leg: Edema present.   Skin:     General: Skin is warm and dry.      Comments: +DFU to right plantar appears to have epithelialized and callous present   Neurological:      Mental Status: He is alert and oriented to person, place, and time. Mental status is at baseline.         Vitals:    06/14/22 0945   BP: 119/69   Pulse: 89    Resp: 20   Temp: 97.5 °F (36.4 °C)       Assessment:           ICD-10-CM ICD-9-CM   1. Type 2 diabetes with skin ulcer of foot  E11.621 250.80    L97.509 707.15            Wound 03/02/22 1037 Diabetic Ulcer Right anterior Foot (Active)   03/02/22 1037    Pre-existing: Yes   Primary Wound Type: Diabetic ulc   Side: Right   Orientation: anterior   Location: Foot   Wound Number:    Ankle-Brachial Index:    Pulses:    Removal Indication and Assessment:    Wound Outcome:    (Retired) Wound Type:    (Retired) Wound Length (cm):    (Retired) Wound Width (cm):    (Retired) Depth (cm):    Wound Description (Comments):    Removal Indications:    Wound Image   06/14/22 0900   Wound WDL WDL 06/14/22 0900   Dressing Appearance Intact;Dry 06/14/22 0900   Drainage Amount None 06/14/22 0900   Wound Length (cm) 0 cm 06/14/22 0900   Wound Width (cm) 0 cm 06/14/22 0900   Wound Depth (cm) 0 cm 06/14/22 0900   Wound Volume (cm^3) 0 cm^3 06/14/22 0900   Wound Surface Area (cm^2) 0 cm^2 06/14/22 0900            Wound 04/12/22 1426 Diabetic Ulcer Right plantar Foot (Active)   04/12/22 1426    Pre-existing: Yes   Primary Wound Type: Diabetic ulc   Side: Right   Orientation: plantar   Location: Foot   Wound Number:    Ankle-Brachial Index:    Pulses:    Removal Indication and Assessment:    Wound Outcome:    (Retired) Wound Type:    (Retired) Wound Length (cm):    (Retired) Wound Width (cm):    (Retired) Depth (cm):    Wound Description (Comments):    Removal Indications:    Wound Image   06/14/22 0900   Wound WDL ex 06/14/22 0900   Dressing Appearance Intact;Dry 06/14/22 0900   Drainage Amount None 06/14/22 0900   Appearance Red 06/14/22 0900   Tissue loss description Partial thickness 06/14/22 0900   Black (%), Wound Tissue Color 0 % 06/14/22 0900   Red (%), Wound Tissue Color 100 % 06/14/22 0900   Yellow (%), Wound Tissue Color 0 % 06/14/22 0900   Periwound Area Dry;Intact 06/14/22 0900   Wound Edges Callused 06/14/22 0900   Wound  Length (cm) 0.1 cm 06/14/22 0900   Wound Width (cm) 0.1 cm 06/14/22 0900   Wound Depth (cm) 0.1 cm 06/14/22 0900   Wound Volume (cm^3) 0.001 cm^3 06/14/22 0900   Wound Surface Area (cm^2) 0.01 cm^2 06/14/22 0900   Care Cleansed with:;Soap and water;Sterile normal saline 06/14/22 0900   Dressing Changed 06/14/22 0900   Periwound Care Skin barrier film applied 06/14/22 0900   Off Loading Football dressing;Off loading shoe 06/14/22 0900   Dressing Change Due 06/21/22 06/14/22 0900           Plan:                Orders Placed This Encounter   Procedures    Change dressing     Clean with NS. Cavilon to periwound's. Plantar foot: Endoform (moistened with Normal Saline) to wound bed then Drawtex. Gigi foam (long x 2) to plantar foot and over distal foot. Football dressing (cast padding x 3), Coban. Darco. Pt will return to [facility] in 1 wk.        Follow up in about 1 week (around 6/21/2022) for wound care visit.     See Jean-Baptiste MD

## 2022-06-21 ENCOUNTER — HOSPITAL ENCOUNTER (OUTPATIENT)
Dept: WOUND CARE | Facility: HOSPITAL | Age: 62
Discharge: HOME | End: 2022-06-21
Attending: FAMILY MEDICINE
Payer: MEDICAID

## 2022-06-21 VITALS
TEMPERATURE: 98 F | HEART RATE: 79 BPM | DIASTOLIC BLOOD PRESSURE: 73 MMHG | SYSTOLIC BLOOD PRESSURE: 143 MMHG | RESPIRATION RATE: 20 BRPM

## 2022-06-21 DIAGNOSIS — L97.509 TYPE 2 DIABETES WITH SKIN ULCER OF FOOT: Primary | ICD-10-CM

## 2022-06-21 DIAGNOSIS — E11.621 TYPE 2 DIABETES WITH SKIN ULCER OF FOOT: Primary | ICD-10-CM

## 2022-06-21 PROCEDURE — 99499 NO LOS: ICD-10-PCS | Mod: ,,, | Performed by: FAMILY MEDICINE

## 2022-06-21 PROCEDURE — 99499 UNLISTED E&M SERVICE: CPT | Mod: ,,, | Performed by: FAMILY MEDICINE

## 2022-06-21 PROCEDURE — 97597 DEBRIDEMENT: ICD-10-PCS | Mod: ,,, | Performed by: FAMILY MEDICINE

## 2022-06-21 PROCEDURE — 97597 DBRDMT OPN WND 1ST 20 CM/<: CPT | Mod: ,,, | Performed by: FAMILY MEDICINE

## 2022-06-21 PROCEDURE — 99213 OFFICE O/P EST LOW 20 MIN: CPT | Performed by: FAMILY MEDICINE

## 2022-06-21 NOTE — PROGRESS NOTES
[facility]                Progress Note    Subjective:       Patient ID: Fermin Henson is a 62 y.o. male.    Chief Complaint: Wound Check    F/u wound care visit. Patient ambulatory to exam room with no c/o pain or discomfort at present. Wound dressing to right foot intact with no drainage noted. Wound to right plantar foot heeled. Wound to right dorsal foot reopened with excoriation noted. Wound care done per order. RTC in one week.    MD note:  Patient following up for DFU to right plantar.  He has been keeping dressings in place, but has not been offloading as directed.  No pain as he is insensate.  No fevers, chills, or sweats.  Last a1c did show blood sugars to be well controlled      Review of Systems   Constitutional: Negative.    HENT: Negative.    Eyes: Negative.    Respiratory: Negative.    Cardiovascular: Negative.    Gastrointestinal: Negative.    Skin: Positive for wound.   Psychiatric/Behavioral: Negative.          Objective:        Physical Exam  Constitutional:       Appearance: Normal appearance.   HENT:      Head: Normocephalic and atraumatic.      Right Ear: External ear normal.      Left Ear: External ear normal.      Mouth/Throat:      Mouth: Mucous membranes are moist.      Pharynx: Oropharynx is clear.   Eyes:      Extraocular Movements: Extraocular movements intact.      Conjunctiva/sclera: Conjunctivae normal.      Pupils: Pupils are equal, round, and reactive to light.   Cardiovascular:      Rate and Rhythm: Normal rate and regular rhythm.   Pulmonary:      Effort: Pulmonary effort is normal. No respiratory distress.   Abdominal:      General: There is no distension.      Palpations: Abdomen is soft.      Tenderness: There is no abdominal tenderness.   Skin:     General: Skin is warm and dry.      Comments: +plantar wound calloused  +dorsal wound open with beefy red base   Neurological:      Mental Status: He is alert and oriented to  "person, place, and time. Mental status is at baseline.      Sensory: Sensory deficit present.           Vitals:    06/21/22 1012   BP: (!) 143/73   Pulse: 79   Resp: 20   Temp: 97.7 °F (36.5 °C)     Debridement    Date/Time: 6/21/2022 9:27 AM  Performed by: See Jean-Baptiste MD  Authorized by: See Jean-Baptiste MD     Time out: Immediately prior to procedure a "time out" was called to verify the correct patient, procedure, equipment, support staff and site/side marked as required.    Consent Done?:  Yes (Written)  Local anesthesia used?: No      Wound Details:    Location:  Right foot    Location:  Right Plantar    Type of Debridement:  Non-excisional       Length (cm):  0.3       Area (sq cm):  0.09       Width (cm):  0.3       Percent Debrided (%):  100       Depth (cm):  0       Total Area Debrided (sq cm):  0.09    Depth of debridement:  Epidermis/Dermis    Tissue debrided:  Dermis and Epidermis    Devitalized tissue debrided:  Callus and Fibrin    Instruments:  Curette    Bleeding:  None  Patient tolerance:  Patient tolerated the procedure well with no immediate complications      Assessment:           ICD-10-CM ICD-9-CM   1. Type 2 diabetes with skin ulcer of foot  E11.621 250.80    L97.509 707.15            Wound 03/02/22 1037 Diabetic Ulcer Right anterior Foot (Active)   03/02/22 1037    Pre-existing: Yes   Primary Wound Type: Diabetic ulc   Side: Right   Orientation: anterior   Location: Foot   Wound Number:    Ankle-Brachial Index:    Pulses:    Removal Indication and Assessment:    Wound Outcome:    (Retired) Wound Type:    (Retired) Wound Length (cm):    (Retired) Wound Width (cm):    (Retired) Depth (cm):    Wound Description (Comments):    Removal Indications:    Wound Image   06/21/22 1000   Wound WDL ex 06/21/22 1000   Dressing Appearance Intact;Dried drainage 06/21/22 1000   Drainage Amount Scant 06/21/22 1000   Drainage Characteristics/Odor Serosanguineous 06/21/22 1000   Appearance Red 06/21/22 1000 "   Tissue loss description Partial thickness 06/21/22 1000   Black (%), Wound Tissue Color 0 % 06/21/22 1000   Red (%), Wound Tissue Color 100 % 06/21/22 1000   Yellow (%), Wound Tissue Color 0 % 06/21/22 1000   Periwound Area Dry 06/21/22 1000   Wound Edges Undefined 06/21/22 1000   Wound Length (cm) 2.5 cm 06/21/22 1000   Wound Width (cm) 2.2 cm 06/21/22 1000   Wound Depth (cm) 0.1 cm 06/21/22 1000   Wound Volume (cm^3) 0.55 cm^3 06/21/22 1000   Wound Surface Area (cm^2) 5.5 cm^2 06/21/22 1000   Care Cleansed with:;Soap and water;Sterile normal saline 06/21/22 1000   Dressing Changed 06/21/22 1000   Off Loading Football dressing;Off loading shoe 06/21/22 1000   Dressing Change Due 06/28/22 06/21/22 1000            Wound 04/12/22 1426 Diabetic Ulcer Right plantar Foot (Active)   04/12/22 1426    Pre-existing: Yes   Primary Wound Type: Diabetic [facility]   Side: Right   Orientation: plantar   Location: Foot   Wound Number:    Ankle-Brachial Index:    Pulses:    Removal Indication and Assessment:    Wound Outcome:    (Retired) Wound Type:    (Retired) Wound Length (cm):    (Retired) Wound Width (cm):    (Retired) Depth (cm):    Wound Description (Comments):    Removal Indications:    Wound Image   06/21/22 1000   Wound WDL WDL 06/21/22 1000   Dressing Appearance Intact;Dry 06/21/22 1000   Drainage Amount None 06/21/22 1000   Appearance Epithelialization 06/21/22 1000   Tissue loss description Not applicable 06/21/22 1000   Wound Edges Rolled/closed 06/21/22 1000   Wound Length (cm) 0 cm 06/21/22 1000   Wound Width (cm) 0 cm 06/21/22 1000   Wound Depth (cm) 0 cm 06/21/22 1000   Wound Volume (cm^3) 0 cm^3 06/21/22 1000   Wound Surface Area (cm^2) 0 cm^2 06/21/22 1000   Care Cleansed with:;Soap and water;Sterile normal saline 06/21/22 1000   Dressing Changed 06/21/22 1000   Off Loading Football dressing;Off loading shoe 06/21/22 1000           Plan:                Orders Placed This Encounter   Procedures    Debridement      This order was created via procedure documentation     Standing Status:   Standing     Number of Occurrences:   1    Change dressing     Clean with NS. Optifoam Ag to right dorsal foot and plantar foot. Football dressing (cast padding x3, coban). Darco.        Follow up in about 1 week (around 6/28/2022) for wound care visit.     See Jean-Baptiste MD

## 2022-06-28 ENCOUNTER — HOSPITAL ENCOUNTER (OUTPATIENT)
Dept: WOUND CARE | Facility: HOSPITAL | Age: 62
Discharge: HOME | End: 2022-06-28
Attending: FAMILY MEDICINE
Payer: MEDICAID

## 2022-06-28 VITALS — TEMPERATURE: 98 F | HEART RATE: 92 BPM | DIASTOLIC BLOOD PRESSURE: 69 MMHG | SYSTOLIC BLOOD PRESSURE: 125 MMHG

## 2022-06-28 DIAGNOSIS — L97.412 DIABETIC ULCER OF RIGHT MIDFOOT ASSOCIATED WITH TYPE 2 DIABETES MELLITUS, WITH FAT LAYER EXPOSED: ICD-10-CM

## 2022-06-28 DIAGNOSIS — E11.621 TYPE 2 DIABETES WITH SKIN ULCER OF FOOT: Primary | ICD-10-CM

## 2022-06-28 DIAGNOSIS — L97.509 TYPE 2 DIABETES WITH SKIN ULCER OF FOOT: Primary | ICD-10-CM

## 2022-06-28 DIAGNOSIS — E11.621 DIABETIC ULCER OF RIGHT MIDFOOT ASSOCIATED WITH TYPE 2 DIABETES MELLITUS, WITH FAT LAYER EXPOSED: ICD-10-CM

## 2022-06-28 DIAGNOSIS — L03.115 CELLULITIS OF RIGHT FOOT: ICD-10-CM

## 2022-06-28 PROCEDURE — 99214 OFFICE O/P EST MOD 30 MIN: CPT | Mod: ,,, | Performed by: FAMILY MEDICINE

## 2022-06-28 PROCEDURE — 99214 PR OFFICE/OUTPT VISIT, EST, LEVL IV, 30-39 MIN: ICD-10-PCS | Mod: ,,, | Performed by: FAMILY MEDICINE

## 2022-06-28 PROCEDURE — 99213 OFFICE O/P EST LOW 20 MIN: CPT | Performed by: FAMILY MEDICINE

## 2022-06-28 NOTE — PROGRESS NOTES
[facility]                Progress Note    Subjective:       Patient ID: Fermin Henson is a 62 y.o. male.    Chief Complaint: Wound Check    Follow up wound care visit. Patient ambulated to exam room unaided, with prescribed Darco shoe on Right Foot. No c/o pain at present. Dressing intact with a scant amount of dried serous, non-malodor drainage from Right Dorsal Foot. Right Plantar Foot wound still appears healed, with callous near previous wound bed. Right Dorsal Foot excoriation is measuring larger in (1.5 cm) length and (1.5 cm) width, patient denies walking or standing more than usual.    Patient advised to make an appointment with Dermatology for Right Dorsal Foot.     Wound care done as per order. RTC 1 week to see MD.       MD note:  Patient has no new complaints today.  He has been on his feet more stating he went to [facility] several times last week for his moms prescriptions.  No fevers, chills, and sweats.        Review of Systems   Constitutional: Negative.    HENT: Negative.    Eyes: Negative.    Respiratory: Negative.    Cardiovascular: Negative.    Gastrointestinal: Negative.    Skin: Positive for wound.   Psychiatric/Behavioral: Negative.          Objective:        Physical Exam  Constitutional:       Appearance: Normal appearance.   HENT:      Head: Normocephalic and atraumatic.      Mouth/Throat:      Mouth: Mucous membranes are moist.      Pharynx: Oropharynx is clear.   Eyes:      Extraocular Movements: Extraocular movements intact.      Conjunctiva/sclera: Conjunctivae normal.      Pupils: Pupils are equal, round, and reactive to light.   Cardiovascular:      Rate and Rhythm: Normal rate and regular rhythm.   Pulmonary:      Effort: Pulmonary effort is normal. No respiratory distress.   Abdominal:      General: There is no distension.      Palpations: Abdomen is soft.   Skin:     General: Skin is warm and dry.      Comments: DFU to right foot with  maceration   Neurological:      Mental Status: He is alert.         Vitals:    06/28/22 1010   BP: 125/69   Pulse: 92   Temp: 97.5 °F (36.4 °C)       Assessment:           ICD-10-CM ICD-9-CM   1. Type 2 diabetes with skin ulcer of foot  E11.621 250.80    L97.509 707.15   2. Diabetic ulcer of right midfoot associated with type 2 diabetes mellitus, with fat layer exposed  E11.621 250.80    L97.412 707.14   3. Cellulitis of right foot  L03.115 682.7            Wound 03/02/22 1037 Diabetic Ulcer Right anterior Foot (Active)   03/02/22 1037    Pre-existing: Yes   Primary Wound Type: Diabetic ulc   Side: Right   Orientation: anterior   Location: Foot   Wound Number:    Ankle-Brachial Index:    Pulses:    Removal Indication and Assessment:    Wound Outcome:    (Retired) Wound Type:    (Retired) Wound Length (cm):    (Retired) Wound Width (cm):    (Retired) Depth (cm):    Wound Description (Comments):    Removal Indications:    Wound Image   06/28/22 1000   Wound WDL ex 06/28/22 1000   Dressing Appearance Intact;Dried drainage 06/28/22 1000   Drainage Amount Scant 06/28/22 1000   Drainage Characteristics/Odor Serous;No odor 06/28/22 1000   Appearance Pink 06/28/22 1000   Tissue loss description Partial thickness 06/28/22 1000   Black (%), Wound Tissue Color 0 % 06/28/22 1000   Red (%), Wound Tissue Color 100 % 06/28/22 1000   Yellow (%), Wound Tissue Color 0 % 06/28/22 1000   Periwound Area Excoriated 06/28/22 1000   Wound Edges Undefined 06/28/22 1000   Wound Length (cm) 4 cm 06/28/22 1000   Wound Width (cm) 3.8 cm 06/28/22 1000   Wound Depth (cm) 0.1 cm 06/28/22 1000   Wound Volume (cm^3) 1.52 cm^3 06/28/22 1000   Wound Surface Area (cm^2) 15.2 cm^2 06/28/22 1000   Care Cleansed with:;Antimicrobial agent;Sterile normal saline 06/28/22 1000   Dressing Changed 06/28/22 1000   Off Loading Football dressing;Off loading shoe 06/28/22 1000   Dressing Change Due 07/05/22 06/28/22 1000            Wound 04/12/22 1426 Diabetic  Ulcer Right plantar Foot (Active)   04/12/22 1426    Pre-existing: Yes   Primary Wound Type: Diabetic ulc   Side: Right   Orientation: plantar   Location: Foot   Wound Number:    Ankle-Brachial Index:    Pulses:    Removal Indication and Assessment:    Wound Outcome:    (Retired) Wound Type:    (Retired) Wound Length (cm):    (Retired) Wound Width (cm):    (Retired) Depth (cm):    Wound Description (Comments):    Removal Indications:    Wound Image    06/28/22 1000   Wound WDL WDL 06/28/22 1000   Dressing Appearance Intact;Dry 06/28/22 1000   Drainage Amount None 06/28/22 1000   Appearance Epithelialization 06/28/22 1000   Tissue loss description Not applicable 06/28/22 1000   Black (%), Wound Tissue Color 0 % 06/28/22 1000   Red (%), Wound Tissue Color 0 % 06/28/22 1000   Yellow (%), Wound Tissue Color 0 % 06/28/22 1000   Periwound Area Dry;Intact 06/28/22 1000   Wound Edges Callused 06/28/22 1000   Wound Length (cm) 0 cm 06/28/22 1000   Wound Width (cm) 0 cm 06/28/22 1000   Wound Depth (cm) 0 cm 06/28/22 1000   Wound Volume (cm^3) 0 cm^3 06/28/22 1000   Wound Surface Area (cm^2) 0 cm^2 06/28/22 1000   Tunneling (depth (cm)/location) 0 06/28/22 1000   Undermining (depth (cm)/location) 0 06/28/22 1000   Care Cleansed with:;Sterile normal saline 06/28/22 1000   Dressing Changed 06/28/22 1000   Off Loading Football dressing;Off loading shoe 06/28/22 1000   Dressing Change Due 07/05/22 06/28/22 1000           Plan:                Orders Placed This Encounter   Procedures    Change dressing     Clean with NS. Aquacel Extra to Right dorsal and plantar foot. Gigi Foam (reg x 1, to dorsal and long x 1, to plantar foot). Football dressing (cast padding x3, Coban). Darco.        Follow up in about 1 week (around 7/5/2022) for wound care.       See Jean-Baptiste MD

## 2022-07-05 ENCOUNTER — HOSPITAL ENCOUNTER (OUTPATIENT)
Dept: WOUND CARE | Facility: HOSPITAL | Age: 62
Discharge: HOME | End: 2022-07-05
Attending: FAMILY MEDICINE
Payer: MEDICAID

## 2022-07-05 VITALS — HEART RATE: 99 BPM | TEMPERATURE: 97 F | SYSTOLIC BLOOD PRESSURE: 137 MMHG | DIASTOLIC BLOOD PRESSURE: 69 MMHG

## 2022-07-05 DIAGNOSIS — L97.412 DIABETIC ULCER OF RIGHT MIDFOOT ASSOCIATED WITH TYPE 2 DIABETES MELLITUS, WITH FAT LAYER EXPOSED: Primary | ICD-10-CM

## 2022-07-05 DIAGNOSIS — E11.621 DIABETIC ULCER OF RIGHT MIDFOOT ASSOCIATED WITH TYPE 2 DIABETES MELLITUS, WITH FAT LAYER EXPOSED: Primary | ICD-10-CM

## 2022-07-05 PROCEDURE — 11055 PARING/CUTG B9 HYPRKER LES 1: CPT | Performed by: FAMILY MEDICINE

## 2022-07-05 PROCEDURE — 11042 DBRDMT SUBQ TIS 1ST 20SQCM/<: CPT | Mod: ,,, | Performed by: FAMILY MEDICINE

## 2022-07-05 PROCEDURE — 99499 NO LOS: ICD-10-PCS | Mod: ,,, | Performed by: FAMILY MEDICINE

## 2022-07-05 PROCEDURE — 99499 UNLISTED E&M SERVICE: CPT | Mod: ,,, | Performed by: FAMILY MEDICINE

## 2022-07-05 PROCEDURE — 11042 DEBRIDEMENT: ICD-10-PCS | Mod: ,,, | Performed by: FAMILY MEDICINE

## 2022-07-05 NOTE — PROGRESS NOTES
[facility]                Progress Note    Subjective:       Patient ID: Fermin Henson is a 62 y.o. male.    Chief Complaint: No chief complaint on file.    Walked to clinic unaided. No complaints pain as usual. Dsg intact and dry with no drainage. Plantar wound maroon with dried blood debrided but wound continues to be healed. Dorsal wound continues to be pink and raised at baseline but no opening seen. Reminded to make appointment with Dermatology for dorsal foot. States blood sugar in 120's. Will return in  1  Week.    MD note:  No pain in wound.  No fevers, chills, or sweats.  He has been walking a lot this past week.  No odor from wound.  He has been wearing off loading boot.        Review of Systems   Constitutional: Negative.    HENT: Negative.    Eyes: Negative.    Respiratory: Negative.    Cardiovascular: Negative.    Gastrointestinal: Negative.    Skin: Positive for wound.   Psychiatric/Behavioral: Negative.          Objective:        Physical Exam  Constitutional:       Appearance: Normal appearance.   HENT:      Head: Normocephalic and atraumatic.      Right Ear: External ear normal.      Left Ear: External ear normal.      Mouth/Throat:      Mouth: Mucous membranes are moist.      Pharynx: Oropharynx is clear.   Eyes:      Extraocular Movements: Extraocular movements intact.      Pupils: Pupils are equal, round, and reactive to light.   Cardiovascular:      Rate and Rhythm: Normal rate and regular rhythm.   Pulmonary:      Effort: Pulmonary effort is normal. No respiratory distress.   Abdominal:      General: There is no distension.      Palpations: Abdomen is soft.   Skin:     General: Skin is warm and dry.      Comments: +callous with blood blister underneath on plantar surface right foot   Neurological:      Mental Status: He is alert and oriented to person, place, and time.           Vitals:    07/05/22 1324   BP: 137/69   Pulse: 99   Temp: 97.1 °F  (36.2 °C)     Debridement    Date/Time: 7/5/2022 1:00 PM  Performed by: See Jean-Baptiste MD  Authorized by: See Jean-Baptiste MD     Consent Done?:  Yes (Written)  Local anesthesia used?: No      Wound Details:    Location:  Right foot    Location:  Right Plantar       Length (cm):  0.3       Area (sq cm):  0.06       Width (cm):  0.2       Percent Debrided (%):  100       Depth (cm):  0.1       Total Area Debrided (sq cm):  0.06    Depth of debridement:  Subcutaneous tissue    Tissue debrided:  Epidermis, Dermis and Subcutaneous    Devitalized tissue debrided:  Biofilm, Callus and Necrotic/Eschar    Instruments:  Curette    Bleeding:  Minimal  Hemostasis Achieved: Yes    Method Used:  Pressure  Patient tolerance:  Patient tolerated the procedure well with no immediate complications      Assessment:           ICD-10-CM ICD-9-CM   1. Diabetic ulcer of right midfoot associated with type 2 diabetes mellitus, with fat layer exposed  E11.621 250.80    L97.412 707.14            Wound 03/02/22 1037 Diabetic Ulcer Right anterior Foot (Active)   03/02/22 1037    Pre-existing: Yes   Primary Wound Type: Diabetic ulc   Side: Right   Orientation: anterior   Location: Foot   Wound Number:    Ankle-Brachial Index:    Pulses:    Removal Indication and Assessment:    Wound Outcome:    (Retired) Wound Type:    (Retired) Wound Length (cm):    (Retired) Wound Width (cm):    (Retired) Depth (cm):    Wound Description (Comments):    Removal Indications:    Wound Image   07/05/22 1300   Wound WDL WDL 07/05/22 1300   Dressing Appearance Dry;Intact 07/05/22 1300   Drainage Amount None 07/05/22 1300   Appearance Pink 07/05/22 1300   Tissue loss description Partial thickness 07/05/22 1300   Periwound Area Dry 07/05/22 1300   Wound Edges Defined 07/05/22 1300   Wound Length (cm) 0 cm 07/05/22 1300   Wound Width (cm) 0 cm 07/05/22 1300   Wound Depth (cm) 0 cm 07/05/22 1300   Wound Volume (cm^3) 0 cm^3 07/05/22 1300   Wound Surface Area (cm^2) 0  cm^2 07/05/22 1300   Care Cleansed with:;Antimicrobial agent;Sterile normal saline 07/05/22 1300   Dressing Changed 07/05/22 1300   Off Loading Football dressing;Off loading shoe 07/05/22 1300   Dressing Change Due 07/12/22 07/05/22 1300            Wound 04/12/22 1426 Diabetic Ulcer Right plantar Foot (Active)   04/12/22 1426    Pre-existing: Yes   Primary Wound Type: Diabetic ulc   Side: Right   Orientation: plantar   Location: Foot   Wound Number:    Ankle-Brachial Index:    Pulses:    Removal Indication and Assessment:    Wound Outcome:    (Retired) Wound Type:    (Retired) Wound Length (cm):    (Retired) Wound Width (cm):    (Retired) Depth (cm):    Wound Description (Comments):    Removal Indications:    Wound Image   07/05/22 1300   Wound WDL WDL 07/05/22 1300   Dressing Appearance Dry;Intact 07/05/22 1300   Drainage Amount None 07/05/22 1300   Appearance Maroon 07/05/22 1300   Wound Edges Callused 07/05/22 1300   Wound Length (cm) 0 cm 07/05/22 1300   Wound Width (cm) 0 cm 07/05/22 1300   Wound Depth (cm) 0 cm 07/05/22 1300   Wound Volume (cm^3) 0 cm^3 07/05/22 1300   Wound Surface Area (cm^2) 0 cm^2 07/05/22 1300   Care Cleansed with:;Antimicrobial agent;Sterile normal saline 07/05/22 1300   Dressing Changed 07/05/22 1300   Off Loading Football dressing;Off loading shoe 07/05/22 1300   Dressing Change Due 07/12/22 07/05/22 1300           Plan:                Orders Placed This Encounter   Procedures    Debridement     This order was created via procedure documentation     Standing Status:   Standing     Number of Occurrences:   1    Change dressing     Standing Status:   Standing     Number of Occurrences:   1    Change dressing     Regular foam to doral and plantar sites cover with 3 cast padding and Coban football, Darco        No follow-ups on file.     See Jean-Baptiste MD

## 2022-07-12 ENCOUNTER — HOSPITAL ENCOUNTER (OUTPATIENT)
Dept: WOUND CARE | Facility: HOSPITAL | Age: 62
Discharge: HOME | End: 2022-07-12
Attending: FAMILY MEDICINE
Payer: MEDICAID

## 2022-07-12 VITALS
TEMPERATURE: 98 F | RESPIRATION RATE: 20 BRPM | SYSTOLIC BLOOD PRESSURE: 121 MMHG | HEART RATE: 98 BPM | DIASTOLIC BLOOD PRESSURE: 69 MMHG

## 2022-07-12 DIAGNOSIS — E11.621 DIABETIC ULCER OF RIGHT MIDFOOT ASSOCIATED WITH TYPE 2 DIABETES MELLITUS, WITH FAT LAYER EXPOSED: Primary | ICD-10-CM

## 2022-07-12 DIAGNOSIS — L97.412 DIABETIC ULCER OF RIGHT MIDFOOT ASSOCIATED WITH TYPE 2 DIABETES MELLITUS, WITH FAT LAYER EXPOSED: Primary | ICD-10-CM

## 2022-07-12 PROCEDURE — 99214 OFFICE O/P EST MOD 30 MIN: CPT | Mod: ,,, | Performed by: FAMILY MEDICINE

## 2022-07-12 PROCEDURE — 99214 PR OFFICE/OUTPT VISIT, EST, LEVL IV, 30-39 MIN: ICD-10-PCS | Mod: ,,, | Performed by: FAMILY MEDICINE

## 2022-07-12 PROCEDURE — 99212 OFFICE O/P EST SF 10 MIN: CPT | Mod: 25 | Performed by: FAMILY MEDICINE

## 2022-07-12 PROCEDURE — 11042 DBRDMT SUBQ TIS 1ST 20SQCM/<: CPT

## 2022-07-12 NOTE — PROGRESS NOTES
[facility]                Progress Note    Subjective:       Patient ID: Fermin Henson is a 62 y.o. male.    Chief Complaint: Wound Check    F/u wound care visit. Patient ambulatory to exam room with no c/o pain or discomfort at present. Wound dressing to right foot intact with no drainage noted. Wounds to right dorsal foot and plantar foot are healed. Patient instructed to notify [facility] of any problems or concerns, verbalized understanding.    MD note:  Patient presenting for follow up of DFU.  He has been offloading per patient.  No fevers, chills, or sweats.  He states there has been no new wounds that he is aware of.        Review of Systems   Constitutional: Negative.    HENT: Negative.    Eyes: Negative.    Respiratory: Negative.    Cardiovascular: Negative.    Gastrointestinal: Negative.    Skin: Positive for wound.   Psychiatric/Behavioral: Negative.          Objective:        Physical Exam  Constitutional:       Appearance: Normal appearance.   HENT:      Head: Normocephalic and atraumatic.      Right Ear: External ear normal.      Left Ear: External ear normal.      Mouth/Throat:      Mouth: Mucous membranes are moist.      Pharynx: Oropharynx is clear.   Eyes:      Extraocular Movements: Extraocular movements intact.      Conjunctiva/sclera: Conjunctivae normal.      Pupils: Pupils are equal, round, and reactive to light.   Cardiovascular:      Rate and Rhythm: Normal rate and regular rhythm.   Pulmonary:      Effort: Pulmonary effort is normal. No respiratory distress.   Abdominal:      General: There is no distension.      Palpations: Abdomen is soft.   Skin:     General: Skin is warm and dry.   Neurological:      Mental Status: He is alert.         Vitals:    07/12/22 0950   BP: 121/69   Pulse: 98   Resp: 20   Temp: 97.7 °F (36.5 °C)       Assessment:           ICD-10-CM ICD-9-CM   1. Diabetic ulcer of right midfoot associated with type 2 diabetes  mellitus, with fat layer exposed  E11.621 250.80    L97.412 707.14       [REMOVED]      Wound 03/02/22 1037 Diabetic Ulcer Right anterior Foot (Removed)   03/02/22 1037    Pre-existing: Yes   Primary Wound Type: Diabetic ulc   Side: Right   Orientation: anterior   Location: Foot   Wound Number:    Ankle-Brachial Index:    Pulses:    Removal Indication and Assessment:    Wound Outcome: Healed   (Retired) Wound Type:    (Retired) Wound Length (cm):    (Retired) Wound Width (cm):    (Retired) Depth (cm):    Wound Description (Comments):    Removal Indications:    Removed 07/12/22 1009   Wound Image   07/12/22 0900   Wound WDL WDL 07/12/22 0900   Dressing Appearance Dry;Intact 07/12/22 0900   Drainage Amount None 07/12/22 0900   Appearance Red 07/12/22 0900   Periwound Area Dry 07/12/22 0900   Wound Length (cm) 0 cm 07/12/22 0900   Wound Width (cm) 0 cm 07/12/22 0900   Wound Depth (cm) 0 cm 07/12/22 0900   Wound Volume (cm^3) 0 cm^3 07/12/22 0900   Wound Surface Area (cm^2) 0 cm^2 07/12/22 0900   Care Cleansed with:;Soap and water;Sterile normal saline 07/12/22 0900       [REMOVED]      Wound 04/12/22 1426 Diabetic Ulcer Right plantar Foot (Removed)   04/12/22 1426    Pre-existing: Yes   Primary Wound Type: Diabetic ulc   Side: Right   Orientation: plantar   Location: Foot   Wound Number:    Ankle-Brachial Index:    Pulses:    Removal Indication and Assessment:    Wound Outcome: Healed   (Retired) Wound Type:    (Retired) Wound Length (cm):    (Retired) Wound Width (cm):    (Retired) Depth (cm):    Wound Description (Comments):    Removal Indications:    Removed 07/12/22 1010   Wound Image   07/12/22 0900   Wound WDL WDL 07/12/22 0900   Dressing Appearance Dry;Intact 07/12/22 0900   Drainage Amount None 07/12/22 0900   Appearance Maroon 07/12/22 0900   Tissue loss description Not applicable 07/12/22 0900   Periwound Area Dry 07/12/22 0900   Wound Edges Callused;Rolled/closed 07/12/22 0900   Wound Length (cm) 0 cm  07/12/22 0900   Wound Width (cm) 0 cm 07/12/22 0900   Wound Depth (cm) 0 cm 07/12/22 0900   Wound Volume (cm^3) 0 cm^3 07/12/22 0900   Wound Surface Area (cm^2) 0 cm^2 07/12/22 0900   Care Cleansed with:;Soap and water;Sterile normal saline 07/12/22 0900           Plan:            1.  Wound has healed  2.  Call with any concerns  3.  Take all medications as prescribed       Follow up if symptoms worsen or fail to improve.

## 2022-07-25 ENCOUNTER — CLINICAL SUPPORT (OUTPATIENT)
Dept: FAMILY MEDICINE | Facility: CLINIC | Age: 62
End: 2022-07-25
Payer: MEDICAID

## 2022-07-25 ENCOUNTER — TELEPHONE (OUTPATIENT)
Dept: FAMILY MEDICINE | Facility: CLINIC | Age: 62
End: 2022-07-25

## 2022-07-25 DIAGNOSIS — Z23 NEED FOR SHINGLES VACCINE: Primary | ICD-10-CM

## 2022-07-25 PROCEDURE — 90750 HZV VACC RECOMBINANT IM: CPT | Mod: PBBFAC,PO | Performed by: FAMILY MEDICINE

## 2022-07-25 PROCEDURE — 96372 THER/PROPH/DIAG INJ SC/IM: CPT | Mod: PBBFAC,PO | Performed by: FAMILY MEDICINE

## 2022-07-30 DIAGNOSIS — E11.9 TYPE 2 DIABETES MELLITUS WITHOUT COMPLICATION, WITHOUT LONG-TERM CURRENT USE OF INSULIN: ICD-10-CM

## 2022-07-30 NOTE — TELEPHONE ENCOUNTER
No new care gaps identified.  [facility] Embedded Care Gaps. Reference number: [id number]. 7/30/2022   5:29:35 PM CDT

## 2022-08-01 RX ORDER — PIOGLITAZONEHYDROCHLORIDE 15 MG/1
TABLET ORAL
Qty: 90 TABLET | Refills: 0 | Status: SHIPPED | OUTPATIENT
Start: 2022-08-01 | End: 2022-10-31

## 2022-08-01 NOTE — TELEPHONE ENCOUNTER
Refill Authorization Note   Fermin Sixto  is requesting a refill authorization.  Brief Assessment and Rationale for Refill:  Approve     Medication Therapy Plan:       Medication Reconciliation Completed: No   Comments:     No Care Gaps Recommended     Note composed:11:56 AM 08/01/2022

## 2022-08-09 ENCOUNTER — HOSPITAL ENCOUNTER (OUTPATIENT)
Dept: WOUND CARE | Facility: HOSPITAL | Age: 62
Discharge: HOME | End: 2022-08-09
Attending: FAMILY MEDICINE
Payer: MEDICAID

## 2022-08-09 VITALS — SYSTOLIC BLOOD PRESSURE: 134 MMHG | HEART RATE: 78 BPM | TEMPERATURE: 98 F | DIASTOLIC BLOOD PRESSURE: 72 MMHG

## 2022-08-09 DIAGNOSIS — E11.621 DIABETIC ULCER OF RIGHT MIDFOOT ASSOCIATED WITH TYPE 2 DIABETES MELLITUS, WITH FAT LAYER EXPOSED: Primary | ICD-10-CM

## 2022-08-09 DIAGNOSIS — L97.412 DIABETIC ULCER OF RIGHT MIDFOOT ASSOCIATED WITH TYPE 2 DIABETES MELLITUS, WITH FAT LAYER EXPOSED: Primary | ICD-10-CM

## 2022-08-09 PROCEDURE — 11042 DBRDMT SUBQ TIS 1ST 20SQCM/<: CPT | Performed by: FAMILY MEDICINE

## 2022-08-09 PROCEDURE — 11042 DBRDMT SUBQ TIS 1ST 20SQCM/<: CPT | Mod: ,,, | Performed by: FAMILY MEDICINE

## 2022-08-09 PROCEDURE — 99499 NO LOS: ICD-10-PCS | Mod: ,,, | Performed by: FAMILY MEDICINE

## 2022-08-09 PROCEDURE — 99499 UNLISTED E&M SERVICE: CPT | Mod: ,,, | Performed by: FAMILY MEDICINE

## 2022-08-09 PROCEDURE — 11042 DEBRIDEMENT: ICD-10-PCS | Mod: ,,, | Performed by: FAMILY MEDICINE

## 2022-08-09 NOTE — PROGRESS NOTES
"[facility]                Progress Note    Subjective:       Patient ID: Fermin Henson is a 62 y.o. male.    Chief Complaint: Wound Check    Readmit to wound care visit. Patient ambulated to exam room unaided, with Darco shoe on Right Foot. No c/o pain at present. Band-Aid covering Right Plantar Foot wound, with a moderate amount of serosanguineous, non-malodor drainage. Patient reports Right Plantar Foot wound reopened "a few days ago. I tried to clip off a piece of skin that was flapping around". Area is ecchymotic and blistered, patient denies hitting, applying pressure or stepping on any foreign objects near wound bed. New wound noted to Right 5th Lateral Toe, patient unsure when this occurred.      Wound care done as per order. RTC 1 week to see MD.    MD note:  Patient returning as DFU has reopened.  He has been trying to offload, but states he has to do all the errands for himself and his mom.  There has been no pain as he is insensate.        Review of Systems   Constitutional: Negative.    HENT: Negative.    Eyes: Negative.    Respiratory: Negative.    Cardiovascular: Negative.    Gastrointestinal: Negative.    Skin: Positive for wound.   Neurological: Negative.    Psychiatric/Behavioral: Negative.          Objective:        Physical Exam  Constitutional:       Appearance: Normal appearance.   HENT:      Head: Normocephalic and atraumatic.      Right Ear: External ear normal.      Left Ear: External ear normal.      Mouth/Throat:      Mouth: Mucous membranes are moist.      Pharynx: Oropharynx is clear.   Eyes:      Extraocular Movements: Extraocular movements intact.      Conjunctiva/sclera: Conjunctivae normal.      Pupils: Pupils are equal, round, and reactive to light.   Cardiovascular:      Rate and Rhythm: Normal rate.   Pulmonary:      Effort: Pulmonary effort is normal. No respiratory distress.   Abdominal:      General: There is no distension.      " "Palpations: Abdomen is soft.   Musculoskeletal:      Cervical back: Normal range of motion and neck supple.   Skin:     General: Skin is warm and dry.      Comments: +right plantar DFU with slough and mild maceratino   Neurological:      Mental Status: He is alert.           Vitals:    08/09/22 1007   BP: 134/72   Pulse: 78   Temp: 97.5 °F (36.4 °C)     Debridement    Date/Time: 8/9/2022 9:32 AM  Performed by: See Jean-Baptiste MD  Authorized by: See Jean-Baptiste MD     Time out: Immediately prior to procedure a "time out" was called to verify the correct patient, procedure, equipment, support staff and site/side marked as required.    Consent Done?:  Yes (Written)  Local anesthesia used?: No      Wound Details:    Location:  Right foot    Location:  Right Plantar    Type of Debridement:  Excisional       Length (cm):  3       Area (sq cm):  6.9       Width (cm):  2.3       Percent Debrided (%):  100       Depth (cm):  0.3       Total Area Debrided (sq cm):  6.9    Depth of debridement:  Subcutaneous tissue    Tissue debrided:  Dermis, Epidermis and Subcutaneous    Devitalized tissue debrided:  Callus, Fibrin and Slough    Instruments:  Curette    Bleeding:  Minimal  Hemostasis Achieved: Yes    Method Used:  Pressure  Patient tolerance:  Patient tolerated the procedure well with no immediate complications      Assessment:           ICD-10-CM ICD-9-CM   1. Diabetic ulcer of right midfoot associated with type 2 diabetes mellitus, with fat layer exposed  E11.621 250.80    L97.412 707.14            Wound 04/12/22 1426 Diabetic Ulcer Right plantar Foot (Active)   04/12/22 1426    Pre-existing: Yes   Primary Wound Type: Diabetic ulc   Side: Right   Orientation: plantar   Location: Foot   Wound Number:    Ankle-Brachial Index:    Pulses:    Removal Indication and Assessment:    Wound Outcome: Healed   (Retired) Wound Type:    (Retired) Wound Length (cm):    (Retired) Wound Width (cm):    (Retired) Depth (cm):    Wound " Description (Comments):    Removal Indications:    Wound Image    08/09/22 1000   Wound WDL ex 08/09/22 1000   Dressing Appearance Moist drainage;Area marked 08/09/22 1000   Drainage Amount Moderate 08/09/22 1000   Drainage Characteristics/Odor Serosanguineous;No odor 08/09/22 1000   Appearance Red;Maroon;Blistered;Ecchymotic 08/09/22 1000   Tissue loss description Partial thickness 08/09/22 1000   Black (%), Wound Tissue Color 0 % 08/09/22 1000   Red (%), Wound Tissue Color 100 % 08/09/22 1000   Yellow (%), Wound Tissue Color 0 % 08/09/22 1000   Periwound Area Dry 08/09/22 1000   Wound Edges Callused 08/09/22 1000   Wound Length (cm) 3 cm 08/09/22 1000   Wound Width (cm) 2.3 cm 08/09/22 1000   Wound Depth (cm) 0.3 cm 08/09/22 1000   Wound Volume (cm^3) 2.07 cm^3 08/09/22 1000   Wound Surface Area (cm^2) 6.9 cm^2 08/09/22 1000   Care Cleansed with:;Antimicrobial agent;Sterile normal saline 08/09/22 1000   Dressing Changed 08/09/22 1000   Off Loading Football dressing;Off loading shoe 08/09/22 1000   Dressing Change Due 08/16/22 08/09/22 1000            Wound 08/09/22 1015 Diabetic Ulcer Right lateral Toe, fifth (Active)   08/09/22 1015    Pre-existing:    Primary Wound Type: Diabetic ulc   Side: Right   Orientation: lateral   Location: Toe, fifth   Wound Number:    Ankle-Brachial Index:    Pulses:    Removal Indication and Assessment:    Wound Outcome:    (Retired) Wound Type:    (Retired) Wound Length (cm):    (Retired) Wound Width (cm):    (Retired) Depth (cm):    Wound Description (Comments):    Removal Indications:    Wound Image   08/09/22 1000   Wound WDL ex 08/09/22 1000   Dressing Appearance Open to air 08/09/22 1000   Drainage Amount None 08/09/22 1000   Appearance Red 08/09/22 1000   Tissue loss description Partial thickness 08/09/22 1000   Black (%), Wound Tissue Color 0 % 08/09/22 1000   Red (%), Wound Tissue Color 100 % 08/09/22 1000   Yellow (%), Wound Tissue Color 0 % 08/09/22 1000   Periwound Area  Dry;Intact 08/09/22 1000   Wound Edges Open 08/09/22 1000   Wound Length (cm) 0.5 cm 08/09/22 1000   Wound Width (cm) 0.2 cm 08/09/22 1000   Wound Depth (cm) 0.1 cm 08/09/22 1000   Wound Volume (cm^3) 0.01 cm^3 08/09/22 1000   Wound Surface Area (cm^2) 0.1 cm^2 08/09/22 1000   Tunneling (depth (cm)/location) 0 08/09/22 1000   Undermining (depth (cm)/location) 0 08/09/22 1000   Care Cleansed with:;Antimicrobial agent;Sterile normal saline 08/09/22 1000   Dressing Applied 08/09/22 1000   Off Loading Football dressing;Off loading shoe 08/09/22 1000   Dressing Change Due 08/16/22 08/09/22 1000           Plan:                Orders Placed This Encounter   Procedures    Change dressing     Clean with NS. Endoform to wound beds. Gigi Foam (reg x 1, to dorsal and long x 1 laid horizontal to plantar foot). Football dressing (cast padding x 3, Coban). Darco.        Follow up in about 1 week (around 8/16/2022) for wound care.     See Jean-Baptiste MD

## 2022-08-16 ENCOUNTER — HOSPITAL ENCOUNTER (OUTPATIENT)
Dept: WOUND CARE | Facility: HOSPITAL | Age: 62
Discharge: HOME | End: 2022-08-16
Attending: FAMILY MEDICINE
Payer: MEDICAID

## 2022-08-16 VITALS
DIASTOLIC BLOOD PRESSURE: 69 MMHG | RESPIRATION RATE: 20 BRPM | HEART RATE: 84 BPM | TEMPERATURE: 98 F | SYSTOLIC BLOOD PRESSURE: 134 MMHG

## 2022-08-16 DIAGNOSIS — E11.621 DIABETIC ULCER OF RIGHT MIDFOOT ASSOCIATED WITH TYPE 2 DIABETES MELLITUS, WITH FAT LAYER EXPOSED: Primary | ICD-10-CM

## 2022-08-16 DIAGNOSIS — L97.412 DIABETIC ULCER OF RIGHT MIDFOOT ASSOCIATED WITH TYPE 2 DIABETES MELLITUS, WITH FAT LAYER EXPOSED: Primary | ICD-10-CM

## 2022-08-16 PROCEDURE — 11042 DBRDMT SUBQ TIS 1ST 20SQCM/<: CPT | Performed by: FAMILY MEDICINE

## 2022-08-16 PROCEDURE — 11042 DBRDMT SUBQ TIS 1ST 20SQCM/<: CPT | Mod: ,,, | Performed by: FAMILY MEDICINE

## 2022-08-16 PROCEDURE — 99499 UNLISTED E&M SERVICE: CPT | Mod: ,,, | Performed by: FAMILY MEDICINE

## 2022-08-16 PROCEDURE — 11042 DEBRIDEMENT: ICD-10-PCS | Mod: ,,, | Performed by: FAMILY MEDICINE

## 2022-08-16 PROCEDURE — 99499 NO LOS: ICD-10-PCS | Mod: ,,, | Performed by: FAMILY MEDICINE

## 2022-08-16 NOTE — PROGRESS NOTES
[facility]                Progress Note    Subjective:       Patient ID: Fermin Henson is a 62 y.o. male.    Chief Complaint: Wound Check    F/u wound care visit. Patient ambulatory to exam room with darco shoe on right foot as ordered, no difficulty noted. Patient denies pain or discomfort at present. Wound dressing intact with moderate amount of drainage with small area of breakthrough drainage noted on coban. Patient denies ambulating more than normal or stepping in water. Wound to right plantar foot measuring smaller in length and width. Wound to right lateral fifth toe healed. Wound care done per order. RTC in one week.    MD note:  Patient following up for DFU of right foot.  He states he is offloading as directed.  No fevers, chills, or sweats. No odor from wound that he has noticed.  He has been keeping dressings in place, clean, and dry.      Review of Systems   Constitutional: Negative.    HENT: Negative.    Eyes: Negative.    Respiratory: Negative.    Cardiovascular: Negative.    Gastrointestinal: Negative.    Skin: Positive for wound.   Neurological: Positive for numbness.         Objective:        Physical Exam  Constitutional:       Appearance: Normal appearance.   HENT:      Head: Normocephalic and atraumatic.      Mouth/Throat:      Mouth: Mucous membranes are moist.      Pharynx: Oropharynx is clear.   Eyes:      Extraocular Movements: Extraocular movements intact.      Conjunctiva/sclera: Conjunctivae normal.      Pupils: Pupils are equal, round, and reactive to light.   Cardiovascular:      Rate and Rhythm: Normal rate and regular rhythm.   Pulmonary:      Effort: Pulmonary effort is normal. No respiratory distress.   Abdominal:      General: There is no distension.      Palpations: Abdomen is soft.   Skin:     General: Skin is warm and dry.      Comments: +DFU to right plantar with callous and slough   Neurological:      Mental Status: He is  alert and oriented to person, place, and time. Mental status is at baseline.      Sensory: Sensory deficit present.           Vitals:    08/16/22 1012   BP: 134/69   Pulse: 84   Resp: 20   Temp: 97.7 °F (36.5 °C)     Debridement    Date/Time: 8/16/2022 9:49 AM  Performed by: See Jean-Baptiste MD  Authorized by: See Jean-Baptiste MD     Consent Done?:  Yes (Written)  Local anesthesia used?: No      Wound Details:    Location:  Right foot    Location:  Right Plantar    Type of Debridement:  Excisional       Length (cm):  1.1       Area (sq cm):  0.55       Width (cm):  0.5       Percent Debrided (%):  100       Depth (cm):  0.4       Total Area Debrided (sq cm):  0.55    Depth of debridement:  Subcutaneous tissue    Tissue debrided:  Dermis, Epidermis and Subcutaneous    Devitalized tissue debrided:  Biofilm, Fibrin and Slough    Instruments:  Curette    Bleeding:  Minimal  Hemostasis Achieved: Yes    Method Used:  Pressure  Patient tolerance:  Patient tolerated the procedure well with no immediate complications      Assessment:           ICD-10-CM ICD-9-CM   1. Diabetic ulcer of right midfoot associated with type 2 diabetes mellitus, with fat layer exposed  E11.621 250.80    L97.412 707.14            Wound 04/12/22 1426 Diabetic Ulcer Right plantar Foot (Active)   04/12/22 1426    Pre-existing: Yes   Primary Wound Type: Diabetic ulc   Side: Right   Orientation: plantar   Location: Foot   Wound Number:    Ankle-Brachial Index:    Pulses:    Removal Indication and Assessment:    Wound Outcome: Healed   (Retired) Wound Type:    (Retired) Wound Length (cm):    (Retired) Wound Width (cm):    (Retired) Depth (cm):    Wound Description (Comments):    Removal Indications:    Wound Image    08/16/22 1000   Wound WDL ex 08/16/22 1000   Dressing Appearance Intact;Moist drainage 08/16/22 1000   Drainage Amount Moderate 08/16/22 1000   Drainage Characteristics/Odor Serosanguineous;Malodorous 08/16/22 1000   Appearance Red;Maroon  08/16/22 1000   Tissue loss description Partial thickness 08/16/22 1000   Black (%), Wound Tissue Color 0 % 08/16/22 1000   Red (%), Wound Tissue Color 100 % 08/16/22 1000   Yellow (%), Wound Tissue Color 0 % 08/16/22 1000   Periwound Area Dry 08/16/22 1000   Wound Edges Callused 08/16/22 1000   Wound Length (cm) 1.1 cm 08/16/22 1000   Wound Width (cm) 0.5 cm 08/16/22 1000   Wound Depth (cm) 0.4 cm 08/16/22 1000   Wound Volume (cm^3) 0.22 cm^3 08/16/22 1000   Wound Surface Area (cm^2) 0.55 cm^2 08/16/22 1000   Care Cleansed with:;Soap and water;Sterile normal saline 08/16/22 1000   Dressing Changed 08/16/22 1000   Off Loading Football dressing;Off loading shoe 08/16/22 1000   Dressing Change Due 08/23/22 08/16/22 1000       [REMOVED]      Wound 08/09/22 1015 Diabetic Ulcer Right lateral Toe, fifth (Removed)   08/09/22 1015    Pre-existing:    Primary Wound Type: Diabetic ulc   Side: Right   Orientation: lateral   Location: Toe, fifth   Wound Number:    Ankle-Brachial Index:    Pulses:    Removal Indication and Assessment:    Wound Outcome: Healed   (Retired) Wound Type:    (Retired) Wound Length (cm):    (Retired) Wound Width (cm):    (Retired) Depth (cm):    Wound Description (Comments):    Removal Indications:    Removed 08/16/22 1030   Wound Image   08/16/22 1000   Dressing Appearance Intact;Dry 08/16/22 1000   Drainage Amount None 08/16/22 1000   Appearance Epithelialization 08/16/22 1000   Tissue loss description Not applicable 08/16/22 1000   Wound Edges Rolled/closed 08/16/22 1000   Wound Length (cm) 0 cm 08/16/22 1000   Wound Width (cm) 0 cm 08/16/22 1000   Wound Depth (cm) 0 cm 08/16/22 1000   Wound Volume (cm^3) 0 cm^3 08/16/22 1000   Wound Surface Area (cm^2) 0 cm^2 08/16/22 1000   Care Cleansed with:;Soap and water;Sterile normal saline 08/16/22 1000           Plan:                Orders Placed This Encounter   Procedures    Change dressing     Clean with NS. Endoform to wound beds. Metra (Reg x 1) to  Plantar Foot. Gigi Foam (reg x 3, to dorsal and plantar foot). Football dressing (cast padding x 3, Coban). Darco.        Follow up in about 1 week (around 8/23/2022) for wound care visit.     See Jean-Baptiste MD

## 2022-08-23 ENCOUNTER — HOSPITAL ENCOUNTER (OUTPATIENT)
Dept: WOUND CARE | Facility: HOSPITAL | Age: 62
Discharge: HOME | End: 2022-08-23
Attending: FAMILY MEDICINE
Payer: MEDICAID

## 2022-08-23 VITALS — DIASTOLIC BLOOD PRESSURE: 77 MMHG | HEART RATE: 100 BPM | SYSTOLIC BLOOD PRESSURE: 139 MMHG | TEMPERATURE: 98 F

## 2022-08-23 DIAGNOSIS — E11.621 TYPE 2 DIABETES WITH SKIN ULCER OF FOOT: ICD-10-CM

## 2022-08-23 DIAGNOSIS — L97.412 DIABETIC ULCER OF RIGHT MIDFOOT ASSOCIATED WITH TYPE 2 DIABETES MELLITUS, WITH FAT LAYER EXPOSED: Primary | ICD-10-CM

## 2022-08-23 DIAGNOSIS — E11.621 DIABETIC ULCER OF RIGHT MIDFOOT ASSOCIATED WITH TYPE 2 DIABETES MELLITUS, WITH FAT LAYER EXPOSED: Primary | ICD-10-CM

## 2022-08-23 DIAGNOSIS — L97.509 TYPE 2 DIABETES WITH SKIN ULCER OF FOOT: ICD-10-CM

## 2022-08-23 PROCEDURE — 11042 DEBRIDEMENT: ICD-10-PCS | Mod: ,,, | Performed by: FAMILY MEDICINE

## 2022-08-23 PROCEDURE — 87070 CULTURE OTHR SPECIMN AEROBIC: CPT | Performed by: FAMILY MEDICINE

## 2022-08-23 PROCEDURE — 11042 DBRDMT SUBQ TIS 1ST 20SQCM/<: CPT | Performed by: FAMILY MEDICINE

## 2022-08-23 PROCEDURE — 99499 UNLISTED E&M SERVICE: CPT | Mod: ,,, | Performed by: FAMILY MEDICINE

## 2022-08-23 PROCEDURE — 87205 SMEAR GRAM STAIN: CPT | Performed by: FAMILY MEDICINE

## 2022-08-23 PROCEDURE — 87186 SC STD MICRODIL/AGAR DIL: CPT | Performed by: FAMILY MEDICINE

## 2022-08-23 PROCEDURE — 99499 NO LOS: ICD-10-PCS | Mod: ,,, | Performed by: FAMILY MEDICINE

## 2022-08-23 PROCEDURE — 87077 CULTURE AEROBIC IDENTIFY: CPT | Mod: 59 | Performed by: FAMILY MEDICINE

## 2022-08-23 PROCEDURE — 11042 DBRDMT SUBQ TIS 1ST 20SQCM/<: CPT | Mod: ,,, | Performed by: FAMILY MEDICINE

## 2022-08-23 NOTE — PROGRESS NOTES
[facility]                Progress Note    Subjective:       Patient ID: Fermin Henson is a 62 y.o. male.    Chief Complaint: Wound Check    Follow up wound care visit. Patient ambulated to exam room unaided, with prescribed Darco shoe on Right Foot. No c/o pain at present. Dressing intact with a large amount of dried brown, malodor drainage from Right Plantar Foot. Right Plantar Foot wound measuring smaller in (0.4 cm) length and (0.1 cm) width, MD debrided and sent tissue for culture.     Wound care done as per order. RTC 1 week to see MD.     MD note:  Patient following up today for DFU.  He has been keeping dressings in place, clean and dry.  No fevers, chills, or sweats.  He is not offloading as directed stating his mom has not been doing well and he has been on his feet more taking care of her.  There has been no new wounds that he is aware of      Review of Systems   Constitutional: Negative.    HENT: Negative.    Eyes: Negative.    Respiratory: Negative.    Cardiovascular: Negative.    Gastrointestinal: Negative.    Skin: Positive for wound.   Neurological: Positive for numbness.   Psychiatric/Behavioral: Negative.          Objective:        Physical Exam  Constitutional:       Appearance: Normal appearance.   HENT:      Head: Normocephalic and atraumatic.      Right Ear: External ear normal.      Left Ear: External ear normal.      Mouth/Throat:      Mouth: Mucous membranes are moist.      Pharynx: Oropharynx is clear.   Eyes:      Extraocular Movements: Extraocular movements intact.      Conjunctiva/sclera: Conjunctivae normal.      Pupils: Pupils are equal, round, and reactive to light.   Cardiovascular:      Rate and Rhythm: Normal rate and regular rhythm.   Pulmonary:      Effort: Pulmonary effort is normal. No respiratory distress.   Abdominal:      General: There is no distension.      Palpations: Abdomen is soft.   Skin:     General: Skin is warm and  "dry.      Comments: +right DFU with brown discharge and excess callous   Neurological:      Mental Status: He is alert and oriented to person, place, and time. Mental status is at baseline.           Vitals:    08/23/22 1047   BP: 139/77   Pulse: 100   Temp: 97.8 °F (36.6 °C)     Debridement    Date/Time: 8/23/2022 10:32 AM  Performed by: See Jean-Baptiste MD  Authorized by: See Jean-Baptiste MD     Time out: Immediately prior to procedure a "time out" was called to verify the correct patient, procedure, equipment, support staff and site/side marked as required.    Consent Done?:  Yes (Written)  Local anesthesia used?: No      Wound Details:    Location:  Right foot    Location:  Right Plantar    Type of Debridement:  Excisional       Length (cm):  0.7       Area (sq cm):  0.28       Width (cm):  0.4       Percent Debrided (%):  100       Depth (cm):  0.3       Total Area Debrided (sq cm):  0.28    Depth of debridement:  Subcutaneous tissue    Tissue debrided:  Dermis, Epidermis and Subcutaneous    Devitalized tissue debrided:  Biofilm, Callus, Fibrin and Slough    Instruments:  Curette    Bleeding:  Minimal  Hemostasis Achieved: Yes    Method Used:  Pressure  Patient tolerance:  Patient tolerated the procedure well with no immediate complications      Assessment:           ICD-10-CM ICD-9-CM   1. Diabetic ulcer of right midfoot associated with type 2 diabetes mellitus, with fat layer exposed  E11.621 250.80    L97.412 707.14   2. Type 2 diabetes with skin ulcer of foot  E11.621 250.80    L97.509 707.15            Wound 04/12/22 1426 Diabetic Ulcer Right plantar Foot (Active)   04/12/22 1426    Pre-existing: Yes   Primary Wound Type: Diabetic ulc   Side: Right   Orientation: plantar   Location: Foot   Wound Number:    Ankle-Brachial Index:    Pulses:    Removal Indication and Assessment:    Wound Outcome: Healed   (Retired) Wound Type:    (Retired) Wound Length (cm):    (Retired) Wound Width (cm):    (Retired) Depth " (cm):    Wound Description (Comments):    Removal Indications:    Wound Image    08/23/22 1000   Wound WDL ex 08/23/22 1000   Dressing Appearance Intact;Dried drainage 08/23/22 1000   Drainage Amount Large 08/23/22 1000   Drainage Characteristics/Odor Brown;Malodorous 08/23/22 1000   Appearance Pink 08/23/22 1000   Tissue loss description Partial thickness 08/23/22 1000   Black (%), Wound Tissue Color 0 % 08/23/22 1000   Red (%), Wound Tissue Color 100 % 08/23/22 1000   Yellow (%), Wound Tissue Color 0 % 08/23/22 1000   Periwound Area Dry 08/23/22 1000   Wound Edges Callused 08/23/22 1000   Wound Length (cm) 0.7 cm 08/23/22 1000   Wound Width (cm) 0.4 cm 08/23/22 1000   Wound Depth (cm) 0.3 cm 08/23/22 1000   Wound Volume (cm^3) 0.084 cm^3 08/23/22 1000   Wound Surface Area (cm^2) 0.28 cm^2 08/23/22 1000   Tunneling (depth (cm)/location) 0 08/23/22 1000   Undermining (depth (cm)/location) 0 08/23/22 1000   Care Cleansed with:;Antimicrobial agent;Sterile normal saline 08/23/22 1000   Dressing Changed 08/23/22 1000   Periwound Care Absorptive dressing applied 08/23/22 1000   Off Loading Football dressing;Off loading shoe 08/23/22 1000   Dressing Change Due 08/30/22 08/23/22 1000           Plan:                Orders Placed This Encounter   Procedures    Debridement     This order was created via procedure documentation     Standing Status:   Standing     Number of Occurrences:   1    CULTURE, TISSUE    Change dressing     Clean with NS. Hydrofera Blue transfer to wound bed. Mextra (Reg x 1) to Plantar Foot. Gigi Foam (long x 2, one to wrapped from dorsal to plantar foot, one to plantar foot ). Football dressing (cast padding x 3, Coban). Darco.        Follow up in about 1 week (around 8/30/2022) for wound care.     See Jean-Baptiste MD

## 2022-08-25 DIAGNOSIS — L97.412 DIABETIC ULCER OF RIGHT MIDFOOT ASSOCIATED WITH TYPE 2 DIABETES MELLITUS, WITH FAT LAYER EXPOSED: Primary | ICD-10-CM

## 2022-08-25 DIAGNOSIS — E11.621 DIABETIC ULCER OF RIGHT MIDFOOT ASSOCIATED WITH TYPE 2 DIABETES MELLITUS, WITH FAT LAYER EXPOSED: Primary | ICD-10-CM

## 2022-08-25 RX ORDER — CIPROFLOXACIN 500 MG/1
500 TABLET ORAL EVERY 12 HOURS
Qty: 14 TABLET | Refills: 0 | Status: SHIPPED | OUTPATIENT
Start: 2022-08-25 | End: 2022-09-01

## 2022-08-26 ENCOUNTER — TELEPHONE (OUTPATIENT)
Dept: FAMILY MEDICINE | Facility: CLINIC | Age: 62
End: 2022-08-26
Payer: MEDICAID

## 2022-08-26 DIAGNOSIS — L97.412 DIABETIC ULCER OF RIGHT MIDFOOT ASSOCIATED WITH TYPE 2 DIABETES MELLITUS, WITH FAT LAYER EXPOSED: Primary | ICD-10-CM

## 2022-08-26 DIAGNOSIS — E11.621 DIABETIC ULCER OF RIGHT MIDFOOT ASSOCIATED WITH TYPE 2 DIABETES MELLITUS, WITH FAT LAYER EXPOSED: Primary | ICD-10-CM

## 2022-08-26 LAB
BACTERIA TISS AEROBE CULT: ABNORMAL
BACTERIA TISS AEROBE CULT: ABNORMAL
GRAM STN SPEC: ABNORMAL

## 2022-08-26 RX ORDER — SULFAMETHOXAZOLE AND TRIMETHOPRIM 800; 160 MG/1; MG/1
1 TABLET ORAL 2 TIMES DAILY
Qty: 20 TABLET | Refills: 0 | Status: SHIPPED | OUTPATIENT
Start: 2022-08-26 | End: 2022-09-05

## 2022-08-26 NOTE — TELEPHONE ENCOUNTER
----- Message from Asiya Dobson sent at 8/26/2022 12:52 PM CDT -----  Type:  Patient Returning Call    Who Called: self     Who Left Message for Patient: Roma Vega MA     Does the patient know what this is regarding?: no     Would the patient rather a call back or a response via [facility]? Call back     Best Call Back Number: [phone]

## 2022-08-26 NOTE — TELEPHONE ENCOUNTER
----- Message from See Jean-Baptiste MD sent at 8/26/2022  8:19 AM CDT -----  Please let patient know that his culture grew out two bacteria and I have sent in a prescription for 2 different antibiotics    Thanks  Dr. Jean-Baptiste

## 2022-08-26 NOTE — PROGRESS NOTES
Please let patient know that his culture grew out two bacteria and I have sent in a prescription for 2 different antibiotics    Thanks  Dr. Jean-Baptiste

## 2022-08-26 NOTE — TELEPHONE ENCOUNTER
Patient states [facility] pharmacy would not give him both antibiotics ordered as he was informed he should not take bactrim and cipro together

## 2022-08-26 NOTE — TELEPHONE ENCOUNTER
Please check with patients pharmacy to see why they would not fill both antibiotics I prescribed.  He has a multi-organism infection that cultures how he needs both.    Thanks  Dr. Jean-Baptiste

## 2022-08-29 ENCOUNTER — TELEPHONE (OUTPATIENT)
Dept: FAMILY MEDICINE | Facility: CLINIC | Age: 62
End: 2022-08-29
Payer: MEDICAID

## 2022-08-29 NOTE — TELEPHONE ENCOUNTER
spoke w/rusty states that the pt came in stating that he didn't no way he was taking two antibodies bactrim and cipro together Rusty states he has the bactrim and she will get the cipro meds ready for  vs

## 2022-08-30 ENCOUNTER — HOSPITAL ENCOUNTER (OUTPATIENT)
Dept: WOUND CARE | Facility: HOSPITAL | Age: 62
Discharge: HOME | End: 2022-08-30
Attending: FAMILY MEDICINE
Payer: MEDICAID

## 2022-08-30 VITALS — TEMPERATURE: 97 F | SYSTOLIC BLOOD PRESSURE: 126 MMHG | HEART RATE: 70 BPM | DIASTOLIC BLOOD PRESSURE: 68 MMHG

## 2022-08-30 DIAGNOSIS — L97.509 TYPE 2 DIABETES WITH SKIN ULCER OF FOOT: Primary | ICD-10-CM

## 2022-08-30 DIAGNOSIS — E11.621 TYPE 2 DIABETES WITH SKIN ULCER OF FOOT: Primary | ICD-10-CM

## 2022-08-30 PROCEDURE — 99499 UNLISTED E&M SERVICE: CPT | Mod: ,,, | Performed by: FAMILY MEDICINE

## 2022-08-30 PROCEDURE — 99499 NO LOS: ICD-10-PCS | Mod: ,,, | Performed by: FAMILY MEDICINE

## 2022-08-30 PROCEDURE — 11042 DBRDMT SUBQ TIS 1ST 20SQCM/<: CPT | Performed by: FAMILY MEDICINE

## 2022-08-30 PROCEDURE — 11042 DEBRIDEMENT: ICD-10-PCS | Mod: ,,, | Performed by: FAMILY MEDICINE

## 2022-08-30 PROCEDURE — 11042 DBRDMT SUBQ TIS 1ST 20SQCM/<: CPT | Mod: ,,, | Performed by: FAMILY MEDICINE

## 2022-08-30 NOTE — PROGRESS NOTES
[facility]                Progress Note    Subjective:       Patient ID: Fermin Henson is a 62 y.o. male.    Chief Complaint: Wound Check    Walked to clinic unaided Started on second abx as didn't want to take 2 together initially. Strong odor from wound and brown drainage washed off and hard callus surrounds bigger wound than last week. 10 min Vinegar soak placed for infection. Callused removed. Wound bigger in length width and depth. States up on his feet more now as taking care of his mother that is declining in health.    MD note:  patient following up today for wound care to right foot.  He has been having a lot of odor from the wound.  He just picked up the second antibiotic yesterday after being resistatnt to taking 2 abx.  No fevers, chills, or sweats.      Review of Systems   Constitutional: Negative.    HENT: Negative.     Eyes: Negative.    Respiratory: Negative.     Cardiovascular: Negative.    Gastrointestinal: Negative.    Skin:  Positive for wound.   Psychiatric/Behavioral: Negative.         Objective:        Physical Exam  Constitutional:       Appearance: Normal appearance.   HENT:      Head: Normocephalic and atraumatic.      Mouth/Throat:      Mouth: Mucous membranes are moist.      Pharynx: Oropharynx is clear.   Eyes:      Extraocular Movements: Extraocular movements intact.      Conjunctiva/sclera: Conjunctivae normal.      Pupils: Pupils are equal, round, and reactive to light.   Cardiovascular:      Rate and Rhythm: Normal rate and regular rhythm.   Pulmonary:      Effort: Pulmonary effort is normal. No respiratory distress.   Abdominal:      General: There is no distension.      Palpations: Abdomen is soft.   Musculoskeletal:      Cervical back: Normal range of motion and neck supple.   Skin:     General: Skin is warm and dry.      Comments: +right DFU with slough, callous, and maceration; foul odor present   Neurological:      Mental  "Status: He is alert.       Vitals:    08/30/22 1107   BP: 126/68   Pulse: 70   Temp: 97.3 °F (36.3 °C)     Debridement    Date/Time: 8/30/2022 10:39 AM  Performed by: See Jean-Baptiste MD  Authorized by: See Jean-Baptiste MD     Time out: Immediately prior to procedure a "time out" was called to verify the correct patient, procedure, equipment, support staff and site/side marked as required.    Consent Done?:  Yes (Written)  Local anesthesia used?: No      Wound Details:    Location:  Right foot    Location:  Right Plantar    Type of Debridement:  Excisional       Length (cm):  1       Area (sq cm):  0.9       Width (cm):  0.9       Percent Debrided (%):  100       Depth (cm):  0.5       Total Area Debrided (sq cm):  0.9    Depth of debridement:  Subcutaneous tissue    Tissue debrided:  Epidermis, Dermis and Subcutaneous    Devitalized tissue debrided:  Biofilm, Callus, Fibrin and Slough    Instruments:  Curette    Bleeding:  Minimal  Hemostasis Achieved: Yes    Method Used:  Pressure  Patient tolerance:  Patient tolerated the procedure well with no immediate complications    Assessment:           ICD-10-CM ICD-9-CM   1. Type 2 diabetes with skin ulcer of foot  E11.621 250.80    L97.509 707.15            Wound 04/12/22 1426 Diabetic Ulcer Right plantar Foot (Active)   04/12/22 1426    Pre-existing: Yes   Primary Wound Type: Diabetic ulc   Side: Right   Orientation: plantar   Location: Foot   Wound Number:    Ankle-Brachial Index:    Pulses:    Removal Indication and Assessment:    Wound Outcome: Healed   (Retired) Wound Type:    (Retired) Wound Length (cm):    (Retired) Wound Width (cm):    (Retired) Depth (cm):    Wound Description (Comments):    Removal Indications:    Wound Image   08/30/22 1100   Wound WDL ex 08/30/22 1100   Dressing Appearance Dry;Intact 08/30/22 1100   Drainage Amount Small 08/30/22 1100   Drainage Characteristics/Odor Brown;Malodorous 08/30/22 1100   Appearance Maroon 08/30/22 1100   Tissue " loss description Partial thickness 08/30/22 1100   Red (%), Wound Tissue Color 100 % 08/30/22 1100   Periwound Area Dry 08/30/22 1100   Wound Edges Defined;Callused 08/30/22 1100   Wound Length (cm) 1 cm 08/30/22 1100   Wound Width (cm) 0.9 cm 08/30/22 1100   Wound Depth (cm) 0.5 cm 08/30/22 1100   Wound Volume (cm^3) 0.45 cm^3 08/30/22 1100   Wound Surface Area (cm^2) 0.9 cm^2 08/30/22 1100   Care Cleansed with:;Antimicrobial agent;Sterile normal saline 08/30/22 1100   Dressing Changed 08/30/22 1100   Dressing Change Due 09/06/22 08/30/22 1100           Plan:                Orders Placed This Encounter   Procedures    Change dressing     Clean with NS. Hydrofera Blue transfer to wound bed. Mextra (Reg x 1) to Plantar Foot. Gigi Foam (long x 2, one to wrapped from dorsal to plantar foot, one to plantar foot ). Football dressing (cast padding x 3, [facility]an). [facility]        Follow up in about 1 week (around 9/6/2022).     See Jean-Baptiste MD

## 2022-09-06 ENCOUNTER — HOSPITAL ENCOUNTER (OUTPATIENT)
Dept: WOUND CARE | Facility: HOSPITAL | Age: 62
Discharge: HOME | End: 2022-09-06
Attending: FAMILY MEDICINE
Payer: MEDICAID

## 2022-09-06 VITALS
HEART RATE: 94 BPM | TEMPERATURE: 98 F | RESPIRATION RATE: 16 BRPM | DIASTOLIC BLOOD PRESSURE: 72 MMHG | SYSTOLIC BLOOD PRESSURE: 132 MMHG

## 2022-09-06 DIAGNOSIS — E11.621 DIABETIC ULCER OF RIGHT MIDFOOT ASSOCIATED WITH TYPE 2 DIABETES MELLITUS, WITH FAT LAYER EXPOSED: ICD-10-CM

## 2022-09-06 DIAGNOSIS — L97.412 DIABETIC ULCER OF RIGHT MIDFOOT ASSOCIATED WITH TYPE 2 DIABETES MELLITUS, WITH FAT LAYER EXPOSED: ICD-10-CM

## 2022-09-06 DIAGNOSIS — L97.509 TYPE 2 DIABETES WITH SKIN ULCER OF FOOT: Primary | ICD-10-CM

## 2022-09-06 DIAGNOSIS — E11.621 TYPE 2 DIABETES WITH SKIN ULCER OF FOOT: Primary | ICD-10-CM

## 2022-09-06 PROCEDURE — 11042 DEBRIDEMENT: ICD-10-PCS | Mod: ,,, | Performed by: FAMILY MEDICINE

## 2022-09-06 PROCEDURE — 99499 NO LOS: ICD-10-PCS | Mod: ,,, | Performed by: FAMILY MEDICINE

## 2022-09-06 PROCEDURE — 11042 DBRDMT SUBQ TIS 1ST 20SQCM/<: CPT | Mod: ,,, | Performed by: FAMILY MEDICINE

## 2022-09-06 PROCEDURE — 99499 UNLISTED E&M SERVICE: CPT | Mod: ,,, | Performed by: FAMILY MEDICINE

## 2022-09-06 PROCEDURE — 11042 DBRDMT SUBQ TIS 1ST 20SQCM/<: CPT | Performed by: FAMILY MEDICINE

## 2022-09-06 NOTE — PROGRESS NOTES
[facility]                Progress Note    Subjective:       Patient ID: Fermin Henson is a 62 y.o. male.    Chief Complaint: Wound Check    F/u wound care visit. Patient ambulatory to exam room with no c/o pain or discomfort at present. Wound dressing to right foot intact with small amount of drainage noted. Wound to right plantar foot has increased callus and measuring smaller from prior wound care visit. After wound debridement wound measuring 0.9cm x 0.6cm x0.3cm. Wound care done per order. RTC in one week.    MD note:  patient following up for DFU to right great toe.  There has been no pain, increased drainage or odor from wound.  He has not been able to offload as his mom has not been well and he has had to help her ambulate more frequently.  No fevers, chills, or sweats.    Review of Systems   Constitutional: Negative.    HENT: Negative.     Eyes: Negative.    Respiratory: Negative.     Cardiovascular: Negative.    Gastrointestinal: Negative.    Musculoskeletal: Negative.    Skin:  Positive for wound.   Psychiatric/Behavioral: Negative.         Objective:        Physical Exam  Constitutional:       Appearance: Normal appearance.   HENT:      Head: Normocephalic and atraumatic.      Mouth/Throat:      Mouth: Mucous membranes are moist.      Pharynx: Oropharynx is clear.   Eyes:      Extraocular Movements: Extraocular movements intact.      Conjunctiva/sclera: Conjunctivae normal.      Pupils: Pupils are equal, round, and reactive to light.   Cardiovascular:      Rate and Rhythm: Normal rate and regular rhythm.   Pulmonary:      Effort: Pulmonary effort is normal. No respiratory distress.   Abdominal:      General: There is no distension.      Palpations: Abdomen is soft.   Skin:     General: Skin is warm and dry.      Comments: Wound has excess callous and slough   Neurological:      Mental Status: He is alert.       Vitals:    09/06/22 0942   BP: 132/72  Pt presents to the ED with c/o cough x 1 week, worsening today. Cough is productive, yellow/clear in color, thick. + headache. Pt denies use of OTC medication PTA. Pt states symptoms worsen at night.   "  Pulse: 94   Resp: 16   Temp: 97.7 °F (36.5 °C)     Debridement    Date/Time: 9/6/2022 9:32 AM  Performed by: See Jean-Baptiste MD  Authorized by: See Jean-Baptiste MD     Time out: Immediately prior to procedure a "time out" was called to verify the correct patient, procedure, equipment, support staff and site/side marked as required.    Consent Done?:  Yes (Written)  Local anesthesia used?: No      Wound Details:    Location:  Left foot    Location:  Left 1st Toe    Type of Debridement:  Excisional       Length (cm):  0.4       Area (sq cm):  0.2       Width (cm):  0.4       Percent Debrided (%):  100       Depth (cm):  0.3       Total Area Debrided (sq cm):  0.2    Depth of debridement:  Subcutaneous tissue    Tissue debrided:  Dermis, Epidermis and Subcutaneous    Devitalized tissue debrided:  Biofilm, Callus, Fibrin and Slough    Instruments:  Curette    Bleeding:  Minimal  Hemostasis Achieved: Yes    Method Used:  Pressure  Patient tolerance:  Patient tolerated the procedure well with no immediate complications    Assessment:           ICD-10-CM ICD-9-CM   1. Type 2 diabetes with skin ulcer of foot  E11.621 250.80    L97.509 707.15   2. Diabetic ulcer of right midfoot associated with type 2 diabetes mellitus, with fat layer exposed  E11.621 250.80    L97.412 707.14            Wound 04/12/22 1426 Diabetic Ulcer Right plantar Foot (Active)   04/12/22 1426    Pre-existing: Yes   Primary Wound Type: Diabetic ulc   Side: Right   Orientation: plantar   Location: Foot   Wound Number:    Ankle-Brachial Index:    Pulses:    Removal Indication and Assessment:    Wound Outcome: Healed   (Retired) Wound Type:    (Retired) Wound Length (cm):    (Retired) Wound Width (cm):    (Retired) Depth (cm):    Wound Description (Comments):    Removal Indications:    Wound Image    09/06/22 0900   Wound WDL ex 09/06/22 0900   Dressing Appearance Intact;Dried drainage 09/06/22 0900   Drainage Amount Small 09/06/22 0900   Drainage " Characteristics/Odor Serosanguineous 09/06/22 0900   Appearance Pink 09/06/22 0900   Tissue loss description Partial thickness 09/06/22 0900   Black (%), Wound Tissue Color 0 % 09/06/22 0900   Red (%), Wound Tissue Color 100 % 09/06/22 0900   Yellow (%), Wound Tissue Color 0 % 09/06/22 0900   Periwound Area Dry 09/06/22 0900   Wound Edges Callused 09/06/22 0900   Wound Length (cm) 0.4 cm 09/06/22 0900   Wound Width (cm) 0.3 cm 09/06/22 0900   Wound Depth (cm) 0.3 cm 09/06/22 0900   Wound Volume (cm^3) 0.036 cm^3 09/06/22 0900   Wound Surface Area (cm^2) 0.12 cm^2 09/06/22 0900   Care Cleansed with:;Soap and water;Sterile normal saline 09/06/22 0900   Dressing Changed 09/06/22 0900   Periwound Care Skin barrier film applied 09/06/22 0900   Off Loading Football dressing;Off loading shoe 09/06/22 0900   Dressing Change Due 09/13/22 09/06/22 0900           Plan:                Orders Placed This Encounter   Procedures    Debridement     This order was created via procedure documentation     Standing Status:   Standing     Number of Occurrences:   1    Change dressing     Clean with NS. Cavilon to periwound. U-shape pad. Liz to wound bed. Gigi foam long x2. Football dressing (cast padding x3, coban). Darco.        Follow up in about 1 week (around 9/13/2022) for wound care visit.     See Jean-Baptiste MD

## 2022-09-13 ENCOUNTER — HOSPITAL ENCOUNTER (OUTPATIENT)
Dept: WOUND CARE | Facility: HOSPITAL | Age: 62
Discharge: HOME | End: 2022-09-13
Attending: FAMILY MEDICINE
Payer: MEDICAID

## 2022-09-13 ENCOUNTER — TELEPHONE (OUTPATIENT)
Dept: FAMILY MEDICINE | Facility: CLINIC | Age: 62
End: 2022-09-13

## 2022-09-13 VITALS
HEART RATE: 85 BPM | HEIGHT: 72 IN | DIASTOLIC BLOOD PRESSURE: 66 MMHG | WEIGHT: 222 LBS | SYSTOLIC BLOOD PRESSURE: 127 MMHG | TEMPERATURE: 98 F | BODY MASS INDEX: 30.07 KG/M2

## 2022-09-13 DIAGNOSIS — L97.509 TYPE 2 DIABETES WITH SKIN ULCER OF FOOT: Primary | ICD-10-CM

## 2022-09-13 DIAGNOSIS — E11.621 DIABETIC ULCER OF RIGHT MIDFOOT ASSOCIATED WITH TYPE 2 DIABETES MELLITUS, WITH FAT LAYER EXPOSED: ICD-10-CM

## 2022-09-13 DIAGNOSIS — E11.621 TYPE 2 DIABETES WITH SKIN ULCER OF FOOT: Primary | ICD-10-CM

## 2022-09-13 DIAGNOSIS — L97.412 DIABETIC ULCER OF RIGHT MIDFOOT ASSOCIATED WITH TYPE 2 DIABETES MELLITUS, WITH FAT LAYER EXPOSED: ICD-10-CM

## 2022-09-13 PROCEDURE — 11042 DBRDMT SUBQ TIS 1ST 20SQCM/<: CPT | Performed by: FAMILY MEDICINE

## 2022-09-13 PROCEDURE — 11042 DEBRIDEMENT: ICD-10-PCS | Mod: ,,, | Performed by: FAMILY MEDICINE

## 2022-09-13 PROCEDURE — 99499 UNLISTED E&M SERVICE: CPT | Mod: ,,, | Performed by: FAMILY MEDICINE

## 2022-09-13 PROCEDURE — 11042 DBRDMT SUBQ TIS 1ST 20SQCM/<: CPT | Mod: ,,, | Performed by: FAMILY MEDICINE

## 2022-09-13 PROCEDURE — 99499 NO LOS: ICD-10-PCS | Mod: ,,, | Performed by: FAMILY MEDICINE

## 2022-09-13 NOTE — TELEPHONE ENCOUNTER
Called patient to scheduled pre-op appointment next available date 02/06 with PCP next available date with another provider 10/14. Patient is scheduled for surgery on 09/28. [facility] states paperwork can be faxed over.   Please advise

## 2022-09-13 NOTE — TELEPHONE ENCOUNTER
----- Message from Karen Vo sent at 9/13/2022  2:53 PM CDT -----  Regarding: surgery clearance  Type: Patient Call Back    Who called: Alesha- [facility]    What is the request in detail: Alesha trujillo [facility] says pt surgery is scheduled for 9/28 and they are still awaiting clearance from Dr. Jean-Baptiste. Alesha can be reached at the number below or the clearance letter can be sent to: [phone](fax).    Can the clinic reply by [facility]? no    Would the patient rather a call back or a response via [facility]? Call    Best call back number: [phone] (office)

## 2022-09-13 NOTE — PROGRESS NOTES
[facility]                Progress Note    Subjective:       Patient ID: Fermin Henson is a 62 y.o. male.    Chief Complaint: Wound Check    Follow up wound care visit. Patient ambulated to exam room unaided, with prescribed Darco shoe on Right Foot. No c/o pain at present. Dressing intact with a large amount of dried serosanguineous, non-malodor drainage that has strike through to Coban layer of dressing. Patient reports walking more with helping. Right Plantar Foot wound measuring larger in (0.1 cm) length and (0.1 cm) width, MD debrided.     Supplied patient with new XL Darco shoe w/ PegAssist insert. Wound care done as per order. Return to clinic in 1 week.    MD note:  patient following up today for DFU to right foot.  There has been no fevers, chills, or sweats.  He has been keeping dressings in place, clean, and dry.  His mom has continued to deteriorate and he is having to be on his feet more than normal.      Review of Systems   Constitutional: Negative.    HENT: Negative.     Eyes: Negative.    Respiratory: Negative.     Cardiovascular: Negative.    Gastrointestinal: Negative.    Skin:  Positive for wound.   Psychiatric/Behavioral: Negative.         Objective:        Physical Exam  Constitutional:       Appearance: Normal appearance.   HENT:      Head: Normocephalic and atraumatic.      Right Ear: External ear normal.      Left Ear: External ear normal.      Mouth/Throat:      Mouth: Mucous membranes are moist.      Pharynx: Oropharynx is clear.   Eyes:      Extraocular Movements: Extraocular movements intact.      Conjunctiva/sclera: Conjunctivae normal.      Pupils: Pupils are equal, round, and reactive to light.   Cardiovascular:      Rate and Rhythm: Normal rate and regular rhythm.   Pulmonary:      Effort: Pulmonary effort is normal. No respiratory distress.   Abdominal:      General: There is no distension.      Palpations: Abdomen is soft.   Skin:    "  General: Skin is warm and dry.      Comments: DFU to right plantar with excess slough and callous formation   Neurological:      Mental Status: He is alert and oriented to person, place, and time. Mental status is at baseline.   Psychiatric:         Mood and Affect: Mood normal.         Behavior: Behavior normal.       Vitals:    09/13/22 0948   BP: 127/66   Pulse: 85   Temp: 97.9 °F (36.6 °C)     Debridement    Date/Time: 9/13/2022 9:33 AM  Performed by: See Jean-Baptiste MD  Authorized by: See Jean-Baptiste MD     Time out: Immediately prior to procedure a "time out" was called to verify the correct patient, procedure, equipment, support staff and site/side marked as required.    Consent Done?:  Yes (Written)  Local anesthesia used?: No      Wound Details:    Location:  Right foot    Location:  Right 1st Metatarsal Head    Type of Debridement:  Excisional       Length (cm):  1       Area (sq cm):  0.5       Width (cm):  0.5       Percent Debrided (%):  100       Depth (cm):  0.3       Total Area Debrided (sq cm):  0.5    Depth of debridement:  Subcutaneous tissue    Tissue debrided:  Dermis, Epidermis and Subcutaneous    Devitalized tissue debrided:  Biofilm, Callus, Fibrin and Slough    Instruments:  Curette    Bleeding:  Minimal  Hemostasis Achieved: Yes    Method Used:  Pressure  Patient tolerance:  Patient tolerated the procedure well with no immediate complications    Assessment:           ICD-10-CM ICD-9-CM   1. Type 2 diabetes with skin ulcer of foot  E11.621 250.80    L97.509 707.15   2. Diabetic ulcer of right midfoot associated with type 2 diabetes mellitus, with fat layer exposed  E11.621 250.80    L97.412 707.14            Wound 04/12/22 1426 Diabetic Ulcer Right plantar Foot (Active)   04/12/22 1426    Pre-existing: Yes   Primary Wound Type: Diabetic ulc   Side: Right   Orientation: plantar   Location: Foot   Wound Number:    Ankle-Brachial Index:    Pulses:    Removal Indication and Assessment:  "   Wound Outcome: Healed   (Retired) Wound Type:    (Retired) Wound Length (cm):    (Retired) Wound Width (cm):    (Retired) Depth (cm):    Wound Description (Comments):    Removal Indications:    Wound Image   09/13/22 1000   Wound WDL ex 09/13/22 1000   Dressing Appearance Intact;Dried drainage 09/13/22 1000   Drainage Amount Moderate 09/13/22 1000   Drainage Characteristics/Odor Serosanguineous;No odor 09/13/22 1000   Appearance Pink 09/13/22 1000   Tissue loss description Partial thickness 09/13/22 1000   Black (%), Wound Tissue Color 0 % 09/13/22 1000   Red (%), Wound Tissue Color 100 % 09/13/22 1000   Yellow (%), Wound Tissue Color 0 % 09/13/22 1000   Periwound Area Dry 09/13/22 1000   Wound Edges Callused 09/13/22 1000   Wound Length (cm) 1 cm 09/13/22 1000   Wound Width (cm) 0.5 cm 09/13/22 1000   Wound Depth (cm) 0.3 cm 09/13/22 1000   Wound Volume (cm^3) 0.15 cm^3 09/13/22 1000   Wound Surface Area (cm^2) 0.5 cm^2 09/13/22 1000   Tunneling (depth (cm)/location) 0 09/13/22 1000   Undermining (depth (cm)/location) 0 09/13/22 1000   Care Cleansed with:;Antimicrobial agent;Sterile normal saline 09/13/22 1000   Dressing Changed 09/13/22 1000   Periwound Care Skin barrier film applied 09/13/22 1000   Off Loading Football dressing;Off loading shoe 09/13/22 1000   Dressing Change Due 09/20/22 09/13/22 1000           Plan:                Orders Placed This Encounter   Procedures    Debridement     This order was created via procedure documentation     Standing Status:   Standing     Number of Occurrences:   1    Change dressing     Clean with NS. Cavilon/Benzoin to periwound. U-shape pad. Liz to wound bed. Drawtex then Mextra secured with Medipore tape. Gigi foam long x2. Football dressing (cast padding x3, coban). Darco with PegAssist.        Follow up in about 1 week (around 9/20/2022) for wound care.       See Jean-Baptiste MD

## 2022-09-20 ENCOUNTER — HOSPITAL ENCOUNTER (OUTPATIENT)
Dept: WOUND CARE | Facility: HOSPITAL | Age: 62
Discharge: HOME | End: 2022-09-20
Attending: FAMILY MEDICINE
Payer: MEDICAID

## 2022-09-20 VITALS
DIASTOLIC BLOOD PRESSURE: 68 MMHG | RESPIRATION RATE: 20 BRPM | HEART RATE: 83 BPM | SYSTOLIC BLOOD PRESSURE: 123 MMHG | TEMPERATURE: 98 F

## 2022-09-20 DIAGNOSIS — E11.621 TYPE 2 DIABETES WITH SKIN ULCER OF FOOT: Primary | ICD-10-CM

## 2022-09-20 DIAGNOSIS — L97.509 TYPE 2 DIABETES WITH SKIN ULCER OF FOOT: Primary | ICD-10-CM

## 2022-09-20 PROCEDURE — 99499 UNLISTED E&M SERVICE: CPT | Mod: ,,, | Performed by: FAMILY MEDICINE

## 2022-09-20 PROCEDURE — 99499 NO LOS: ICD-10-PCS | Mod: ,,, | Performed by: FAMILY MEDICINE

## 2022-09-20 PROCEDURE — 11042 DEBRIDEMENT: ICD-10-PCS | Mod: ,,, | Performed by: FAMILY MEDICINE

## 2022-09-20 PROCEDURE — 11042 DBRDMT SUBQ TIS 1ST 20SQCM/<: CPT | Mod: ,,, | Performed by: FAMILY MEDICINE

## 2022-09-20 PROCEDURE — 11042 DBRDMT SUBQ TIS 1ST 20SQCM/<: CPT | Performed by: FAMILY MEDICINE

## 2022-09-20 NOTE — PROGRESS NOTES
[facility]                Progress Note    Subjective:       Patient ID: Fermin Henson is a 62 y.o. male.    Chief Complaint: Wound Check    F/u wound care visit. Patient ambulatory to exam room with no difficulty noted. Patient denies pain or discomfort at present. Wound dressing to right foot intact with large amount of drainage with no odor noted. Wound to right plantar foot measuring 0.2cm smaller length and has thick callus to wound edges. Wound care done per order. RTC in one week.    MD note:  patient following up for Right DFU.  He has been on his feet more due to taking care of his mom.  No pain in wounds as he is insensate.  He has been keeping dressings clean and dry.  No new wounds that he is aware of.     Review of Systems   Constitutional: Negative.    HENT: Negative.     Eyes: Negative.    Respiratory: Negative.     Cardiovascular: Negative.    Gastrointestinal: Negative.    Skin:  Positive for wound.   Neurological:  Positive for numbness.       Objective:        Physical Exam  Constitutional:       Appearance: Normal appearance.   HENT:      Head: Normocephalic and atraumatic.      Right Ear: External ear normal.      Left Ear: External ear normal.      Mouth/Throat:      Pharynx: Oropharynx is clear.   Eyes:      Extraocular Movements: Extraocular movements intact.      Conjunctiva/sclera: Conjunctivae normal.      Pupils: Pupils are equal, round, and reactive to light.   Cardiovascular:      Rate and Rhythm: Normal rate and regular rhythm.   Pulmonary:      Effort: Pulmonary effort is normal. No respiratory distress.   Abdominal:      General: There is no distension.      Palpations: Abdomen is soft.   Skin:     General: Skin is warm and dry.      Comments: +DFU with excess callous and slough to periwound and wound bed respectively   Neurological:      Mental Status: He is alert.       Vitals:    09/20/22 1052   BP: 123/68   Pulse: 83   Resp: 20  "  Temp: 97.7 °F (36.5 °C)     Debridement    Date/Time: 9/20/2022 10:30 AM  Performed by: See Jean-Baptiste MD  Authorized by: See Jean-Baptiste MD     Time out: Immediately prior to procedure a "time out" was called to verify the correct patient, procedure, equipment, support staff and site/side marked as required.    Consent Done?:  Yes (Written)  Local anesthesia used?: No      Wound Details:    Location:  Right foot    Location:  Right Plantar    Type of Debridement:  Excisional       Length (cm):  0.8       Area (sq cm):  0.4       Width (cm):  0.5       Percent Debrided (%):  100       Depth (cm):  0.3       Total Area Debrided (sq cm):  0.4    Depth of debridement:  Subcutaneous tissue    Tissue debrided:  Dermis, Epidermis and Subcutaneous    Devitalized tissue debrided:  Biofilm, Callus, Fibrin and Slough    Instruments:  Curette    Bleeding:  Minimal  Hemostasis Achieved: Yes    Method Used:  Pressure  Patient tolerance:  Patient tolerated the procedure well with no immediate complications    Assessment:           ICD-10-CM ICD-9-CM   1. Type 2 diabetes with skin ulcer of foot  E11.621 250.80    L97.509 707.15            Wound 04/12/22 1426 Diabetic Ulcer Right plantar Foot (Active)   04/12/22 1426    Pre-existing: Yes   Primary Wound Type: Diabetic ulc   Side: Right   Orientation: plantar   Location: Foot   Wound Number:    Ankle-Brachial Index:    Pulses:    Removal Indication and Assessment:    Wound Outcome: Healed   (Retired) Wound Type:    (Retired) Wound Length (cm):    (Retired) Wound Width (cm):    (Retired) Depth (cm):    Wound Description (Comments):    Removal Indications:    Wound Image   09/20/22 1000   Wound WDL ex 09/20/22 1000   Dressing Appearance Intact;Moist drainage 09/20/22 1000   Drainage Amount Large 09/20/22 1000   Drainage Characteristics/Odor Serosanguineous;No odor 09/20/22 1000   Appearance Red 09/20/22 1000   Tissue loss description Partial thickness 09/20/22 1000   Black (%), " Wound Tissue Color 0 % 09/20/22 1000   Red (%), Wound Tissue Color 100 % 09/20/22 1000   Yellow (%), Wound Tissue Color 0 % 09/20/22 1000   Periwound Area Dry 09/20/22 1000   Wound Edges Callused 09/20/22 1000   Wound Length (cm) 0.8 cm 09/20/22 1000   Wound Width (cm) 0.5 cm 09/20/22 1000   Wound Depth (cm) 0.3 cm 09/20/22 1000   Wound Volume (cm^3) 0.12 cm^3 09/20/22 1000   Wound Surface Area (cm^2) 0.4 cm^2 09/20/22 1000   Care Cleansed with:;Soap and water;Sterile normal saline 09/20/22 1000   Dressing Changed 09/20/22 1000   Periwound Care Skin barrier film applied 09/20/22 1000   Off Loading Football dressing;Off loading shoe 09/20/22 1000   Dressing Change Due 09/27/22 09/20/22 1000           Plan:                Orders Placed This Encounter   Procedures    Change dressing     Clean with NS. Cavilon/Benzoin to periwound. U-shape pad. Liz to wound bed. Drawtex then Mextra secured with Medipore tape. Gigi foam long x2. Football dressing (cast padding x3, coban). Darco with PegAssist          Follow up in about 1 week (around 9/27/2022) for wound care visit.       See Jean-Baptiste MD

## 2022-09-27 ENCOUNTER — HOSPITAL ENCOUNTER (OUTPATIENT)
Dept: WOUND CARE | Facility: HOSPITAL | Age: 62
Discharge: HOME | End: 2022-09-27
Attending: FAMILY MEDICINE
Payer: MEDICAID

## 2022-09-27 VITALS
RESPIRATION RATE: 20 BRPM | SYSTOLIC BLOOD PRESSURE: 130 MMHG | HEART RATE: 93 BPM | DIASTOLIC BLOOD PRESSURE: 78 MMHG | TEMPERATURE: 98 F

## 2022-09-27 DIAGNOSIS — L97.509 TYPE 2 DIABETES WITH SKIN ULCER OF FOOT: Primary | ICD-10-CM

## 2022-09-27 DIAGNOSIS — L97.412 DIABETIC ULCER OF RIGHT MIDFOOT ASSOCIATED WITH TYPE 2 DIABETES MELLITUS, WITH FAT LAYER EXPOSED: ICD-10-CM

## 2022-09-27 DIAGNOSIS — E11.621 DIABETIC ULCER OF RIGHT MIDFOOT ASSOCIATED WITH TYPE 2 DIABETES MELLITUS, WITH FAT LAYER EXPOSED: ICD-10-CM

## 2022-09-27 DIAGNOSIS — E11.621 TYPE 2 DIABETES WITH SKIN ULCER OF FOOT: Primary | ICD-10-CM

## 2022-09-27 PROCEDURE — 99499 NO LOS: ICD-10-PCS | Mod: ,,, | Performed by: FAMILY MEDICINE

## 2022-09-27 PROCEDURE — 11042 DBRDMT SUBQ TIS 1ST 20SQCM/<: CPT | Mod: ,,, | Performed by: FAMILY MEDICINE

## 2022-09-27 PROCEDURE — 99499 UNLISTED E&M SERVICE: CPT | Mod: ,,, | Performed by: FAMILY MEDICINE

## 2022-09-27 PROCEDURE — 11042 DEBRIDEMENT: ICD-10-PCS | Mod: ,,, | Performed by: FAMILY MEDICINE

## 2022-09-27 PROCEDURE — 11042 DBRDMT SUBQ TIS 1ST 20SQCM/<: CPT | Performed by: FAMILY MEDICINE

## 2022-09-27 NOTE — PROGRESS NOTES
[facility]                Progress Note    Subjective:       Patient ID: Fermin Henson is a 62 y.o. male.    Chief Complaint: Wound Check      F/u wound care visit. Patient ambulatory to exam room with darco shoe on right foot and no difficulty noted. Patient denies pain or discomfort at present. Wound dressing to right foot intact with small amount of drainage noted to right plantar foot wound. Wound measuring 0.1cm larger length and 0.5cm larger width. Wound care done per order. RTC in one week.    MD note:  patient states that he was able to stay off his feet more this past week, but still had to help his mom a lot as she is not feeling well.  There has been no fevers, chills, or sweats.  No pain as he is insensate int he feet.  He is taking all medications as prescribed.    Review of Systems   Constitutional: Negative.    HENT: Negative.     Eyes: Negative.    Respiratory: Negative.     Cardiovascular: Negative.    Gastrointestinal: Negative.    Skin:  Positive for wound.   Psychiatric/Behavioral: Negative.         Objective:        Physical Exam  Constitutional:       Appearance: Normal appearance.   HENT:      Head: Normocephalic and atraumatic.      Right Ear: External ear normal.      Left Ear: External ear normal.      Mouth/Throat:      Mouth: Mucous membranes are moist.      Pharynx: Oropharynx is clear.   Eyes:      Extraocular Movements: Extraocular movements intact.      Conjunctiva/sclera: Conjunctivae normal.      Pupils: Pupils are equal, round, and reactive to light.   Cardiovascular:      Rate and Rhythm: Normal rate and regular rhythm.   Pulmonary:      Effort: Pulmonary effort is normal. No respiratory distress.   Abdominal:      General: There is no distension.      Palpations: Abdomen is soft.   Skin:     General: Skin is warm and dry.      Comments: +DFU to right plantar (with callous and slough) and open skin to dorsum of right foot      Neurological:      Mental Status: He is alert and oriented to person, place, and time. Mental status is at baseline.       Vitals:    09/27/22 1104   BP: 130/78   Pulse: 93   Resp: 20   Temp: 97.5 °F (36.4 °C)     Debridement    Date/Time: 9/27/2022 10:34 AM  Performed by: See Jean-Baptiste MD  Authorized by: See Jean-Baptiste MD     Consent Done?:  Yes (Written)  Local anesthesia used?: No      Wound Details:    Location:  Right foot    Location:  Right Plantar    Type of Debridement:  Excisional       Length (cm):  0.9       Area (sq cm):  0.72       Width (cm):  0.8       Percent Debrided (%):  100       Depth (cm):  0.3       Total Area Debrided (sq cm):  0.72    Depth of debridement:  Subcutaneous tissue    Tissue debrided:  Dermis, Epidermis and Subcutaneous    Devitalized tissue debrided:  Biofilm, Fibrin and Slough    Instruments:  Curette    Bleeding:  Minimal  Method Used:  Pressure  Patient tolerance:  Patient tolerated the procedure well with no immediate complications    Assessment:           ICD-10-CM ICD-9-CM   1. Type 2 diabetes with skin ulcer of foot  E11.621 250.80    L97.509 707.15   2. Diabetic ulcer of right midfoot associated with type 2 diabetes mellitus, with fat layer exposed  E11.621 250.80    L97.412 707.14            Wound 04/12/22 1426 Diabetic Ulcer Right plantar Foot (Active)   04/12/22 1426    Pre-existing: Yes   Primary Wound Type: Diabetic ulc   Side: Right   Orientation: plantar   Location: Foot   Wound Number:    Ankle-Brachial Index:    Pulses:    Removal Indication and Assessment:    Wound Outcome: Healed   (Retired) Wound Type:    (Retired) Wound Length (cm):    (Retired) Wound Width (cm):    (Retired) Depth (cm):    Wound Description (Comments):    Removal Indications:    Wound Image    09/27/22 1108   Wound WDL ex 09/27/22 1108   Dressing Appearance Intact;Moist drainage 09/27/22 1108   Drainage Amount Small 09/27/22 1108   Drainage Characteristics/Odor Serosanguineous  09/27/22 1108   Appearance Red 09/27/22 1108   Tissue loss description Partial thickness 09/27/22 1108   Black (%), Wound Tissue Color 0 % 09/27/22 1108   Red (%), Wound Tissue Color 100 % 09/27/22 1108   Yellow (%), Wound Tissue Color 0 % 09/27/22 1108   Periwound Area Dry;Intact 09/27/22 1108   Wound Edges Defined;Callused 09/27/22 1108   Wound Length (cm) 0.9 cm 09/27/22 1108   Wound Width (cm) 0.8 cm 09/27/22 1108   Wound Depth (cm) 0.3 cm 09/27/22 1108   Wound Volume (cm^3) 0.216 cm^3 09/27/22 1108   Wound Surface Area (cm^2) 0.72 cm^2 09/27/22 1108   Care Cleansed with:;Soap and water;Sterile normal saline 09/27/22 1108   Dressing Changed 09/27/22 1108   Off Loading Football dressing;Off loading shoe 09/27/22 1108   Dressing Change Due 10/04/22 09/27/22 1108           Plan:                  Orders Placed This Encounter   Procedures    Debridement     This order was created via procedure documentation     Standing Status:   Standing     Number of Occurrences:   1    Change dressing     Clean with NS. Liz to wound bed, cover with drawtex. Gigi foam short x2 (long n/a) to plantar foot. Optifoam Ag to dorsal foot. Football dressing (cast padding x3, coban). Darco. RTC in one week.        Follow up in about 1 week (around 10/4/2022) for wound care visit.       See Jean-Baptiste MD

## 2022-10-04 ENCOUNTER — HOSPITAL ENCOUNTER (OUTPATIENT)
Dept: WOUND CARE | Facility: HOSPITAL | Age: 62
Discharge: HOME | End: 2022-10-04
Attending: FAMILY MEDICINE
Payer: MEDICAID

## 2022-10-04 VITALS — TEMPERATURE: 98 F | DIASTOLIC BLOOD PRESSURE: 68 MMHG | HEART RATE: 80 BPM | SYSTOLIC BLOOD PRESSURE: 141 MMHG

## 2022-10-04 DIAGNOSIS — E11.621 DIABETIC ULCER OF RIGHT MIDFOOT ASSOCIATED WITH TYPE 2 DIABETES MELLITUS, WITH FAT LAYER EXPOSED: ICD-10-CM

## 2022-10-04 DIAGNOSIS — L97.412 DIABETIC ULCER OF RIGHT MIDFOOT ASSOCIATED WITH TYPE 2 DIABETES MELLITUS, WITH FAT LAYER EXPOSED: ICD-10-CM

## 2022-10-04 DIAGNOSIS — L97.509 TYPE 2 DIABETES WITH SKIN ULCER OF FOOT: Primary | ICD-10-CM

## 2022-10-04 DIAGNOSIS — E11.621 TYPE 2 DIABETES WITH SKIN ULCER OF FOOT: Primary | ICD-10-CM

## 2022-10-04 PROCEDURE — 99499 NO LOS: ICD-10-PCS | Mod: ,,, | Performed by: FAMILY MEDICINE

## 2022-10-04 PROCEDURE — 11042 DBRDMT SUBQ TIS 1ST 20SQCM/<: CPT | Mod: ,,, | Performed by: FAMILY MEDICINE

## 2022-10-04 PROCEDURE — 99499 UNLISTED E&M SERVICE: CPT | Mod: ,,, | Performed by: FAMILY MEDICINE

## 2022-10-04 PROCEDURE — 11042 DEBRIDEMENT: ICD-10-PCS | Mod: ,,, | Performed by: FAMILY MEDICINE

## 2022-10-04 PROCEDURE — 11042 DBRDMT SUBQ TIS 1ST 20SQCM/<: CPT | Performed by: FAMILY MEDICINE

## 2022-10-04 NOTE — PROGRESS NOTES
"[facility]                Progress Note    Subjective:       Patient ID: Fermin Henson is a 62 y.o. male.    Chief Complaint: Wound Check and Dressing Change    Follow up wound care visit. Patient ambulated to exam room unaided, with prescribed Darco shoe on Right Foot. No c/o pain at present. Dressing intact with a large amount of dried serosanguineous, non-malodor drainage that has strike through to Coban layer of dressing. Patient reports walking more and having to help his mother be lifted "she's been very weak". Right Plantar Foot wound measuring smaller in (0.1 cm) length and (0.2 cm) width, MD debrided.     Wound care done as per order. Return to clinic in 1 week.    MD note:  patient following up today for wound care to right foot.  He continues to not be able to offload as much due to taking care of his ailing mother.  There has been no increase drainage from the wound and no odor.  He denies any fevers, chills, sweats.  He has kept dressings in tact, clean, and dry.      Review of Systems   Constitutional: Negative.    HENT: Negative.     Eyes: Negative.    Respiratory: Negative.     Cardiovascular: Negative.    Gastrointestinal: Negative.    Genitourinary: Negative.    Musculoskeletal: Negative.    Skin:  Positive for wound.       Objective:        Physical Exam  Constitutional:       Appearance: Normal appearance.   HENT:      Head: Normocephalic and atraumatic.      Right Ear: External ear normal.      Left Ear: External ear normal.      Mouth/Throat:      Mouth: Mucous membranes are moist.      Pharynx: Oropharynx is clear.   Eyes:      Extraocular Movements: Extraocular movements intact.      Conjunctiva/sclera: Conjunctivae normal.      Pupils: Pupils are equal, round, and reactive to light.   Cardiovascular:      Rate and Rhythm: Normal rate and regular rhythm.   Pulmonary:      Effort: Pulmonary effort is normal. No respiratory distress. " "  Abdominal:      General: There is no distension.      Palpations: Abdomen is soft.   Skin:     General: Skin is warm and dry.      Comments: +DFU to right foot with excess callous and slough; no surrounding erythema; no purulent drainage   Neurological:      Mental Status: He is alert.       Vitals:    10/04/22 1312   BP: (!) 141/68   Pulse: 80   Temp: 97.7 °F (36.5 °C)     Debridement    Date/Time: 10/4/2022 12:32 PM  Performed by: See Jean-Baptiste MD  Authorized by: See Jean-Baptiste MD     Time out: Immediately prior to procedure a "time out" was called to verify the correct patient, procedure, equipment, support staff and site/side marked as required.    Consent Done?:  Yes (Written)  Local anesthesia used?: No      Wound Details:    Location:  Right foot    Location:  Right Midfoot    Type of Debridement:  Excisional       Length (cm):  1       Area (sq cm):  1       Width (cm):  1       Percent Debrided (%):  100       Depth (cm):  0.4       Total Area Debrided (sq cm):  1    Depth of debridement:  Subcutaneous tissue    Tissue debrided:  Dermis and Epidermis    Devitalized tissue debrided:  Biofilm, Callus, Fibrin and Slough    Instruments:  Curette    Bleeding:  Minimal  Hemostasis Achieved: Yes    Method Used:  Pressure  Patient tolerance:  Patient tolerated the procedure well with no immediate complications    Assessment:           ICD-10-CM ICD-9-CM   1. Type 2 diabetes with skin ulcer of foot  E11.621 250.80    L97.509 707.15   2. Diabetic ulcer of right midfoot associated with type 2 diabetes mellitus, with fat layer exposed  E11.621 250.80    L97.412 707.14            Wound 04/12/22 1426 Diabetic Ulcer Right plantar Foot (Active)   04/12/22 1426    Pre-existing: Yes   Primary Wound Type: Diabetic ulc   Side: Right   Orientation: plantar   Location: Foot   Wound Number:    Ankle-Brachial Index:    Pulses:    Removal Indication and Assessment:    Wound Outcome: Healed   (Retired) Wound Type:  "   (Retired) Wound Length (cm):    (Retired) Wound Width (cm):    (Retired) Depth (cm):    Wound Description (Comments):    Removal Indications:    Wound Image    10/04/22 1313   Wound WDL ex 10/04/22 1313   Dressing Appearance Intact;Dried drainage;Area marked 10/04/22 1313   Drainage Amount Large 10/04/22 1313   Drainage Characteristics/Odor Serosanguineous;No odor 10/04/22 1313   Appearance Red 10/04/22 1313   Tissue loss description Partial thickness 10/04/22 1313   Black (%), Wound Tissue Color 0 % 10/04/22 1313   Red (%), Wound Tissue Color 100 % 10/04/22 1313   Yellow (%), Wound Tissue Color 0 % 10/04/22 1313   Periwound Area Dry 10/04/22 1313   Wound Edges Callused 10/04/22 1313   Wound Length (cm) 0.8 cm 10/04/22 1313   Wound Width (cm) 0.6 cm 10/04/22 1313   Wound Depth (cm) 0.3 cm 10/04/22 1313   Wound Volume (cm^3) 0.144 cm^3 10/04/22 1313   Wound Surface Area (cm^2) 0.48 cm^2 10/04/22 1313   Tunneling (depth (cm)/location) 0 10/04/22 1313   Undermining (depth (cm)/location) 0 10/04/22 1313   Care Cleansed with:;Antimicrobial agent;Sterile normal saline 10/04/22 1313   Dressing Changed 10/04/22 1313   Off Loading Football dressing;Off loading shoe 10/04/22 1313   Dressing Change Due 10/11/22 10/04/22 1313           Plan:                Orders Placed This Encounter   Procedures    Debridement     This order was created via procedure documentation     Standing Status:   Standing     Number of Occurrences:   1    Change dressing     Clean with NS. Endoform to wound bed, Drawtex (cut to fit), Mextra (reg x 1). Gigi foam (long x2, laid perpendicular). Optifoam Ag to dorsal foot. Football dressing (cast padding x3, coban). Darco. Return to clinic in one week.        Follow up in about 1 week (around 10/11/2022) for wound care.       See Jean-Baptiste MD

## 2022-10-08 LAB — NONINV COLON CA DNA+OCC BLD SCRN STL QL: NEGATIVE

## 2022-10-10 ENCOUNTER — TELEPHONE (OUTPATIENT)
Dept: FAMILY MEDICINE | Facility: CLINIC | Age: 62
End: 2022-10-10
Payer: MEDICAID

## 2022-10-10 NOTE — TELEPHONE ENCOUNTER
----- Message from See Jean-Baptiste MD sent at 10/10/2022  7:36 AM CDT -----  Please let patient know that his colon cancer screening was negative    Thanks,  Dr. Jean-Baptiste

## 2022-10-11 ENCOUNTER — HOSPITAL ENCOUNTER (OUTPATIENT)
Dept: WOUND CARE | Facility: HOSPITAL | Age: 62
Discharge: HOME | End: 2022-10-11
Attending: FAMILY MEDICINE
Payer: MEDICAID

## 2022-10-11 VITALS — SYSTOLIC BLOOD PRESSURE: 141 MMHG | HEART RATE: 94 BPM | DIASTOLIC BLOOD PRESSURE: 78 MMHG | TEMPERATURE: 99 F

## 2022-10-11 DIAGNOSIS — E11.621 TYPE 2 DIABETES WITH SKIN ULCER OF FOOT: Primary | ICD-10-CM

## 2022-10-11 DIAGNOSIS — L97.509 TYPE 2 DIABETES WITH SKIN ULCER OF FOOT: Primary | ICD-10-CM

## 2022-10-11 PROCEDURE — 11042 DEBRIDEMENT: ICD-10-PCS | Mod: ,,, | Performed by: FAMILY MEDICINE

## 2022-10-11 PROCEDURE — 11042 DBRDMT SUBQ TIS 1ST 20SQCM/<: CPT | Performed by: FAMILY MEDICINE

## 2022-10-11 PROCEDURE — 11042 DBRDMT SUBQ TIS 1ST 20SQCM/<: CPT | Mod: ,,, | Performed by: FAMILY MEDICINE

## 2022-10-11 PROCEDURE — 99499 UNLISTED E&M SERVICE: CPT | Mod: ,,, | Performed by: FAMILY MEDICINE

## 2022-10-11 PROCEDURE — 99499 NO LOS: ICD-10-PCS | Mod: ,,, | Performed by: FAMILY MEDICINE

## 2022-10-18 ENCOUNTER — HOSPITAL ENCOUNTER (OUTPATIENT)
Dept: WOUND CARE | Facility: HOSPITAL | Age: 62
Discharge: HOME | End: 2022-10-18
Attending: FAMILY MEDICINE
Payer: MEDICAID

## 2022-10-18 VITALS — DIASTOLIC BLOOD PRESSURE: 75 MMHG | HEART RATE: 102 BPM | SYSTOLIC BLOOD PRESSURE: 146 MMHG | TEMPERATURE: 97 F

## 2022-10-18 DIAGNOSIS — E11.621 DIABETIC ULCER OF RIGHT MIDFOOT ASSOCIATED WITH TYPE 2 DIABETES MELLITUS, WITH FAT LAYER EXPOSED: Primary | ICD-10-CM

## 2022-10-18 DIAGNOSIS — L97.412 DIABETIC ULCER OF RIGHT MIDFOOT ASSOCIATED WITH TYPE 2 DIABETES MELLITUS, WITH FAT LAYER EXPOSED: Primary | ICD-10-CM

## 2022-10-18 PROCEDURE — 99499 NO LOS: ICD-10-PCS | Mod: ,,, | Performed by: FAMILY MEDICINE

## 2022-10-18 PROCEDURE — 11042 DBRDMT SUBQ TIS 1ST 20SQCM/<: CPT | Performed by: FAMILY MEDICINE

## 2022-10-18 PROCEDURE — 11042 DEBRIDEMENT: ICD-10-PCS | Mod: ,,, | Performed by: FAMILY MEDICINE

## 2022-10-18 PROCEDURE — 99499 UNLISTED E&M SERVICE: CPT | Mod: ,,, | Performed by: FAMILY MEDICINE

## 2022-10-18 PROCEDURE — 11042 DBRDMT SUBQ TIS 1ST 20SQCM/<: CPT | Mod: ,,, | Performed by: FAMILY MEDICINE

## 2022-10-18 NOTE — PROGRESS NOTES
"[facility]                Progress Note    Subjective:       Patient ID: Fermin Henson is a 62 y.o. male.    Chief Complaint: Wound Check    Walked to clinic unaided wearing Darco and no football. States got cast cover that was a new type that soaked his foot and he removed football as instructed. Has new wound to nub of third toe that "I dont remember doing but noticed blood on bathroom floor". Layer of skin removed and base is red. Plantar wound unchanged.    MD note:  patient following up today for wound to right foot.  His mom passed recently and he has been on his feet more.  He did step in water and wound got saturated.  There has been no pain in the wound.  No odor from wound that he has noticed.  He states he did stub his foot after taking the dressing off as well resulting in another wound    Review of Systems   Constitutional: Negative.    HENT: Negative.     Eyes: Negative.    Respiratory: Negative.     Cardiovascular: Negative.    Gastrointestinal: Negative.    Skin:  Positive for wound.   Neurological:  Positive for numbness.   Psychiatric/Behavioral: Negative.         Objective:        Physical Exam  Constitutional:       Appearance: Normal appearance.   HENT:      Head: Normocephalic and atraumatic.      Right Ear: External ear normal.      Left Ear: External ear normal.      Mouth/Throat:      Mouth: Mucous membranes are moist.      Pharynx: Oropharynx is clear.   Eyes:      Extraocular Movements: Extraocular movements intact.      Conjunctiva/sclera: Conjunctivae normal.      Pupils: Pupils are equal, round, and reactive to light.   Cardiovascular:      Rate and Rhythm: Normal rate and regular rhythm.   Pulmonary:      Effort: Pulmonary effort is normal. No respiratory distress.   Abdominal:      General: There is no distension.      Palpations: Abdomen is soft.   Skin:     General: Skin is warm and dry.      Comments: +DFU to plantar with " "maceration and new wound with maceration to distal amputation site of second toe.   Neurological:      Mental Status: He is alert and oriented to person, place, and time. Mental status is at baseline.       Vitals:    10/18/22 1047   BP: (!) 146/75   Pulse: 102   Temp: 97.2 °F (36.2 °C)     Debridement    Date/Time: 10/18/2022 10:34 AM  Performed by: See Jean-Baptiste MD  Authorized by: See Jean-Baptiste MD     Time out: Immediately prior to procedure a "time out" was called to verify the correct patient, procedure, equipment, support staff and site/side marked as required.    Consent Done?:  Yes (Written)  Local anesthesia used?: No      Wound Details:    Location:  Right foot    Location:  Right Plantar    Type of Debridement:  Excisional       Length (cm):  1.3       Area (sq cm):  1.3       Width (cm):  1       Percent Debrided (%):  100       Depth (cm):  0.3       Total Area Debrided (sq cm):  1.3    Depth of debridement:  Subcutaneous tissue    Tissue debrided:  Dermis, Epidermis and Subcutaneous    Devitalized tissue debrided:  Biofilm, Fibrin and Slough    Instruments:  Curette    Bleeding:  Minimal  Hemostasis Achieved: Yes    Method Used:  Pressure  Patient tolerance:  Patient tolerated the procedure well with no immediate complications    Assessment:           ICD-10-CM ICD-9-CM   1. Diabetic ulcer of right midfoot associated with type 2 diabetes mellitus, with fat layer exposed  E11.621 250.80    L97.412 707.14            Wound 04/12/22 1426 Diabetic Ulcer Right plantar Foot (Active)   04/12/22 1426    Pre-existing: Yes   Primary Wound Type: Diabetic ulc   Side: Right   Orientation: plantar   Location: Foot   Wound Number:    Ankle-Brachial Index:    Pulses:    Removal Indication and Assessment:    Wound Outcome: Healed   (Retired) Wound Type:    (Retired) Wound Length (cm):    (Retired) Wound Width (cm):    (Retired) Depth (cm):    Wound Description (Comments):    Removal Indications:    Wound Image   " 10/18/22 1114   Wound WDL ex 10/18/22 1114   Dressing Appearance Dry 10/18/22 1114   Drainage Amount None 10/18/22 1114   Appearance Pink 10/18/22 1114   Tissue loss description Full thickness 10/18/22 1114   Red (%), Wound Tissue Color 100 % 10/18/22 1114   Periwound Area Dry 10/18/22 1114   Wound Edges Irregular 10/18/22 1114   Wound Length (cm) 1.3 cm 10/18/22 1114   Wound Width (cm) 1 cm 10/18/22 1114   Wound Depth (cm) 0.3 cm 10/18/22 1114   Wound Volume (cm^3) 0.39 cm^3 10/18/22 1114   Wound Surface Area (cm^2) 1.3 cm^2 10/18/22 1114   Care Cleansed with:;Antimicrobial agent;Sterile normal saline 10/18/22 1114           Plan:                Orders Placed This Encounter   Procedures    Debridement     This order was created via procedure documentation     Standing Status:   Standing     Number of Occurrences:   1    Change dressing     Clean with NS. Gentian violet to periwound. Drawtex, Mextra (reg x 1). Gigi foam (long x2, laid perpendicular). Football dressing (cast padding x3, coban). Darco. Return to clinic in one week.          Follow up in about 1 week (around 10/25/2022).     See Jean-Baptiste MD

## 2022-10-25 ENCOUNTER — HOSPITAL ENCOUNTER (OUTPATIENT)
Dept: WOUND CARE | Facility: HOSPITAL | Age: 62
Discharge: HOME | End: 2022-10-25
Attending: FAMILY MEDICINE
Payer: MEDICAID

## 2022-10-25 VITALS — SYSTOLIC BLOOD PRESSURE: 134 MMHG | TEMPERATURE: 98 F | DIASTOLIC BLOOD PRESSURE: 64 MMHG | HEART RATE: 72 BPM

## 2022-10-25 DIAGNOSIS — E11.621 TYPE 2 DIABETES WITH SKIN ULCER OF FOOT: ICD-10-CM

## 2022-10-25 DIAGNOSIS — L97.509 TYPE 2 DIABETES WITH SKIN ULCER OF FOOT: ICD-10-CM

## 2022-10-25 DIAGNOSIS — L97.412 DIABETIC ULCER OF RIGHT MIDFOOT ASSOCIATED WITH TYPE 2 DIABETES MELLITUS, WITH FAT LAYER EXPOSED: Primary | ICD-10-CM

## 2022-10-25 DIAGNOSIS — E11.621 DIABETIC ULCER OF RIGHT MIDFOOT ASSOCIATED WITH TYPE 2 DIABETES MELLITUS, WITH FAT LAYER EXPOSED: Primary | ICD-10-CM

## 2022-10-25 PROCEDURE — 99214 OFFICE O/P EST MOD 30 MIN: CPT | Mod: 25,,, | Performed by: FAMILY MEDICINE

## 2022-10-25 PROCEDURE — 99214 PR OFFICE/OUTPT VISIT, EST, LEVL IV, 30-39 MIN: ICD-10-PCS | Mod: 25,,, | Performed by: FAMILY MEDICINE

## 2022-10-25 PROCEDURE — 11042 DEBRIDEMENT: ICD-10-PCS | Mod: ,,, | Performed by: FAMILY MEDICINE

## 2022-10-25 PROCEDURE — 11042 DBRDMT SUBQ TIS 1ST 20SQCM/<: CPT | Performed by: FAMILY MEDICINE

## 2022-10-25 PROCEDURE — 11042 DBRDMT SUBQ TIS 1ST 20SQCM/<: CPT | Mod: ,,, | Performed by: FAMILY MEDICINE

## 2022-10-25 NOTE — PROGRESS NOTES
[facility]                Progress Note    Subjective:       Patient ID: Fermin Henson is a 62 y.o. male.    Chief Complaint: Wound Check and Dressing Change    Follow up wound care visit. Patient ambulated to exam room unaided, with prescribed Darco shoe w/ PegAssist on Right Foot. No c/o pain at present. Dressing intact with a copious amount of serosanguineous, drainage that has strike through to Coban layer of dressing. Wound to Right 3rd Distal Toe has sheared open. Right Plantar Foot wound measuring larger in (1.2 cm) length and (1.4 cm) width, MD debrided.     PegAssist and Darco shoe changed today. Wound care done as per order. Return to clinic in 1 week    MD note:  patient following up today for right DFU.  There has been no fevers, chills, or sweats.  He states that when he walks he feels like his foot is slanted.  He has been offloading more this past week.        Review of Systems   Constitutional: Negative.    HENT: Negative.     Eyes: Negative.    Respiratory: Negative.     Cardiovascular: Negative.    Gastrointestinal: Negative.    Skin:  Positive for wound.   Psychiatric/Behavioral: Negative.         Objective:        Physical Exam  Constitutional:       Appearance: Normal appearance.   HENT:      Head: Normocephalic and atraumatic.      Right Ear: External ear normal.      Left Ear: External ear normal.      Mouth/Throat:      Mouth: Mucous membranes are moist.      Pharynx: Oropharynx is clear.   Eyes:      Extraocular Movements: Extraocular movements intact.      Conjunctiva/sclera: Conjunctivae normal.      Pupils: Pupils are equal, round, and reactive to light.   Cardiovascular:      Rate and Rhythm: Normal rate and regular rhythm.   Pulmonary:      Effort: Pulmonary effort is normal. No respiratory distress.   Abdominal:      General: There is no distension.      Palpations: Abdomen is soft.   Musculoskeletal:      Right lower leg: No edema.     "  Left lower leg: No edema.   Skin:     General: Skin is warm and dry.      Comments: +right plantar DFU and DFU to distal second toe with fibrin present   Neurological:      Mental Status: He is alert.       Vitals:    10/25/22 1013   BP: 134/64   Pulse: 72   Temp: 97.8 °F (36.6 °C)     Debridement    Date/Time: 10/25/2022 9:47 AM  Performed by: See Jean-Baptiste MD  Authorized by: See Jean-Baptiste MD     Time out: Immediately prior to procedure a "time out" was called to verify the correct patient, procedure, equipment, support staff and site/side marked as required.    Consent Done?:  Yes (Written)  Local anesthesia used?: No      Wound Details:    Location:  Right foot    Location:  Right Plantar    Type of Debridement:  Excisional       Length (cm):  0.5       Area (sq cm):  0.4       Width (cm):  0.8       Percent Debrided (%):  100       Depth (cm):  0.1       Total Area Debrided (sq cm):  0.4    Depth of debridement:  Subcutaneous tissue    Tissue debrided:  Dermis, Epidermis and Subcutaneous    Devitalized tissue debrided:  Biofilm, Fibrin, Slough and Callus    Instruments:  Curette    Bleeding:  Minimal  Hemostasis Achieved: Yes    Method Used:  Pressure    Assessment:           ICD-10-CM ICD-9-CM   1. Diabetic ulcer of right midfoot associated with type 2 diabetes mellitus, with fat layer exposed  E11.621 250.80    L97.412 707.14   2. Type 2 diabetes with skin ulcer of foot  E11.621 250.80    L97.509 707.15            Altered Skin Integrity 10/25/22 Right distal Toe, third (Active)   10/25/22    Altered Skin Integrity Present on Admission: yes   Side: Right   Orientation: distal   Location: Toe, third   Wound Number:    Is this injury device related?:    Primary Wound Type:    Description of Altered Skin Integrity:    Ankle-Brachial Index:    Pulses:    Removal Indication and Assessment:    Wound Outcome:    (Retired) Wound Length (cm):    (Retired) Wound Width (cm):    (Retired) Depth (cm):    Wound " Description (Comments):    Removal Indications:    Wound Image   10/25/22 1017   Description of Altered Skin Integrity Partial thickness tissue loss. Shallow open ulcer with a red or pink wound bed, without slough. Intact or Open/Ruptured Serum-filled blister. 10/25/22 1017   Dressing Appearance Intact;Moist drainage 10/25/22 1017   Drainage Amount Large 10/25/22 1017   Drainage Characteristics/Odor Serosanguineous 10/25/22 1017   Appearance Red;Yellow;Moist 10/25/22 1017   Tissue loss description Partial thickness 10/25/22 1017   Black (%), Wound Tissue Color 0 % 10/25/22 1017   Red (%), Wound Tissue Color 90 % 10/25/22 1017   Yellow (%), Wound Tissue Color 10 % 10/25/22 1017   Periwound Area Dry;[place] 10/25/22 1017   Wound Edges Defined;Open 10/25/22 1017   Wound Length (cm) 0.5 cm 10/25/22 1017   Wound Width (cm) 0.8 cm 10/25/22 1017   Wound Depth (cm) 0.1 cm 10/25/22 1017   Wound Volume (cm^3) 0.04 cm^3 10/25/22 1017   Wound Surface Area (cm^2) 0.4 cm^2 10/25/22 1017   Tunneling (depth (cm)/location) 0 10/25/22 1017   Undermining (depth (cm)/location) 0 10/25/22 1017   Care Cleansed with:;Antimicrobial agent;Sterile normal saline 10/25/22 1017   Dressing Changed 10/25/22 1017   Periwound Care Moisture barrier applied 10/25/22 1017   Off Loading Football dressing;Off loading shoe 10/25/22 1017   Dressing Change Due 11/01/22 10/25/22 1017            Wound 04/12/22 1426 Diabetic Ulcer Right plantar Foot (Active)   04/12/22 1426    Pre-existing: Yes   Primary Wound Type: Diabetic ulc   Side: Right   Orientation: plantar   Location: Foot   Wound Number:    Ankle-Brachial Index:    Pulses:    Removal Indication and Assessment:    Wound Outcome: Healed   (Retired) Wound Type:    (Retired) Wound Length (cm):    (Retired) Wound Width (cm):    (Retired) Depth (cm):    Wound Description (Comments):    Removal Indications:    Wound Image    10/25/22 1017   Wound WDL ex 10/25/22 1017   Dressing Appearance Intact;Moist  drainage 10/25/22 1017   Drainage Amount Copious 10/25/22 1017   Drainage Characteristics/Odor Serosanguineous 10/25/22 1017   Appearance Red;Moist 10/25/22 1017   Tissue loss description Partial thickness 10/25/22 1017   Black (%), Wound Tissue Color 0 % 10/25/22 1017   Red (%), Wound Tissue Color 100 % 10/25/22 1017   Yellow (%), Wound Tissue Color 0 % 10/25/22 1017   Periwound Area Macerated;Moist;Pale white 10/25/22 1017   Wound Edges Defined;Open 10/25/22 1017   Wound Length (cm) 2.5 cm 10/25/22 1017   Wound Width (cm) 2.4 cm 10/25/22 1017   Wound Depth (cm) 0.1 cm 10/25/22 1017   Wound Volume (cm^3) 0.6 cm^3 10/25/22 1017   Wound Surface Area (cm^2) 6 cm^2 10/25/22 1017   Tunneling (depth (cm)/location) 0 10/25/22 1017   Undermining (depth (cm)/location) 0 10/25/22 1017   Care Cleansed with:;Antimicrobial agent;Sterile normal saline 10/25/22 1017   Dressing Changed 10/25/22 1017   Off Loading Football dressing;Off loading shoe 10/25/22 1017   Dressing Change Due 11/01/22 10/25/22 1017           Plan:                Orders Placed This Encounter   Procedures    Debridement     This order was created via procedure documentation     Standing Status:   Standing     Number of Occurrences:   1    Change dressing     Clean with NS. Gentian violet to periwound. Endoform to both wound beds. Drawtex, Mextra (reg x 1). Gigi foam (long x2, laid perpendicular). Football dressing (fanfold to cushion toe, cast padding x3, coban). Darco. Return to clinic in one week.        Follow up in about 1 week (around 11/1/2022) for wound care.     MD eSe Luis MD

## 2022-11-01 ENCOUNTER — HOSPITAL ENCOUNTER (OUTPATIENT)
Dept: WOUND CARE | Facility: HOSPITAL | Age: 62
Discharge: HOME | End: 2022-11-01
Attending: FAMILY MEDICINE
Payer: MEDICAID

## 2022-11-01 VITALS — HEART RATE: 87 BPM | DIASTOLIC BLOOD PRESSURE: 67 MMHG | SYSTOLIC BLOOD PRESSURE: 141 MMHG | TEMPERATURE: 98 F

## 2022-11-01 DIAGNOSIS — L97.412 DIABETIC ULCER OF RIGHT MIDFOOT ASSOCIATED WITH TYPE 2 DIABETES MELLITUS, WITH FAT LAYER EXPOSED: Primary | ICD-10-CM

## 2022-11-01 DIAGNOSIS — E11.621 DIABETIC ULCER OF RIGHT MIDFOOT ASSOCIATED WITH TYPE 2 DIABETES MELLITUS, WITH FAT LAYER EXPOSED: Primary | ICD-10-CM

## 2022-11-01 PROCEDURE — 11042 DEBRIDEMENT: ICD-10-PCS | Mod: ,,, | Performed by: FAMILY MEDICINE

## 2022-11-01 PROCEDURE — 11042 DBRDMT SUBQ TIS 1ST 20SQCM/<: CPT | Mod: ,,, | Performed by: FAMILY MEDICINE

## 2022-11-01 PROCEDURE — 99214 PR OFFICE/OUTPT VISIT, EST, LEVL IV, 30-39 MIN: ICD-10-PCS | Mod: 25,,, | Performed by: FAMILY MEDICINE

## 2022-11-01 PROCEDURE — 11042 DBRDMT SUBQ TIS 1ST 20SQCM/<: CPT | Performed by: FAMILY MEDICINE

## 2022-11-01 PROCEDURE — 99214 OFFICE O/P EST MOD 30 MIN: CPT | Mod: 25,,, | Performed by: FAMILY MEDICINE

## 2022-11-01 NOTE — PROGRESS NOTES
[facility]                Progress Note    Subjective:       Patient ID: Fermin Henson is a 62 y.o. male.    Chief Complaint: Wound Check and Dressing Change    Follow up wound care visit. Patient ambulated to exam room unaided, with prescribed Darco shoe w/ PegAssist on Right Foot. No c/o pain at present. Dressing intact with a large amount of serosanguineous, drainage that has strike through to Coban layer of dressing at plantar aspect. Right 3rd Distal Toe wound measuring smaller in (0.1 cm) length and in (0.5 cm) width. Right Plantar Foot wound measuring larger in (0.7 cm) length, MD debrided.     Patient to change dressing in 1 week at home - written instructions given. Wound care done as per order. Return to clinic in 2 weeks.     MD note:  patient following up today for DFU to right plantar.  There has been no fevers, chills, or sweats.  He states that he is offloading the foot.  He has kept dressings in place, clean, and dry.    Review of Systems   Constitutional: Negative.    HENT: Negative.     Eyes: Negative.    Respiratory: Negative.     Cardiovascular: Negative.    Gastrointestinal: Negative.    Skin:  Positive for wound.   Neurological:  Positive for numbness.   Psychiatric/Behavioral: Negative.         Objective:        Physical Exam  Constitutional:       Appearance: Normal appearance.   HENT:      Head: Normocephalic and atraumatic.      Right Ear: External ear normal.      Left Ear: External ear normal.      Mouth/Throat:      Mouth: Mucous membranes are moist.      Pharynx: Oropharynx is clear.   Eyes:      Conjunctiva/sclera: Conjunctivae normal.      Pupils: Pupils are equal, round, and reactive to light.   Abdominal:      General: There is no distension.      Palpations: Abdomen is soft.   Musculoskeletal:      Right lower leg: No edema.      Left lower leg: No edema.   Skin:     General: Skin is warm and dry.      Comments: +right plantar DFU  "with excess slough and maceration   Neurological:      Mental Status: He is alert and oriented to person, place, and time.      Sensory: Sensory deficit present.       Vitals:    11/01/22 0954   BP: (!) 141/67   Pulse: 87   Temp: 97.5 °F (36.4 °C)     Debridement    Date/Time: 11/1/2022 9:21 AM  Performed by: See Jean-Baptiste MD  Authorized by: See Jean-Baptiste MD     Time out: Immediately prior to procedure a "time out" was called to verify the correct patient, procedure, equipment, support staff and site/side marked as required.    Consent Done?:  Yes (Written)  Local anesthesia used?: No      Wound Details:    Location:  Right foot    Location:  Right Plantar    Type of Debridement:  Excisional       Length (cm):  1.8       Area (sq cm):  4.32       Width (cm):  2.4       Percent Debrided (%):  100       Depth (cm):  0.1       Total Area Debrided (sq cm):  4.32    Depth of debridement:  Subcutaneous tissue    Tissue debrided:  Dermis, Epidermis and Subcutaneous    Devitalized tissue debrided:  Biofilm and Slough    Instruments:  Curette      Assessment:           ICD-10-CM ICD-9-CM   1. Diabetic ulcer of right midfoot associated with type 2 diabetes mellitus, with fat layer exposed  E11.621 250.80    L97.412 707.14            Altered Skin Integrity 10/25/22 Right distal Toe, third (Active)   10/25/22    Altered Skin Integrity Present on Admission: yes   Side: Right   Orientation: distal   Location: Toe, third   Wound Number:    Is this injury device related?:    Primary Wound Type:    Description of Altered Skin Integrity:    Ankle-Brachial Index:    Pulses:    Removal Indication and Assessment:    Wound Outcome:    (Retired) Wound Length (cm):    (Retired) Wound Width (cm):    (Retired) Depth (cm):    Wound Description (Comments):    Removal Indications:    Wound Image   11/01/22 0956   Description of Altered Skin Integrity Partial thickness tissue loss. Shallow open ulcer with a red or pink wound bed, without " slough. Intact or Open/Ruptured Serum-filled blister. 11/01/22 0956   Dressing Appearance Intact;Moist drainage 11/01/22 0956   Drainage Amount Small 11/01/22 0956   Drainage Characteristics/Odor Serosanguineous;No odor 11/01/22 0956   Appearance Pink;Moist 11/01/22 0956   Tissue loss description Partial thickness 11/01/22 0956   Black (%), Wound Tissue Color 0 % 11/01/22 0956   Red (%), Wound Tissue Color 100 % 11/01/22 0956   Yellow (%), Wound Tissue Color 0 % 11/01/22 0956   Periwound Area Dry;Scar tissue 11/01/22 0956   Wound Edges Defined;Open 11/01/22 0956   Wound Length (cm) 0.4 cm 11/01/22 0956   Wound Width (cm) 0.3 cm 11/01/22 0956   Wound Depth (cm) 0.1 cm 11/01/22 0956   Wound Volume (cm^3) 0.012 cm^3 11/01/22 0956   Wound Surface Area (cm^2) 0.12 cm^2 11/01/22 0956   Tunneling (depth (cm)/location) 0 11/01/22 0956   Undermining (depth (cm)/location) 0 11/01/22 0956   Care Cleansed with:;Antimicrobial agent;Sterile normal saline 11/01/22 0956   Dressing Changed 11/01/22 0956            Wound 04/12/22 1426 Diabetic Ulcer Right plantar Foot (Active)   04/12/22 1426    Pre-existing: Yes   Primary Wound Type: Diabetic ulc   Side: Right   Orientation: plantar   Location: Foot   Wound Number:    Ankle-Brachial Index:    Pulses:    Removal Indication and Assessment:    Wound Outcome: Healed   (Retired) Wound Type:    (Retired) Wound Length (cm):    (Retired) Wound Width (cm):    (Retired) Depth (cm):    Wound Description (Comments):    Removal Indications:    Wound Image   11/01/22 0956   Wound WDL ex 11/01/22 0956   Dressing Appearance Intact;Moist drainage;Area marked 11/01/22 0956   Drainage Amount Large 11/01/22 0956   Drainage Characteristics/Odor Serosanguineous;No odor 11/01/22 0956   Appearance Red;Pink;Yellow;Moist 11/01/22 0956   Tissue loss description Partial thickness 11/01/22 0956   Black (%), Wound Tissue Color 0 % 11/01/22 0956   Red (%), Wound Tissue Color 90 % 11/01/22 0956   Yellow (%), Wound  Tissue Color 10 % 11/01/22 0956   Periwound Area Macerated;Moist;Pale white 11/01/22 0956   Wound Edges Defined;Open 11/01/22 0956   Wound Length (cm) 1.8 cm 11/01/22 0956   Wound Width (cm) 2.4 cm 11/01/22 0956   Wound Depth (cm) 0.1 cm 11/01/22 0956   Wound Volume (cm^3) 0.432 cm^3 11/01/22 0956   Wound Surface Area (cm^2) 4.32 cm^2 11/01/22 0956   Tunneling (depth (cm)/location) 0 11/01/22 0956   Undermining (depth (cm)/location) 0 11/01/22 0956   Care Cleansed with:;Antimicrobial agent;Sterile normal saline 11/01/22 0956   Dressing Changed 11/01/22 0956           Plan:                Orders Placed This Encounter   Procedures    Debridement     This order was created via procedure documentation     Standing Status:   Standing     Number of Occurrences:   1    Change dressing     Clean with NS. Gentian violet to periwound. Endoform to both wound beds. Drawtex, Mextra (reg x 1). Gigi foam (long x2, laid perpendicular). Football dressing (fanfold to cushion toe, cast padding x3, coban). Darco.     Patient will change at home in 1 week. Cleaning with Normal Saline, pat dry. Then apply Endoform to wound beds. Foam AG to cover. Cast Padding and Coban. Return to clinic in 2 weeks.        Follow up in about 2 weeks (around 11/15/2022) for wound care.       See Jean-Baptiste MD

## 2022-11-15 ENCOUNTER — HOSPITAL ENCOUNTER (OUTPATIENT)
Dept: WOUND CARE | Facility: HOSPITAL | Age: 62
Discharge: HOME | End: 2022-11-15
Attending: FAMILY MEDICINE
Payer: MEDICAID

## 2022-11-15 VITALS
SYSTOLIC BLOOD PRESSURE: 136 MMHG | DIASTOLIC BLOOD PRESSURE: 68 MMHG | RESPIRATION RATE: 20 BRPM | HEART RATE: 89 BPM | TEMPERATURE: 98 F

## 2022-11-15 DIAGNOSIS — E11.621 DIABETIC ULCER OF RIGHT MIDFOOT ASSOCIATED WITH TYPE 2 DIABETES MELLITUS, WITH FAT LAYER EXPOSED: Primary | ICD-10-CM

## 2022-11-15 DIAGNOSIS — L97.412 DIABETIC ULCER OF RIGHT MIDFOOT ASSOCIATED WITH TYPE 2 DIABETES MELLITUS, WITH FAT LAYER EXPOSED: Primary | ICD-10-CM

## 2022-11-15 PROCEDURE — 11042 DBRDMT SUBQ TIS 1ST 20SQCM/<: CPT | Performed by: FAMILY MEDICINE

## 2022-11-15 PROCEDURE — 11042 DEBRIDEMENT: ICD-10-PCS | Mod: ,,, | Performed by: FAMILY MEDICINE

## 2022-11-15 PROCEDURE — 99499 UNLISTED E&M SERVICE: CPT | Mod: ,,, | Performed by: FAMILY MEDICINE

## 2022-11-15 PROCEDURE — 99499 NO LOS: ICD-10-PCS | Mod: ,,, | Performed by: FAMILY MEDICINE

## 2022-11-15 PROCEDURE — 11042 DBRDMT SUBQ TIS 1ST 20SQCM/<: CPT | Mod: ,,, | Performed by: FAMILY MEDICINE

## 2022-11-15 NOTE — PROGRESS NOTES
[facility]                Progress Note    Subjective:       Patient ID: Fermin Henson is a 62 y.o. male.    Chief Complaint: Wound Check    F/u wound care visit. Patient ambulatory to exam room with no c/o pain or distress noted. Wound dressing to right foot intact with large amount of drainage noted. Wound to right distal 3rd toe is healed. Wound to right plantar foot measuring 0.2cm larger length and 1.4cm smaller width. Wound care done per order. RTC in one week.    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  He states he has not been on his feet as much since the passing of his mom.  He has been staying with his sister and did change his own dressings last week.  No pain in wound as he is insensate.  No increased odor from wound    Review of Systems   Constitutional: Negative.    HENT: Negative.     Eyes: Negative.    Cardiovascular: Negative.    Gastrointestinal: Negative.    Skin:  Positive for wound.   Neurological:  Positive for numbness.   Psychiatric/Behavioral: Negative.         Objective:        Physical Exam  Constitutional:       Appearance: Normal appearance.   HENT:      Head: Normocephalic and atraumatic.      Right Ear: External ear normal.      Left Ear: External ear normal.      Mouth/Throat:      Mouth: Mucous membranes are moist.      Pharynx: Oropharynx is clear.   Eyes:      Extraocular Movements: Extraocular movements intact.      Conjunctiva/sclera: Conjunctivae normal.      Pupils: Pupils are equal, round, and reactive to light.   Cardiovascular:      Rate and Rhythm: Normal rate and regular rhythm.   Abdominal:      General: There is no distension.      Palpations: Abdomen is soft.   Skin:     General: Skin is warm and dry.      Comments: +right plantar DFU with maceration to surrounding tissue   Neurological:      Mental Status: He is alert and oriented to person, place, and time.      Sensory: Sensory deficit  "present.       Vitals:    11/15/22 1006   BP: 136/68   Pulse: 89   Resp: 20   Temp: 97.7 °F (36.5 °C)     Debridement    Date/Time: 11/15/2022 9:37 AM  Performed by: See Jean-Baptiste MD  Authorized by: See Jean-Baptiste MD     Time out: Immediately prior to procedure a "time out" was called to verify the correct patient, procedure, equipment, support staff and site/side marked as required.    Consent Done?:  Yes (Written)  Local anesthesia used?: No      Wound Details:    Location:  Right foot    Location:  Right Plantar    Type of Debridement:  Excisional       Length (cm):  2       Area (sq cm):  2       Width (cm):  1       Percent Debrided (%):  100       Depth (cm):  0.1       Total Area Debrided (sq cm):  2    Depth of debridement:  Subcutaneous tissue    Tissue debrided:  Dermis, Epidermis and Subcutaneous    Devitalized tissue debrided:  Biofilm, Fibrin and Callus    Instruments:  Curette    Bleeding:  Minimal  Hemostasis Achieved: Yes    Method Used:  Pressure  Patient tolerance:  Patient tolerated the procedure well with no immediate complications    Assessment:           ICD-10-CM ICD-9-CM   1. Diabetic ulcer of right midfoot associated with type 2 diabetes mellitus, with fat layer exposed  E11.621 250.80    L97.412 707.14            Wound 04/12/22 1426 Diabetic Ulcer Right plantar Foot (Active)   04/12/22 1426    Pre-existing: Yes   Primary Wound Type: Diabetic ulc   Side: Right   Orientation: plantar   Location: Foot   Wound Number:    Ankle-Brachial Index:    Pulses:    Removal Indication and Assessment:    Wound Outcome: Healed   (Retired) Wound Type:    (Retired) Wound Length (cm):    (Retired) Wound Width (cm):    (Retired) Depth (cm):    Wound Description (Comments):    Removal Indications:    Wound Image   11/15/22 1007   Wound WDL ex 11/15/22 1007   Dressing Appearance Intact;Moist drainage 11/15/22 1007   Drainage Amount Large 11/15/22 1007   Drainage Characteristics/Odor " Serosanguineous;Malodorous 11/15/22 1007   Appearance Red 11/15/22 1007   Tissue loss description Partial thickness 11/15/22 1007   Black (%), Wound Tissue Color 0 % 11/15/22 1007   Red (%), Wound Tissue Color 012656 % 11/15/22 1007   Yellow (%), Wound Tissue Color 0 % 11/15/22 1007   Periwound Area Pale white;Macerated 11/15/22 1007   Wound Edges Defined 11/15/22 1007   Wound Length (cm) 2 cm 11/15/22 1007   Wound Width (cm) 1 cm 11/15/22 1007   Wound Depth (cm) 0.1 cm 11/15/22 1007   Wound Volume (cm^3) 0.2 cm^3 11/15/22 1007   Wound Surface Area (cm^2) 2 cm^2 11/15/22 1007   Care Cleansed with:;Soap and water;Sterile normal saline 11/15/22 1007   Dressing Changed 11/15/22 1007   Periwound Care Moisture barrier applied 11/15/22 1007   Off Loading Football dressing;Off loading shoe 11/15/22 1007   Dressing Change Due 11/22/22 11/15/22 1007       [REMOVED]      Altered Skin Integrity 10/25/22 Right distal Toe, third (Removed)   10/25/22    Altered Skin Integrity Present on Admission: yes   Side: Right   Orientation: distal   Location: Toe, third   Wound Number:    Is this injury device related?:    Primary Wound Type:    Description of Altered Skin Integrity:    Ankle-Brachial Index:    Pulses:    Removal Indication and Assessment:    Wound Outcome: Healed   (Retired) Wound Length (cm):    (Retired) Wound Width (cm):    (Retired) Depth (cm):    Wound Description (Comments):    Removal Indications:    Removed 11/15/22 1000   Wound Image   11/15/22 1007   Description of Altered Skin Integrity Intact skin with non-blanchable redness of localized area 11/15/22 1007   Dressing Appearance Intact;Dry 11/15/22 1007   Drainage Amount None 11/15/22 1007   Appearance Epithelialization 11/15/22 1007   Tissue loss description Not applicable 11/15/22 1007   Wound Edges Rolled/closed 11/15/22 1007   Wound Length (cm) 0 cm 11/15/22 1007   Wound Width (cm) 0 cm 11/15/22 1007   Wound Depth (cm) 0 cm 11/15/22 1007   Wound Volume  (cm^3) 0 cm^3 11/15/22 1007   Wound Surface Area (cm^2) 0 cm^2 11/15/22 1007           Plan:                Orders Placed This Encounter   Procedures    Debridement     This order was created via procedure documentation     Standing Status:   Standing     Number of Occurrences:   1    Change dressing     Clean with NS. Gentian violet to periwound. HB transfertow wound bed, drawtex, mextra short x1, dl foam long x1 to plantar foot. Optifoam AG to dorsal foot. Football dressing (cast padding x3, coban). Darco.        Follow up in about 1 week (around 11/22/2022) for MD wound care visit.       See Jean-Baptiste MD

## 2022-11-22 ENCOUNTER — HOSPITAL ENCOUNTER (OUTPATIENT)
Dept: WOUND CARE | Facility: HOSPITAL | Age: 62
Discharge: HOME | End: 2022-11-22
Attending: FAMILY MEDICINE
Payer: MEDICAID

## 2022-11-22 VITALS
RESPIRATION RATE: 20 BRPM | SYSTOLIC BLOOD PRESSURE: 151 MMHG | DIASTOLIC BLOOD PRESSURE: 70 MMHG | HEART RATE: 92 BPM | TEMPERATURE: 98 F

## 2022-11-22 DIAGNOSIS — L97.412 DIABETIC ULCER OF RIGHT MIDFOOT ASSOCIATED WITH TYPE 2 DIABETES MELLITUS, WITH FAT LAYER EXPOSED: Primary | ICD-10-CM

## 2022-11-22 DIAGNOSIS — E11.621 DIABETIC ULCER OF RIGHT MIDFOOT ASSOCIATED WITH TYPE 2 DIABETES MELLITUS, WITH FAT LAYER EXPOSED: Primary | ICD-10-CM

## 2022-11-22 PROCEDURE — 11042 DBRDMT SUBQ TIS 1ST 20SQCM/<: CPT | Mod: ,,, | Performed by: FAMILY MEDICINE

## 2022-11-22 PROCEDURE — 99214 PR OFFICE/OUTPT VISIT, EST, LEVL IV, 30-39 MIN: ICD-10-PCS | Mod: 25,,, | Performed by: FAMILY MEDICINE

## 2022-11-22 PROCEDURE — 11042 DEBRIDEMENT: ICD-10-PCS | Mod: ,,, | Performed by: FAMILY MEDICINE

## 2022-11-22 PROCEDURE — 11042 DBRDMT SUBQ TIS 1ST 20SQCM/<: CPT | Performed by: FAMILY MEDICINE

## 2022-11-22 PROCEDURE — 99214 OFFICE O/P EST MOD 30 MIN: CPT | Mod: 25,,, | Performed by: FAMILY MEDICINE

## 2022-11-22 NOTE — PROGRESS NOTES
[facility]                Progress Note    Subjective:       Patient ID: Fermin Henson is a 62 y.o. male.    Chief Complaint: Wound Check    F/u wound care visit. Patient ambulatory to exam room with no difficulty noted. Patient denies pain or discomfort at present. Wound dressing to right foot intact with moderate amount of drainage noted. Wound to right plantar foot measuring smaller length. Right distal 3rd toe wound noted measuring 0.2 x 0.2 x 0.1cm. Wound care done per order. RTC in one week.    MD note:  patient following up today for DFU to to right plantar.  There has been no odor or increased drainage.  He is staying off foot.  No fevers, chills, or sweats.    Review of Systems   Constitutional: Negative.    HENT: Negative.     Eyes: Negative.    Respiratory: Negative.     Cardiovascular: Negative.    Gastrointestinal: Negative.    Skin:  Positive for wound.   Psychiatric/Behavioral: Negative.         Objective:        Physical Exam  Constitutional:       Appearance: Normal appearance.   HENT:      Head: Normocephalic and atraumatic.      Right Ear: External ear normal.      Left Ear: External ear normal.      Mouth/Throat:      Mouth: Mucous membranes are moist.      Pharynx: Oropharynx is clear.   Eyes:      Extraocular Movements: Extraocular movements intact.      Conjunctiva/sclera: Conjunctivae normal.      Pupils: Pupils are equal, round, and reactive to light.   Cardiovascular:      Rate and Rhythm: Normal rate and regular rhythm.   Abdominal:      General: There is no distension.      Palpations: Abdomen is soft.   Skin:     General: Skin is warm and dry.      Comments: +plantar DFU with maceration and slough   Neurological:      Mental Status: He is alert and oriented to person, place, and time. Mental status is at baseline.       Vitals:    11/22/22 1011   BP: (!) 151/70   Pulse: 92   Resp: 20   Temp: 97.9 °F (36.6 °C)  "    Debridement    Date/Time: 11/22/2022 9:37 AM  Performed by: See Jean-Baptiste MD  Authorized by: See Jean-Baptiste MD     Time out: Immediately prior to procedure a "time out" was called to verify the correct patient, procedure, equipment, support staff and site/side marked as required.    Consent Done?:  Yes (Written)  Local anesthesia used?: No      Wound Details:    Location:  Right foot    Location:  Right Plantar    Type of Debridement:  Excisional       Length (cm):  0.2       Area (sq cm):  0.04       Width (cm):  0.2       Percent Debrided (%):  100       Depth (cm):  0.1       Total Area Debrided (sq cm):  0.04    Depth of debridement:  Subcutaneous tissue    Tissue debrided:  Dermis, Epidermis and Subcutaneous    Devitalized tissue debrided:  Biofilm, Slough and Fibrin    Instruments:  Curette    Bleeding:  Minimal  Hemostasis Achieved: Yes    Method Used:  Pressure  Patient tolerance:  Patient tolerated the procedure well with no immediate complications    Assessment:           ICD-10-CM ICD-9-CM   1. Diabetic ulcer of right midfoot associated with type 2 diabetes mellitus, with fat layer exposed  E11.621 250.80    L97.412 707.14            Altered Skin Integrity 11/22/22 1020 Right distal Toe, third Diabetic Ulcer Partial thickness tissue loss. Shallow open ulcer with a red or pink wound bed, without slough. Intact or Open/Ruptured Serum-filled blister. (Active)   11/22/22 1020   Altered Skin Integrity Present on Admission:    Side: Right   Orientation: distal   Location: Toe, third   Wound Number:    Is this injury device related?:    Primary Wound Type: Diabetic ulc   Description of Altered Skin Integrity: Partial thickness tissue loss. Shallow open ulcer with a red or pink wound bed, without slough. Intact or Open/Ruptured Serum-filled blister.   Ankle-Brachial Index:    Pulses:    Removal Indication and Assessment:    Wound Outcome:    (Retired) Wound Length (cm):    (Retired) Wound Width (cm):  "   (Retired) Depth (cm):    Wound Description (Comments):    Removal Indications:    Wound Image   11/22/22 1020   Description of Altered Skin Integrity Partial thickness tissue loss. Shallow open ulcer with a red or pink wound bed, without slough. Intact or Open/Ruptured Serum-filled blister. 11/22/22 1020   Dressing Appearance Intact;Moist drainage 11/22/22 1020   Drainage Amount Scant 11/22/22 1020   Drainage Characteristics/Odor Serosanguineous 11/22/22 1020   Appearance Red 11/22/22 1020   Tissue loss description Partial thickness 11/22/22 1020   Black (%), Wound Tissue Color 0 % 11/22/22 1020   Red (%), Wound Tissue Color 100 % 11/22/22 1020   Yellow (%), Wound Tissue Color 0 % 11/22/22 1020   Periwound Area Dry;Intact 11/22/22 1020   Wound Edges Defined 11/22/22 1020   Wound Length (cm) 0.2 cm 11/22/22 1020   Wound Width (cm) 0.2 cm 11/22/22 1020   Wound Depth (cm) 0.1 cm 11/22/22 1020   Wound Volume (cm^3) 0.004 cm^3 11/22/22 1020   Wound Surface Area (cm^2) 0.04 cm^2 11/22/22 1020   Care Cleansed with:;Soap and water;Sterile normal saline 11/22/22 1020   Dressing Changed 11/22/22 1020   Off Loading Football dressing;Off loading shoe 11/22/22 1020   Dressing Change Due 11/29/22 11/22/22 1020            Wound 04/12/22 1426 Diabetic Ulcer Right plantar Foot (Active)   04/12/22 1426    Pre-existing: Yes   Primary Wound Type: Diabetic ulc   Side: Right   Orientation: plantar   Location: Foot   Wound Number:    Ankle-Brachial Index:    Pulses:    Removal Indication and Assessment:    Wound Outcome: Healed   (Retired) Wound Type:    (Retired) Wound Length (cm):    (Retired) Wound Width (cm):    (Retired) Depth (cm):    Wound Description (Comments):    Removal Indications:    Wound Image   11/22/22 1020   Wound WDL ex 11/22/22 1020   Dressing Appearance Intact;Moist drainage 11/22/22 1020   Drainage Amount Moderate 11/22/22 1020   Drainage Characteristics/Odor Serosanguineous 11/22/22 1020   Appearance Red 11/22/22  1020   Tissue loss description Partial thickness 11/22/22 1020   Black (%), Wound Tissue Color 0 % 11/22/22 1020   Red (%), Wound Tissue Color 100 % 11/22/22 1020   Yellow (%), Wound Tissue Color 0 % 11/22/22 1020   Periwound Area Pale white;Macerated 11/22/22 1020   Wound Edges Callused 11/22/22 1020   Wound Length (cm) 1.9 cm 11/22/22 1020   Wound Width (cm) 1.3 cm 11/22/22 1020   Wound Depth (cm) 0.1 cm 11/22/22 1020   Wound Volume (cm^3) 0.247 cm^3 11/22/22 1020   Wound Surface Area (cm^2) 2.47 cm^2 11/22/22 1020   Care Cleansed with:;Soap and water;Sterile normal saline 11/22/22 1020   Dressing Changed 11/22/22 1020   Periwound Care Moisture barrier applied 11/22/22 1020   Off Loading Football dressing;Off loading shoe 11/22/22 1020   Dressing Change Due 11/29/22 11/22/22 1020           Plan:                Orders Placed This Encounter   Procedures    Debridement     This order was created via procedure documentation     Standing Status:   Standing     Number of Occurrences:   1    Change dressing     Clean with NS. Gentian violet to periwound.Endoform to wound bed, drawtex, mextra short x1, dl foam long x1 to plantar foot. Optifoam AG to dorsal foot and distal 3rd toe. Football dressing (cast padding x3, coban). Darco.          Follow up in about 1 week (around 11/29/2022) for MD wound care visit.     See Jean-Baptiste MD

## 2022-11-25 ENCOUNTER — TELEPHONE (OUTPATIENT)
Dept: FAMILY MEDICINE | Facility: CLINIC | Age: 62
End: 2022-11-25
Payer: MEDICAID

## 2022-11-28 ENCOUNTER — OFFICE VISIT (OUTPATIENT)
Dept: FAMILY MEDICINE | Facility: CLINIC | Age: 62
End: 2022-11-28
Payer: MEDICAID

## 2022-11-28 VITALS
DIASTOLIC BLOOD PRESSURE: 80 MMHG | SYSTOLIC BLOOD PRESSURE: 134 MMHG | WEIGHT: 220.81 LBS | OXYGEN SATURATION: 96 % | HEART RATE: 92 BPM | BODY MASS INDEX: 29.91 KG/M2 | RESPIRATION RATE: 18 BRPM | HEIGHT: 72 IN | TEMPERATURE: 98 F

## 2022-11-28 DIAGNOSIS — E11.9 TYPE 2 DIABETES MELLITUS WITHOUT COMPLICATION, WITHOUT LONG-TERM CURRENT USE OF INSULIN: ICD-10-CM

## 2022-11-28 DIAGNOSIS — E11.29 TYPE 2 DIABETES MELLITUS WITH MICROALBUMINURIA, WITHOUT LONG-TERM CURRENT USE OF INSULIN: ICD-10-CM

## 2022-11-28 DIAGNOSIS — Z23 INFLUENZA VACCINE NEEDED: ICD-10-CM

## 2022-11-28 DIAGNOSIS — E11.42 TYPE 2 DIABETES MELLITUS WITH DIABETIC POLYNEUROPATHY, WITHOUT LONG-TERM CURRENT USE OF INSULIN: ICD-10-CM

## 2022-11-28 DIAGNOSIS — R80.8 OTHER PROTEINURIA: ICD-10-CM

## 2022-11-28 DIAGNOSIS — E11.3393 CONTROLLED TYPE 2 DIABETES MELLITUS WITH BOTH EYES AFFECTED BY MODERATE NONPROLIFERATIVE RETINOPATHY WITHOUT MACULAR EDEMA, WITHOUT LONG-TERM CURRENT USE OF INSULIN: ICD-10-CM

## 2022-11-28 DIAGNOSIS — I10 BENIGN ESSENTIAL HTN: ICD-10-CM

## 2022-11-28 DIAGNOSIS — Z00.00 VISIT FOR WELL MAN HEALTH CHECK: Primary | ICD-10-CM

## 2022-11-28 DIAGNOSIS — L97.509 TYPE 2 DIABETES WITH SKIN ULCER OF FOOT: ICD-10-CM

## 2022-11-28 DIAGNOSIS — R80.9 TYPE 2 DIABETES MELLITUS WITH MICROALBUMINURIA, WITHOUT LONG-TERM CURRENT USE OF INSULIN: ICD-10-CM

## 2022-11-28 DIAGNOSIS — E11.621 TYPE 2 DIABETES WITH SKIN ULCER OF FOOT: ICD-10-CM

## 2022-11-28 PROCEDURE — 90686 IIV4 VACC NO PRSV 0.5 ML IM: CPT | Mod: PBBFAC,PO

## 2022-11-28 PROCEDURE — 99999 PR PBB SHADOW E&M-EST. PATIENT-LVL V: ICD-10-PCS | Mod: PBBFAC,,, | Performed by: FAMILY MEDICINE

## 2022-11-28 PROCEDURE — 3044F PR MOST RECENT HEMOGLOBIN A1C LEVEL <7.0%: ICD-10-PCS | Mod: CPTII,,, | Performed by: FAMILY MEDICINE

## 2022-11-28 PROCEDURE — 3075F SYST BP GE 130 - 139MM HG: CPT | Mod: CPTII,,, | Performed by: FAMILY MEDICINE

## 2022-11-28 PROCEDURE — 1159F MED LIST DOCD IN RCRD: CPT | Mod: CPTII,,, | Performed by: FAMILY MEDICINE

## 2022-11-28 PROCEDURE — 3044F HG A1C LEVEL LT 7.0%: CPT | Mod: CPTII,,, | Performed by: FAMILY MEDICINE

## 2022-11-28 PROCEDURE — 99499 UNLISTED E&M SERVICE: CPT | Mod: S$PBB,,, | Performed by: FAMILY MEDICINE

## 2022-11-28 PROCEDURE — 3079F PR MOST RECENT DIASTOLIC BLOOD PRESSURE 80-89 MM HG: ICD-10-PCS | Mod: CPTII,,, | Performed by: FAMILY MEDICINE

## 2022-11-28 PROCEDURE — 99999 PR PBB SHADOW E&M-EST. PATIENT-LVL V: CPT | Mod: PBBFAC,,, | Performed by: FAMILY MEDICINE

## 2022-11-28 PROCEDURE — 1159F PR MEDICATION LIST DOCUMENTED IN MEDICAL RECORD: ICD-10-PCS | Mod: CPTII,,, | Performed by: FAMILY MEDICINE

## 2022-11-28 PROCEDURE — 99396 PR PREVENTIVE VISIT,EST,40-64: ICD-10-PCS | Mod: S$PBB,,, | Performed by: FAMILY MEDICINE

## 2022-11-28 PROCEDURE — 99215 OFFICE O/P EST HI 40 MIN: CPT | Mod: PBBFAC,PO | Performed by: FAMILY MEDICINE

## 2022-11-28 PROCEDURE — 1160F RVW MEDS BY RX/DR IN RCRD: CPT | Mod: CPTII,,, | Performed by: FAMILY MEDICINE

## 2022-11-28 PROCEDURE — 3075F PR MOST RECENT SYSTOLIC BLOOD PRESS GE 130-139MM HG: ICD-10-PCS | Mod: CPTII,,, | Performed by: FAMILY MEDICINE

## 2022-11-28 PROCEDURE — 99396 PREV VISIT EST AGE 40-64: CPT | Mod: S$PBB,,, | Performed by: FAMILY MEDICINE

## 2022-11-28 PROCEDURE — 1160F PR REVIEW ALL MEDS BY PRESCRIBER/CLIN PHARMACIST DOCUMENTED: ICD-10-PCS | Mod: CPTII,,, | Performed by: FAMILY MEDICINE

## 2022-11-28 PROCEDURE — 3008F BODY MASS INDEX DOCD: CPT | Mod: CPTII,,, | Performed by: FAMILY MEDICINE

## 2022-11-28 PROCEDURE — 4010F ACE/ARB THERAPY RXD/TAKEN: CPT | Mod: CPTII,,, | Performed by: FAMILY MEDICINE

## 2022-11-28 PROCEDURE — 3008F PR BODY MASS INDEX (BMI) DOCUMENTED: ICD-10-PCS | Mod: CPTII,,, | Performed by: FAMILY MEDICINE

## 2022-11-28 PROCEDURE — 4010F PR ACE/ARB THEARPY RXD/TAKEN: ICD-10-PCS | Mod: CPTII,,, | Performed by: FAMILY MEDICINE

## 2022-11-28 PROCEDURE — 3079F DIAST BP 80-89 MM HG: CPT | Mod: CPTII,,, | Performed by: FAMILY MEDICINE

## 2022-11-28 PROCEDURE — 99499 RISK ADDL DX/OHS AUDIT: ICD-10-PCS | Mod: S$PBB,,, | Performed by: FAMILY MEDICINE

## 2022-11-28 RX ORDER — LOSARTAN POTASSIUM 50 MG/1
50 TABLET ORAL DAILY
Qty: 90 TABLET | Refills: 0 | Status: SHIPPED | OUTPATIENT
Start: 2022-11-28 | End: 2023-01-03

## 2022-11-28 RX ORDER — SIMVASTATIN 20 MG/1
20 TABLET, FILM COATED ORAL NIGHTLY
Qty: 90 TABLET | Refills: 0 | Status: SHIPPED | OUTPATIENT
Start: 2022-11-28 | End: 2023-05-15

## 2022-11-28 RX ORDER — MOXIFLOXACIN 5 MG/ML
1 SOLUTION/ DROPS OPHTHALMIC 4 TIMES DAILY
COMMUNITY
Start: 2022-07-22 | End: 2023-10-12

## 2022-11-28 RX ORDER — KETOROLAC TROMETHAMINE 5 MG/ML
SOLUTION OPHTHALMIC
COMMUNITY
Start: 2022-09-08 | End: 2023-10-12

## 2022-11-28 RX ORDER — METFORMIN HYDROCHLORIDE 850 MG/1
TABLET ORAL
Qty: 270 TABLET | Refills: 2 | Status: SHIPPED | OUTPATIENT
Start: 2022-11-28 | End: 2023-09-18 | Stop reason: SDUPTHER

## 2022-11-28 RX ORDER — PREDNISOLONE ACETATE 10 MG/ML
SUSPENSION/ DROPS OPHTHALMIC
COMMUNITY
Start: 2022-08-08 | End: 2023-10-12

## 2022-11-28 RX ORDER — LOSARTAN POTASSIUM 25 MG/1
25 TABLET ORAL DAILY
Qty: 90 TABLET | Refills: 3 | Status: CANCELLED | OUTPATIENT
Start: 2022-11-28

## 2022-11-28 RX ORDER — MELATONIN 10 MG
CAPSULE ORAL
Status: CANCELLED | OUTPATIENT
Start: 2022-11-28

## 2022-11-28 RX ORDER — COVID-19 MOLECULAR TEST ASSAY
KIT MISCELLANEOUS
COMMUNITY
Start: 2022-07-22 | End: 2023-12-04

## 2022-11-28 RX ORDER — PIOGLITAZONEHYDROCHLORIDE 15 MG/1
15 TABLET ORAL DAILY
Qty: 90 TABLET | Refills: 0 | Status: SHIPPED | OUTPATIENT
Start: 2022-11-28 | End: 2023-05-01

## 2022-11-28 NOTE — PROGRESS NOTES
"Well Man VISIT      CHIEF COMPLAINT  Chief Complaint   Patient presents with    Follow-up    Diabetes       HPI  Fermin Henson is a 62 y.o. male who presents for physical.     Social Factors  Tobacco use: No  Ready to Quit: No  Alcohol: No  Intimate partner violence screening  "Do you feel safe in your current relationship?" single  "Have you ever been in a relationship in which your partner frightened you or hurt you?" no  Living Will/POA: No  Regular Exercise: No    Depression  Over the past two weeks, have you felt down, depressed, or hopeless? No  Over the past two weeks, have you felt little interest or pleasure in doing things? No    Reproductive Health  STD screening in last year: deferred  HIV screening: deferred    Screen for Chronic Disease  CHD Risk Factors: advanced age (older than 55 for men, 65 for women), diabetes mellitus, dyslipidemia and obesity (BMI >= 30 kg/m2)  Estimated body mass index is 29.94 kg/m² as calculated from the following:    Height as of this encounter: 6' (1.829 m).    Weight as of this encounter: 100.1 kg (220 lb 12.7 oz).  Dyslipidemia screening needed: ordered  T2DM screening needed: ordered  Colonoscopy needed: utd per patient  PSA needed: n/a  AAA screening needed:n/a  Screen men 35 years and older, and men 20 to 34 years of age who have cardiovascular risk factors for dyslipidemia  Begin screening colonoscopies at 50 years of age in men of average risk, and continue until 75 years of age; offer fecal occult blood testing every year, flexible sigmoidoscopy every five years combined with fecal occult blood testing every three years, or colonoscopy every 10 years   The [employer] Association recommends offering PSA testing and digital rectal examination to well-informed men beginning at 40 years of age and continuing until life expectancy is less than 10 years  Screen once with ultrasonography in men 65 to 75 years of age if they have a family history or have " smoked at [id number] cigarettes in their lifetime  Screen men with a sustained blood pressure greater than 135/80 mm Hg for T2DM      Immunizations  stated as up to date, no records available    ALLERGIES and MEDS were verified.   PMHx, PSHx, FHx, SOCIALHx were updated as pertinent.    REVIEW OF SYSTEMS  Review of Systems   Constitutional: Negative.    HENT: Negative.     Eyes: Negative.    Respiratory: Negative.     Cardiovascular: Negative.    Gastrointestinal: Negative.    Genitourinary: Negative.    Musculoskeletal: Negative.    Skin: Negative.    Neurological:  Positive for sensory change.       PHYSICAL EXAM  VITAL SIGNS: /80 Comment: home reading  Pulse 92   Temp 98.1 °F (36.7 °C) (Oral)   Resp 18   Ht 6' (1.829 m)   Wt 100.1 kg (220 lb 12.7 oz)   SpO2 96%   BMI 29.94 kg/m²   GEN: Well developed, Well nourished, No acute distress.  HENT: Normocephalic, Atraumatic, Bilateral external ears normal, Nose normal, Oropharynx moist, No oral exudates.   Eyes: PERRL, EOMI, Conjunctiva normal, No discharge.   Neck: Supple, No tenderness.  Lymphatic: No cervical or supraclavicular lymphadenopathy noted.   Cardiovascular: Normal heart rate, Normal rhythm, No murmurs, No rubs, No gallops.   Thorax & Lungs: Normal breath sounds, No respiratory distress, No wheezing.  Abdomen: Soft, No tenderness, Bowel sounds normal.  Genital: deferred  Skin: Warm, Dry, No erythema, No rash.   Extremities: No edema, No tenderness.       ASSESSMENT/PLAN    Fermin was seen today for follow-up and diabetes.    Diagnoses and all orders for this visit:    Visit for [facility] check  -     CBC Auto Differential; Future  -     Comprehensive Metabolic Panel; Future  -     Lipid Panel; Future  -     Urinalysis; Future  -     Microalbumin/Creatinine Ratio, Urine; Future  -     Hemoglobin A1C; Future    Type 2 diabetes mellitus with diabetic polyneuropathy, without long-term current use of insulin  -     empagliflozin (JARDIANCE) 10 mg  tablet; Take 1 tablet (10 mg total) by mouth once daily.  -     metFORMIN (GLUCOPHAGE) 850 MG tablet; TAKE 2 TABLETS BY MOUTH ONCE DAILY IN THE MORNING AND 1 WITH EVENING MEAL Strength: 850 mg  -     simvastatin (ZOCOR) 20 MG tablet; Take 1 tablet (20 mg total) by mouth every evening.  -     Comprehensive Metabolic Panel; Future  -     Lipid Panel; Future  -     Microalbumin/Creatinine Ratio, Urine; Future  -     Hemoglobin A1C; Future    Type 2 diabetes mellitus with microalbuminuria, without long-term current use of insulin  -     empagliflozin (JARDIANCE) 10 mg tablet; Take 1 tablet (10 mg total) by mouth once daily.    Type 2 diabetes with skin ulcer of foot  -     metFORMIN (GLUCOPHAGE) 850 MG tablet; TAKE 2 TABLETS BY MOUTH ONCE DAILY IN THE MORNING AND 1 WITH EVENING MEAL Strength: 850 mg    Type 2 diabetes mellitus without complication, without long-term current use of insulin  -     pioglitazone (ACTOS) 15 MG tablet; Take 1 tablet (15 mg total) by mouth once daily.    Influenza vaccine needed  -     Influenza - Quadrivalent *Preferred* (6 months+) (PF)    Benign essential HTN  -     losartan (COZAAR) 50 MG tablet; Take 1 tablet (50 mg total) by mouth once daily.  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  He states he has good days and bad days    See Jean-Baptiste MD

## 2022-11-29 ENCOUNTER — HOSPITAL ENCOUNTER (OUTPATIENT)
Dept: WOUND CARE | Facility: HOSPITAL | Age: 62
Discharge: HOME | End: 2022-11-29
Attending: FAMILY MEDICINE
Payer: MEDICAID

## 2022-11-29 VITALS — SYSTOLIC BLOOD PRESSURE: 134 MMHG | TEMPERATURE: 98 F | HEART RATE: 84 BPM | DIASTOLIC BLOOD PRESSURE: 66 MMHG

## 2022-11-29 DIAGNOSIS — E11.621 DIABETIC ULCER OF RIGHT MIDFOOT ASSOCIATED WITH TYPE 2 DIABETES MELLITUS, WITH FAT LAYER EXPOSED: Primary | ICD-10-CM

## 2022-11-29 DIAGNOSIS — L97.412 DIABETIC ULCER OF RIGHT MIDFOOT ASSOCIATED WITH TYPE 2 DIABETES MELLITUS, WITH FAT LAYER EXPOSED: Primary | ICD-10-CM

## 2022-11-29 PROCEDURE — 99213 OFFICE O/P EST LOW 20 MIN: CPT | Performed by: FAMILY MEDICINE

## 2022-11-29 PROCEDURE — 99214 PR OFFICE/OUTPT VISIT, EST, LEVL IV, 30-39 MIN: ICD-10-PCS | Mod: ,,, | Performed by: FAMILY MEDICINE

## 2022-11-29 PROCEDURE — 99214 OFFICE O/P EST MOD 30 MIN: CPT | Mod: ,,, | Performed by: FAMILY MEDICINE

## 2022-11-29 NOTE — PROGRESS NOTES
[facility]                Progress Note    Subjective:       Patient ID: Fermin Henson is a 62 y.o. male.    Chief Complaint: Wound Check    F/u wound care visit. Patient ambulatory to exam room with no difficulty noted. Patient denies pain or discomfort at present. Wound dressing to right foot intact with mild slippage off wounds noted and with scant to small amount of drainage noted. Wound to right plantar foot measuring smaller in all dimensions. Wound care done per order. RTC in one week.    MD note:  patient following up today for right DFU.  There has been no fevers, chills, or sweats.  He was on his feet a lot more due to cleaning out his moms house since her passing.  He has not seen any drainage through the dressing    Review of Systems   Constitutional: Negative.    HENT: Negative.     Eyes: Negative.    Respiratory: Negative.     Cardiovascular: Negative.    Gastrointestinal: Negative.    Skin:  Positive for wound.   Psychiatric/Behavioral: Negative.         Objective:        Physical Exam  Constitutional:       Appearance: Normal appearance.   HENT:      Head: Normocephalic and atraumatic.      Right Ear: External ear normal.      Left Ear: External ear normal.      Mouth/Throat:      Mouth: Mucous membranes are moist.      Pharynx: Oropharynx is clear.   Eyes:      Extraocular Movements: Extraocular movements intact.      Conjunctiva/sclera: Conjunctivae normal.      Pupils: Pupils are equal, round, and reactive to light.   Cardiovascular:      Rate and Rhythm: Normal rate and regular rhythm.   Abdominal:      General: There is no distension.      Palpations: Abdomen is soft.   Skin:     General: Skin is warm and dry.      Comments: +right DFU with mild maceration; no slough  +excoriation of right dorsal foot   Neurological:      Mental Status: He is alert and oriented to person, place, and time.      Sensory: Sensory deficit present.       Vitals:     11/29/22 1308   BP: 134/66   Pulse: 84   Temp: 98.1 °F (36.7 °C)       Assessment:           ICD-10-CM ICD-9-CM   1. Diabetic ulcer of right midfoot associated with type 2 diabetes mellitus, with fat layer exposed  E11.621 250.80    L97.412 707.14            Altered Skin Integrity 11/22/22 1020 Right distal Toe, third Diabetic Ulcer Partial thickness tissue loss. Shallow open ulcer with a red or pink wound bed, without slough. Intact or Open/Ruptured Serum-filled blister. (Active)   11/22/22 1020   Altered Skin Integrity Present on Admission:    Side: Right   Orientation: distal   Location: Toe, third   Wound Number:    Is this injury device related?:    Primary Wound Type: Diabetic ulc   Description of Altered Skin Integrity: Partial thickness tissue loss. Shallow open ulcer with a red or pink wound bed, without slough. Intact or Open/Ruptured Serum-filled blister.   Ankle-Brachial Index:    Pulses:    Removal Indication and Assessment:    Wound Outcome:    (Retired) Wound Length (cm):    (Retired) Wound Width (cm):    (Retired) Depth (cm):    Wound Description (Comments):    Removal Indications:    Wound Image   11/29/22 1316   Description of Altered Skin Integrity Partial thickness tissue loss. Shallow open ulcer with a red or pink wound bed, without slough. Intact or Open/Ruptured Serum-filled blister. 11/29/22 1316   Dressing Appearance Dry;Intact 11/29/22 1316   Drainage Amount Scant 11/29/22 1316   Drainage Characteristics/Odor Serosanguineous 11/29/22 1316   Appearance Pink;Epithelialization 11/29/22 1316   Tissue loss description Partial thickness 11/29/22 1316   Black (%), Wound Tissue Color 0 % 11/29/22 1316   Red (%), Wound Tissue Color 100 % 11/29/22 1316   Yellow (%), Wound Tissue Color 0 % 11/29/22 1316   Periwound Area Dry;Intact 11/29/22 1316   Wound Edges Defined 11/29/22 1316   Wound Length (cm) 0.2 cm 11/29/22 1316   Wound Width (cm) 0.2 cm 11/29/22 1316   Wound Depth (cm) 0.1 cm 11/29/22 1316    Wound Volume (cm^3) 0.004 cm^3 11/29/22 1316   Wound Surface Area (cm^2) 0.04 cm^2 11/29/22 1316   Care Cleansed with:;Soap and water;Sterile normal saline 11/29/22 1316   Dressing Changed 11/29/22 1316   Periwound Care Skin barrier film applied 11/29/22 1316   Off Loading Football dressing;Off loading shoe 11/29/22 1316   Dressing Change Due 12/06/22 11/29/22 1316            Wound 04/12/22 1426 Diabetic Ulcer Right plantar Foot (Active)   04/12/22 1426    Pre-existing: Yes   Primary Wound Type: Diabetic ulc   Side: Right   Orientation: plantar   Location: Foot   Wound Number:    Ankle-Brachial Index:    Pulses:    Removal Indication and Assessment:    Wound Outcome: Healed   (Retired) Wound Type:    (Retired) Wound Length (cm):    (Retired) Wound Width (cm):    (Retired) Depth (cm):    Wound Description (Comments):    Removal Indications:    Wound Image   11/29/22 1316   Wound WDL ex 11/29/22 1316   Dressing Appearance Intact;Moist drainage 11/29/22 1316   Drainage Amount Moderate 11/29/22 1316   Drainage Characteristics/Odor Serosanguineous 11/29/22 1316   Appearance Red 11/29/22 1316   Tissue loss description Partial thickness 11/29/22 1316   Black (%), Wound Tissue Color 0 % 11/29/22 1316   Red (%), Wound Tissue Color 100 % 11/29/22 1316   Yellow (%), Wound Tissue Color 0 % 11/29/22 1316   Periwound Area Pale white;Macerated 11/29/22 1316   Wound Edges Callused 11/29/22 1316   Wound Length (cm) 0.7 cm 11/29/22 1316   Wound Width (cm) 1 cm 11/29/22 1316   Wound Depth (cm) 0.1 cm 11/29/22 1316   Wound Volume (cm^3) 0.07 cm^3 11/29/22 1316   Wound Surface Area (cm^2) 0.7 cm^2 11/29/22 1316   Care Cleansed with:;Soap and water;Sterile normal saline 11/29/22 1316   Dressing Changed 11/29/22 1316   Periwound Care Moisture barrier applied;Skin barrier film applied 11/29/22 1316   Off Loading Football dressing;Off loading shoe 11/29/22 1316   Dressing Change Due 12/06/22 11/29/22 1316           Plan:                 Orders Placed This Encounter   Procedures    Change dressing     Clean with NS. Gentian violet to periwound.Endoform to wound bed, U-shape pad x2 (toe and plantar foot), mextra short x1, dl foam strip x1 to right distal 3rd toe secured with medipore tape, dl foam long x2 to plantar foot. Optifoam AG to dorsal foot. Football dressing (cast padding x3, coban). Darco.          Follow up in about 1 week (around 12/6/2022) for MD wound care visit.     See Jean-Baptiste MD

## 2022-12-05 ENCOUNTER — LAB VISIT (OUTPATIENT)
Dept: LAB | Facility: HOSPITAL | Age: 62
End: 2022-12-05
Attending: FAMILY MEDICINE
Payer: MEDICAID

## 2022-12-05 DIAGNOSIS — E11.42 TYPE 2 DIABETES MELLITUS WITH DIABETIC POLYNEUROPATHY, WITHOUT LONG-TERM CURRENT USE OF INSULIN: ICD-10-CM

## 2022-12-05 DIAGNOSIS — E11.3393 CONTROLLED TYPE 2 DIABETES MELLITUS WITH BOTH EYES AFFECTED BY MODERATE NONPROLIFERATIVE RETINOPATHY WITHOUT MACULAR EDEMA, WITHOUT LONG-TERM CURRENT USE OF INSULIN: ICD-10-CM

## 2022-12-05 DIAGNOSIS — Z00.00 VISIT FOR WELL MAN HEALTH CHECK: ICD-10-CM

## 2022-12-05 DIAGNOSIS — I10 BENIGN ESSENTIAL HTN: ICD-10-CM

## 2022-12-05 LAB
ALBUMIN SERPL BCP-MCNC: 3.7 G/DL (ref 3.5–5.2)
ALP SERPL-CCNC: 56 U/L (ref 55–135)
ALT SERPL W/O P-5'-P-CCNC: 19 U/L (ref 10–44)
ANION GAP SERPL CALC-SCNC: 10 MMOL/L (ref 8–16)
AST SERPL-CCNC: 15 U/L (ref 10–40)
BASOPHILS # BLD AUTO: 0.04 K/UL (ref 0–0.2)
BASOPHILS NFR BLD: 0.5 % (ref 0–1.9)
BILIRUB SERPL-MCNC: 0.6 MG/DL (ref 0.1–1)
BUN SERPL-MCNC: 10 MG/DL (ref 8–23)
CALCIUM SERPL-MCNC: 9.3 MG/DL (ref 8.7–10.5)
CHLORIDE SERPL-SCNC: 104 MMOL/L (ref 95–110)
CHOLEST SERPL-MCNC: 119 MG/DL (ref 120–199)
CHOLEST/HDLC SERPL: 2.3 {RATIO} (ref 2–5)
CO2 SERPL-SCNC: 25 MMOL/L (ref 23–29)
CREAT SERPL-MCNC: 0.8 MG/DL (ref 0.5–1.4)
DIFFERENTIAL METHOD: ABNORMAL
EOSINOPHIL # BLD AUTO: 0.4 K/UL (ref 0–0.5)
EOSINOPHIL NFR BLD: 4.9 % (ref 0–8)
ERYTHROCYTE [DISTWIDTH] IN BLOOD BY AUTOMATED COUNT: 13.4 % (ref 11.5–14.5)
EST. GFR  (NO RACE VARIABLE): >60 ML/MIN/1.73 M^2
ESTIMATED AVG GLUCOSE: 143 MG/DL (ref 68–131)
GLUCOSE SERPL-MCNC: 135 MG/DL (ref 70–110)
HBA1C MFR BLD: 6.6 % (ref 4–5.6)
HCT VFR BLD AUTO: 42.1 % (ref 40–54)
HDLC SERPL-MCNC: 52 MG/DL (ref 40–75)
HDLC SERPL: 43.7 % (ref 20–50)
HGB BLD-MCNC: 13.3 G/DL (ref 14–18)
IMM GRANULOCYTES # BLD AUTO: 0.01 K/UL (ref 0–0.04)
IMM GRANULOCYTES NFR BLD AUTO: 0.1 % (ref 0–0.5)
LDLC SERPL CALC-MCNC: 54 MG/DL (ref 63–159)
LYMPHOCYTES # BLD AUTO: 1.5 K/UL (ref 1–4.8)
LYMPHOCYTES NFR BLD: 20.1 % (ref 18–48)
MCH RBC QN AUTO: 31.4 PG (ref 27–31)
MCHC RBC AUTO-ENTMCNC: 31.6 G/DL (ref 32–36)
MCV RBC AUTO: 99 FL (ref 82–98)
MONOCYTES # BLD AUTO: 0.7 K/UL (ref 0.3–1)
MONOCYTES NFR BLD: 8.7 % (ref 4–15)
NEUTROPHILS # BLD AUTO: 5 K/UL (ref 1.8–7.7)
NEUTROPHILS NFR BLD: 65.7 % (ref 38–73)
NONHDLC SERPL-MCNC: 67 MG/DL
NRBC BLD-RTO: 0 /100 WBC
PLATELET # BLD AUTO: 252 K/UL (ref 150–450)
PMV BLD AUTO: 9.6 FL (ref 9.2–12.9)
POTASSIUM SERPL-SCNC: 3.8 MMOL/L (ref 3.5–5.1)
PROT SERPL-MCNC: 7.3 G/DL (ref 6–8.4)
RBC # BLD AUTO: 4.24 M/UL (ref 4.6–6.2)
SODIUM SERPL-SCNC: 139 MMOL/L (ref 136–145)
TRIGL SERPL-MCNC: 65 MG/DL (ref 30–150)
WBC # BLD AUTO: 7.58 K/UL (ref 3.9–12.7)

## 2022-12-05 PROCEDURE — 85025 COMPLETE CBC W/AUTO DIFF WBC: CPT | Performed by: FAMILY MEDICINE

## 2022-12-05 PROCEDURE — 36415 COLL VENOUS BLD VENIPUNCTURE: CPT | Mod: PO | Performed by: FAMILY MEDICINE

## 2022-12-05 PROCEDURE — 83036 HEMOGLOBIN GLYCOSYLATED A1C: CPT | Performed by: FAMILY MEDICINE

## 2022-12-05 PROCEDURE — 80053 COMPREHEN METABOLIC PANEL: CPT | Performed by: FAMILY MEDICINE

## 2022-12-05 PROCEDURE — 80061 LIPID PANEL: CPT | Performed by: FAMILY MEDICINE

## 2022-12-06 ENCOUNTER — HOSPITAL ENCOUNTER (OUTPATIENT)
Dept: WOUND CARE | Facility: HOSPITAL | Age: 62
Discharge: HOME | End: 2022-12-06
Attending: FAMILY MEDICINE
Payer: MEDICAID

## 2022-12-06 ENCOUNTER — HOSPITAL ENCOUNTER (OUTPATIENT)
Dept: RADIOLOGY | Facility: HOSPITAL | Age: 62
Discharge: HOME | End: 2022-12-06
Attending: FAMILY MEDICINE
Payer: MEDICAID

## 2022-12-06 VITALS
HEART RATE: 104 BPM | TEMPERATURE: 98 F | RESPIRATION RATE: 18 BRPM | DIASTOLIC BLOOD PRESSURE: 64 MMHG | SYSTOLIC BLOOD PRESSURE: 134 MMHG

## 2022-12-06 DIAGNOSIS — E11.621 TYPE 2 DIABETES WITH SKIN ULCER OF FOOT: ICD-10-CM

## 2022-12-06 DIAGNOSIS — E11.29 TYPE 2 DIABETES MELLITUS WITH MICROALBUMINURIA, WITHOUT LONG-TERM CURRENT USE OF INSULIN: Primary | ICD-10-CM

## 2022-12-06 DIAGNOSIS — R80.9 TYPE 2 DIABETES MELLITUS WITH MICROALBUMINURIA, WITHOUT LONG-TERM CURRENT USE OF INSULIN: ICD-10-CM

## 2022-12-06 DIAGNOSIS — R80.9 TYPE 2 DIABETES MELLITUS WITH MICROALBUMINURIA, WITHOUT LONG-TERM CURRENT USE OF INSULIN: Primary | ICD-10-CM

## 2022-12-06 DIAGNOSIS — L97.509 TYPE 2 DIABETES WITH SKIN ULCER OF FOOT: ICD-10-CM

## 2022-12-06 DIAGNOSIS — E11.29 TYPE 2 DIABETES MELLITUS WITH MICROALBUMINURIA, WITHOUT LONG-TERM CURRENT USE OF INSULIN: ICD-10-CM

## 2022-12-06 PROCEDURE — 87070 CULTURE OTHR SPECIMN AEROBIC: CPT | Performed by: FAMILY MEDICINE

## 2022-12-06 PROCEDURE — 99213 OFFICE O/P EST LOW 20 MIN: CPT | Performed by: FAMILY MEDICINE

## 2022-12-06 PROCEDURE — 73630 X-RAY EXAM OF FOOT: CPT | Mod: TC,FY,RT

## 2022-12-06 PROCEDURE — 11042 DEBRIDEMENT: ICD-10-PCS | Mod: ,,, | Performed by: FAMILY MEDICINE

## 2022-12-06 PROCEDURE — 73630 XR FOOT COMPLETE 3 VIEW RIGHT: ICD-10-PCS | Mod: 26,RT,, | Performed by: RADIOLOGY

## 2022-12-06 PROCEDURE — 87077 CULTURE AEROBIC IDENTIFY: CPT | Mod: 59 | Performed by: FAMILY MEDICINE

## 2022-12-06 PROCEDURE — 11042 DBRDMT SUBQ TIS 1ST 20SQCM/<: CPT | Performed by: FAMILY MEDICINE

## 2022-12-06 PROCEDURE — 87186 SC STD MICRODIL/AGAR DIL: CPT | Performed by: FAMILY MEDICINE

## 2022-12-06 PROCEDURE — 73630 X-RAY EXAM OF FOOT: CPT | Mod: 26,RT,, | Performed by: RADIOLOGY

## 2022-12-06 PROCEDURE — 99214 PR OFFICE/OUTPT VISIT, EST, LEVL IV, 30-39 MIN: ICD-10-PCS | Mod: 25,,, | Performed by: FAMILY MEDICINE

## 2022-12-06 PROCEDURE — 99214 OFFICE O/P EST MOD 30 MIN: CPT | Mod: 25,,, | Performed by: FAMILY MEDICINE

## 2022-12-06 PROCEDURE — 11042 DBRDMT SUBQ TIS 1ST 20SQCM/<: CPT | Mod: ,,, | Performed by: FAMILY MEDICINE

## 2022-12-06 RX ORDER — CIPROFLOXACIN 500 MG/1
500 TABLET ORAL EVERY 12 HOURS
Qty: 20 TABLET | Refills: 0 | Status: SHIPPED | OUTPATIENT
Start: 2022-12-06 | End: 2023-02-28 | Stop reason: ALTCHOICE

## 2022-12-06 RX ORDER — SULFAMETHOXAZOLE AND TRIMETHOPRIM 800; 160 MG/1; MG/1
1 TABLET ORAL 2 TIMES DAILY
Qty: 20 TABLET | Refills: 0 | Status: SHIPPED | OUTPATIENT
Start: 2022-12-06 | End: 2022-12-16

## 2022-12-06 NOTE — PROGRESS NOTES
Subjective:       Patient ID: Fermin Henson is a 62 y.o. male.    Chief Complaint: Wound Care    Pt arrived to [facility] ambulated without any issues. Pt denies any fever, chills, or flu-like symptoms; denies pain/discomfort. Pt reports not having any issues with drsg since last visit. Upon removing football drsg; noted to have strike through drainage to distal foot/toe area. Pt denies hitting/bumping foot but does state that he has been very active lately. Noted to have new wound to rt dorsal foot. Rt 3rd toe wound cultured; MD ordering xray & po abx. Pt will return to [facility] in 1wk.  Review of Systems    Objective:      Physical Exam    Assessment:       1. Type 2 diabetes mellitus with microalbuminuria, without long-term current use of insulin    2. Type 2 diabetes with skin ulcer of foot           Altered Skin Integrity 11/22/22 1020 Right distal Toe, third Diabetic Ulcer Partial thickness tissue loss. Shallow open ulcer with a red or pink wound bed, without slough. Intact or Open/Ruptured Serum-filled blister. (Active)   11/22/22 1020   Altered Skin Integrity Present on Admission:    Side: Right   Orientation: distal   Location: Toe, third   Wound Number:    Is this injury device related?:    Primary Wound Type: Diabetic ulc   Description of Altered Skin Integrity: Partial thickness tissue loss. Shallow open ulcer with a red or pink wound bed, without slough. Intact or Open/Ruptured Serum-filled blister.   Ankle-Brachial Index:    Pulses:    Removal Indication and Assessment:    Wound Outcome:    (Retired) Wound Length (cm):    (Retired) Wound Width (cm):    (Retired) Depth (cm):    Wound Description (Comments):    Removal Indications:    Wound Image   12/06/22 1007   Description of Altered Skin Integrity Full thickness tissue loss. Subcutaneous fat may be visible but bone, tendon or muscle are not exposed 12/06/22 1007   Dressing Appearance Moist drainage;Saturated 12/06/22 1007   Drainage Amount Large 12/06/22  1007   Drainage Characteristics/Odor Serosanguineous 12/06/22 1007   Appearance Red;Yellow 12/06/22 1007   Tissue loss description Full thickness 12/06/22 1007   Black (%), Wound Tissue Color 0 % 12/06/22 1007   Red (%), Wound Tissue Color 70 % 12/06/22 1007   Yellow (%), Wound Tissue Color 30 % 12/06/22 1007   Periwound Area Intact;Redness 12/06/22 1007   Wound Edges Open 12/06/22 1007   Wound Length (cm) 2.3 cm 12/06/22 1007   Wound Width (cm) 1 cm 12/06/22 1007   Wound Depth (cm) 0 cm 12/06/22 1007   Wound Volume (cm^3) 0 cm^3 12/06/22 1007   Wound Surface Area (cm^2) 2.3 cm^2 12/06/22 1007   Tunneling (depth (cm)/location) 0 12/06/22 1007   Undermining (depth (cm)/location) 0 12/06/22 1007   Care Cleansed with:;Antimicrobial agent 12/06/22 1007   Dressing Applied 12/06/22 1007   Periwound Care Other (see comments) 12/06/22 1007   Off Loading Football dressing;Off loading shoe 12/06/22 1007            Altered Skin Integrity 12/06/22 1003 Right dorsal Foot (Active)   12/06/22 1003   Altered Skin Integrity Present on Admission: yes   Side: Right   Orientation: dorsal   Location: Foot   Wound Number:    Is this injury device related?:    Primary Wound Type:    Description of Altered Skin Integrity:    Ankle-Brachial Index:    Pulses:    Removal Indication and Assessment:    Wound Outcome:    (Retired) Wound Length (cm):    (Retired) Wound Width (cm):    (Retired) Depth (cm):    Wound Description (Comments):    Removal Indications:    Wound Image   12/06/22 1007   Description of Altered Skin Integrity Partial thickness tissue loss. Shallow open ulcer with a red or pink wound bed, without slough. Intact or Open/Ruptured Serum-filled blister. 12/06/22 1007   Dressing Appearance Moist drainage 12/06/22 1007   Drainage Amount Small 12/06/22 1007   Drainage Characteristics/Odor Serosanguineous 12/06/22 1007   Appearance Red 12/06/22 1007   Tissue loss description Partial thickness 12/06/22 1007   Black (%), Wound Tissue  Color 0 % 12/06/22 1007   Red (%), Wound Tissue Color 100 % 12/06/22 1007   Yellow (%), Wound Tissue Color 0 % 12/06/22 1007   Periwound Area Intact;Redness 12/06/22 1007   Wound Edges Open 12/06/22 1007   Wound Length (cm) 2.7 cm 12/06/22 1007   Wound Width (cm) 0.6 cm 12/06/22 1007   Wound Depth (cm) 0.1 cm 12/06/22 1007   Wound Volume (cm^3) 0.162 cm^3 12/06/22 1007   Wound Surface Area (cm^2) 1.62 cm^2 12/06/22 1007   Tunneling (depth (cm)/location) 0 12/06/22 1007   Undermining (depth (cm)/location) 0 12/06/22 1007   Care Cleansed with: 12/06/22 1007   Dressing Applied;Foam;Silver 12/06/22 1007   Off Loading Football dressing;Off loading shoe 12/06/22 1007            Wound 04/12/22 1426 Diabetic Ulcer Right plantar Foot (Active)   04/12/22 1426    Pre-existing: Yes   Primary Wound Type: Diabetic ulc   Side: Right   Orientation: plantar   Location: Foot   Wound Number:    Ankle-Brachial Index:    Pulses:    Removal Indication and Assessment:    Wound Outcome: Healed   (Retired) Wound Type:    (Retired) Wound Length (cm):    (Retired) Wound Width (cm):    (Retired) Depth (cm):    Wound Description (Comments):    Removal Indications:    Wound Image   12/06/22 1007   Wound WDL ex 12/06/22 1007   Dressing Appearance Moist drainage 12/06/22 1007   Drainage Amount Large 12/06/22 1007   Drainage Characteristics/Odor Serosanguineous 12/06/22 1007   Appearance Red 12/06/22 1007   Tissue loss description Full thickness 12/06/22 1007   Black (%), Wound Tissue Color 0 % 12/06/22 1007   Red (%), Wound Tissue Color 95 % 12/06/22 1007   Yellow (%), Wound Tissue Color 5 % 12/06/22 1007   Periwound Area Intact;Macerated 12/06/22 1007   Wound Edges Callused 12/06/22 1007   Wound Length (cm) 0.9 cm 12/06/22 1007   Wound Width (cm) 0.9 cm 12/06/22 1007   Wound Depth (cm) 0.2 cm 12/06/22 1007   Wound Volume (cm^3) 0.162 cm^3 12/06/22 1007   Wound Surface Area (cm^2) 0.81 cm^2 12/06/22 1007   Tunneling (depth (cm)/location) 0  12/06/22 1007   Undermining (depth (cm)/location) 0 12/06/22 1007   Care Cleansed with:;Antimicrobial agent 12/06/22 1007   Dressing Applied 12/06/22 1007   Periwound Care Other (see comments) 12/06/22 1007   Off Loading Football dressing;Off loading shoe 12/06/22 1007           Plan:

## 2022-12-06 NOTE — PROGRESS NOTES
"[facility]                Progress Note    Subjective:       Patient ID: Fermin Henson is a 62 y.o. male.    Chief Complaint: Wound Care    Pt arrived to [facility] ambulated without any issues. Pt denies any fever, chills, or flu-like symptoms; denies pain/discomfort. Pt reports not having any issues with drsg since last visit. Upon removing football drsg; noted to have strike through drainage to distal foot/toe area. Pt denies hitting/bumping foot but does state that he has been very active lately. Noted to have new wound to rt dorsal foot. Rt 3rd toe wound cultured; MD ordering xray & po abx. Pt will return to [facility] in 1wk    MD note:  patient presenting for follow up of Right foot.  He has been continuing to clean out his mom house, so has been on his feet a lot.  He denies hitting his foot on anything, but states it's hard to tell with having neuropathy.  There has been no fevers, chills, or sweats.  He has kept dressings in tact, clean, and dry.      Review of Systems   Constitutional: Negative.    HENT: Negative.     Eyes: Negative.    Respiratory: Negative.     Cardiovascular: Negative.    Gastrointestinal: Negative.    Skin:  Positive for wound.   Neurological:  Positive for numbness.   Psychiatric/Behavioral:  Negative for agitation, behavioral problems and confusion.        Objective:        Physical Exam    Vitals:    12/06/22 0956   BP: 134/64   Pulse: 104   Resp: 18   Temp: 98.3 °F (36.8 °C)     Debridement    Date/Time: 12/6/2022 9:42 AM  Performed by: See Jean-Baptiste MD  Authorized by: See Jean-Baptiste MD     Time out: Immediately prior to procedure a "time out" was called to verify the correct patient, procedure, equipment, support staff and site/side marked as required.    Consent Done?:  Yes (Written)  Local anesthesia used?: No      Wound Details:    Location:  Right foot    Location:  Right 3rd Toe    Type of Debridement:  Excisional       Length (cm):  " 2.3       Area (sq cm):  2.3       Width (cm):  1       Percent Debrided (%):  100       Depth (cm):  0.1       Total Area Debrided (sq cm):  2.3    Depth of debridement:  Subcutaneous tissue    Tissue debrided:  Dermis, Epidermis and Subcutaneous    Devitalized tissue debrided:  Biofilm, Fibrin and Slough    Instruments:  Curette    Bleeding:  Minimal  Hemostasis Achieved: Yes    Method Used:  Pressure  Patient tolerance:  Patient tolerated the procedure well with no immediate complications    Assessment:           ICD-10-CM ICD-9-CM   1. Type 2 diabetes mellitus with microalbuminuria, without long-term current use of insulin  E11.29 250.40    R80.9 791.0   2. Type 2 diabetes with skin ulcer of foot  E11.621 250.80    L97.509 707.15            Altered Skin Integrity 11/22/22 1020 Right distal Toe, third Diabetic Ulcer Partial thickness tissue loss. Shallow open ulcer with a red or pink wound bed, without slough. Intact or Open/Ruptured Serum-filled blister. (Active)   11/22/22 1020   Altered Skin Integrity Present on Admission:    Side: Right   Orientation: distal   Location: Toe, third   Wound Number:    Is this injury device related?:    Primary Wound Type: Diabetic ulc   Description of Altered Skin Integrity: Partial thickness tissue loss. Shallow open ulcer with a red or pink wound bed, without slough. Intact or Open/Ruptured Serum-filled blister.   Ankle-Brachial Index:    Pulses:    Removal Indication and Assessment:    Wound Outcome:    (Retired) Wound Length (cm):    (Retired) Wound Width (cm):    (Retired) Depth (cm):    Wound Description (Comments):    Removal Indications:    Wound Image   12/06/22 1007   Description of Altered Skin Integrity Full thickness tissue loss. Subcutaneous fat may be visible but bone, tendon or muscle are not exposed 12/06/22 1007   Dressing Appearance Moist drainage;Saturated 12/06/22 1007   Drainage Amount Large 12/06/22 1007   Drainage Characteristics/Odor Serosanguineous  12/06/22 1007   Appearance Red;Yellow 12/06/22 1007   Tissue loss description Full thickness 12/06/22 1007   Black (%), Wound Tissue Color 0 % 12/06/22 1007   Red (%), Wound Tissue Color 70 % 12/06/22 1007   Yellow (%), Wound Tissue Color 30 % 12/06/22 1007   Periwound Area Intact;Redness 12/06/22 1007   Wound Edges Open 12/06/22 1007   Wound Length (cm) 2.3 cm 12/06/22 1007   Wound Width (cm) 1 cm 12/06/22 1007   Wound Depth (cm) 0 cm 12/06/22 1007   Wound Volume (cm^3) 0 cm^3 12/06/22 1007   Wound Surface Area (cm^2) 2.3 cm^2 12/06/22 1007   Tunneling (depth (cm)/location) 0 12/06/22 1007   Undermining (depth (cm)/location) 0 12/06/22 1007   Care Cleansed with:;Antimicrobial agent 12/06/22 1007   Dressing Applied 12/06/22 1007   Periwound Care Other (see comments) 12/06/22 1007   Off Loading Football dressing;Off loading shoe 12/06/22 1007            Altered Skin Integrity 12/06/22 1003 Right dorsal Foot (Active)   12/06/22 1003   Altered Skin Integrity Present on Admission: yes   Side: Right   Orientation: dorsal   Location: Foot   Wound Number:    Is this injury device related?:    Primary Wound Type:    Description of Altered Skin Integrity:    Ankle-Brachial Index:    Pulses:    Removal Indication and Assessment:    Wound Outcome:    (Retired) Wound Length (cm):    (Retired) Wound Width (cm):    (Retired) Depth (cm):    Wound Description (Comments):    Removal Indications:    Wound Image   12/06/22 1007   Description of Altered Skin Integrity Partial thickness tissue loss. Shallow open ulcer with a red or pink wound bed, without slough. Intact or Open/Ruptured Serum-filled blister. 12/06/22 1007   Dressing Appearance Moist drainage 12/06/22 1007   Drainage Amount Small 12/06/22 1007   Drainage Characteristics/Odor Serosanguineous 12/06/22 1007   Appearance Red 12/06/22 1007   Tissue loss description Partial thickness 12/06/22 1007   Black (%), Wound Tissue Color 0 % 12/06/22 1007   Red (%), Wound Tissue Color  100 % 12/06/22 1007   Yellow (%), Wound Tissue Color 0 % 12/06/22 1007   Periwound Area Intact;Redness 12/06/22 1007   Wound Edges Open 12/06/22 1007   Wound Length (cm) 2.7 cm 12/06/22 1007   Wound Width (cm) 0.6 cm 12/06/22 1007   Wound Depth (cm) 0.1 cm 12/06/22 1007   Wound Volume (cm^3) 0.162 cm^3 12/06/22 1007   Wound Surface Area (cm^2) 1.62 cm^2 12/06/22 1007   Tunneling (depth (cm)/location) 0 12/06/22 1007   Undermining (depth (cm)/location) 0 12/06/22 1007   Care Cleansed with: 12/06/22 1007   Dressing Applied;Foam;Silver 12/06/22 1007   Off Loading Football dressing;Off loading shoe 12/06/22 1007            Wound 04/12/22 1426 Diabetic Ulcer Right plantar Foot (Active)   04/12/22 1426    Pre-existing: Yes   Primary Wound Type: Diabetic ulc   Side: Right   Orientation: plantar   Location: Foot   Wound Number:    Ankle-Brachial Index:    Pulses:    Removal Indication and Assessment:    Wound Outcome: Healed   (Retired) Wound Type:    (Retired) Wound Length (cm):    (Retired) Wound Width (cm):    (Retired) Depth (cm):    Wound Description (Comments):    Removal Indications:    Wound Image   12/06/22 1007   Wound WDL ex 12/06/22 1007   Dressing Appearance Moist drainage 12/06/22 1007   Drainage Amount Large 12/06/22 1007   Drainage Characteristics/Odor Serosanguineous 12/06/22 1007   Appearance Red 12/06/22 1007   Tissue loss description Full thickness 12/06/22 1007   Black (%), Wound Tissue Color 0 % 12/06/22 1007   Red (%), Wound Tissue Color 95 % 12/06/22 1007   Yellow (%), Wound Tissue Color 5 % 12/06/22 1007   Periwound Area Intact;Macerated 12/06/22 1007   Wound Edges Callused 12/06/22 1007   Wound Length (cm) 0.9 cm 12/06/22 1007   Wound Width (cm) 0.9 cm 12/06/22 1007   Wound Depth (cm) 0.2 cm 12/06/22 1007   Wound Volume (cm^3) 0.162 cm^3 12/06/22 1007   Wound Surface Area (cm^2) 0.81 cm^2 12/06/22 1007   Tunneling (depth (cm)/location) 0 12/06/22 1007   Undermining (depth (cm)/location) 0 12/06/22  1007   Care Cleansed with:;Antimicrobial agent 12/06/22 1007   Dressing Applied 12/06/22 1007   Periwound Care Other (see comments) 12/06/22 1007   Off Loading Football dressing;Off loading shoe 12/06/22 1007           Plan:              Tissue pathology and/or culture taken     [x] Yes      [] No  Sharp debridement performed                   [] Yes       [x] No  Labs ordered     [] Yes       [x] No  Imaging ordered    [x] Yes      [] No    Orders Placed This Encounter   Procedures    Debridement     This order was created via procedure documentation     Standing Status:   Standing     Number of Occurrences:   1    Aerobic culture          X-Ray Foot Complete Right     Standing Status:   Future     Number of Occurrences:   1     Standing Expiration Date:   12/6/2023    Change dressing     Cleaned wound with NS. Gent Tiff to periwound. To plantar foot & 3rd toe: hydrofera blue transfer backed by mextra. To dorsal foot: optifoam AG. Football drsg (cast padding x3, coban x1). Darco. Pt will return to [facility] in 1wk.        Follow up in about 1 week (around 12/13/2022) for Wound Care.       See Jean-Baptiste MD

## 2022-12-06 NOTE — PROGRESS NOTES
[facility]  [address]  [id number]  Nurse Visit    Subjective:       Patient seen in clinic today.  Dressing changed as ordered.      Assessment:          Altered Skin Integrity 11/22/22 1020 Right distal Toe, third Diabetic Ulcer Partial thickness tissue loss. Shallow open ulcer with a red or pink wound bed, without slough. Intact or Open/Ruptured Serum-filled blister. (Active)   11/22/22 1020   Altered Skin Integrity Present on Admission:    Side: Right   Orientation: distal   Location: Toe, third   Wound Number:    Is this injury device related?:    Primary Wound Type: Diabetic ulc   Description of Altered Skin Integrity: Partial thickness tissue loss. Shallow open ulcer with a red or pink wound bed, without slough. Intact or Open/Ruptured Serum-filled blister.   Ankle-Brachial Index:    Pulses:    Removal Indication and Assessment:    Wound Outcome:    (Retired) Wound Length (cm):    (Retired) Wound Width (cm):    (Retired) Depth (cm):    Wound Description (Comments):    Removal Indications:    Wound Image   12/06/22 1007   Description of Altered Skin Integrity Full thickness tissue loss. Subcutaneous fat may be visible but bone, tendon or muscle are not exposed 12/06/22 1007   Dressing Appearance Moist drainage;Saturated 12/06/22 1007   Drainage Amount Large 12/06/22 1007   Drainage Characteristics/Odor Serosanguineous 12/06/22 1007   Appearance Red;Yellow 12/06/22 1007   Tissue loss description Full thickness 12/06/22 1007   Black (%), Wound Tissue Color 0 % 12/06/22 1007   Red (%), Wound Tissue Color 70 % 12/06/22 1007   Yellow (%), Wound Tissue Color 30 % 12/06/22 1007   Periwound Area Intact;Redness 12/06/22 1007   Wound Edges Open 12/06/22 1007   Wound Length (cm) 2.3 cm 12/06/22 1007   Wound Width (cm) 1 cm 12/06/22 1007   Wound Depth (cm) 0 cm 12/06/22 1007   Wound Volume (cm^3) 0 cm^3 12/06/22 1007   Wound Surface Area (cm^2) 2.3 cm^2 12/06/22 1007   Tunneling  (depth (cm)/location) 0 12/06/22 1007   Undermining (depth (cm)/location) 0 12/06/22 1007   Care Cleansed with:;Antimicrobial agent 12/06/22 1007   Dressing Applied 12/06/22 1007   Periwound Care Other (see comments) 12/06/22 1007   Off Loading Football dressing;Off loading shoe 12/06/22 1007            Altered Skin Integrity 12/06/22 1003 Right dorsal Foot (Active)   12/06/22 1003   Altered Skin Integrity Present on Admission: yes   Side: Right   Orientation: dorsal   Location: Foot   Wound Number:    Is this injury device related?:    Primary Wound Type:    Description of Altered Skin Integrity:    Ankle-Brachial Index:    Pulses:    Removal Indication and Assessment:    Wound Outcome:    (Retired) Wound Length (cm):    (Retired) Wound Width (cm):    (Retired) Depth (cm):    Wound Description (Comments):    Removal Indications:    Wound Image   12/06/22 1007   Description of Altered Skin Integrity Partial thickness tissue loss. Shallow open ulcer with a red or pink wound bed, without slough. Intact or Open/Ruptured Serum-filled blister. 12/06/22 1007   Dressing Appearance Moist drainage 12/06/22 1007   Drainage Amount Small 12/06/22 1007   Drainage Characteristics/Odor Serosanguineous 12/06/22 1007   Appearance Red 12/06/22 1007   Tissue loss description Partial thickness 12/06/22 1007   Black (%), Wound Tissue Color 0 % 12/06/22 1007   Red (%), Wound Tissue Color 100 % 12/06/22 1007   Yellow (%), Wound Tissue Color 0 % 12/06/22 1007   Periwound Area Intact;Redness 12/06/22 1007   Wound Edges Open 12/06/22 1007   Wound Length (cm) 2.7 cm 12/06/22 1007   Wound Width (cm) 0.6 cm 12/06/22 1007   Wound Depth (cm) 0.1 cm 12/06/22 1007   Wound Volume (cm^3) 0.162 cm^3 12/06/22 1007   Wound Surface Area (cm^2) 1.62 cm^2 12/06/22 1007   Tunneling (depth (cm)/location) 0 12/06/22 1007   Undermining (depth (cm)/location) 0 12/06/22 1007   Care Cleansed with: 12/06/22 1007   Dressing Applied;Foam;Silver 12/06/22 1007   Off  Loading Football dressing;Off loading shoe 12/06/22 1007            Wound 04/12/22 1426 Diabetic Ulcer Right plantar Foot (Active)   04/12/22 1426    Pre-existing: Yes   Primary Wound Type: Diabetic ulc   Side: Right   Orientation: plantar   Location: Foot   Wound Number:    Ankle-Brachial Index:    Pulses:    Removal Indication and Assessment:    Wound Outcome: Healed   (Retired) Wound Type:    (Retired) Wound Length (cm):    (Retired) Wound Width (cm):    (Retired) Depth (cm):    Wound Description (Comments):    Removal Indications:    Wound Image   12/06/22 1007   Wound WDL ex 12/06/22 1007   Dressing Appearance Moist drainage 12/06/22 1007   Drainage Amount Large 12/06/22 1007   Drainage Characteristics/Odor Serosanguineous 12/06/22 1007   Appearance Red 12/06/22 1007   Tissue loss description Full thickness 12/06/22 1007   Black (%), Wound Tissue Color 0 % 12/06/22 1007   Red (%), Wound Tissue Color 95 % 12/06/22 1007   Yellow (%), Wound Tissue Color 5 % 12/06/22 1007   Periwound Area Intact;Macerated 12/06/22 1007   Wound Edges Callused 12/06/22 1007   Wound Length (cm) 0.9 cm 12/06/22 1007   Wound Width (cm) 0.9 cm 12/06/22 1007   Wound Depth (cm) 0.2 cm 12/06/22 1007   Wound Volume (cm^3) 0.162 cm^3 12/06/22 1007   Wound Surface Area (cm^2) 0.81 cm^2 12/06/22 1007   Tunneling (depth (cm)/location) 0 12/06/22 1007   Undermining (depth (cm)/location) 0 12/06/22 1007   Care Cleansed with:;Antimicrobial agent 12/06/22 1007   Dressing Applied 12/06/22 1007   Periwound Care Other (see comments) 12/06/22 1007   Off Loading Football dressing;Off loading shoe 12/06/22 1007           Plan:     Orders Placed This Encounter   Procedures    Aerobic culture          X-Ray Foot Complete Right     Standing Status:   Future     Number of Occurrences:   1     Standing Expiration Date:   12/6/2023    Change dressing     Cleaned wound with NS. Gent Tiff to periwound. To plantar foot & 3rd toe: hydrofera blue transfer backed  by corazon. To dorsal foot: optifoam AG. Football drsg (cast padding x3, coban x1). Darco. Pt will return to [facility] in 1wk.           Follow up in about 1 week (around 12/13/2022) for Wound Care.

## 2022-12-08 ENCOUNTER — TELEPHONE (OUTPATIENT)
Dept: FAMILY MEDICINE | Facility: CLINIC | Age: 62
End: 2022-12-08
Payer: MEDICAID

## 2022-12-08 DIAGNOSIS — E11.621 DIABETIC ULCER OF RIGHT MIDFOOT ASSOCIATED WITH TYPE 2 DIABETES MELLITUS, WITH FAT LAYER EXPOSED: Primary | ICD-10-CM

## 2022-12-08 DIAGNOSIS — L97.412 DIABETIC ULCER OF RIGHT MIDFOOT ASSOCIATED WITH TYPE 2 DIABETES MELLITUS, WITH FAT LAYER EXPOSED: Primary | ICD-10-CM

## 2022-12-08 NOTE — PROGRESS NOTES
Please let patient know that he will need to be on antibiotics based on culture results.  Bactrim has been sent in    Thanks  Dr. Jean-Baptiste

## 2022-12-08 NOTE — TELEPHONE ENCOUNTER
----- Message from See Jean-Baptiste MD sent at 12/8/2022 11:08 AM CST -----  Please let patient know that he will need to be on antibiotics based on culture results.  Bactrim has been sent in    Thanks  Dr. Jean-Baptiste

## 2022-12-09 LAB
BACTERIA SPEC AEROBE CULT: ABNORMAL
BACTERIA SPEC AEROBE CULT: ABNORMAL

## 2022-12-13 ENCOUNTER — HOSPITAL ENCOUNTER (OUTPATIENT)
Dept: WOUND CARE | Facility: HOSPITAL | Age: 62
Discharge: HOME | End: 2022-12-13
Attending: FAMILY MEDICINE
Payer: MEDICAID

## 2022-12-13 VITALS — DIASTOLIC BLOOD PRESSURE: 66 MMHG | SYSTOLIC BLOOD PRESSURE: 160 MMHG | HEART RATE: 99 BPM | TEMPERATURE: 98 F

## 2022-12-13 DIAGNOSIS — E11.621 DIABETIC ULCER OF RIGHT MIDFOOT ASSOCIATED WITH TYPE 2 DIABETES MELLITUS, WITH FAT LAYER EXPOSED: Primary | ICD-10-CM

## 2022-12-13 DIAGNOSIS — L97.412 DIABETIC ULCER OF RIGHT MIDFOOT ASSOCIATED WITH TYPE 2 DIABETES MELLITUS, WITH FAT LAYER EXPOSED: Primary | ICD-10-CM

## 2022-12-13 PROCEDURE — 11042 DBRDMT SUBQ TIS 1ST 20SQCM/<: CPT | Mod: ,,, | Performed by: FAMILY MEDICINE

## 2022-12-13 PROCEDURE — 99499 NO LOS: ICD-10-PCS | Mod: ,,, | Performed by: FAMILY MEDICINE

## 2022-12-13 PROCEDURE — 99499 UNLISTED E&M SERVICE: CPT | Mod: ,,, | Performed by: FAMILY MEDICINE

## 2022-12-13 PROCEDURE — 11042 DEBRIDEMENT: ICD-10-PCS | Mod: ,,, | Performed by: FAMILY MEDICINE

## 2022-12-13 PROCEDURE — 11042 DBRDMT SUBQ TIS 1ST 20SQCM/<: CPT | Performed by: FAMILY MEDICINE

## 2022-12-13 NOTE — PROGRESS NOTES
[facility]                Progress Note    Subjective:       Patient ID: Fermin Henson is a 62 y.o. male.    Chief Complaint: Wound Check    Walked to clinic unaided wearing Darco shoe. No complaints pain. Callus  around plantar foot and wound red with no slough. Right distal toe deeper and C pad placed to relieve pressure as well as extra padding with two dl foams over toes. Continue football. Aware must stay off feet.    MD note:  patient following up today for DFU's to right foot.  He states he walked less last week, but still more than he is suppose to do.  No pain as he is insensate.  No fevers, chills, or sweats    Review of Systems   Constitutional: Negative.    HENT: Negative.     Eyes: Negative.    Respiratory: Negative.     Cardiovascular: Negative.    Gastrointestinal: Negative.    Skin:  Positive for wound.   Psychiatric/Behavioral: Negative.         Objective:        Physical Exam  Constitutional:       Appearance: Normal appearance.   HENT:      Head: Normocephalic and atraumatic.      Right Ear: External ear normal.      Left Ear: External ear normal.      Mouth/Throat:      Mouth: Mucous membranes are moist.      Pharynx: Oropharynx is clear.   Eyes:      Extraocular Movements: Extraocular movements intact.      Conjunctiva/sclera: Conjunctivae normal.      Pupils: Pupils are equal, round, and reactive to light.   Abdominal:      General: There is no distension.      Palpations: Abdomen is soft.   Skin:     General: Skin is warm and dry.      Comments: +DFU to distal right second toe with tunneling  +DFU to right plantar with callous and maceration   Neurological:      Mental Status: He is alert.       Vitals:    12/13/22 1036   BP: (!) 160/66   Pulse: 99   Temp: 98 °F (36.7 °C)     Debridement    Date/Time: 12/13/2022 10:27 AM  Performed by: See Jean-Baptiste MD  Authorized by: See Jean-Baptiste MD     Time out: Immediately prior to procedure a  ""time out" was called to verify the correct patient, procedure, equipment, support staff and site/side marked as required.    Consent Done?:  Yes (Written)  Local anesthesia used?: No      Wound Details:    Location:  Right foot    Location:  Right 2nd Toe    Type of Debridement:  Excisional       Length (cm):  1.3       Area (sq cm):  1.17       Width (cm):  0.9       Percent Debrided (%):  100       Depth (cm):  0.3       Total Area Debrided (sq cm):  1.17    Depth of debridement:  Subcutaneous tissue    Tissue debrided:  Dermis, Epidermis and Subcutaneous    Devitalized tissue debrided:  Biofilm, Fibrin and Slough    Instruments:  Curette    Additional wounds:  1    2nd Wound Details:     Location:  Right foot    Location:  Right Plantar    Location:  Right Plantar    Type of Debridement:  Excisional       Length (cm):  2       Area (sq cm):  4       Width (cm):  2       Percent Debrided (%):  100       Depth (cm):  0.1       Total Area Debrided (sq cm):  4    Depth of debridement:  Subcutaneous tissue    Tissue debrided:  Dermis, Epidermis and Subcutaneous    Devitalized tissue debrided:  Callus, Fibrin and Slough    Instruments:  Curette    Bleeding:  Minimal  Hemostasis Achieved: Yes    Method Used:  Pressure  Patient tolerance:  Patient tolerated the procedure well with no immediate complications    Assessment:           ICD-10-CM ICD-9-CM   1. Diabetic ulcer of right midfoot associated with type 2 diabetes mellitus, with fat layer exposed  E11.621 250.80    L97.412 707.14            Altered Skin Integrity 11/22/22 1020 Right distal Toe, third Diabetic Ulcer Partial thickness tissue loss. Shallow open ulcer with a red or pink wound bed, without slough. Intact or Open/Ruptured Serum-filled blister. (Active)   11/22/22 1020   Altered Skin Integrity Present on Admission:    Side: Right   Orientation: distal   Location: Toe, third   Wound Number:    Is this injury device related?:    Primary Wound Type: Diabetic " [facility]   Description of Altered Skin Integrity: Partial thickness tissue loss. Shallow open ulcer with a red or pink wound bed, without slough. Intact or Open/Ruptured Serum-filled blister.   Ankle-Brachial Index:    Pulses:    Removal Indication and Assessment:    Wound Outcome:    (Retired) Wound Length (cm):    (Retired) Wound Width (cm):    (Retired) Depth (cm):    Wound Description (Comments):    Removal Indications:    Wound Image   12/13/22 1042   Description of Altered Skin Integrity Full thickness tissue loss. Subcutaneous fat may be visible but bone, tendon or muscle are not exposed 12/13/22 1042   Dressing Appearance Dry;Intact 12/13/22 1042   Drainage Amount None 12/13/22 1042   Appearance Red 12/13/22 1042   Tissue loss description Full thickness 12/13/22 1042   Red (%), Wound Tissue Color 100 % 12/13/22 1042   Periwound Area Dry;Intact 12/13/22 1042   Wound Length (cm) 1.3 cm 12/13/22 1042   Wound Width (cm) 0.9 cm 12/13/22 1042   Wound Depth (cm) 0.3 cm 12/13/22 1042   Wound Volume (cm^3) 0.351 cm^3 12/13/22 1042   Wound Surface Area (cm^2) 1.17 cm^2 12/13/22 1042   Tunneling (depth (cm)/location) 0 12/13/22 1042   Undermining (depth (cm)/location) 0 12/13/22 1042   Care Cleansed with:;Antimicrobial agent;Sterile normal saline 12/13/22 1042   Dressing Changed 12/13/22 1042   Off Loading Football dressing;Off loading shoe 12/13/22 1042   Dressing Change Due 12/20/22 12/13/22 1042            Altered Skin Integrity 12/06/22 1003 Right dorsal Foot (Active)   12/06/22 1003   Altered Skin Integrity Present on Admission: yes   Side: Right   Orientation: dorsal   Location: Foot   Wound Number:    Is this injury device related?:    Primary Wound Type:    Description of Altered Skin Integrity:    Ankle-Brachial Index:    Pulses:    Removal Indication and Assessment:    Wound Outcome:    (Retired) Wound Length (cm):    (Retired) Wound Width (cm):    (Retired) Depth (cm):    Wound Description (Comments):    Removal  Indications:    Wound Image   12/13/22 1042   Description of Altered Skin Integrity Partial thickness tissue loss. Shallow open ulcer with a red or pink wound bed, without slough. Intact or Open/Ruptured Serum-filled blister. 12/13/22 1042   Dressing Appearance Dry 12/13/22 1042   Drainage Amount None 12/13/22 1042   Appearance Red 12/13/22 1042   Tissue loss description Partial thickness 12/13/22 1042   Red (%), Wound Tissue Color 100 % 12/13/22 1042   Periwound Area Intact;Dry 12/13/22 1042   Wound Length (cm) 2 cm 12/13/22 1042   Wound Width (cm) 2 cm 12/13/22 1042   Wound Depth (cm) 0.1 cm 12/13/22 1042   Wound Volume (cm^3) 0.4 cm^3 12/13/22 1042   Wound Surface Area (cm^2) 4 cm^2 12/13/22 1042   Care Cleansed with:;Antimicrobial agent;Sterile normal saline 12/13/22 1042   Dressing Changed 12/13/22 1042   Off Loading Football dressing;Off loading shoe 12/13/22 1042   Dressing Change Due 12/20/22 12/13/22 1042            Wound 04/12/22 1426 Diabetic Ulcer Right plantar Foot (Active)   04/12/22 1426    Pre-existing: Yes   Primary Wound Type: Diabetic ulc   Side: Right   Orientation: plantar   Location: Foot   Wound Number:    Ankle-Brachial Index:    Pulses:    Removal Indication and Assessment:    Wound Outcome: Healed   (Retired) Wound Type:    (Retired) Wound Length (cm):    (Retired) Wound Width (cm):    (Retired) Depth (cm):    Wound Description (Comments):    Removal Indications:    Wound Image   12/13/22 1042   Wound WDL ex 12/13/22 1042   Drainage Amount Moderate 12/13/22 1042   Drainage Characteristics/Odor Clear;Serous 12/13/22 1042   Appearance Red 12/13/22 1042   Tissue loss description Full thickness 12/13/22 1042   Black (%), Wound Tissue Color 0 % 12/13/22 1042   Red (%), Wound Tissue Color 100 % 12/13/22 1042   Yellow (%), Wound Tissue Color 0 % 12/13/22 1042   Periwound Area Dry 12/13/22 1042   Wound Edges Callused 12/13/22 1042   Wound Length (cm) 1 cm 12/13/22 1042   Wound Width (cm) 0.8 cm  12/13/22 1042   Wound Depth (cm) 0.2 cm 12/13/22 1042   Wound Volume (cm^3) 0.16 cm^3 12/13/22 1042   Wound Surface Area (cm^2) 0.8 cm^2 12/13/22 1042   Tunneling (depth (cm)/location) 0 12/13/22 1042   Undermining (depth (cm)/location) 0 12/13/22 1042   Care Cleansed with:;Antimicrobial agent;Sterile normal saline 12/13/22 1042   Dressing Changed 12/13/22 1042   Off Loading Football dressing;Off loading shoe 12/13/22 1042   Dressing Change Due 12/20/22 12/13/22 1042           Plan:              Tissue pathology and/or culture taken     [] Yes      [x] No  Sharp debridement performed                   [x] Yes       [] No  Labs ordered     [] Yes       [x] No  Imaging ordered    [] Yes      [x] No    Orders Placed This Encounter   Procedures    Debridement     This order was created via procedure documentation     Standing Status:   Standing     Number of Occurrences:   1    Change dressing     Cleaned wound with NS. Gent Tiff to periwound. To plantar foot & 3rd toe: hydrofera blue transfer backed by mextra. To dorsal foot: optifoam AG. Football drsg (cast padding x3, coban x1). Darco. Pt will return to [facility] in 1wk        Follow up in about 1 week (around 12/20/2022).     See Jean-Baptiste MD

## 2022-12-20 ENCOUNTER — HOSPITAL ENCOUNTER (OUTPATIENT)
Dept: WOUND CARE | Facility: HOSPITAL | Age: 62
Discharge: HOME | End: 2022-12-20
Attending: FAMILY MEDICINE
Payer: MEDICAID

## 2022-12-20 VITALS — DIASTOLIC BLOOD PRESSURE: 70 MMHG | HEART RATE: 108 BPM | TEMPERATURE: 98 F | SYSTOLIC BLOOD PRESSURE: 137 MMHG

## 2022-12-20 DIAGNOSIS — L97.412 DIABETIC ULCER OF RIGHT MIDFOOT ASSOCIATED WITH TYPE 2 DIABETES MELLITUS, WITH FAT LAYER EXPOSED: Primary | ICD-10-CM

## 2022-12-20 DIAGNOSIS — E11.621 DIABETIC ULCER OF RIGHT MIDFOOT ASSOCIATED WITH TYPE 2 DIABETES MELLITUS, WITH FAT LAYER EXPOSED: Primary | ICD-10-CM

## 2022-12-20 PROCEDURE — 11042 DBRDMT SUBQ TIS 1ST 20SQCM/<: CPT | Performed by: FAMILY MEDICINE

## 2022-12-20 PROCEDURE — 11042 DBRDMT SUBQ TIS 1ST 20SQCM/<: CPT | Mod: ,,, | Performed by: FAMILY MEDICINE

## 2022-12-20 PROCEDURE — 99499 UNLISTED E&M SERVICE: CPT | Mod: ,,, | Performed by: FAMILY MEDICINE

## 2022-12-20 PROCEDURE — 99499 NO LOS: ICD-10-PCS | Mod: ,,, | Performed by: FAMILY MEDICINE

## 2022-12-20 PROCEDURE — 11042 DEBRIDEMENT: ICD-10-PCS | Mod: ,,, | Performed by: FAMILY MEDICINE

## 2022-12-20 NOTE — PROGRESS NOTES
[facility]                Progress Note    Subjective:       Patient ID: Fermin Henson is a 62 y.o. male.    Chief Complaint: Wound Check    Follow up wound care visit. Patient ambulated to exam room unaided, with prescribed Darco shoe w/ PegAssist on Right Foot. No c/o pain at present. Dressing intact with a large amount of serosanguineous, non-malodor drainage. Right 3rd Distal Toe wound measuring smaller in (0.3 cm) length and in (0.3 cm) width. Right Dorsal Foot wound measuring smaller in (0.5 cm) length and in (0.3 cm) width - MD debrided. Right Plantar Foot wound measuring the same as last visit, MD pared down callus around wound.     Wound care done as per order. Return to clinic in 1 week.     MD note:  patient continues to be on his feet more than instructed, but states it was less this past week.  He finished all antibiotics.  No fevers, chills, or sweats.  He has been keeping dressings in place, clean, and dry.      Review of Systems   Constitutional: Negative.    HENT: Negative.     Eyes: Negative.    Respiratory: Negative.     Cardiovascular: Negative.    Gastrointestinal: Negative.    Skin:  Positive for wound.   Psychiatric/Behavioral: Negative.         Objective:        Physical Exam  Constitutional:       Appearance: Normal appearance.   HENT:      Head: Normocephalic and atraumatic.      Right Ear: External ear normal.      Left Ear: External ear normal.      Mouth/Throat:      Mouth: Mucous membranes are moist.      Pharynx: Oropharynx is clear.   Eyes:      Extraocular Movements: Extraocular movements intact.      Conjunctiva/sclera: Conjunctivae normal.      Pupils: Pupils are equal, round, and reactive to light.   Pulmonary:      Effort: Pulmonary effort is normal. No respiratory distress.   Abdominal:      General: There is no distension.      Palpations: Abdomen is soft.   Skin:     General: Skin is warm and dry.      Comments: +DFU to distal  "right second toe with slough present  +right plantar DFU with callous   Neurological:      Mental Status: He is alert and oriented to person, place, and time. Mental status is at baseline.      Sensory: Sensory deficit present.       Vitals:    12/20/22 1103   BP: 137/70   Pulse: 108   Temp: 97.7 °F (36.5 °C)     Debridement    Date/Time: 12/20/2022 10:28 AM  Performed by: See Jean-Baptiste MD  Authorized by: See Jean-Baptiste MD     Time out: Immediately prior to procedure a "time out" was called to verify the correct patient, procedure, equipment, support staff and site/side marked as required.    Consent Done?:  Yes (Written)  Local anesthesia used?: No      Wound Details:    Location:  Right foot    Location:  Right 2nd Toe    Type of Debridement:  Excisional       Length (cm):  0.6       Area (sq cm):  0.18       Width (cm):  0.3       Percent Debrided (%):  100       Depth (cm):  0.2       Total Area Debrided (sq cm):  0.18    Depth of debridement:  Subcutaneous tissue    Tissue debrided:  Dermis, Epidermis and Subcutaneous    Devitalized tissue debrided:  Biofilm and Slough    Instruments:  Curette    Additional wounds:  1    2nd Wound Details:     Location:  Right foot    Location:  Right Plantar    Location:  Right Plantar    Type of Debridement:  Excisional       Length (cm):  1       Area (sq cm):  0.8       Width (cm):  0.8       Percent Debrided (%):  100       Depth (cm):  0.1       Total Area Debrided (sq cm):  0.8    Depth of debridement:  Subcutaneous tissue    Tissue debrided:  Dermis, Epidermis and Subcutaneous    Devitalized tissue debrided:  Biofilm, Callus and Fibrin    Instruments:  Curette    Bleeding:  Minimal  Hemostasis Achieved: Yes    Method Used:  Pressure  Patient tolerance:  Patient tolerated the procedure well with no immediate complications      Assessment:           ICD-10-CM ICD-9-CM   1. Diabetic ulcer of right midfoot associated with type 2 diabetes mellitus, with fat layer " exposed  E11.621 250.80    L97.412 707.14            Altered Skin Integrity 11/22/22 1020 Right distal Toe, third Diabetic Ulcer Partial thickness tissue loss. Shallow open ulcer with a red or pink wound bed, without slough. Intact or Open/Ruptured Serum-filled blister. (Active)   11/22/22 1020   Altered Skin Integrity Present on Admission:    Side: Right   Orientation: distal   Location: Toe, third   Wound Number:    Is this injury device related?:    Primary Wound Type: Diabetic ulc   Description of Altered Skin Integrity: Partial thickness tissue loss. Shallow open ulcer with a red or pink wound bed, without slough. Intact or Open/Ruptured Serum-filled blister.   Ankle-Brachial Index:    Pulses:    Removal Indication and Assessment:    Wound Outcome:    (Retired) Wound Length (cm):    (Retired) Wound Width (cm):    (Retired) Depth (cm):    Wound Description (Comments):    Removal Indications:    Wound Image    12/20/22 1135   Description of Altered Skin Integrity Partial thickness tissue loss. Shallow open ulcer with a red or pink wound bed, without slough. Intact or Open/Ruptured Serum-filled blister. 12/20/22 1135   Dressing Appearance Intact;Moist drainage 12/20/22 1135   Drainage Amount Moderate 12/20/22 1135   Drainage Characteristics/Odor Serosanguineous;No odor 12/20/22 1135   Appearance Red;Maroon;Moist 12/20/22 1135   Tissue loss description Partial thickness 12/20/22 1135   Black (%), Wound Tissue Color 0 % 12/20/22 1135   Red (%), Wound Tissue Color 100 % 12/20/22 1135   Yellow (%), Wound Tissue Color 0 % 12/20/22 1135   Periwound Area Dry;Intact 12/20/22 1135   Wound Edges Defined;Open 12/20/22 1135   Wound Length (cm) 1 cm 12/20/22 1135   Wound Width (cm) 0.6 cm 12/20/22 1135   Wound Depth (cm) 0.3 cm 12/20/22 1135   Wound Volume (cm^3) 0.18 cm^3 12/20/22 1135   Wound Surface Area (cm^2) 0.6 cm^2 12/20/22 1135   Tunneling (depth (cm)/location) 0 12/20/22 1135   Undermining (depth (cm)/location) 0  12/20/22 1135   Care Cleansed with:;Antimicrobial agent;Sterile normal saline 12/20/22 1135   Dressing Changed 12/20/22 1135   Off Loading Football dressing;Off loading shoe 12/20/22 1135   Dressing Change Due 12/27/22 12/20/22 1135            Altered Skin Integrity 12/06/22 1003 Right dorsal Foot (Active)   12/06/22 1003   Altered Skin Integrity Present on Admission: yes   Side: Right   Orientation: dorsal   Location: Foot   Wound Number:    Is this injury device related?:    Primary Wound Type:    Description of Altered Skin Integrity:    Ankle-Brachial Index:    Pulses:    Removal Indication and Assessment:    Wound Outcome:    (Retired) Wound Length (cm):    (Retired) Wound Width (cm):    (Retired) Depth (cm):    Wound Description (Comments):    Removal Indications:    Wound Image   12/20/22 1135   Description of Altered Skin Integrity Partial thickness tissue loss. Shallow open ulcer with a red or pink wound bed, without slough. Intact or Open/Ruptured Serum-filled blister. 12/20/22 1135   Dressing Appearance Intact;Moist drainage 12/20/22 1135   Drainage Amount Moderate 12/20/22 1135   Drainage Characteristics/Odor Serosanguineous;No odor 12/20/22 1135   Appearance Red 12/20/22 1135   Tissue loss description Partial thickness 12/20/22 1135   Black (%), Wound Tissue Color 0 % 12/20/22 1135   Red (%), Wound Tissue Color 100 % 12/20/22 1135   Yellow (%), Wound Tissue Color 0 % 12/20/22 1135   Periwound Area Redness 12/20/22 1135   Wound Edges Open 12/20/22 1135   Wound Length (cm) 1.5 cm 12/20/22 1135   Wound Width (cm) 1.7 cm 12/20/22 1135   Wound Depth (cm) 0.1 cm 12/20/22 1135   Wound Volume (cm^3) 0.255 cm^3 12/20/22 1135   Wound Surface Area (cm^2) 2.55 cm^2 12/20/22 1135   Tunneling (depth (cm)/location) 0 12/20/22 1135   Undermining (depth (cm)/location) 0 12/20/22 1135   Care Cleansed with:;Antimicrobial agent;Sterile normal saline 12/20/22 1135   Dressing Changed 12/20/22 1135   Periwound Care Absorptive  dressing applied 12/20/22 1135   Off Loading Football dressing;Off loading shoe 12/20/22 1135   Dressing Change Due 12/27/22 12/20/22 1135            Wound 04/12/22 1426 Diabetic Ulcer Right plantar Foot (Active)   04/12/22 1426    Pre-existing: Yes   Primary Wound Type: Diabetic ulc   Side: Right   Orientation: plantar   Location: Foot   Wound Number:    Ankle-Brachial Index:    Pulses:    Removal Indication and Assessment:    Wound Outcome: Healed   (Retired) Wound Type:    (Retired) Wound Length (cm):    (Retired) Wound Width (cm):    (Retired) Depth (cm):    Wound Description (Comments):    Removal Indications:    Wound Image    12/20/22 1135   Wound WDL ex 12/20/22 1135   Dressing Appearance Intact;Moist drainage 12/20/22 1135   Drainage Amount Large 12/20/22 1135   Drainage Characteristics/Odor Serosanguineous 12/20/22 1135   Appearance Red 12/20/22 1135   Tissue loss description Partial thickness 12/20/22 1135   Black (%), Wound Tissue Color 0 % 12/20/22 1135   Red (%), Wound Tissue Color 100 % 12/20/22 1135   Yellow (%), Wound Tissue Color 0 % 12/20/22 1135   Periwound Area [place] 12/20/22 1135   Wound Edges Callused;Defined;Open 12/20/22 1135   Wound Length (cm) 1 cm 12/20/22 1135   Wound Width (cm) 0.8 cm 12/20/22 1135   Wound Depth (cm) 0.1 cm 12/20/22 1135   Wound Volume (cm^3) 0.08 cm^3 12/20/22 1135   Wound Surface Area (cm^2) 0.8 cm^2 12/20/22 1135   Tunneling (depth (cm)/location) 0 12/20/22 1135   Undermining (depth (cm)/location) 0 12/20/22 1135   Care Cleansed with:;Antimicrobial agent;Sterile normal saline 12/20/22 1135   Dressing Changed 12/20/22 1135   Periwound Care Absorptive dressing applied 12/20/22 1135   Off Loading Football dressing;Off loading shoe 12/20/22 1135   Dressing Change Due 12/27/22 12/20/22 1135           Plan:              Tissue pathology and/or culture taken     [] Yes      [x] No  Sharp debridement performed                   [x] Yes       [] No  Labs ordered     [] Yes        [x] No  Imaging ordered    [] Yes      [x] No    Orders Placed This Encounter   Procedures    Debridement     This order was created via procedure documentation     Standing Status:   Standing     Number of Occurrences:   1    Change dressing     Cleaned wound with NS. Betadine to periwound.   Right plantar foot: Endoform to wound bed.    Right 3rd toe: Iodoflex to wound bed.   Right dorsal foot: Endoform to wound bed then cover with Optifoam AG   Mextra Long covering 3rd toe and Plantar Foot. Gigi Foam Long x 2 (laid perpendicular) then Gigi Foam Short x 1 to Plantar aspect.   Football drsg (cast padding x3, coban x1).  Darco Shoe.        Follow up in about 1 week (around 12/27/2022) for wound care.     See Jean-Baptiste MD

## 2022-12-27 ENCOUNTER — HOSPITAL ENCOUNTER (OUTPATIENT)
Dept: WOUND CARE | Facility: HOSPITAL | Age: 62
Discharge: HOME | End: 2022-12-27
Attending: FAMILY MEDICINE
Payer: MEDICARE

## 2022-12-27 VITALS — DIASTOLIC BLOOD PRESSURE: 81 MMHG | HEART RATE: 72 BPM | TEMPERATURE: 98 F | SYSTOLIC BLOOD PRESSURE: 147 MMHG

## 2022-12-27 DIAGNOSIS — L97.509 TYPE 2 DIABETES WITH SKIN ULCER OF FOOT: Primary | ICD-10-CM

## 2022-12-27 DIAGNOSIS — E11.621 TYPE 2 DIABETES WITH SKIN ULCER OF FOOT: Primary | ICD-10-CM

## 2022-12-27 PROCEDURE — 99214 OFFICE O/P EST MOD 30 MIN: CPT | Mod: ,,, | Performed by: FAMILY MEDICINE

## 2022-12-27 PROCEDURE — 99214 PR OFFICE/OUTPT VISIT, EST, LEVL IV, 30-39 MIN: ICD-10-PCS | Mod: ,,, | Performed by: FAMILY MEDICINE

## 2022-12-27 PROCEDURE — 99213 OFFICE O/P EST LOW 20 MIN: CPT | Mod: 27 | Performed by: FAMILY MEDICINE

## 2022-12-27 NOTE — PROGRESS NOTES
[facility]                Progress Note    Subjective:       Patient ID: Fermin Henson is a 62 y.o. male.    Chief Complaint: Wound Check    Follow up visit. Pt arrived ambulatory, unaccompanied. Denies any pain. Seen by Dr. Jean-Baptiste. Continue plan of care. Pt to follow up in clinic on next week, 12/03/23.      MD note:  patient followintg up today for multiple wounds to Right foot.  He has been off his feet more in the past week than normal.  He has completed abx as of last week for positive culture.  No fevers, chills, or sweats.     Review of Systems   Constitutional: Negative.    HENT: Negative.     Eyes: Negative.    Respiratory: Negative.     Cardiovascular: Negative.    Gastrointestinal: Negative.    Skin:  Positive for wound.   Neurological:  Positive for numbness.   Psychiatric/Behavioral: Negative.         Objective:        Physical Exam  Constitutional:       Appearance: Normal appearance.   HENT:      Head: Normocephalic and atraumatic.      Right Ear: External ear normal.      Left Ear: External ear normal.      Mouth/Throat:      Mouth: Mucous membranes are moist.      Pharynx: Oropharynx is clear.   Eyes:      Extraocular Movements: Extraocular movements intact.      Conjunctiva/sclera: Conjunctivae normal.      Pupils: Pupils are equal, round, and reactive to light.   Cardiovascular:      Rate and Rhythm: Normal rate and regular rhythm.   Pulmonary:      Effort: Pulmonary effort is normal. No respiratory distress.   Abdominal:      General: There is no distension.      Palpations: Abdomen is soft.   Skin:     General: Skin is warm.      Comments: +DFU to distal right second toe and right plantar without slough or maceration; no erythema  +plaque on dorsal foot improving   Neurological:      Mental Status: He is alert.       Vitals:    12/27/22 1010   BP: (!) 147/81   Pulse: 72   Temp: 98.2 °F (36.8 °C)       Assessment:           ICD-10-CM ICD-9-CM   1.  Type 2 diabetes with skin ulcer of foot  E11.621 250.80    L97.509 707.15            Altered Skin Integrity 12/06/22 1003 Right dorsal Foot (Active)   12/06/22 1003   Altered Skin Integrity Present on Admission: yes   Side: Right   Orientation: dorsal   Location: Foot   Wound Number:    Is this injury device related?:    Primary Wound Type:    Description of Altered Skin Integrity:    Ankle-Brachial Index:    Pulses:    Removal Indication and Assessment:    Wound Outcome:    (Retired) Wound Length (cm):    (Retired) Wound Width (cm):    (Retired) Depth (cm):    Wound Description (Comments):    Removal Indications:    Description of Altered Skin Integrity Partial thickness tissue loss. Shallow open ulcer with a red or pink wound bed, without slough. Intact or Open/Ruptured Serum-filled blister. 12/27/22 1051   Dressing Appearance Dry 12/27/22 1051   Drainage Amount None 12/27/22 1051   Red (%), Wound Tissue Color 100 % 12/27/22 1051   Care Cleansed with:;Antimicrobial agent;Sterile normal saline 12/27/22 1051   Dressing Applied 12/27/22 1051   Off Loading Football dressing;Off loading shoe 12/27/22 1051   Dressing Change Due 01/03/23 12/27/22 1051            Wound 04/12/22 1426 Diabetic Ulcer Right plantar Foot (Active)   04/12/22 1426    Pre-existing: Yes   Primary Wound Type: Diabetic ulc   Side: Right   Orientation: plantar   Location: Foot   Wound Number:    Ankle-Brachial Index:    Pulses:    Removal Indication and Assessment:    Wound Outcome: Healed   (Retired) Wound Type:    (Retired) Wound Length (cm):    (Retired) Wound Width (cm):    (Retired) Depth (cm):    Wound Description (Comments):    Removal Indications:    Wound Image   12/27/22 1051   Wound WDL WDL 12/27/22 1051   Dressing Appearance Dry 12/27/22 1051   Drainage Amount None 12/27/22 1051   Red (%), Wound Tissue Color 100 % 12/27/22 1051   Periwound Area [place] 12/27/22 1051   Wound Length (cm) 1 cm 12/27/22 1051   Wound Width (cm) 1 cm 12/27/22 1051    Wound Depth (cm) 0.2 cm 12/27/22 1051   Wound Volume (cm^3) 0.2 cm^3 12/27/22 1051   Wound Surface Area (cm^2) 1 cm^2 12/27/22 1051   Care Cleansed with:;Antimicrobial agent;Sterile normal saline 12/27/22 1051   Dressing Collagen;Foam;Absorptive Pad 12/27/22 1051   Off Loading Football dressing;Off loading shoe 12/27/22 1051   Dressing Change Due 01/03/23 12/27/22 1051           Plan:              Tissue pathology and/or culture taken     [] Yes      [x] No  Sharp debridement performed                   [] Yes       [x] No  Labs ordered     [] Yes       [x] No  Imaging ordered    [] Yes      [x] No    Orders Placed This Encounter   Procedures    Change dressing     Cleaned wound with NS. Betadine to periwound.   Right plantar foot: Endoform to wound bed.     Right 3rd toe: Iodoflex to wound bed.   Right dorsal foot: Endoform to wound bed then cover with Optifoam AG   Mextra Long covering 3rd toe and Plantar Foot. Gigi Foam Long x 2 (laid perpendicular) then Gigi Foam Short x 1 to Plantar aspect.   Football drsg (cast padding x3, coban x1).   Darco Shoe.        Follow up in about 1 week (around 1/3/2023) for wound care.     See Jean-Baptiste MD

## 2023-01-03 ENCOUNTER — HOSPITAL ENCOUNTER (OUTPATIENT)
Dept: WOUND CARE | Facility: HOSPITAL | Age: 63
Discharge: HOME | End: 2023-01-03
Attending: FAMILY MEDICINE
Payer: MEDICAID

## 2023-01-03 ENCOUNTER — HOSPITAL ENCOUNTER (OUTPATIENT)
Dept: RADIOLOGY | Facility: HOSPITAL | Age: 63
Discharge: HOME | End: 2023-01-03
Attending: FAMILY MEDICINE
Payer: MEDICAID

## 2023-01-03 VITALS
RESPIRATION RATE: 20 BRPM | DIASTOLIC BLOOD PRESSURE: 69 MMHG | HEART RATE: 105 BPM | TEMPERATURE: 96 F | SYSTOLIC BLOOD PRESSURE: 122 MMHG

## 2023-01-03 DIAGNOSIS — E11.621 TYPE 2 DIABETES WITH SKIN ULCER OF FOOT: ICD-10-CM

## 2023-01-03 DIAGNOSIS — E11.621 DIABETIC ULCER OF RIGHT MIDFOOT ASSOCIATED WITH TYPE 2 DIABETES MELLITUS, WITH FAT LAYER EXPOSED: Primary | ICD-10-CM

## 2023-01-03 DIAGNOSIS — L97.412 DIABETIC ULCER OF RIGHT MIDFOOT ASSOCIATED WITH TYPE 2 DIABETES MELLITUS, WITH FAT LAYER EXPOSED: Primary | ICD-10-CM

## 2023-01-03 DIAGNOSIS — L97.509 TYPE 2 DIABETES WITH SKIN ULCER OF FOOT: ICD-10-CM

## 2023-01-03 PROCEDURE — 73630 X-RAY EXAM OF FOOT: CPT | Mod: 26,RT,, | Performed by: RADIOLOGY

## 2023-01-03 PROCEDURE — 11042 DEBRIDEMENT: ICD-10-PCS | Mod: ,,, | Performed by: FAMILY MEDICINE

## 2023-01-03 PROCEDURE — 99499 NO LOS: ICD-10-PCS | Mod: ,,, | Performed by: FAMILY MEDICINE

## 2023-01-03 PROCEDURE — 73630 X-RAY EXAM OF FOOT: CPT | Mod: TC,FY,RT

## 2023-01-03 PROCEDURE — 73630 XR FOOT COMPLETE 3 VIEW RIGHT: ICD-10-PCS | Mod: 26,RT,, | Performed by: RADIOLOGY

## 2023-01-03 PROCEDURE — 11042 DBRDMT SUBQ TIS 1ST 20SQCM/<: CPT | Performed by: FAMILY MEDICINE

## 2023-01-03 PROCEDURE — 99499 UNLISTED E&M SERVICE: CPT | Mod: ,,, | Performed by: FAMILY MEDICINE

## 2023-01-03 PROCEDURE — 11042 DBRDMT SUBQ TIS 1ST 20SQCM/<: CPT | Mod: ,,, | Performed by: FAMILY MEDICINE

## 2023-01-03 NOTE — PROGRESS NOTES
[facility]                Progress Note    Subjective:       Patient ID: Fermin Henson is a 62 y.o. male.    Chief Complaint: Wound Check    F/u wound care visit. Patient ambulatory to exam room with no difficulty noted. Patient denies pain or discomfort at present. Wound dressing to right foot intact with slippage noted to foam on plantar foot when dressing removed. Patient denies increased ambulation. Small amount of drainage noted to wound dressing from all wounds. Wound to right plantar foot measuring smaller in all dimensions. Wound to right 3rd distal toe noted to have hypergranulation wound tissue to wound bed. Right foot xray ordered. Wound care done per order. RTC in one week.    MD note:  patient following up for DFU's to right foot.  He states he has been off his foot a lot as he has been staying home.  No pain as he is insensate and no fevers, chills, or sweats.  He states he has been keeping dressings in place, clean, and dry.     Review of Systems   Constitutional: Negative.    HENT: Negative.     Eyes: Negative.    Respiratory: Negative.     Cardiovascular: Negative.    Gastrointestinal: Negative.    Skin:  Positive for wound.   Psychiatric/Behavioral: Negative.         Objective:        Physical Exam  Constitutional:       Appearance: Normal appearance.   HENT:      Head: Normocephalic and atraumatic.      Right Ear: External ear normal.      Left Ear: External ear normal.      Mouth/Throat:      Mouth: Mucous membranes are moist.      Pharynx: Oropharynx is clear.   Eyes:      Extraocular Movements: Extraocular movements intact.      Conjunctiva/sclera: Conjunctivae normal.      Pupils: Pupils are equal, round, and reactive to light.   Cardiovascular:      Rate and Rhythm: Normal rate and regular rhythm.   Abdominal:      General: There is no distension.      Palpations: Abdomen is soft.   Skin:     General: Skin is warm and dry.      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       Vitals:    01/03/23 0957   BP: 122/69   Pulse: 105   Resp: 20   Temp: 96.1 °F (35.6 °C)     Debridement    Date/Time: 1/3/2023 9:49 AM  Performed by: See Jean-Baptiste MD  Authorized by: See Jean-Baptiste MD     Time out: Immediately prior to procedure a "time out" was called to verify the correct patient, procedure, equipment, support staff and site/side marked as required.    Consent Done?:  Yes (Written)  Local anesthesia used?: No      Wound Details:    Location:  Right foot    Location:  Right Plantar    Type of Debridement:  Excisional       Length (cm):  0.5       Area (sq cm):  0.3       Width (cm):  0.6       Percent Debrided (%):  100       Depth (cm):  0.2       Total Area Debrided (sq cm):  0.3    Depth of debridement:  Subcutaneous tissue    Tissue debrided:  Dermis, Epidermis and Subcutaneous    Devitalized tissue debrided:  Biofilm, Fibrin and Slough    Instruments:  Curette    Bleeding:  Minimal  Hemostasis Achieved: Yes    Method Used:  Pressure  Patient tolerance:  Patient tolerated the procedure well with no immediate complications      Chemical cauterization  Silver nitrate applied to distal second toe for hypergranulation.  No bleeding and patient tolerated well    Assessment:           ICD-10-CM ICD-9-CM   1. Diabetic ulcer of right midfoot associated with type 2 diabetes mellitus, with fat layer exposed  E11.621 250.80    L97.412 707.14   2. Type 2 diabetes with skin ulcer of foot  E11.621 250.80    L97.509 707.15            Altered Skin Integrity 11/22/22 1020 Right distal Toe, third Diabetic Ulcer Partial thickness tissue loss. Shallow open ulcer with a red or pink wound bed, without slough. Intact or " Open/Ruptured Serum-filled blister. (Active)   11/22/22 1020   Altered Skin Integrity Present on Admission:    Side: Right   Orientation: distal   Location: Toe, third   Wound Number:    Is this injury device related?:    Primary Wound Type: Diabetic ulc   Description of Altered Skin Integrity: Partial thickness tissue loss. Shallow open ulcer with a red or pink wound bed, without slough. Intact or Open/Ruptured Serum-filled blister.   Ankle-Brachial Index:    Pulses:    Removal Indication and Assessment:    Wound Outcome:    (Retired) Wound Length (cm):    (Retired) Wound Width (cm):    (Retired) Depth (cm):    Wound Description (Comments):    Removal Indications:    Wound Image   01/03/23 1005   Description of Altered Skin Integrity Partial thickness tissue loss. Shallow open ulcer with a red or pink wound bed, without slough. Intact or Open/Ruptured Serum-filled blister. 01/03/23 1005   Dressing Appearance Intact;Moist drainage 01/03/23 1005   Drainage Amount Small 01/03/23 1005   Appearance Red;Hypergranulation 01/03/23 1005   Tissue loss description Partial thickness 01/03/23 1005   Black (%), Wound Tissue Color 0 % 01/03/23 1005   Red (%), Wound Tissue Color 100 % 01/03/23 1005   Yellow (%), Wound Tissue Color 0 % 01/03/23 1005   Periwound Area Dry;Intact 01/03/23 1005   Wound Edges Defined 01/03/23 1005   Wound Length (cm) 1 cm 01/03/23 1005   Wound Width (cm) 0.8 cm 01/03/23 1005   Wound Depth (cm) 0 cm 01/03/23 1005   Wound Volume (cm^3) 0 cm^3 01/03/23 1005   Wound Surface Area (cm^2) 0.8 cm^2 01/03/23 1005   Care Cleansed with:;Soap and water;Sterile normal saline 01/03/23 1005   Dressing Changed 01/03/23 1005   Off Loading Football dressing;Off loading shoe 01/03/23 1005   Dressing Change Due 01/10/23 01/03/23 1005            Altered Skin Integrity 12/06/22 1003 Right dorsal Foot (Active)   12/06/22 1003   Altered Skin Integrity Present on Admission: yes   Side: Right   Orientation: dorsal   Location:  Foot   Wound Number:    Is this injury device related?:    Primary Wound Type:    Description of Altered Skin Integrity:    Ankle-Brachial Index:    Pulses:    Removal Indication and Assessment:    Wound Outcome:    (Retired) Wound Length (cm):    (Retired) Wound Width (cm):    (Retired) Depth (cm):    Wound Description (Comments):    Removal Indications:    Wound Image   01/03/23 1005   Description of Altered Skin Integrity Partial thickness tissue loss. Shallow open ulcer with a red or pink wound bed, without slough. Intact or Open/Ruptured Serum-filled blister. 01/03/23 1005   Dressing Appearance Intact;Moist drainage 01/03/23 1005   Drainage Amount Small 01/03/23 1005   Drainage Characteristics/Odor Serosanguineous 01/03/23 1005   Appearance Red 01/03/23 1005   Tissue loss description Partial thickness 01/03/23 1005   Black (%), Wound Tissue Color 0 % 01/03/23 1005   Red (%), Wound Tissue Color 100 % 01/03/23 1005   Yellow (%), Wound Tissue Color 0 % 01/03/23 1005   Periwound Area Redness 01/03/23 1005   Wound Edges Defined 01/03/23 1005   Wound Length (cm) 1.6 cm 01/03/23 1005   Wound Width (cm) 3 cm 01/03/23 1005   Wound Depth (cm) 0 cm 01/03/23 1005   Wound Volume (cm^3) 0 cm^3 01/03/23 1005   Wound Surface Area (cm^2) 4.8 cm^2 01/03/23 1005   Care Cleansed with:;Soap and water;Sterile normal saline 01/03/23 1005   Off Loading Football dressing;Off loading shoe 01/03/23 1005   Dressing Change Due 01/10/23 01/03/23 1005            Wound 04/12/22 1426 Diabetic Ulcer Right plantar Foot (Active)   04/12/22 1426    Pre-existing: Yes   Primary Wound Type: Diabetic ulc   Side: Right   Orientation: plantar   Location: Foot   Wound Number:    Ankle-Brachial Index:    Pulses:    Removal Indication and Assessment:    Wound Outcome: Healed   (Retired) Wound Type:    (Retired) Wound Length (cm):    (Retired) Wound Width (cm):    (Retired) Depth (cm):    Wound Description (Comments):    Removal Indications:    Wound Image    01/03/23 1005   Wound WDL ex 01/03/23 1005   Dressing Appearance Intact;Moist drainage 01/03/23 1005   Drainage Amount Moderate 01/03/23 1005   Drainage Characteristics/Odor Serosanguineous 01/03/23 1005   Appearance Red 01/03/23 1005   Tissue loss description Partial thickness 01/03/23 1005   Black (%), Wound Tissue Color 0 % 01/03/23 1005   Red (%), Wound Tissue Color 100 % 01/03/23 1005   Yellow (%), Wound Tissue Color 0 % 01/03/23 1005   Periwound Area Dry;Intact 01/03/23 1005   Wound Edges Callused 01/03/23 1005   Wound Length (cm) 0.5 cm 01/03/23 1005   Wound Width (cm) 0.6 cm 01/03/23 1005   Wound Depth (cm) 0.2 cm 01/03/23 1005   Wound Volume (cm^3) 0.06 cm^3 01/03/23 1005   Wound Surface Area (cm^2) 0.3 cm^2 01/03/23 1005   Care Cleansed with:;Soap and water;Sterile normal saline 01/03/23 1005   Dressing Changed 01/03/23 1005   Off Loading Football dressing;Off loading shoe 01/03/23 1005   Dressing Change Due 01/10/23 01/03/23 1005           Plan:              Tissue pathology and/or culture taken     [] Yes      [x] No  Sharp debridement performed                   [x] Yes       [] No  Labs ordered     [] Yes       [x] No  Imaging ordered    [x] Yes      [] No    Orders Placed This Encounter   Procedures    Debridement     This order was created via procedure documentation     Standing Status:   Standing     Number of Occurrences:   1    X-Ray Foot Complete Right     Standing Status:   Future     Number of Occurrences:   1     Standing Expiration Date:   1/3/2024    Change dressing     Cleaned wound with NS. Betadine to periwound.   Right plantar foot: Endoform to wound bed.     Right 3rd toe: Iodoflex to wound bed.   Right dorsal foot: Endoform to wound bed then cover with Optifoam AG   Gigi Foam Long x1 to Plantar aspect.   Football drsg (cast padding x3, coban x1).   Darco Shoe.        Follow up in about 1 week (around 1/10/2023) for MD wound care visit.     See Jean-Baptiste MD

## 2023-01-10 ENCOUNTER — HOSPITAL ENCOUNTER (OUTPATIENT)
Dept: WOUND CARE | Facility: HOSPITAL | Age: 63
Discharge: HOME | End: 2023-01-10
Attending: FAMILY MEDICINE
Payer: MEDICARE

## 2023-01-10 VITALS — DIASTOLIC BLOOD PRESSURE: 71 MMHG | TEMPERATURE: 98 F | HEART RATE: 101 BPM | SYSTOLIC BLOOD PRESSURE: 135 MMHG

## 2023-01-10 DIAGNOSIS — L97.509 TYPE 2 DIABETES WITH SKIN ULCER OF FOOT: ICD-10-CM

## 2023-01-10 DIAGNOSIS — E11.621 DIABETIC ULCER OF RIGHT MIDFOOT ASSOCIATED WITH TYPE 2 DIABETES MELLITUS, WITH FAT LAYER EXPOSED: Primary | ICD-10-CM

## 2023-01-10 DIAGNOSIS — L97.509 TYPE 2 DIABETES WITH SKIN ULCER OF FOOT: Primary | ICD-10-CM

## 2023-01-10 DIAGNOSIS — L97.412 DIABETIC ULCER OF RIGHT MIDFOOT ASSOCIATED WITH TYPE 2 DIABETES MELLITUS, WITH FAT LAYER EXPOSED: Primary | ICD-10-CM

## 2023-01-10 DIAGNOSIS — E11.621 TYPE 2 DIABETES WITH SKIN ULCER OF FOOT: Primary | ICD-10-CM

## 2023-01-10 DIAGNOSIS — E11.621 TYPE 2 DIABETES WITH SKIN ULCER OF FOOT: ICD-10-CM

## 2023-01-10 PROCEDURE — 11042 DEBRIDEMENT: ICD-10-PCS | Mod: ,,, | Performed by: FAMILY MEDICINE

## 2023-01-10 PROCEDURE — 17250 CHEM CAUT OF GRANLTJ TISSUE: CPT | Mod: 59,,, | Performed by: FAMILY MEDICINE

## 2023-01-10 PROCEDURE — 11042 DBRDMT SUBQ TIS 1ST 20SQCM/<: CPT | Performed by: FAMILY MEDICINE

## 2023-01-10 PROCEDURE — 17250 PR CHEM CAUTERY GRANULATN TISSUE: ICD-10-PCS | Mod: 59,,, | Performed by: FAMILY MEDICINE

## 2023-01-10 PROCEDURE — 11042 DBRDMT SUBQ TIS 1ST 20SQCM/<: CPT | Mod: ,,, | Performed by: FAMILY MEDICINE

## 2023-01-10 PROCEDURE — 99214 PR OFFICE/OUTPT VISIT, EST, LEVL IV, 30-39 MIN: ICD-10-PCS | Mod: 25,,, | Performed by: FAMILY MEDICINE

## 2023-01-10 PROCEDURE — 99214 OFFICE O/P EST MOD 30 MIN: CPT | Mod: 25,,, | Performed by: FAMILY MEDICINE

## 2023-01-10 PROCEDURE — 17250 CHEM CAUT OF GRANLTJ TISSUE: CPT | Mod: 59

## 2023-01-10 NOTE — PROGRESS NOTES
[facility]                Progress Note    Subjective:       Patient ID: Fermin Henson is a 62 y.o. male.    Chief Complaint: Wound Check    Follow up wound care visit. Patient ambulated to exam room unaided, with prescribed Darco shoe w/ PegAssist on Right Foot. No c/o pain at present. Dressing intact with a large amount of serosanguineous, non-malodor drainage from Plantar aspect with strike through noted. Right 3rd Distal Toe and Right Dorsal Foot has a scant amount of serosanguineous drainage. Right 3rd Distal Toe wound measuring smaller in (0.5 cm) length and in (0.3 cm) width, with hypergranulation noted. Right Dorsal Foot wound measuring smaller in (1.1 cm) length and in (2.4 cm) width. Right Plantar Foot wound measuring larger in (0.3 cm) length.     Wound care done as per order. Return to clinic in 1 week to see Podiatry on Friday 01/20. AVS printed and given to patient.    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  He has been on his feet more than normal.  No fevers, chills, or sweats.  He has kept dressings in place, clean and dry.  He had abnormal xrays last week showing possible early osteo of tip of second toe (tip of partial amputation).  Patient has no new wounds that he is aware of at this time.      Review of Systems   Constitutional: Negative.    HENT: Negative.     Eyes: Negative.    Respiratory: Negative.     Cardiovascular: Negative.    Gastrointestinal: Negative.    Skin:  Positive for wound.   Psychiatric/Behavioral: Negative.         Objective:        Physical Exam  Constitutional:       Appearance: Normal appearance.   HENT:      Head: Normocephalic and atraumatic.      Right Ear: External ear normal.      Left Ear: External ear normal.      Mouth/Throat:      Mouth: Mucous membranes are moist.      Pharynx: Oropharynx is clear.   Eyes:      Extraocular Movements: Extraocular movements intact.      Conjunctiva/sclera:  "Conjunctivae normal.      Pupils: Pupils are equal, round, and reactive to light.   Abdominal:      General: There is no distension.      Palpations: Abdomen is soft.   Skin:     General: Skin is warm and dry.      Comments: +hypergranulation of tip of second toe amp site  +plantar DFU with slough and callous   Neurological:      Mental Status: He is alert.       Vitals:    01/10/23 1003   BP: 135/71   Pulse: 101   Temp: 97.8 °F (36.6 °C)       Debridement    Date/Time: 1/10/2023 9:40 AM  Performed by: See Jean-Baptiste MD  Authorized by: See Jean-Baptiste MD     Time out: Immediately prior to procedure a "time out" was called to verify the correct patient, procedure, equipment, support staff and site/side marked as required.    Consent Done?:  Yes (Written)  Local anesthesia used?: No      Wound Details:    Location:  Right foot    Location:  Right Plantar    Type of Debridement:  Excisional       Length (cm):  0.5       Area (sq cm):  0.25       Width (cm):  0.5       Percent Debrided (%):  100       Depth (cm):  0.3       Total Area Debrided (sq cm):  0.25    Depth of debridement:  Subcutaneous tissue    Tissue debrided:  Dermis, Epidermis and Subcutaneous    Devitalized tissue debrided:  Biofilm, Callus, Fibrin and Slough    Instruments:  Curette    Bleeding:  Minimal  Hemostasis Achieved: Yes    Method Used:  Pressure  Patient tolerance:  Patient tolerated the procedure well with no immediate complications    Patient also had silver nitrate applied to wound of right second toe for hypergranulation.  Chemical cauterization performed without any adverse reaction.  Patient tolerated well.       Assessment:           ICD-10-CM ICD-9-CM   1. Diabetic ulcer of right midfoot associated with type 2 diabetes mellitus, with fat layer exposed  E11.621 250.80    L97.412 707.14   2. Type 2 diabetes with skin ulcer of foot  E11.621 250.80    L97.509 707.15            Altered Skin Integrity 11/22/22 1020 Right distal Toe, third " Diabetic Ulcer Partial thickness tissue loss. Shallow open ulcer with a red or pink wound bed, without slough. Intact or Open/Ruptured Serum-filled blister. (Active)   11/22/22 1020   Altered Skin Integrity Present on Admission:    Side: Right   Orientation: distal   Location: Toe, third   Wound Number:    Is this injury device related?:    Primary Wound Type: Diabetic [facility]   Description of Altered Skin Integrity: Partial thickness tissue loss. Shallow open ulcer with a red or pink wound bed, without slough. Intact or Open/Ruptured Serum-filled blister.   Ankle-Brachial Index:    Pulses:    Removal Indication and Assessment:    Wound Outcome:    (Retired) Wound Length (cm):    (Retired) Wound Width (cm):    (Retired) Depth (cm):    Wound Description (Comments):    Removal Indications:    Wound Image   01/10/23 1004   Description of Altered Skin Integrity Partial thickness tissue loss. Shallow open ulcer with a red or pink wound bed, without slough. Intact or Open/Ruptured Serum-filled blister. 01/10/23 1004   Dressing Appearance Intact;Moist drainage 01/10/23 1004   Drainage Amount Scant 01/10/23 1004   Drainage Characteristics/Odor Serosanguineous;No odor 01/10/23 1004   Appearance Red;Hypergranulation 01/10/23 1004   Tissue loss description Partial thickness 01/10/23 1004   Black (%), Wound Tissue Color 0 % 01/10/23 1004   Red (%), Wound Tissue Color 100 % 01/10/23 1004   Yellow (%), Wound Tissue Color 0 % 01/10/23 1004   Periwound Area Dry;Intact 01/10/23 1004   Wound Edges Defined;Open 01/10/23 1004   Wound Length (cm) 0.5 cm 01/10/23 1004   Wound Width (cm) 0.5 cm 01/10/23 1004   Wound Depth (cm) 0 cm 01/10/23 1004   Wound Volume (cm^3) 0 cm^3 01/10/23 1004   Wound Surface Area (cm^2) 0.25 cm^2 01/10/23 1004   Tunneling (depth (cm)/location) 0 01/10/23 1004   Undermining (depth (cm)/location) 0 01/10/23 1004   Care Cleansed with:;Antimicrobial agent;Sterile normal saline 01/10/23 1004   Dressing Changed 01/10/23  1004   Off Loading Football dressing;Off loading shoe 01/10/23 1004   Dressing Change Due 01/20/23 01/10/23 1004            Altered Skin Integrity 12/06/22 1003 Right dorsal Foot (Active)   12/06/22 1003   Altered Skin Integrity Present on Admission: yes   Side: Right   Orientation: dorsal   Location: Foot   Wound Number:    Is this injury device related?:    Primary Wound Type:    Description of Altered Skin Integrity:    Ankle-Brachial Index:    Pulses:    Removal Indication and Assessment:    Wound Outcome:    (Retired) Wound Length (cm):    (Retired) Wound Width (cm):    (Retired) Depth (cm):    Wound Description (Comments):    Removal Indications:    Wound Image   01/10/23 1004   Description of Altered Skin Integrity Partial thickness tissue loss. Shallow open ulcer with a red or pink wound bed, without slough. Intact or Open/Ruptured Serum-filled blister. 01/10/23 1004   Dressing Appearance Intact;Moist drainage 01/10/23 1004   Drainage Amount Scant 01/10/23 1004   Drainage Characteristics/Odor Serosanguineous;No odor 01/10/23 1004   Appearance Red;Moist 01/10/23 1004   Tissue loss description Partial thickness 01/10/23 1004   Black (%), Wound Tissue Color 0 % 01/10/23 1004   Red (%), Wound Tissue Color 100 % 01/10/23 1004   Yellow (%), Wound Tissue Color 0 % 01/10/23 1004   Periwound Area Pink;Dry 01/10/23 1004   Wound Edges Defined;Open 01/10/23 1004   Wound Length (cm) 0.5 cm 01/10/23 1004   Wound Width (cm) 0.6 cm 01/10/23 1004   Wound Depth (cm) 0 cm 01/10/23 1004   Wound Volume (cm^3) 0 cm^3 01/10/23 1004   Wound Surface Area (cm^2) 0.3 cm^2 01/10/23 1004   Tunneling (depth (cm)/location) 0 01/10/23 1004   Undermining (depth (cm)/location) 0 01/10/23 1004   Care Cleansed with:;Antimicrobial agent;Sterile normal saline 01/10/23 1004   Dressing Changed 01/10/23 1004   Periwound Care Absorptive dressing applied 01/10/23 1004   Off Loading Football dressing;Off loading shoe 01/10/23 1004   Dressing Change  Due 01/20/23 01/10/23 1004            Wound 04/12/22 1426 Diabetic Ulcer Right plantar Foot (Active)   04/12/22 1426    Pre-existing: Yes   Primary Wound Type: Diabetic ulc   Side: Right   Orientation: plantar   Location: Foot   Wound Number:    Ankle-Brachial Index:    Pulses:    Removal Indication and Assessment:    Wound Outcome: Healed   (Retired) Wound Type:    (Retired) Wound Length (cm):    (Retired) Wound Width (cm):    (Retired) Depth (cm):    Wound Description (Comments):    Removal Indications:    Wound Image   01/10/23 1004   Wound WDL ex 01/10/23 1004   Dressing Appearance Intact;Moist drainage;Area marked 01/10/23 1004   Drainage Amount Large 01/10/23 1004   Drainage Characteristics/Odor Serosanguineous;No odor 01/10/23 1004   Appearance Pink;Moist 01/10/23 1004   Tissue loss description Partial thickness 01/10/23 1004   Black (%), Wound Tissue Color 0 % 01/10/23 1004   Red (%), Wound Tissue Color 100 % 01/10/23 1004   Yellow (%), Wound Tissue Color 0 % 01/10/23 1004   Periwound Area Pale white 01/10/23 1004   Wound Edges Callused;Defined;Open 01/10/23 1004   Wound Length (cm) 0.8 cm 01/10/23 1004   Wound Width (cm) 0.6 cm 01/10/23 1004   Wound Depth (cm) 0.2 cm 01/10/23 1004   Wound Volume (cm^3) 0.096 cm^3 01/10/23 1004   Wound Surface Area (cm^2) 0.48 cm^2 01/10/23 1004   Tunneling (depth (cm)/location) 0 01/10/23 1004   Undermining (depth (cm)/location) 0 01/10/23 1004   Care Cleansed with:;Antimicrobial agent;Sterile normal saline 01/10/23 1004   Periwound Care Absorptive dressing applied 01/10/23 1004   Off Loading Football dressing;Off loading shoe 01/10/23 1004   Dressing Change Due 01/20/23 01/10/23 1004           Plan:              Tissue pathology and/or culture taken     [] Yes      [x] No  Sharp debridement performed                   [x] Yes       [] No  Labs ordered     [] Yes       [x] No  Imaging ordered    [] Yes      [x] No    Orders Placed This Encounter   Procedures    Debridement      This order was created via procedure documentation     Standing Status:   Standing     Number of Occurrences:   1    Change dressing     Cleaned wound with NS.    Right plantar foot: Endoform then Aquacel Ag (cut to size) to wound bed.     Right 3rd toe: Hydrofera Blue transfer (cut to size) to wound bed.   Right dorsal foot: Hydrofera Blue transfer to cover wound bed.   Gigi Foam Long x1 Dorsal to Plantar aspect then 1 laid to Plantar Aspect  Football drsg (cast padding x3, coban x1).   Darco Shoe.        Follow up in about 10 days (around 1/20/2023) for wound care.     See Jean-Baptiste MD

## 2023-01-17 ENCOUNTER — LAB VISIT (OUTPATIENT)
Dept: LAB | Facility: HOSPITAL | Age: 63
End: 2023-01-17
Attending: FAMILY MEDICINE
Payer: MEDICAID

## 2023-01-17 DIAGNOSIS — E11.621 TYPE 2 DIABETES WITH SKIN ULCER OF FOOT: ICD-10-CM

## 2023-01-17 DIAGNOSIS — L97.509 TYPE 2 DIABETES WITH SKIN ULCER OF FOOT: ICD-10-CM

## 2023-01-17 LAB
ALBUMIN SERPL BCP-MCNC: 3.9 G/DL (ref 3.5–5.2)
ALP SERPL-CCNC: 51 U/L (ref 55–135)
ALT SERPL W/O P-5'-P-CCNC: 20 U/L (ref 10–44)
ANION GAP SERPL CALC-SCNC: 7 MMOL/L (ref 8–16)
AST SERPL-CCNC: 16 U/L (ref 10–40)
BILIRUB SERPL-MCNC: 0.6 MG/DL (ref 0.1–1)
BUN SERPL-MCNC: 12 MG/DL (ref 8–23)
CALCIUM SERPL-MCNC: 9.4 MG/DL (ref 8.7–10.5)
CHLORIDE SERPL-SCNC: 106 MMOL/L (ref 95–110)
CO2 SERPL-SCNC: 28 MMOL/L (ref 23–29)
CREAT SERPL-MCNC: 0.9 MG/DL (ref 0.5–1.4)
CRP SERPL-MCNC: 1.4 MG/L (ref 0–8.2)
ERYTHROCYTE [SEDIMENTATION RATE] IN BLOOD BY WESTERGREN METHOD: 10 MM/HR (ref 0–10)
EST. GFR  (NO RACE VARIABLE): >60 ML/MIN/1.73 M^2
GLUCOSE SERPL-MCNC: 226 MG/DL (ref 70–110)
POTASSIUM SERPL-SCNC: 4.6 MMOL/L (ref 3.5–5.1)
PROT SERPL-MCNC: 7.4 G/DL (ref 6–8.4)
SODIUM SERPL-SCNC: 141 MMOL/L (ref 136–145)

## 2023-01-17 PROCEDURE — 80053 COMPREHEN METABOLIC PANEL: CPT | Performed by: FAMILY MEDICINE

## 2023-01-17 PROCEDURE — 86140 C-REACTIVE PROTEIN: CPT | Performed by: FAMILY MEDICINE

## 2023-01-17 PROCEDURE — 85652 RBC SED RATE AUTOMATED: CPT | Performed by: FAMILY MEDICINE

## 2023-01-17 PROCEDURE — 36415 COLL VENOUS BLD VENIPUNCTURE: CPT | Performed by: FAMILY MEDICINE

## 2023-01-20 ENCOUNTER — HOSPITAL ENCOUNTER (OUTPATIENT)
Dept: WOUND CARE | Facility: HOSPITAL | Age: 63
Discharge: HOME | End: 2023-01-20
Attending: PODIATRIST
Payer: MEDICARE

## 2023-01-20 VITALS — SYSTOLIC BLOOD PRESSURE: 140 MMHG | HEART RATE: 86 BPM | TEMPERATURE: 98 F | DIASTOLIC BLOOD PRESSURE: 72 MMHG

## 2023-01-20 DIAGNOSIS — E11.621 TYPE 2 DIABETES WITH SKIN ULCER OF FOOT: ICD-10-CM

## 2023-01-20 DIAGNOSIS — M86.9 OSTEOMYELITIS OF THIRD TOE OF RIGHT FOOT: Primary | ICD-10-CM

## 2023-01-20 DIAGNOSIS — E11.621 DIABETIC ULCER OF RIGHT MIDFOOT ASSOCIATED WITH TYPE 2 DIABETES MELLITUS, WITH FAT LAYER EXPOSED: ICD-10-CM

## 2023-01-20 DIAGNOSIS — L97.412 DIABETIC ULCER OF RIGHT MIDFOOT ASSOCIATED WITH TYPE 2 DIABETES MELLITUS, WITH FAT LAYER EXPOSED: ICD-10-CM

## 2023-01-20 DIAGNOSIS — L97.509 TYPE 2 DIABETES WITH SKIN ULCER OF FOOT: ICD-10-CM

## 2023-01-20 LAB
GRAM STN SPEC: NORMAL
GRAM STN SPEC: NORMAL

## 2023-01-20 PROCEDURE — 99499 UNLISTED E&M SERVICE: CPT | Mod: ,,, | Performed by: PODIATRIST

## 2023-01-20 PROCEDURE — 88307 TISSUE EXAM BY PATHOLOGIST: CPT | Performed by: STUDENT IN AN ORGANIZED HEALTH CARE EDUCATION/TRAINING PROGRAM

## 2023-01-20 PROCEDURE — 88307 PR  SURG PATH,LEVEL V: ICD-10-PCS | Mod: 26,,, | Performed by: STUDENT IN AN ORGANIZED HEALTH CARE EDUCATION/TRAINING PROGRAM

## 2023-01-20 PROCEDURE — 87070 CULTURE OTHR SPECIMN AEROBIC: CPT | Performed by: PODIATRIST

## 2023-01-20 PROCEDURE — 99499 NO LOS: ICD-10-PCS | Mod: ,,, | Performed by: PODIATRIST

## 2023-01-20 PROCEDURE — 87075 CULTR BACTERIA EXCEPT BLOOD: CPT | Performed by: PODIATRIST

## 2023-01-20 PROCEDURE — 11042 WOUND DEBRIDEMENT: ICD-10-PCS | Mod: 59,,, | Performed by: PODIATRIST

## 2023-01-20 PROCEDURE — 11044 DBRDMT BONE 1ST 20 SQ CM/<: CPT | Performed by: PODIATRIST

## 2023-01-20 PROCEDURE — 87186 SC STD MICRODIL/AGAR DIL: CPT | Performed by: PODIATRIST

## 2023-01-20 PROCEDURE — 11044 WOUND DEBRIDEMENT: ICD-10-PCS | Mod: ,,, | Performed by: PODIATRIST

## 2023-01-20 PROCEDURE — 87205 SMEAR GRAM STAIN: CPT | Performed by: PODIATRIST

## 2023-01-20 PROCEDURE — 87077 CULTURE AEROBIC IDENTIFY: CPT | Performed by: PODIATRIST

## 2023-01-20 PROCEDURE — 11044 DBRDMT BONE 1ST 20 SQ CM/<: CPT | Mod: ,,, | Performed by: PODIATRIST

## 2023-01-20 PROCEDURE — 11042 DBRDMT SUBQ TIS 1ST 20SQCM/<: CPT | Mod: 59,,, | Performed by: PODIATRIST

## 2023-01-20 PROCEDURE — 88307 TISSUE EXAM BY PATHOLOGIST: CPT | Mod: 26,,, | Performed by: STUDENT IN AN ORGANIZED HEALTH CARE EDUCATION/TRAINING PROGRAM

## 2023-01-20 NOTE — PROGRESS NOTES
[facility]                Progress Note    Subjective:       Patient ID: Fermin Henson is a 62 y.o. male.    Chief Complaint: Wound Check    Follow up wound care visit with Podiatry consultation for Bone Biopsy. Patient ambulated to exam room unaided, with prescribed Darco shoe w/ PegAssist on Right Foot. No c/o pain at present. Dressing intact with a large amount of serosanguineous, non-malodor drainage from Plantar and Distal aspects of foot with strike through noted.       Review of Systems   Constitutional:  Negative for activity change, appetite change, chills, fatigue and fever.   Respiratory:  Negative for cough and shortness of breath.    Cardiovascular:  Negative for chest pain and leg swelling.   Gastrointestinal:  Negative for diarrhea, nausea and vomiting.   Skin:  Positive for wound.   Neurological:  Positive for numbness. Negative for weakness.        + paresthesia        Objective:        Physical Exam  Constitutional:       Appearance: Normal appearance.   HENT:      Head: Normocephalic and atraumatic.      Right Ear: External ear normal.      Left Ear: External ear normal.      Mouth/Throat:      Mouth: Mucous membranes are moist.      Pharynx: Oropharynx is clear.   Eyes:      Extraocular Movements: Extraocular movements intact.      Conjunctiva/sclera: Conjunctivae normal.      Pupils: Pupils are equal, round, and reactive to light.   Abdominal:      General: There is no distension.      Palpations: Abdomen is soft.   Skin:     General: Skin is warm and dry.      Comments: +hypergranulation of tip of second toe amp site  +plantar DFU with slough and callous   Neurological:      Mental Status: He is alert.       Vitals:    01/20/23 1029   BP: (!) 140/72   Pulse: 86   Temp: 97.7 °F (36.5 °C)       Assessment:           ICD-10-CM ICD-9-CM   1. Osteomyelitis of third toe of right foot  M86.9 730.27   2. Type 2 diabetes with skin ulcer of foot  E11.621  250.80    L97.509 707.15   3. Diabetic ulcer of right midfoot associated with type 2 diabetes mellitus, with fat layer exposed  E11.621 250.80    L97.412 707.14            Altered Skin Integrity 11/22/22 1020 Right distal Toe, third Diabetic Ulcer (Active)   11/22/22 1020   Altered Skin Integrity Present on Admission:    Side: Right   Orientation: distal   Location: Toe, third   Wound Number:    Is this injury device related?:    Primary Wound Type: Diabetic ulc   Description of Altered Skin Integrity:    Ankle-Brachial Index:    Pulses:    Removal Indication and Assessment:    Wound Outcome:    (Retired) Wound Length (cm):    (Retired) Wound Width (cm):    (Retired) Depth (cm):    Wound Description (Comments):    Removal Indications:    Wound Image    01/20/23 1041   Description of Altered Skin Integrity Full thickness tissue loss. Subcutaneous fat may be visible but bone, tendon or muscle are not exposed 01/20/23 1041   Dressing Appearance Intact;Moist drainage;Area marked 01/20/23 1041   Drainage Amount Large 01/20/23 1041   Drainage Characteristics/Odor Serosanguineous;No odor 01/20/23 1041   Appearance Red;Bleeding;Hypergranulation 01/20/23 1041   Tissue loss description Full thickness 01/20/23 1041   Black (%), Wound Tissue Color 0 % 01/20/23 1041   Red (%), Wound Tissue Color 100 % 01/20/23 1041   Yellow (%), Wound Tissue Color 0 % 01/20/23 1041   Periwound Area Dry 01/20/23 1041   Wound Edges Defined;Open 01/20/23 1041   Wound Length (cm) 0.7 cm 01/20/23 1041   Wound Width (cm) 0.6 cm 01/20/23 1041   Wound Depth (cm) 0.6 cm 01/20/23 1041   Wound Volume (cm^3) 0.252 cm^3 01/20/23 1041   Wound Surface Area (cm^2) 0.42 cm^2 01/20/23 1041   Tunneling (depth (cm)/location) 0 01/20/23 1041   Undermining (depth (cm)/location) 0 01/20/23 1041   Care Cleansed with:;Antimicrobial agent;Sterile normal saline;Povidone iodine 01/20/23 1041   Dressing Changed 01/20/23 1041   Periwound Care Topical treatment applied  01/20/23 1041   Off Loading Football dressing;Off loading shoe 01/20/23 1041   Dressing Change Due 01/24/23 01/20/23 1041            Altered Skin Integrity 12/06/22 1003 Right dorsal Foot (Active)   12/06/22 1003   Altered Skin Integrity Present on Admission: yes   Side: Right   Orientation: dorsal   Location: Foot   Wound Number:    Is this injury device related?:    Primary Wound Type:    Description of Altered Skin Integrity:    Ankle-Brachial Index:    Pulses:    Removal Indication and Assessment:    Wound Outcome:    (Retired) Wound Length (cm):    (Retired) Wound Width (cm):    (Retired) Depth (cm):    Wound Description (Comments):    Removal Indications:    Wound Image   01/20/23 1041   Dressing Appearance Intact;Dried drainage 01/20/23 1041   Drainage Amount Small 01/20/23 1041   Drainage Characteristics/Odor Serous;No odor 01/20/23 1041   Appearance Pink;Epithelialization 01/20/23 1041   Tissue loss description Not applicable 01/20/23 1041   Periwound Area Dry 01/20/23 1041   Wound Edges Approximated 01/20/23 1041   Wound Length (cm) 0 cm 01/20/23 1041   Wound Width (cm) 0 cm 01/20/23 1041   Wound Depth (cm) 0 cm 01/20/23 1041   Wound Volume (cm^3) 0 cm^3 01/20/23 1041   Wound Surface Area (cm^2) 0 cm^2 01/20/23 1041   Tunneling (depth (cm)/location) 0 01/20/23 1041   Undermining (depth (cm)/location) 0 01/20/23 1041   Care Cleansed with:;Antimicrobial agent;Sterile normal saline;Povidone iodine 01/20/23 1041   Dressing Changed 01/20/23 1041   Off Loading Football dressing;Off loading shoe 01/20/23 1041   Dressing Change Due 01/24/23 01/20/23 1041            Wound 04/12/22 1426 Diabetic Ulcer Right plantar Foot (Active)   04/12/22 1426    Pre-existing: Yes   Primary Wound Type: Diabetic ulc   Side: Right   Orientation: plantar   Location: Foot   Wound Number:    Ankle-Brachial Index:    Pulses:    Removal Indication and Assessment:    Wound Outcome: Healed   (Retired) Wound Type:    (Retired) Wound Length  (cm):    (Retired) Wound Width (cm):    (Retired) Depth (cm):    Wound Description (Comments):    Removal Indications:    Wound Image    01/20/23 1041   Wound WDL ex 01/20/23 1041   Dressing Appearance Intact;Moist drainage;Area marked 01/20/23 1041   Drainage Amount Large 01/20/23 1041   Drainage Characteristics/Odor Serosanguineous;No odor 01/20/23 1041   Appearance Pink;Moist 01/20/23 1041   Tissue loss description Partial thickness 01/20/23 1041   Black (%), Wound Tissue Color 0 % 01/20/23 1041   Red (%), Wound Tissue Color 100 % 01/20/23 1041   Yellow (%), Wound Tissue Color 0 % 01/20/23 1041   Periwound Area Dry 01/20/23 1041   Wound Edges Callused;Defined;Open 01/20/23 1041   Wound Length (cm) 0.8 cm 01/20/23 1041   Wound Width (cm) 0.6 cm 01/20/23 1041   Wound Depth (cm) 0.2 cm 01/20/23 1041   Wound Volume (cm^3) 0.096 cm^3 01/20/23 1041   Wound Surface Area (cm^2) 0.48 cm^2 01/20/23 1041   Tunneling (depth (cm)/location) 0 01/20/23 1041   Undermining (depth (cm)/location) 0 01/20/23 1041   Care Cleansed with:;Antimicrobial agent;Sterile normal saline;Povidone iodine 01/20/23 1041   Dressing Changed 01/20/23 1041   Periwound Care Absorptive dressing applied 01/20/23 1041   Off Loading Football dressing;Off loading shoe 01/20/23 1041   Dressing Change Due 01/24/23 01/20/23 1041           Plan:              Right Dorsal Foot wound appears closed. Right 3rd Distal Toe has hyper granulating tissue, patient is here for a bone biopsy due to an abnormal x-ray. Personally reviewed previous and current imaging with patient, highlighting signs of OM to distal aspect of proximal phalanx.    Right Plantar Foot is measuring approximately the same with a large ring of callus around wound bed.     Wound Debridement    Date/Time: 1/20/2023 9:42 AM  Performed by: Sunshine Shannon DPM  Authorized by: Sunshine Shannon DPM       Wound Details:    Location:  Right foot    Location:  Right 3rd Toe    Type of Debridement:   Excisional       Length (cm):  0.7       Area (sq cm):  0.42       Width (cm):  0.6       Percent Debrided (%):  100       Depth (cm):  0.6       Total Area Debrided (sq cm):  0.42    Depth of debridement:  Bone    Tissue debrided:  Dermis, Epidermis, Subcutaneous, Cartilage, Bone and Hypergranulation    Devitalized tissue debrided:  Callus and Fibrin    Instruments:  Blade, Rongeur and Forceps    Additional wounds:  1    2nd Wound Details:     Location:  Right foot    Location:  Right 1st Metatarsal Head    Location:  Right 1st Metatarsal Head    Type of Debridement:  Excisional       Length (cm):  0.8       Area (sq cm):  0.48       Width (cm):  0.6       Percent Debrided (%):  100       Depth (cm):  0.2       Total Area Debrided (sq cm):  0.48    Depth of debridement:  Subcutaneous tissue    Tissue debrided:  Dermis, Epidermis and Subcutaneous    Devitalized tissue debrided:  Fibrin and Callus    Instruments:  Curette    Bleeding:  Minimal  Hemostasis Achieved: Yes    Method Used:  Pressure (retention sutures to 3rd digit)  Patient tolerance:  Patient tolerated the procedure well with no immediate complications    Wound care done as per order. Return to clinic in 1 week on Tuesday. Patient declined AVS.      Tissue pathology and/or culture taken     [x] Yes      [] No  Sharp debridement performed                   [x] Yes       [] No  Labs ordered     [] Yes       [x] No  Imaging ordered    [] Yes      [x] No  Consult for Infectious Disease Placed      [x] Yes      [] No    Orders Placed This Encounter   Procedures    Debridement     This order was created via procedure documentation     Standing Status:   Standing     Number of Occurrences:   1    Gram stain     Standing Status:   Standing     Number of Occurrences:   1    Ambulatory referral/consult to Infectious Disease     Standing Status:   Future     Standing Expiration Date:   2/20/2024     Referral Priority:   Routine     Referral Type:   Consultation      Referral Reason:   Specialty Services Required     Requested Specialty:   Infectious Diseases     Number of Visits Requested:   1    Change dressing     Clean with NS. Betadine scrub for 2 minutes. MD took biopsy of Right 3rd Distal toe, closed with sutures.   Right 3rd Toe: Betadine soaked Adaptic touch placed over incision site.   Right Dorsal Foot: Mepilex Transfer over wound bed.  Right Plantar Foot: Cavilon/Benzoin. Then U-Pad x 2 around wound bed. Endoform/Iodoflex to wound bed. Drawtex cut to size, then Mextra short. Gigi foam (long x 3 , 1 over toes, 2 laid perpendicular to plantar foot)  Football dressing (cast padding x 3). Coban. Lou.        Follow up in about 4 days (around 1/24/2023) for wound care.

## 2023-01-22 LAB — BACTERIA SPEC AEROBE CULT: ABNORMAL

## 2023-01-23 ENCOUNTER — TELEPHONE (OUTPATIENT)
Dept: FAMILY MEDICINE | Facility: CLINIC | Age: 63
End: 2023-01-23
Payer: MEDICAID

## 2023-01-23 DIAGNOSIS — M86.9 OSTEOMYELITIS OF TOE OF RIGHT FOOT: Primary | ICD-10-CM

## 2023-01-23 RX ORDER — DOXYCYCLINE 100 MG/1
100 CAPSULE ORAL EVERY 12 HOURS
Qty: 20 CAPSULE | Refills: 0 | Status: SHIPPED | OUTPATIENT
Start: 2023-01-23 | End: 2023-02-02

## 2023-01-23 NOTE — TELEPHONE ENCOUNTER
----- Message from eSe Jean-Baptiste MD sent at 1/23/2023  7:45 AM CST -----  Please let patient know that his culture results from bone biopsy are back and I have sent in doxycycline for him to start before he sees the infectious disease doctor    Thanks  Dr. Jean-Baptiste

## 2023-01-23 NOTE — PROGRESS NOTES
Please let patient know that his culture results from bone biopsy are back and I have sent in doxycycline for him to start before he sees the infectious disease doctor    Thanks  Dr. Jean-Baptiste

## 2023-01-24 ENCOUNTER — HOSPITAL ENCOUNTER (OUTPATIENT)
Dept: WOUND CARE | Facility: HOSPITAL | Age: 63
Discharge: HOME | End: 2023-01-24
Attending: FAMILY MEDICINE
Payer: MEDICARE

## 2023-01-24 VITALS
DIASTOLIC BLOOD PRESSURE: 64 MMHG | SYSTOLIC BLOOD PRESSURE: 124 MMHG | RESPIRATION RATE: 16 BRPM | HEART RATE: 119 BPM | TEMPERATURE: 98 F

## 2023-01-24 DIAGNOSIS — E11.621 TYPE 2 DIABETES WITH SKIN ULCER OF FOOT: Primary | ICD-10-CM

## 2023-01-24 DIAGNOSIS — L97.509 TYPE 2 DIABETES WITH SKIN ULCER OF FOOT: Primary | ICD-10-CM

## 2023-01-24 LAB — BACTERIA SPEC ANAEROBE CULT: NORMAL

## 2023-01-24 PROCEDURE — 99214 PR OFFICE/OUTPT VISIT, EST, LEVL IV, 30-39 MIN: ICD-10-PCS | Mod: ,,, | Performed by: FAMILY MEDICINE

## 2023-01-24 PROCEDURE — 99213 OFFICE O/P EST LOW 20 MIN: CPT | Performed by: FAMILY MEDICINE

## 2023-01-24 PROCEDURE — 99214 OFFICE O/P EST MOD 30 MIN: CPT | Mod: ,,, | Performed by: FAMILY MEDICINE

## 2023-01-24 NOTE — PROGRESS NOTES
[facility]                Progress Note    Subjective:       Patient ID: Fermin Henson is a 62 y.o. male.    Chief Complaint: Wound Care    Pt arrived to [facility] ambulated without any issues. Pt denies any fever, chills, or flu-like symptoms; denies pain/discomfort. Pt reports having bone removal done previous by Dr. Shannon & started taking rx abx yesterday. Pt reports not having any issues with drsg since last visit. 7 sutures noted to 3rd rt toe but noted to have increase drainage. Pt will return to [facility] in 1 wk.     MD note:  patient following up today after bone biopsy last week.  Results showed +for S. Aureus and was started yesterday on doxycycline 100mg BID.  He took first dose last night and second dose this morning.  He has no sensation in his feet, so no complaints of pain.  No fevers, chills, or sweats.  He kept sutures in place.      Review of Systems   Constitutional: Negative.    HENT: Negative.     Eyes: Negative.    Respiratory: Negative.     Cardiovascular: Negative.    Gastrointestinal: Negative.    Skin:  Positive for wound.   Neurological:  Positive for numbness.   Psychiatric/Behavioral: Negative.         Objective:        Physical Exam  Constitutional:       Appearance: Normal appearance. He is obese.   HENT:      Head: Normocephalic and atraumatic.      Right Ear: External ear normal.      Left Ear: External ear normal.      Mouth/Throat:      Mouth: Mucous membranes are moist.      Pharynx: Oropharynx is clear.   Eyes:      Extraocular Movements: Extraocular movements intact.      Conjunctiva/sclera: Conjunctivae normal.      Pupils: Pupils are equal, round, and reactive to light.   Abdominal:      General: There is distension.      Palpations: Abdomen is soft.   Musculoskeletal:      Right lower leg: No edema.      Left lower leg: No edema.   Skin:     General: Skin is warm and dry.      Comments: +sutures in place at distal right second toe with  serous drainage present  +plantar DFU with minimal callous and no slough or maceration   Neurological:      Mental Status: He is alert and oriented to person, place, and time. Mental status is at baseline.      Sensory: Sensory deficit present.       Vitals:    01/24/23 1018   BP: 124/64   Pulse: (!) 119   Resp: 16   Temp: 97.6 °F (36.4 °C)       Assessment:           ICD-10-CM ICD-9-CM   1. Type 2 diabetes with skin ulcer of foot  E11.621 250.80    L97.509 707.15            Altered Skin Integrity 11/22/22 1020 Right distal Toe, third Diabetic Ulcer (Active)   11/22/22 1020   Altered Skin Integrity Present on Admission:    Side: Right   Orientation: distal   Location: Toe, third   Wound Number:    Is this injury device related?:    Primary Wound Type: Diabetic ulc   Description of Altered Skin Integrity:    Ankle-Brachial Index:    Pulses:    Removal Indication and Assessment:    Wound Outcome:    (Retired) Wound Length (cm):    (Retired) Wound Width (cm):    (Retired) Depth (cm):    Wound Description (Comments):    Removal Indications:    Wound Image   01/24/23 1025   Description of Altered Skin Integrity Full thickness tissue loss. Subcutaneous fat may be visible but bone, tendon or muscle are not exposed 01/24/23 1025   Dressing Appearance Moist drainage 01/24/23 1025   Drainage Amount Moderate 01/24/23 1025   Drainage Characteristics/Odor Serosanguineous 01/24/23 1025   Appearance Sutures intact;Red 01/24/23 1025   Tissue loss description Full thickness 01/24/23 1025   Black (%), Wound Tissue Color 0 % 01/24/23 1025   Red (%), Wound Tissue Color 100 % 01/24/23 1025   Yellow (%), Wound Tissue Color 0 % 01/24/23 1025   Periwound Area Intact;Redness 01/24/23 1025   Wound Edges Defined 01/24/23 1025   Wound Length (cm) 0.5 cm 01/24/23 1025   Wound Width (cm) 2 cm 01/24/23 1025   Wound Depth (cm) 0.1 cm 01/24/23 1025   Wound Volume (cm^3) 0.1 cm^3 01/24/23 1025   Wound Surface Area (cm^2) 1 cm^2 01/24/23 1025    Tunneling (depth (cm)/location) 0 01/24/23 1025   Undermining (depth (cm)/location) 0 01/24/23 1025   Care Cleansed with:;Sterile normal saline 01/24/23 1025   Dressing Applied 01/24/23 1025   Periwound Care Moisture barrier applied 01/24/23 1025   Off Loading Football dressing;Off loading shoe 01/24/23 1025            Altered Skin Integrity 12/06/22 1003 Right dorsal Foot (Active)   12/06/22 1003   Altered Skin Integrity Present on Admission: yes   Side: Right   Orientation: dorsal   Location: Foot   Wound Number:    Is this injury device related?:    Primary Wound Type:    Description of Altered Skin Integrity:    Ankle-Brachial Index:    Pulses:    Removal Indication and Assessment:    Wound Outcome:    (Retired) Wound Length (cm):    (Retired) Wound Width (cm):    (Retired) Depth (cm):    Wound Description (Comments):    Removal Indications:    Wound Image   01/24/23 1025   Description of Altered Skin Integrity Partial thickness tissue loss. Shallow open ulcer with a red or pink wound bed, without slough. Intact or Open/Ruptured Serum-filled blister. 01/24/23 1025   Dressing Appearance Moist drainage 01/24/23 1025   Drainage Amount Small 01/24/23 1025   Drainage Characteristics/Odor Serosanguineous 01/24/23 1025   Appearance Pink;Red 01/24/23 1025   Tissue loss description Partial thickness 01/24/23 1025   Black (%), Wound Tissue Color 0 % 01/24/23 1025   Red (%), Wound Tissue Color 100 % 01/24/23 1025   Yellow (%), Wound Tissue Color 0 % 01/24/23 1025   Periwound Area Intact;Redness 01/24/23 1025   Wound Edges Defined 01/24/23 1025   Wound Length (cm) 0.8 cm 01/24/23 1025   Wound Width (cm) 0.7 cm 01/24/23 1025   Wound Depth (cm) 0.1 cm 01/24/23 1025   Wound Volume (cm^3) 0.056 cm^3 01/24/23 1025   Wound Surface Area (cm^2) 0.56 cm^2 01/24/23 1025   Tunneling (depth (cm)/location) 0 01/24/23 1025   Undermining (depth (cm)/location) 0 01/24/23 1025   Care Cleansed with:;Sterile normal saline 01/24/23 1025    Dressing Applied 01/24/23 1025   Off Loading Football dressing;Off loading shoe 01/24/23 1025            Wound 04/12/22 1426 Diabetic Ulcer Right plantar Foot (Active)   04/12/22 1426    Pre-existing: Yes   Primary Wound Type: Diabetic ulc   Side: Right   Orientation: plantar   Location: Foot   Wound Number:    Ankle-Brachial Index:    Pulses:    Removal Indication and Assessment:    Wound Outcome: Healed   (Retired) Wound Type:    (Retired) Wound Length (cm):    (Retired) Wound Width (cm):    (Retired) Depth (cm):    Wound Description (Comments):    Removal Indications:    Wound Image   01/24/23 1025   Wound WDL ex 01/24/23 1025   Dressing Appearance Moist drainage 01/24/23 1025   Drainage Amount Moderate 01/24/23 1025   Drainage Characteristics/Odor Serosanguineous 01/24/23 1025   Appearance Red 01/24/23 1025   Tissue loss description Full thickness 01/24/23 1025   Black (%), Wound Tissue Color 0 % 01/24/23 1025   Red (%), Wound Tissue Color 100 % 01/24/23 1025   Yellow (%), Wound Tissue Color 0 % 01/24/23 1025   Periwound Area Intact 01/24/23 1025   Wound Edges Callused 01/24/23 1025   Wound Length (cm) 0.6 cm 01/24/23 1025   Wound Width (cm) 0.4 cm 01/24/23 1025   Wound Depth (cm) 0.4 cm 01/24/23 1025   Wound Volume (cm^3) 0.096 cm^3 01/24/23 1025   Wound Surface Area (cm^2) 0.24 cm^2 01/24/23 1025   Tunneling (depth (cm)/location) 0 01/24/23 1025   Undermining (depth (cm)/location) 0 01/24/23 1025   Care Cleansed with:;Sterile normal saline 01/24/23 1025   Dressing Applied 01/24/23 1025   Periwound Care Moisture barrier applied 01/24/23 1025   Off Loading Football dressing;Off loading shoe 01/24/23 1025           Plan:              Tissue pathology and/or culture taken     [] Yes      [x] No  Sharp debridement performed                   [] Yes       [x] No  Labs ordered     [] Yes       [x] No  Imaging ordered    [] Yes      [x] No    Orders Placed This Encounter   Procedures    Change dressing     Clean  with NS. Cavilon to periwound.     Right 3rd Toe: cutimed, hydrofera blue transfer, Mextra.   Right Dorsal Foot: Mepilex foam   Right Plantar Foot: U-Pad x 2 around wound bed. Hydrofera blue transfer to wound bed, Mextra Gigi foam (long x 3 , 1 over toes, 2 laid perpendicular to plantar foot)   Football dressing (cast padding x 3). Coban. Darco. Pt to return to [facility] in 1 wk.        Follow up in about 1 week (around 1/31/2023) for Wound Care.     See Jean-Baptiste MD

## 2023-01-27 LAB
FINAL PATHOLOGIC DIAGNOSIS: NORMAL
Lab: NORMAL

## 2023-01-31 ENCOUNTER — HOSPITAL ENCOUNTER (OUTPATIENT)
Dept: WOUND CARE | Facility: HOSPITAL | Age: 63
Discharge: HOME | End: 2023-01-31
Attending: FAMILY MEDICINE
Payer: MEDICARE

## 2023-01-31 VITALS — SYSTOLIC BLOOD PRESSURE: 183 MMHG | DIASTOLIC BLOOD PRESSURE: 83 MMHG | TEMPERATURE: 98 F | HEART RATE: 82 BPM

## 2023-01-31 DIAGNOSIS — E11.621 DIABETIC ULCER OF RIGHT MIDFOOT ASSOCIATED WITH TYPE 2 DIABETES MELLITUS, WITH FAT LAYER EXPOSED: Primary | ICD-10-CM

## 2023-01-31 DIAGNOSIS — L97.412 DIABETIC ULCER OF RIGHT MIDFOOT ASSOCIATED WITH TYPE 2 DIABETES MELLITUS, WITH FAT LAYER EXPOSED: Primary | ICD-10-CM

## 2023-01-31 PROCEDURE — 99214 PR OFFICE/OUTPT VISIT, EST, LEVL IV, 30-39 MIN: ICD-10-PCS | Mod: ,,, | Performed by: FAMILY MEDICINE

## 2023-01-31 PROCEDURE — 99214 OFFICE O/P EST MOD 30 MIN: CPT | Mod: ,,, | Performed by: FAMILY MEDICINE

## 2023-01-31 PROCEDURE — 99213 OFFICE O/P EST LOW 20 MIN: CPT | Performed by: FAMILY MEDICINE

## 2023-01-31 NOTE — PROGRESS NOTES
[facility]                Progress Note    Subjective:       Patient ID: Fermin Henson is a 62 y.o. male.    Chief Complaint: Wound Check    Walked to clinic unaided with no complaints pain. Less callus than usual on plantar foot with pink base that is 1 mm bigger otherwise unchanged. Toe had sutures removed and wound is smaller than last week. Dorsal foot excoriated. Mextra placed over all wounds with foam to cover. Football placed. Enc to elevate more as has callus despite two c pads, football and Darco.     Wound Check    Review of Systems      Objective:        Physical Exam    Vitals:    01/31/23 1025   BP: (!) 183/83   Pulse: 82   Temp: 98.2 °F (36.8 °C)       Assessment:           ICD-10-CM ICD-9-CM   1. Diabetic ulcer of right midfoot associated with type 2 diabetes mellitus, with fat layer exposed  E11.621 250.80    L97.412 707.14                Plan:              Tissue pathology and/or culture taken     [] Yes      [x] No  Sharp debridement performed                   [] Yes       [x] No  Labs ordered     [] Yes       [x] No  Imaging ordered    [] Yes      [x] No    Orders Placed This Encounter   Procedures    Change dressing     NS. Cavilon to periwound.     Right 3rd Toe: cutimed, hydrofera blue transfer, Mextra.   Right Dorsal Foot: Mepilex foam   Right Plantar Foot: U-Pad x 2 around wound bed. Hydrofera blue transfer to wound bed, Mextra Gigi foam (long x 3 , 1 over toes, 2 laid perpendicular to plantar foot)   Football dressing (cast padding x 3). Coban. Lou. Pt to return to [facility] in 1 wk.        Follow up in about 1 week (around 2/7/2023).

## 2023-01-31 NOTE — PROGRESS NOTES
[facility]                Progress Note    Subjective:       Patient ID: Fermin Henson is a 62 y.o. male.    Chief Complaint: Wound Check    Walked to clinic unaided with no complaints pain. Less callus than usual on plantar foot with pink base that is 1 mm bigger otherwise unchanged. Toe had sutures removed and wound is smaller than last week. Dorsal foot excoriated. Mextra placed over all wounds with foam to cover. Football placed. Enc to elevate more as has callus despite two c pads, football and Darco.     MD note:  patient following up for wounds to right foot.  Sutures have yet to be removed.  No fevers, chills, or sweats.  Pathology from bone biopsy shows chronic osteomyeltitis of right toe.  He has been on oral abx in the past and has been referred to ID.  Patient has type 2 DM that is well controlled.  He has peripheral neuropathy from type 2 DM.  No new wounds that he is aware of at this time.      Wound Check    Review of Systems   Constitutional: Negative.    HENT: Negative.     Eyes: Negative.    Cardiovascular: Negative.    Gastrointestinal: Negative.    Skin:  Positive for wound.   Neurological:  Positive for numbness.       Objective:        Physical Exam  Constitutional:       Appearance: Normal appearance.   HENT:      Head: Normocephalic and atraumatic.      Right Ear: External ear normal.      Left Ear: External ear normal.      Mouth/Throat:      Mouth: Mucous membranes are moist.      Pharynx: Oropharynx is clear.   Eyes:      Extraocular Movements: Extraocular movements intact.      Conjunctiva/sclera: Conjunctivae normal.      Pupils: Pupils are equal, round, and reactive to light.   Abdominal:      General: There is no distension.      Palpations: Abdomen is soft.   Musculoskeletal:      Right lower leg: No edema.      Left lower leg: No edema.   Skin:     General: Skin is warm.      Comments: +distal toe wound has hypergranulation and  minimal slough (right foot)  +right plantar with some maceration, but no slough or periwound erythema   Neurological:      Mental Status: He is alert.       Vitals:    01/31/23 1025   BP: (!) 183/83   Pulse: 82   Temp: 98.2 °F (36.8 °C)       Assessment:           ICD-10-CM ICD-9-CM   1. Diabetic ulcer of right midfoot associated with type 2 diabetes mellitus, with fat layer exposed  E11.621 250.80    L97.412 707.14                Plan:              Tissue pathology and/or culture taken     [] Yes      [x] No  Sharp debridement performed                   [] Yes       [x] No  Labs ordered     [] Yes       [x] No  Imaging ordered    [] Yes      [x] No    Orders Placed This Encounter   Procedures    Change dressing     NS. Cavilon to periwound.     Right 3rd Toe: cutimed, hydrofera blue transfer, Mextra.   Right Dorsal Foot: Mepilex foam   Right Plantar Foot: U-Pad x 2 around wound bed. Hydrofera blue transfer to wound bed, Mextra Gigi foam (long x 3 , 1 over toes, 2 laid perpendicular to plantar foot)   Football dressing (cast padding x 3). Coban. Darco. Pt to return to [facility] in 1 wk.   5 sutures removed from distal toe  ID referral placed by Dr. Shannon  Consider HBO therapy for chornic osteo in right toe as this could prevent further amputations     Follow up in about 1 week (around 2/7/2023).

## 2023-02-07 ENCOUNTER — HOSPITAL ENCOUNTER (OUTPATIENT)
Dept: WOUND CARE | Facility: HOSPITAL | Age: 63
Discharge: HOME | End: 2023-02-07
Attending: FAMILY MEDICINE
Payer: MEDICARE

## 2023-02-07 VITALS
DIASTOLIC BLOOD PRESSURE: 70 MMHG | SYSTOLIC BLOOD PRESSURE: 145 MMHG | RESPIRATION RATE: 16 BRPM | TEMPERATURE: 98 F | HEART RATE: 85 BPM

## 2023-02-07 DIAGNOSIS — E11.42 TYPE 2 DIABETES MELLITUS WITH DIABETIC POLYNEUROPATHY, WITHOUT LONG-TERM CURRENT USE OF INSULIN: Primary | ICD-10-CM

## 2023-02-07 PROCEDURE — 11042 DBRDMT SUBQ TIS 1ST 20SQCM/<: CPT | Mod: HCNC,,, | Performed by: FAMILY MEDICINE

## 2023-02-07 PROCEDURE — 11042 DEBRIDEMENT: ICD-10-PCS | Mod: HCNC,,, | Performed by: FAMILY MEDICINE

## 2023-02-07 PROCEDURE — 99214 PR OFFICE/OUTPT VISIT, EST, LEVL IV, 30-39 MIN: ICD-10-PCS | Mod: HCNC,25,, | Performed by: FAMILY MEDICINE

## 2023-02-07 PROCEDURE — 99214 OFFICE O/P EST MOD 30 MIN: CPT | Mod: HCNC,25,, | Performed by: FAMILY MEDICINE

## 2023-02-07 NOTE — PROGRESS NOTES
[facility]                Progress Note    Subjective:       Patient ID: Fermin Henson is a 62 y.o. male.    Chief Complaint: Wound Care    Pt arrived to [facility] ambulating without any issues. Pt denies any fever, chills, or flu-like symptoms; denies pain/discomfort. Pt reports not having any issues with drsg since last visit. Changing primary drsg to collagen.   Pt will return to [facility] in 1 wk.     MD note:  patient following up for chronic DFU to right plantar, osteo to right toe and dorsal wound that he states he was told is a keloid.  No pain as he is insensate.  No fevers, chills, or sweats.  He states he has not been told of an appointment with ID for the osteo.  No new wounds that he is aware of at this time.     Review of Systems   Constitutional: Negative.    HENT: Negative.     Eyes: Negative.    Respiratory: Negative.     Cardiovascular: Negative.    Gastrointestinal: Negative.    Skin:  Positive for wound.   Neurological:  Positive for numbness.       Objective:        Physical Exam  Constitutional:       Appearance: Normal appearance.   HENT:      Head: Normocephalic and atraumatic.      Right Ear: External ear normal.      Left Ear: External ear normal.      Mouth/Throat:      Mouth: Mucous membranes are moist.      Pharynx: Oropharynx is clear.   Eyes:      Extraocular Movements: Extraocular movements intact.      Conjunctiva/sclera: Conjunctivae normal.      Pupils: Pupils are equal, round, and reactive to light.   Cardiovascular:      Rate and Rhythm: Normal rate and regular rhythm.   Pulmonary:      Effort: Pulmonary effort is normal. No respiratory distress.   Abdominal:      General: There is no distension.      Palpations: Abdomen is soft.   Skin:     General: Skin is warm and dry.      Comments: +plantar wound has excess callous and slough requiring debridement; distal second toe has small opening with serous drainage; dorsal wound remains  "excoriated, but no sough or drainage present at this time.   Neurological:      Mental Status: He is alert.       Vitals:    02/07/23 1122   BP: (!) 145/70   Pulse: 85   Resp: 16   Temp: 98.2 °F (36.8 °C)     Debridement    Date/Time: 2/7/2023 10:35 AM  Performed by: See Jean-Baptiste MD  Authorized by: See Jean-Baptiste MD     Time out: Immediately prior to procedure a "time out" was called to verify the correct patient, procedure, equipment, support staff and site/side marked as required.    Consent Done?:  Yes (Written)  Local anesthesia used?: No      Wound Details:    Location:  Right foot    Location:  Right Plantar    Type of Debridement:  Excisional       Length (cm):  0.5       Area (sq cm):  0.25       Width (cm):  0.5       Percent Debrided (%):  100       Depth (cm):  0.4       Total Area Debrided (sq cm):  0.25    Depth of debridement:  Subcutaneous tissue    Tissue debrided:  Dermis, Epidermis and Subcutaneous    Devitalized tissue debrided:  Biofilm, Callus, Fibrin and Slough    Instruments:  Curette    Bleeding:  Minimal  Hemostasis Achieved: Yes    Method Used:  Pressure  Patient tolerance:  Patient tolerated the procedure well with no immediate complications    Assessment:           ICD-10-CM ICD-9-CM   1. Type 2 diabetes mellitus with diabetic polyneuropathy, without long-term current use of insulin  E11.42 250.60     357.2            Altered Skin Integrity 11/22/22 1020 Right distal Toe, third Diabetic Ulcer (Active)   11/22/22 1020   Altered Skin Integrity Present on Admission:    Side: Right   Orientation: distal   Location: Toe, third   Wound Number:    Is this injury device related?:    Primary Wound Type: Diabetic ulc   Description of Altered Skin Integrity:    Ankle-Brachial Index:    Pulses:    Removal Indication and Assessment:    Wound Outcome:    (Retired) Wound Length (cm):    (Retired) Wound Width (cm):    (Retired) Depth (cm):    Wound Description (Comments):    Removal Indications:  "   Wound Image   02/07/23 1125   Description of Altered Skin Integrity Partial thickness tissue loss. Shallow open ulcer with a red or pink wound bed, without slough. Intact or Open/Ruptured Serum-filled blister. 02/07/23 1125   Dressing Appearance Dried drainage 02/07/23 1125   Drainage Amount Small 02/07/23 1125   Drainage Characteristics/Odor Sanguineous 02/07/23 1125   Appearance Red 02/07/23 1125   Tissue loss description Partial thickness 02/07/23 1125   Black (%), Wound Tissue Color 0 % 02/07/23 1125   Red (%), Wound Tissue Color 10 % 02/07/23 1125   Yellow (%), Wound Tissue Color 0 % 02/07/23 1125   Periwound Area Intact 02/07/23 1125   Wound Edges Defined 02/07/23 1125   Wound Length (cm) 0.2 cm 02/07/23 1125   Wound Width (cm) 0.4 cm 02/07/23 1125   Wound Depth (cm) 0.1 cm 02/07/23 1125   Wound Volume (cm^3) 0.008 cm^3 02/07/23 1125   Wound Surface Area (cm^2) 0.08 cm^2 02/07/23 1125   Tunneling (depth (cm)/location) 0 02/07/23 1125   Undermining (depth (cm)/location) 0 02/07/23 1125   Care Cleansed with:;Sterile normal saline 02/07/23 1125   Dressing Applied 02/07/23 1125   Periwound Care Moisture barrier applied 02/07/23 1125   Off Loading Off loading shoe;Football dressing 02/07/23 1125            Altered Skin Integrity 12/06/22 1003 Right dorsal Foot (Active)   12/06/22 1003   Altered Skin Integrity Present on Admission: yes   Side: Right   Orientation: dorsal   Location: Foot   Wound Number:    Is this injury device related?:    Primary Wound Type:    Description of Altered Skin Integrity:    Ankle-Brachial Index:    Pulses:    Removal Indication and Assessment:    Wound Outcome:    (Retired) Wound Length (cm):    (Retired) Wound Width (cm):    (Retired) Depth (cm):    Wound Description (Comments):    Removal Indications:    Wound Image   02/07/23 1125   Description of Altered Skin Integrity Partial thickness tissue loss. Shallow open ulcer with a red or pink wound bed, without slough. Intact or  Open/Ruptured Serum-filled blister. 02/07/23 1125   Dressing Appearance Dried drainage 02/07/23 1125   Drainage Amount Small 02/07/23 1125   Drainage Characteristics/Odor Serosanguineous 02/07/23 1125   Appearance Red 02/07/23 1125   Tissue loss description Partial thickness 02/07/23 1125   Black (%), Wound Tissue Color 0 % 02/07/23 1125   Red (%), Wound Tissue Color 100 % 02/07/23 1125   Yellow (%), Wound Tissue Color 0 % 02/07/23 1125   Periwound Area Intact 02/07/23 1125   Wound Edges Defined 02/07/23 1125   Wound Length (cm) 3.5 cm 02/07/23 1125   Wound Width (cm) 2.5 cm 02/07/23 1125   Wound Depth (cm) 0.1 cm 02/07/23 1125   Wound Volume (cm^3) 0.875 cm^3 02/07/23 1125   Wound Surface Area (cm^2) 8.75 cm^2 02/07/23 1125   Tunneling (depth (cm)/location) 0 02/07/23 1125   Undermining (depth (cm)/location) 0 02/07/23 1125   Care Cleansed with:;Sterile normal saline 02/07/23 1125   Dressing Applied 02/07/23 1125   Off Loading Football dressing;Off loading shoe 02/07/23 1125            Wound 04/12/22 1426 Diabetic Ulcer Right plantar Foot (Active)   04/12/22 1426    Pre-existing: Yes   Primary Wound Type: Diabetic ulc   Side: Right   Orientation: plantar   Location: Foot   Wound Number:    Ankle-Brachial Index:    Pulses:    Removal Indication and Assessment:    Wound Outcome: Healed   (Retired) Wound Type:    (Retired) Wound Length (cm):    (Retired) Wound Width (cm):    (Retired) Depth (cm):    Wound Description (Comments):    Removal Indications:    Wound Image   02/07/23 1125   Wound WDL ex 02/07/23 1125   Dressing Appearance Dried drainage 02/07/23 1125   Drainage Amount Small 02/07/23 1125   Drainage Characteristics/Odor Serosanguineous 02/07/23 1125   Appearance Red 02/07/23 1125   Tissue loss description Full thickness 02/07/23 1125   Black (%), Wound Tissue Color 0 % 02/07/23 1125   Red (%), Wound Tissue Color 100 % 02/07/23 1125   Yellow (%), Wound Tissue Color 0 % 02/07/23 1125   Periwound Area Intact  02/07/23 1125   Wound Edges Callused 02/07/23 1125   Wound Length (cm) 0.5 cm 02/07/23 1125   Wound Width (cm) 0.5 cm 02/07/23 1125   Wound Depth (cm) 0.4 cm 02/07/23 1125   Wound Volume (cm^3) 0.1 cm^3 02/07/23 1125   Wound Surface Area (cm^2) 0.25 cm^2 02/07/23 1125   Tunneling (depth (cm)/location) 0 02/07/23 1125   Undermining (depth (cm)/location) 0.7cm / 11-6 o'clock 02/07/23 1125   Care Cleansed with:;Sterile normal saline 02/07/23 1125   Dressing Applied 02/07/23 1125   Periwound Care Moisture barrier applied 02/07/23 1125   Off Loading Football dressing;Off loading shoe 02/07/23 1125           Plan:              Tissue pathology and/or culture taken     [] Yes      [x] No  Sharp debridement performed                   [] Yes       [x] No  Labs ordered     [] Yes       [x] No  Imaging ordered    [] Yes      [x] No    Orders Placed This Encounter   Procedures    Debridement     This order was created via procedure documentation     Standing Status:   Standing     Number of Occurrences:   1    Change dressing     Clean wound with NS. Cavilon to periwound.   Rt 3rd Toe & Rt plantar Foot: Liz, mextra.   Rt Dorsal Foot: Optifoam AG  Football dressing (cast padding x 3, coban). Darco.   Pt to return to [facility] in 1 wk.        Follow up in about 1 week (around 2/14/2023) for Wound Care.     See Jean-Baptiste MD

## 2023-02-14 ENCOUNTER — HOSPITAL ENCOUNTER (OUTPATIENT)
Dept: WOUND CARE | Facility: HOSPITAL | Age: 63
Discharge: HOME | End: 2023-02-14
Attending: FAMILY MEDICINE
Payer: MEDICARE

## 2023-02-14 VITALS — DIASTOLIC BLOOD PRESSURE: 64 MMHG | SYSTOLIC BLOOD PRESSURE: 131 MMHG | TEMPERATURE: 98 F | HEART RATE: 97 BPM

## 2023-02-14 DIAGNOSIS — E11.621 DIABETIC ULCER OF RIGHT MIDFOOT ASSOCIATED WITH TYPE 2 DIABETES MELLITUS, WITH FAT LAYER EXPOSED: ICD-10-CM

## 2023-02-14 DIAGNOSIS — E11.42 TYPE 2 DIABETES MELLITUS WITH DIABETIC POLYNEUROPATHY, WITHOUT LONG-TERM CURRENT USE OF INSULIN: Primary | ICD-10-CM

## 2023-02-14 DIAGNOSIS — L97.412 DIABETIC ULCER OF RIGHT MIDFOOT ASSOCIATED WITH TYPE 2 DIABETES MELLITUS, WITH FAT LAYER EXPOSED: ICD-10-CM

## 2023-02-14 PROCEDURE — 11042 DEBRIDEMENT: ICD-10-PCS | Mod: HCNC,,, | Performed by: FAMILY MEDICINE

## 2023-02-14 PROCEDURE — 99214 OFFICE O/P EST MOD 30 MIN: CPT | Mod: 25,HCNC,, | Performed by: FAMILY MEDICINE

## 2023-02-14 PROCEDURE — 11042 DBRDMT SUBQ TIS 1ST 20SQCM/<: CPT | Mod: HCNC | Performed by: FAMILY MEDICINE

## 2023-02-14 PROCEDURE — 11042 DBRDMT SUBQ TIS 1ST 20SQCM/<: CPT | Mod: HCNC,,, | Performed by: FAMILY MEDICINE

## 2023-02-14 PROCEDURE — 99214 PR OFFICE/OUTPT VISIT, EST, LEVL IV, 30-39 MIN: ICD-10-PCS | Mod: 25,HCNC,, | Performed by: FAMILY MEDICINE

## 2023-02-14 NOTE — PROGRESS NOTES
[facility]                Progress Note    Subjective:       Patient ID: Fermin Henson is a 62 y.o. male.    Chief Complaint: Wound Check    Follow up wound care visit. Patient ambulated to exam room unaided, with prescribed Darco shoe w/ PegAssist on Right Foot. No c/o pain at present. Dressing intact with a moderate amount of serosanguineous, non-malodor drainage from Plantar aspect and Dorsal aspect. Right 3rd Distal Toe has no drainage. Right 3rd Distal Toe wound appears healed with a thin layer of epithelization. Right Dorsal Foot wound measuring smaller in (0.8 cm) length and larger in (1.5 cm) width. Right Plantar Foot wound measuring larger in (0.4 cm) length.     Wound care done as per order. Return to clinic in 1 week on Wednesday. Patient declined AVS.      MD note:  patient following up for wounds to right foot.  He states that he has been keeping dressings in place, clean, and dry.  No odor from wounds that he has noticed.  No feves, chills, or sweats.        Review of Systems   Constitutional: Negative.    HENT: Negative.     Eyes: Negative.    Respiratory: Negative.     Genitourinary: Negative.    Skin:  Positive for wound.   Neurological:  Positive for numbness.       Objective:        Physical Exam  Constitutional:       Appearance: Normal appearance.   HENT:      Head: Normocephalic and atraumatic.      Right Ear: External ear normal.      Left Ear: External ear normal.      Mouth/Throat:      Mouth: Mucous membranes are moist.      Pharynx: Oropharynx is clear.   Eyes:      Extraocular Movements: Extraocular movements intact.      Conjunctiva/sclera: Conjunctivae normal.      Pupils: Pupils are equal, round, and reactive to light.   Cardiovascular:      Rate and Rhythm: Normal rate and regular rhythm.   Abdominal:      General: There is no distension.      Palpations: Abdomen is soft.   Skin:     General: Skin is warm and dry.      Comments:  "+wound to dorsal foot that is mostly excoriation  +DFU of plantar with excess callous and some slough   Neurological:      Mental Status: He is alert and oriented to person, place, and time. Mental status is at baseline.      Sensory: Sensory deficit present.       Vitals:    02/14/23 1100   BP: 131/64   Pulse: 97   Temp: 97.5 °F (36.4 °C)     Debridement    Date/Time: 2/14/2023 10:34 AM  Performed by: See Jean-Baptiste MD  Authorized by: See Jean-Baptiste MD     Time out: Immediately prior to procedure a "time out" was called to verify the correct patient, procedure, equipment, support staff and site/side marked as required.    Consent Done?:  Yes (Written)  Local anesthesia used?: No      Wound Details:    Location:  Right foot    Location:  Right Plantar    Type of Debridement:  Excisional       Length (cm):  0.9       Area (sq cm):  0.45       Width (cm):  0.5       Percent Debrided (%):  80       Depth (cm):  0.1       Total Area Debrided (sq cm):  0.36    Depth of debridement:  Subcutaneous tissue    Tissue debrided:  Dermis, Epidermis and Subcutaneous    Devitalized tissue debrided:  Slough, Callus and Biofilm    Instruments:  Curette    Bleeding:  Minimal  Hemostasis Achieved: Yes    Method Used:  Pressure  Patient tolerance:  Patient tolerated the procedure well with no immediate complications    Assessment:           ICD-10-CM ICD-9-CM   1. Type 2 diabetes mellitus with diabetic polyneuropathy, without long-term current use of insulin  E11.42 250.60     357.2   2. Diabetic ulcer of right midfoot associated with type 2 diabetes mellitus, with fat layer exposed  E11.621 250.80    L97.412 707.14            Altered Skin Integrity 11/22/22 1020 Right distal Toe, third Diabetic Ulcer (Active)   11/22/22 1020   Altered Skin Integrity Present on Admission:    Side: Right   Orientation: distal   Location: Toe, third   Wound Number:    Is this injury device related?:    Primary Wound Type: Diabetic ulc   Description " of Altered Skin Integrity:    Ankle-Brachial Index:    Pulses:    Removal Indication and Assessment:    Wound Outcome:    (Retired) Wound Length (cm):    (Retired) Wound Width (cm):    (Retired) Depth (cm):    Wound Description (Comments):    Removal Indications:    Wound Image   02/14/23 1154   Dressing Appearance Intact;Dry 02/14/23 1154   Drainage Amount None 02/14/23 1154   Appearance Epithelialization 02/14/23 1154   Periwound Area Dry;Intact 02/14/23 1154   Wound Edges Approximated 02/14/23 1154   Wound Length (cm) 0 cm 02/14/23 1154   Wound Width (cm) 0 cm 02/14/23 1154   Wound Depth (cm) 0 cm 02/14/23 1154   Wound Volume (cm^3) 0 cm^3 02/14/23 1154   Wound Surface Area (cm^2) 0 cm^2 02/14/23 1154   Tunneling (depth (cm)/location) 0 02/14/23 1154   Undermining (depth (cm)/location) 0 02/14/23 1154   Care Cleansed with:;Antimicrobial agent;Sterile normal saline 02/14/23 1154   Dressing Changed 02/14/23 1154   Periwound Care Absorptive dressing applied 02/14/23 1154   Off Loading Football dressing;Off loading shoe 02/14/23 1154   Dressing Change Due 02/22/23 02/14/23 1154            Altered Skin Integrity 12/06/22 1003 Right dorsal Foot (Active)   12/06/22 1003   Altered Skin Integrity Present on Admission: yes   Side: Right   Orientation: dorsal   Location: Foot   Wound Number:    Is this injury device related?:    Primary Wound Type:    Description of Altered Skin Integrity:    Ankle-Brachial Index:    Pulses:    Removal Indication and Assessment:    Wound Outcome:    (Retired) Wound Length (cm):    (Retired) Wound Width (cm):    (Retired) Depth (cm):    Wound Description (Comments):    Removal Indications:    Wound Image   02/14/23 1154   Description of Altered Skin Integrity Partial thickness tissue loss. Shallow open ulcer with a red or pink wound bed, without slough. Intact or Open/Ruptured Serum-filled blister. 02/14/23 1154   Dressing Appearance Intact;Moist drainage 02/14/23 1154   Drainage Amount  Moderate 02/14/23 1154   Drainage Characteristics/Odor Serosanguineous;No odor 02/14/23 1154   Appearance Red;Moist 02/14/23 1154   Tissue loss description Partial thickness 02/14/23 1154   Black (%), Wound Tissue Color 0 % 02/14/23 1154   Red (%), Wound Tissue Color 100 % 02/14/23 1154   Yellow (%), Wound Tissue Color 0 % 02/14/23 1154   Periwound Area Dry;Scar tissue 02/14/23 1154   Wound Edges Open 02/14/23 1154   Wound Length (cm) 2.7 cm 02/14/23 1154   Wound Width (cm) 4 cm 02/14/23 1154   Wound Surface Area (cm^2) 10.8 cm^2 02/14/23 1154   Tunneling (depth (cm)/location) 0 02/14/23 1154   Undermining (depth (cm)/location) 0 02/14/23 1154   Care Cleansed with:;Antimicrobial agent;Sterile normal saline 02/14/23 1154   Dressing Changed 02/14/23 1154   Periwound Care Skin barrier film applied 02/14/23 1154   Off Loading Football dressing;Off loading shoe 02/14/23 1154   Dressing Change Due 02/22/23 02/14/23 1154            Wound 04/12/22 1426 Diabetic Ulcer Right plantar Foot (Active)   04/12/22 1426    Pre-existing: Yes   Primary Wound Type: Diabetic ulc   Side: Right   Orientation: plantar   Location: Foot   Wound Number:    Ankle-Brachial Index:    Pulses:    Removal Indication and Assessment:    Wound Outcome: Healed   (Retired) Wound Type:    (Retired) Wound Length (cm):    (Retired) Wound Width (cm):    (Retired) Depth (cm):    Wound Description (Comments):    Removal Indications:    Wound Image   02/14/23 1154   Wound WDL ex 02/14/23 1154   Dressing Appearance Intact;Moist drainage 02/14/23 1154   Drainage Amount Moderate 02/14/23 1154   Drainage Characteristics/Odor Serosanguineous;No odor 02/14/23 1154   Appearance Red;Moist;Epithelialization 02/14/23 1154   Tissue loss description Partial thickness 02/14/23 1154   Black (%), Wound Tissue Color 0 % 02/14/23 1154   Red (%), Wound Tissue Color 100 % 02/14/23 1154   Yellow (%), Wound Tissue Color 0 % 02/14/23 1154   Periwound Area Dry 02/14/23 1154   Wound  Edges Callused;Defined;Open 02/14/23 1154   Wound Length (cm) 0.9 cm 02/14/23 1154   Wound Width (cm) 0.5 cm 02/14/23 1154   Wound Depth (cm) 0.1 cm 02/14/23 1154   Wound Volume (cm^3) 0.045 cm^3 02/14/23 1154   Wound Surface Area (cm^2) 0.45 cm^2 02/14/23 1154   Tunneling (depth (cm)/location) 0 02/14/23 1154   Undermining (depth (cm)/location) 0 02/14/23 1154   Care Cleansed with:;Antimicrobial agent;Sterile normal saline;Debrided 02/14/23 1154   Dressing Changed 02/14/23 1154   Periwound Care Skin barrier film applied 02/14/23 1154   Off Loading Football dressing;Off loading shoe 02/14/23 1154   Dressing Change Due 02/22/23 02/14/23 1154           Plan:              Tissue pathology and/or culture taken     [] Yes      [x] No  Sharp debridement performed                   [x] Yes       [] No  Labs ordered     [] Yes       [x] No  Imaging ordered    [] Yes      [x] No    Orders Placed This Encounter   Procedures    Change dressing     Clean wound with NS. Cavilon to periwound.   Rt Dorsal Foot: Optifoam AG   Rt plantar Foot: Liz to wound bed then Mextra short x1. Gigi Foam (long x 2, laid perpendicular)  Football dressing (cast padding x 3, coban). Darco.   Pt to return to [facility] in 1 wk.        Follow up in about 8 days (around 2/22/2023) for wound care.

## 2023-02-22 ENCOUNTER — HOSPITAL ENCOUNTER (OUTPATIENT)
Dept: WOUND CARE | Facility: HOSPITAL | Age: 63
Discharge: HOME | End: 2023-02-22
Attending: FAMILY MEDICINE
Payer: MEDICARE

## 2023-02-22 VITALS — TEMPERATURE: 98 F | HEART RATE: 70 BPM

## 2023-02-22 DIAGNOSIS — E11.42 TYPE 2 DIABETES MELLITUS WITH DIABETIC POLYNEUROPATHY, WITHOUT LONG-TERM CURRENT USE OF INSULIN: ICD-10-CM

## 2023-02-22 DIAGNOSIS — L97.509 TYPE 2 DIABETES WITH SKIN ULCER OF FOOT: Primary | ICD-10-CM

## 2023-02-22 DIAGNOSIS — E11.621 DIABETIC ULCER OF RIGHT MIDFOOT ASSOCIATED WITH TYPE 2 DIABETES MELLITUS, WITH FAT LAYER EXPOSED: ICD-10-CM

## 2023-02-22 DIAGNOSIS — E11.621 TYPE 2 DIABETES WITH SKIN ULCER OF FOOT: Primary | ICD-10-CM

## 2023-02-22 DIAGNOSIS — L97.412 DIABETIC ULCER OF RIGHT MIDFOOT ASSOCIATED WITH TYPE 2 DIABETES MELLITUS, WITH FAT LAYER EXPOSED: ICD-10-CM

## 2023-02-22 PROCEDURE — 99214 OFFICE O/P EST MOD 30 MIN: CPT | Mod: 25,HCNC,, | Performed by: FAMILY MEDICINE

## 2023-02-22 PROCEDURE — 11042 DBRDMT SUBQ TIS 1ST 20SQCM/<: CPT | Mod: HCNC | Performed by: FAMILY MEDICINE

## 2023-02-22 PROCEDURE — 99214 PR OFFICE/OUTPT VISIT, EST, LEVL IV, 30-39 MIN: ICD-10-PCS | Mod: 25,HCNC,, | Performed by: FAMILY MEDICINE

## 2023-02-22 PROCEDURE — 11042 DBRDMT SUBQ TIS 1ST 20SQCM/<: CPT | Mod: HCNC,,, | Performed by: FAMILY MEDICINE

## 2023-02-22 PROCEDURE — 11042 DEBRIDEMENT: ICD-10-PCS | Mod: HCNC,,, | Performed by: FAMILY MEDICINE

## 2023-02-22 NOTE — PROGRESS NOTES
[facility]                Progress Note    Subjective:       Patient ID: Fermin Henson is a 63 y.o. male.    Chief Complaint: Wound Check    Walked to clinic unaided wearing intact dry football and darco shoe. Drainage was serous/sang and was contained within football cast padding and had no odour. Distal toe healed Plantar unchanged and Dorsal foot twice the size as last week. Cutimed placed on dorsal foot. Callus to plantar foot but softer than usual. Blood sugar 114 this am and states is usually around that number.    This is an established patient in wound care.   Patient presents in the office today for evaluation of the chronic wound.  Updated HPI is noted below.    The periwound appears non-erythematous, no maceration noted, non-edematous    The wound appears stable; fibrin and slough in wound bed. Periwound callus.     Patient denies fever, chills    Patients reports no pain.     Lymphedema status is noncontributory to wound status    Pain at the site of the wound is aching    Contributing factors to current wound state include numerous comorbid conditions, Diabetes Type 2, Lymphedema    Review of Systems   Constitutional:  Negative for activity change, appetite change and fever.   HENT:  Negative for congestion.    Respiratory:  Negative for shortness of breath and wheezing.    Cardiovascular:  Negative for chest pain and leg swelling.   Gastrointestinal:  Negative for abdominal pain, nausea and vomiting.   Skin:  Positive for wound.   Neurological:  Negative for dizziness and headaches.   Psychiatric/Behavioral:  The patient is not nervous/anxious.        Objective:        Physical Exam  Vitals and nursing note reviewed.   Constitutional:       Appearance: He is well-developed.   HENT:      Head: Normocephalic and atraumatic.   Eyes:      Conjunctiva/sclera: Conjunctivae normal.   Pulmonary:      Effort: Pulmonary effort is normal.   Abdominal:       General: There is no distension.      Palpations: Abdomen is soft.   Musculoskeletal:         General: Normal range of motion.      Cervical back: Normal range of motion and neck supple.   Skin:     Comments: See wound description   Neurological:      Mental Status: He is alert and oriented to person, place, and time.   Psychiatric:         Behavior: Behavior normal.       Vitals:    02/22/23 1017   BP: (P) 137/70   Pulse: 70   Temp: 97.7 °F (36.5 °C)       Assessment:           ICD-10-CM ICD-9-CM   1. Type 2 diabetes with skin ulcer of foot  E11.621 250.80    L97.509 707.15   2. Type 2 diabetes mellitus with diabetic polyneuropathy, without long-term current use of insulin  E11.42 250.60     357.2   3. Diabetic ulcer of right midfoot associated with type 2 diabetes mellitus, with fat layer exposed  E11.621 250.80    L97.412 707.14            Altered Skin Integrity 11/22/22 1020 Right distal Toe, third Diabetic Ulcer (Active)   11/22/22 1020   Altered Skin Integrity Present on Admission:    Side: Right   Orientation: distal   Location: Toe, third   Wound Number:    Is this injury device related?:    Primary Wound Type: Diabetic ulc   Description of Altered Skin Integrity:    Ankle-Brachial Index:    Pulses:    Removal Indication and Assessment:    Wound Outcome:    (Retired) Wound Length (cm):    (Retired) Wound Width (cm):    (Retired) Depth (cm):    Wound Description (Comments):    Removal Indications:    Wound Image   02/22/23 1046   Dressing Appearance Dry;Intact 02/22/23 1046   Appearance Closed/resurfaced 02/22/23 1046   Wound Length (cm) 0 cm 02/22/23 1046   Wound Width (cm) 0 cm 02/22/23 1046   Wound Depth (cm) 0 cm 02/22/23 1046   Wound Volume (cm^3) 0 cm^3 02/22/23 1046   Wound Surface Area (cm^2) 0 cm^2 02/22/23 1046   Care Cleansed with:;Antimicrobial agent;Sterile normal saline 02/22/23 1046   Dressing Changed 02/22/23 1046   Off Loading Football dressing;Off loading shoe 02/22/23 1046   Dressing Change  Due 03/01/23 02/22/23 1046            Altered Skin Integrity 12/06/22 1003 Right dorsal Foot (Active)   12/06/22 1003   Altered Skin Integrity Present on Admission: yes   Side: Right   Orientation: dorsal   Location: Foot   Wound Number:    Is this injury device related?:    Primary Wound Type:    Description of Altered Skin Integrity:    Ankle-Brachial Index:    Pulses:    Removal Indication and Assessment:    Wound Outcome:    (Retired) Wound Length (cm):    (Retired) Wound Width (cm):    (Retired) Depth (cm):    Wound Description (Comments):    Removal Indications:    Wound Image   02/22/23 1046   Dressing Appearance Open to air;Intact 02/22/23 1046   Drainage Amount None 02/22/23 1046   Appearance Red 02/22/23 1046   Tissue loss description Partial thickness 02/22/23 1046   Red (%), Wound Tissue Color 100 % 02/22/23 1046   Periwound Area Dry;Intact 02/22/23 1046   Wound Length (cm) 0.9 cm 02/22/23 1046   Wound Width (cm) 0.4 cm 02/22/23 1046   Wound Depth (cm) 0.2 cm 02/22/23 1046   Wound Volume (cm^3) 0.072 cm^3 02/22/23 1046   Wound Surface Area (cm^2) 0.36 cm^2 02/22/23 1046   Care Cleansed with:;Antimicrobial agent;Sterile normal saline 02/22/23 1046   Dressing Changed 02/22/23 1046   Off Loading Football dressing;Off loading shoe 02/22/23 1046   Dressing Change Due 03/01/23 02/22/23 1046            Wound 04/12/22 1426 Diabetic Ulcer Right plantar Foot (Active)   04/12/22 1426    Pre-existing: Yes   Primary Wound Type: Diabetic ulc   Side: Right   Orientation: plantar   Location: Foot   Wound Number:    Ankle-Brachial Index:    Pulses:    Removal Indication and Assessment:    Wound Outcome: Healed   (Retired) Wound Type:    (Retired) Wound Length (cm):    (Retired) Wound Width (cm):    (Retired) Depth (cm):    Wound Description (Comments):    Removal Indications:    Wound Image   02/22/23 1046   Wound WDL ex 02/22/23 1046   Dressing Appearance Dry;Intact 02/22/23 1046   Drainage Amount Moderate 02/22/23 1046    Drainage Characteristics/Odor Serosanguineous 02/22/23 1046   Appearance Red 02/22/23 1046   Tissue loss description Partial thickness 02/22/23 1046   Red (%), Wound Tissue Color 100 % 02/22/23 1046   Periwound Area Intact;Dry 02/22/23 1046   Wound Edges Defined 02/22/23 1046   Wound Length (cm) 0.9 cm 02/22/23 1046   Wound Width (cm) 0.4 cm 02/22/23 1046   Wound Depth (cm) 0.2 cm 02/22/23 1046   Wound Volume (cm^3) 0.072 cm^3 02/22/23 1046   Wound Surface Area (cm^2) 0.36 cm^2 02/22/23 1046   Care Cleansed with:;Antimicrobial agent;Sterile normal saline 02/22/23 1046   Dressing Changed 02/22/23 1046   Off Loading Football dressing;Off loading shoe 02/22/23 1046   Dressing Change Due 03/01/23 02/22/23 1046           Debridement    Date/Time: 2/22/2023 9:36 AM  Performed by: Little Chun MD  Authorized by: Little Chun MD     Consent Done?:  Yes (Verbal)  Local anesthesia used?: Yes    Local anesthetic:  Topical anesthetic    Wound Details:    Location:  Right foot    Location:  Right Plantar    Type of Debridement:  Excisional       Length (cm):  0.9       Area (sq cm):  0.36       Width (cm):  0.4       Percent Debrided (%):  100       Depth (cm):  0.2       Total Area Debrided (sq cm):  0.36    Depth of debridement:  Subcutaneous tissue    Tissue debrided:  Epidermis and Subcutaneous    Devitalized tissue debrided:  Slough, Fibrin and Callus    Instruments:  Curette    Bleeding:  Minimal  Hemostasis Achieved: Yes    Method Used:  Pressure  Patient tolerance:  Patient tolerated the procedure well with no immediate complications    Plan:            Debridement needed and Done today.   Recommend off-loading  Increase protein   Elevate feet   Monitor blood glucose   Keep dressing clean and intact  Continue with wound care orders and plan as noted in orders.   Continue to follow current medication regimen as per pcp   Call for any questions / concerns.     Tissue pathology and/or culture taken     [] Yes      [x]  No  Sharp debridement performed                   [x] Yes       [] No  Labs ordered     [] Yes       [x] No  Imaging ordered    [] Yes      [] No    Orders Placed This Encounter   Procedures    Debridement     This order was created via procedure documentation     Standing Status:   Standing     Number of Occurrences:   1    Change dressing     Clean wound with NS. Cavilon to periwound.   Rt Dorsal Foot: Cutimed  Rt plantar Foot: Liz to wound bed then Mextra. Gigi Foam (long x 2, laid perpendicular)   Football dressing (cast padding x 3, coban). Darco.   Pt to return to [facility] in 1 wk.        Follow up in about 1 week (around 3/1/2023).

## 2023-02-28 ENCOUNTER — HOSPITAL ENCOUNTER (OUTPATIENT)
Dept: WOUND CARE | Facility: HOSPITAL | Age: 63
Discharge: HOME | End: 2023-02-28
Attending: FAMILY MEDICINE
Payer: MEDICARE

## 2023-02-28 VITALS — HEART RATE: 77 BPM | SYSTOLIC BLOOD PRESSURE: 139 MMHG | DIASTOLIC BLOOD PRESSURE: 66 MMHG | TEMPERATURE: 97 F

## 2023-02-28 DIAGNOSIS — E11.621 TYPE 2 DIABETES WITH SKIN ULCER OF FOOT: Primary | ICD-10-CM

## 2023-02-28 DIAGNOSIS — E11.42 TYPE 2 DIABETES MELLITUS WITH DIABETIC POLYNEUROPATHY, WITHOUT LONG-TERM CURRENT USE OF INSULIN: ICD-10-CM

## 2023-02-28 DIAGNOSIS — L97.509 TYPE 2 DIABETES WITH SKIN ULCER OF FOOT: Primary | ICD-10-CM

## 2023-02-28 PROCEDURE — 99214 OFFICE O/P EST MOD 30 MIN: CPT | Mod: HCNC,,, | Performed by: FAMILY MEDICINE

## 2023-02-28 PROCEDURE — 99213 OFFICE O/P EST LOW 20 MIN: CPT | Mod: HCNC | Performed by: FAMILY MEDICINE

## 2023-02-28 PROCEDURE — 99214 PR OFFICE/OUTPT VISIT, EST, LEVL IV, 30-39 MIN: ICD-10-PCS | Mod: HCNC,,, | Performed by: FAMILY MEDICINE

## 2023-02-28 NOTE — PROGRESS NOTES
[facility]                Progress Note    Subjective:       Patient ID: Fermin Henson is a 63 y.o. male.    Chief Complaint: Wound Check    Follow up wound care visit. Patient ambulated to exam room unaided, with prescribed Darco shoe w/ PegAssist on Right Foot. No c/o pain at present. Dressing intact with a moderate amount of serosanguineous, non-malodor drainage from Plantar aspect. Right 3rd Distal Toe appears healed. Right Dorsal Foot wound has intact epithelization covering. Right Plantar Foot wound is measuring smaller in (0.3 cm) length and in (0.2 cm) width.     Wound care done as per order. Return to clinic in 1 week. Patient declined AVS.    MD note:  patient following up today for diabetic foot wounds to right foot.  No fevers, chills, or sweats.  No changes in blood sugar readings per patient.  He states that he is otherwise doing well.  No pain as he is insensate.     Review of Systems   Constitutional: Negative.    HENT: Negative.     Eyes: Negative.    Respiratory: Negative.     Cardiovascular: Negative.    Gastrointestinal: Negative.    Skin:  Positive for wound.   Neurological:  Positive for numbness.   Psychiatric/Behavioral: Negative.         Objective:        Physical Exam  Constitutional:       Appearance: Normal appearance.   HENT:      Head: Normocephalic and atraumatic.      Right Ear: External ear normal.      Left Ear: External ear normal.      Mouth/Throat:      Mouth: Mucous membranes are moist.      Pharynx: Oropharynx is clear.   Eyes:      Extraocular Movements: Extraocular movements intact.      Conjunctiva/sclera: Conjunctivae normal.      Pupils: Pupils are equal, round, and reactive to light.   Cardiovascular:      Rate and Rhythm: Normal rate and regular rhythm.   Pulmonary:      Effort: Pulmonary effort is normal. No respiratory distress.   Abdominal:      General: There is no distension.      Palpations: Abdomen is soft.    Musculoskeletal:      Right lower leg: No edema.      Left lower leg: No edema.   Skin:     General: Skin is warm and dry.      Comments: +right plantar wound improved with minimal slough; no periwound erythema  +right dorsal wound improved without active drainage at this time.    Neurological:      Mental Status: He is alert and oriented to person, place, and time.      Sensory: Sensory deficit present.       Vitals:    02/28/23 1059   BP: 139/66   Pulse: 77   Temp: 97.3 °F (36.3 °C)       Assessment:           ICD-10-CM ICD-9-CM   1. Type 2 diabetes with skin ulcer of foot  E11.621 250.80    L97.509 707.15   2. Type 2 diabetes mellitus with diabetic polyneuropathy, without long-term current use of insulin  E11.42 250.60     357.2            Altered Skin Integrity 12/06/22 1003 Right dorsal Foot (Active)   12/06/22 1003   Altered Skin Integrity Present on Admission: yes   Side: Right   Orientation: dorsal   Location: Foot   Wound Number:    Is this injury device related?:    Primary Wound Type:    Description of Altered Skin Integrity:    Ankle-Brachial Index:    Pulses:    Removal Indication and Assessment:    Wound Outcome:    (Retired) Wound Length (cm):    (Retired) Wound Width (cm):    (Retired) Depth (cm):    Wound Description (Comments):    Removal Indications:    Wound Image   02/28/23 1228   Description of Altered Skin Integrity Partial thickness tissue loss. Shallow open ulcer with a red or pink wound bed, without slough. Intact or Open/Ruptured Serum-filled blister. 02/28/23 1228   Dressing Appearance Intact;Dried drainage 02/28/23 1228   Drainage Amount Small 02/28/23 1228   Drainage Characteristics/Odor Serous;No odor 02/28/23 1228   Appearance Epithelialization;Dry 02/28/23 1228   Tissue loss description Partial thickness 02/28/23 1228   Black (%), Wound Tissue Color 0 % 02/28/23 1228   Red (%), Wound Tissue Color 100 % 02/28/23 1228   Yellow (%), Wound Tissue Color 0 % 02/28/23 1228   Periwound  Area Dry;Intact 02/28/23 1228   Wound Length (cm) 0 cm 02/28/23 1228   Wound Width (cm) 0 cm 02/28/23 1228   Wound Depth (cm) 0 cm 02/28/23 1228   Wound Volume (cm^3) 0 cm^3 02/28/23 1228   Wound Surface Area (cm^2) 0 cm^2 02/28/23 1228   Tunneling (depth (cm)/location) 0 02/28/23 1228   Undermining (depth (cm)/location) 0 02/28/23 1228   Care Cleansed with:;Antimicrobial agent;Sterile normal saline 02/28/23 1228   Dressing Changed 02/28/23 1228   Periwound Care Absorptive dressing applied 02/28/23 1228   Off Loading Football dressing;Off loading shoe 02/28/23 1228   Dressing Change Due 03/07/23 02/28/23 1228            Wound 04/12/22 1426 Diabetic Ulcer Right plantar Foot (Active)   04/12/22 1426    Pre-existing: Yes   Primary Wound Type: Diabetic ulc   Side: Right   Orientation: plantar   Location: Foot   Wound Number:    Ankle-Brachial Index:    Pulses:    Removal Indication and Assessment:    Wound Outcome: Healed   (Retired) Wound Type:    (Retired) Wound Length (cm):    (Retired) Wound Width (cm):    (Retired) Depth (cm):    Wound Description (Comments):    Removal Indications:    Wound Image   02/28/23 1228   Wound WDL ex 02/28/23 1228   Dressing Appearance Intact;Moist drainage 02/28/23 1228   Drainage Amount Moderate 02/28/23 1228   Drainage Characteristics/Odor Serosanguineous;No odor 02/28/23 1228   Appearance Red 02/28/23 1228   Tissue loss description Partial thickness 02/28/23 1228   Black (%), Wound Tissue Color 0 % 02/28/23 1228   Red (%), Wound Tissue Color 100 % 02/28/23 1228   Yellow (%), Wound Tissue Color 0 % 02/28/23 1228   Periwound Area Dry;Intact 02/28/23 1228   Wound Edges Defined;Open 02/28/23 1228   Wound Length (cm) 0.6 cm 02/28/23 1228   Wound Width (cm) 0.2 cm 02/28/23 1228   Wound Depth (cm) 0.2 cm 02/28/23 1228   Wound Volume (cm^3) 0.024 cm^3 02/28/23 1228   Wound Surface Area (cm^2) 0.12 cm^2 02/28/23 1228   Tunneling (depth (cm)/location) 0 02/28/23 1228   Undermining (depth  (cm)/location) 0 02/28/23 1228   Care Cleansed with:;Antimicrobial agent;Sterile normal saline 02/28/23 1228   Dressing Changed 02/28/23 1228   Periwound Care Absorptive dressing applied 02/28/23 1228   Off Loading Football dressing;Off loading shoe 02/28/23 1228   Dressing Change Due 04/18/23 02/28/23 1228       [REMOVED]      Altered Skin Integrity 11/22/22 1020 Right distal Toe, third Diabetic Ulcer (Removed)   11/22/22 1020   Altered Skin Integrity Present on Admission:    Side: Right   Orientation: distal   Location: Toe, third   Wound Number:    Is this injury device related?:    Primary Wound Type: Diabetic ulc   Description of Altered Skin Integrity:    Ankle-Brachial Index:    Pulses:    Removal Indication and Assessment:    Wound Outcome: Healed   (Retired) Wound Length (cm):    (Retired) Wound Width (cm):    (Retired) Depth (cm):    Wound Description (Comments):    Removal Indications:    Removed 02/28/23 1145   Wound Image   02/28/23 1228   Dressing Appearance Intact;Dry 02/28/23 1228   Appearance Closed/resurfaced 02/28/23 1228   Tissue loss description Not applicable 02/28/23 1228   Periwound Area Dry;Intact 02/28/23 1228   Wound Edges Approximated 02/28/23 1228   Wound Length (cm) 0 cm 02/28/23 1228   Wound Width (cm) 0 cm 02/28/23 1228   Wound Depth (cm) 0 cm 02/28/23 1228   Wound Volume (cm^3) 0 cm^3 02/28/23 1228   Wound Surface Area (cm^2) 0 cm^2 02/28/23 1228   Tunneling (depth (cm)/location) 0 02/28/23 1228   Undermining (depth (cm)/location) 0 02/28/23 1228   Care Cleansed with:;Antimicrobial agent;Sterile normal saline 02/28/23 1228   Dressing Removed 02/28/23 1228           Plan:              Tissue pathology and/or culture taken     [] Yes      [x] No  Sharp debridement performed                   [] Yes       [x] No  Labs ordered     [] Yes       [x] No  Imaging ordered    [] Yes      [x] No    Orders Placed This Encounter   Procedures    Change dressing     Clean wound with NS. Cavilon to  periwound.   Rt Dorsal Foot: Adaptic (plain Non-adherent dressing)  Rt plantar Foot: Liz to wound bed then Mextra (short x1). Gigi Foam (long x 2, laid perpendicular)   Football dressing (cast padding x 3, coban). Darco.   Pt to return to [facility] in 1 wk.        Follow up in about 1 week (around 3/7/2023) for wound care.     See Jean-Baptiste MD

## 2023-03-04 DIAGNOSIS — E11.29 TYPE 2 DIABETES MELLITUS WITH MICROALBUMINURIA, WITHOUT LONG-TERM CURRENT USE OF INSULIN: ICD-10-CM

## 2023-03-04 DIAGNOSIS — R80.9 TYPE 2 DIABETES MELLITUS WITH MICROALBUMINURIA, WITHOUT LONG-TERM CURRENT USE OF INSULIN: ICD-10-CM

## 2023-03-04 DIAGNOSIS — E11.42 TYPE 2 DIABETES MELLITUS WITH DIABETIC POLYNEUROPATHY, WITHOUT LONG-TERM CURRENT USE OF INSULIN: ICD-10-CM

## 2023-03-04 NOTE — TELEPHONE ENCOUNTER
No new care gaps identified.  [facility] Embedded Care Gaps. Reference number: [id number]. 3/04/2023   9:34:28 AM CST

## 2023-03-05 NOTE — TELEPHONE ENCOUNTER
Refill Decision Note   Fermin Henson  is requesting a refill authorization.  Brief Assessment and Rationale for Refill:  Approve     Medication Therapy Plan:       Medication Reconciliation Completed: No    Comments:     No Care Gaps recommended.     Note composed:11:42 AM 03/05/2023

## 2023-03-07 ENCOUNTER — HOSPITAL ENCOUNTER (OUTPATIENT)
Dept: WOUND CARE | Facility: HOSPITAL | Age: 63
Discharge: HOME | End: 2023-03-07
Attending: FAMILY MEDICINE
Payer: MEDICARE

## 2023-03-07 VITALS — TEMPERATURE: 99 F | SYSTOLIC BLOOD PRESSURE: 121 MMHG | DIASTOLIC BLOOD PRESSURE: 58 MMHG | HEART RATE: 70 BPM

## 2023-03-07 DIAGNOSIS — L97.509 TYPE 2 DIABETES WITH SKIN ULCER OF FOOT: Primary | ICD-10-CM

## 2023-03-07 DIAGNOSIS — E11.621 TYPE 2 DIABETES WITH SKIN ULCER OF FOOT: Primary | ICD-10-CM

## 2023-03-07 PROCEDURE — 99214 PR OFFICE/OUTPT VISIT, EST, LEVL IV, 30-39 MIN: ICD-10-PCS | Mod: HCNC,25,, | Performed by: FAMILY MEDICINE

## 2023-03-07 PROCEDURE — 99214 OFFICE O/P EST MOD 30 MIN: CPT | Mod: HCNC,25,, | Performed by: FAMILY MEDICINE

## 2023-03-07 PROCEDURE — 11042 DEBRIDEMENT: ICD-10-PCS | Mod: HCNC,,, | Performed by: FAMILY MEDICINE

## 2023-03-07 PROCEDURE — 11042 DBRDMT SUBQ TIS 1ST 20SQCM/<: CPT | Mod: HCNC,,, | Performed by: FAMILY MEDICINE

## 2023-03-07 PROCEDURE — 11042 DBRDMT SUBQ TIS 1ST 20SQCM/<: CPT | Mod: HCNC | Performed by: FAMILY MEDICINE

## 2023-03-07 NOTE — PROGRESS NOTES
[facility]                Progress Note    Subjective:       Patient ID: Fermin Henson is a 63 y.o. male.    Chief Complaint: No chief complaint on file.    Pt arrived to [facility] ambulating without any issues. Pt denies any fever, chills, or flu-like symptoms; denies pain/discomfort. Pt reports not having any issues with drsg since last visit. Noted to have callus to plantar wound. Debridement completed by MD. Changing collagen but no other changes to drsg. Pt will return to [facility] in 1 wk.     MD note:  patient presenting for follow up of DFU to right dorsal and plantar foot.  There has been no fevers, chills, or sweats.  He has been on his feet more this week.  No odor from wound that he has noticed    Review of Systems   Constitutional: Negative.    HENT: Negative.     Eyes: Negative.    Respiratory: Negative.     Cardiovascular: Negative.    Gastrointestinal: Negative.    Skin:  Positive for wound.   Psychiatric/Behavioral: Negative.         Objective:        Physical Exam  Constitutional:       Appearance: Normal appearance.   HENT:      Head: Normocephalic and atraumatic.      Right Ear: External ear normal.      Left Ear: External ear normal.   Eyes:      Extraocular Movements: Extraocular movements intact.      Conjunctiva/sclera: Conjunctivae normal.      Pupils: Pupils are equal, round, and reactive to light.   Pulmonary:      Effort: Pulmonary effort is normal. No respiratory distress.   Abdominal:      General: There is no distension.      Palpations: Abdomen is soft.   Skin:     General: Skin is warm and dry.      Comments: +dorsal wound improved and no current drainage present  +plantar wound open and calloused with excess slough   Neurological:      Mental Status: He is alert and oriented to person, place, and time. Mental status is at baseline.      Sensory: Sensory deficit present.       Vitals:    03/07/23 1044   BP: (!) 121/58   Pulse: 70   Temp: 98.6 °F  "(37 °C)     Debridement    Date/Time: 3/7/2023 10:30 AM  Performed by: See Jean-Baptiste MD  Authorized by: See Jean-Baptiste MD     Time out: Immediately prior to procedure a "time out" was called to verify the correct patient, procedure, equipment, support staff and site/side marked as required.    Consent Done?:  Yes (Written)  Local anesthesia used?: No      Wound Details:    Location:  Right foot    Location:  Right Plantar    Type of Debridement:  Excisional       Length (cm):  0.3       Area (sq cm):  0.09       Width (cm):  0.3       Percent Debrided (%):  100       Depth (cm):  0.1       Total Area Debrided (sq cm):  0.09    Depth of debridement:  Subcutaneous tissue    Tissue debrided:  Dermis, Epidermis and Subcutaneous    Devitalized tissue debrided:  Biofilm, Callus, Fibrin and Slough    Instruments:  Curette    Bleeding:  Minimal  Hemostasis Achieved: Yes    Method Used:  Pressure  Patient tolerance:  Patient tolerated the procedure well with no immediate complications    Assessment:           ICD-10-CM ICD-9-CM   1. Type 2 diabetes with skin ulcer of foot  E11.621 250.80    L97.509 707.15            Altered Skin Integrity 12/06/22 1003 Right dorsal Foot (Active)   12/06/22 1003   Altered Skin Integrity Present on Admission - Did Patient arrive to the hospital with altered skin?: yes   Side: Right   Orientation: dorsal   Location: Foot   Wound Number:    Is this injury device related?:    Primary Wound Type:    Description of Altered Skin Integrity:    Ankle-Brachial Index:    Pulses:    Removal Indication and Assessment:    Wound Outcome:    (Retired) Wound Length (cm):    (Retired) Wound Width (cm):    (Retired) Depth (cm):    Wound Description (Comments):    Removal Indications:    Wound Image   03/07/23 1058   Description of Altered Skin Integrity Partial thickness tissue loss. Shallow open ulcer with a red or pink wound bed, without slough. Intact or Open/Ruptured Serum-filled blister. 03/07/23 1058 "   Dressing Appearance Dried drainage 03/07/23 1058   Drainage Amount Small 03/07/23 1058   Drainage Characteristics/Odor Serosanguineous 03/07/23 1058   Appearance Pink;Red 03/07/23 1058   Tissue loss description Partial thickness 03/07/23 1058   Black (%), Wound Tissue Color 0 % 03/07/23 1058   Red (%), Wound Tissue Color 100 % 03/07/23 1058   Yellow (%), Wound Tissue Color 0 % 03/07/23 1058   Periwound Area Intact 03/07/23 1058   Wound Edges Defined 03/07/23 1058   Wound Length (cm) 0.3 cm 03/07/23 1058   Wound Width (cm) 0.3 cm 03/07/23 1058   Wound Depth (cm) 0.1 cm 03/07/23 1058   Wound Volume (cm^3) 0.009 cm^3 03/07/23 1058   Wound Surface Area (cm^2) 0.09 cm^2 03/07/23 1058   Tunneling (depth (cm)/location) 0 03/07/23 1058   Undermining (depth (cm)/location) 0 03/07/23 1058   Care Cleansed with:;Sterile normal saline 03/07/23 1058   Dressing Applied 03/07/23 1058   Periwound Care Moisture barrier applied 03/07/23 1058   Off Loading Football dressing;Off loading shoe 03/07/23 1058            Wound 04/12/22 1426 Diabetic Ulcer Right plantar Foot (Active)   04/12/22 1426    Pre-existing: Yes   Primary Wound Type: Diabetic ulc   Side: Right   Orientation: plantar   Location: Foot   Wound Number:    Ankle-Brachial Index:    Pulses:    Removal Indication and Assessment:    Wound Outcome: Healed   (Retired) Wound Type:    (Retired) Wound Length (cm):    (Retired) Wound Width (cm):    (Retired) Depth (cm):    Wound Description (Comments):    Removal Indications:    Wound Image   03/07/23 1058   Wound WDL ex 03/07/23 1058   Dressing Appearance Moist drainage 03/07/23 1058   Drainage Amount Moderate 03/07/23 1058   Drainage Characteristics/Odor Serosanguineous 03/07/23 1058   Appearance Red 03/07/23 1058   Tissue loss description Full thickness 03/07/23 1058   Black (%), Wound Tissue Color 0 % 03/07/23 1058   Red (%), Wound Tissue Color 100 % 03/07/23 1058   Yellow (%), Wound Tissue Color 0 % 03/07/23 1058   Periwound  Area Intact 03/07/23 1058   Wound Edges Callused 03/07/23 1058   Wound Length (cm) 0.7 cm 03/07/23 1058   Wound Width (cm) 0.2 cm 03/07/23 1058   Wound Depth (cm) 0.5 cm 03/07/23 1058   Wound Volume (cm^3) 0.07 cm^3 03/07/23 1058   Wound Surface Area (cm^2) 0.14 cm^2 03/07/23 1058   Tunneling (depth (cm)/location) 0 03/07/23 1058   Undermining (depth (cm)/location) 0.4cm / circumfrencial 03/07/23 1058   Care Cleansed with:;Sterile normal saline 03/07/23 1058   Dressing Applied 03/07/23 1058   Periwound Care Moisture barrier applied 03/07/23 1058   Off Loading Football dressing;Off loading shoe 03/07/23 1058           Plan:              Tissue pathology and/or culture taken     [] Yes      [x] No  Sharp debridement performed                   [x] Yes       [] No  Labs ordered     [] Yes       [x] No  Imaging ordered    [] Yes      [x] No    Orders Placed This Encounter   Procedures    Debridement     This order was created via procedure documentation     Standing Status:   Standing     Number of Occurrences:   1    Change dressing     Clean wound with NS. Cavilon to periwound.   Rt Dorsal Foot: Adaptic touch  Rt plantar Foot: Endoform to wound bed then Mextra (short x1). Gigi Foam (long x 2, laid perpendicular)   Football dressing (cast padding x 3, coban). Darco. Pt to return to [facility] in 1 wk.        Follow up in about 1 week (around 3/14/2023) for Wound Care.     See Jean-Baptiste MD

## 2023-03-14 ENCOUNTER — HOSPITAL ENCOUNTER (OUTPATIENT)
Dept: WOUND CARE | Facility: HOSPITAL | Age: 63
Discharge: HOME | End: 2023-03-14
Attending: FAMILY MEDICINE
Payer: MEDICARE

## 2023-03-14 VITALS — TEMPERATURE: 98 F | SYSTOLIC BLOOD PRESSURE: 123 MMHG | HEART RATE: 87 BPM | DIASTOLIC BLOOD PRESSURE: 67 MMHG

## 2023-03-14 DIAGNOSIS — E11.621 DIABETIC ULCER OF RIGHT MIDFOOT ASSOCIATED WITH TYPE 2 DIABETES MELLITUS, WITH FAT LAYER EXPOSED: Primary | ICD-10-CM

## 2023-03-14 DIAGNOSIS — L97.412 DIABETIC ULCER OF RIGHT MIDFOOT ASSOCIATED WITH TYPE 2 DIABETES MELLITUS, WITH FAT LAYER EXPOSED: Primary | ICD-10-CM

## 2023-03-14 PROCEDURE — 11042 DEBRIDEMENT: ICD-10-PCS | Mod: HCNC,,, | Performed by: FAMILY MEDICINE

## 2023-03-14 PROCEDURE — 11042 DBRDMT SUBQ TIS 1ST 20SQCM/<: CPT | Mod: HCNC,,, | Performed by: FAMILY MEDICINE

## 2023-03-14 PROCEDURE — 99499 UNLISTED E&M SERVICE: CPT | Mod: HCNC,,, | Performed by: FAMILY MEDICINE

## 2023-03-14 PROCEDURE — 99499 NO LOS: ICD-10-PCS | Mod: HCNC,,, | Performed by: FAMILY MEDICINE

## 2023-03-14 PROCEDURE — 11042 DBRDMT SUBQ TIS 1ST 20SQCM/<: CPT | Mod: HCNC | Performed by: FAMILY MEDICINE

## 2023-03-14 NOTE — PROGRESS NOTES
"[facility]                Progress Note    Subjective:       Patient ID: Fermin Henson is a 63 y.o. male.    Chief Complaint: Wound Check    Walked to clinic unaided wearing Darco shoe to right foot. Football intact with no evidence of drainage but strong odor. Callus to plantar wound that is slightly bigger and pale red. Dorsal foot wound to distal foot shallow and bright red and proximal healed. Has been spending much time on feet. Absorbency dressings added including Duratex and longer Mextra as "feet sweating". Refused printed AVS. Return in one week and encouraged to rest legs and elevate more.    MD note:  there has been no new wounds.  He was on his feet a lot this past week as they are cleaning out his moms house. He has kept all dressings in place, clean, and dry.  No fevers, chills, or sweats.      Wound Check    Review of Systems   Constitutional: Negative.    HENT: Negative.     Eyes: Negative.    Respiratory: Negative.     Cardiovascular: Negative.    Gastrointestinal: Negative.    Skin:  Positive for wound.   Psychiatric/Behavioral: Negative.         Objective:        Physical Exam    Vitals:    03/14/23 1058   BP: 123/67   Pulse: 87   Temp: 98.1 °F (36.7 °C)     Debridement    Date/Time: 3/14/2023 10:37 AM  Performed by: See Jean-Baptiste MD  Authorized by: See Jean-Baptiste MD     Time out: Immediately prior to procedure a "time out" was called to verify the correct patient, procedure, equipment, support staff and site/side marked as required.    Consent Done?:  Yes (Written)  Local anesthesia used?: No      Wound Details:    Location:  Right foot    Location:  Right Plantar    Type of Debridement:  Excisional       Length (cm):  0.8       Area (sq cm):  0.16       Width (cm):  0.2       Percent Debrided (%):  100       Depth (cm):  0.1       Total Area Debrided (sq cm):  0.16    Depth of debridement:  Subcutaneous tissue    Tissue debrided:  Dermis, " Epidermis and Subcutaneous    Devitalized tissue debrided:  Biofilm, Fibrin and Slough    Instruments:  Curette  Bleeding:  Minimal  Hemostasis Achieved: Yes  Method Used:  Pressure  Patient tolerance:  Patient tolerated the procedure well with no immediate complications    Assessment:           ICD-10-CM ICD-9-CM   1. Diabetic ulcer of right midfoot associated with type 2 diabetes mellitus, with fat layer exposed  E11.621 250.80    L97.412 707.14            Altered Skin Integrity 12/06/22 1003 Right dorsal Foot (Active)   12/06/22 1003   Altered Skin Integrity Present on Admission - Did Patient arrive to the hospital with altered skin?: yes   Side: Right   Orientation: dorsal   Location: Foot   Wound Number:    Is this injury device related?:    Primary Wound Type:    Description of Altered Skin Integrity:    Ankle-Brachial Index:    Pulses:    Removal Indication and Assessment:    Wound Outcome:    (Retired) Wound Length (cm):    (Retired) Wound Width (cm):    (Retired) Depth (cm):    Wound Description (Comments):    Removal Indications:    Wound Image   03/14/23 1059   Wound Length (cm) 1 cm 03/14/23 1059   Wound Width (cm) 0.8 cm 03/14/23 1059   Wound Surface Area (cm^2) 0.8 cm^2 03/14/23 1059            Wound 04/12/22 1426 Diabetic Ulcer Right plantar Foot (Active)   04/12/22 1426    Pre-existing: Yes   Primary Wound Type: Diabetic ulc   Side: Right   Orientation: plantar   Location: Foot   Wound Number:    Ankle-Brachial Index:    Pulses:    Removal Indication and Assessment:    Wound Outcome: Healed   (Retired) Wound Type:    (Retired) Wound Length (cm):    (Retired) Wound Width (cm):    (Retired) Depth (cm):    Wound Description (Comments):    Removal Indications:    Wound Image   03/14/23 1059   Wound WDL ex 03/14/23 1059   Dressing Appearance Dry;Intact;Dried drainage 03/14/23 1059   Drainage Amount Scant 03/14/23 1059   Appearance Red 03/14/23 1059   Tissue loss description Full thickness 03/14/23 1059    Red (%), Wound Tissue Color 100 % 03/14/23 1059   Periwound Area Dry 03/14/23 1059   Wound Edges Defined;Callused 03/14/23 1059   Wound Length (cm) 0.8 cm 03/14/23 1059   Wound Width (cm) 0.6 cm 03/14/23 1059   Wound Depth (cm) 0.2 cm 03/14/23 1059   Wound Volume (cm^3) 0.096 cm^3 03/14/23 1059   Wound Surface Area (cm^2) 0.48 cm^2 03/14/23 1059   Care Cleansed with:;Antimicrobial agent;Sterile normal saline 03/14/23 1059   Dressing Changed 03/14/23 1059   Off Loading Football dressing;Off loading shoe 03/14/23 1059   Dressing Change Due 03/21/23 03/14/23 1059           Plan:              Tissue pathology and/or culture taken     [] Yes      [x] No  Sharp debridement performed                   [x] Yes       [] No  Labs ordered     [] Yes       [x] No  Imaging ordered    [] Yes      [x] No    Orders Placed This Encounter   Procedures    Debridement     This order was created via procedure documentation     Standing Status:   Standing     Number of Occurrences:   1    Change dressing     Clean wound with NS. Cavilon to periwound.   Rt Dorsal Foot: Adaptic touch   Rt plantar Foot: Endoform to wound bed then  stacked Drawtex,Mextra long. Gigi Foam (long x 2, laid perpendicular)   Football dressing (cast padding x 3, coban). Darco. Pt to return to [facility] in 1 wk.        Follow up in about 1 week (around 3/21/2023).       See Jean-Baptiste MD

## 2023-03-21 ENCOUNTER — HOSPITAL ENCOUNTER (OUTPATIENT)
Dept: WOUND CARE | Facility: HOSPITAL | Age: 63
Discharge: HOME | End: 2023-03-21
Attending: FAMILY MEDICINE
Payer: MEDICARE

## 2023-03-21 VITALS — SYSTOLIC BLOOD PRESSURE: 132 MMHG | HEART RATE: 84 BPM | DIASTOLIC BLOOD PRESSURE: 63 MMHG | TEMPERATURE: 98 F

## 2023-03-21 DIAGNOSIS — L97.412 DIABETIC ULCER OF RIGHT MIDFOOT ASSOCIATED WITH TYPE 2 DIABETES MELLITUS, WITH FAT LAYER EXPOSED: Primary | ICD-10-CM

## 2023-03-21 DIAGNOSIS — E11.621 DIABETIC ULCER OF RIGHT MIDFOOT ASSOCIATED WITH TYPE 2 DIABETES MELLITUS, WITH FAT LAYER EXPOSED: Primary | ICD-10-CM

## 2023-03-21 PROCEDURE — 11042 DBRDMT SUBQ TIS 1ST 20SQCM/<: CPT | Mod: HCNC,,, | Performed by: FAMILY MEDICINE

## 2023-03-21 PROCEDURE — 99214 PR OFFICE/OUTPT VISIT, EST, LEVL IV, 30-39 MIN: ICD-10-PCS | Mod: 25,HCNC,, | Performed by: FAMILY MEDICINE

## 2023-03-21 PROCEDURE — 99214 OFFICE O/P EST MOD 30 MIN: CPT | Mod: 25,HCNC,, | Performed by: FAMILY MEDICINE

## 2023-03-21 PROCEDURE — 11042 DEBRIDEMENT: ICD-10-PCS | Mod: HCNC,,, | Performed by: FAMILY MEDICINE

## 2023-03-21 PROCEDURE — 11042 DBRDMT SUBQ TIS 1ST 20SQCM/<: CPT | Mod: HCNC | Performed by: FAMILY MEDICINE

## 2023-03-21 NOTE — PROGRESS NOTES
[facility]                Progress Note    Subjective:       Patient ID: Fermin Henson is a 63 y.o. male.    Chief Complaint: No chief complaint on file.    Pt arrived to [facility] ambulating without any issues Pt denies any fever, chills, or flu-like symptoms; denies pain/discomfort. Pt reports getting drsg wet on Saturday; his cast cover leaked, pt then reapplied drsg with supplies at home (gauze, cast padding, coban). Also reports being very active at home d/2 family circumstances. Debridement done by MD. No change to drsg. Pt will return to [facility] in 1 wk.     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  He states they are still moving his moms stuff out of her house, so he is on his feet more than normal.  No odor from wound, but states he had to change dressings after cast cover leaked.  No new concerns at this time per patient.  He denies any recent, fevers, chills, or sweats      Review of Systems   Constitutional: Negative.    HENT: Negative.     Eyes: Negative.    Respiratory: Negative.     Cardiovascular: Negative.    Gastrointestinal: Negative.    Skin:  Positive for wound.   Neurological:  Positive for numbness.   Psychiatric/Behavioral: Negative.         Objective:        Physical Exam  Constitutional:       Appearance: Normal appearance.   HENT:      Head: Normocephalic and atraumatic.      Right Ear: External ear normal.      Left Ear: External ear normal.      Mouth/Throat:      Mouth: Mucous membranes are moist.      Pharynx: Oropharynx is clear.   Eyes:      Extraocular Movements: Extraocular movements intact.      Conjunctiva/sclera: Conjunctivae normal.      Pupils: Pupils are equal, round, and reactive to light.   Abdominal:      General: There is no distension.      Palpations: Abdomen is soft.   Neurological:      Mental Status: He is alert and oriented to person, place, and time. Mental status is at baseline.       Vitals:     "03/21/23 1105   BP: 132/63   Pulse: 84   Temp: 97.9 °F (36.6 °C)     Debridement    Date/Time: 3/21/2023 10:29 AM  Performed by: See Jean-Baptiste MD  Authorized by: See Jean-Baptiste MD     Time out: Immediately prior to procedure a "time out" was called to verify the correct patient, procedure, equipment, support staff and site/side marked as required.    Consent Done?:  Yes (Written)  Local anesthesia used?: No      Wound Details:    Location:  Right foot    Location:  Right Plantar    Type of Debridement:  Excisional       Length (cm):  0.8       Area (sq cm):  0.4       Width (cm):  0.5       Percent Debrided (%):  100       Depth (cm):  0.3       Total Area Debrided (sq cm):  0.4    Depth of debridement:  Subcutaneous tissue    Tissue debrided:  Epidermis, Subcutaneous and Dermis    Devitalized tissue debrided:  Callus, Slough, Fibrin and Biofilm    Instruments:  Curette  Bleeding:  Minimal  Hemostasis Achieved: Yes  Method Used:  Pressure  Patient tolerance:  Patient tolerated the procedure well with no immediate complications    Assessment:           ICD-10-CM ICD-9-CM   1. Diabetic ulcer of right midfoot associated with type 2 diabetes mellitus, with fat layer exposed  E11.621 250.80    L97.412 707.14            Altered Skin Integrity 12/06/22 1003 Right dorsal Foot (Active)   12/06/22 1003   Altered Skin Integrity Present on Admission - Did Patient arrive to the hospital with altered skin?: yes   Side: Right   Orientation: dorsal   Location: Foot   Wound Number:    Is this injury device related?:    Primary Wound Type:    Description of Altered Skin Integrity:    Ankle-Brachial Index:    Pulses:    Removal Indication and Assessment:    Wound Outcome:    (Retired) Wound Length (cm):    (Retired) Wound Width (cm):    (Retired) Depth (cm):    Wound Description (Comments):    Removal Indications:    Wound Image   03/21/23 1118   Description of Altered Skin Integrity Partial thickness tissue loss. Shallow open " ulcer with a red or pink wound bed, without slough. Intact or Open/Ruptured Serum-filled blister. 03/21/23 1118   Dressing Appearance Dry;Intact;Clean 03/21/23 1118   Drainage Amount Small 03/21/23 1118   Drainage Characteristics/Odor Serosanguineous 03/21/23 1118   Appearance Red 03/21/23 1118   Tissue loss description Partial thickness 03/21/23 1118   Black (%), Wound Tissue Color 0 % 03/21/23 1118   Red (%), Wound Tissue Color 100 % 03/21/23 1118   Yellow (%), Wound Tissue Color 0 % 03/21/23 1118   Periwound Area Intact 03/21/23 1118   Wound Edges Callused 03/21/23 1118   Wound Length (cm) 4 cm 03/21/23 1118   Wound Width (cm) 3 cm 03/21/23 1118   Wound Depth (cm) 0.1 cm 03/21/23 1118   Wound Volume (cm^3) 1.2 cm^3 03/21/23 1118   Wound Surface Area (cm^2) 12 cm^2 03/21/23 1118   Tunneling (depth (cm)/location) 0 03/21/23 1118   Undermining (depth (cm)/location) 0 03/21/23 1118   Care Cleansed with:;Sterile normal saline 03/21/23 1118   Dressing Applied 03/21/23 1118   Periwound Care Moisture barrier applied 03/21/23 1118   Off Loading Football dressing;Off loading shoe 03/21/23 1118            Wound 04/12/22 1426 Diabetic Ulcer Right plantar Foot (Active)   04/12/22 1426    Pre-existing: Yes   Primary Wound Type: Diabetic ulc   Side: Right   Orientation: plantar   Location: Foot   Wound Number:    Ankle-Brachial Index:    Pulses:    Removal Indication and Assessment:    Wound Outcome: Healed   (Retired) Wound Type:    (Retired) Wound Length (cm):    (Retired) Wound Width (cm):    (Retired) Depth (cm):    Wound Description (Comments):    Removal Indications:    Wound Image   03/21/23 1118   Wound WDL ex 03/21/23 1118   Dressing Appearance Dry;Intact;Clean 03/21/23 1118   Drainage Amount Moderate 03/21/23 1118   Drainage Characteristics/Odor Serosanguineous 03/21/23 1118   Appearance Red 03/21/23 1118   Tissue loss description Full thickness 03/21/23 1118   Black (%), Wound Tissue Color 0 % 03/21/23 1118   Red  (%), Wound Tissue Color 100 % 03/21/23 1118   Yellow (%), Wound Tissue Color 0 % 03/21/23 1118   Periwound Area Intact 03/21/23 1118   Wound Edges Callused 03/21/23 1118   Wound Length (cm) 0.8 cm 03/21/23 1118   Wound Width (cm) 0.3 cm 03/21/23 1118   Wound Depth (cm) 0.5 cm 03/21/23 1118   Wound Volume (cm^3) 0.12 cm^3 03/21/23 1118   Wound Surface Area (cm^2) 0.24 cm^2 03/21/23 1118   Tunneling (depth (cm)/location) 0 03/21/23 1118   Undermining (depth (cm)/location) 0.5cm/ 6-12 o'clock 03/21/23 1118   Care Cleansed with:;Sterile normal saline 03/21/23 1118   Dressing Applied 03/21/23 1118   Periwound Care Moisture barrier applied 03/21/23 1118   Off Loading Football dressing;Off loading shoe 03/21/23 1118           Plan:              Tissue pathology and/or culture taken     [] Yes      [x] No  Sharp debridement performed                   [x] Yes       [] No  Labs ordered     [] Yes       [x] No  Imaging ordered    [] Yes      [x] No    Orders Placed This Encounter   Procedures    Debridement     This order was created via procedure documentation     Standing Status:   Standing     Number of Occurrences:   1    Change dressing     Clean wound with NS. Cavilon to periwound.   Rt Dorsal Foot: Adaptic touch   Rt plantar Foot: Endoform to wound bed then Mextra long. Gigi Foam x2. Football dressing (cast padding x 3, coban). Darco. Pt to return to [facility] in 1 wk.        Follow up in about 1 week (around 3/28/2023) for Wound Care.     See Jean-Baptiste MD

## 2023-03-28 ENCOUNTER — HOSPITAL ENCOUNTER (OUTPATIENT)
Dept: WOUND CARE | Facility: HOSPITAL | Age: 63
Discharge: HOME | End: 2023-03-28
Attending: FAMILY MEDICINE
Payer: MEDICARE

## 2023-03-28 VITALS — SYSTOLIC BLOOD PRESSURE: 130 MMHG | HEART RATE: 91 BPM | TEMPERATURE: 97 F | DIASTOLIC BLOOD PRESSURE: 82 MMHG

## 2023-03-28 DIAGNOSIS — L97.509 TYPE 2 DIABETES WITH SKIN ULCER OF FOOT: Primary | ICD-10-CM

## 2023-03-28 DIAGNOSIS — E11.621 TYPE 2 DIABETES WITH SKIN ULCER OF FOOT: Primary | ICD-10-CM

## 2023-03-28 PROCEDURE — 11042 DBRDMT SUBQ TIS 1ST 20SQCM/<: CPT | Mod: HCNC | Performed by: FAMILY MEDICINE

## 2023-03-28 PROCEDURE — 99214 OFFICE O/P EST MOD 30 MIN: CPT | Mod: HCNC,,, | Performed by: FAMILY MEDICINE

## 2023-03-28 PROCEDURE — 99214 PR OFFICE/OUTPT VISIT, EST, LEVL IV, 30-39 MIN: ICD-10-PCS | Mod: HCNC,,, | Performed by: FAMILY MEDICINE

## 2023-03-28 NOTE — PROGRESS NOTES
[facility]                Progress Note    Subjective:       Patient ID: Fermin Henson is a 63 y.o. male.    Chief Complaint: Wound Check    Walked to clinic unaided. No complaints pain. Only one smallarea to dorsal foot with thin skin covering and dry skin surrounding. Plantar wound continues to have callus surround but less than usual. Continues to clear house after mothers death but will be done soon and able to rest foot. 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  He was on his feet quite a bit this past week, but has now finished moving his moms things out of the house and will now be able to offload the foot as directed.  No fevers, chills, or sweats.  No new wounds that he is aware of at this time.  He has kept dressings in place, clean, and dry.     Wound Check    Review of Systems   Constitutional: Negative.    HENT: Negative.     Eyes: Negative.    Respiratory: Negative.     Cardiovascular: Negative.    Gastrointestinal: Negative.    Skin:  Positive for wound.   Neurological:  Positive for numbness.   Psychiatric/Behavioral: Negative.         Objective:        Physical Exam  Constitutional:       Appearance: Normal appearance.   HENT:      Head: Normocephalic and atraumatic.      Right Ear: External ear normal.      Left Ear: External ear normal.      Mouth/Throat:      Mouth: Mucous membranes are moist.      Pharynx: Oropharynx is clear.   Eyes:      Extraocular Movements: Extraocular movements intact.      Conjunctiva/sclera: Conjunctivae normal.      Pupils: Pupils are equal, round, and reactive to light.   Cardiovascular:      Rate and Rhythm: Normal rate and regular rhythm.   Musculoskeletal:      Right lower leg: No edema.      Left lower leg: No edema.   Skin:     General: Skin is warm and dry.      Comments: +DFU to right plantar with callous, but no slough or maceration  +small opening to right dorsal  foot without slough or maceration   Neurological:      Mental Status: He is alert and oriented to person, place, and time.      Sensory: Sensory deficit present.       Vitals:    03/28/23 1047   BP: 130/82   Pulse: 91   Temp: 97.3 °F (36.3 °C)       Assessment:           ICD-10-CM ICD-9-CM   1. Type 2 diabetes with skin ulcer of foot  E11.621 250.80    L97.509 707.15            Altered Skin Integrity 12/06/22 1003 Right dorsal Foot (Active)   12/06/22 1003   Altered Skin Integrity Present on Admission - Did Patient arrive to the hospital with altered skin?: yes   Side: Right   Orientation: dorsal   Location: Foot   Wound Number:    Is this injury device related?:    Primary Wound Type:    Description of Altered Skin Integrity:    Ankle-Brachial Index:    Pulses:    Removal Indication and Assessment:    Wound Outcome:    (Retired) Wound Length (cm):    (Retired) Wound Width (cm):    (Retired) Depth (cm):    Wound Description (Comments):    Removal Indications:    Wound Image   03/28/23 1103   Description of Altered Skin Integrity Partial thickness tissue loss. Shallow open ulcer with a red or pink wound bed, without slough. Intact or Open/Ruptured Serum-filled blister. 03/28/23 1103   Dressing Appearance Dry;Intact 03/28/23 1103   Drainage Amount Scant 03/28/23 1103   Drainage Characteristics/Odor Clear 03/28/23 1103   Appearance Red 03/28/23 1103   Tissue loss description Partial thickness 03/28/23 1103   Red (%), Wound Tissue Color 100 % 03/28/23 1103   Periwound Area Intact;Dry 03/28/23 1103   Wound Edges Defined 03/28/23 1103   Wound Length (cm) 0.3 cm 03/28/23 1103   Wound Width (cm) 0.3 cm 03/28/23 1103   Wound Depth (cm) 0.1 cm 03/28/23 1103   Wound Volume (cm^3) 0.009 cm^3 03/28/23 1103   Wound Surface Area (cm^2) 0.09 cm^2 03/28/23 1103   Care Cleansed with:;Antimicrobial agent;Sterile normal saline 03/28/23 1103   Dressing Changed 03/28/23 1103   Dressing Change Due 04/11/23 03/28/23 1103            Wound  04/12/22 1426 Diabetic Ulcer Right plantar Foot (Active)   04/12/22 1426    Pre-existing: Yes   Primary Wound Type: Diabetic ulc   Side: Right   Orientation: plantar   Location: Foot   Wound Number:    Ankle-Brachial Index:    Pulses:    Removal Indication and Assessment:    Wound Outcome: Healed   (Retired) Wound Type:    (Retired) Wound Length (cm):    (Retired) Wound Width (cm):    (Retired) Depth (cm):    Wound Description (Comments):    Removal Indications:    Wound Image   03/28/23 1103   Wound WDL ex 03/28/23 1103   Dressing Appearance Dry;Intact 03/28/23 1103   Drainage Amount None 03/28/23 1103   Drainage Characteristics/Odor Serous 03/28/23 1103   Appearance Red 03/28/23 1103   Tissue loss description Partial thickness 03/28/23 1103   Red (%), Wound Tissue Color 100 % 03/28/23 1103   Periwound Area Dry;Intact 03/28/23 1103   Wound Edges Defined 03/28/23 1103   Wound Length (cm) 0.7 cm 03/28/23 1103   Wound Width (cm) 0.5 cm 03/28/23 1103   Wound Depth (cm) 0.4 cm 03/28/23 1103   Wound Volume (cm^3) 0.14 cm^3 03/28/23 1103   Wound Surface Area (cm^2) 0.35 cm^2 03/28/23 1103   Care Cleansed with:;Antimicrobial agent;Sterile normal saline 03/28/23 1103   Dressing Changed 03/28/23 1103   Dressing Change Due 04/11/23 03/28/23 1103           Plan:              Tissue pathology and/or culture taken     [] Yes      [x] No  Sharp debridement performed                   [] Yes       [x] No  Labs ordered     [] Yes       [x] No  Imaging ordered    [] Yes      [x] No    Orders Placed This Encounter   Procedures    Change dressing     Comments    Clean wound with NS. Cavilon to periwound.   Rt Dorsal Foot: Adaptic touch   Rt plantar Foot: Endoform to wound bed then Mextra long. Gigi Foam x2. Football dressing (cast padding x 3, coban). Darco. Pt to return to [facility] in 1 wk.        Follow up in about 1 week (around 4/4/2023).     See Jean-Baptiste MD

## 2023-04-04 ENCOUNTER — HOSPITAL ENCOUNTER (OUTPATIENT)
Dept: WOUND CARE | Facility: HOSPITAL | Age: 63
Discharge: HOME | End: 2023-04-04
Attending: FAMILY MEDICINE
Payer: MEDICARE

## 2023-04-04 VITALS — SYSTOLIC BLOOD PRESSURE: 134 MMHG | TEMPERATURE: 97 F | HEART RATE: 101 BPM | DIASTOLIC BLOOD PRESSURE: 67 MMHG

## 2023-04-04 DIAGNOSIS — E11.621 TYPE 2 DIABETES WITH SKIN ULCER OF FOOT: Primary | ICD-10-CM

## 2023-04-04 DIAGNOSIS — L97.509 TYPE 2 DIABETES WITH SKIN ULCER OF FOOT: Primary | ICD-10-CM

## 2023-04-04 PROCEDURE — 11045 DBRDMT SUBQ TISS EACH ADDL: CPT | Mod: HCNC | Performed by: FAMILY MEDICINE

## 2023-04-04 PROCEDURE — 99214 PR OFFICE/OUTPT VISIT, EST, LEVL IV, 30-39 MIN: ICD-10-PCS | Mod: HCNC,25,, | Performed by: FAMILY MEDICINE

## 2023-04-04 PROCEDURE — 99214 OFFICE O/P EST MOD 30 MIN: CPT | Mod: HCNC,25,, | Performed by: FAMILY MEDICINE

## 2023-04-04 PROCEDURE — 11042 DEBRIDEMENT: ICD-10-PCS | Mod: HCNC,,, | Performed by: FAMILY MEDICINE

## 2023-04-04 PROCEDURE — 11042 DBRDMT SUBQ TIS 1ST 20SQCM/<: CPT | Performed by: FAMILY MEDICINE

## 2023-04-04 PROCEDURE — 11042 DBRDMT SUBQ TIS 1ST 20SQCM/<: CPT | Mod: HCNC,,, | Performed by: FAMILY MEDICINE

## 2023-04-04 NOTE — PROGRESS NOTES
[facility]                Progress Note    Subjective:       Patient ID: Fermin Henson is a 63 y.o. male.    Chief Complaint: Wound Check    Walked to clinic wearing intact dry football and Darco shoe. Continues to have callus surrounding wound to plantar which is about same size and pale pink. Will try Cellerate Rx activated collogen to wound. Dorsal foot has linear redness and will leave open this week to avoid irritation size is about the same . Refused printed AVS.    MD note:  patient presenting today for follow up of chronic wound to right foot.  He has been trying to offload, but does continue to walk more than he should.  No odor from wound and no fevers, chills, or sweats.  There has been no new wounds that he is aware of at this time.     Wound Check    Review of Systems   Constitutional: Negative.    HENT: Negative.     Eyes: Negative.    Cardiovascular: Negative.    Gastrointestinal: Negative.    Skin:  Positive for wound.   Neurological:  Positive for numbness.       Objective:        Physical Exam  Constitutional:       Appearance: Normal appearance.   HENT:      Head: Normocephalic and atraumatic.      Right Ear: External ear normal.      Left Ear: External ear normal.      Mouth/Throat:      Mouth: Mucous membranes are moist.      Pharynx: Oropharynx is clear.   Eyes:      Extraocular Movements: Extraocular movements intact.      Conjunctiva/sclera: Conjunctivae normal.      Pupils: Pupils are equal, round, and reactive to light.   Cardiovascular:      Rate and Rhythm: Normal rate and regular rhythm.   Abdominal:      General: There is no distension.      Palpations: Abdomen is soft.   Skin:     General: Skin is warm.      Comments: +right dorsal wound slightly worsened, but still very superficial  +right plantar wound stable, with excess callous and moderate slough   Neurological:      Mental Status: He is alert.       Vitals:    04/04/23 1340   BP:  "134/67   Pulse: 101   Temp: 97.2 °F (36.2 °C)     Debridement    Date/Time: 4/4/2023 12:59 PM  Performed by: See Jean-Baptiste MD  Authorized by: See Jean-Baptiste MD     Time out: Immediately prior to procedure a "time out" was called to verify the correct patient, procedure, equipment, support staff and site/side marked as required.    Consent Done?:  Yes (Written)  Local anesthesia used?: No      Wound Details:    Location:  Right foot    Location:  Right Plantar    Type of Debridement:  Excisional       Length (cm):  0.4       Area (sq cm):  0.08       Width (cm):  0.2       Percent Debrided (%):  100       Depth (cm):  0       Total Area Debrided (sq cm):  0.08    Depth of debridement:  Subcutaneous tissue    Tissue debrided:  Dermis, Epidermis and Subcutaneous    Devitalized tissue debrided:  Biofilm, Callus, Fibrin and Slough    Instruments:  Curette  Bleeding:  Minimal  Hemostasis Achieved: Yes  Method Used:  Pressure  Patient tolerance:  Patient tolerated the procedure well with no immediate complications    Assessment:           ICD-10-CM ICD-9-CM   1. Type 2 diabetes with skin ulcer of foot  E11.621 250.80    L97.509 707.15            Altered Skin Integrity 12/06/22 1003 Right dorsal Foot (Active)   12/06/22 1003   Altered Skin Integrity Present on Admission - Did Patient arrive to the hospital with altered skin?: yes   Side: Right   Orientation: dorsal   Location: Foot   Wound Number:    Is this injury device related?:    Primary Wound Type:    Description of Altered Skin Integrity:    Ankle-Brachial Index:    Pulses:    Removal Indication and Assessment:    Wound Outcome:    (Retired) Wound Length (cm):    (Retired) Wound Width (cm):    (Retired) Depth (cm):    Wound Description (Comments):    Removal Indications:    Wound Image   04/04/23 1321   Description of Altered Skin Integrity Partial thickness tissue loss. Shallow open ulcer with a red or pink wound bed, without slough. Intact or Open/Ruptured " Serum-filled blister. 04/04/23 1321   Dressing Appearance Dry;Intact 04/04/23 1321   Drainage Amount None 04/04/23 1321   Appearance Red 04/04/23 1321   Tissue loss description Partial thickness 04/04/23 1321   Red (%), Wound Tissue Color 100 % 04/04/23 1321   Periwound Area Scar tissue 04/04/23 1321   Wound Edges Defined 04/04/23 1321   Wound Length (cm) 0.4 cm 04/04/23 1321   Wound Width (cm) 0.2 cm 04/04/23 1321   Wound Depth (cm) 0.1 cm 04/04/23 1321   Wound Volume (cm^3) 0.008 cm^3 04/04/23 1321   Wound Surface Area (cm^2) 0.08 cm^2 04/04/23 1321   Care Cleansed with:;Antimicrobial agent;Sterile normal saline 04/04/23 1321   Dressing Changed 04/04/23 1321   Off Loading Football dressing;Off loading shoe 04/04/23 1321   Dressing Change Due 04/11/23 04/04/23 1321            Wound 04/12/22 1426 Diabetic Ulcer Right plantar Foot (Active)   04/12/22 1426    Pre-existing: Yes   Primary Wound Type: Diabetic ulc   Side: Right   Orientation: plantar   Location: Foot   Wound Number:    Ankle-Brachial Index:    Pulses:    Removal Indication and Assessment:    Wound Outcome: Healed   (Retired) Wound Type:    (Retired) Wound Length (cm):    (Retired) Wound Width (cm):    (Retired) Depth (cm):    Wound Description (Comments):    Removal Indications:    Wound Image   04/04/23 1321   Wound WDL ex 04/04/23 1321   Dressing Appearance Dry;Intact 04/04/23 1321   Drainage Amount None 04/04/23 1321   Appearance Pink 04/04/23 1321   Tissue loss description Full thickness 04/04/23 1321   Red (%), Wound Tissue Color 100 % 04/04/23 1321   Periwound Area Other (see comments) 04/04/23 1321   Wound Edges Callused 04/04/23 1321   Wound Length (cm) 0.5 cm 04/04/23 1321   Wound Width (cm) 0.3 cm 04/04/23 1321   Wound Depth (cm) 0.3 cm 04/04/23 1321   Wound Volume (cm^3) 0.045 cm^3 04/04/23 1321   Wound Surface Area (cm^2) 0.15 cm^2 04/04/23 1321   Care Cleansed with:;Antimicrobial agent;Sterile normal saline 04/04/23 1321   Dressing Changed  04/04/23 1321   Off Loading Football dressing;Off loading shoe 04/04/23 1321   Dressing Change Due 04/11/23 04/04/23 1321           Plan:              Tissue pathology and/or culture taken     [] Yes      [] No  Sharp debridement performed                   [x] Yes       [] No  Labs ordered     [] Yes       [x] No  Imaging ordered    [] Yes      [x] No    Orders Placed This Encounter   Procedures    Debridement     This order was created via procedure documentation     Standing Status:   Standing     Number of Occurrences:   1    Change dressing     Cellerate Rx activated collagen  Long Mextra to cover both wounds  Three cast padding football with 2 foams  Coban to secure and Darco        Follow up in about 1 week (around 4/11/2023).     See Jean-Baptiste MD

## 2023-04-11 ENCOUNTER — HOSPITAL ENCOUNTER (OUTPATIENT)
Dept: WOUND CARE | Facility: HOSPITAL | Age: 63
Discharge: HOME | End: 2023-04-11
Attending: FAMILY MEDICINE
Payer: MEDICARE

## 2023-04-11 VITALS
SYSTOLIC BLOOD PRESSURE: 127 MMHG | RESPIRATION RATE: 18 BRPM | DIASTOLIC BLOOD PRESSURE: 80 MMHG | HEART RATE: 105 BPM | TEMPERATURE: 98 F

## 2023-04-11 DIAGNOSIS — E11.621 DIABETIC ULCER OF RIGHT MIDFOOT ASSOCIATED WITH TYPE 2 DIABETES MELLITUS, WITH FAT LAYER EXPOSED: ICD-10-CM

## 2023-04-11 DIAGNOSIS — E11.621 TYPE 2 DIABETES WITH SKIN ULCER OF FOOT: Primary | ICD-10-CM

## 2023-04-11 DIAGNOSIS — L97.509 TYPE 2 DIABETES WITH SKIN ULCER OF FOOT: Primary | ICD-10-CM

## 2023-04-11 DIAGNOSIS — L97.412 DIABETIC ULCER OF RIGHT MIDFOOT ASSOCIATED WITH TYPE 2 DIABETES MELLITUS, WITH FAT LAYER EXPOSED: ICD-10-CM

## 2023-04-11 PROCEDURE — 99213 OFFICE O/P EST LOW 20 MIN: CPT | Mod: HCNC | Performed by: FAMILY MEDICINE

## 2023-04-11 PROCEDURE — 99214 PR OFFICE/OUTPT VISIT, EST, LEVL IV, 30-39 MIN: ICD-10-PCS | Mod: HCNC,25,, | Performed by: FAMILY MEDICINE

## 2023-04-11 PROCEDURE — 11042 DBRDMT SUBQ TIS 1ST 20SQCM/<: CPT

## 2023-04-11 PROCEDURE — 99214 OFFICE O/P EST MOD 30 MIN: CPT | Mod: HCNC,25,, | Performed by: FAMILY MEDICINE

## 2023-04-11 PROCEDURE — 11042 DEBRIDEMENT: ICD-10-PCS | Mod: HCNC,,, | Performed by: FAMILY MEDICINE

## 2023-04-11 PROCEDURE — 11042 DBRDMT SUBQ TIS 1ST 20SQCM/<: CPT | Mod: HCNC,,, | Performed by: FAMILY MEDICINE

## 2023-04-11 NOTE — PROGRESS NOTES
[facility]                Progress Note    Subjective:       Patient ID: Fermin Henson is a 63 y.o. male.    Chief Complaint: Wound Care    Pt arrived to [facility] ambulating without any issues. Pt denies any fever, chills, or flu-like symptoms; denies pain/discomfort. Pt reports not having any issues with drsg since last visit. No change to drsg. Pt will return to [facility] in 1 wk.      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  He is doing better with staying off the foot, but continues to walk a good amount. No fevers, chills, or sweats.  He has not gotten dressings wet.  No new wounds that he is aware of at this time.       Review of Systems   Constitutional: Negative.    HENT: Negative.     Eyes: Negative.    Respiratory: Negative.     Cardiovascular: Negative.    Gastrointestinal: Negative.    Skin:  Positive for wound.   Psychiatric/Behavioral: Negative.         Objective:        Physical Exam  Constitutional:       Appearance: Normal appearance.   HENT:      Head: Normocephalic and atraumatic.      Right Ear: External ear normal.      Left Ear: External ear normal.      Mouth/Throat:      Mouth: Mucous membranes are moist.      Pharynx: Oropharynx is clear.   Eyes:      Extraocular Movements: Extraocular movements intact.      Conjunctiva/sclera: Conjunctivae normal.      Pupils: Pupils are equal, round, and reactive to light.   Cardiovascular:      Rate and Rhythm: Normal rate and regular rhythm.   Abdominal:      General: There is no distension.      Palpations: Abdomen is soft.   Skin:     General: Skin is warm.      Comments: +positive plantar DFU with excess callous and some slough   Neurological:      Mental Status: He is alert.       Vitals:    04/11/23 1028   BP: 127/80   Pulse: 105   Resp: 18   Temp: 97.9 °F (36.6 °C)     Debridement    Date/Time: 4/11/2023 9:33 AM  Performed by: See Jean-Baptiste MD  Authorized by: See Jean-Baptiste,  "MD     Time out: Immediately prior to procedure a "time out" was called to verify the correct patient, procedure, equipment, support staff and site/side marked as required.    Consent Done?:  Yes (Written)  Local anesthesia used?: No      Wound Details:    Location:  Right foot    Location:  Right Plantar    Type of Debridement:  Excisional       Length (cm):  0.7       Area (sq cm):  0.28       Width (cm):  0.4       Percent Debrided (%):  100       Depth (cm):  0.5       Total Area Debrided (sq cm):  0.28    Depth of debridement:  Subcutaneous tissue    Tissue debrided:  Dermis, Epidermis and Subcutaneous    Devitalized tissue debrided:  Fibrin, Slough and Callus    Instruments:  Curette  Bleeding:  Minimal  Hemostasis Achieved: Yes  Method Used:  Pressure  Patient tolerance:  Patient tolerated the procedure well with no immediate complications    Assessment:           ICD-10-CM ICD-9-CM   1. Type 2 diabetes with skin ulcer of foot  E11.621 250.80    L97.509 707.15   2. Diabetic ulcer of right midfoot associated with type 2 diabetes mellitus, with fat layer exposed  E11.621 250.80    L97.412 707.14            Altered Skin Integrity 12/06/22 1003 Right dorsal;distal Foot (Active)   12/06/22 1003   Altered Skin Integrity Present on Admission - Did Patient arrive to the hospital with altered skin?: yes   Side: Right   Orientation: dorsal;distal   Location: Foot   Wound Number:    Is this injury device related?:    Primary Wound Type:    Description of Altered Skin Integrity:    Ankle-Brachial Index:    Pulses:    Removal Indication and Assessment:    Wound Outcome:    (Retired) Wound Length (cm):    (Retired) Wound Width (cm):    (Retired) Depth (cm):    Wound Description (Comments):    Removal Indications:    Wound Image   04/11/23 1035   Description of Altered Skin Integrity Partial thickness tissue loss. Shallow open ulcer with a red or pink wound bed, without slough. Intact or Open/Ruptured Serum-filled blister. " 04/11/23 1035   Dressing Appearance Dried drainage 04/11/23 1035   Drainage Amount Small 04/11/23 1035   Drainage Characteristics/Odor Serosanguineous 04/11/23 1035   Appearance Pink;Red 04/11/23 1035   Tissue loss description Partial thickness 04/11/23 1035   Black (%), Wound Tissue Color 0 % 04/11/23 1035   Red (%), Wound Tissue Color 100 % 04/11/23 1035   Yellow (%), Wound Tissue Color 0 % 04/11/23 1035   Periwound Area Intact;Redness 04/11/23 1035   Wound Edges Defined 04/11/23 1035   Wound Length (cm) 3 cm 04/11/23 1035   Wound Width (cm) 1 cm 04/11/23 1035   Wound Depth (cm) 0.1 cm 04/11/23 1035   Wound Volume (cm^3) 0.3 cm^3 04/11/23 1035   Wound Surface Area (cm^2) 3 cm^2 04/11/23 1035   Tunneling (depth (cm)/location) 0 04/11/23 1035   Undermining (depth (cm)/location) 0 04/11/23 1035   Care Cleansed with:;Sterile normal saline 04/11/23 1035   Dressing Applied 04/11/23 1035   Off Loading Football dressing;Off loading shoe 04/11/23 1035            Altered Skin Integrity 04/11/23 1034 Right dorsal;proximal Foot (Active)   04/11/23 1034   Altered Skin Integrity Present on Admission - Did Patient arrive to the hospital with altered skin?: yes   Side: Right   Orientation: dorsal;proximal   Location: Foot   Wound Number:    Is this injury device related?: No   Primary Wound Type:    Description of Altered Skin Integrity:    Ankle-Brachial Index:    Pulses:    Removal Indication and Assessment:    Wound Outcome:    (Retired) Wound Length (cm):    (Retired) Wound Width (cm):    (Retired) Depth (cm):    Wound Description (Comments):    Removal Indications:    Wound Image   04/11/23 1035   Description of Altered Skin Integrity Partial thickness tissue loss. Shallow open ulcer with a red or pink wound bed, without slough. Intact or Open/Ruptured Serum-filled blister. 04/11/23 1035   Dressing Appearance Dried drainage 04/11/23 1035   Drainage Amount Small 04/11/23 1035   Drainage Characteristics/Odor Serosanguineous  04/11/23 1035   Appearance Pink;Red 04/11/23 1035   Tissue loss description Partial thickness 04/11/23 1035   Black (%), Wound Tissue Color 0 % 04/11/23 1035   Red (%), Wound Tissue Color 100 % 04/11/23 1035   Yellow (%), Wound Tissue Color 0 % 04/11/23 1035   Periwound Area Intact;Redness 04/11/23 1035   Wound Edges Defined 04/11/23 1035   Wound Length (cm) 2.5 cm 04/11/23 1035   Wound Width (cm) 1 cm 04/11/23 1035   Wound Depth (cm) 0.1 cm 04/11/23 1035   Wound Volume (cm^3) 0.25 cm^3 04/11/23 1035   Wound Surface Area (cm^2) 2.5 cm^2 04/11/23 1035   Tunneling (depth (cm)/location) 0 04/11/23 1035   Undermining (depth (cm)/location) 0 04/11/23 1035   Care Cleansed with:;Sterile normal saline 04/11/23 1035   Dressing Applied 04/11/23 1035   Off Loading Football dressing;Off loading shoe 04/11/23 1035            Wound 04/12/22 1426 Diabetic Ulcer Right plantar Foot (Active)   04/12/22 1426    Pre-existing: Yes   Primary Wound Type: Diabetic ulc   Side: Right   Orientation: plantar   Location: Foot   Wound Number:    Ankle-Brachial Index:    Pulses:    Removal Indication and Assessment:    Wound Outcome: Healed   (Retired) Wound Type:    (Retired) Wound Length (cm):    (Retired) Wound Width (cm):    (Retired) Depth (cm):    Wound Description (Comments):    Removal Indications:    Wound Image   04/11/23 1035   Wound WDL ex 04/11/23 1035   Dressing Appearance Moist drainage 04/11/23 1035   Drainage Amount Small 04/11/23 1035   Drainage Characteristics/Odor Serosanguineous 04/11/23 1035   Appearance Red 04/11/23 1035   Tissue loss description Full thickness 04/11/23 1035   Black (%), Wound Tissue Color 0 % 04/11/23 1035   Red (%), Wound Tissue Color 100 % 04/11/23 1035   Yellow (%), Wound Tissue Color 0 % 04/11/23 1035   Periwound Area Intact 04/11/23 1035   Wound Edges Callused 04/11/23 1035   Wound Length (cm) 0.7 cm 04/11/23 1035   Wound Width (cm) 0.4 cm 04/11/23 1035   Wound Depth (cm) 0.5 cm 04/11/23 1035   Wound  Volume (cm^3) 0.14 cm^3 04/11/23 1035   Wound Surface Area (cm^2) 0.28 cm^2 04/11/23 1035   Tunneling (depth (cm)/location) 0 04/11/23 1035   Undermining (depth (cm)/location) 0.3cm/ 5-6 o'clock 04/11/23 1035   Care Cleansed with:;Sterile normal saline 04/11/23 1035   Dressing Applied 04/11/23 1035   Periwound Care Moisture barrier applied 04/11/23 1035   Off Loading Football dressing;Off loading shoe 04/11/23 1035           Plan:              Tissue pathology and/or culture taken     [] Yes      [x] No  Sharp debridement performed                   [] Yes       [x] No  Labs ordered     [] Yes       [x] No  Imaging ordered    [] Yes      [x] No    Orders Placed This Encounter   Procedures    Debridement     This order was created via procedure documentation     Standing Status:   Standing     Number of Occurrences:   1    Change dressing     Clean wound with NS. Cavilon to periwounds. Cellerate Rx activated collagen (placed by MD). Adaptic touch to dorsal foot wounds. Long Mextra to cover both wounds (plantar & dorsal). Foam x2. Football drsg (cast padding x3, coban). Darco. Pt to return to [facility] in 1 wk.        Follow up in about 1 week (around 4/18/2023) for Wound Care.     See Jean-Baptiste MD

## 2023-04-18 ENCOUNTER — HOSPITAL ENCOUNTER (OUTPATIENT)
Dept: WOUND CARE | Facility: HOSPITAL | Age: 63
Discharge: HOME | End: 2023-04-18
Attending: FAMILY MEDICINE
Payer: MEDICARE

## 2023-04-18 VITALS — DIASTOLIC BLOOD PRESSURE: 69 MMHG | SYSTOLIC BLOOD PRESSURE: 128 MMHG | HEART RATE: 105 BPM | TEMPERATURE: 98 F

## 2023-04-18 DIAGNOSIS — L97.509 TYPE 2 DIABETES WITH SKIN ULCER OF FOOT: Primary | ICD-10-CM

## 2023-04-18 DIAGNOSIS — L97.412 DIABETIC ULCER OF RIGHT MIDFOOT ASSOCIATED WITH TYPE 2 DIABETES MELLITUS, WITH FAT LAYER EXPOSED: ICD-10-CM

## 2023-04-18 DIAGNOSIS — E11.621 TYPE 2 DIABETES WITH SKIN ULCER OF FOOT: Primary | ICD-10-CM

## 2023-04-18 DIAGNOSIS — E11.621 DIABETIC ULCER OF RIGHT MIDFOOT ASSOCIATED WITH TYPE 2 DIABETES MELLITUS, WITH FAT LAYER EXPOSED: ICD-10-CM

## 2023-04-18 PROCEDURE — 11042 DBRDMT SUBQ TIS 1ST 20SQCM/<: CPT | Mod: HCNC | Performed by: FAMILY MEDICINE

## 2023-04-18 PROCEDURE — 99214 OFFICE O/P EST MOD 30 MIN: CPT | Mod: 25,HCNC,, | Performed by: FAMILY MEDICINE

## 2023-04-18 PROCEDURE — 11042 DEBRIDEMENT: ICD-10-PCS | Mod: HCNC,,, | Performed by: FAMILY MEDICINE

## 2023-04-18 PROCEDURE — 99214 PR OFFICE/OUTPT VISIT, EST, LEVL IV, 30-39 MIN: ICD-10-PCS | Mod: 25,HCNC,, | Performed by: FAMILY MEDICINE

## 2023-04-18 PROCEDURE — 11042 DBRDMT SUBQ TIS 1ST 20SQCM/<: CPT | Mod: HCNC,,, | Performed by: FAMILY MEDICINE

## 2023-04-18 NOTE — PROGRESS NOTES
[facility]                Progress Note    Subjective:       Patient ID: Fermin Henson is a 63 y.o. male.    Chief Complaint: Wound Check    Follow up wound care visit. Patient ambulated to exam room unaided, with prescribed Darco shoe w/ PegAssist on Right Foot. No c/o pain at present. Dressing intact with a small amount of serosanguineous, non-malodor drainage from Plantar aspect. Right Dorsal Distal and Proximal Foot wounds appear to have thin layer of epithelization covering. Right Plantar Foot wound is measuring smaller in (0.2 cm) length, (0.2 cm) width and (0.2 cm) depth.     Wound care done as per order. Return to clinic in 1 week. Patient declined AVS.     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  No pain as he is insensate.  He states he did try to offload more this past week.  No fevers, chills, or sweats.  He kept dressings in place, clean, and dry.     Review of Systems   Constitutional: Negative.    HENT: Negative.     Eyes: Negative.    Respiratory: Negative.     Cardiovascular: Negative.    Gastrointestinal: Negative.    Skin:  Positive for wound.   Psychiatric/Behavioral: Negative.         Objective:        Physical Exam  Constitutional:       Appearance: Normal appearance.   HENT:      Head: Normocephalic and atraumatic.      Right Ear: External ear normal.      Left Ear: External ear normal.      Mouth/Throat:      Mouth: Mucous membranes are moist.      Pharynx: Oropharynx is clear.   Eyes:      Extraocular Movements: Extraocular movements intact.      Conjunctiva/sclera: Conjunctivae normal.      Pupils: Pupils are equal, round, and reactive to light.   Cardiovascular:      Rate and Rhythm: Normal rate and regular rhythm.   Abdominal:      General: There is no distension.      Palpations: Abdomen is soft.   Skin:     General: Skin is warm.      Comments: +DFU with callus and slough to right plantar; no surrounding  "erythema.  No purulent drainage on exam   Neurological:      Mental Status: He is alert and oriented to person, place, and time.      Sensory: Sensory deficit present.       Vitals:    04/18/23 1050   BP: 128/69   Pulse: 105   Temp: 97.5 °F (36.4 °C)     Debridement    Date/Time: 4/18/2023 10:17 AM  Performed by: See Jean-Baptiste MD  Authorized by: See Jean-Baptiste MD     Time out: Immediately prior to procedure a "time out" was called to verify the correct patient, procedure, equipment, support staff and site/side marked as required.    Consent Done?:  Yes (Written)  Local anesthesia used?: No      Wound Details:    Location:  Right foot    Location:  Right Plantar    Type of Debridement:  Excisional       Length (cm):  0.5       Area (sq cm):  0.1       Width (cm):  0.2       Percent Debrided (%):  100       Depth (cm):  0.3       Total Area Debrided (sq cm):  0.1    Depth of debridement:  Subcutaneous tissue    Tissue debrided:  Dermis, Epidermis and Subcutaneous    Devitalized tissue debrided:  Biofilm, Callus, Fibrin and Slough    Instruments:  Curette  Bleeding:  Minimal  Hemostasis Achieved: Yes  Method Used:  Pressure  Patient tolerance:  Patient tolerated the procedure well with no immediate complications    Assessment:           ICD-10-CM ICD-9-CM   1. Type 2 diabetes with skin ulcer of foot  E11.621 250.80    L97.509 707.15   2. Diabetic ulcer of right midfoot associated with type 2 diabetes mellitus, with fat layer exposed  E11.621 250.80    L97.412 707.14            Altered Skin Integrity 12/06/22 1003 Right dorsal;distal Foot (Active)   12/06/22 1003   Altered Skin Integrity Present on Admission - Did Patient arrive to the hospital with altered skin?: yes   Side: Right   Orientation: dorsal;distal   Location: Foot   Wound Number:    Is this injury device related?:    Primary Wound Type:    Description of Altered Skin Integrity:    Ankle-Brachial Index:    Pulses:    Removal Indication and Assessment:  "   Wound Outcome:    (Retired) Wound Length (cm):    (Retired) Wound Width (cm):    (Retired) Depth (cm):    Wound Description (Comments):    Removal Indications:    Wound Image   04/18/23 1127   Dressing Appearance Intact;Dry 04/18/23 1127   Drainage Amount None 04/18/23 1127   Appearance Epithelialization 04/18/23 1127   Tissue loss description Not applicable 04/18/23 1127   Periwound Area Dry;Intact 04/18/23 1127   Wound Edges Approximated 04/18/23 1127   Care Cleansed with:;Antimicrobial agent;Sterile normal saline 04/18/23 1127   Dressing Changed 04/18/23 1127   Off Loading Football dressing;Off loading shoe 04/18/23 1127   Dressing Change Due 04/25/23 04/18/23 1127            Altered Skin Integrity 04/11/23 1034 Right dorsal;proximal Foot (Active)   04/11/23 1034   Altered Skin Integrity Present on Admission - Did Patient arrive to the hospital with altered skin?: yes   Side: Right   Orientation: dorsal;proximal   Location: Foot   Wound Number:    Is this injury device related?: No   Primary Wound Type:    Description of Altered Skin Integrity:    Ankle-Brachial Index:    Pulses:    Removal Indication and Assessment:    Wound Outcome:    (Retired) Wound Length (cm):    (Retired) Wound Width (cm):    (Retired) Depth (cm):    Wound Description (Comments):    Removal Indications:    Wound Image   04/18/23 1127   Dressing Appearance Intact;Dried drainage 04/18/23 1127   Drainage Amount Scant 04/18/23 1127   Drainage Characteristics/Odor Serous 04/18/23 1127   Appearance Epithelialization 04/18/23 1127   Tissue loss description Not applicable 04/18/23 1127   Periwound Area Dry;Intact 04/18/23 1127   Wound Edges Approximated 04/18/23 1127   Wound Length (cm) 0 cm 04/18/23 1127   Wound Width (cm) 0 cm 04/18/23 1127   Wound Depth (cm) 0 cm 04/18/23 1127   Wound Volume (cm^3) 0 cm^3 04/18/23 1127   Wound Surface Area (cm^2) 0 cm^2 04/18/23 1127   Tunneling (depth (cm)/location) 0 04/18/23 1127   Undermining (depth  (cm)/location) 0 04/18/23 1127   Care Cleansed with:;Antimicrobial agent;Sterile normal saline 04/18/23 1127   Dressing Changed 04/18/23 1127   Off Loading Football dressing;Off loading shoe 04/18/23 1127   Dressing Change Due 04/25/23 04/18/23 1127            Wound 04/12/22 1426 Diabetic Ulcer Right plantar Foot (Active)   04/12/22 1426    Pre-existing: Yes   Primary Wound Type: Diabetic ulc   Side: Right   Orientation: plantar   Location: Foot   Wound Number:    Ankle-Brachial Index:    Pulses:    Removal Indication and Assessment:    Wound Outcome: Healed   (Retired) Wound Type:    (Retired) Wound Length (cm):    (Retired) Wound Width (cm):    (Retired) Depth (cm):    Wound Description (Comments):    Removal Indications:    Wound Image    04/18/23 1127   Wound WDL ex 04/18/23 1127   Dressing Appearance Intact;Moist drainage 04/18/23 1127   Drainage Amount Small 04/18/23 1127   Drainage Characteristics/Odor Serosanguineous;No odor 04/18/23 1127   Appearance Pink;Moist 04/18/23 1127   Tissue loss description Partial thickness 04/18/23 1127   Black (%), Wound Tissue Color 0 % 04/18/23 1127   Red (%), Wound Tissue Color 100 % 04/18/23 1127   Yellow (%), Wound Tissue Color 0 % 04/18/23 1127   Periwound Area Dry 04/18/23 1127   Wound Edges Callused;Defined;Open 04/18/23 1127   Wound Length (cm) 0.5 cm 04/18/23 1127   Wound Width (cm) 0.2 cm 04/18/23 1127   Wound Depth (cm) 0.3 cm 04/18/23 1127   Wound Volume (cm^3) 0.03 cm^3 04/18/23 1127   Wound Surface Area (cm^2) 0.1 cm^2 04/18/23 1127   Tunneling (depth (cm)/location) 0 04/18/23 1127   Undermining (depth (cm)/location) 0 04/18/23 1127   Care Cleansed with:;Antimicrobial agent;Sterile normal saline 04/18/23 1127   Dressing Changed 04/18/23 1127   Periwound Care Absorptive dressing applied 04/18/23 1127   Off Loading Football dressing;Off loading shoe 04/18/23 1127   Dressing Change Due 04/25/23 04/18/23 1127           Plan:              Tissue pathology and/or  culture taken     [] Yes      [x] No  Sharp debridement performed                   [x] Yes       [] No  Labs ordered     [] Yes       [x] No  Imaging ordered    [] Yes      [x] No    Orders Placed This Encounter   Procedures    Change dressing     Clean wound with NS. Cavilon to periwounds. Cellerate Rx activated collagen (placed by MD). UrgoTul Ag to dorsal foot wounds. Long Mextra to cover both wounds (plantar & dorsal). Mepilex Foam (x 2 to plantar and x 1 to dorsal). Football drsg (cast padding x3, coban). Darco. Pt to return to [facility] in 1 wk.        Follow up in about 1 week (around 4/25/2023) for wound care.     See Jean-Baptiste MD

## 2023-04-25 ENCOUNTER — HOSPITAL ENCOUNTER (OUTPATIENT)
Dept: WOUND CARE | Facility: HOSPITAL | Age: 63
Discharge: HOME | End: 2023-04-25
Attending: FAMILY MEDICINE
Payer: MEDICARE

## 2023-04-25 VITALS — DIASTOLIC BLOOD PRESSURE: 52 MMHG | SYSTOLIC BLOOD PRESSURE: 147 MMHG

## 2023-04-25 DIAGNOSIS — L97.412 DIABETIC ULCER OF RIGHT MIDFOOT ASSOCIATED WITH TYPE 2 DIABETES MELLITUS, WITH FAT LAYER EXPOSED: Primary | ICD-10-CM

## 2023-04-25 DIAGNOSIS — E11.621 DIABETIC ULCER OF RIGHT MIDFOOT ASSOCIATED WITH TYPE 2 DIABETES MELLITUS, WITH FAT LAYER EXPOSED: Primary | ICD-10-CM

## 2023-04-25 PROCEDURE — 99213 OFFICE O/P EST LOW 20 MIN: CPT | Mod: HCNC | Performed by: FAMILY MEDICINE

## 2023-04-25 PROCEDURE — 11042 DEBRIDEMENT: ICD-10-PCS | Mod: HCNC,,, | Performed by: FAMILY MEDICINE

## 2023-04-25 PROCEDURE — 11042 DBRDMT SUBQ TIS 1ST 20SQCM/<: CPT | Mod: HCNC,,, | Performed by: FAMILY MEDICINE

## 2023-04-25 PROCEDURE — 99214 PR OFFICE/OUTPT VISIT, EST, LEVL IV, 30-39 MIN: ICD-10-PCS | Mod: HCNC,25,, | Performed by: FAMILY MEDICINE

## 2023-04-25 PROCEDURE — 11042 DBRDMT SUBQ TIS 1ST 20SQCM/<: CPT

## 2023-04-25 PROCEDURE — 99214 OFFICE O/P EST MOD 30 MIN: CPT | Mod: HCNC,25,, | Performed by: FAMILY MEDICINE

## 2023-04-25 NOTE — PROGRESS NOTES
[facility]                Progress Note    Subjective:       Patient ID: Fermin Henson is a 63 y.o. male.    Chief Complaint: Wound Care    Pt arrived to [facility] ambulating without any issues. Pt denies any fever, chills, or flu-like symptoms; denies pain/discomfort. Drsg removed & wound cleaned by Volodymyr MONSALVE; drsg left on side for RN to observe. Pt reports not having any issues with drsg  since last visit. Cellerate applied by MD. No change to drsg. Pt will return to [facility] in 1wk.     MD note:  patient following up today for DFU of right plantar.  There has been no odor from wound that he has noticed.  He reports keeping dressings in place, clean, and dry.  No fevers, chills, or sweats.  No new wounds that is aware of at this time.        Review of Systems   Constitutional: Negative.    HENT: Negative.     Eyes: Negative.    Respiratory: Negative.     Cardiovascular: Negative.    Gastrointestinal: Negative.    Skin:  Positive for wound.   Psychiatric/Behavioral: Negative.         Objective:        Physical Exam  Constitutional:       Appearance: Normal appearance.   HENT:      Head: Normocephalic and atraumatic.      Mouth/Throat:      Mouth: Mucous membranes are moist.      Pharynx: Oropharynx is clear.   Eyes:      Extraocular Movements: Extraocular movements intact.      Conjunctiva/sclera: Conjunctivae normal.      Pupils: Pupils are equal, round, and reactive to light.   Abdominal:      General: There is no distension.      Palpations: Abdomen is soft.   Skin:     General: Skin is warm.      Comments: +right plantar DFU with callous and slough   Neurological:      Mental Status: He is alert and oriented to person, place, and time. Mental status is at baseline.      Sensory: Sensory deficit present.       Vitals:    04/25/23 1002   BP: (!) 147/52     Debridement    Date/Time: 4/25/2023 9:43 AM  Performed by: See Jean-Baptiste MD  Authorized by: See Jean-Baptiste,  "MD     Time out: Immediately prior to procedure a "time out" was called to verify the correct patient, procedure, equipment, support staff and site/side marked as required.    Consent Done?:  Yes (Written)  Local anesthesia used?: No      Wound Details:    Location:  Right foot    Location:  Right Plantar    Type of Debridement:  Excisional       Length (cm):  0.5       Area (sq cm):  0.2       Width (cm):  0.4       Percent Debrided (%):  100       Depth (cm):  0.2       Total Area Debrided (sq cm):  0.2    Depth of debridement:  Subcutaneous tissue    Tissue debrided:  Dermis, Epidermis and Subcutaneous    Devitalized tissue debrided:  Biofilm, Callus, Fibrin and Slough    Instruments:  Curette  Bleeding:  Minimal  Hemostasis Achieved: Yes  Method Used:  Pressure  Patient tolerance:  Patient tolerated the procedure well with no immediate complications    Assessment:           ICD-10-CM ICD-9-CM   1. Diabetic ulcer of right midfoot associated with type 2 diabetes mellitus, with fat layer exposed  E11.621 250.80    L97.412 707.14            Altered Skin Integrity 12/06/22 1003 Right dorsal;distal Foot (Active)   12/06/22 1003   Altered Skin Integrity Present on Admission - Did Patient arrive to the hospital with altered skin?: yes   Side: Right   Orientation: dorsal;distal   Location: Foot   Wound Number:    Is this injury device related?:    Primary Wound Type:    Description of Altered Skin Integrity:    Ankle-Brachial Index:    Pulses:    Removal Indication and Assessment:    Wound Outcome:    (Retired) Wound Length (cm):    (Retired) Wound Width (cm):    (Retired) Depth (cm):    Wound Description (Comments):    Removal Indications:    Wound Image   04/25/23 1012   Description of Altered Skin Integrity Full thickness tissue loss. Subcutaneous fat may be visible but bone, tendon or muscle are not exposed 04/25/23 1012   Dressing Appearance Moist drainage 04/25/23 1012   Drainage Amount Small 04/25/23 1012 "   Drainage Characteristics/Odor Serosanguineous 04/25/23 1012   Appearance Red 04/25/23 1012   Tissue loss description Full thickness 04/25/23 1012   Black (%), Wound Tissue Color 0 % 04/25/23 1012   Red (%), Wound Tissue Color 100 % 04/25/23 1012   Yellow (%), Wound Tissue Color 0 % 04/25/23 1012   Periwound Area Intact 04/25/23 1012   Wound Edges Defined 04/25/23 1012   Wound Length (cm) 2.5 cm 04/25/23 1012   Wound Width (cm) 2.5 cm 04/25/23 1012   Wound Depth (cm) 0.1 cm 04/25/23 1012   Wound Volume (cm^3) 0.625 cm^3 04/25/23 1012   Wound Surface Area (cm^2) 6.25 cm^2 04/25/23 1012   Tunneling (depth (cm)/location) 0 04/25/23 1012   Undermining (depth (cm)/location) 0 04/25/23 1012   Care Cleansed with:;Sterile normal saline 04/25/23 1012   Dressing Applied 04/25/23 1012   Periwound Care Moisture barrier applied 04/25/23 1012   Off Loading Football dressing;Off loading shoe 04/25/23 1012            Altered Skin Integrity 04/11/23 1034 Right dorsal;proximal Foot (Active)   04/11/23 1034   Altered Skin Integrity Present on Admission - Did Patient arrive to the hospital with altered skin?: yes   Side: Right   Orientation: dorsal;proximal   Location: Foot   Wound Number:    Is this injury device related?: No   Primary Wound Type:    Description of Altered Skin Integrity:    Ankle-Brachial Index:    Pulses:    Removal Indication and Assessment:    Wound Outcome:    (Retired) Wound Length (cm):    (Retired) Wound Width (cm):    (Retired) Depth (cm):    Wound Description (Comments):    Removal Indications:    Wound Image   04/25/23 1012   Wound Length (cm) 0 cm 04/25/23 1012   Wound Width (cm) 0 cm 04/25/23 1012   Wound Depth (cm) 0 cm 04/25/23 1012   Wound Volume (cm^3) 0 cm^3 04/25/23 1012   Wound Surface Area (cm^2) 0 cm^2 04/25/23 1012            Wound 04/12/22 1426 Diabetic Ulcer Right plantar Foot (Active)   04/12/22 1426    Pre-existing: Yes   Primary Wound Type: Diabetic ulc   Side: Right   Orientation: plantar    Location: Foot   Wound Number:    Ankle-Brachial Index:    Pulses:    Removal Indication and Assessment:    Wound Outcome: Healed   (Retired) Wound Type:    (Retired) Wound Length (cm):    (Retired) Wound Width (cm):    (Retired) Depth (cm):    Wound Description (Comments):    Removal Indications:    Wound Image   04/25/23 1012   Wound WDL ex 04/25/23 1012   Dressing Appearance Dry;Intact;Clean 04/25/23 1012   Drainage Amount Small 04/25/23 1012   Drainage Characteristics/Odor Serosanguineous 04/25/23 1012   Appearance Red 04/25/23 1012   Tissue loss description Partial thickness 04/25/23 1012   Black (%), Wound Tissue Color 0 % 04/25/23 1012   Red (%), Wound Tissue Color 0 % 04/25/23 1012   Yellow (%), Wound Tissue Color 0 % 04/25/23 1012   Periwound Area Intact 04/25/23 1012   Wound Edges Defined 04/25/23 1012   Wound Length (cm) 0.5 cm 04/25/23 1012   Wound Width (cm) 0.4 cm 04/25/23 1012   Wound Depth (cm) 0.2 cm 04/25/23 1012   Wound Volume (cm^3) 0.04 cm^3 04/25/23 1012   Wound Surface Area (cm^2) 0.2 cm^2 04/25/23 1012   Tunneling (depth (cm)/location) 0 04/25/23 1012   Undermining (depth (cm)/location) 0 04/25/23 1012   Care Cleansed with:;Sterile normal saline 04/25/23 1012   Dressing Applied 04/25/23 1012   Periwound Care Moisture barrier applied 04/25/23 1012   Off Loading Football dressing;Off loading shoe 04/25/23 1012           Plan:              Tissue pathology and/or culture taken     [] Yes      [x] No  Sharp debridement performed                   [x] Yes       [] No  Labs ordered     [] Yes       [x] No  Imaging ordered    [] Yes      [x] No    Orders Placed This Encounter   Procedures    Change dressing     Clean wound with NS. Cavilon to periwounds. Cellerate Rx activated collagen (placed by MD). UrgoTul Ag to dorsal foot wounds. Long Mextra to cover both wounds (plantar & dorsal). Mepilex Foam (x 2 to plantar and x 1 to dorsal). Football drsg (cast padding x3, coban). Darco. Pt to return to  [facility] in 1 wk.        Follow up in about 1 week (around 5/2/2023) for Wound Care.     See Jean-Baptiste MD

## 2023-04-29 DIAGNOSIS — E11.9 TYPE 2 DIABETES MELLITUS WITHOUT COMPLICATION, WITHOUT LONG-TERM CURRENT USE OF INSULIN: ICD-10-CM

## 2023-04-29 NOTE — TELEPHONE ENCOUNTER
Care Due:                  Date            Visit Type   Department     Provider  --------------------------------------------------------------------------------                                EP [facility]                              PRIMARY      MED/ INTERNAL  Last Visit: 11-      CARE ([facility])   MED/ PEDS      See A  Page  Next Visit: 05-      WOUND CHECK  [facility]  Page                                                            Last  Test          Frequency    Reason                     Performed    Due Date  --------------------------------------------------------------------------------    HBA1C.......  6 months...  empagliflozin, metFORMIN,   12- 06-                             pioglitazone.............    [employer] Embedded Care Due Messages. Reference number: [id number].   4/29/2023 6:17:27 PM CDT

## 2023-05-01 RX ORDER — PIOGLITAZONEHYDROCHLORIDE 15 MG/1
TABLET ORAL
Qty: 90 TABLET | Refills: 0 | Status: SHIPPED | OUTPATIENT
Start: 2023-05-01 | End: 2023-07-30

## 2023-05-01 NOTE — TELEPHONE ENCOUNTER
Provider Staff:  Action required for this patient     Please see care gap opportunities below in Care Due Message.    Thanks!  [facility]     Appointments      Date Provider   Last Visit   11/28/2022 See Jean-Baptiste MD   Next Visit   5/2/2023 See Jean-Baptiste MD     Refill Decision Note   Fermin Henson  is requesting a refill authorization.  Brief Assessment and Rationale for Refill:  Approve     Medication Therapy Plan:         Comments:     Note composed:6:41 AM 05/01/2023

## 2023-05-02 ENCOUNTER — HOSPITAL ENCOUNTER (OUTPATIENT)
Dept: WOUND CARE | Facility: HOSPITAL | Age: 63
Discharge: HOME | End: 2023-05-02
Attending: FAMILY MEDICINE
Payer: MEDICARE

## 2023-05-02 VITALS — TEMPERATURE: 97 F | SYSTOLIC BLOOD PRESSURE: 131 MMHG | HEART RATE: 97 BPM | DIASTOLIC BLOOD PRESSURE: 71 MMHG

## 2023-05-02 DIAGNOSIS — E11.621 DIABETIC ULCER OF RIGHT MIDFOOT ASSOCIATED WITH TYPE 2 DIABETES MELLITUS, WITH FAT LAYER EXPOSED: Primary | ICD-10-CM

## 2023-05-02 DIAGNOSIS — E11.621 TYPE 2 DIABETES WITH SKIN ULCER OF FOOT: ICD-10-CM

## 2023-05-02 DIAGNOSIS — L97.509 TYPE 2 DIABETES WITH SKIN ULCER OF FOOT: ICD-10-CM

## 2023-05-02 DIAGNOSIS — L97.412 DIABETIC ULCER OF RIGHT MIDFOOT ASSOCIATED WITH TYPE 2 DIABETES MELLITUS, WITH FAT LAYER EXPOSED: Primary | ICD-10-CM

## 2023-05-02 PROCEDURE — 11042 DBRDMT SUBQ TIS 1ST 20SQCM/<: CPT | Performed by: FAMILY MEDICINE

## 2023-05-02 PROCEDURE — 99214 PR OFFICE/OUTPT VISIT, EST, LEVL IV, 30-39 MIN: ICD-10-PCS | Mod: 25,,, | Performed by: FAMILY MEDICINE

## 2023-05-02 PROCEDURE — 99214 OFFICE O/P EST MOD 30 MIN: CPT | Mod: 25,,, | Performed by: FAMILY MEDICINE

## 2023-05-02 PROCEDURE — 11042 DEBRIDEMENT: ICD-10-PCS | Mod: ,,, | Performed by: FAMILY MEDICINE

## 2023-05-02 PROCEDURE — 11042 DBRDMT SUBQ TIS 1ST 20SQCM/<: CPT | Mod: ,,, | Performed by: FAMILY MEDICINE

## 2023-05-02 NOTE — PROGRESS NOTES
[facility]                Progress Note    Subjective:       Patient ID: Fermin Henson is a 63 y.o. male.    Chief Complaint: Wound Check    Follow up wound care visit. Patient ambulated to exam room unaided, with prescribed Darco shoe w/ PegAssist on Right Foot. No c/o pain at present. Dressing intact with a moderate amount of serosanguineous, non-malodor drainage from Plantar aspect and a small amount of serosanguineous, non-malodor drainage from Dorsal aspect. Right Dorsal Distal and Proximal Foot wounds appear to have thin layer of epithelization covering. Right Plantar Foot wound is measuring smaller in (0.2 cm) length and (0.2 cm) width.     Wound care done as per order. Return to clinic in 1 week. Patient declined AVS.    MD note:  patient following up today for DFU to right plantar and possible keloid of right dorsal foot.  No fevers, chills, or sweats.  He states he did not get dressings wet.  He has been offloading more.  No odor from wound per patient    Review of Systems   Constitutional: Negative.    HENT: Negative.     Eyes: Negative.    Respiratory: Negative.     Cardiovascular: Negative.    Gastrointestinal: Negative.    Skin:  Positive for wound.   Neurological:  Positive for numbness.       Objective:        Physical Exam  Constitutional:       Appearance: Normal appearance.   HENT:      Head: Normocephalic and atraumatic.      Right Ear: External ear normal.      Left Ear: External ear normal.      Mouth/Throat:      Mouth: Mucous membranes are moist.      Pharynx: Oropharynx is clear.   Eyes:      Extraocular Movements: Extraocular movements intact.      Conjunctiva/sclera: Conjunctivae normal.      Pupils: Pupils are equal, round, and reactive to light.   Abdominal:      General: There is no distension.      Palpations: Abdomen is soft.   Skin:     General: Skin is warm.      Comments: + open DFU to right plantar with slough and surrounding  "callous; no maceration noted; no active drainage on examination of foot  +excoriations to right dorsal foot of possible keloid as patient reports he was told in the past after having it biopsied that it was keloid   Neurological:      Mental Status: He is alert.       Vitals:    05/02/23 1007   BP: 131/71   Pulse: 97   Temp: 97.2 °F (36.2 °C)     Debridement    Date/Time: 5/2/2023 10:02 AM  Performed by: See Jean-Baptiste MD  Authorized by: See Jean-Baptiste MD     Time out: Immediately prior to procedure a "time out" was called to verify the correct patient, procedure, equipment, support staff and site/side marked as required.    Consent Done?:  Yes (Written)  Local anesthesia used?: No      Wound Details:    Location:  Right foot    Location:  Right Plantar    Type of Debridement:  Excisional       Length (cm):  2       Area (sq cm):  4       Width (cm):  2       Percent Debrided (%):  100       Depth (cm):  0.1       Total Area Debrided (sq cm):  4    Depth of debridement:  Subcutaneous tissue    Tissue debrided:  Dermis, Epidermis and Subcutaneous    Devitalized tissue debrided:  Biofilm, Callus and Slough    Instruments:  Curette  Bleeding:  Minimal  Hemostasis Achieved: Yes  Method Used:  Pressure  Patient tolerance:  Patient tolerated the procedure well with no immediate complications      Assessment:           ICD-10-CM ICD-9-CM   1. Diabetic ulcer of right midfoot associated with type 2 diabetes mellitus, with fat layer exposed  E11.621 250.80    L97.412 707.14   2. Type 2 diabetes with skin ulcer of foot  E11.621 250.80    L97.509 707.15            Altered Skin Integrity 12/06/22 1003 Right dorsal;distal Foot (Active)   12/06/22 1003   Altered Skin Integrity Present on Admission - Did Patient arrive to the hospital with altered skin?: yes   Side: Right   Orientation: dorsal;distal   Location: Foot   Wound Number:    Is this injury device related?:    Primary Wound Type:    Description of Altered Skin " Integrity:    Ankle-Brachial Index:    Pulses:    Removal Indication and Assessment:    Wound Outcome:    (Retired) Wound Length (cm):    (Retired) Wound Width (cm):    (Retired) Depth (cm):    Wound Description (Comments):    Removal Indications:    Wound Image   05/02/23 1130   Description of Altered Skin Integrity Partial thickness tissue loss. Shallow open ulcer with a red or pink wound bed, without slough. Intact or Open/Ruptured Serum-filled blister. 05/02/23 1130   Dressing Appearance Intact;Moist drainage 05/02/23 1130   Drainage Amount Small 05/02/23 1130   Drainage Characteristics/Odor Serosanguineous;No odor 05/02/23 1130   Appearance Red;Moist 05/02/23 1130   Tissue loss description Partial thickness 05/02/23 1130   Black (%), Wound Tissue Color 0 % 05/02/23 1130   Red (%), Wound Tissue Color 100 % 05/02/23 1130   Yellow (%), Wound Tissue Color 0 % 05/02/23 1130   Periwound Area Satellite lesion 05/02/23 1130   Wound Edges Defined;Open 05/02/23 1130   Wound Length (cm) 2 cm 05/02/23 1130   Wound Width (cm) 2 cm 05/02/23 1130   Wound Depth (cm) 0.1 cm 05/02/23 1130   Wound Volume (cm^3) 0.4 cm^3 05/02/23 1130   Wound Surface Area (cm^2) 4 cm^2 05/02/23 1130   Tunneling (depth (cm)/location) 0 05/02/23 1130   Undermining (depth (cm)/location) 0 05/02/23 1130   Care Cleansed with:;Antimicrobial agent;Sterile normal saline 05/02/23 1130   Dressing Changed 05/02/23 1130   Periwound Care Skin barrier film applied 05/02/23 1130   Off Loading Football dressing;Off loading shoe 05/02/23 1130   Dressing Change Due 05/09/23 05/02/23 1130            Wound 04/12/22 1426 Diabetic Ulcer Right plantar Foot (Active)   04/12/22 1426    Pre-existing: Yes   Primary Wound Type: Diabetic ulc   Side: Right   Orientation: plantar   Location: Foot   Wound Number:    Ankle-Brachial Index:    Pulses:    Removal Indication and Assessment:    Wound Outcome: Healed   (Retired) Wound Type:    (Retired) Wound Length (cm):    (Retired)  "Wound Width (cm):    (Retired) Depth (cm):    Wound Description (Comments):    Removal Indications:    Wound Image   05/02/23 1130   Wound WDL ex 05/02/23 1130   Dressing Appearance Intact;Moist drainage 05/02/23 1130   Drainage Amount Moderate 05/02/23 1130   Drainage Characteristics/Odor Serosanguineous;No odor 05/02/23 1130   Appearance Pink;Moist 05/02/23 1130   Tissue loss description Partial thickness 05/02/23 1130   Black (%), Wound Tissue Color 0 % 05/02/23 1130   Red (%), Wound Tissue Color 100 % 05/02/23 1130   Yellow (%), Wound Tissue Color 0 % 05/02/23 1130   Periwound Area Dry 05/02/23 1130   Wound Edges Callused;Defined;Open 05/02/23 1130   Wound Length (cm) 0.3 cm 05/02/23 1130   Wound Width (cm) 0.2 cm 05/02/23 1130   Wound Depth (cm) 0.2 cm 05/02/23 1130   Wound Volume (cm^3) 0.012 cm^3 05/02/23 1130   Wound Surface Area (cm^2) 0.06 cm^2 05/02/23 1130   Tunneling (depth (cm)/location) 0 05/02/23 1130   Undermining (depth (cm)/location) 0 05/02/23 1130   Care Cleansed with:;Antimicrobial agent;Sterile normal saline 05/02/23 1130   Dressing Changed 05/02/23 1130   Periwound Care Absorptive dressing applied 05/02/23 1130   Off Loading Football dressing;Off loading shoe 05/02/23 1130   Dressing Change Due 05/09/23 05/02/23 1130           Plan:              Tissue pathology and/or culture taken     [] Yes      [x] No  Sharp debridement performed                   [x] Yes       [] No  Labs ordered     [] Yes       [x] No  Imaging ordered    [] Yes      [x] No    Orders Placed This Encounter   Procedures    Change dressing     Wound Dressing Orders    Dressing change frequency once a week  Remove old dressing  Cleanse or irrigate with: Normal Saline  Protect periwound with:  periwound: Cavilon   Primary dressing - adjust to fit: WOUND BASE: Cellerate Rx (applied by MD) to plantar wound  Secondary dressing: Mepilex Lite Foam applied to Dorsal Foot and Mextra (5" x 9", applied from dorsal to plantar) then " Mepilex Foam x 2 to Plantar for cushion  Off-load: Football (cast padding x 2 rolls), Coban then Darco w/PegAssist insert    Return to clinic in 1 week        Follow up in about 1 week (around 5/9/2023) for wound care.     See Jean-Baptiste MD

## 2023-05-09 ENCOUNTER — HOSPITAL ENCOUNTER (OUTPATIENT)
Dept: WOUND CARE | Facility: HOSPITAL | Age: 63
Discharge: HOME | End: 2023-05-09
Attending: FAMILY MEDICINE
Payer: MEDICARE

## 2023-05-09 DIAGNOSIS — E11.621 TYPE 2 DIABETES WITH SKIN ULCER OF FOOT: Primary | ICD-10-CM

## 2023-05-09 DIAGNOSIS — L97.509 TYPE 2 DIABETES WITH SKIN ULCER OF FOOT: Primary | ICD-10-CM

## 2023-05-09 PROCEDURE — 11042 DBRDMT SUBQ TIS 1ST 20SQCM/<: CPT | Performed by: FAMILY MEDICINE

## 2023-05-09 PROCEDURE — 11042 DBRDMT SUBQ TIS 1ST 20SQCM/<: CPT | Mod: ,,, | Performed by: FAMILY MEDICINE

## 2023-05-09 PROCEDURE — 11042 DEBRIDEMENT: ICD-10-PCS | Mod: ,,, | Performed by: FAMILY MEDICINE

## 2023-05-09 PROCEDURE — 99214 OFFICE O/P EST MOD 30 MIN: CPT | Mod: 25,,, | Performed by: FAMILY MEDICINE

## 2023-05-09 PROCEDURE — 99214 PR OFFICE/OUTPT VISIT, EST, LEVL IV, 30-39 MIN: ICD-10-PCS | Mod: 25,,, | Performed by: FAMILY MEDICINE

## 2023-05-09 NOTE — PROGRESS NOTES
[facility]                Progress Note    Subjective:       Patient ID: Fermin Henson is a 63 y.o. male.    Chief Complaint: No chief complaint on file.    Pt arrived to [facility] ambulating without any issues. Pt denies any fever, chills, or flu-like symptoms; denies pain/discomfort. Pt reports not having any issues with drsg since last visit. Drsg removed by MARTÍN Snyder MA and was left at side to view. Debridement completed by MD to Rt Foot plantar wound. Changing type of collagen to drsg. Pt will return to [facility] in 1 wk.     MD note:  patient following up for wounds to right foot.  No fevers, chills, or sweats.  He states he did not walk much this past week,b ut has excess callous around plantar wound.  No new wounds that he is aware of at this time.      Review of Systems   Constitutional: Negative.    HENT: Negative.     Eyes: Negative.    Respiratory: Negative.     Cardiovascular: Negative.    Gastrointestinal: Negative.    Genitourinary:  Negative for frequency.   Skin:  Positive for wound.   Neurological: Negative.        Objective:        Physical Exam  Constitutional:       Appearance: Normal appearance.   HENT:      Head: Normocephalic and atraumatic.      Right Ear: External ear normal.      Left Ear: External ear normal.      Mouth/Throat:      Mouth: Mucous membranes are moist.      Pharynx: Oropharynx is clear.   Eyes:      Extraocular Movements: Extraocular movements intact.      Conjunctiva/sclera: Conjunctivae normal.      Pupils: Pupils are equal, round, and reactive to light.   Cardiovascular:      Rate and Rhythm: Normal rate and regular rhythm.   Abdominal:      General: There is no distension.      Palpations: Abdomen is soft.   Skin:     General: Skin is warm.      Comments: +right dorsal and plant wounds; excess callous and slough to plantar wound   Neurological:      Mental Status: He is alert.       There were no vitals filed for this  "visit.    Debridement    Date/Time: 5/9/2023 9:41 AM  Performed by: See Jean-Baptiste MD  Authorized by: See Jean-Baptiste MD     Time out: Immediately prior to procedure a "time out" was called to verify the correct patient, procedure, equipment, support staff and site/side marked as required.    Consent Done?:  Yes (Written)  Local anesthesia used?: No      Wound Details:    Location:  Right foot    Location:  Right Plantar    Type of Debridement:  Excisional       Length (cm):  0.5       Area (sq cm):  0.15       Width (cm):  0.3       Percent Debrided (%):  100       Depth (cm):  0.4       Total Area Debrided (sq cm):  0.15    Depth of debridement:  Subcutaneous tissue    Tissue debrided:  Dermis, Epidermis and Subcutaneous    Devitalized tissue debrided:  Biofilm, Callus, Fibrin and Slough    Instruments:  Curette  Bleeding:  Minimal  Patient tolerance:  Patient tolerated the procedure well with no immediate complications    Assessment:           ICD-10-CM ICD-9-CM   1. Type 2 diabetes with skin ulcer of foot  E11.621 250.80    L97.509 707.15            Altered Skin Integrity 12/06/22 1003 Right dorsal;distal Foot (Active)   12/06/22 1003   Altered Skin Integrity Present on Admission - Did Patient arrive to the hospital with altered skin?: yes   Side: Right   Orientation: dorsal;distal   Location: Foot   Wound Number:    Is this injury device related?:    Primary Wound Type:    Description of Altered Skin Integrity:    Ankle-Brachial Index:    Pulses:    Removal Indication and Assessment:    Wound Outcome:    (Retired) Wound Length (cm):    (Retired) Wound Width (cm):    (Retired) Depth (cm):    Wound Description (Comments):    Removal Indications:    Wound Image   05/09/23 1044   Description of Altered Skin Integrity Partial thickness tissue loss. Shallow open ulcer with a red or pink wound bed, without slough. Intact or Open/Ruptured Serum-filled blister. 05/09/23 1044   Dressing Appearance Intact;Moist " drainage 05/09/23 1044   Drainage Amount Scant 05/09/23 1044   Drainage Characteristics/Odor Serosanguineous 05/09/23 1044   Appearance Red 05/09/23 1044   Tissue loss description Partial thickness 05/09/23 1044   Black (%), Wound Tissue Color 0 % 05/09/23 1044   Red (%), Wound Tissue Color 100 % 05/09/23 1044   Yellow (%), Wound Tissue Color 0 % 05/09/23 1044   Periwound Area Redness 05/09/23 1044   Wound Edges Defined 05/09/23 1044   Wound Length (cm) 3.5 cm 05/09/23 1044   Wound Width (cm) 4 cm 05/09/23 1044   Wound Depth (cm) 0.1 cm 05/09/23 1044   Wound Volume (cm^3) 1.4 cm^3 05/09/23 1044   Wound Surface Area (cm^2) 14 cm^2 05/09/23 1044   Tunneling (depth (cm)/location) 0 05/09/23 1044   Undermining (depth (cm)/location) 0 05/09/23 1044   Care Cleansed with:;Sterile normal saline 05/09/23 1044   Dressing Applied 05/09/23 1044   Periwound Care Moisturizer applied 05/09/23 1044   Off Loading Football dressing;Off loading shoe 05/09/23 1044            Wound 04/12/22 1426 Diabetic Ulcer Right plantar Foot (Active)   04/12/22 1426    Pre-existing: Yes   Primary Wound Type: Diabetic ulc   Side: Right   Orientation: plantar   Location: Foot   Wound Number:    Ankle-Brachial Index:    Pulses:    Removal Indication and Assessment:    Wound Outcome: Healed   (Retired) Wound Type:    (Retired) Wound Length (cm):    (Retired) Wound Width (cm):    (Retired) Depth (cm):    Wound Description (Comments):    Removal Indications:    Wound Image   05/09/23 1044   Wound WDL ex 05/09/23 1044   Dressing Appearance Moist drainage 05/09/23 1044   Drainage Amount Moderate 05/09/23 1044   Drainage Characteristics/Odor Serosanguineous 05/09/23 1044   Appearance Pink;Red 05/09/23 1044   Tissue loss description Full thickness 05/09/23 1044   Black (%), Wound Tissue Color 0 % 05/09/23 1044   Red (%), Wound Tissue Color 100 % 05/09/23 1044   Yellow (%), Wound Tissue Color 0 % 05/09/23 1044   Periwound Area Intact;Dry 05/09/23 1044   Wound  Edges Callused 05/09/23 1044   Wound Length (cm) 0.5 cm 05/09/23 1044   Wound Width (cm) 0.3 cm 05/09/23 1044   Wound Depth (cm) 0.4 cm 05/09/23 1044   Wound Volume (cm^3) 0.06 cm^3 05/09/23 1044   Wound Surface Area (cm^2) 0.15 cm^2 05/09/23 1044   Tunneling (depth (cm)/location) 0 05/09/23 1044   Undermining (depth (cm)/location) 0.2 cm/circumfrencial 05/09/23 1044   Care Cleansed with:;Sterile normal saline 05/09/23 1044   Dressing Applied 05/09/23 1044   Periwound Care Moisture barrier applied 05/09/23 1044   Off Loading Football dressing;Off loading shoe 05/09/23 1044           Plan:              Tissue pathology and/or culture taken     [] Yes      [x] No  Sharp debridement performed                   [x] Yes       [] No  Labs ordered     [] Yes       [x] No  Imaging ordered    [] Yes      [x] No    Orders Placed This Encounter   Procedures    Debridement     This order was created via procedure documentation     Standing Status:   Standing     Number of Occurrences:   1    Change dressing     Clean wound with NS. Cavilon to periwounds. Endoform to Rt Foot plantar wound. UrgoTul Ag to dorsal foot wounds. Long Mextra to cover both wounds (plantar & dorsal). Mepilex Foam (x 2 to plantar and x 1 to dorsal). Football drsg (cast padding x3, coban). Darco. Pt to return to [facility] in 1 wk.        Follow up in about 1 week (around 5/16/2023) for Wound Care.       See Jean-Baptiste MD

## 2023-05-15 DIAGNOSIS — E11.42 TYPE 2 DIABETES MELLITUS WITH DIABETIC POLYNEUROPATHY, WITHOUT LONG-TERM CURRENT USE OF INSULIN: ICD-10-CM

## 2023-05-15 RX ORDER — SIMVASTATIN 20 MG/1
TABLET, FILM COATED ORAL
Qty: 90 TABLET | Refills: 2 | Status: SHIPPED | OUTPATIENT
Start: 2023-05-15 | End: 2023-09-18 | Stop reason: SDUPTHER

## 2023-05-15 NOTE — TELEPHONE ENCOUNTER
Refill Decision Note   Fermin Henson  is requesting a refill authorization.  Brief Assessment and Rationale for Refill:  Approve     Medication Therapy Plan:       Medication Reconciliation Completed: No   Comments:     No Care Gaps recommended.     Note composed:8:26 AM 05/15/2023

## 2023-05-15 NOTE — TELEPHONE ENCOUNTER
No care due was identified.  [facility] Embedded Care Due Messages. Reference number: [id number].   5/15/2023 5:33:32 AM CDT

## 2023-05-16 ENCOUNTER — HOSPITAL ENCOUNTER (OUTPATIENT)
Dept: WOUND CARE | Facility: HOSPITAL | Age: 63
Discharge: HOME | End: 2023-05-16
Attending: FAMILY MEDICINE
Payer: MEDICARE

## 2023-05-16 VITALS — TEMPERATURE: 98 F | DIASTOLIC BLOOD PRESSURE: 67 MMHG | HEART RATE: 95 BPM | SYSTOLIC BLOOD PRESSURE: 142 MMHG

## 2023-05-16 DIAGNOSIS — E11.621 TYPE 2 DIABETES WITH SKIN ULCER OF FOOT: Primary | ICD-10-CM

## 2023-05-16 DIAGNOSIS — L97.509 TYPE 2 DIABETES WITH SKIN ULCER OF FOOT: Primary | ICD-10-CM

## 2023-05-16 DIAGNOSIS — L97.412 DIABETIC ULCER OF RIGHT MIDFOOT ASSOCIATED WITH TYPE 2 DIABETES MELLITUS, WITH FAT LAYER EXPOSED: ICD-10-CM

## 2023-05-16 DIAGNOSIS — E11.621 DIABETIC ULCER OF RIGHT MIDFOOT ASSOCIATED WITH TYPE 2 DIABETES MELLITUS, WITH FAT LAYER EXPOSED: ICD-10-CM

## 2023-05-16 PROCEDURE — 11042 DBRDMT SUBQ TIS 1ST 20SQCM/<: CPT | Performed by: FAMILY MEDICINE

## 2023-05-16 PROCEDURE — 99214 OFFICE O/P EST MOD 30 MIN: CPT | Mod: 25,,, | Performed by: FAMILY MEDICINE

## 2023-05-16 PROCEDURE — 11042 DEBRIDEMENT: ICD-10-PCS | Mod: ,,, | Performed by: FAMILY MEDICINE

## 2023-05-16 PROCEDURE — 99214 PR OFFICE/OUTPT VISIT, EST, LEVL IV, 30-39 MIN: ICD-10-PCS | Mod: 25,,, | Performed by: FAMILY MEDICINE

## 2023-05-16 PROCEDURE — 11042 DBRDMT SUBQ TIS 1ST 20SQCM/<: CPT | Mod: ,,, | Performed by: FAMILY MEDICINE

## 2023-05-16 NOTE — PROGRESS NOTES
[facility]                Progress Note    Subjective:       Patient ID: Fermin Henson is a 63 y.o. male.    Chief Complaint: Wound Check    Follow up wound care visit. Patient ambulated to exam room unaided, with prescribed Darco shoe w/ PegAssist on Right Foot. No c/o pain at present. Dressing intact with a moderate amount of serosanguineous, non-malodor drainage from Plantar aspect and no drainage to dorsal aspect.  Right Plantar Foot wound is measuring smaller in (0.2 cm) depth, but has a large blister beneath wound bed that increases (0.7 cm) length and (0.4 cm) width.     Wound care done as per order. Return to clinic in 1 week. Patient declined AVS.    MD note:  patient following up today for right DFU.  He kept dressings in place, clean, and dry.  He did excessive walking yesterday trying to repair fence at [place] and states that he was sweating a lot and did a lot of rotating on the foot.  He has no feeling in his feet, so no pain.        Review of Systems   Constitutional: Negative.    HENT: Negative.     Eyes: Negative.    Respiratory: Negative.     Cardiovascular: Negative.    Gastrointestinal: Negative.    Skin:  Positive for wound.   Psychiatric/Behavioral: Negative.         Objective:        Physical Exam  Constitutional:       Appearance: Normal appearance.   HENT:      Head: Normocephalic and atraumatic.      Mouth/Throat:      Mouth: Mucous membranes are moist.      Pharynx: Oropharynx is clear.   Eyes:      Extraocular Movements: Extraocular movements intact.      Conjunctiva/sclera: Conjunctivae normal.      Pupils: Pupils are equal, round, and reactive to light.   Cardiovascular:      Rate and Rhythm: Normal rate and regular rhythm.   Abdominal:      General: There is no distension.      Palpations: Abdomen is soft.   Skin:     General: Skin is warm.      Comments: Right plantar DFU with macerated skin and blister; excess slough  Right  "dorsal wound likely from sheering effect over what he states is a keloid   Neurological:      Mental Status: He is alert.       Vitals:    05/16/23 1100   BP: (!) 142/67   Pulse: 95   Temp: 97.7 °F (36.5 °C)     Debridement    Date/Time: 5/16/2023 9:44 AM  Performed by: See Jean-Baptiste MD  Authorized by: See Jean-Baptiste MD     Time out: Immediately prior to procedure a "time out" was called to verify the correct patient, procedure, equipment, support staff and site/side marked as required.    Consent Done?:  Yes (Written)  Local anesthesia used?: No      Wound Details:    Location:  Right foot    Location:  Right Plantar    Type of Debridement:  Excisional       Length (cm):  2       Area (sq cm):  5       Width (cm):  2.5       Percent Debrided (%):  100       Depth (cm):  0.1       Total Area Debrided (sq cm):  5    Depth of debridement:  Subcutaneous tissue    Tissue debrided:  Dermis, Epidermis and Subcutaneous    Devitalized tissue debrided:  Biofilm, Slough and Callus    Instruments:  Curette  Bleeding:  Minimal  Hemostasis Achieved: Yes  Method Used:  Pressure  Patient tolerance:  Patient tolerated the procedure well with no immediate complications    Assessment:           ICD-10-CM ICD-9-CM   1. Type 2 diabetes with skin ulcer of foot  E11.621 250.80    L97.509 707.15   2. Diabetic ulcer of right midfoot associated with type 2 diabetes mellitus, with fat layer exposed  E11.621 250.80    L97.412 707.14            Altered Skin Integrity 12/06/22 1003 Right dorsal;distal Foot (Active)   12/06/22 1003   Altered Skin Integrity Present on Admission - Did Patient arrive to the hospital with altered skin?: yes   Side: Right   Orientation: dorsal;distal   Location: Foot   Wound Number:    Is this injury device related?:    Primary Wound Type:    Description of Altered Skin Integrity:    Ankle-Brachial Index:    Pulses:    Removal Indication and Assessment:    Wound Outcome:    (Retired) Wound Length (cm):  "   (Retired) Wound Width (cm):    (Retired) Depth (cm):    Wound Description (Comments):    Removal Indications:    Wound Image   05/16/23 1402   Description of Altered Skin Integrity Partial thickness tissue loss. Shallow open ulcer with a red or pink wound bed, without slough. Intact or Open/Ruptured Serum-filled blister. 05/16/23 1402   Dressing Appearance Intact;Moist drainage 05/16/23 1402   Drainage Amount None 05/16/23 1402   Appearance Red 05/16/23 1402   Tissue loss description Partial thickness 05/16/23 1402   Black (%), Wound Tissue Color 0 % 05/16/23 1402   Red (%), Wound Tissue Color 100 % 05/16/23 1402   Yellow (%), Wound Tissue Color 0 % 05/16/23 1402   Periwound Area Scar tissue 05/16/23 1402   Wound Edges Open 05/16/23 1402   Wound Length (cm) 2 cm 05/16/23 1402   Wound Width (cm) 2.5 cm 05/16/23 1402   Wound Depth (cm) 0 cm 05/16/23 1402   Wound Volume (cm^3) 0 cm^3 05/16/23 1402   Wound Surface Area (cm^2) 5 cm^2 05/16/23 1402   Tunneling (depth (cm)/location) 0 05/16/23 1402   Undermining (depth (cm)/location) 0 05/16/23 1402   Care Cleansed with:;Antimicrobial agent;Sterile normal saline 05/16/23 1402   Dressing Changed 05/16/23 1402   Periwound Care Absorptive dressing applied 05/16/23 1402   Off Loading Football dressing;Off loading shoe 05/16/23 1402   Dressing Change Due 05/23/23 05/16/23 1402            Wound 04/12/22 1426 Diabetic Ulcer Right plantar Foot (Active)   04/12/22 1426    Pre-existing: Yes   Primary Wound Type: Diabetic ulc   Side: Right   Orientation: plantar   Location: Foot   Wound Number:    Ankle-Brachial Index:    Pulses:    Removal Indication and Assessment:    Wound Outcome: Healed   (Retired) Wound Type:    (Retired) Wound Length (cm):    (Retired) Wound Width (cm):    (Retired) Depth (cm):    Wound Description (Comments):    Removal Indications:    Wound Image     05/16/23 1402   Wound WDL ex 05/16/23 1402   Dressing Appearance Intact;Moist drainage 05/16/23 1402  "  Drainage Amount Moderate 05/16/23 1402   Drainage Characteristics/Odor Serosanguineous;No odor 05/16/23 1402   Appearance Red;Moist 05/16/23 1402   Tissue loss description Partial thickness 05/16/23 1402   Black (%), Wound Tissue Color 0 % 05/16/23 1402   Red (%), Wound Tissue Color 100 % 05/16/23 1402   Yellow (%), Wound Tissue Color 0 % 05/16/23 1402   Periwound Area Blistered 05/16/23 1402   Wound Edges Open 05/16/23 1402   Wound Length (cm) 1.2 cm 05/16/23 1402   Wound Width (cm) 0.7 cm 05/16/23 1402   Wound Depth (cm) 0.2 cm 05/16/23 1402   Wound Volume (cm^3) 0.168 cm^3 05/16/23 1402   Wound Surface Area (cm^2) 0.84 cm^2 05/16/23 1402   Tunneling (depth (cm)/location) 0 05/16/23 1402   Undermining (depth (cm)/location) 0 05/16/23 1402   Care Cleansed with:;Antimicrobial agent;Sterile normal saline 05/16/23 1402   Dressing Changed 05/16/23 1402   Periwound Care Absorptive dressing applied 05/16/23 1402   Off Loading Football dressing;Off loading shoe 05/16/23 1402   Dressing Change Due 05/23/23 05/16/23 1402           Plan:              Tissue pathology and/or culture taken     [] Yes      [x] No  Sharp debridement performed                   [x] Yes       [] No  Labs ordered     [] Yes       [x] No  Imaging ordered    [] Yes      [x] No    Orders Placed This Encounter   Procedures    Debridement     This order was created via procedure documentation     Standing Status:   Standing     Number of Occurrences:   1    Change dressing     Wound Dressing Orders    Dressing change frequency once a week  Remove old dressing  Cleanse or irrigate with: Normal Saline  Protect periwound with:  periwound: Cavilon   Primary dressing: WOUND BASE: Endoform to Right Plantar Foot, Optifoam Ag to Right Dorsal Foot  Secondary dressing: Plantar: Drawtex: size used: 2" x 2" (5 cm x 5 cm) then Mextra: size used: 5"x5" then Gigi Foam (Long x 2, laid perpendicular)  Off-load: Football (cast padding x 3 rolls) then Coban. Lou " shoe on the affected foot    Return to clinic in 1 week.        Follow up in about 1 week (around 5/23/2023) for wound care.     See Jean-Baptiste MD

## 2023-05-23 ENCOUNTER — HOSPITAL ENCOUNTER (OUTPATIENT)
Dept: WOUND CARE | Facility: HOSPITAL | Age: 63
Discharge: HOME | End: 2023-05-23
Attending: FAMILY MEDICINE
Payer: MEDICARE

## 2023-05-23 VITALS — DIASTOLIC BLOOD PRESSURE: 72 MMHG | HEART RATE: 98 BPM | SYSTOLIC BLOOD PRESSURE: 124 MMHG | TEMPERATURE: 98 F

## 2023-05-23 DIAGNOSIS — L97.412 DIABETIC ULCER OF RIGHT MIDFOOT ASSOCIATED WITH TYPE 2 DIABETES MELLITUS, WITH FAT LAYER EXPOSED: Primary | ICD-10-CM

## 2023-05-23 DIAGNOSIS — E11.621 DIABETIC ULCER OF RIGHT MIDFOOT ASSOCIATED WITH TYPE 2 DIABETES MELLITUS, WITH FAT LAYER EXPOSED: Primary | ICD-10-CM

## 2023-05-23 PROCEDURE — 99214 PR OFFICE/OUTPT VISIT, EST, LEVL IV, 30-39 MIN: ICD-10-PCS | Mod: 25,,, | Performed by: FAMILY MEDICINE

## 2023-05-23 PROCEDURE — 99214 OFFICE O/P EST MOD 30 MIN: CPT | Mod: 25,,, | Performed by: FAMILY MEDICINE

## 2023-05-23 PROCEDURE — 97597 DBRDMT OPN WND 1ST 20 CM/<: CPT | Mod: ,,, | Performed by: FAMILY MEDICINE

## 2023-05-23 PROCEDURE — 11055 PARING/CUTG B9 HYPRKER LES 1: CPT | Performed by: FAMILY MEDICINE

## 2023-05-23 PROCEDURE — 97597 DEBRIDEMENT: ICD-10-PCS | Mod: ,,, | Performed by: FAMILY MEDICINE

## 2023-05-23 NOTE — PROGRESS NOTES
[facility]                Progress Note    Subjective:       Patient ID: Fermin Henson is a 63 y.o. male.    Chief Complaint: Wound Check    Walked to clinic unaided. Dsg dry and intact. Dorsal foot healed although redness to whole dorsal foot with no skin breakdown, no heat or swelling. Plantar foot wound small with old blister surrounding and possible DTI to center. Was working in yard and twisting last week which caused blister that now has opened. Enc to rest leg more. Refused printed AVS.    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  He states that he has been staying off of the foot more and no more yard work since last week when the wound worsened.  No fevers, chills, or sweats.  He is insensate, so no pain in foot.  No new concerns at this time per patient    Wound Check    Review of Systems   Constitutional: Negative.    HENT: Negative.     Eyes: Negative.    Respiratory: Negative.     Cardiovascular: Negative.    Gastrointestinal: Negative.    Skin:  Positive for wound.   Neurological:  Positive for numbness.       Objective:        Physical Exam  Constitutional:       Appearance: Normal appearance.   HENT:      Head: Normocephalic and atraumatic.      Right Ear: External ear normal.      Left Ear: External ear normal.      Mouth/Throat:      Mouth: Mucous membranes are moist.      Pharynx: Oropharynx is clear.   Eyes:      Extraocular Movements: Extraocular movements intact.      Conjunctiva/sclera: Conjunctivae normal.      Pupils: Pupils are equal, round, and reactive to light.   Cardiovascular:      Rate and Rhythm: Normal rate and regular rhythm.   Abdominal:      General: There is no distension.      Palpations: Abdomen is soft.   Skin:     General: Skin is warm.      Comments: +right plantar DFU with blood blister that has opened and drained and excess macerated skin requiring removal   Neurological:      Mental Status: He  "is alert and oriented to person, place, and time. Mental status is at baseline.      Sensory: Sensory deficit present.       Vitals:    05/23/23 1001   BP: 124/72   Pulse: 98   Temp: 98.1 °F (36.7 °C)     Debridement    Date/Time: 5/23/2023 9:50 AM  Performed by: See Jean-Baptiste MD  Authorized by: See Jean-Baptiste MD     Time out: Immediately prior to procedure a "time out" was called to verify the correct patient, procedure, equipment, support staff and site/side marked as required.    Consent Done?:  Yes (Written)  Local anesthesia used?: No      Wound Details:    Location:  Right foot    Location:  Right Plantar    Type of Debridement:  Non-excisional       Length (cm):  2       Area (sq cm):  4       Width (cm):  2       Percent Debrided (%):  50       Depth (cm):  0.1       Total Area Debrided (sq cm):  2    Depth of debridement:  Epidermis/Dermis    Tissue debrided:  Dermis and Epidermis    Devitalized tissue debrided:  Biofilm and Callus    Instruments:  Scissors and Forceps  Bleeding:  None  Patient tolerance:  Patient tolerated the procedure well with no immediate complications    Assessment:           ICD-10-CM ICD-9-CM   1. Diabetic ulcer of right midfoot associated with type 2 diabetes mellitus, with fat layer exposed  E11.621 250.80    L97.412 707.14            Altered Skin Integrity 12/06/22 1003 Right dorsal;distal Foot (Active)   12/06/22 1003   Altered Skin Integrity Present on Admission - Did Patient arrive to the hospital with altered skin?: yes   Side: Right   Orientation: dorsal;distal   Location: Foot   Wound Number:    Is this injury device related?:    Primary Wound Type:    Description of Altered Skin Integrity:    Ankle-Brachial Index:    Pulses:    Removal Indication and Assessment:    Wound Outcome:    (Retired) Wound Length (cm):    (Retired) Wound Width (cm):    (Retired) Depth (cm):    Wound Description (Comments):    Removal Indications:    Wound Image   05/23/23 1043   Description " of Altered Skin Integrity Intact skin with non-blanchable redness of localized area 05/23/23 1043   Dressing Appearance Dry;Intact 05/23/23 1043   Drainage Amount None 05/23/23 1043   Appearance Pink;Red 05/23/23 1043   Red (%), Wound Tissue Color 100 % 05/23/23 1043   Periwound Area Excoriated 05/23/23 1043   Wound Length (cm) 0 cm 05/23/23 1043   Wound Width (cm) 0 cm 05/23/23 1043   Wound Depth (cm) 0 cm 05/23/23 1043   Wound Volume (cm^3) 0 cm^3 05/23/23 1043   Wound Surface Area (cm^2) 0 cm^2 05/23/23 1043   Care Cleansed with:;Antimicrobial agent;Sterile normal saline 05/23/23 1043   Dressing Changed 05/23/23 1043   Off Loading Football dressing;Off loading shoe 05/23/23 1043   Dressing Change Due 05/30/23 05/23/23 1043            Wound 04/12/22 1426 Diabetic Ulcer Right plantar Foot (Active)   04/12/22 1426    Pre-existing: Yes   Primary Wound Type: Diabetic ulc   Side: Right   Orientation: plantar   Location: Foot   Wound Number:    Ankle-Brachial Index:    Pulses:    Removal Indication and Assessment:    Wound Outcome: Healed   (Retired) Wound Type:    (Retired) Wound Length (cm):    (Retired) Wound Width (cm):    (Retired) Depth (cm):    Wound Description (Comments):    Removal Indications:    Wound Image   05/23/23 1043   Wound WDL ex 05/23/23 1043   Dressing Appearance Dry;Intact 05/23/23 1043   Drainage Amount None 05/23/23 1043   Appearance Red 05/23/23 1043   Tissue loss description Partial thickness 05/23/23 1043   Red (%), Wound Tissue Color 100 % 05/23/23 1043   Periwound Area Intact;Dry 05/23/23 1043   Wound Edges Defined 05/23/23 1043   Wound Length (cm) 2 cm 05/23/23 1043   Wound Width (cm) 2 cm 05/23/23 1043   Wound Depth (cm) 0.2 cm 05/23/23 1043   Wound Volume (cm^3) 0.8 cm^3 05/23/23 1043   Wound Surface Area (cm^2) 4 cm^2 05/23/23 1043   Care Cleansed with:;Antimicrobial agent;Sterile normal saline 05/23/23 1043   Dressing Changed 05/23/23 1043   Off Loading Football dressing;Off loading  "shoe 05/23/23 1043   Dressing Change Due 05/30/23 05/23/23 1043           Plan:              Tissue pathology and/or culture taken     [] Yes      [x] No  Sharp debridement performed                   [] Yes       [x] No  Labs ordered     [] Yes       [x] No  Imaging ordered    [] Yes      [x] No    Orders Placed This Encounter   Procedures    Debridement     This order was created via procedure documentation     Standing Status:   Standing     Number of Occurrences:   1    Change dressing         Remove old dressing  Cleanse or irrigate with: Normal Saline  Protect periwound with:  periwound:    Primary dressing: WOUND BASE:  Right Plantar Foot, Optifoam Ag to Right Dorsal Foot  Secondary dressing: Plantar: Drawtex: size used: 2" x 2" (5 cm x 5 cm) then Mextra: size used: 5"x5" then Gigi Foam (Long x 2, laid perpendicular)  Off-load: Football (cast padding x 3 rolls) then Coban. Darco shoe on the affected foot     Return to clinic in 1 week.        Follow up in about 1 week (around 5/30/2023).     See Jean-Baptiste MD    "

## 2023-05-26 ENCOUNTER — TELEPHONE (OUTPATIENT)
Dept: FAMILY MEDICINE | Facility: CLINIC | Age: 63
End: 2023-05-26
Payer: MEDICARE

## 2023-05-29 ENCOUNTER — OFFICE VISIT (OUTPATIENT)
Dept: FAMILY MEDICINE | Facility: CLINIC | Age: 63
End: 2023-05-29
Payer: MEDICARE

## 2023-05-29 ENCOUNTER — LAB VISIT (OUTPATIENT)
Dept: LAB | Facility: HOSPITAL | Age: 63
End: 2023-05-29
Attending: FAMILY MEDICINE
Payer: MEDICARE

## 2023-05-29 VITALS
HEART RATE: 98 BPM | TEMPERATURE: 98 F | HEIGHT: 72 IN | OXYGEN SATURATION: 96 % | SYSTOLIC BLOOD PRESSURE: 130 MMHG | RESPIRATION RATE: 18 BRPM | DIASTOLIC BLOOD PRESSURE: 78 MMHG | WEIGHT: 222.13 LBS | BODY MASS INDEX: 30.09 KG/M2

## 2023-05-29 DIAGNOSIS — L97.509 TYPE 2 DIABETES WITH SKIN ULCER OF FOOT: ICD-10-CM

## 2023-05-29 DIAGNOSIS — R80.9 TYPE 2 DIABETES MELLITUS WITH MICROALBUMINURIA, WITHOUT LONG-TERM CURRENT USE OF INSULIN: ICD-10-CM

## 2023-05-29 DIAGNOSIS — E11.42 TYPE 2 DIABETES MELLITUS WITH DIABETIC POLYNEUROPATHY, WITHOUT LONG-TERM CURRENT USE OF INSULIN: ICD-10-CM

## 2023-05-29 DIAGNOSIS — E11.621 TYPE 2 DIABETES WITH SKIN ULCER OF FOOT: ICD-10-CM

## 2023-05-29 DIAGNOSIS — E11.29 TYPE 2 DIABETES MELLITUS WITH MICROALBUMINURIA, WITHOUT LONG-TERM CURRENT USE OF INSULIN: ICD-10-CM

## 2023-05-29 DIAGNOSIS — Z87.442 HISTORY OF NEPHROLITHIASIS: ICD-10-CM

## 2023-05-29 DIAGNOSIS — L97.412 DIABETIC ULCER OF RIGHT MIDFOOT ASSOCIATED WITH TYPE 2 DIABETES MELLITUS, WITH FAT LAYER EXPOSED: ICD-10-CM

## 2023-05-29 DIAGNOSIS — E11.3393 CONTROLLED TYPE 2 DIABETES MELLITUS WITH BOTH EYES AFFECTED BY MODERATE NONPROLIFERATIVE RETINOPATHY WITHOUT MACULAR EDEMA, WITHOUT LONG-TERM CURRENT USE OF INSULIN: ICD-10-CM

## 2023-05-29 DIAGNOSIS — E11.621 DIABETIC ULCER OF RIGHT MIDFOOT ASSOCIATED WITH TYPE 2 DIABETES MELLITUS, WITH FAT LAYER EXPOSED: ICD-10-CM

## 2023-05-29 DIAGNOSIS — E66.09 CLASS 1 OBESITY DUE TO EXCESS CALORIES WITH SERIOUS COMORBIDITY AND BODY MASS INDEX (BMI) OF 30.0 TO 30.9 IN ADULT: ICD-10-CM

## 2023-05-29 DIAGNOSIS — E11.42 TYPE 2 DIABETES MELLITUS WITH DIABETIC POLYNEUROPATHY, WITHOUT LONG-TERM CURRENT USE OF INSULIN: Primary | ICD-10-CM

## 2023-05-29 PROBLEM — N20.0 KIDNEY STONES: Status: RESOLVED | Noted: 2019-04-01 | Resolved: 2023-05-29

## 2023-05-29 PROBLEM — M86.9 OSTEOMYELITIS OF THIRD TOE OF RIGHT FOOT: Status: RESOLVED | Noted: 2023-01-20 | Resolved: 2023-05-29

## 2023-05-29 LAB
ALBUMIN SERPL BCP-MCNC: 3.9 G/DL (ref 3.5–5.2)
ALP SERPL-CCNC: 55 U/L (ref 55–135)
ALT SERPL W/O P-5'-P-CCNC: 24 U/L (ref 10–44)
ANION GAP SERPL CALC-SCNC: 13 MMOL/L (ref 8–16)
AST SERPL-CCNC: 16 U/L (ref 10–40)
BASOPHILS # BLD AUTO: 0.04 K/UL (ref 0–0.2)
BASOPHILS NFR BLD: 0.7 % (ref 0–1.9)
BILIRUB SERPL-MCNC: 0.6 MG/DL (ref 0.1–1)
BUN SERPL-MCNC: 13 MG/DL (ref 8–23)
CALCIUM SERPL-MCNC: 9.5 MG/DL (ref 8.7–10.5)
CHLORIDE SERPL-SCNC: 102 MMOL/L (ref 95–110)
CO2 SERPL-SCNC: 25 MMOL/L (ref 23–29)
CREAT SERPL-MCNC: 1 MG/DL (ref 0.5–1.4)
CRP SERPL-MCNC: 1.2 MG/L (ref 0–8.2)
DIFFERENTIAL METHOD: ABNORMAL
EOSINOPHIL # BLD AUTO: 0.5 K/UL (ref 0–0.5)
EOSINOPHIL NFR BLD: 7.3 % (ref 0–8)
ERYTHROCYTE [DISTWIDTH] IN BLOOD BY AUTOMATED COUNT: 13.1 % (ref 11.5–14.5)
ERYTHROCYTE [SEDIMENTATION RATE] IN BLOOD BY PHOTOMETRIC METHOD: 19 MM/HR (ref 0–23)
EST. GFR  (NO RACE VARIABLE): >60 ML/MIN/1.73 M^2
ESTIMATED AVG GLUCOSE: 146 MG/DL (ref 68–131)
GLUCOSE SERPL-MCNC: 197 MG/DL (ref 70–110)
HBA1C MFR BLD: 6.7 % (ref 4–5.6)
HCT VFR BLD AUTO: 42.4 % (ref 40–54)
HGB BLD-MCNC: 14.1 G/DL (ref 14–18)
IMM GRANULOCYTES # BLD AUTO: 0.02 K/UL (ref 0–0.04)
IMM GRANULOCYTES NFR BLD AUTO: 0.3 % (ref 0–0.5)
LYMPHOCYTES # BLD AUTO: 1.4 K/UL (ref 1–4.8)
LYMPHOCYTES NFR BLD: 22.1 % (ref 18–48)
MCH RBC QN AUTO: 31.8 PG (ref 27–31)
MCHC RBC AUTO-ENTMCNC: 33.3 G/DL (ref 32–36)
MCV RBC AUTO: 96 FL (ref 82–98)
MONOCYTES # BLD AUTO: 0.5 K/UL (ref 0.3–1)
MONOCYTES NFR BLD: 8.6 % (ref 4–15)
NEUTROPHILS # BLD AUTO: 3.7 K/UL (ref 1.8–7.7)
NEUTROPHILS NFR BLD: 61 % (ref 38–73)
NRBC BLD-RTO: 0 /100 WBC
PLATELET # BLD AUTO: 229 K/UL (ref 150–450)
PMV BLD AUTO: 9.7 FL (ref 9.2–12.9)
POTASSIUM SERPL-SCNC: 4.5 MMOL/L (ref 3.5–5.1)
PROT SERPL-MCNC: 7.4 G/DL (ref 6–8.4)
RBC # BLD AUTO: 4.44 M/UL (ref 4.6–6.2)
SODIUM SERPL-SCNC: 140 MMOL/L (ref 136–145)
WBC # BLD AUTO: 6.14 K/UL (ref 3.9–12.7)

## 2023-05-29 PROCEDURE — 99214 PR OFFICE/OUTPT VISIT, EST, LEVL IV, 30-39 MIN: ICD-10-PCS | Mod: S$GLB,,, | Performed by: FAMILY MEDICINE

## 2023-05-29 PROCEDURE — 99999 PR PBB SHADOW E&M-EST. PATIENT-LVL IV: ICD-10-PCS | Mod: PBBFAC,,, | Performed by: FAMILY MEDICINE

## 2023-05-29 PROCEDURE — 3075F PR MOST RECENT SYSTOLIC BLOOD PRESS GE 130-139MM HG: ICD-10-PCS | Mod: CPTII,S$GLB,, | Performed by: FAMILY MEDICINE

## 2023-05-29 PROCEDURE — 3078F DIAST BP <80 MM HG: CPT | Mod: CPTII,S$GLB,, | Performed by: FAMILY MEDICINE

## 2023-05-29 PROCEDURE — 99999 PR PBB SHADOW E&M-EST. PATIENT-LVL IV: CPT | Mod: PBBFAC,,, | Performed by: FAMILY MEDICINE

## 2023-05-29 PROCEDURE — 4010F PR ACE/ARB THEARPY RXD/TAKEN: ICD-10-PCS | Mod: CPTII,S$GLB,, | Performed by: FAMILY MEDICINE

## 2023-05-29 PROCEDURE — 1159F MED LIST DOCD IN RCRD: CPT | Mod: CPTII,S$GLB,, | Performed by: FAMILY MEDICINE

## 2023-05-29 PROCEDURE — 86140 C-REACTIVE PROTEIN: CPT | Performed by: FAMILY MEDICINE

## 2023-05-29 PROCEDURE — 1160F RVW MEDS BY RX/DR IN RCRD: CPT | Mod: CPTII,S$GLB,, | Performed by: FAMILY MEDICINE

## 2023-05-29 PROCEDURE — 3288F FALL RISK ASSESSMENT DOCD: CPT | Mod: CPTII,S$GLB,, | Performed by: FAMILY MEDICINE

## 2023-05-29 PROCEDURE — 1101F PR PT FALLS ASSESS DOC 0-1 FALLS W/OUT INJ PAST YR: ICD-10-PCS | Mod: CPTII,S$GLB,, | Performed by: FAMILY MEDICINE

## 2023-05-29 PROCEDURE — 99499 RISK ADDL DX/OHS AUDIT: ICD-10-PCS | Mod: HCNC,S$GLB,, | Performed by: FAMILY MEDICINE

## 2023-05-29 PROCEDURE — 1101F PT FALLS ASSESS-DOCD LE1/YR: CPT | Mod: CPTII,S$GLB,, | Performed by: FAMILY MEDICINE

## 2023-05-29 PROCEDURE — 1159F PR MEDICATION LIST DOCUMENTED IN MEDICAL RECORD: ICD-10-PCS | Mod: CPTII,S$GLB,, | Performed by: FAMILY MEDICINE

## 2023-05-29 PROCEDURE — 36415 COLL VENOUS BLD VENIPUNCTURE: CPT | Mod: PO | Performed by: FAMILY MEDICINE

## 2023-05-29 PROCEDURE — 85652 RBC SED RATE AUTOMATED: CPT | Performed by: FAMILY MEDICINE

## 2023-05-29 PROCEDURE — 3075F SYST BP GE 130 - 139MM HG: CPT | Mod: CPTII,S$GLB,, | Performed by: FAMILY MEDICINE

## 2023-05-29 PROCEDURE — 1160F PR REVIEW ALL MEDS BY PRESCRIBER/CLIN PHARMACIST DOCUMENTED: ICD-10-PCS | Mod: CPTII,S$GLB,, | Performed by: FAMILY MEDICINE

## 2023-05-29 PROCEDURE — 99214 OFFICE O/P EST MOD 30 MIN: CPT | Mod: S$GLB,,, | Performed by: FAMILY MEDICINE

## 2023-05-29 PROCEDURE — 4010F ACE/ARB THERAPY RXD/TAKEN: CPT | Mod: CPTII,S$GLB,, | Performed by: FAMILY MEDICINE

## 2023-05-29 PROCEDURE — 85025 COMPLETE CBC W/AUTO DIFF WBC: CPT | Performed by: FAMILY MEDICINE

## 2023-05-29 PROCEDURE — 80053 COMPREHEN METABOLIC PANEL: CPT | Performed by: FAMILY MEDICINE

## 2023-05-29 PROCEDURE — 99499 UNLISTED E&M SERVICE: CPT | Mod: HCNC,S$GLB,, | Performed by: FAMILY MEDICINE

## 2023-05-29 PROCEDURE — 3078F PR MOST RECENT DIASTOLIC BLOOD PRESSURE < 80 MM HG: ICD-10-PCS | Mod: CPTII,S$GLB,, | Performed by: FAMILY MEDICINE

## 2023-05-29 PROCEDURE — 3008F BODY MASS INDEX DOCD: CPT | Mod: CPTII,S$GLB,, | Performed by: FAMILY MEDICINE

## 2023-05-29 PROCEDURE — 3008F PR BODY MASS INDEX (BMI) DOCUMENTED: ICD-10-PCS | Mod: CPTII,S$GLB,, | Performed by: FAMILY MEDICINE

## 2023-05-29 PROCEDURE — 3288F PR FALLS RISK ASSESSMENT DOCUMENTED: ICD-10-PCS | Mod: CPTII,S$GLB,, | Performed by: FAMILY MEDICINE

## 2023-05-29 PROCEDURE — 83036 HEMOGLOBIN GLYCOSYLATED A1C: CPT | Performed by: FAMILY MEDICINE

## 2023-05-29 NOTE — PROGRESS NOTES
Routine Office Visit    Patient Name: Fermin Metcalf Peer    : 1960  MRN: [id number]    Subjective:  Fermin is a 63 y.o. male who presents today for:    1. Type 2 dm  Patient presenting today for follow up of type 2 DM.  He has been taking his medication as prescribed.  No hypoglycemia.  He is currently being treated for diabetic foot ulcer and states he has been dealing with this off and on for years.  He has had several toe amputations as well.  He has neuropathy, but states this is a long standing issue.      Past Medical History  Past Medical History:   Diagnosis Date    Diabetes mellitus, type 2     Kidney stones        Past Surgical History  Past Surgical History:   Procedure Laterality Date    EXTRACORPOREAL SHOCK WAVE LITHOTRIPSY Right 2019    Procedure: LITHOTRIPSY, ESWL;  Surgeon: WHITNEY Bryant MD;  Location: [facility];  Service: Urology;  Laterality: Right;  RN PREOP 3/25/2019---NEED H/P-----KUB    eye  surgeries      several     TOE AMPUTATION      several toes on R foot       Family History  Family History   Problem Relation Age of Onset    No Known Problems Mother     Diabetes Father     Diabetes Sister     Drug abuse Brother        Social History  Social History     Socioeconomic History    Marital status: Single    Number of children: 0   Tobacco Use    Smoking status: Never     Passive exposure: Never    Smokeless tobacco: Never   Substance and Sexual Activity    Alcohol use: No    Drug use: No    Sexual activity: Not Currently       Current Medications  Current Outpatient Medications on File Prior to Visit   Medication Sig Dispense Refill    antiox #8/om3/dha/epa/lut/zeax (PRESERVISION AREDS 2, OMEGA-3, ORAL) Take by mouth.      BINAXNOW COVID-19 AG SELF TEST Kit Use as Directed on the Package      cinnamon bark 500 mg capsule Take 500 mg by mouth once daily.      empagliflozin (JARDIANCE) 10 mg tablet Take 1 tablet (10 mg total) by mouth once daily. 90 tablet 1    ferrous sulfate 325 mg  (65 mg iron) Tab tablet Take 325 mg by mouth daily with breakfast.      fish oil-omega-3 fatty acids 300-1,000 mg capsule Take by mouth once daily.      ketorolac 0.5% (ACULAR) 0.5 % Drop       losartan (COZAAR) 50 MG tablet Take 1 tablet by mouth once daily 90 tablet 0    melatonin 10 mg Cap Take by mouth.      metFORMIN (GLUCOPHAGE) 850 MG tablet TAKE 2 TABLETS BY MOUTH ONCE DAILY IN THE MORNING AND 1 WITH EVENING MEAL Strength: 850 mg 270 tablet 2    moxifloxacin (VIGAMOX) 0.5 % ophthalmic solution Place 1 drop into the left eye 4 (four) times daily.      multivitamin (THERAGRAN) per tablet Take 1 tablet by mouth once daily.      pioglitazone (ACTOS) 15 MG tablet Take 1 tablet by mouth once daily 90 tablet 0    prednisoLONE acetate (PRED FORTE) 1 % DrpS       simvastatin (ZOCOR) 20 MG tablet TAKE 1 TABLET BY MOUTH ONCE DAILY IN THE EVENING 90 tablet 2     No current facility-administered medications on file prior to visit.       Allergies   Review of patient's allergies indicates:  No Known Allergies    Review of Systems (Pertinent positives)  Review of Systems   Constitutional: Negative.    HENT: Negative.     Eyes: Negative.    Respiratory: Negative.     Cardiovascular: Negative.    Gastrointestinal: Negative.    Musculoskeletal: Negative.    Skin:         +right dfu managed by me in wound care     Neurological:  Positive for sensory change. Negative for focal weakness.       /78   Pulse 98   Temp 98.1 °F (36.7 °C) (Oral)   Resp 18   Ht 6' (1.829 m)   Wt 100.8 kg (222 lb 1.8 oz)   SpO2 96%   BMI 30.12 kg/m²     GENERAL APPEARANCE: in no apparent distress and well developed and well nourished  HEENT: PERRL, EOMI, Sclera clear, anicteric, Oropharynx clear, no lesions, Neck supple with midline trachea  NECK: normal, supple, no adenopathy, thyroid normal in size  RESPIRATORY: appears well, vitals normal, no respiratory distress, acyanotic, normal RR, chest clear, no wheezing, crepitations, rhonchi,  normal symmetric air entry  HEART: regular rate and rhythm, S1, S2 normal, no murmur, click, rub or gallop.    ABDOMEN: abdomen is soft without tenderness, no masses, no hernias, no organomegaly, no rebound, no guarding. Suprapubic tenderness absent. No CVA tenderness.  NEUROLOGIC: normal without focal findings, CN II-XII are intact.    Extremities: warm/well perfused.  No abnormal hair patterns.  No clubbing, cyanosis or edema.    SKIN: right foot wrapped, so cannot evaluate ulcers today.  Foot exam done at wound care and positive for neuropathy bilaterally and ulcer to right plantar  PSYCH: Alert, oriented x 3, thought content appropriate, speech normal, pleasant and cooperative, good eye contact, well groomed    Protective Sensation (w/ 10 gram monofilament):  Right: Absent  Left: Absent    Visual Inspection:  Ulceration -  Right     Pedal Pulses:   Right: Diminished  Left: Diminished    Posterior tibialis:   Right:Present  Left: Present      Assessment/Plan:  Fermin Henson is a 63 y.o. male who presents today for :    Fermin was seen today for follow-up and diabetes.    Diagnoses and all orders for this visit:    Type 2 diabetes mellitus with diabetic polyneuropathy, without long-term current use of insulin  -     Comprehensive Metabolic Panel; Future  -     Hemoglobin A1C; Future  - Last a1c stable with current regimen    Type 2 diabetes with skin ulcer of foot  -     C-Reactive Protein; Future  -     Sedimentation rate; Future  - Will check inflammatory markers as he is due and will indicate if osteo if significantly elevated  - Continue following up with wound care    Type 2 diabetes mellitus with microalbuminuria, without long-term current use of insulin  -     Comprehensive Metabolic Panel; Future  -     Hemoglobin A1C; Future  - Labs to be done today  - Microalbumin up to date    Class 1 obesity due to excess calories with serious comorbidity and body mass index (BMI) of 30.0 to 30.9 in adult  -     CBC  Auto Differential; Future  - Encouraged weight loss to improve overall health  - He is to increase lean protein intake as well as fiber    Controlled type 2 diabetes mellitus with 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

## 2023-05-30 ENCOUNTER — HOSPITAL ENCOUNTER (OUTPATIENT)
Dept: WOUND CARE | Facility: HOSPITAL | Age: 63
Discharge: HOME | End: 2023-05-30
Attending: FAMILY MEDICINE
Payer: MEDICARE

## 2023-05-30 VITALS — DIASTOLIC BLOOD PRESSURE: 69 MMHG | SYSTOLIC BLOOD PRESSURE: 140 MMHG | TEMPERATURE: 97 F | HEART RATE: 77 BPM

## 2023-05-30 DIAGNOSIS — E11.621 DIABETIC ULCER OF RIGHT MIDFOOT ASSOCIATED WITH TYPE 2 DIABETES MELLITUS, WITH FAT LAYER EXPOSED: Primary | ICD-10-CM

## 2023-05-30 DIAGNOSIS — L97.509 TYPE 2 DIABETES WITH SKIN ULCER OF FOOT: ICD-10-CM

## 2023-05-30 DIAGNOSIS — E11.621 TYPE 2 DIABETES WITH SKIN ULCER OF FOOT: ICD-10-CM

## 2023-05-30 DIAGNOSIS — L97.412 DIABETIC ULCER OF RIGHT MIDFOOT ASSOCIATED WITH TYPE 2 DIABETES MELLITUS, WITH FAT LAYER EXPOSED: Primary | ICD-10-CM

## 2023-05-30 PROCEDURE — 11042 DBRDMT SUBQ TIS 1ST 20SQCM/<: CPT | Performed by: FAMILY MEDICINE

## 2023-05-30 PROCEDURE — 99214 OFFICE O/P EST MOD 30 MIN: CPT | Mod: 25,,, | Performed by: FAMILY MEDICINE

## 2023-05-30 PROCEDURE — 11042 DBRDMT SUBQ TIS 1ST 20SQCM/<: CPT

## 2023-05-30 PROCEDURE — 11042 DEBRIDEMENT: ICD-10-PCS | Mod: ,,, | Performed by: FAMILY MEDICINE

## 2023-05-30 PROCEDURE — 99214 PR OFFICE/OUTPT VISIT, EST, LEVL IV, 30-39 MIN: ICD-10-PCS | Mod: 25,,, | Performed by: FAMILY MEDICINE

## 2023-05-30 PROCEDURE — 11042 DBRDMT SUBQ TIS 1ST 20SQCM/<: CPT | Mod: ,,, | Performed by: FAMILY MEDICINE

## 2023-05-30 NOTE — PROGRESS NOTES
[facility]                Progress Note    Subjective:       Patient ID: Fermin Henson is a 63 y.o. male.    Chief Complaint: Wound Check    Follow up wound care visit. Patient ambulated to exam room unaided, with prescribed Darco shoe w/ PegAssist on Right Foot. No c/o pain to wound beds at present. Dressing intact with a small amount of serosanguineous, non-malodor drainage from Plantar aspect and no drainage from Dorsal aspect.  Right Plantar Foot wound is measuring smaller after debridement in (1.6 cm) length, in (1.8 cm) width and in (0.2 cm) depth.     Wound care done as per order. Return to clinic in 1 week. Patient declined AVS.      MD note:  patient following up for Right DFU.  He has been keeping dressings in place, clean, and dry.  He denies any odor from wounds.  Labs done yesterday show controlled type 2 DM with a1c of 6.5.  he states he has been offloading his foot and no prolonged standing or walking      Review of Systems   Constitutional: Negative.    HENT: Negative.     Eyes: Negative.    Respiratory: Negative.     Cardiovascular: Negative.    Gastrointestinal: Negative.    Skin:  Positive for wound.   Psychiatric/Behavioral: Negative.         Objective:        Physical Exam  Constitutional:       Appearance: Normal appearance.   HENT:      Head: Normocephalic and atraumatic.      Mouth/Throat:      Mouth: Mucous membranes are moist.      Pharynx: Oropharynx is clear.   Eyes:      Extraocular Movements: Extraocular movements intact.      Conjunctiva/sclera: Conjunctivae normal.      Pupils: Pupils are equal, round, and reactive to light.   Abdominal:      General: There is no distension.      Palpations: Abdomen is soft.   Musculoskeletal:      Right lower leg: No edema.      Left lower leg: No edema.   Skin:     General: Skin is warm.      Comments: +right plantar DFU improved with 3 msall openings; mild serous drainage; excess slough and callous  "requiring debridment   Neurological:      Mental Status: He is alert and oriented to person, place, and time. Mental status is at baseline.      Sensory: Sensory deficit present.       Vitals:    05/30/23 1102   BP: (!) 140/69   Pulse: 77   Temp: 97 °F (36.1 °C)     Debridement    Date/Time: 5/30/2023 9:43 AM  Performed by: See Jean-Baptiste MD  Authorized by: See Jean-Baptiste MD     Time out: Immediately prior to procedure a "time out" was called to verify the correct patient, procedure, equipment, support staff and site/side marked as required.    Consent Done?:  Yes (Written)  Local anesthesia used?: No         Length (cm):  0.4       Area (sq cm):  0.08       Width (cm):  0.2       Percent Debrided (%):  100       Depth (cm):  0       Total Area Debrided (sq cm):  0.08    Depth of debridement:  Subcutaneous tissue    Tissue debrided:  Epidermis, Dermis and Subcutaneous    Devitalized tissue debrided:  Biofilm, Slough and Callus    Instruments:  Curette  Bleeding:  Minimal  Hemostasis Achieved: Yes  Method Used:  Pressure  Patient tolerance:  Patient tolerated the procedure well with no immediate complications    Assessment:           ICD-10-CM ICD-9-CM   1. Diabetic ulcer of right midfoot associated with type 2 diabetes mellitus, with fat layer exposed  E11.621 250.80    L97.412 707.14   2. Type 2 diabetes with skin ulcer of foot  E11.621 250.80    L97.509 707.15            Altered Skin Integrity 12/06/22 1003 Right dorsal;distal Foot (Active)   12/06/22 1003   Altered Skin Integrity Present on Admission - Did Patient arrive to the hospital with altered skin?: yes   Side: Right   Orientation: dorsal;distal   Location: Foot   Wound Number:    Is this injury device related?:    Primary Wound Type:    Description of Altered Skin Integrity:    Ankle-Brachial Index:    Pulses:    Removal Indication and Assessment:    Wound Outcome:    (Retired) Wound Length (cm):    (Retired) Wound Width (cm):    (Retired) Depth (cm): "    Wound Description (Comments):    Removal Indications:    Wound Image   05/30/23 1146   Dressing Appearance Intact;Dry 05/30/23 1146   Drainage Amount None 05/30/23 1146   Appearance Epithelialization 05/30/23 1146   Tissue loss description Not applicable 05/30/23 1146   Periwound Area Dry;Intact 05/30/23 1146   Wound Edges Approximated 05/30/23 1146   Wound Length (cm) 0 cm 05/30/23 1146   Wound Width (cm) 0 cm 05/30/23 1146   Wound Depth (cm) 0 cm 05/30/23 1146   Wound Volume (cm^3) 0 cm^3 05/30/23 1146   Wound Surface Area (cm^2) 0 cm^2 05/30/23 1146   Tunneling (depth (cm)/location) 0 05/30/23 1146   Undermining (depth (cm)/location) 0 05/30/23 1146   Care Cleansed with:;Antimicrobial agent;Sterile normal saline 05/30/23 1146   Dressing Changed 05/30/23 1146   Off Loading Football dressing;Off loading shoe 05/30/23 1146   Dressing Change Due 06/06/23 05/30/23 1146            Wound 04/12/22 1426 Diabetic Ulcer Right plantar Foot (Active)   04/12/22 1426    Pre-existing: Yes   Primary Wound Type: Diabetic ulc   Side: Right   Orientation: plantar   Location: Foot   Wound Number:    Ankle-Brachial Index:    Pulses:    Removal Indication and Assessment:    Wound Outcome: Healed   (Retired) Wound Type:    (Retired) Wound Length (cm):    (Retired) Wound Width (cm):    (Retired) Depth (cm):    Wound Description (Comments):    Removal Indications:    Wound Image    05/30/23 1146   Wound WDL ex 05/30/23 1146   Dressing Appearance Intact;Dried drainage 05/30/23 1146   Drainage Amount Small 05/30/23 1146   Drainage Characteristics/Odor Serosanguineous;No odor 05/30/23 1146   Appearance Pink 05/30/23 1146   Tissue loss description Partial thickness 05/30/23 1146   Black (%), Wound Tissue Color 0 % 05/30/23 1146   Red (%), Wound Tissue Color 100 % 05/30/23 1146   Yellow (%), Wound Tissue Color 0 % 05/30/23 1146   Periwound Area Dry 05/30/23 1146   Wound Edges Callused;Defined;Open 05/30/23 1146   Wound Length (cm) 0.4 cm  05/30/23 1146   Wound Width (cm) 0.2 cm 05/30/23 1146   Wound Depth (cm) 0 cm 05/30/23 1146   Wound Volume (cm^3) 0 cm^3 05/30/23 1146   Wound Surface Area (cm^2) 0.08 cm^2 05/30/23 1146   Tunneling (depth (cm)/location) 0 05/30/23 1146   Undermining (depth (cm)/location) 0 05/30/23 1146   Care Cleansed with:;Antimicrobial agent;Sterile normal saline 05/30/23 1146   Dressing Changed 05/30/23 1146   Periwound Care Absorptive dressing applied 05/30/23 1146   Off Loading Football dressing;Off loading shoe 05/30/23 1146   Dressing Change Due 06/06/23 05/30/23 1146           Plan:              Tissue pathology and/or culture taken     [] Yes      [x] No  Sharp debridement performed                   [x] Yes       [] No  Labs ordered     [] Yes       [x] No  Imaging ordered    [] Yes      [x] No    Orders Placed This Encounter   Procedures    Change dressing     Remove old dressing   Cleanse or irrigate with: Normal Saline   Protect periwound with:  Cavilon     Primary dressing: WOUND BASE:  Aquacel Extra covering both wound beds.    Off-load: Football (cast padding x 3 rolls) then Coban. Darco shoe on the affected foot       Return to clinic in 1 week.        Follow up in about 1 week (around 6/6/2023) for wound care.     See Jean-Baptiste MD

## 2023-06-06 ENCOUNTER — HOSPITAL ENCOUNTER (OUTPATIENT)
Dept: WOUND CARE | Facility: HOSPITAL | Age: 63
Discharge: HOME | End: 2023-06-06
Attending: FAMILY MEDICINE
Payer: MEDICARE

## 2023-06-06 VITALS — TEMPERATURE: 98 F | SYSTOLIC BLOOD PRESSURE: 133 MMHG | HEART RATE: 94 BPM | DIASTOLIC BLOOD PRESSURE: 71 MMHG

## 2023-06-06 DIAGNOSIS — L97.412 DIABETIC ULCER OF RIGHT MIDFOOT ASSOCIATED WITH TYPE 2 DIABETES MELLITUS, WITH FAT LAYER EXPOSED: Primary | ICD-10-CM

## 2023-06-06 DIAGNOSIS — L97.509 TYPE 2 DIABETES WITH SKIN ULCER OF FOOT: ICD-10-CM

## 2023-06-06 DIAGNOSIS — E11.621 TYPE 2 DIABETES WITH SKIN ULCER OF FOOT: ICD-10-CM

## 2023-06-06 DIAGNOSIS — E11.621 DIABETIC ULCER OF RIGHT MIDFOOT ASSOCIATED WITH TYPE 2 DIABETES MELLITUS, WITH FAT LAYER EXPOSED: Primary | ICD-10-CM

## 2023-06-06 PROCEDURE — 11042 DEBRIDEMENT: ICD-10-PCS | Mod: ,,, | Performed by: FAMILY MEDICINE

## 2023-06-06 PROCEDURE — 11042 DBRDMT SUBQ TIS 1ST 20SQCM/<: CPT | Performed by: FAMILY MEDICINE

## 2023-06-06 PROCEDURE — 11042 DBRDMT SUBQ TIS 1ST 20SQCM/<: CPT | Mod: ,,, | Performed by: FAMILY MEDICINE

## 2023-06-06 PROCEDURE — 99214 PR OFFICE/OUTPT VISIT, EST, LEVL IV, 30-39 MIN: ICD-10-PCS | Mod: 25,,, | Performed by: FAMILY MEDICINE

## 2023-06-06 PROCEDURE — 99214 OFFICE O/P EST MOD 30 MIN: CPT | Mod: 25,,, | Performed by: FAMILY MEDICINE

## 2023-06-06 NOTE — PROGRESS NOTES
[facility]                Progress Note    Subjective:       Patient ID: Fermin Henson is a 63 y.o. male.    Chief Complaint: Wound Check    Follow up wound care visit. Patient ambulated to exam room unaided, with prescribed Darco shoe w/ PegAssist on Right Foot. No c/o pain to wound beds at present. Dressing intact with a large amount of serosanguineous, non-malodor drainage from Plantar aspect and no drainage from Dorsal aspect.  Right Plantar Foot wound is measuring the same as last visit after debridement and a blistered indentation noted next to wound bed. Patient denies standing or walking more. PegAssist noted to have a crack where foot applies pressure, removed and gave new PegAssist for Darco.     Wound care done as per order. Return to clinic in 1 week. Patient declined AVS.    MD note:  patient following up today for right DFU.  He states that he has been staying of his foot this past week.  He denies any excess walking.  He reports to have kept dressings in place, clean, and dry.  No new wounds that he is aware of at this time        Review of Systems   Constitutional: Negative.    HENT: Negative.     Eyes: Negative.    Respiratory: Negative.     Cardiovascular: Negative.    Gastrointestinal: Negative.    Skin:  Positive for wound.   Neurological:  Positive for numbness. Negative for weakness.       Objective:        Physical Exam  Constitutional:       Appearance: Normal appearance.   HENT:      Head: Normocephalic and atraumatic.      Right Ear: External ear normal.      Left Ear: External ear normal.      Mouth/Throat:      Mouth: Mucous membranes are moist.      Pharynx: Oropharynx is clear.   Eyes:      Extraocular Movements: Extraocular movements intact.      Conjunctiva/sclera: Conjunctivae normal.      Pupils: Pupils are equal, round, and reactive to light.   Cardiovascular:      Rate and Rhythm: Normal rate.   Pulmonary:      Effort: Pulmonary  "effort is normal. No respiratory distress.   Abdominal:      General: There is no distension.      Palpations: Abdomen is soft.   Skin:     General: Skin is warm.      Comments: +right plantar DFU with excess callous and maceration requiring debridement; no periwound erythema; no active drainage at time of exam   Neurological:      Mental Status: He is alert and oriented to person, place, and time. Mental status is at baseline.      Sensory: Sensory deficit present.       Vitals:    06/06/23 1030   BP: 133/71   Pulse: 94   Temp: 98.2 °F (36.8 °C)     Debridement    Date/Time: 6/6/2023 10:06 AM  Performed by: See Jean-Baptiste MD  Authorized by: See Jean-Baptiste MD     Time out: Immediately prior to procedure a "time out" was called to verify the correct patient, procedure, equipment, support staff and site/side marked as required.    Consent Done?:  Yes (Written)  Local anesthesia used?: No      Wound Details:    Location:  Right foot    Location:  Right Plantar    Type of Debridement:  Excisional       Length (cm):  0.4       Area (sq cm):  0.08       Width (cm):  0.2       Percent Debrided (%):  100       Depth (cm):  0.1       Total Area Debrided (sq cm):  0.08    Depth of debridement:  Subcutaneous tissue    Tissue debrided:  Dermis, Epidermis and Subcutaneous    Devitalized tissue debrided:  Biofilm, Fibrin and Slough    Instruments:  Curette  Bleeding:  Minimal  Hemostasis Achieved: Yes  Method Used:  Pressure  Patient tolerance:  Patient tolerated the procedure well with no immediate complications    Assessment:           ICD-10-CM ICD-9-CM   1. Diabetic ulcer of right midfoot associated with type 2 diabetes mellitus, with fat layer exposed  E11.621 250.80    L97.412 707.14   2. Type 2 diabetes with skin ulcer of foot  E11.621 250.80    L97.509 707.15            Altered Skin Integrity 12/06/22 1003 Right dorsal;distal Foot (Active)   12/06/22 1003   Altered Skin Integrity Present on Admission - Did Patient " arrive to the hospital with altered skin?: yes   Side: Right   Orientation: dorsal;distal   Location: Foot   Wound Number:    Is this injury device related?:    Primary Wound Type:    Description of Altered Skin Integrity:    Ankle-Brachial Index:    Pulses:    Removal Indication and Assessment:    Wound Outcome:    (Retired) Wound Length (cm):    (Retired) Wound Width (cm):    (Retired) Depth (cm):    Wound Description (Comments):    Removal Indications:    Wound Image   06/06/23 1119   Dressing Appearance Intact;Dry 06/06/23 1119   Drainage Amount None 06/06/23 1119   Appearance Red;Dry;Epithelialization 06/06/23 1119   Tissue loss description Not applicable 06/06/23 1119   Black (%), Wound Tissue Color 0 % 06/06/23 1119   Red (%), Wound Tissue Color 100 % 06/06/23 1119   Yellow (%), Wound Tissue Color 0 % 06/06/23 1119   Periwound Area Dry 06/06/23 1119   Wound Length (cm) 0 cm 06/06/23 1119   Wound Width (cm) 0 cm 06/06/23 1119   Wound Depth (cm) 0 cm 06/06/23 1119   Wound Volume (cm^3) 0 cm^3 06/06/23 1119   Wound Surface Area (cm^2) 0 cm^2 06/06/23 1119   Tunneling (depth (cm)/location) 0 06/06/23 1119   Undermining (depth (cm)/location) 0 06/06/23 1119   Care Cleansed with:;Antimicrobial agent;Sterile normal saline 06/06/23 1119   Off Loading Football dressing;Off loading shoe 06/06/23 1119   Dressing Change Due 06/13/23 06/06/23 1119            Wound 04/12/22 1426 Diabetic Ulcer Right plantar Foot (Active)   04/12/22 1426    Pre-existing: Yes   Primary Wound Type: Diabetic ulc   Side: Right   Orientation: plantar   Location: Foot   Wound Number:    Ankle-Brachial Index:    Pulses:    Removal Indication and Assessment:    Wound Outcome: Healed   (Retired) Wound Type:    (Retired) Wound Length (cm):    (Retired) Wound Width (cm):    (Retired) Depth (cm):    Wound Description (Comments):    Removal Indications:    Wound Image    06/06/23 1119   Wound WDL ex 06/06/23 1119   Dressing Appearance Intact;Moist  "drainage 06/06/23 1119   Drainage Amount Large 06/06/23 1119   Drainage Characteristics/Odor Serosanguineous;No odor 06/06/23 1119   Appearance Red;Moist;Blistered 06/06/23 1119   Tissue loss description Partial thickness 06/06/23 1119   Black (%), Wound Tissue Color 0 % 06/06/23 1119   Red (%), Wound Tissue Color 100 % 06/06/23 1119   Yellow (%), Wound Tissue Color 0 % 06/06/23 1119   Periwound Area Macerated;Pale white 06/06/23 1119   Wound Edges Open 06/06/23 1119   Wound Length (cm) 0.4 cm 06/06/23 1119   Wound Width (cm) 0.2 cm 06/06/23 1119   Wound Depth (cm) 0 cm 06/06/23 1119   Wound Volume (cm^3) 0 cm^3 06/06/23 1119   Wound Surface Area (cm^2) 0.08 cm^2 06/06/23 1119   Tunneling (depth (cm)/location) 0 06/06/23 1119   Undermining (depth (cm)/location) 0 06/06/23 1119   Care Cleansed with:;Antimicrobial agent;Sterile normal saline 06/06/23 1119   Dressing Changed 06/06/23 1119   Periwound Care Absorptive dressing applied 06/06/23 1119   Off Loading Football dressing;Off loading shoe 06/06/23 1119   Dressing Change Due 06/13/23 06/06/23 1119           Plan:              Tissue pathology and/or culture taken     [] Yes      [x] No  Sharp debridement performed                   [x] Yes       [] No  Labs ordered     [] Yes       [x] No  Imaging ordered    [] Yes      [x] No    Orders Placed This Encounter   Procedures    Debridement     This order was created via procedure documentation     Standing Status:   Standing     Number of Occurrences:   1    Change dressing     Remove old dressing   Cleanse or irrigate with: Normal Saline   Protect periwound with:  Cavilon     Primary dressing: WOUND BASE:  Aquacel Extra covering Dorsal Foot. Drawtex (double layered) then Mextra (5" x 5"). Gigi foam (small 4" x 4" double stacked)     Off-load: Football (cast padding x 3 rolls) then Coban. Darco shoe with New PegAssist on the affected foot       Return to clinic in 1 week.        Follow up in about 1 week (around " 6/13/2023) for wound care.     See Jean-Baptiste MD

## 2023-06-09 ENCOUNTER — PES CALL (OUTPATIENT)
Dept: ADMINISTRATIVE | Facility: CLINIC | Age: 63
End: 2023-06-09
Payer: MEDICARE

## 2023-06-10 DIAGNOSIS — I10 BENIGN ESSENTIAL HTN: ICD-10-CM

## 2023-06-10 RX ORDER — LOSARTAN POTASSIUM 50 MG/1
TABLET ORAL
Qty: 90 TABLET | Refills: 3 | Status: SHIPPED | OUTPATIENT
Start: 2023-06-10 | End: 2023-09-26 | Stop reason: SDUPTHER

## 2023-06-10 NOTE — TELEPHONE ENCOUNTER
No care due was identified.  [facility] Embedded Care Due Messages. Reference number: [id number].   6/10/2023 6:34:43 PM CDT

## 2023-06-11 NOTE — TELEPHONE ENCOUNTER
Refill Decision Note   Fermin Henson  is requesting a refill authorization.  Brief Assessment and Rationale for Refill:  Approve     Medication Therapy Plan:       Medication Reconciliation Completed: No   Comments:     No Care Gaps recommended.     Note composed:8:22 PM 06/10/2023

## 2023-06-13 ENCOUNTER — HOSPITAL ENCOUNTER (OUTPATIENT)
Dept: WOUND CARE | Facility: HOSPITAL | Age: 63
Discharge: HOME | End: 2023-06-13
Attending: FAMILY MEDICINE
Payer: MEDICARE

## 2023-06-13 VITALS — TEMPERATURE: 97 F | HEART RATE: 91 BPM | DIASTOLIC BLOOD PRESSURE: 66 MMHG | SYSTOLIC BLOOD PRESSURE: 133 MMHG

## 2023-06-13 DIAGNOSIS — L97.509 TYPE 2 DIABETES WITH SKIN ULCER OF FOOT: Primary | ICD-10-CM

## 2023-06-13 DIAGNOSIS — E11.621 TYPE 2 DIABETES WITH SKIN ULCER OF FOOT: Primary | ICD-10-CM

## 2023-06-13 PROCEDURE — 99214 PR OFFICE/OUTPT VISIT, EST, LEVL IV, 30-39 MIN: ICD-10-PCS | Mod: 25,,, | Performed by: FAMILY MEDICINE

## 2023-06-13 PROCEDURE — 11042 DEBRIDEMENT: ICD-10-PCS | Mod: ,,, | Performed by: FAMILY MEDICINE

## 2023-06-13 PROCEDURE — 11042 DBRDMT SUBQ TIS 1ST 20SQCM/<: CPT | Performed by: FAMILY MEDICINE

## 2023-06-13 PROCEDURE — 99214 OFFICE O/P EST MOD 30 MIN: CPT | Mod: 25,,, | Performed by: FAMILY MEDICINE

## 2023-06-13 PROCEDURE — 11042 DBRDMT SUBQ TIS 1ST 20SQCM/<: CPT | Mod: ,,, | Performed by: FAMILY MEDICINE

## 2023-06-13 NOTE — PROGRESS NOTES
[facility]                Progress Note    Subjective:       Patient ID: Fermin Henson is a 63 y.o. male.    Chief Complaint: Wound Consult    Walked to clinic wearing Darco and PegAssist. Dsg D/I Dorsal foot opened again and plantar foot callused over and after debridement wound healed. Football and return in one week to check site. Refused printed AVS     MD note:  Patient following up today for wound care to Right DFU.  Plantar wound has not been draining excessively per patient.  No fevers, chills, or sweats.  No malodorous drainage that he is aware of at this time.  No new concerns    Review of Systems   Constitutional: Negative.    HENT: Negative.     Eyes: Negative.    Respiratory: Negative.     Cardiovascular: Negative.    Gastrointestinal: Negative.    Skin:  Positive for wound.   Neurological:  Positive for numbness.       Objective:        Physical Exam  Constitutional:       Appearance: Normal appearance.   HENT:      Head: Normocephalic and atraumatic.      Mouth/Throat:      Mouth: Mucous membranes are moist.      Pharynx: Oropharynx is clear.   Eyes:      Extraocular Movements: Extraocular movements intact.      Conjunctiva/sclera: Conjunctivae normal.      Pupils: Pupils are equal, round, and reactive to light.   Cardiovascular:      Rate and Rhythm: Normal rate and regular rhythm.   Abdominal:      General: There is no distension.      Palpations: Abdomen is soft.   Skin:     General: Skin is warm.      Comments: +right dorsal DFU that is superficial and no active drainage  +right plantar DFU with excess callous   Neurological:      Mental Status: He is alert and oriented to person, place, and time. Mental status is at baseline.      Sensory: Sensory deficit present.       Vitals:    06/13/23 1003   BP: 133/66   Pulse: 91   Temp: 97.1 °F (36.2 °C)     Debridement    Date/Time: 6/13/2023 9:38 AM  Performed by: See Jean-Baptiste MD  Authorized by:  "See Jean-Baptiste MD     Time out: Immediately prior to procedure a "time out" was called to verify the correct patient, procedure, equipment, support staff and site/side marked as required.    Consent Done?:  Yes (Verbal)  Local anesthesia used?: No      Wound Details:    Location:  Right foot    Location:  Right Plantar    Type of Debridement:  Excisional       Length (cm):  1       Area (sq cm):  1       Width (cm):  1       Percent Debrided (%):  100       Depth (cm):  0.1       Total Area Debrided (sq cm):  1    Depth of debridement:  Subcutaneous tissue    Tissue debrided:  Dermis, Epidermis and Subcutaneous    Devitalized tissue debrided:  Biofilm, Callus and Other    Instruments:  Curette  Bleeding:  Minimal  Hemostasis Achieved: Yes  Method Used:  Pressure  Patient tolerance:  Patient tolerated the procedure well with no immediate complications      Assessment:           ICD-10-CM ICD-9-CM   1. Type 2 diabetes with skin ulcer of foot  E11.621 250.80    L97.509 707.15            Altered Skin Integrity 12/06/22 1003 Right dorsal;distal Foot (Active)   12/06/22 1003   Altered Skin Integrity Present on Admission - Did Patient arrive to the hospital with altered skin?: yes   Side: Right   Orientation: dorsal;distal   Location: Foot   Wound Number:    Is this injury device related?:    Primary Wound Type:    Description of Altered Skin Integrity:    Ankle-Brachial Index:    Pulses:    Removal Indication and Assessment:    Wound Outcome:    (Retired) Wound Length (cm):    (Retired) Wound Width (cm):    (Retired) Depth (cm):    Wound Description (Comments):    Removal Indications:    Wound Image   06/13/23 1042   Description of Altered Skin Integrity Partial thickness tissue loss. Shallow open ulcer with a red or pink wound bed, without slough. Intact or Open/Ruptured Serum-filled blister. 06/13/23 1042   Dressing Appearance Dry;Intact 06/13/23 1042   Drainage Amount None 06/13/23 1042   Appearance Red 06/13/23 1042 "   Red (%), Wound Tissue Color 100 % 06/13/23 1042   Periwound Area Intact;Dry 06/13/23 1042   Wound Edges Defined 06/13/23 1042   Wound Length (cm) 1.5 cm 06/13/23 1042   Wound Width (cm) 1.5 cm 06/13/23 1042   Wound Depth (cm) 0.1 cm 06/13/23 1042   Wound Volume (cm^3) 0.225 cm^3 06/13/23 1042   Wound Surface Area (cm^2) 2.25 cm^2 06/13/23 1042   Care Cleansed with:;Antimicrobial agent;Sterile normal saline 06/13/23 1042   Off Loading Football dressing;Off loading shoe 06/13/23 1042   Dressing Change Due 06/20/23 06/13/23 1042            Wound 04/12/22 1426 Diabetic Ulcer Right plantar Foot (Active)   04/12/22 1426    Pre-existing: Yes   Primary Wound Type: Diabetic ulc   Side: Right   Orientation: plantar   Location: Foot   Wound Number:    Ankle-Brachial Index:    Pulses:    Removal Indication and Assessment:    Wound Outcome: Healed   (Retired) Wound Type:    (Retired) Wound Length (cm):    (Retired) Wound Width (cm):    (Retired) Depth (cm):    Wound Description (Comments):    Removal Indications:    Wound Image   06/13/23 1039   Wound WDL ex 06/13/23 1039   Dressing Appearance Dry;Intact 06/13/23 1039   Drainage Amount None 06/13/23 1039   Appearance Closed/resurfaced 06/13/23 1039   Periwound Area Intact;Dry 06/13/23 1039   Wound Length (cm) 0 cm 06/13/23 1039   Wound Width (cm) 0 cm 06/13/23 1039   Wound Depth (cm) 0 cm 06/13/23 1039   Wound Volume (cm^3) 0 cm^3 06/13/23 1039   Wound Surface Area (cm^2) 0 cm^2 06/13/23 1039   Care Cleansed with:;Antimicrobial agent;Sterile normal saline 06/13/23 1039   Dressing Changed 06/13/23 1039   Dressing Change Due 06/20/23 06/13/23 1039           Plan:              Tissue pathology and/or culture taken     [] Yes      [x] No  Sharp debridement performed                   [x] Yes       [x] No  Labs ordered     [] Yes       [x] No  Imaging ordered    [] Yes      [x] No    Orders Placed This Encounter   Procedures    Debridement     This order was created via procedure  documentation     Standing Status:   Standing     Number of Occurrences:   1    Change dressing     Remove old dressing   Cleanse or irrigate with: Normal Saline   Protect periwound with:  Cavilon     Primary dressing: WOUND BASE:  Optifoam to Dorsal Foot.   Gigi foam x 2 in perpendicular fashion   Off-load: Football (cast padding x 3 rolls) then Coban. Darco shoe with  PegAssist on the affected foot        Follow up in about 1 week (around 6/20/2023).     See Jean-Baptiste MD

## 2023-06-20 ENCOUNTER — HOSPITAL ENCOUNTER (OUTPATIENT)
Dept: WOUND CARE | Facility: HOSPITAL | Age: 63
Discharge: HOME | End: 2023-06-20
Attending: FAMILY MEDICINE
Payer: MEDICARE

## 2023-06-20 VITALS — TEMPERATURE: 98 F | SYSTOLIC BLOOD PRESSURE: 132 MMHG | HEART RATE: 94 BPM | DIASTOLIC BLOOD PRESSURE: 70 MMHG

## 2023-06-20 DIAGNOSIS — L97.412 DIABETIC ULCER OF RIGHT MIDFOOT ASSOCIATED WITH TYPE 2 DIABETES MELLITUS, WITH FAT LAYER EXPOSED: ICD-10-CM

## 2023-06-20 DIAGNOSIS — E11.621 DIABETIC ULCER OF RIGHT MIDFOOT ASSOCIATED WITH TYPE 2 DIABETES MELLITUS, WITH FAT LAYER EXPOSED: ICD-10-CM

## 2023-06-20 DIAGNOSIS — E11.42 TYPE 2 DIABETES MELLITUS WITH DIABETIC POLYNEUROPATHY, WITHOUT LONG-TERM CURRENT USE OF INSULIN: Primary | ICD-10-CM

## 2023-06-20 PROCEDURE — 11042 DBRDMT SUBQ TIS 1ST 20SQCM/<: CPT | Performed by: FAMILY MEDICINE

## 2023-06-20 PROCEDURE — 99214 PR OFFICE/OUTPT VISIT, EST, LEVL IV, 30-39 MIN: ICD-10-PCS | Mod: 25,,, | Performed by: FAMILY MEDICINE

## 2023-06-20 PROCEDURE — 11042 DBRDMT SUBQ TIS 1ST 20SQCM/<: CPT | Mod: ,,, | Performed by: FAMILY MEDICINE

## 2023-06-20 PROCEDURE — 11042 DEBRIDEMENT: ICD-10-PCS | Mod: ,,, | Performed by: FAMILY MEDICINE

## 2023-06-20 PROCEDURE — 99214 OFFICE O/P EST MOD 30 MIN: CPT | Mod: 25,,, | Performed by: FAMILY MEDICINE

## 2023-06-20 NOTE — PROGRESS NOTES
[facility]                Progress Note    Subjective:       Patient ID: Fermin Henson is a 63 y.o. male.    Chief Complaint: No chief complaint on file.    HPI    Review of Systems      Objective:        Physical Exam    Vitals:    06/20/23 1044   BP: 132/70   Pulse: 94   Temp: 98.2 °F (36.8 °C)       Assessment:         No diagnosis found.         Altered Skin Integrity 12/06/22 1003 Right dorsal;distal Foot (Active)   12/06/22 1003   Altered Skin Integrity Present on Admission - Did Patient arrive to the hospital with altered skin?: yes   Side: Right   Orientation: dorsal;distal   Location: Foot   Wound Number:    Is this injury device related?:    Primary Wound Type:    Description of Altered Skin Integrity:    Ankle-Brachial Index:    Pulses:    Removal Indication and Assessment:    Wound Outcome:    (Retired) Wound Length (cm):    (Retired) Wound Width (cm):    (Retired) Depth (cm):    Wound Description (Comments):    Removal Indications:    Wound Image   06/20/23 1114   Description of Altered Skin Integrity Partial thickness tissue loss. Shallow open ulcer with a red or pink wound bed, without slough. Intact or Open/Ruptured Serum-filled blister. 06/20/23 1114   Dressing Appearance Dry;Intact 06/20/23 1114   Drainage Amount None 06/20/23 1114   Appearance Red 06/20/23 1114   Red (%), Wound Tissue Color 100 % 06/20/23 1114   Periwound Area Dry;Intact 06/20/23 1114   Wound Edges Defined 06/20/23 1114   Wound Length (cm) 2 cm 06/20/23 1114   Wound Width (cm) 2 cm 06/20/23 1114   Wound Depth (cm) 0.1 cm 06/20/23 1114   Wound Volume (cm^3) 0.4 cm^3 06/20/23 1114   Wound Surface Area (cm^2) 4 cm^2 06/20/23 1114   Care Cleansed with:;Sterile normal saline 06/20/23 1114   Dressing Changed 06/20/23 1114   Off Loading Football dressing 06/20/23 1114   Dressing Change Due 06/27/23 06/20/23 1114            Wound 04/12/22 1426 Diabetic Ulcer Right plantar Foot (Active)    04/12/22 1426    Pre-existing: Yes   Primary Wound Type: Diabetic ulc   Side: Right   Orientation: plantar   Location: Foot   Wound Number:    Ankle-Brachial Index:    Pulses:    Removal Indication and Assessment:    Wound Outcome: Healed   (Retired) Wound Type:    (Retired) Wound Length (cm):    (Retired) Wound Width (cm):    (Retired) Depth (cm):    Wound Description (Comments):    Removal Indications:    Wound Image   06/20/23 1114   Wound WDL ex 06/20/23 1114   Dressing Appearance Dry;Intact 06/20/23 1114   Drainage Amount None 06/20/23 1114   Appearance Red 06/20/23 1114   Red (%), Wound Tissue Color 100 % 06/20/23 1114   Periwound Area Intact;Dry 06/20/23 1114   Wound Length (cm) 0.1 cm 06/20/23 1114   Wound Width (cm) 0.1 cm 06/20/23 1114   Wound Depth (cm) 0.1 cm 06/20/23 1114   Wound Volume (cm^3) 0.001 cm^3 06/20/23 1114   Wound Surface Area (cm^2) 0.01 cm^2 06/20/23 1114   Care Cleansed with:;Antimicrobial agent;Sterile normal saline 06/20/23 1114   Dressing Changed 06/20/23 1114   Dressing Change Due 06/27/23 06/20/23 1114           Plan:              Tissue pathology and/or culture taken     [] Yes      [] No  Sharp debridement performed                   [] Yes       [] No  Labs ordered     [] Yes       [] No  Imaging ordered    [] Yes      [] No    No orders of the defined types were placed in this encounter.       No follow-ups on file.

## 2023-06-20 NOTE — PROGRESS NOTES
[facility]                Progress Note    Subjective:       Patient ID: Fermin Henson is a 63 y.o. male.    Chief Complaint: Wound Check    Walked to clinic unaided with no complaints of pain. Plantar wound was healed last week and is now open to 0.1 x 0.1 with blood blister to proximal end. Excoriation to dorsal foot try Polymem. Continue football. Refused printed AVS.    MD note:  patient following up to right lower foot.  He states that he kept dressings in place, clean, and dry.  No fevers, chills, or sweats.  No new wounds that he is aware of at this time.      Wound Check    Review of Systems   Constitutional: Negative.    HENT: Negative.     Eyes: Negative.    Respiratory: Negative.     Cardiovascular: Negative.    Gastrointestinal: Negative.    Skin:  Positive for wound.   Neurological:  Positive for numbness.   Psychiatric/Behavioral: Negative.         Objective:        Physical Exam  Constitutional:       Appearance: Normal appearance.   HENT:      Head: Normocephalic and atraumatic.      Right Ear: External ear normal.      Left Ear: External ear normal.      Mouth/Throat:      Mouth: Mucous membranes are moist.      Pharynx: Oropharynx is clear.   Eyes:      Extraocular Movements: Extraocular movements intact.      Conjunctiva/sclera: Conjunctivae normal.      Pupils: Pupils are equal, round, and reactive to light.   Pulmonary:      Effort: Pulmonary effort is normal. No respiratory distress.   Abdominal:      General: There is no distension.      Palpations: Abdomen is soft.   Skin:     General: Skin is warm.      Comments: +small opening to right plantar with mild serous drainage; no maceration,but there is excess callous and slough   Neurological:      Mental Status: He is alert.       Vitals:    06/20/23 1044   BP: 132/70   Pulse: 94   Temp: 98.2 °F (36.8 °C)     Debridement    Date/Time: 6/20/2023 9:46 AM  Performed by: See Jean-Baptiste,  "MD  Authorized by: See Jean-Baptiste MD     Time out: Immediately prior to procedure a "time out" was called to verify the correct patient, procedure, equipment, support staff and site/side marked as required.    Consent Done?:  Yes (Verbal)  Local anesthesia used?: No      Wound Details:    Location:  Right foot    Location:  Right Plantar    Type of Debridement:  Excisional       Length (cm):  0.3       Area (sq cm):  0.09       Width (cm):  0.3       Percent Debrided (%):  100       Depth (cm):  0.1       Total Area Debrided (sq cm):  0.09    Depth of debridement:  Subcutaneous tissue    Tissue debrided:  Dermis, Epidermis and Subcutaneous    Instruments:  Curette  Bleeding:  Minimal  Hemostasis Achieved: Yes  Method Used:  Pressure  Patient tolerance:  Patient tolerated the procedure well with no immediate complications    Assessment:           ICD-10-CM ICD-9-CM   1. Type 2 diabetes mellitus with diabetic polyneuropathy, without long-term current use of insulin  E11.42 250.60     357.2   2. Diabetic ulcer of right midfoot associated with type 2 diabetes mellitus, with fat layer exposed  E11.621 250.80    L97.412 707.14            Altered Skin Integrity 12/06/22 1003 Right dorsal;distal Foot (Active)   12/06/22 1003   Altered Skin Integrity Present on Admission - Did Patient arrive to the hospital with altered skin?: yes   Side: Right   Orientation: dorsal;distal   Location: Foot   Wound Number:    Is this injury device related?:    Primary Wound Type:    Description of Altered Skin Integrity:    Ankle-Brachial Index:    Pulses:    Removal Indication and Assessment:    Wound Outcome:    (Retired) Wound Length (cm):    (Retired) Wound Width (cm):    (Retired) Depth (cm):    Wound Description (Comments):    Removal Indications:    Wound Image   06/20/23 1114   Description of Altered Skin Integrity Partial thickness tissue loss. Shallow open ulcer with a red or pink wound bed, without slough. Intact or Open/Ruptured " Serum-filled blister. 06/20/23 1114   Dressing Appearance Dry;Intact 06/20/23 1114   Drainage Amount None 06/20/23 1114   Appearance Red 06/20/23 1114   Red (%), Wound Tissue Color 100 % 06/20/23 1114   Periwound Area Dry;Intact 06/20/23 1114   Wound Edges Defined 06/20/23 1114   Wound Length (cm) 2 cm 06/20/23 1114   Wound Width (cm) 2 cm 06/20/23 1114   Wound Depth (cm) 0.1 cm 06/20/23 1114   Wound Volume (cm^3) 0.4 cm^3 06/20/23 1114   Wound Surface Area (cm^2) 4 cm^2 06/20/23 1114   Care Cleansed with:;Sterile normal saline 06/20/23 1114   Dressing Changed 06/20/23 1114   Off Loading Football dressing 06/20/23 1114   Dressing Change Due 06/27/23 06/20/23 1114            Wound 04/12/22 1426 Diabetic Ulcer Right plantar Foot (Active)   04/12/22 1426    Pre-existing: Yes   Primary Wound Type: Diabetic ulc   Side: Right   Orientation: plantar   Location: Foot   Wound Number:    Ankle-Brachial Index:    Pulses:    Removal Indication and Assessment:    Wound Outcome: Healed   (Retired) Wound Type:    (Retired) Wound Length (cm):    (Retired) Wound Width (cm):    (Retired) Depth (cm):    Wound Description (Comments):    Removal Indications:    Wound Image   06/20/23 1114   Wound WDL ex 06/20/23 1114   Dressing Appearance Dry;Intact 06/20/23 1114   Drainage Amount None 06/20/23 1114   Appearance Red 06/20/23 1114   Red (%), Wound Tissue Color 100 % 06/20/23 1114   Periwound Area Intact;Dry 06/20/23 1114   Wound Length (cm) 0.1 cm 06/20/23 1114   Wound Width (cm) 0.1 cm 06/20/23 1114   Wound Depth (cm) 0.1 cm 06/20/23 1114   Wound Volume (cm^3) 0.001 cm^3 06/20/23 1114   Wound Surface Area (cm^2) 0.01 cm^2 06/20/23 1114   Care Cleansed with:;Antimicrobial agent;Sterile normal saline 06/20/23 1114   Dressing Changed 06/20/23 1114   Dressing Change Due 06/27/23 06/20/23 1114           Plan:              Tissue pathology and/or culture taken     [] Yes      [x] No  Sharp debridement performed                   [x] Yes        [] No  Labs ordered     [] Yes       [x] No  Imaging ordered    [] Yes      [x] No    Orders Placed This Encounter   Procedures    Debridement     This order was created via procedure documentation     Standing Status:   Standing     Number of Occurrences:   1    Change dressing     Polymem to dorsal wound. Liz to plantar wound covered by regular Mextra.   Two Gigi foams in perpendicular fashion to plantar foot.  Three cast padding/Coban football        Follow up in about 1 week (around 6/27/2023).       See Jean-Baptiste MD

## 2023-06-27 ENCOUNTER — HOSPITAL ENCOUNTER (OUTPATIENT)
Dept: WOUND CARE | Facility: HOSPITAL | Age: 63
Discharge: HOME | End: 2023-06-27
Attending: FAMILY MEDICINE
Payer: MEDICARE

## 2023-06-27 DIAGNOSIS — L97.412 DIABETIC ULCER OF RIGHT MIDFOOT ASSOCIATED WITH TYPE 2 DIABETES MELLITUS, WITH FAT LAYER EXPOSED: Primary | ICD-10-CM

## 2023-06-27 DIAGNOSIS — E11.621 DIABETIC ULCER OF RIGHT MIDFOOT ASSOCIATED WITH TYPE 2 DIABETES MELLITUS, WITH FAT LAYER EXPOSED: Primary | ICD-10-CM

## 2023-06-27 PROCEDURE — 99214 OFFICE O/P EST MOD 30 MIN: CPT | Mod: 25,,, | Performed by: FAMILY MEDICINE

## 2023-06-27 PROCEDURE — 11042 DBRDMT SUBQ TIS 1ST 20SQCM/<: CPT | Mod: ,,, | Performed by: FAMILY MEDICINE

## 2023-06-27 PROCEDURE — 11042 DEBRIDEMENT: ICD-10-PCS | Mod: ,,, | Performed by: FAMILY MEDICINE

## 2023-06-27 PROCEDURE — 99214 PR OFFICE/OUTPT VISIT, EST, LEVL IV, 30-39 MIN: ICD-10-PCS | Mod: 25,,, | Performed by: FAMILY MEDICINE

## 2023-06-27 PROCEDURE — 11042 DBRDMT SUBQ TIS 1ST 20SQCM/<: CPT | Performed by: FAMILY MEDICINE

## 2023-06-27 NOTE — PROGRESS NOTES
[facility]                Progress Note    Subjective:       Patient ID: Fermin Henson is a 63 y.o. male.    Chief Complaint: Wound Consult    Follow up wound care visit. Patient ambulated to exam room unaided, with prescribed Darco shoe w/ PegAssist on Right Foot. No c/o pain to wound beds at present. Dressing intact with a small amount of serosanguineous, non-malodor drainage from Dorsal aspect of Right Foot.  Right Plantar Foot wound is measuring the same as last visit with very little depth noted.      Wound care done as per order. Return to clinic in 1 week on Monday due to upcoming holiday. Patient declined AVS.      MD note:  patient following up today for DFU to right foot.  He states that he has been staying inside due to the heat and has been offloading as directed.  No malodorous drainage that he has noticed.  No fevers, chills, or sweats.  He states he has kept dressings in place, clean, and dry.       Review of Systems   Constitutional: Negative.    HENT: Negative.     Eyes: Negative.    Respiratory: Negative.     Cardiovascular: Negative.    Gastrointestinal: Negative.    Skin:  Positive for wound.   Neurological:  Positive for numbness.       Objective:        Physical Exam  Constitutional:       Appearance: Normal appearance.   HENT:      Head: Normocephalic and atraumatic.      Right Ear: External ear normal.      Left Ear: External ear normal.      Mouth/Throat:      Mouth: Mucous membranes are moist.      Pharynx: Oropharynx is clear.   Eyes:      Extraocular Movements: Extraocular movements intact.      Conjunctiva/sclera: Conjunctivae normal.      Pupils: Pupils are equal, round, and reactive to light.   Abdominal:      General: There is no distension.      Palpations: Abdomen is soft.   Skin:     General: Skin is warm.      Comments: +callous and hematoma present to right plantar; no active drainage   Neurological:      Mental Status: He is  "alert and oriented to person, place, and time. Mental status is at baseline.      Sensory: Sensory deficit present.       There were no vitals filed for this visit.  Debridement    Date/Time: 6/27/2023 9:43 AM  Performed by: See Jean-Baptiste MD  Authorized by: See Jean-Baptiste MD     Time out: Immediately prior to procedure a "time out" was called to verify the correct patient, procedure, equipment, support staff and site/side marked as required.    Consent Done?:  Yes (Written)  Local anesthesia used?: No      Type of Debridement:  Excisional       Length (cm):  1       Area (sq cm):  1       Width (cm):  1       Percent Debrided (%):  100       Depth (cm):  0.1       Total Area Debrided (sq cm):  1    Depth of debridement:  Subcutaneous tissue    Tissue debrided:  Dermis, Epidermis and Subcutaneous    Devitalized tissue debrided:  Biofilm, Callus and Fibrin    Instruments:  Curette  Bleeding:  Minimal  Hemostasis Achieved: Yes  Method Used:  Pressure  Patient tolerance:  Patient tolerated the procedure well with no immediate complications    Assessment:           ICD-10-CM ICD-9-CM   1. Diabetic ulcer of right midfoot associated with type 2 diabetes mellitus, with fat layer exposed  E11.621 250.80    L97.412 707.14            Altered Skin Integrity 12/06/22 1003 Right dorsal;distal Foot (Active)   12/06/22 1003   Altered Skin Integrity Present on Admission - Did Patient arrive to the hospital with altered skin?: yes   Side: Right   Orientation: dorsal;distal   Location: Foot   Wound Number:    Is this injury device related?:    Primary Wound Type:    Description of Altered Skin Integrity:    Ankle-Brachial Index:    Pulses:    Removal Indication and Assessment:    Wound Outcome:    (Retired) Wound Length (cm):    (Retired) Wound Width (cm):    (Retired) Depth (cm):    Wound Description (Comments):    Removal Indications:    Wound Image   06/27/23 1154   Dressing Appearance Intact;Moist drainage 06/27/23 1154 "   Drainage Amount Small 06/27/23 1154   Drainage Characteristics/Odor Serosanguineous;No odor 06/27/23 1154   Appearance Red;Moist 06/27/23 1154   Tissue loss description Partial thickness 06/27/23 1154   Black (%), Wound Tissue Color 0 % 06/27/23 1154   Red (%), Wound Tissue Color 100 % 06/27/23 1154   Yellow (%), Wound Tissue Color 0 % 06/27/23 1154   Periwound Area Dry 06/27/23 1154   Wound Edges Defined;Open 06/27/23 1154   Wound Length (cm) 3 cm 06/27/23 1154   Wound Width (cm) 2 cm 06/27/23 1154   Wound Depth (cm) 0.1 cm 06/27/23 1154   Wound Volume (cm^3) 0.6 cm^3 06/27/23 1154   Wound Surface Area (cm^2) 6 cm^2 06/27/23 1154   Tunneling (depth (cm)/location) 0 06/27/23 1154   Undermining (depth (cm)/location) 0 06/27/23 1154   Care Cleansed with:;Antimicrobial agent;Sterile normal saline 06/27/23 1154   Dressing Changed 06/27/23 1154   Periwound Care Absorptive dressing applied 06/27/23 1154   Off Loading Football dressing;Off loading shoe 06/27/23 1154   Dressing Change Due 07/03/23 06/27/23 1154            Wound 04/12/22 1426 Diabetic Ulcer Right plantar Foot (Active)   04/12/22 1426    Pre-existing: Yes   Primary Wound Type: Diabetic ulc   Side: Right   Orientation: plantar   Location: Foot   Wound Number:    Ankle-Brachial Index:    Pulses:    Removal Indication and Assessment:    Wound Outcome: Healed   (Retired) Wound Type:    (Retired) Wound Length (cm):    (Retired) Wound Width (cm):    (Retired) Depth (cm):    Wound Description (Comments):    Removal Indications:    Wound Image    06/27/23 1154   Wound WDL ex 06/27/23 1154   Dressing Appearance Intact;Dry 06/27/23 1154   Drainage Amount None 06/27/23 1154   Appearance Red 06/27/23 1154   Black (%), Wound Tissue Color 0 % 06/27/23 1154   Red (%), Wound Tissue Color 100 % 06/27/23 1154   Yellow (%), Wound Tissue Color 0 % 06/27/23 1154   Periwound Area Dry 06/27/23 1154   Wound Edges Callused;Defined;Open 06/27/23 1154   Wound Length (cm) 0.1 cm  06/27/23 1154   Wound Width (cm) 0.1 cm 06/27/23 1154   Wound Depth (cm) 0 cm 06/27/23 1154   Wound Volume (cm^3) 0 cm^3 06/27/23 1154   Wound Surface Area (cm^2) 0.01 cm^2 06/27/23 1154   Tunneling (depth (cm)/location) 0 06/27/23 1154   Undermining (depth (cm)/location) 0 06/27/23 1154   Care Cleansed with:;Antimicrobial agent;Sterile normal saline 06/27/23 1154   Dressing Changed 06/27/23 1154   Periwound Care Dry periwound area maintained 06/27/23 1154   Off Loading Football dressing;Off loading shoe 06/27/23 1154   Dressing Change Due 07/03/23 06/27/23 1154           Plan:              Tissue pathology and/or culture taken     [] Yes      [x] No  Sharp debridement performed                   [x] Yes       [] No  Labs ordered     [] Yes       [x] No  Imaging ordered    [] Yes      [x] No    Orders Placed This Encounter   Procedures    Debridement     This order was created via procedure documentation     Standing Status:   Standing     Number of Occurrences:   1    Change dressing     Wound Dressing Orders    Right Foot    Dressing change frequency once a week  Remove old dressing  Cleanse or irrigate with: Normal Saline  Protect periwound with:  periwound: Cavilon   Primary dressing - adjust to fit: WOUND BASE: Optifoam Ag over both wounds  Secondary dressing: Gigi Foam Long x 2 laid perpendicular to plantar foot  Off-load: Football (cast padding x 3 rolls), Coban and Darco shoe with PegAssist insert on the affected foot        Follow up in about 6 days (around 7/3/2023) for wound care.       See Jean-Baptiste MD

## 2023-07-03 ENCOUNTER — HOSPITAL ENCOUNTER (OUTPATIENT)
Dept: WOUND CARE | Facility: HOSPITAL | Age: 63
Discharge: HOME | End: 2023-07-03
Attending: INTERNAL MEDICINE
Payer: MEDICARE

## 2023-07-03 VITALS — DIASTOLIC BLOOD PRESSURE: 72 MMHG | HEART RATE: 91 BPM | TEMPERATURE: 98 F | SYSTOLIC BLOOD PRESSURE: 143 MMHG

## 2023-07-03 DIAGNOSIS — E11.621 DIABETIC ULCER OF RIGHT MIDFOOT ASSOCIATED WITH TYPE 2 DIABETES MELLITUS, WITH FAT LAYER EXPOSED: Primary | ICD-10-CM

## 2023-07-03 DIAGNOSIS — L97.412 DIABETIC ULCER OF RIGHT MIDFOOT ASSOCIATED WITH TYPE 2 DIABETES MELLITUS, WITH FAT LAYER EXPOSED: Primary | ICD-10-CM

## 2023-07-03 PROCEDURE — 11042 DBRDMT SUBQ TIS 1ST 20SQCM/<: CPT | Mod: HCNC,,, | Performed by: INTERNAL MEDICINE

## 2023-07-03 PROCEDURE — 11042 DBRDMT SUBQ TIS 1ST 20SQCM/<: CPT | Mod: HCNC | Performed by: INTERNAL MEDICINE

## 2023-07-03 PROCEDURE — 99214 PR OFFICE/OUTPT VISIT, EST, LEVL IV, 30-39 MIN: ICD-10-PCS | Mod: 25,HCNC,, | Performed by: INTERNAL MEDICINE

## 2023-07-03 PROCEDURE — 99214 OFFICE O/P EST MOD 30 MIN: CPT | Mod: 25,HCNC,, | Performed by: INTERNAL MEDICINE

## 2023-07-03 PROCEDURE — 11042 WOUND DEBRIDEMENT: ICD-10-PCS | Mod: HCNC,,, | Performed by: INTERNAL MEDICINE

## 2023-07-03 NOTE — PROCEDURES
Wound Debridement    Date/Time: 7/3/2023 10:14 AM  Performed by: Tarun Weaver MD  Authorized by: Tarun Weaver MD     Consent Done?:  Yes (Verbal)  Local anesthesia used?: No      Wound Details:    Location:  Right foot    Location:  Right Plantar    Type of Debridement:  Excisional       Length (cm):  0.5       Area (sq cm):  0.15       Width (cm):  0.3       Percent Debrided (%):  100       Depth (cm):  0.1       Total Area Debrided (sq cm):  0.15    Depth of debridement:  Subcutaneous tissue    Tissue debrided:  Subcutaneous    Devitalized tissue debrided:  Biofilm and Callus    Instruments:  Curette  Bleeding:  None  Patient tolerance:  Patient tolerated the procedure well with no immediate complications

## 2023-07-03 NOTE — PROGRESS NOTES
[facility]                Progress Note    Subjective:       Patient ID: Fermin Henson is a 63 y.o. male.    Chief Complaint: Wound Check    Walked to clinic unaided. No complaints pain. Dorsal foot continues to have open area and is measured in cluster and less breakdown although measures bigger. Plantar wound callused with small black dry area to center pre debridement. Post debridement continues to have shallow pink opening same size as last week. Adjustment in foam application for more cushion over wound. Refused printed AVS.    Using darco shoe no strike through drainage of dressing insensate of foot due to neuropathy    Wound Check    Review of Systems      Objective:        Physical Exam  Constitutional:       General: He is not in acute distress.     Appearance: He is obese.   Cardiovascular:      Pulses: Normal pulses.   Pulmonary:      Effort: Pulmonary effort is normal. No respiratory distress.   Musculoskeletal:      Right lower leg: No edema.      Left lower leg: No edema.   Skin:     Comments: Planter wound with pin point opening with pink/red base, no exposed bone or tendon there is periwound callous    Dorsal foot with pink intact epithelium   Neurological:      Mental Status: He is alert.       Vitals:    07/03/23 1036   BP: (!) 143/72   Pulse: 91   Temp: 97.7 °F (36.5 °C)       Assessment:           ICD-10-CM ICD-9-CM   1. Diabetic ulcer of right midfoot associated with type 2 diabetes mellitus, with fat layer exposed  E11.621 250.80    L97.412 707.14            Altered Skin Integrity 12/06/22 1003 Right dorsal;distal Foot (Active)   12/06/22 1003   Altered Skin Integrity Present on Admission - Did Patient arrive to the hospital with altered skin?: yes   Side: Right   Orientation: dorsal;distal   Location: Foot   Wound Number:    Is this injury device related?:    Primary Wound Type:    Description of Altered Skin Integrity:    Ankle-Brachial  Index:    Pulses:    Removal Indication and Assessment:    Wound Outcome:    (Retired) Wound Length (cm):    (Retired) Wound Width (cm):    (Retired) Depth (cm):    Wound Description (Comments):    Removal Indications:    Wound Image   07/03/23 1112   Description of Altered Skin Integrity Partial thickness tissue loss. Shallow open ulcer with a red or pink wound bed, without slough. Intact or Open/Ruptured Serum-filled blister. 07/03/23 1112   Dressing Appearance Dry;Intact 07/03/23 1112   Drainage Amount None 07/03/23 1112   Appearance Pink 07/03/23 1112   Tissue loss description Partial thickness 07/03/23 1112   Red (%), Wound Tissue Color 100 % 07/03/23 1112   Periwound Area Intact;Dry 07/03/23 1112   Wound Edges Defined 07/03/23 1112   Wound Length (cm) 4 cm 07/03/23 1112   Wound Width (cm) 2 cm 07/03/23 1112   Wound Depth (cm) 0.1 cm 07/03/23 1112   Wound Volume (cm^3) 0.8 cm^3 07/03/23 1112   Wound Surface Area (cm^2) 8 cm^2 07/03/23 1112   Care Cleansed with:;Antimicrobial agent;Sterile normal saline 07/03/23 1112   Dressing Changed 07/03/23 1112   Off Loading Football dressing;Off loading shoe 07/03/23 1112   Dressing Change Due 07/11/23 07/03/23 1112            Wound 04/12/22 1426 Diabetic Ulcer Right plantar Foot (Active)   04/12/22 1426    Pre-existing: Yes   Primary Wound Type: Diabetic ulc   Side: Right   Orientation: plantar   Location: Foot   Wound Number:    Ankle-Brachial Index:    Pulses:    Removal Indication and Assessment:    Wound Outcome: Healed   (Retired) Wound Type:    (Retired) Wound Length (cm):    (Retired) Wound Width (cm):    (Retired) Depth (cm):    Wound Description (Comments):    Removal Indications:    Wound Image   07/03/23 1112   Wound WDL ex 07/03/23 1112   Dressing Appearance Dry;Intact 07/03/23 1112   Drainage Amount None 07/03/23 1112   Appearance Pink 07/03/23 1112   Tissue loss description Partial thickness 07/03/23 1112   Red (%), Wound Tissue Color 100 % 07/03/23 2676    Periwound Area Dry;Intact 07/03/23 1112   Wound Edges Defined 07/03/23 1112   Wound Length (cm) 0.1 cm 07/03/23 1112   Wound Width (cm) 0.1 cm 07/03/23 1112   Wound Depth (cm) 0.1 cm 07/03/23 1112   Wound Volume (cm^3) 0.001 cm^3 07/03/23 1112   Wound Surface Area (cm^2) 0.01 cm^2 07/03/23 1112   Care Cleansed with:;Antimicrobial agent;Sterile normal saline 07/03/23 1112   Dressing Changed 07/03/23 1112   Off Loading Football dressing;Off loading shoe 07/03/23 1112   Dressing Change Due 07/11/23 07/03/23 1112           Plan:          Callous debrided away from plantar wound continue offloading dressing return in one week for wound check    Tissue pathology and/or culture taken     [] Yes      [x] No  Sharp debridement performed                   [x] Yes       [] No  Labs ordered     [] Yes       [x] No  Imaging ordered    [] Yes      [x] No    Orders Placed This Encounter   Procedures    Change dressing     Dressing change frequency once a week   Remove old dressing   Cleanse or irrigate with: Normal Saline   Protect periwound with:  periwound: Cavilon   Primary dressing - adjust to fit: WOUND BASE: Optifoam Ag over both wounds   Secondary dressing: Gigi Foam Long x 2 laid perpendicular   Off-load: Football (cast padding x 3 rolls), Coban and Darco shoe with PegAssist insert on the affected foot        Follow up in about 1 week (around 7/10/2023).

## 2023-07-11 ENCOUNTER — HOSPITAL ENCOUNTER (OUTPATIENT)
Dept: WOUND CARE | Facility: HOSPITAL | Age: 63
Discharge: HOME | End: 2023-07-11
Attending: FAMILY MEDICINE
Payer: MEDICARE

## 2023-07-11 DIAGNOSIS — E11.42 TYPE 2 DIABETES MELLITUS WITH DIABETIC POLYNEUROPATHY, WITHOUT LONG-TERM CURRENT USE OF INSULIN: Primary | ICD-10-CM

## 2023-07-11 DIAGNOSIS — L97.509 TYPE 2 DIABETES WITH SKIN ULCER OF FOOT: ICD-10-CM

## 2023-07-11 DIAGNOSIS — L97.412 DIABETIC ULCER OF RIGHT MIDFOOT ASSOCIATED WITH TYPE 2 DIABETES MELLITUS, WITH FAT LAYER EXPOSED: Primary | ICD-10-CM

## 2023-07-11 DIAGNOSIS — E11.621 DIABETIC ULCER OF RIGHT MIDFOOT ASSOCIATED WITH TYPE 2 DIABETES MELLITUS, WITH FAT LAYER EXPOSED: Primary | ICD-10-CM

## 2023-07-11 DIAGNOSIS — E11.621 DIABETIC ULCER OF RIGHT MIDFOOT ASSOCIATED WITH TYPE 2 DIABETES MELLITUS, WITH FAT LAYER EXPOSED: ICD-10-CM

## 2023-07-11 DIAGNOSIS — L97.412 DIABETIC ULCER OF RIGHT MIDFOOT ASSOCIATED WITH TYPE 2 DIABETES MELLITUS, WITH FAT LAYER EXPOSED: ICD-10-CM

## 2023-07-11 DIAGNOSIS — E11.621 TYPE 2 DIABETES WITH SKIN ULCER OF FOOT: ICD-10-CM

## 2023-07-11 PROCEDURE — 99214 OFFICE O/P EST MOD 30 MIN: CPT | Mod: HCNC,,, | Performed by: FAMILY MEDICINE

## 2023-07-11 PROCEDURE — 99212 OFFICE O/P EST SF 10 MIN: CPT | Mod: HCNC | Performed by: FAMILY MEDICINE

## 2023-07-11 PROCEDURE — 99214 PR OFFICE/OUTPT VISIT, EST, LEVL IV, 30-39 MIN: ICD-10-PCS | Mod: HCNC,,, | Performed by: FAMILY MEDICINE

## 2023-07-11 NOTE — PROGRESS NOTES
[facility]                Progress Note    Subjective:       Patient ID: Fermin Henson is a 63 y.o. male.    Chief Complaint: Wound Check    Walked to clinic unaided. Dorsal foot continues to have open area and advised to follow up with dermatology. Advised to follow up with Dr Shannon as soon as possible for shoe insert. Consult will be placed.Plantar foot healed. Dried skin removed. Discussed precautions to take to stay healed. Pt has reopened foot so many times in past that has no confidence in staying healed. Aware that he must take precautions for another two weeks to assure healing.  Will D/C from wound care as wound healed. Dorsal foot red and half the size it was last week. Pt states that when we stop wrapping this area in football it dries out. Follow up PRN.    MD note:  patient following up today for right plantar DFU.  He reports to have been offloading as directed.  He denies any odor from wound and reports that he has kept dressings in place, clean, and dry.  No new wounds that he is aware of at this time.     Wound Check      Review of Systems   Constitutional: Negative.    HENT: Negative.     Eyes: Negative.    Respiratory: Negative.     Cardiovascular: Negative.    Gastrointestinal: Negative.    Skin:  Positive for wound.   Neurological:  Positive for numbness.   Psychiatric/Behavioral: Negative.         Objective:        Physical Exam  Constitutional:       Appearance: Normal appearance.   HENT:      Head: Normocephalic and atraumatic.      Right Ear: External ear normal.      Left Ear: External ear normal.      Mouth/Throat:      Mouth: Mucous membranes are moist.      Pharynx: Oropharynx is clear.   Eyes:      Extraocular Movements: Extraocular movements intact.      Conjunctiva/sclera: Conjunctivae normal.      Pupils: Pupils are equal, round, and reactive to light.   Abdominal:      General: There is no distension.      Palpations: Abdomen is  soft.   Musculoskeletal:      Right lower leg: No edema.      Left lower leg: No edema.   Skin:     General: Skin is warm.      Comments: +plantar wound has healed  +dorsal keloid is irritated, but no active drainage   Neurological:      Mental Status: He is alert and oriented to person, place, and time. Mental status is at baseline.       There were no vitals filed for this visit.    Assessment:           ICD-10-CM ICD-9-CM   1. Diabetic ulcer of right midfoot associated with type 2 diabetes mellitus, with fat layer exposed  E11.621 250.80    L97.412 707.14   2. Type 2 diabetes with skin ulcer of foot  E11.621 250.80    L97.509 707.15            Altered Skin Integrity 12/06/22 1003 Right dorsal;distal Foot (Active)   12/06/22 1003   Altered Skin Integrity Present on Admission - Did Patient arrive to the hospital with altered skin?: yes   Side: Right   Orientation: dorsal;distal   Location: Foot   Wound Number:    Is this injury device related?:    Primary Wound Type:    Description of Altered Skin Integrity:    Ankle-Brachial Index:    Pulses:    Removal Indication and Assessment:    Wound Outcome:    (Retired) Wound Length (cm):    (Retired) Wound Width (cm):    (Retired) Depth (cm):    Wound Description (Comments):    Removal Indications:    Wound Image   07/11/23 1148   Description of Altered Skin Integrity Partial thickness tissue loss. Shallow open ulcer with a red or pink wound bed, without slough. Intact or Open/Ruptured Serum-filled blister. 07/11/23 1148   Dressing Appearance Dry;Intact;Dried drainage 07/11/23 1148   Drainage Amount Scant 07/11/23 1148   Drainage Characteristics/Odor Serosanguineous 07/11/23 1148   Appearance Red 07/11/23 1148   Tissue loss description Partial thickness 07/11/23 1148   Red (%), Wound Tissue Color 100 % 07/11/23 1148   Periwound Area Dry;Intact 07/11/23 1148   Wound Length (cm) 1.5 cm 07/11/23 1148   Wound Width (cm) 2 cm 07/11/23 1148   Wound Depth (cm) 0.1 cm 07/11/23  1148   Wound Volume (cm^3) 0.3 cm^3 07/11/23 1148   Wound Surface Area (cm^2) 3 cm^2 07/11/23 1148   Care Cleansed with:;Antimicrobial agent;Sterile normal saline 07/11/23 1148   Dressing Changed 07/11/23 1148            Wound 04/12/22 1426 Diabetic Ulcer Right plantar Foot (Active)   04/12/22 1426    Pre-existing: Yes   Primary Wound Type: Diabetic ulc   Side: Right   Orientation: plantar   Location: Foot   Wound Number:    Ankle-Brachial Index:    Pulses:    Removal Indication and Assessment:    Wound Outcome: Healed   (Retired) Wound Type:    (Retired) Wound Length (cm):    (Retired) Wound Width (cm):    (Retired) Depth (cm):    Wound Description (Comments):    Removal Indications:    Wound Image   07/11/23 1148   Dressing Appearance Dry;Intact 07/11/23 1148   Wound Length (cm) 0 cm 07/11/23 1148   Wound Width (cm) 0 cm 07/11/23 1148   Wound Depth (cm) 0 cm 07/11/23 1148   Wound Volume (cm^3) 0 cm^3 07/11/23 1148   Wound Surface Area (cm^2) 0 cm^2 07/11/23 1148   Care Cleansed with:;Antimicrobial agent;Sterile normal saline 07/11/23 1148   Dressing Removed 07/11/23 1148           Plan:              Tissue pathology and/or culture taken     [] Yes      [x] No  Sharp debridement performed                   [] Yes       [x] No  Labs ordered     [] Yes       [x] No  Imaging ordered    [] Yes      [x] No      Wound appears healed  Refer to podiatry for shoe inserts     Follow up if symptoms worsen or fail to improve.     See Jean-Baptiste MD

## 2023-07-29 DIAGNOSIS — E11.9 TYPE 2 DIABETES MELLITUS WITHOUT COMPLICATION, WITHOUT LONG-TERM CURRENT USE OF INSULIN: ICD-10-CM

## 2023-07-29 NOTE — TELEPHONE ENCOUNTER
No care due was identified.  [facility] Embedded Care Due Messages. Reference number: [id number].   7/29/2023 6:59:28 PM CDT

## 2023-07-30 RX ORDER — PIOGLITAZONEHYDROCHLORIDE 15 MG/1
TABLET ORAL
Qty: 90 TABLET | Refills: 1 | Status: SHIPPED | OUTPATIENT
Start: 2023-07-30 | End: 2023-09-26 | Stop reason: SDUPTHER

## 2023-07-30 NOTE — TELEPHONE ENCOUNTER
Refill Authorization Note     Refill Decision Note   Fermin Henson  is requesting a refill authorization.  Brief Assessment and Rationale for Refill:        Medication Therapy Plan:       Medication Reconciliation Completed: No   Comments:     No Care Gaps recommended.     Note composed:11:48 AM 07/30/2023

## 2023-08-10 ENCOUNTER — OFFICE VISIT (OUTPATIENT)
Dept: PODIATRY | Facility: CLINIC | Age: 63
End: 2023-08-10
Payer: MEDICARE

## 2023-08-10 VITALS — BODY MASS INDEX: 30.1 KG/M2 | HEIGHT: 72 IN | WEIGHT: 222.25 LBS

## 2023-08-10 DIAGNOSIS — L84 CORN OR CALLUS: ICD-10-CM

## 2023-08-10 DIAGNOSIS — M20.42 HAMMER TOE OF LEFT FOOT: ICD-10-CM

## 2023-08-10 DIAGNOSIS — M20.5X2 HALLUX LIMITUS, ACQUIRED, LEFT: ICD-10-CM

## 2023-08-10 DIAGNOSIS — Z89.421 HX OF AMPUTATION OF LESSER TOE, RIGHT: ICD-10-CM

## 2023-08-10 DIAGNOSIS — Z89.411 RIGHT GREAT TOE AMPUTEE: ICD-10-CM

## 2023-08-10 DIAGNOSIS — E11.49 TYPE II DIABETES MELLITUS WITH NEUROLOGICAL MANIFESTATIONS: Primary | ICD-10-CM

## 2023-08-10 PROCEDURE — 1160F RVW MEDS BY RX/DR IN RCRD: CPT | Mod: HCNC,CPTII,S$GLB, | Performed by: PODIATRIST

## 2023-08-10 PROCEDURE — 3008F BODY MASS INDEX DOCD: CPT | Mod: HCNC,CPTII,S$GLB, | Performed by: PODIATRIST

## 2023-08-10 PROCEDURE — 1159F PR MEDICATION LIST DOCUMENTED IN MEDICAL RECORD: ICD-10-PCS | Mod: HCNC,CPTII,S$GLB, | Performed by: PODIATRIST

## 2023-08-10 PROCEDURE — 4010F ACE/ARB THERAPY RXD/TAKEN: CPT | Mod: HCNC,CPTII,S$GLB, | Performed by: PODIATRIST

## 2023-08-10 PROCEDURE — 99214 OFFICE O/P EST MOD 30 MIN: CPT | Mod: HCNC,S$GLB,, | Performed by: PODIATRIST

## 2023-08-10 PROCEDURE — 1159F MED LIST DOCD IN RCRD: CPT | Mod: HCNC,CPTII,S$GLB, | Performed by: PODIATRIST

## 2023-08-10 PROCEDURE — 4010F PR ACE/ARB THEARPY RXD/TAKEN: ICD-10-PCS | Mod: HCNC,CPTII,S$GLB, | Performed by: PODIATRIST

## 2023-08-10 PROCEDURE — 99999 PR PBB SHADOW E&M-EST. PATIENT-LVL IV: ICD-10-PCS | Mod: PBBFAC,HCNC,, | Performed by: PODIATRIST

## 2023-08-10 PROCEDURE — 1101F PT FALLS ASSESS-DOCD LE1/YR: CPT | Mod: HCNC,CPTII,S$GLB, | Performed by: PODIATRIST

## 2023-08-10 PROCEDURE — 3044F HG A1C LEVEL LT 7.0%: CPT | Mod: HCNC,CPTII,S$GLB, | Performed by: PODIATRIST

## 2023-08-10 PROCEDURE — 3044F PR MOST RECENT HEMOGLOBIN A1C LEVEL <7.0%: ICD-10-PCS | Mod: HCNC,CPTII,S$GLB, | Performed by: PODIATRIST

## 2023-08-10 PROCEDURE — 3288F FALL RISK ASSESSMENT DOCD: CPT | Mod: HCNC,CPTII,S$GLB, | Performed by: PODIATRIST

## 2023-08-10 PROCEDURE — 1160F PR REVIEW ALL MEDS BY PRESCRIBER/CLIN PHARMACIST DOCUMENTED: ICD-10-PCS | Mod: HCNC,CPTII,S$GLB, | Performed by: PODIATRIST

## 2023-08-10 PROCEDURE — 3288F PR FALLS RISK ASSESSMENT DOCUMENTED: ICD-10-PCS | Mod: HCNC,CPTII,S$GLB, | Performed by: PODIATRIST

## 2023-08-10 PROCEDURE — 99999 PR PBB SHADOW E&M-EST. PATIENT-LVL IV: CPT | Mod: PBBFAC,HCNC,, | Performed by: PODIATRIST

## 2023-08-10 PROCEDURE — 1101F PR PT FALLS ASSESS DOC 0-1 FALLS W/OUT INJ PAST YR: ICD-10-PCS | Mod: HCNC,CPTII,S$GLB, | Performed by: PODIATRIST

## 2023-08-10 PROCEDURE — 99214 PR OFFICE/OUTPT VISIT, EST, LEVL IV, 30-39 MIN: ICD-10-PCS | Mod: HCNC,S$GLB,, | Performed by: PODIATRIST

## 2023-08-10 PROCEDURE — 3008F PR BODY MASS INDEX (BMI) DOCUMENTED: ICD-10-PCS | Mod: HCNC,CPTII,S$GLB, | Performed by: PODIATRIST

## 2023-08-10 NOTE — PATIENT INSTRUCTIONS
Over the counter pain creams: Voltaren Gel, Biofreeze, Bengay, tiger balm, two old goat, lidocaine gel,  Absorbine Veterinary Liniment Gel Topical Analgesic Sore Muscle and Joint Pain Relief  Recommend lotions: eucerin, eucerin for diabetics, aquaphor, A&D ointment, gold bond for diabetics, sween, Portland's Bees all purpose baby ointment,  urea 40 with aloe or SkinIntegra rapid crack repair (found on amazon.com)  Shoe recommendations: (try 6pm.com, zappos.com , nordstromrack.PublicRelay, or shoes.com for discounted prices) you can visit varsity shoes in [place], DSW shoes in [place]  or sol rack in the [facility] (there are also several shoe brand outlets in the [facility])  ONLY purchase stability style tennis shoes NO flex, foam, free, yoga mat style shoes  Asics (GT 4000 or gel foundations), new balance stability type shoes (such as the 940 series), saucony (stabil c3),  Steve (GTS or Beast or transcend), propet, HokaOne (tennis shoe) Scotty (tennis shoes and boots)  Sofft Brand (women) Juany&Luis (men), clarks, crocs, aerosoles, naturalizers, SAS, ecco, born, isai lazar, rockports (dress shoes)  Vionic, burkenstocks, fitflops, propet, taos, baretraps (sandals)  HokaOne sandals, crocs, propet (house shoes)    Nail Home remedy:  Vicks Vapor rub or Emuaid to nails for easier manageability    Diabetes: Inspecting Your Feet  Diabetes increases your chances of developing foot problems. So inspect your feet every day. This helps you find small skin irritations before they become serious infections. If you have trouble seeing the bottoms of your feet, use a mirror or ask a family member or friend to help.     Pressure spots on the bottom of the foot are common areas where problems develop.   How to check your feet  Below are tips to help you look for foot problems. Try to check your feet at the same time each day, such as when you get out of bed in the morning:  Check the top of each foot. The tops of toes, back of  the heel, and outer edge of the foot can get a lot of rubbing from poor-fitting shoes.  Check the bottom of each foot. Daily wear and tear often leads to problems at pressure spots.  Check the toes and nails. Fungal infections often occur between toes. Toenail problems can also be a sign of fungal infections or lead to breaks in the skin.  Check your shoes, too. Loose objects inside a shoe can injure the foot. Use your hand to feel inside your shoes for things like denae, loose stitching, or rough areas that could irritate your skin.  Warning signs  Look for any color changes in the foot. Redness with streaks can signal a severe infection, which needs immediate medical attention. Tell your doctor right away if you have any of these problems:  Swelling, sometimes with color changes, may be a sign of poor blood flow or infection. Symptoms include tenderness and an increase in the size of your foot.  Warm or hot areas on your feet may be signs of infection. A foot that is cold may not be getting enough blood.  Sensations such as burning, tingling, or pins and needles can be signs of a problem. Also check for areas that may be numb.  Hot spots are caused by friction or pressure. Look for hot spots in areas that get a lot of rubbing. Hot spots can turn into blisters, calluses, or sores.  Cracks and sores are caused by dry or irritated skin. They are a sign that the skin is breaking down, which can lead to infection.  Toenail problems to watch for include nails growing into the skin (ingrown toenail) and causing redness or pain. Thick, yellow, or discolored nails can signal a fungal infection.  Drainage and odor can develop from untreated sores and ulcers. Call your doctor right away if you notice white or yellow drainage, bleeding, or unpleasant odor.   © 1069-5833 [employer]. [address] [place] [address], [place] [address]. All rights reserved. This information is not intended as a substitute for  professional medical care. Always follow your healthcare professional's instructions.        Step-by-Step:  Inspecting Your Feet (Diabetes)    Date Last Reviewed: 10/1/2016  © 4196-7135 The [employer]. [address] [place] [address], [place] [address]. All rights reserved. This information is not intended as a substitute for professional medical care. Always follow your healthcare professional's instructions.

## 2023-08-10 NOTE — PROGRESS NOTES
Subjective:      Patient ID: Fermin Henson is a 63 y.o. male.    Chief Complaint: Diabetes Mellitus, Nail Care, and Routine Foot Care    Fermin is a 63 y.o. male who presents to the clinic for evaluation and treatment of high risk feet. Fermin has a past medical history of Diabetes mellitus, type 2 and Kidney stones. The patient was previously seen in wound clinic for bone biopsy and achieved the status of healed under the care of Dr. Jean-Baptiste. He would like to discuss shoes on today's encounter. This patient has documented high risk feet requiring routine maintenance secondary to diabetes mellitis and those secondary complications of diabetes, as mentioned..    PCP: See Jean-Baptiste MD    Date Last Seen by PCP:   Chief Complaint   Patient presents with    Diabetes Mellitus    Nail Care    Routine Foot Care     Hemoglobin A1C   Date Value Ref Range Status   05/29/2023 6.7 (H) 4.0 - 5.6 % Final     Comment:     ADA Screening Guidelines:  5.7-6.4%  Consistent with prediabetes  >or=6.5%  Consistent with diabetes    High levels of fetal hemoglobin interfere with the HbA1C  assay. Heterozygous hemoglobin variants (HbS, HgC, etc)do  not significantly interfere with this assay.   However, presence of multiple variants may affect accuracy.     12/05/2022 6.6 (H) 4.0 - 5.6 % Final     Comment:     ADA Screening Guidelines:  5.7-6.4%  Consistent with prediabetes  >or=6.5%  Consistent with diabetes    High levels of fetal hemoglobin interfere with the HbA1C  assay. Heterozygous hemoglobin variants (HbS, HgC, etc)do  not significantly interfere with this assay.   However, presence of multiple variants may affect accuracy.     04/12/2022 6.4 (H) 4.0 - 5.6 % Final     Comment:     ADA Screening Guidelines:  5.7-6.4%  Consistent with prediabetes  >or=6.5%  Consistent with diabetes    High levels of fetal hemoglobin interfere with the HbA1C  assay. Heterozygous hemoglobin variants (HbS, HgC, etc)do  not significantly interfere with  this assay.   However, presence of multiple variants may affect accuracy.             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      BINAXNOW COVID-19 AG SELF TEST Kit Use as Directed on the Package      cinnamon bark 500 mg capsule Take 500 mg by mouth once daily.      empagliflozin (JARDIANCE) 10 mg tablet Take 1 tablet (10 mg total) by mouth once daily. 90 tablet 1    ferrous sulfate 325 mg (65 mg iron) Tab tablet Take 325 mg by mouth daily with breakfast.      fish oil-omega-3 fatty acids 300-1,000 mg capsule Take by mouth once daily.      ketorolac 0.5% (ACULAR) 0.5 % Drop       losartan (COZAAR) 50 MG tablet Take 1 tablet by mouth once daily 90 tablet 3    melatonin 10 mg Cap Take by mouth.      metFORMIN (GLUCOPHAGE) 850 MG tablet TAKE 2 TABLETS BY MOUTH ONCE DAILY IN THE MORNING AND 1 WITH EVENING MEAL Strength: 850 mg 270 tablet 2    moxifloxacin (VIGAMOX) 0.5 % ophthalmic solution Place 1 drop into the left eye 4 (four) times daily.      multivitamin (THERAGRAN) per tablet Take 1 tablet by mouth once daily.      pioglitazone (ACTOS) 15 MG tablet Take 1 tablet by mouth once  daily 90 tablet 1    prednisoLONE acetate (PRED FORTE) 1 % DrpS       simvastatin (ZOCOR) 20 MG tablet TAKE 1 TABLET BY MOUTH ONCE DAILY IN THE EVENING 90 tablet 2     No current facility-administered medications on file prior to visit.       Review of patient's allergies indicates:  No Known Allergies    Past Surgical History:   Procedure Laterality Date    EXTRACORPOREAL SHOCK WAVE LITHOTRIPSY Right 4/1/2019    Procedure: LITHOTRIPSY, ESWL;  Surgeon: WHITNEY Bryant MD;  Location: [facility];  Service: Urology;  Laterality: Right;  RN PREOP 3/25/2019---NEED H/P-----KUB    eye  surgeries      several     TOE AMPUTATION      several toes on R foot       Family History   Problem Relation Age of Onset    No Known Problems Mother     Diabetes Father     Diabetes Sister     Drug abuse Brother        Social History     Socioeconomic History    Marital status: Single    Number of children: 0   Tobacco Use    Smoking status: Never     Passive exposure: Never    Smokeless tobacco: Never   Substance and Sexual Activity    Alcohol use: No    Drug use: No    Sexual activity: Not Currently       Review of Systems   Constitutional: Negative for chills and fever.   Cardiovascular:  Positive for leg swelling. Negative for claudication.   Respiratory:  Negative for cough and shortness of breath.    Skin:  Positive for dry skin, nail changes and poor wound healing. Negative for itching and rash.   Musculoskeletal:  Positive for joint pain, myalgias and stiffness. Negative for falls, joint swelling and muscle weakness.   Gastrointestinal:  Negative for diarrhea, nausea and vomiting.   Neurological:  Positive for numbness and paresthesias. Negative for tremors and weakness.   Psychiatric/Behavioral:  Negative for altered mental status and hallucinations.            Objective:      Vitals:    08/10/23 1020   Weight: 100.8 kg (222 lb 3.6 oz)   Height: 6' (1.829 m)   PainSc: 0-No pain       Physical Exam  Vitals and nursing note reviewed.    Constitutional:       General: He is not in acute distress.     Appearance: He is well-developed. He is not toxic-appearing or diaphoretic.      Comments: alert and oriented x 3.    Cardiovascular:      Pulses:           Dorsalis pedis pulses are 2+ on the right side and 2+ on the left side.        Posterior tibial pulses are 2+ on the right side and 2+ on the left side.   Pulmonary:      Effort: No respiratory distress.   Musculoskeletal:         General: No deformity.      Right lower leg: Edema present.      Left lower leg: Edema present.      Right ankle: No tenderness. No lateral malleolus, medial malleolus, AITF ligament, CF ligament or posterior TF ligament tenderness.      Right Achilles Tendon: No defects. Neil's test negative.      Left ankle: No tenderness. No lateral malleolus, medial malleolus, AITF ligament, CF ligament or posterior TF ligament tenderness.      Left Achilles Tendon: No defects. Neil's test negative.      Right foot: No tenderness or bony tenderness.      Left foot: No tenderness or bony tenderness.      Comments: Muscle strength is 5/5 in all groups bilaterally.    There is equinus deformity bilateral with decreased dorsiflexion at the ankle joint bilateral. Gait analysis reveals excessive pronation through midstance and propulsion with early heel off. Shoes reveals lateral heel counter wear bilateral     Decreased first MPJ range of motion both weightbearing and nonweightbearing to LLE, no crepitus observed the first MP joint, + dorsal flag sign.     Patient has hammertoes of all lesser digits           Right Lower Extremity: Right leg is amputated below ankle. (hallux and 2nd-3rd digit)  Feet:      Right foot:      Protective Sensation: 10 sites tested.   1 site sensed.     Skin integrity: Callus present.      Left foot:      Protective Sensation: 10 sites tested.  3 sites sensed.      Skin integrity: Skin integrity normal.   Lymphadenopathy:      Comments: No lymphatic  streaking     Skin:     General: Skin is warm and dry.      Coloration: Skin is not pale.      Findings: No rash.      Nails: There is no clubbing.   Neurological:      Sensory: Sensory deficit present.      Motor: No atrophy.      Comments: Glen-Nnamdi 5.07 monofilamant testing is diminished Noé feet.     Decreased/absent vibratory sensation bilateral feet to 128Hz tuning fork.       Psychiatric:         Attention and Perception: He is attentive.         Mood and Affect: Mood is not anxious. Affect is not inappropriate.         Speech: He is communicative. Speech is not slurred.         Behavior: Behavior is not combative.         8/10/2023          Assessment:       Encounter Diagnoses   Name Primary?    Type II diabetes mellitus with neurological manifestations Yes    Right great toe amputee     Hx of amputation of lesser toe, right     Corn or callus     Hallux limitus, acquired, left     Hammer toe of left foot          Plan:     Problem List Items Addressed This Visit    None  Visit Diagnoses       Type II diabetes mellitus with neurological manifestations    -  Primary    Relevant Orders    DIABETIC SHOES FOR HOME USE    Right great toe amputee        Relevant Orders    DIABETIC SHOES FOR HOME USE    Hx of amputation of lesser toe, right        Relevant Orders    DIABETIC SHOES FOR HOME USE    Corn or callus        Relevant Orders    DIABETIC SHOES FOR HOME USE    Hallux limitus, acquired, left        Relevant Orders    DIABETIC SHOES FOR HOME USE    Hammer toe of left foot        Relevant Orders    DIABETIC SHOES FOR HOME USE           I counseled the patient on his conditions, their implications and medical management.    Orders Placed This Encounter    DIABETIC SHOES FOR HOME USE     Education about the diabetic foot, neuropathy, and prevention of limb loss.    Shoe inspection. Diabetic Foot Education. Patient reminded of the importance of good nutrition/healthy diet/weight management and blood sugar  control to help prevent podiatric complications of diabetes. Patient instructed on proper foot hygeine. Wear comfortable, proper fitting shoes. Wash feet daily. Dry well. After drying, apply moisturizer to feet (no lotion to webspaces). Inspect feet daily for skin breaks, blisters, swelling, or redness. Wear cotton socks (preferably white)  Change socks every day. Do NOT walk barefoot. Do NOT use heating pads or hot water soaks. We discussed wearing proper shoe gear, daily foot inspections, never walking without protective shoe gear.     Discussed edema control and the importance of daily moisturizer to the feet such as Gold bonds diabetic foot cream    Recommend applying vicks vaporub to thick abnormal toenails daily x 6 months to treat fungal nail infection.    Rx diabetic shoes for protection and support    Wound has remained healed. Skin is still delicate therefore patient must be diligent in avoiding excessive pressure and making sure there is adequate support and padding in shoe gear.     He will continue to monitor the areas daily, inspect his feet, wear protective shoe gear when ambulatory, moisturizer to maintain skin integrity and follow in this office in approximately 3-5 months, sooner p.r.n.

## 2023-09-06 DIAGNOSIS — E11.29 TYPE 2 DIABETES MELLITUS WITH MICROALBUMINURIA, WITHOUT LONG-TERM CURRENT USE OF INSULIN: ICD-10-CM

## 2023-09-06 DIAGNOSIS — E11.42 TYPE 2 DIABETES MELLITUS WITH DIABETIC POLYNEUROPATHY, WITHOUT LONG-TERM CURRENT USE OF INSULIN: ICD-10-CM

## 2023-09-06 DIAGNOSIS — R80.9 TYPE 2 DIABETES MELLITUS WITH MICROALBUMINURIA, WITHOUT LONG-TERM CURRENT USE OF INSULIN: ICD-10-CM

## 2023-09-06 NOTE — TELEPHONE ENCOUNTER
Care Due:                  Date            Visit Type   Department     Provider  --------------------------------------------------------------------------------                                [facility]                              PRIMARY      MED/ INTERNAL  Last Visit: 05-      CARE ([facility])   MED/ PEDS      See A  Page                              [facility]                              PRIMARY      MED/ INTERNAL  Next Visit: 12-      CARE ([facility])   MED/ PEDS      See A  Page                                                            Last  Test          Frequency    Reason                     Performed    Due Date  --------------------------------------------------------------------------------    HBA1C.......  6 months...  empagliflozin, metFORMIN,   05- 11-                             pioglitazone.............    Lipid Panel.  12 months..  simvastatin..............  12- 11-    [facility] Embedded Care Due Messages. Reference number: [id number].   9/06/2023 11:12:46 AM CDT

## 2023-09-06 NOTE — TELEPHONE ENCOUNTER
Provider Staff:  Action required for this patient     Please see care gap opportunities below in Care Due Message: A1C & lipid panel due 11/25/23    Thanks!  [facility]     Appointments      Date Provider   Last Visit   5/29/2023 See Jean-Baptiste MD   Next Visit   12/4/2023 See JeanB-aptiste MD     Refill Decision Note   Fermin Henson  is requesting a refill authorization.  Brief Assessment and Rationale for Refill:  Approve     Medication Therapy Plan:         Comments:     Note composed:3:40 PM 09/06/2023

## 2023-09-18 DIAGNOSIS — R80.9 TYPE 2 DIABETES MELLITUS WITH MICROALBUMINURIA, WITHOUT LONG-TERM CURRENT USE OF INSULIN: ICD-10-CM

## 2023-09-18 DIAGNOSIS — E11.621 TYPE 2 DIABETES WITH SKIN ULCER OF FOOT: ICD-10-CM

## 2023-09-18 DIAGNOSIS — E11.42 TYPE 2 DIABETES MELLITUS WITH DIABETIC POLYNEUROPATHY, WITHOUT LONG-TERM CURRENT USE OF INSULIN: ICD-10-CM

## 2023-09-18 DIAGNOSIS — E11.29 TYPE 2 DIABETES MELLITUS WITH MICROALBUMINURIA, WITHOUT LONG-TERM CURRENT USE OF INSULIN: ICD-10-CM

## 2023-09-18 DIAGNOSIS — L97.509 TYPE 2 DIABETES WITH SKIN ULCER OF FOOT: ICD-10-CM

## 2023-09-18 RX ORDER — METFORMIN HYDROCHLORIDE 850 MG/1
TABLET ORAL
Qty: 270 TABLET | Refills: 2 | Status: SHIPPED | OUTPATIENT
Start: 2023-09-18

## 2023-09-18 RX ORDER — SIMVASTATIN 20 MG/1
20 TABLET, FILM COATED ORAL NIGHTLY
Qty: 90 TABLET | Refills: 2 | Status: SHIPPED | OUTPATIENT
Start: 2023-09-18

## 2023-09-18 NOTE — TELEPHONE ENCOUNTER
No care due was identified.  [facility] Embedded Care Due Messages. Reference number: [id number].   9/18/2023 10:16:19 AM CDT

## 2023-09-18 NOTE — TELEPHONE ENCOUNTER
----- Message from Chepe Rust sent at 9/15/2023  1:52 PM CDT -----  Type: RX Refill Request    Who Called: Karina/ [facility]     Have you contacted your pharmacy: Yes     Refill or New Rx:REFILL     RX Name and Strength:    1. empagliflozin (JARDIANCE) 10 mg tablet 90 tablet   2. metFORMIN (GLUCOPHAGE) 850 MG tablet 270 tablet   3. simvastatin (ZOCOR) 20 MG tablet 90 tablet     Preferred Pharmacy with phone number:[facility] [place], [place] - [address]   Phone:  [phone]  Fax:  [phone]          Local or Mail Order: Mail Order     Would the patient rather a call back or a response via [facility]? NO     Best Call Back Number: [phone]

## 2023-09-26 DIAGNOSIS — I10 BENIGN ESSENTIAL HTN: ICD-10-CM

## 2023-09-26 DIAGNOSIS — E11.9 TYPE 2 DIABETES MELLITUS WITHOUT COMPLICATION, WITHOUT LONG-TERM CURRENT USE OF INSULIN: ICD-10-CM

## 2023-09-26 RX ORDER — LOSARTAN POTASSIUM 50 MG/1
50 TABLET ORAL DAILY
Qty: 90 TABLET | Refills: 3 | Status: SHIPPED | OUTPATIENT
Start: 2023-09-26

## 2023-09-26 RX ORDER — PIOGLITAZONEHYDROCHLORIDE 15 MG/1
15 TABLET ORAL DAILY
Qty: 90 TABLET | Refills: 1 | Status: SHIPPED | OUTPATIENT
Start: 2023-09-26 | End: 2023-12-21

## 2023-09-26 NOTE — TELEPHONE ENCOUNTER
No care due was identified.  [facility] Embedded Care Due Messages. Reference number: [id number].   9/26/2023 4:01:11 PM CDT

## 2023-10-04 ENCOUNTER — TELEPHONE (OUTPATIENT)
Dept: FAMILY MEDICINE | Facility: CLINIC | Age: 63
End: 2023-10-04
Payer: MEDICARE

## 2023-10-04 NOTE — TELEPHONE ENCOUNTER
----- Message from Matthew Arnaldo sent at 10/4/2023 11:44 AM CDT -----  Regarding: Self [phone]  Type:  Patient Returning Call    Who Called:  Self     Who Left Message for Patient:  unknown     Does the patient know what this is regarding?: yes     Would the patient rather a call back or a response via [facility]?  Call back     Best Call Back Number: [phone]     Additional Information:     Thank you.

## 2023-10-11 ENCOUNTER — TELEPHONE (OUTPATIENT)
Dept: ADMINISTRATIVE | Facility: CLINIC | Age: 63
End: 2023-10-11
Payer: MEDICARE

## 2023-10-12 ENCOUNTER — OFFICE VISIT (OUTPATIENT)
Dept: FAMILY MEDICINE | Facility: CLINIC | Age: 63
End: 2023-10-12
Payer: MEDICARE

## 2023-10-12 VITALS
HEIGHT: 72 IN | TEMPERATURE: 98 F | OXYGEN SATURATION: 97 % | SYSTOLIC BLOOD PRESSURE: 132 MMHG | WEIGHT: 226.5 LBS | BODY MASS INDEX: 30.68 KG/M2 | DIASTOLIC BLOOD PRESSURE: 60 MMHG | HEART RATE: 81 BPM

## 2023-10-12 DIAGNOSIS — E11.42 TYPE 2 DIABETES MELLITUS WITH DIABETIC POLYNEUROPATHY, WITHOUT LONG-TERM CURRENT USE OF INSULIN: ICD-10-CM

## 2023-10-12 DIAGNOSIS — L97.509 TYPE 2 DIABETES WITH SKIN ULCER OF FOOT: ICD-10-CM

## 2023-10-12 DIAGNOSIS — E11.621 TYPE 2 DIABETES WITH SKIN ULCER OF FOOT: ICD-10-CM

## 2023-10-12 DIAGNOSIS — Z23 NEEDS FLU SHOT: ICD-10-CM

## 2023-10-12 DIAGNOSIS — E11.3393 CONTROLLED TYPE 2 DIABETES MELLITUS WITH BOTH EYES AFFECTED BY MODERATE NONPROLIFERATIVE RETINOPATHY WITHOUT MACULAR EDEMA, WITHOUT LONG-TERM CURRENT USE OF INSULIN: ICD-10-CM

## 2023-10-12 DIAGNOSIS — I10 BENIGN ESSENTIAL HTN: ICD-10-CM

## 2023-10-12 DIAGNOSIS — Z89.421 HX OF AMPUTATION OF LESSER TOE, RIGHT: ICD-10-CM

## 2023-10-12 DIAGNOSIS — Z00.00 ENCOUNTER FOR PREVENTIVE HEALTH EXAMINATION: Primary | ICD-10-CM

## 2023-10-12 DIAGNOSIS — E11.29 TYPE 2 DIABETES MELLITUS WITH MICROALBUMINURIA, WITHOUT LONG-TERM CURRENT USE OF INSULIN: ICD-10-CM

## 2023-10-12 DIAGNOSIS — R80.9 TYPE 2 DIABETES MELLITUS WITH MICROALBUMINURIA, WITHOUT LONG-TERM CURRENT USE OF INSULIN: ICD-10-CM

## 2023-10-12 PROBLEM — Z89.411 RIGHT GREAT TOE AMPUTEE: Status: ACTIVE | Noted: 2023-10-12

## 2023-10-12 PROCEDURE — 3078F DIAST BP <80 MM HG: CPT | Mod: HCNC,CPTII,S$GLB, | Performed by: NURSE PRACTITIONER

## 2023-10-12 PROCEDURE — 3288F PR FALLS RISK ASSESSMENT DOCUMENTED: ICD-10-PCS | Mod: HCNC,CPTII,S$GLB, | Performed by: NURSE PRACTITIONER

## 2023-10-12 PROCEDURE — G0008 FLU VACCINE - QUADRIVALENT - ADJUVANTED: ICD-10-PCS | Mod: HCNC,S$GLB,, | Performed by: NURSE PRACTITIONER

## 2023-10-12 PROCEDURE — 99999 PR PBB SHADOW E&M-EST. PATIENT-LVL V: ICD-10-PCS | Mod: PBBFAC,HCNC,, | Performed by: NURSE PRACTITIONER

## 2023-10-12 PROCEDURE — G0402 INITIAL PREVENTIVE EXAM: HCPCS | Mod: HCNC,S$GLB,, | Performed by: NURSE PRACTITIONER

## 2023-10-12 PROCEDURE — G0008 ADMIN INFLUENZA VIRUS VAC: HCPCS | Mod: HCNC,S$GLB,, | Performed by: NURSE PRACTITIONER

## 2023-10-12 PROCEDURE — G0402 PR WELCOME MEDICARE PREVENTIVE VISIT NEW ENROLLEE: ICD-10-PCS | Mod: HCNC,S$GLB,, | Performed by: NURSE PRACTITIONER

## 2023-10-12 PROCEDURE — 4010F PR ACE/ARB THEARPY RXD/TAKEN: ICD-10-PCS | Mod: HCNC,CPTII,S$GLB, | Performed by: NURSE PRACTITIONER

## 2023-10-12 PROCEDURE — 1159F MED LIST DOCD IN RCRD: CPT | Mod: HCNC,CPTII,S$GLB, | Performed by: NURSE PRACTITIONER

## 2023-10-12 PROCEDURE — 1159F PR MEDICATION LIST DOCUMENTED IN MEDICAL RECORD: ICD-10-PCS | Mod: HCNC,CPTII,S$GLB, | Performed by: NURSE PRACTITIONER

## 2023-10-12 PROCEDURE — 3288F FALL RISK ASSESSMENT DOCD: CPT | Mod: HCNC,CPTII,S$GLB, | Performed by: NURSE PRACTITIONER

## 2023-10-12 PROCEDURE — 1160F RVW MEDS BY RX/DR IN RCRD: CPT | Mod: HCNC,CPTII,S$GLB, | Performed by: NURSE PRACTITIONER

## 2023-10-12 PROCEDURE — 90694 FLU VACCINE - QUADRIVALENT - ADJUVANTED: ICD-10-PCS | Mod: HCNC,S$GLB,, | Performed by: NURSE PRACTITIONER

## 2023-10-12 PROCEDURE — 3075F SYST BP GE 130 - 139MM HG: CPT | Mod: HCNC,CPTII,S$GLB, | Performed by: NURSE PRACTITIONER

## 2023-10-12 PROCEDURE — 1101F PT FALLS ASSESS-DOCD LE1/YR: CPT | Mod: HCNC,CPTII,S$GLB, | Performed by: NURSE PRACTITIONER

## 2023-10-12 PROCEDURE — G9920 SCRNING PERF AND NEGATIVE: HCPCS | Mod: HCNC,CPTII,S$GLB, | Performed by: NURSE PRACTITIONER

## 2023-10-12 PROCEDURE — 99999 PR PBB SHADOW E&M-EST. PATIENT-LVL V: CPT | Mod: PBBFAC,HCNC,, | Performed by: NURSE PRACTITIONER

## 2023-10-12 PROCEDURE — 3044F PR MOST RECENT HEMOGLOBIN A1C LEVEL <7.0%: ICD-10-PCS | Mod: HCNC,CPTII,S$GLB, | Performed by: NURSE PRACTITIONER

## 2023-10-12 PROCEDURE — 4010F ACE/ARB THERAPY RXD/TAKEN: CPT | Mod: HCNC,CPTII,S$GLB, | Performed by: NURSE PRACTITIONER

## 2023-10-12 PROCEDURE — 3044F HG A1C LEVEL LT 7.0%: CPT | Mod: HCNC,CPTII,S$GLB, | Performed by: NURSE PRACTITIONER

## 2023-10-12 PROCEDURE — G9920 PR SCREENING AND NEGATIVE: ICD-10-PCS | Mod: HCNC,CPTII,S$GLB, | Performed by: NURSE PRACTITIONER

## 2023-10-12 PROCEDURE — 90694 VACC AIIV4 NO PRSRV 0.5ML IM: CPT | Mod: HCNC,S$GLB,, | Performed by: NURSE PRACTITIONER

## 2023-10-12 PROCEDURE — 3075F PR MOST RECENT SYSTOLIC BLOOD PRESS GE 130-139MM HG: ICD-10-PCS | Mod: HCNC,CPTII,S$GLB, | Performed by: NURSE PRACTITIONER

## 2023-10-12 PROCEDURE — 1101F PR PT FALLS ASSESS DOC 0-1 FALLS W/OUT INJ PAST YR: ICD-10-PCS | Mod: HCNC,CPTII,S$GLB, | Performed by: NURSE PRACTITIONER

## 2023-10-12 PROCEDURE — 3078F PR MOST RECENT DIASTOLIC BLOOD PRESSURE < 80 MM HG: ICD-10-PCS | Mod: HCNC,CPTII,S$GLB, | Performed by: NURSE PRACTITIONER

## 2023-10-12 PROCEDURE — 1160F PR REVIEW ALL MEDS BY PRESCRIBER/CLIN PHARMACIST DOCUMENTED: ICD-10-PCS | Mod: HCNC,CPTII,S$GLB, | Performed by: NURSE PRACTITIONER

## 2023-10-12 NOTE — PROGRESS NOTES
Fermin Henson presented for a  Medicare AWV and comprehensive Health Risk Assessment today. The following components were reviewed and updated:    Medical history  Family History  Social history  Allergies and Current Medications  Health Risk Assessment  Health Maintenance  Care Team       ** See Completed Assessments for Annual Wellness Visit within the encounter summary.**       The following assessments were completed:  Living Situation  CAGE  Depression Screening  Timed Get Up and Go  Whisper Test  Cognitive Function Screening  Nutrition Screening  ADL Screening  PAQ Screening          Vitals:    10/12/23 1009 10/12/23 1023   BP: 132/60    BP Location: Right arm    Patient Position: Sitting    BP Method: Large (Manual)    Pulse: 103 81   Temp: 97.7 °F (36.5 °C)    TempSrc: Oral    SpO2: 97%    Weight: 102.7 kg (226 lb 8.4 oz)    Height: 6' (1.829 m)      Body mass index is 30.72 kg/m².  Physical Exam  Vitals and nursing note reviewed.   Constitutional:       Appearance: Normal appearance.   Cardiovascular:      Rate and Rhythm: Normal rate.      Pulses: Normal pulses.      Heart sounds: Normal heart sounds.   Pulmonary:      Effort: Pulmonary effort is normal.      Breath sounds: Normal breath sounds.   Musculoskeletal:         General: Normal range of motion.   Neurological:      Mental Status: He is alert and oriented to person, place, and time.   Psychiatric:         Mood and Affect: Mood normal.         Behavior: Behavior normal.             Diagnoses and health risks identified today and associated recommendations/orders:    1. Encounter for preventive health examination  Pt was seen today for an Annual Wellness visit. Healthcare maintenance and screening recommendations were discussed and updated as indicated. Return in one year for AWV.    Review current opioid prescriptions:n/a  Screen for potential Substance Use Disorders:n/a    2. Type 2 diabetes mellitus with diabetic polyneuropathy, without long-term  current use of insulin  The current medical regimen is effective;  continue present plan and medications.  Hemoglobin A1C   Date Value Ref Range Status   05/29/2023 6.7 (H) 4.0 - 5.6 % Final             12/05/2022 6.6 (H) 4.0 - 5.6 % Final             04/12/2022 6.4 (H) 4.0 - 5.6 % Final             3. Type 2 diabetes mellitus with microalbuminuria, without long-term current use of insulin  The current medical regimen is effective;  continue present plan and medications.    4. 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    5. Type 2 diabetes with skin ulcer of foot  The current medical regimen is effective;  continue present plan and medications. Followed by Podiatry.    6. Hx of amputation of lesser toe, right  The current medical regimen is effective;  continue present plan and medications.    7. Needs flu shot  Seasonal flu discussed. Understanding verbalized.  - Influenza (FLUAD) - Quadrivalent (Adjuvanted) *Preferred* (65+) (PF)    8. Benign essential HTN  The current medical regimen is effective;  continue present plan and medications.        Provided Fermin with a 5-10 year written screening schedule and personal prevention plan. Recommendations were developed using the USPSTF age appropriate recommendations. Education, counseling, and referrals were provided as needed. After Visit Summary printed and given to patient which includes a list of additional screenings\tests needed.    Follow up in about 1 year (around 10/12/2024).    CARMELA Terry  I offered to discuss advanced care planning, including how to pick a person who would make decisions for you if you were unable to make them for yourself, called a health care power of , and what kind of decisions you might make such as use of life  sustaining treatments such as ventilators and tube feeding when faced with a life limiting illness recorded on a living will that they will need to know. (How you want to be cared for as you near the end of your natural life)     X Patient is interested in learning more about how to make advanced directives.  I provided them paperwork and offered to discuss this with them.

## 2023-10-12 NOTE — PATIENT INSTRUCTIONS
Counseling and Referral of Other Preventative  (Italic type indicates deductible and co-insurance are waived)    Patient Name: Fermin Henson  Today's Date: 10/12/2023    Health Maintenance       Date Due Completion Date    COVID-19 Vaccine (1) Never done ---    Pneumococcal Vaccines (Age 65+) (2 - PCV) 10/19/2019 10/19/2018    TETANUS VACCINE 09/16/2023 9/16/2013    Hemoglobin A1c 11/29/2023 5/29/2023    Diabetes Urine Screening 12/05/2023 12/5/2022    Lipid Panel 12/05/2023 12/5/2022    Eye Exam 06/02/2024 6/2/2023    Foot Exam 08/10/2024 8/10/2023    Override on 1/28/2019: Done    Low Dose Statin 10/12/2024 10/12/2023    Colorectal Cancer Screening 10/03/2025 10/3/2022        Orders Placed This Encounter   Procedures    Influenza (FLUAD) - Quadrivalent (Adjuvanted) *Preferred* (65+) (PF)       The following information is provided to all patients.  This information is to help you find resources for any of the problems found today that may be affecting your health:                Living healthy guide: www.[facility].[place].gov      Understanding Diabetes: www.diabetes.org      Eating healthy: www.cdc.gov/healthyweight      CDC home safety checklist: www.cdc.gov/steadi/patient.html      Agency on Aging: www.goea.[place].[facility]      Alcoholics anonymous (AA): www.aa.org      Physical Activity: www.shai.nih.gov/mh8psfx      Tobacco use: www.quitwithusla.org

## 2023-10-23 ENCOUNTER — TELEPHONE (OUTPATIENT)
Dept: PODIATRY | Facility: CLINIC | Age: 63
End: 2023-10-23
Payer: MEDICARE

## 2023-10-23 NOTE — TELEPHONE ENCOUNTER
Left voice message for patient to give our office a call back at [phone] to help with rescheduling appointment from 11/10 to another day.

## 2023-11-01 ENCOUNTER — PATIENT OUTREACH (OUTPATIENT)
Dept: ADMINISTRATIVE | Facility: HOSPITAL | Age: 63
End: 2023-11-01
Payer: MEDICARE

## 2023-11-01 LAB
LEFT EYE DM RETINOPATHY: POSITIVE
RIGHT EYE DM RETINOPATHY: POSITIVE

## 2023-11-09 ENCOUNTER — OFFICE VISIT (OUTPATIENT)
Dept: PODIATRY | Facility: CLINIC | Age: 63
End: 2023-11-09
Payer: MEDICARE

## 2023-11-09 VITALS
HEART RATE: 94 BPM | HEIGHT: 72 IN | DIASTOLIC BLOOD PRESSURE: 74 MMHG | WEIGHT: 226.44 LBS | BODY MASS INDEX: 30.67 KG/M2 | SYSTOLIC BLOOD PRESSURE: 134 MMHG

## 2023-11-09 DIAGNOSIS — Z89.421 HX OF AMPUTATION OF LESSER TOE, RIGHT: ICD-10-CM

## 2023-11-09 DIAGNOSIS — E11.49 TYPE II DIABETES MELLITUS WITH NEUROLOGICAL MANIFESTATIONS: Primary | ICD-10-CM

## 2023-11-09 PROCEDURE — 3078F DIAST BP <80 MM HG: CPT | Mod: HCNC,CPTII,S$GLB, | Performed by: PODIATRIST

## 2023-11-09 PROCEDURE — 3078F PR MOST RECENT DIASTOLIC BLOOD PRESSURE < 80 MM HG: ICD-10-PCS | Mod: HCNC,CPTII,S$GLB, | Performed by: PODIATRIST

## 2023-11-09 PROCEDURE — 99999 PR PBB SHADOW E&M-EST. PATIENT-LVL III: CPT | Mod: PBBFAC,HCNC,, | Performed by: PODIATRIST

## 2023-11-09 PROCEDURE — 3008F PR BODY MASS INDEX (BMI) DOCUMENTED: ICD-10-PCS | Mod: HCNC,CPTII,S$GLB, | Performed by: PODIATRIST

## 2023-11-09 PROCEDURE — 99999 PR PBB SHADOW E&M-EST. PATIENT-LVL III: ICD-10-PCS | Mod: PBBFAC,HCNC,, | Performed by: PODIATRIST

## 2023-11-09 PROCEDURE — 3008F BODY MASS INDEX DOCD: CPT | Mod: HCNC,CPTII,S$GLB, | Performed by: PODIATRIST

## 2023-11-09 PROCEDURE — 3075F SYST BP GE 130 - 139MM HG: CPT | Mod: HCNC,CPTII,S$GLB, | Performed by: PODIATRIST

## 2023-11-09 PROCEDURE — 3075F PR MOST RECENT SYSTOLIC BLOOD PRESS GE 130-139MM HG: ICD-10-PCS | Mod: HCNC,CPTII,S$GLB, | Performed by: PODIATRIST

## 2023-11-09 PROCEDURE — 4010F ACE/ARB THERAPY RXD/TAKEN: CPT | Mod: HCNC,CPTII,S$GLB, | Performed by: PODIATRIST

## 2023-11-09 PROCEDURE — 3044F PR MOST RECENT HEMOGLOBIN A1C LEVEL <7.0%: ICD-10-PCS | Mod: HCNC,CPTII,S$GLB, | Performed by: PODIATRIST

## 2023-11-09 PROCEDURE — 99213 OFFICE O/P EST LOW 20 MIN: CPT | Mod: HCNC,S$GLB,, | Performed by: PODIATRIST

## 2023-11-09 PROCEDURE — 4010F PR ACE/ARB THEARPY RXD/TAKEN: ICD-10-PCS | Mod: HCNC,CPTII,S$GLB, | Performed by: PODIATRIST

## 2023-11-09 PROCEDURE — 3044F HG A1C LEVEL LT 7.0%: CPT | Mod: HCNC,CPTII,S$GLB, | Performed by: PODIATRIST

## 2023-11-09 PROCEDURE — 99213 PR OFFICE/OUTPT VISIT, EST, LEVL III, 20-29 MIN: ICD-10-PCS | Mod: HCNC,S$GLB,, | Performed by: PODIATRIST

## 2023-11-09 NOTE — PROGRESS NOTES
Subjective:      Patient ID: Fermin Henson is a 63 y.o. male.    Chief Complaint: Diabetic Foot Exam and Nail Care (Last DM PCP visit on 08/30/2023 See Jean-Baptiste MD)    Fermin is a 63 y.o. male who presents to the clinic for evaluation and treatment of high risk feet. Fermin has a past medical history of Benign essential HTN (12/4/2023), Diabetes mellitus, type 2, and Kidney stones.  He would like to discuss shoes on today's encounter. This patient has documented high risk feet requiring routine maintenance secondary to diabetes mellitis and those secondary complications of diabetes, as mentioned..    PCP: See Jean-Baptiste MD    Date Last Seen by PCP:   Chief Complaint   Patient presents with    Diabetic Foot Exam    Nail Care     Last DM PCP visit on 08/30/2023 See Jean-Baptiste MD     Hemoglobin A1C   Date Value Ref Range Status   05/29/2023 6.7 (H) 4.0 - 5.6 % Final     Comment:     ADA Screening Guidelines:  5.7-6.4%  Consistent with prediabetes  >or=6.5%  Consistent with diabetes    High levels of fetal hemoglobin interfere with the HbA1C  assay. Heterozygous hemoglobin variants (HbS, HgC, etc)do  not significantly interfere with this assay.   However, presence of multiple variants may affect accuracy.     12/05/2022 6.6 (H) 4.0 - 5.6 % Final     Comment:     ADA Screening Guidelines:  5.7-6.4%  Consistent with prediabetes  >or=6.5%  Consistent with diabetes    High levels of fetal hemoglobin interfere with the HbA1C  assay. Heterozygous hemoglobin variants (HbS, HgC, etc)do  not significantly interfere with this assay.   However, presence of multiple variants may affect accuracy.     04/12/2022 6.4 (H) 4.0 - 5.6 % Final     Comment:     ADA Screening Guidelines:  5.7-6.4%  Consistent with prediabetes  >or=6.5%  Consistent with diabetes    High levels of fetal hemoglobin interfere with the HbA1C  assay. Heterozygous hemoglobin variants (HbS, HgC, etc)do  not significantly interfere with this assay.    However, presence of multiple variants may affect accuracy.             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      cinnamon bark 500 mg capsule Take 500 mg by mouth once daily.      empagliflozin (JARDIANCE) 10 mg tablet Take 1 tablet (10 mg total) by mouth once daily. 90 tablet 0    ferrous sulfate 325 mg (65 mg iron) Tab tablet Take 325 mg by mouth daily with breakfast.      fish oil-omega-3 fatty acids 300-1,000 mg capsule Take by mouth once daily.      losartan (COZAAR) 50 MG tablet Take 1 tablet (50 mg total) by mouth once daily. 90 tablet 3    melatonin 10 mg Cap Take by mouth.      metFORMIN (GLUCOPHAGE) 850 MG tablet TAKE 2 TABLETS BY MOUTH ONCE DAILY IN THE MORNING AND 1 WITH EVENING MEAL Strength: 850 mg 270 tablet 2    multivitamin (THERAGRAN) per tablet Take 1 tablet by mouth once daily.      pioglitazone (ACTOS) 15 MG tablet Take 1 tablet (15 mg total) by mouth once daily. 90 tablet 1    simvastatin (ZOCOR) 20 MG tablet Take 1 tablet (20 mg total) by mouth every evening. 90 tablet 2     No current facility-administered medications on file prior to visit.       Review of patient's allergies indicates:  No Known  Allergies    Past Surgical History:   Procedure Laterality Date    EXTRACORPOREAL SHOCK WAVE LITHOTRIPSY Right 4/1/2019    Procedure: LITHOTRIPSY, ESWL;  Surgeon: WHITNEY Bryant MD;  Location: [facility];  Service: Urology;  Laterality: Right;  RN PREOP 3/25/2019---NEED H/P-----KUB    eye  surgeries      several     TOE AMPUTATION      several toes on R foot       Family History   Problem Relation Age of Onset    No Known Problems Mother     Diabetes Father     Diabetes Sister     Drug abuse Brother        Social History     Socioeconomic History    Marital status: Single    Number of children: 0   Tobacco Use    Smoking status: Never     Passive exposure: Never    Smokeless tobacco: Never   Substance and Sexual Activity    Alcohol use: No    Drug use: No    Sexual activity: Not Currently       Review of Systems   Constitutional: Negative for chills and fever.   Cardiovascular:  Positive for leg swelling. Negative for claudication.   Respiratory:  Negative for cough and shortness of breath.    Skin:  Positive for dry skin, nail changes and poor wound healing. Negative for itching and rash.   Musculoskeletal:  Positive for joint pain, myalgias and stiffness. Negative for falls, joint swelling and muscle weakness.   Gastrointestinal:  Negative for diarrhea, nausea and vomiting.   Neurological:  Positive for numbness and paresthesias. Negative for tremors and weakness.   Psychiatric/Behavioral:  Negative for altered mental status and hallucinations.            Objective:      Vitals:    11/09/23 1359   BP: 134/74   Pulse: 94   Weight: 102.7 kg (226 lb 6.6 oz)   Height: 6' (1.829 m)   PainSc: 0-No pain       Physical Exam  Vitals and nursing note reviewed.   Constitutional:       General: He is not in acute distress.     Appearance: He is well-developed. He is not toxic-appearing or diaphoretic.      Comments: alert and oriented x 3.    Cardiovascular:      Pulses:           Dorsalis pedis pulses are 2+ on the right  side and 2+ on the left side.        Posterior tibial pulses are 2+ on the right side and 2+ on the left side.   Pulmonary:      Effort: No respiratory distress.   Musculoskeletal:         General: No deformity.      Right lower leg: Edema present.      Left lower leg: Edema present.      Right ankle: No tenderness. No lateral malleolus, medial malleolus, AITF ligament, CF ligament or posterior TF ligament tenderness. Decreased range of motion.      Right Achilles Tendon: No defects. Neil's test negative.      Left ankle: No tenderness. No lateral malleolus, medial malleolus, AITF ligament, CF ligament or posterior TF ligament tenderness. Decreased range of motion.      Left Achilles Tendon: No defects. Neil's test negative.      Right foot: No tenderness or bony tenderness.      Left foot: No tenderness or bony tenderness.      Comments: Muscle strength is 5/5 in all groups bilaterally.    There is equinus deformity bilateral with decreased dorsiflexion at the ankle joint bilateral. Gait analysis reveals excessive pronation through midstance and propulsion with early heel off. Shoes reveals lateral heel counter wear bilateral     Amps 1-3, right      Right Lower Extremity: (hallux and 2nd-3rd digit)  Feet:      Right foot:      Protective Sensation: 10 sites tested.   1 site sensed.     Skin integrity: Callus present.      Left foot:      Protective Sensation: 10 sites tested.  3 sites sensed.      Skin integrity: Skin integrity normal.   Lymphadenopathy:      Comments: No lymphatic streaking     Skin:     General: Skin is warm and dry.      Coloration: Skin is not pale.      Findings: No rash.      Nails: There is no clubbing.      Comments: Nails short  Minimal HKT buildup   Neurological:      Sensory: Sensory deficit present.      Motor: No atrophy.      Comments: Aurora-Nnamdi 5.07 monofilamant testing is diminished Noé feet.     Decreased/absent vibratory sensation bilateral feet to 128Hz tuning  fatuma.       Psychiatric:         Attention and Perception: He is attentive.         Mood and Affect: Mood is not anxious. Affect is not inappropriate.         Speech: He is communicative. Speech is not slurred.         Behavior: Behavior is not combative.               Assessment:       Encounter Diagnoses   Name Primary?    Type II diabetes mellitus with neurological manifestations Yes    Hx of amputation of lesser toe, right          Plan:     Problem List Items Addressed This Visit          Orthopedic    Hx of amputation of lesser toe, right    Relevant Orders    DIABETIC SHOES FOR HOME USE     Other Visit Diagnoses       Type II diabetes mellitus with neurological manifestations    -  Primary    Relevant Orders    DIABETIC SHOES FOR HOME USE           I counseled the patient on his conditions, their implications and medical management.    Orders Placed This Encounter    DIABETIC SHOES FOR HOME USE     Education about the diabetic foot, neuropathy, and prevention of limb loss.  Rx diabetic shoes    Shoe inspection. Diabetic Foot Education. Patient reminded of the importance of good nutrition/healthy diet/weight management and blood sugar control to help prevent podiatric complications of diabetes. Patient instructed on proper foot hygeine. Wear comfortable, proper fitting shoes. Wash feet daily. Dry well. After drying, apply moisturizer to feet (no lotion to webspaces). Inspect feet daily for skin breaks, blisters, swelling, or redness. Wear cotton socks (preferably white)  Change socks every day. Do NOT walk barefoot. Do NOT use heating pads or hot water soaks. We discussed wearing proper shoe gear, daily foot inspections, never walking without protective shoe gear.     Discussed edema control and the importance of daily moisturizer to the feet such as Gold bonds diabetic foot cream    Recommend applying vicks vaporub to thick abnormal toenails daily x 6 months to treat fungal nail infection.    He will continue  to monitor the areas daily, inspect his feet, wear protective shoe gear when ambulatory, moisturizer to maintain skin integrity and follow in this office in approximately 3-5 months, sooner p.r.n.

## 2023-12-01 ENCOUNTER — TELEPHONE (OUTPATIENT)
Dept: FAMILY MEDICINE | Facility: CLINIC | Age: 63
End: 2023-12-01
Payer: MEDICARE

## 2023-12-01 NOTE — TELEPHONE ENCOUNTER
----- Message from Edel Lozano sent at 12/1/2023  1:11 PM CST -----  Regarding: pt called  Type: Patient Call Back         Who called:PEDRO TOMLIN [[id number]]         What is the request in detail:Pt returned call          Can the clinic reply by [facility]? No         Would the patient rather a call back or a response via [facility]? Call back         Best call back number: Telephone Information:  Mobile          [phone]             Additional Information:         Thank you.

## 2023-12-04 ENCOUNTER — OFFICE VISIT (OUTPATIENT)
Dept: FAMILY MEDICINE | Facility: CLINIC | Age: 63
End: 2023-12-04
Payer: MEDICARE

## 2023-12-04 VITALS
RESPIRATION RATE: 18 BRPM | WEIGHT: 230.94 LBS | SYSTOLIC BLOOD PRESSURE: 132 MMHG | DIASTOLIC BLOOD PRESSURE: 60 MMHG | TEMPERATURE: 98 F | HEART RATE: 75 BPM | OXYGEN SATURATION: 96 % | HEIGHT: 72 IN | BODY MASS INDEX: 31.28 KG/M2

## 2023-12-04 DIAGNOSIS — Z00.00 VISIT FOR WELL MAN HEALTH CHECK: Primary | ICD-10-CM

## 2023-12-04 DIAGNOSIS — I10 BENIGN ESSENTIAL HTN: ICD-10-CM

## 2023-12-04 DIAGNOSIS — E11.29 TYPE 2 DIABETES MELLITUS WITH MICROALBUMINURIA, WITHOUT LONG-TERM CURRENT USE OF INSULIN: ICD-10-CM

## 2023-12-04 DIAGNOSIS — Z23 NEED FOR PNEUMOCOCCAL 20-VALENT CONJUGATE VACCINATION: ICD-10-CM

## 2023-12-04 DIAGNOSIS — R80.9 TYPE 2 DIABETES MELLITUS WITH MICROALBUMINURIA, WITHOUT LONG-TERM CURRENT USE OF INSULIN: ICD-10-CM

## 2023-12-04 DIAGNOSIS — E11.42 TYPE 2 DIABETES MELLITUS WITH DIABETIC POLYNEUROPATHY, WITHOUT LONG-TERM CURRENT USE OF INSULIN: ICD-10-CM

## 2023-12-04 DIAGNOSIS — E11.3393 CONTROLLED TYPE 2 DIABETES MELLITUS WITH BOTH EYES AFFECTED BY MODERATE NONPROLIFERATIVE RETINOPATHY WITHOUT MACULAR EDEMA, WITHOUT LONG-TERM CURRENT USE OF INSULIN: ICD-10-CM

## 2023-12-04 DIAGNOSIS — M65.331 TRIGGER MIDDLE FINGER OF RIGHT HAND: ICD-10-CM

## 2023-12-04 DIAGNOSIS — E66.09 CLASS 1 OBESITY DUE TO EXCESS CALORIES WITH SERIOUS COMORBIDITY AND BODY MASS INDEX (BMI) OF 30.0 TO 30.9 IN ADULT: ICD-10-CM

## 2023-12-04 DIAGNOSIS — Z12.5 PROSTATE CANCER SCREENING: ICD-10-CM

## 2023-12-04 DIAGNOSIS — M79.641 PAIN OF RIGHT HAND: Primary | ICD-10-CM

## 2023-12-04 DIAGNOSIS — Z89.421 HX OF AMPUTATION OF LESSER TOE, RIGHT: ICD-10-CM

## 2023-12-04 PROBLEM — L97.412 DIABETIC ULCER OF RIGHT MIDFOOT ASSOCIATED WITH TYPE 2 DIABETES MELLITUS, WITH FAT LAYER EXPOSED: Status: RESOLVED | Noted: 2020-01-21 | Resolved: 2023-12-04

## 2023-12-04 PROBLEM — E11.621 DIABETIC ULCER OF RIGHT MIDFOOT ASSOCIATED WITH TYPE 2 DIABETES MELLITUS, WITH FAT LAYER EXPOSED: Status: RESOLVED | Noted: 2020-01-21 | Resolved: 2023-12-04

## 2023-12-04 PROCEDURE — G0009 ADMIN PNEUMOCOCCAL VACCINE: HCPCS | Mod: HCNC,S$GLB,, | Performed by: FAMILY MEDICINE

## 2023-12-04 PROCEDURE — 4010F PR ACE/ARB THEARPY RXD/TAKEN: ICD-10-PCS | Mod: HCNC,CPTII,S$GLB, | Performed by: FAMILY MEDICINE

## 2023-12-04 PROCEDURE — 3075F PR MOST RECENT SYSTOLIC BLOOD PRESS GE 130-139MM HG: ICD-10-PCS | Mod: HCNC,CPTII,S$GLB, | Performed by: FAMILY MEDICINE

## 2023-12-04 PROCEDURE — 1160F PR REVIEW ALL MEDS BY PRESCRIBER/CLIN PHARMACIST DOCUMENTED: ICD-10-PCS | Mod: HCNC,CPTII,S$GLB, | Performed by: FAMILY MEDICINE

## 2023-12-04 PROCEDURE — 1159F MED LIST DOCD IN RCRD: CPT | Mod: HCNC,CPTII,S$GLB, | Performed by: FAMILY MEDICINE

## 2023-12-04 PROCEDURE — 3288F FALL RISK ASSESSMENT DOCD: CPT | Mod: HCNC,CPTII,S$GLB, | Performed by: FAMILY MEDICINE

## 2023-12-04 PROCEDURE — 3044F PR MOST RECENT HEMOGLOBIN A1C LEVEL <7.0%: ICD-10-PCS | Mod: HCNC,CPTII,S$GLB, | Performed by: FAMILY MEDICINE

## 2023-12-04 PROCEDURE — 1160F RVW MEDS BY RX/DR IN RCRD: CPT | Mod: HCNC,CPTII,S$GLB, | Performed by: FAMILY MEDICINE

## 2023-12-04 PROCEDURE — 99999 PR PBB SHADOW E&M-EST. PATIENT-LVL V: CPT | Mod: PBBFAC,HCNC,, | Performed by: FAMILY MEDICINE

## 2023-12-04 PROCEDURE — 90677 PCV20 VACCINE IM: CPT | Mod: HCNC,S$GLB,, | Performed by: FAMILY MEDICINE

## 2023-12-04 PROCEDURE — 1159F PR MEDICATION LIST DOCUMENTED IN MEDICAL RECORD: ICD-10-PCS | Mod: HCNC,CPTII,S$GLB, | Performed by: FAMILY MEDICINE

## 2023-12-04 PROCEDURE — 99999 PR PBB SHADOW E&M-EST. PATIENT-LVL V: ICD-10-PCS | Mod: PBBFAC,HCNC,, | Performed by: FAMILY MEDICINE

## 2023-12-04 PROCEDURE — 3288F PR FALLS RISK ASSESSMENT DOCUMENTED: ICD-10-PCS | Mod: HCNC,CPTII,S$GLB, | Performed by: FAMILY MEDICINE

## 2023-12-04 PROCEDURE — 3078F DIAST BP <80 MM HG: CPT | Mod: HCNC,CPTII,S$GLB, | Performed by: FAMILY MEDICINE

## 2023-12-04 PROCEDURE — 90677 PNEUMOCOCCAL CONJUGATE VACCINE 20-VALENT: ICD-10-PCS | Mod: HCNC,S$GLB,, | Performed by: FAMILY MEDICINE

## 2023-12-04 PROCEDURE — 99397 PR PREVENTIVE VISIT,EST,65 & OVER: ICD-10-PCS | Mod: HCNC,S$GLB,, | Performed by: FAMILY MEDICINE

## 2023-12-04 PROCEDURE — 4010F ACE/ARB THERAPY RXD/TAKEN: CPT | Mod: HCNC,CPTII,S$GLB, | Performed by: FAMILY MEDICINE

## 2023-12-04 PROCEDURE — 3008F PR BODY MASS INDEX (BMI) DOCUMENTED: ICD-10-PCS | Mod: HCNC,CPTII,S$GLB, | Performed by: FAMILY MEDICINE

## 2023-12-04 PROCEDURE — 3078F PR MOST RECENT DIASTOLIC BLOOD PRESSURE < 80 MM HG: ICD-10-PCS | Mod: HCNC,CPTII,S$GLB, | Performed by: FAMILY MEDICINE

## 2023-12-04 PROCEDURE — 3008F BODY MASS INDEX DOCD: CPT | Mod: HCNC,CPTII,S$GLB, | Performed by: FAMILY MEDICINE

## 2023-12-04 PROCEDURE — 3044F HG A1C LEVEL LT 7.0%: CPT | Mod: HCNC,CPTII,S$GLB, | Performed by: FAMILY MEDICINE

## 2023-12-04 PROCEDURE — 1101F PR PT FALLS ASSESS DOC 0-1 FALLS W/OUT INJ PAST YR: ICD-10-PCS | Mod: HCNC,CPTII,S$GLB, | Performed by: FAMILY MEDICINE

## 2023-12-04 PROCEDURE — 1101F PT FALLS ASSESS-DOCD LE1/YR: CPT | Mod: HCNC,CPTII,S$GLB, | Performed by: FAMILY MEDICINE

## 2023-12-04 PROCEDURE — 99397 PER PM REEVAL EST PAT 65+ YR: CPT | Mod: HCNC,S$GLB,, | Performed by: FAMILY MEDICINE

## 2023-12-04 PROCEDURE — 3075F SYST BP GE 130 - 139MM HG: CPT | Mod: HCNC,CPTII,S$GLB, | Performed by: FAMILY MEDICINE

## 2023-12-04 PROCEDURE — G0009 PNEUMOCOCCAL CONJUGATE VACCINE 20-VALENT: ICD-10-PCS | Mod: HCNC,S$GLB,, | Performed by: FAMILY MEDICINE

## 2023-12-04 NOTE — PROGRESS NOTES
"Well Man VISIT      CHIEF COMPLAINT  Chief Complaint   Patient presents with    Follow-up    Diabetes       HPI  Fermin Henson is a 63 y.o. male who presents for physical.     Social Factors  Tobacco use: No  Ready to Quit: No  Alcohol: No  Intimate partner violence screening  "Do you feel safe in your current relationship?" single  "Have you ever been in a relationship in which your partner frightened you or hurt you?" no  Living Will/POA: No  Regular Exercise: No    Depression  Over the past two weeks, have you felt down, depressed, or hopeless? No  Over the past two weeks, have you felt little interest or pleasure in doing things? No    Reproductive Health  STD screening in last year: deferred  HIV screening: deferred    Screen for Chronic Disease  CHD Risk Factors: advanced age (older than 55 for men, 65 for women), diabetes mellitus, dyslipidemia and obesity (BMI >= 30 kg/m2)  Estimated body mass index is 31.32 kg/m² as calculated from the following:    Height as of this encounter: 6' (1.829 m).    Weight as of this encounter: 104.8 kg (230 lb 14.9 oz).  Dyslipidemia screening needed: ordered  T2DM screening needed: ordered  Colonoscopy needed: utd per patient  PSA needed: n/a  AAA screening needed:n/a  Screen men 35 years and older, and men 20 to 34 years of age who have cardiovascular risk factors for dyslipidemia  Begin screening colonoscopies at 50 years of age in men of average risk, and continue until 75 years of age; offer fecal occult blood testing every year, flexible sigmoidoscopy every five years combined with fecal occult blood testing every three years, or colonoscopy every 10 years   The American Urological Association recommends offering PSA testing and digital rectal examination to well-informed men beginning at 40 years of age and continuing until life expectancy is less than 10 years  Screen once with ultrasonography in men 65 to 75 years of age if they have a family history or have " smoked at [id number] cigarettes in their lifetime  Screen men with a sustained blood pressure greater than 135/80 mm Hg for T2DM      Immunizations  stated as up to date, no records available    ALLERGIES and MEDS were verified.   PMHx, PSHx, FHx, SOCIALHx were updated as pertinent.    REVIEW OF SYSTEMS  Review of Systems   Constitutional: Negative.    HENT: Negative.     Eyes: Negative.    Respiratory: Negative.     Cardiovascular: Negative.    Gastrointestinal: Negative.    Genitourinary: Negative.    Musculoskeletal: Negative.    Skin: Negative.    Neurological:  Positive for sensory change.         PHYSICAL EXAM  VITAL SIGNS: /60   Pulse 75   Temp 97.9 °F (36.6 °C) (Oral)   Resp 18   Ht 6' (1.829 m)   Wt 104.8 kg (230 lb 14.9 oz)   SpO2 96%   BMI 31.32 kg/m²   GEN: Well developed, Well nourished, No acute distress.  HENT: Normocephalic, Atraumatic, Bilateral external ears normal, Nose normal, Oropharynx moist, No oral exudates.   Eyes: PERRL, EOMI, Conjunctiva normal, No discharge.   Neck: Supple, No tenderness.  Lymphatic: No cervical or supraclavicular lymphadenopathy noted.   Cardiovascular: Normal heart rate, Normal rhythm, No murmurs, No rubs, No gallops.   Thorax & Lungs: Normal breath sounds, No respiratory distress, No wheezing.  Abdomen: Soft, No tenderness, Bowel sounds normal.  Genital: deferred  Skin: Warm, Dry, No erythema, No rash.   Extremities: No edema, No tenderness.       ASSESSMENT/PLAN    Fermin was seen today for follow-up and diabetes.    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, without long-term current use of insulin  -     CBC Auto Differential; Future  -     COMPREHENSIVE METABOLIC PANEL; Future  -     Hemoglobin A1C; Future  -     Lipid Panel; Future  -     Microalbumin/Creatinine Ratio, Urine; Future  - Patient reported having higher blood sugar readings at home lately, but denies changes in diet  -  Will adjust medications pending A1c results    Type 2 diabetes mellitus with diabetic polyneuropathy, without long-term current use of insulin  -     CBC Auto Differential; Future  -     COMPREHENSIVE METABOLIC PANEL; Future  -     Hemoglobin A1C; Future  -     Lipid Panel; Future  -     Microalbumin/Creatinine Ratio, Urine; Future    Hx of amputation of lesser toe, right  - Did have ulcer just beneath amputation site, but that has healed per patient    Benign essential HTN  - Stable on current medication regimen  - No headaches or chest pain    Prostate cancer screening  -     PSA, Screening; Future  - Asymptomatic  -  Will check PSA     Trigger middle finger of right hand  -     Ambulatory referral/consult to Orthopedics; Future  - Patient has trouble with trigger finger getting stuck in place    Need for pneumococcal 20-valent conjugate vaccination  -     (In Office Administered) Pneumococcal Conjugate Vaccine (20 Valent) (IM) (Preferred)    Class 1 obesity due to excess calories with serious comorbidity and body mass index (BMI) of 30.0 to 30.9 in adult  - Encouraged weight loss to help with overall health including blood pressure and diabetes    Type 2 diabetes mellitus with microalbuminuria, without long-term current use of insulin         FOLLOW UP: 6 months or sooner if needed      See Jean-Baptiste MD

## 2023-12-05 ENCOUNTER — OFFICE VISIT (OUTPATIENT)
Dept: ORTHOPEDICS | Facility: CLINIC | Age: 63
End: 2023-12-05
Payer: MEDICARE

## 2023-12-05 DIAGNOSIS — M65.331 TRIGGER MIDDLE FINGER OF RIGHT HAND: Primary | ICD-10-CM

## 2023-12-05 PROCEDURE — 1160F PR REVIEW ALL MEDS BY PRESCRIBER/CLIN PHARMACIST DOCUMENTED: ICD-10-PCS | Mod: HCNC,CPTII,S$GLB, | Performed by: ORTHOPAEDIC SURGERY

## 2023-12-05 PROCEDURE — 4010F ACE/ARB THERAPY RXD/TAKEN: CPT | Mod: HCNC,CPTII,S$GLB, | Performed by: ORTHOPAEDIC SURGERY

## 2023-12-05 PROCEDURE — 99204 PR OFFICE/OUTPT VISIT, NEW, LEVL IV, 45-59 MIN: ICD-10-PCS | Mod: 25,HCNC,S$GLB, | Performed by: ORTHOPAEDIC SURGERY

## 2023-12-05 PROCEDURE — 1159F PR MEDICATION LIST DOCUMENTED IN MEDICAL RECORD: ICD-10-PCS | Mod: HCNC,CPTII,S$GLB, | Performed by: ORTHOPAEDIC SURGERY

## 2023-12-05 PROCEDURE — 20550 NJX 1 TENDON SHEATH/LIGAMENT: CPT | Mod: HCNC,RT,S$GLB, | Performed by: ORTHOPAEDIC SURGERY

## 2023-12-05 PROCEDURE — 1160F RVW MEDS BY RX/DR IN RCRD: CPT | Mod: HCNC,CPTII,S$GLB, | Performed by: ORTHOPAEDIC SURGERY

## 2023-12-05 PROCEDURE — 3288F PR FALLS RISK ASSESSMENT DOCUMENTED: ICD-10-PCS | Mod: HCNC,CPTII,S$GLB, | Performed by: ORTHOPAEDIC SURGERY

## 2023-12-05 PROCEDURE — 99999 PR PBB SHADOW E&M-EST. PATIENT-LVL III: CPT | Mod: PBBFAC,HCNC,, | Performed by: ORTHOPAEDIC SURGERY

## 2023-12-05 PROCEDURE — 99204 OFFICE O/P NEW MOD 45 MIN: CPT | Mod: 25,HCNC,S$GLB, | Performed by: ORTHOPAEDIC SURGERY

## 2023-12-05 PROCEDURE — 1101F PR PT FALLS ASSESS DOC 0-1 FALLS W/OUT INJ PAST YR: ICD-10-PCS | Mod: HCNC,CPTII,S$GLB, | Performed by: ORTHOPAEDIC SURGERY

## 2023-12-05 PROCEDURE — 20550 TENDON SHEATH: ICD-10-PCS | Mod: HCNC,RT,S$GLB, | Performed by: ORTHOPAEDIC SURGERY

## 2023-12-05 PROCEDURE — 4010F PR ACE/ARB THEARPY RXD/TAKEN: ICD-10-PCS | Mod: HCNC,CPTII,S$GLB, | Performed by: ORTHOPAEDIC SURGERY

## 2023-12-05 PROCEDURE — 3044F HG A1C LEVEL LT 7.0%: CPT | Mod: HCNC,CPTII,S$GLB, | Performed by: ORTHOPAEDIC SURGERY

## 2023-12-05 PROCEDURE — 3288F FALL RISK ASSESSMENT DOCD: CPT | Mod: HCNC,CPTII,S$GLB, | Performed by: ORTHOPAEDIC SURGERY

## 2023-12-05 PROCEDURE — 1159F MED LIST DOCD IN RCRD: CPT | Mod: HCNC,CPTII,S$GLB, | Performed by: ORTHOPAEDIC SURGERY

## 2023-12-05 PROCEDURE — 3044F PR MOST RECENT HEMOGLOBIN A1C LEVEL <7.0%: ICD-10-PCS | Mod: HCNC,CPTII,S$GLB, | Performed by: ORTHOPAEDIC SURGERY

## 2023-12-05 PROCEDURE — 99999 PR PBB SHADOW E&M-EST. PATIENT-LVL III: ICD-10-PCS | Mod: PBBFAC,HCNC,, | Performed by: ORTHOPAEDIC SURGERY

## 2023-12-05 PROCEDURE — 1101F PT FALLS ASSESS-DOCD LE1/YR: CPT | Mod: HCNC,CPTII,S$GLB, | Performed by: ORTHOPAEDIC SURGERY

## 2023-12-05 RX ORDER — TRIAMCINOLONE ACETONIDE 40 MG/ML
20 INJECTION, SUSPENSION INTRA-ARTICULAR; INTRAMUSCULAR
Status: DISCONTINUED | OUTPATIENT
Start: 2023-12-05 | End: 2023-12-05 | Stop reason: HOSPADM

## 2023-12-05 RX ADMIN — TRIAMCINOLONE ACETONIDE 20 MG: 40 INJECTION, SUSPENSION INTRA-ARTICULAR; INTRAMUSCULAR at 12:12

## 2023-12-05 NOTE — PROCEDURES
Tendon Sheath    Date/Time: 12/5/2023 12:30 PM    Performed by: Philly Edge MD  Authorized by: Philly Edge MD    Consent Done?:  Yes (Verbal)  Indications:  Pain  Timeout: prior to procedure the correct patient, procedure, and site was verified    Prep: patient was prepped and draped in usual sterile fashion      Local anesthesia used?: Yes    Local anesthetic:  Topical anesthetic  Location:  Long finger  Site:  R long flexor tendon sheath  Needle size:  25 G  Approach:  Volar  Medications:  20 mg triamcinolone acetonide 40 mg/mL  Patient tolerance:  Patient tolerated the procedure well with no immediate complications

## 2023-12-05 NOTE — PROGRESS NOTES
Assessment: 63 y.o. male with RLF trigger finger    I explained my diagnostic impression and the reasoning behind it in detail, using layman's terms.      Plan:   - Injection of the right trigger finger - long  performed, please see procedure note for more details.  Prior to the injection risks and benefits of corticosteroid injection were discussed with the patient including pain, infection, bleeding, skin color changes, swelling, steroid flare. We discussed that over time injections can result in chondral damage, acceleration of arthritis formation, damage to tendons and damage to joints.  The patient consented for the procedure.  Post-injection instructions were given to the patient in writing.  - Return to clinic PRN    All questions were answered in detail. The patient is in full agreement with the treatment plan and will proceed accordingly.    Chief Complaint   Patient presents with    Right Hand - Finger Injury, Pain       Initial visit (12/5/23): Fermin Henson is a 63 y.o. male who presents today complaining of Finger Injury and Pain of the Right Hand     Duration of symptoms:  a couple months   Triggering of the RLF  Trauma or new activity: no  Pain is constant  Aggravating factors: motion of the finger  Relieving factors: rest  Radicular symptoms: no numbness, paresthesias   Prior treatment:  OTC NSAIDs  without improvement in pain.     Pain does interfere with activities of daily living .      This patient was seen in consultation at the request of Dr. See Jean-Baptiste    This is the extent of the patient's complaints at this time.     Hand dominance: Left     Occupation: Retired       Review of patient's allergies indicates:  No Known Allergies      Current Outpatient Medications:     antiox #8/om3/dha/epa/lut/zeax (PRESERVISION AREDS 2, OMEGA-3, ORAL), Take by mouth., Disp: , Rfl:     cinnamon bark 500 mg capsule, Take 500 mg by mouth once daily., Disp: , Rfl:     empagliflozin (JARDIANCE) 10 mg  tablet, Take 1 tablet (10 mg total) by mouth once daily., Disp: 90 tablet, Rfl: 0    ferrous sulfate 325 mg (65 mg iron) Tab tablet, Take 325 mg by mouth daily with breakfast., Disp: , Rfl:     fish oil-omega-3 fatty acids 300-1,000 mg capsule, Take by mouth once daily., Disp: , Rfl:     losartan (COZAAR) 50 MG tablet, Take 1 tablet (50 mg total) by mouth once daily., Disp: 90 tablet, Rfl: 3    melatonin 10 mg Cap, Take by mouth., Disp: , Rfl:     metFORMIN (GLUCOPHAGE) 850 MG tablet, TAKE 2 TABLETS BY MOUTH ONCE DAILY IN THE MORNING AND 1 WITH EVENING MEAL Strength: 850 mg, Disp: 270 tablet, Rfl: 2    multivitamin (THERAGRAN) per tablet, Take 1 tablet by mouth once daily., Disp: , Rfl:     pioglitazone (ACTOS) 15 MG tablet, Take 1 tablet (15 mg total) by mouth once daily., Disp: 90 tablet, Rfl: 1    simvastatin (ZOCOR) 20 MG tablet, Take 1 tablet (20 mg total) by mouth every evening., Disp: 90 tablet, Rfl: 2    Physical Exam:   Vitals:    12/05/23 1239   PainSc:   2   PainLoc: Finger       General:  Patient is alert, awake and oriented to time, place and person. Mood and affect are appropriate.  Patient does not appear to be in any distress, denies any constitutional symptoms and appears stated age.   HEENT:  Pupils are equal and round, sclera are not injected. External examination of ears and nose reveals no abnormalities. Cranial nerves II-X are grossly intact  Skin:  no rashes, abrasions or open wounds on the affected extremity   Resp:  No respiratory distress or audible wheezing   CV: 2+  pulses, all extremities warm and well perfused   Right Hand   Triggering of RLF with palpable painful nodule at the A1 pulley   Negative tinels  LTSI m/u/r  2+ RP  + EPL, IO, FDS, FDP     Imaging: 3 views of the right hand negative for fractures, degenerative changes     I personally reviewed and interpreted the patient's imaging obtained today in clinic     A note notifying Dr. See Jean-Baptiste of my findings was sent via the  electronic medical record     This note was created by combination of typed  and M-Modal dictation. Transcription and phonetic errors may be present.  If there are any questions, please contact me.    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, without long-term current use of insulin 03/09/2020    Type 2 diabetes mellitus with microalbuminuria, without long-term current use of insulin 05/13/2019    Other proteinuria 10/25/2018    Type 2 diabetes mellitus with diabetic polyneuropathy, without long-term current use of insulin 07/20/2018       Past Surgical History:   Procedure Laterality Date    EXTRACORPOREAL SHOCK WAVE LITHOTRIPSY Right 4/1/2019    Procedure: LITHOTRIPSY, ESWL;  Surgeon: WHITNEY Bryant MD;  Location: [facility] OR;  Service: Urology;  Laterality: Right;  RN PREOP 3/25/2019---NEED H/P-----KUB    eye  surgeries      several     TOE AMPUTATION      several toes on R foot       Family History   Problem Relation Age of Onset    No Known Problems Mother     Diabetes Father     Diabetes Sister     Drug abuse Brother

## 2023-12-19 ENCOUNTER — OFFICE VISIT (OUTPATIENT)
Dept: PODIATRY | Facility: CLINIC | Age: 63
End: 2023-12-19
Payer: MEDICARE

## 2023-12-19 VITALS
DIASTOLIC BLOOD PRESSURE: 75 MMHG | BODY MASS INDEX: 31.2 KG/M2 | SYSTOLIC BLOOD PRESSURE: 128 MMHG | HEIGHT: 72 IN | WEIGHT: 230.38 LBS | HEART RATE: 118 BPM

## 2023-12-19 DIAGNOSIS — L97.512 ULCER OF RIGHT FOOT WITH FAT LAYER EXPOSED: Primary | ICD-10-CM

## 2023-12-19 DIAGNOSIS — E11.49 TYPE II DIABETES MELLITUS WITH NEUROLOGICAL MANIFESTATIONS: ICD-10-CM

## 2023-12-19 PROCEDURE — 3078F PR MOST RECENT DIASTOLIC BLOOD PRESSURE < 80 MM HG: ICD-10-PCS | Mod: HCNC,CPTII,S$GLB, | Performed by: PODIATRIST

## 2023-12-19 PROCEDURE — 99214 OFFICE O/P EST MOD 30 MIN: CPT | Mod: 25,HCNC,S$GLB, | Performed by: PODIATRIST

## 2023-12-19 PROCEDURE — 4010F PR ACE/ARB THEARPY RXD/TAKEN: ICD-10-PCS | Mod: HCNC,CPTII,S$GLB, | Performed by: PODIATRIST

## 2023-12-19 PROCEDURE — 3044F PR MOST RECENT HEMOGLOBIN A1C LEVEL <7.0%: ICD-10-PCS | Mod: HCNC,CPTII,S$GLB, | Performed by: PODIATRIST

## 2023-12-19 PROCEDURE — 87075 CULTR BACTERIA EXCEPT BLOOD: CPT | Mod: HCNC | Performed by: PODIATRIST

## 2023-12-19 PROCEDURE — 4010F ACE/ARB THERAPY RXD/TAKEN: CPT | Mod: HCNC,CPTII,S$GLB, | Performed by: PODIATRIST

## 2023-12-19 PROCEDURE — 87070 CULTURE OTHR SPECIMN AEROBIC: CPT | Mod: HCNC | Performed by: PODIATRIST

## 2023-12-19 PROCEDURE — 11042 PR DEBRIDEMENT, SKIN, SUB-Q TISSUE,=<20 SQ CM: ICD-10-PCS | Mod: HCNC,S$GLB,, | Performed by: PODIATRIST

## 2023-12-19 PROCEDURE — 99999 PR PBB SHADOW E&M-EST. PATIENT-LVL III: CPT | Mod: PBBFAC,HCNC,, | Performed by: PODIATRIST

## 2023-12-19 PROCEDURE — 3078F DIAST BP <80 MM HG: CPT | Mod: HCNC,CPTII,S$GLB, | Performed by: PODIATRIST

## 2023-12-19 PROCEDURE — 1159F MED LIST DOCD IN RCRD: CPT | Mod: HCNC,CPTII,S$GLB, | Performed by: PODIATRIST

## 2023-12-19 PROCEDURE — 1159F PR MEDICATION LIST DOCUMENTED IN MEDICAL RECORD: ICD-10-PCS | Mod: HCNC,CPTII,S$GLB, | Performed by: PODIATRIST

## 2023-12-19 PROCEDURE — 99214 PR OFFICE/OUTPT VISIT, EST, LEVL IV, 30-39 MIN: ICD-10-PCS | Mod: 25,HCNC,S$GLB, | Performed by: PODIATRIST

## 2023-12-19 PROCEDURE — 3008F BODY MASS INDEX DOCD: CPT | Mod: HCNC,CPTII,S$GLB, | Performed by: PODIATRIST

## 2023-12-19 PROCEDURE — 87186 SC STD MICRODIL/AGAR DIL: CPT | Mod: HCNC | Performed by: PODIATRIST

## 2023-12-19 PROCEDURE — 11042 DBRDMT SUBQ TIS 1ST 20SQCM/<: CPT | Mod: HCNC,S$GLB,, | Performed by: PODIATRIST

## 2023-12-19 PROCEDURE — 3044F HG A1C LEVEL LT 7.0%: CPT | Mod: HCNC,CPTII,S$GLB, | Performed by: PODIATRIST

## 2023-12-19 PROCEDURE — 3074F SYST BP LT 130 MM HG: CPT | Mod: HCNC,CPTII,S$GLB, | Performed by: PODIATRIST

## 2023-12-19 PROCEDURE — 87077 CULTURE AEROBIC IDENTIFY: CPT | Mod: HCNC | Performed by: PODIATRIST

## 2023-12-19 PROCEDURE — 99999 PR PBB SHADOW E&M-EST. PATIENT-LVL III: ICD-10-PCS | Mod: PBBFAC,HCNC,, | Performed by: PODIATRIST

## 2023-12-19 PROCEDURE — 3008F PR BODY MASS INDEX (BMI) DOCUMENTED: ICD-10-PCS | Mod: HCNC,CPTII,S$GLB, | Performed by: PODIATRIST

## 2023-12-19 PROCEDURE — 3074F PR MOST RECENT SYSTOLIC BLOOD PRESSURE < 130 MM HG: ICD-10-PCS | Mod: HCNC,CPTII,S$GLB, | Performed by: PODIATRIST

## 2023-12-19 NOTE — PROGRESS NOTES
Subjective:      Patient ID: Fermin Henson is a 63 y.o. male.    Chief Complaint: Nail Care    Fermin is a 63 y.o. male who presents to the clinic for evaluation and treatment of high risk feet. Fermin has a past medical history of Benign essential HTN (12/4/2023), Diabetes mellitus, type 2, and Kidney stones. The patient's chief complaint is foot ulcer, right foot. This patient has documented high risk feet requiring routine maintenance secondary to peripheral neuropathy. Pt states he stepped on a nail at [employer] two weeks ago. Pt states he has been cleaning wound with peroxide.     PCP: See Jean-Baptiste MD    Date Last Seen by PCP:   Chief Complaint   Patient presents with    Nail Care         Current shoe gear:  Affected Foot: Tennis shoes     Unaffected Foot: Tennis shoes    History of Trauma: negative  Sign of Infection: none    Hemoglobin A1C   Date Value Ref Range Status   05/29/2023 6.7 (H) 4.0 - 5.6 % Final     Comment:     ADA Screening Guidelines:  5.7-6.4%  Consistent with prediabetes  >or=6.5%  Consistent with diabetes    High levels of fetal hemoglobin interfere with the HbA1C  assay. Heterozygous hemoglobin variants (HbS, HgC, etc)do  not significantly interfere with this assay.   However, presence of multiple variants may affect accuracy.     12/05/2022 6.6 (H) 4.0 - 5.6 % Final     Comment:     ADA Screening Guidelines:  5.7-6.4%  Consistent with prediabetes  >or=6.5%  Consistent with diabetes    High levels of fetal hemoglobin interfere with the HbA1C  assay. Heterozygous hemoglobin variants (HbS, HgC, etc)do  not significantly interfere with this assay.   However, presence of multiple variants may affect accuracy.     04/12/2022 6.4 (H) 4.0 - 5.6 % Final     Comment:     ADA Screening Guidelines:  5.7-6.4%  Consistent with prediabetes  >or=6.5%  Consistent with diabetes    High levels of fetal hemoglobin interfere with the HbA1C  assay. Heterozygous hemoglobin variants (HbS, HgC, etc)do  not  significantly interfere with this assay.   However, presence of multiple variants may affect accuracy.         Review of Systems   Constitutional: Negative for chills, fever and malaise/fatigue.   HENT:  Negative for hearing loss.    Cardiovascular:  Negative for claudication.   Respiratory:  Negative for shortness of breath.    Skin:  Positive for unusual hair distribution. Negative for flushing and rash.   Musculoskeletal:  Negative for joint pain and myalgias.   Gastrointestinal:  Negative for nausea and vomiting.   Neurological:  Positive for numbness, paresthesias and sensory change. Negative for loss of balance.   Psychiatric/Behavioral:  Negative for altered mental status.    Allergic/Immunologic: Negative for hives.           Objective:      Physical Exam  Vitals reviewed.   Constitutional:       Appearance: He is well-developed.   Cardiovascular:      Pulses: Normal pulses.   Musculoskeletal:      Right ankle: Decreased range of motion. Abnormal pulse.      Right Achilles Tendon: Neil's test negative.      Left ankle: Decreased range of motion. Abnormal pulse.      Left Achilles Tendon: Neil's test negative.      Right foot: Decreased range of motion. Prominent metatarsal heads present.      Left foot: Decreased range of motion.      Comments: Right digit amps 1-3   Feet:      Right foot:      Protective Sensation: 5 sites tested.  2 sites sensed.      Left foot:      Protective Sensation: 5 sites tested.  2 sites sensed.   Skin:     General: Skin is dry.      Capillary Refill: Capillary refill takes more than 3 seconds.   Neurological:      Mental Status: He is alert.      Sensory: Sensory deficit present.      Comments: diminished sensation noted to b/L lower extremities   Psychiatric:         Behavior: Behavior normal. Behavior is cooperative.                   Ulceration  Location: right foot  Measurements: 1.0x 1.3x 0.3cm  Drainage: serous  Wound base: fibrogranular  SOI:  erythema  cultured      Assessment:       Encounter Diagnoses   Name Primary?    Ulcer of right foot with fat layer exposed Yes    Type II diabetes mellitus with neurological manifestations          Plan:       Fermin was seen today for nail care.    Diagnoses and all orders for this visit:    Ulcer of right foot with fat layer exposed  -     Aerobic culture  -     Culture, Anaerobic    Type II diabetes mellitus with neurological manifestations      I counseled the patient on his conditions, their implications and medical management.  Culture taken    Ulceration on right foot debrided through sub-q tissue using an tissue nipper. Ulceration debrided down to healthy tissue. Pt tolerated debridement well. Pt advised to keep dressing dry and intact until next visit, unless he/she has home health.   iodosorb applied to wound (s)  Home health orders placed? No  Darco shoe dispensed    Football dressing applied to pts right foot by, MA under my direct supervision. Pt tolerated dressing well.   RTC in 1 weeks or sooner if any new pedal problems should arise or if condition worsens.

## 2023-12-20 DIAGNOSIS — E11.9 TYPE 2 DIABETES MELLITUS WITHOUT COMPLICATION, WITHOUT LONG-TERM CURRENT USE OF INSULIN: ICD-10-CM

## 2023-12-21 LAB — BACTERIA SPEC AEROBE CULT: ABNORMAL

## 2023-12-21 RX ORDER — PIOGLITAZONEHYDROCHLORIDE 15 MG/1
15 TABLET ORAL
Qty: 90 TABLET | Refills: 0 | Status: SHIPPED | OUTPATIENT
Start: 2023-12-21 | End: 2024-03-02

## 2023-12-21 NOTE — TELEPHONE ENCOUNTER
Care Due:                  Date            Visit Type   Department     Provider  --------------------------------------------------------------------------------                                EP [facility]                              PRIMARY      MED/ INTERNAL  Last Visit: 12-      CARE ([facility])   MED/ PEDS      See ELLIS  Page  Next Visit: None Scheduled  None         None Found                                                            Last  Test          Frequency    Reason                     Performed    Due Date  --------------------------------------------------------------------------------    HBA1C.......  6 months...  empagliflozin, metFORMIN,   05- 11-                             pioglitazone.............    Lipid Panel.  12 months..  simvastatin..............  12- 11-    [employer] Embedded Care Due Messages. Reference number: [id number].   12/20/2023 7:42:31 PM CST

## 2023-12-21 NOTE — TELEPHONE ENCOUNTER
Refill Routing Note   Medication(s) are not appropriate for processing by [facility] for the following reason(s):        Required labs outdated    [facility] action(s):  Defer     Requires labs : Yes             Appointments  past 12m or future 3m with PCP    Date Provider   Last Visit   12/4/2023 See Jean-Baptiste MD   Next Visit   6/10/2024 See Jean-Baptiste MD   ED visits in past 90 days: 0        Note composed:10:16 AM 12/21/2023

## 2023-12-26 LAB — BACTERIA SPEC ANAEROBE CULT: NORMAL

## 2023-12-26 RX ORDER — CLINDAMYCIN HYDROCHLORIDE 300 MG/1
300 CAPSULE ORAL 3 TIMES DAILY
Qty: 30 CAPSULE | Refills: 0 | Status: SHIPPED | OUTPATIENT
Start: 2023-12-26

## 2023-12-27 ENCOUNTER — OFFICE VISIT (OUTPATIENT)
Dept: PODIATRY | Facility: CLINIC | Age: 63
End: 2023-12-27
Payer: MEDICARE

## 2023-12-27 ENCOUNTER — TELEPHONE (OUTPATIENT)
Dept: PODIATRY | Facility: CLINIC | Age: 63
End: 2023-12-27
Payer: MEDICARE

## 2023-12-27 VITALS
DIASTOLIC BLOOD PRESSURE: 86 MMHG | WEIGHT: 230 LBS | HEART RATE: 105 BPM | RESPIRATION RATE: 18 BRPM | SYSTOLIC BLOOD PRESSURE: 136 MMHG | BODY MASS INDEX: 31.15 KG/M2 | HEIGHT: 72 IN

## 2023-12-27 DIAGNOSIS — E11.49 TYPE II DIABETES MELLITUS WITH NEUROLOGICAL MANIFESTATIONS: Primary | ICD-10-CM

## 2023-12-27 DIAGNOSIS — Z89.411 RIGHT GREAT TOE AMPUTEE: ICD-10-CM

## 2023-12-27 DIAGNOSIS — Z89.421 H/O AMPUTATION OF LESSER TOE, RIGHT: ICD-10-CM

## 2023-12-27 DIAGNOSIS — Z22.322 MRSA (METHICILLIN RESISTANT STAPH AUREUS) CULTURE POSITIVE: ICD-10-CM

## 2023-12-27 DIAGNOSIS — L97.512 FOOT ULCER, RIGHT, WITH FAT LAYER EXPOSED: ICD-10-CM

## 2023-12-27 PROCEDURE — 3079F PR MOST RECENT DIASTOLIC BLOOD PRESSURE 80-89 MM HG: ICD-10-PCS | Mod: HCNC,CPTII,S$GLB, | Performed by: PODIATRIST

## 2023-12-27 PROCEDURE — 11042 DBRDMT SUBQ TIS 1ST 20SQCM/<: CPT | Mod: HCNC,S$GLB,, | Performed by: PODIATRIST

## 2023-12-27 PROCEDURE — 99214 PR OFFICE/OUTPT VISIT, EST, LEVL IV, 30-39 MIN: ICD-10-PCS | Mod: 25,HCNC,S$GLB, | Performed by: PODIATRIST

## 2023-12-27 PROCEDURE — 3008F BODY MASS INDEX DOCD: CPT | Mod: HCNC,CPTII,S$GLB, | Performed by: PODIATRIST

## 2023-12-27 PROCEDURE — 99214 OFFICE O/P EST MOD 30 MIN: CPT | Mod: 25,HCNC,S$GLB, | Performed by: PODIATRIST

## 2023-12-27 PROCEDURE — 99999 PR PBB SHADOW E&M-EST. PATIENT-LVL III: CPT | Mod: PBBFAC,HCNC,, | Performed by: PODIATRIST

## 2023-12-27 PROCEDURE — 4010F ACE/ARB THERAPY RXD/TAKEN: CPT | Mod: HCNC,CPTII,S$GLB, | Performed by: PODIATRIST

## 2023-12-27 PROCEDURE — 3075F SYST BP GE 130 - 139MM HG: CPT | Mod: HCNC,CPTII,S$GLB, | Performed by: PODIATRIST

## 2023-12-27 PROCEDURE — 1159F PR MEDICATION LIST DOCUMENTED IN MEDICAL RECORD: ICD-10-PCS | Mod: HCNC,CPTII,S$GLB, | Performed by: PODIATRIST

## 2023-12-27 PROCEDURE — 1160F RVW MEDS BY RX/DR IN RCRD: CPT | Mod: HCNC,CPTII,S$GLB, | Performed by: PODIATRIST

## 2023-12-27 PROCEDURE — 3075F PR MOST RECENT SYSTOLIC BLOOD PRESS GE 130-139MM HG: ICD-10-PCS | Mod: HCNC,CPTII,S$GLB, | Performed by: PODIATRIST

## 2023-12-27 PROCEDURE — 3044F PR MOST RECENT HEMOGLOBIN A1C LEVEL <7.0%: ICD-10-PCS | Mod: HCNC,CPTII,S$GLB, | Performed by: PODIATRIST

## 2023-12-27 PROCEDURE — 3008F PR BODY MASS INDEX (BMI) DOCUMENTED: ICD-10-PCS | Mod: HCNC,CPTII,S$GLB, | Performed by: PODIATRIST

## 2023-12-27 PROCEDURE — 3044F HG A1C LEVEL LT 7.0%: CPT | Mod: HCNC,CPTII,S$GLB, | Performed by: PODIATRIST

## 2023-12-27 PROCEDURE — 1159F MED LIST DOCD IN RCRD: CPT | Mod: HCNC,CPTII,S$GLB, | Performed by: PODIATRIST

## 2023-12-27 PROCEDURE — 1160F PR REVIEW ALL MEDS BY PRESCRIBER/CLIN PHARMACIST DOCUMENTED: ICD-10-PCS | Mod: HCNC,CPTII,S$GLB, | Performed by: PODIATRIST

## 2023-12-27 PROCEDURE — 99999 PR PBB SHADOW E&M-EST. PATIENT-LVL III: ICD-10-PCS | Mod: PBBFAC,HCNC,, | Performed by: PODIATRIST

## 2023-12-27 PROCEDURE — 3079F DIAST BP 80-89 MM HG: CPT | Mod: HCNC,CPTII,S$GLB, | Performed by: PODIATRIST

## 2023-12-27 PROCEDURE — 11042 WOUND DEBRIDEMENT: ICD-10-PCS | Mod: HCNC,S$GLB,, | Performed by: PODIATRIST

## 2023-12-27 PROCEDURE — 4010F PR ACE/ARB THEARPY RXD/TAKEN: ICD-10-PCS | Mod: HCNC,CPTII,S$GLB, | Performed by: PODIATRIST

## 2023-12-27 RX ORDER — SULFAMETHOXAZOLE AND TRIMETHOPRIM 800; 160 MG/1; MG/1
1 TABLET ORAL 2 TIMES DAILY
Qty: 28 TABLET | Refills: 0 | Status: SHIPPED | OUTPATIENT
Start: 2023-12-27 | End: 2024-01-10

## 2023-12-27 NOTE — PROCEDURES
"Wound Debridement    Date/Time: 12/27/2023 1:30 PM    Performed by: Iveth Perez DPM  Authorized by: Iveth Perez DPM    Time out: Immediately prior to procedure a "time out" was called to verify the correct patient, procedure, equipment, support staff and site/side marked as required.    Consent Done?:  Yes (Verbal)    Preparation: Patient was prepped and draped with clean technique    Local anesthesia used?: No      Wound Details:    Location:  Right foot    Location:  Right 1st Metatarsal Head    Type of Debridement:  Excisional       Length (cm):  0.9       Area (sq cm):  0.63       Width (cm):  0.7       Percent Debrided (%):  100       Depth (cm):  0.2       Total Area Debrided (sq cm):  0.63    Depth of debridement:  Subcutaneous tissue    Tissue debrided:  Dermis and Subcutaneous    Devitalized tissue debrided:  Biofilm, Callus and Fibrin    Instruments:  Curette  Bleeding:  Minimal  Hemostasis Achieved: Yes  Method Used:  Pressure  Patient tolerance:  Patient tolerated the procedure well with no immediate complications  1st Wound Pain Assessment: 0    "

## 2023-12-27 NOTE — PROGRESS NOTES
Subjective:      Patient ID: Fermin Henson is a 63 y.o. male.    Chief Complaint: Wound Care (Rt foot ) and Dressing Change (Football )    Fermin is a 63 y.o. male who presents to the clinic for evaluation and treatment of high risk feet. Fermin has a past medical history of Benign essential HTN (12/4/2023), Diabetes mellitus, type 2, and Kidney stones. The patient's chief complaint is follow up for foot ulcer, right foot. This patient has documented high risk feet requiring routine maintenance secondary to peripheral neuropathy. Pt states he stepped on a nail at [employer] 3 weeks ago. Pt states he got tetanus shot. Cultures show MRSA but not on abx yet. Here for further recommendations.     PCP: See Jean-Baptiste MD    Date Last Seen by PCP:   Chief Complaint   Patient presents with    Wound Care     Rt foot     Dressing Change     Football          Current shoe gear:  Affected Foot: Tennis shoes     Unaffected Foot: Tennis shoes    History of Trauma: negative  Sign of Infection: none    Hemoglobin A1C   Date Value Ref Range Status   05/29/2023 6.7 (H) 4.0 - 5.6 % Final     Comment:     ADA Screening Guidelines:  5.7-6.4%  Consistent with prediabetes  >or=6.5%  Consistent with diabetes    High levels of fetal hemoglobin interfere with the HbA1C  assay. Heterozygous hemoglobin variants (HbS, HgC, etc)do  not significantly interfere with this assay.   However, presence of multiple variants may affect accuracy.     12/05/2022 6.6 (H) 4.0 - 5.6 % Final     Comment:     ADA Screening Guidelines:  5.7-6.4%  Consistent with prediabetes  >or=6.5%  Consistent with diabetes    High levels of fetal hemoglobin interfere with the HbA1C  assay. Heterozygous hemoglobin variants (HbS, HgC, etc)do  not significantly interfere with this assay.   However, presence of multiple variants may affect accuracy.     04/12/2022 6.4 (H) 4.0 - 5.6 % Final     Comment:     ADA Screening Guidelines:  5.7-6.4%  Consistent with  prediabetes  >or=6.5%  Consistent with diabetes    High levels of fetal hemoglobin interfere with the HbA1C  assay. Heterozygous hemoglobin variants (HbS, HgC, etc)do  not significantly interfere with this assay.   However, presence of multiple variants may affect accuracy.         Review of Systems   Constitutional: Negative for chills, fever and malaise/fatigue.   HENT:  Negative for hearing loss.    Cardiovascular:  Negative for claudication.   Respiratory:  Negative for shortness of breath.    Skin:  Positive for unusual hair distribution. Negative for flushing and rash.   Musculoskeletal:  Negative for joint pain and myalgias.   Gastrointestinal:  Negative for nausea and vomiting.   Neurological:  Positive for numbness, paresthesias and sensory change. Negative for loss of balance.   Psychiatric/Behavioral:  Negative for altered mental status.    Allergic/Immunologic: Negative for hives.           Objective:      Physical Exam  Vitals reviewed.   Constitutional:       Appearance: He is well-developed.   Cardiovascular:      Pulses: Normal pulses.   Musculoskeletal:      Right ankle: Decreased range of motion. Abnormal pulse.      Right Achilles Tendon: Neil's test negative.      Left ankle: Decreased range of motion. Abnormal pulse.      Left Achilles Tendon: Neil's test negative.      Right foot: Decreased range of motion. Prominent metatarsal heads present.      Left foot: Decreased range of motion.      Comments: Right digit amps 1-3   Feet:      Right foot:      Protective Sensation: 5 sites tested.  2 sites sensed.      Left foot:      Protective Sensation: 5 sites tested.  2 sites sensed.   Skin:     General: Skin is dry.      Capillary Refill: Capillary refill takes more than 3 seconds.   Neurological:      Mental Status: He is alert.      Sensory: Sensory deficit present.      Comments: diminished sensation noted to b/L lower extremities   Psychiatric:         Behavior: Behavior normal. Behavior  is cooperative.        Ulceration  Location: right foot  Measurements: 0.9 x 0.7 x 0.2cm  Drainage: serous  Wound base: fibrogranular with overlying biofilm  SOI: none  Tunneling/Undermining: none          Assessment:       Encounter Diagnoses   Name Primary?    Type II diabetes mellitus with neurological manifestations Yes    Right great toe amputee     H/O amputation of lesser toe, right     Foot ulcer, right, with fat layer exposed     MRSA (methicillin resistant staph aureus) culture positive            Plan:       Fermin was seen today for wound care and dressing change.    Diagnoses and all orders for this visit:    Type II diabetes mellitus with neurological manifestations    Right great toe amputee    H/O amputation of lesser toe, right    Foot ulcer, right, with fat layer exposed  -     Wound Debridement    MRSA (methicillin resistant staph aureus) culture positive  -     Wound Debridement    Other orders  -     sulfamethoxazole-trimethoprim 800-160mg (BACTRIM DS) 800-160 mg Tab; Take 1 tablet by mouth 2 (two) times daily. for 14 days        I counseled the patient on his conditions, their implications and medical management.     Cultures reviewed. MRSA +. Rx Bactrim bid for 14 days.      Excisional debridement preformed of wound today. See separate procedure note.     Home health orders placed? No    Iodosorb applied directly to wound bed after cleansing with alcohol. Applied Foam, football dressing to affected area. Dressing and foot/wound is to stay clean, dry and dressing to stay intact until follow up. Darco shoe dispensed and applied to the affected foot. Patient was advised to wear Darco shoe for ambulation at ALL times. Advised not to get dressing wet, remove dressing until follow up or walk directly on football dressing.     RTC 1 week, sooner PRN

## 2024-01-04 ENCOUNTER — OFFICE VISIT (OUTPATIENT)
Dept: PODIATRY | Facility: CLINIC | Age: 64
End: 2024-01-04
Payer: MEDICARE

## 2024-01-04 VITALS
BODY MASS INDEX: 31.15 KG/M2 | HEIGHT: 72 IN | HEART RATE: 111 BPM | SYSTOLIC BLOOD PRESSURE: 144 MMHG | DIASTOLIC BLOOD PRESSURE: 79 MMHG | WEIGHT: 230 LBS | RESPIRATION RATE: 18 BRPM

## 2024-01-04 DIAGNOSIS — L97.512 FOOT ULCER, RIGHT, WITH FAT LAYER EXPOSED: ICD-10-CM

## 2024-01-04 DIAGNOSIS — E11.49 TYPE II DIABETES MELLITUS WITH NEUROLOGICAL MANIFESTATIONS: Primary | ICD-10-CM

## 2024-01-04 DIAGNOSIS — Z89.411 RIGHT GREAT TOE AMPUTEE: ICD-10-CM

## 2024-01-04 PROCEDURE — 11042 DBRDMT SUBQ TIS 1ST 20SQCM/<: CPT | Mod: HCNC,S$GLB,, | Performed by: PODIATRIST

## 2024-01-04 PROCEDURE — 99499 UNLISTED E&M SERVICE: CPT | Mod: HCNC,S$GLB,, | Performed by: PODIATRIST

## 2024-01-04 PROCEDURE — 99999 PR PBB SHADOW E&M-EST. PATIENT-LVL III: CPT | Mod: PBBFAC,HCNC,, | Performed by: PODIATRIST

## 2024-01-04 NOTE — PROGRESS NOTES
Subjective:      Patient ID: Fermin Henson is a 63 y.o. male.    Chief Complaint: Foot Ulcer (Rt foot ) and Dressing Change (Football )    Fermin is a 63 y.o. male who presents to the clinic for evaluation and treatment of high risk feet. Fermin has a past medical history of Benign essential HTN (12/4/2023), Diabetes mellitus, type 2, and Kidney stones. The patient's chief complaint is follow up for foot ulcer, right foot. This patient has documented high risk feet requiring routine maintenance secondary to peripheral neuropathy. Pt states he stepped on a nail at [employer] 3 weeks ago. Pt states he got tetanus shot. Cultures show MRSA but not on abx yet. Here for further recommendations.       1/4/24: Patient has been in football dressing, ambulating in Darco shoe x 1 week with out issues . Seen by Dr Perez last week     PCP: See Jean-Baptiste MD    Date Last Seen by PCP:   Chief Complaint   Patient presents with    Foot Ulcer     Rt foot     Dressing Change     Football        Hemoglobin A1C   Date Value Ref Range Status   05/29/2023 6.7 (H) 4.0 - 5.6 % Final     Comment:     ADA Screening Guidelines:  5.7-6.4%  Consistent with prediabetes  >or=6.5%  Consistent with diabetes    High levels of fetal hemoglobin interfere with the HbA1C  assay. Heterozygous hemoglobin variants (HbS, HgC, etc)do  not significantly interfere with this assay.   However, presence of multiple variants may affect accuracy.     12/05/2022 6.6 (H) 4.0 - 5.6 % Final     Comment:     ADA Screening Guidelines:  5.7-6.4%  Consistent with prediabetes  >or=6.5%  Consistent with diabetes    High levels of fetal hemoglobin interfere with the HbA1C  assay. Heterozygous hemoglobin variants (HbS, HgC, etc)do  not significantly interfere with this assay.   However, presence of multiple variants may affect accuracy.     04/12/2022 6.4 (H) 4.0 - 5.6 % Final     Comment:     ADA Screening Guidelines:  5.7-6.4%  Consistent with prediabetes  >or=6.5%   Consistent with diabetes    High levels of fetal hemoglobin interfere with the HbA1C  assay. Heterozygous hemoglobin variants (HbS, HgC, etc)do  not significantly interfere with this assay.   However, presence of multiple variants may affect accuracy.         Review of Systems   Constitutional: Negative for chills, fever and malaise/fatigue.   HENT:  Negative for hearing loss.    Cardiovascular:  Negative for claudication.   Respiratory:  Negative for shortness of breath.    Skin:  Positive for poor wound healing and unusual hair distribution. Negative for flushing and rash.   Musculoskeletal:  Negative for joint pain and myalgias.   Gastrointestinal:  Negative for nausea and vomiting.   Neurological:  Positive for numbness, paresthesias and sensory change. Negative for loss of balance.   Psychiatric/Behavioral:  Negative for altered mental status.    Allergic/Immunologic: Negative for hives.           Objective:      Physical Exam  Vitals reviewed.   Constitutional:       Appearance: He is well-developed.   Cardiovascular:      Pulses: Normal pulses.   Musculoskeletal:      Right ankle: Decreased range of motion. Abnormal pulse.      Right Achilles Tendon: Neil's test negative.      Left ankle: Decreased range of motion. Abnormal pulse.      Left Achilles Tendon: Neil's test negative.      Right foot: Decreased range of motion. Prominent metatarsal heads present.      Left foot: Decreased range of motion.      Comments: Right digit amps 1-3   Feet:      Right foot:      Protective Sensation: 5 sites tested.  2 sites sensed.      Left foot:      Protective Sensation: 5 sites tested.  2 sites sensed.   Skin:     General: Skin is dry.      Capillary Refill: Capillary refill takes more than 3 seconds.      Coloration: Skin is not ashen or jaundiced.      Findings: Wound present.   Neurological:      Mental Status: He is alert.      Sensory: Sensory deficit present.      Comments: diminished sensation noted to  b/L lower extremities   Psychiatric:         Behavior: Behavior normal. Behavior is cooperative.        Ulceration  Location: right foot  Measurements: 1.0x1.0x0.2cm  Drainage: serous  Wound base: fibrogranular with overlying biofilm  SOI: none  Tunneling/Undermining: none          Assessment:       Encounter Diagnoses   Name Primary?    Type II diabetes mellitus with neurological manifestations Yes    Right great toe amputee     Foot ulcer, right, with fat layer exposed            Plan:       Fermin was seen today for foot ulcer and dressing change.    Diagnoses and all orders for this visit:    Type II diabetes mellitus with neurological manifestations    Right great toe amputee    Foot ulcer, right, with fat layer exposed        I counseled the patient on his conditions, their implications and medical management.     Continue abx until complete     Debridement: With verbal consent, nonviable tissues on the right foot were debrided beyond sub q utilizing a  sterile No. 3 scalpel and forceps. Minimal bleeding controlled with direct pressure  The patient tolerated this well.     Dressings: Liz  Offloading:Shelia ROMERO MA assisted w/ application of football dressing under my direct supervision. Darco shoe applied to offload the area. Advised patient that this should be worn on the affected foot at all times when ambulating     Follow-up:Patient is to return to the clinic in 1 week  for follow-up but should call [facility] immediately if any signs of infection, such as fever, chills, sweats, increased redness or pain.    Short-term goals include maintaining good offloading and minimizing bioburden, promoting granulation and epithelialization to healing.  Long-term goals include keeping the wound healed by good offloading and medical management under the direction of internist.

## 2024-01-09 ENCOUNTER — OFFICE VISIT (OUTPATIENT)
Dept: PODIATRY | Facility: CLINIC | Age: 64
End: 2024-01-09
Payer: MEDICARE

## 2024-01-09 DIAGNOSIS — E11.49 TYPE II DIABETES MELLITUS WITH NEUROLOGICAL MANIFESTATIONS: ICD-10-CM

## 2024-01-09 DIAGNOSIS — L97.512 FOOT ULCER, RIGHT, WITH FAT LAYER EXPOSED: Primary | ICD-10-CM

## 2024-01-09 PROCEDURE — 99499 UNLISTED E&M SERVICE: CPT | Mod: HCNC,S$GLB,, | Performed by: PODIATRIST

## 2024-01-09 PROCEDURE — 11042 DBRDMT SUBQ TIS 1ST 20SQCM/<: CPT | Mod: HCNC,S$GLB,, | Performed by: PODIATRIST

## 2024-01-09 NOTE — PROGRESS NOTES
Subjective:      Patient ID: Fermin Henson is a 63 y.o. male.    Chief Complaint: Foot Ulcer    Fermin is a 63 y.o. male who presents to the clinic for evaluation and treatment of high risk feet. Fermin has a past medical history of Benign essential HTN (12/4/2023), Diabetes mellitus, type 2, and Kidney stones. The patient's chief complaint is foot ulcer, right foot. This patient has documented high risk feet requiring routine maintenance secondary to peripheral neuropathy. Pt is here today for wound care. Pt denies any new issues.       PCP: See Jean-Baptiste MD    Date Last Seen by PCP:   Chief Complaint   Patient presents with    Foot Ulcer         Current shoe gear:  Affected Foot: Football and Darco shoe on the affected foot     Unaffected Foot: Tennis shoes    History of Trauma: negative  Sign of Infection: none    Hemoglobin A1C   Date Value Ref Range Status   05/29/2023 6.7 (H) 4.0 - 5.6 % Final     Comment:     ADA Screening Guidelines:  5.7-6.4%  Consistent with prediabetes  >or=6.5%  Consistent with diabetes    High levels of fetal hemoglobin interfere with the HbA1C  assay. Heterozygous hemoglobin variants (HbS, HgC, etc)do  not significantly interfere with this assay.   However, presence of multiple variants may affect accuracy.     12/05/2022 6.6 (H) 4.0 - 5.6 % Final     Comment:     ADA Screening Guidelines:  5.7-6.4%  Consistent with prediabetes  >or=6.5%  Consistent with diabetes    High levels of fetal hemoglobin interfere with the HbA1C  assay. Heterozygous hemoglobin variants (HbS, HgC, etc)do  not significantly interfere with this assay.   However, presence of multiple variants may affect accuracy.     04/12/2022 6.4 (H) 4.0 - 5.6 % Final     Comment:     ADA Screening Guidelines:  5.7-6.4%  Consistent with prediabetes  >or=6.5%  Consistent with diabetes    High levels of fetal hemoglobin interfere with the HbA1C  assay. Heterozygous hemoglobin variants (HbS, HgC, etc)do  not significantly  interfere with this assay.   However, presence of multiple variants may affect accuracy.         Review of Systems   Constitutional: Negative for chills, fever and malaise/fatigue.   HENT:  Negative for hearing loss.    Cardiovascular:  Negative for claudication.   Respiratory:  Negative for shortness of breath.    Skin:  Positive for unusual hair distribution. Negative for flushing and rash.   Musculoskeletal:  Negative for joint pain and myalgias.   Gastrointestinal:  Negative for nausea and vomiting.   Neurological:  Positive for numbness, paresthesias and sensory change. Negative for loss of balance.   Psychiatric/Behavioral:  Negative for altered mental status.    Allergic/Immunologic: Negative for hives.           Objective:      Physical Exam  Vitals reviewed.   Constitutional:       Appearance: He is well-developed.   Cardiovascular:      Pulses: Normal pulses.   Musculoskeletal:      Right ankle: Decreased range of motion. Abnormal pulse.      Right Achilles Tendon: Neil's test negative.      Left ankle: Decreased range of motion. Abnormal pulse.      Left Achilles Tendon: Neil's test negative.      Right foot: Decreased range of motion. Prominent metatarsal heads present.      Left foot: Decreased range of motion.      Comments: Right digit amps 1-3   Feet:      Right foot:      Protective Sensation: 5 sites tested.  2 sites sensed.      Left foot:      Protective Sensation: 5 sites tested.  2 sites sensed.   Skin:     General: Skin is dry.      Capillary Refill: Capillary refill takes more than 3 seconds.   Neurological:      Mental Status: He is alert.      Sensory: Sensory deficit present.      Comments: diminished sensation noted to b/L lower extremities   Psychiatric:         Behavior: Behavior normal. Behavior is cooperative.                   Ulceration  Location: right foot  Measurements: 1.0x 1.0x 0.2cm  Drainage: serous  Wound base: granular  SOI: none    Assessment:       Encounter  Diagnoses   Name Primary?    Foot ulcer, right, with fat layer exposed Yes    Type II diabetes mellitus with neurological manifestations          Plan:       Fermin was seen today for foot ulcer.    Diagnoses and all orders for this visit:    Foot ulcer, right, with fat layer exposed    Type II diabetes mellitus with neurological manifestations      I counseled the patient on his conditions, their implications and medical management.    Ulceration on right foot debrided through sub-q tissue using an tissue nipper. Ulceration debrided down to healthy tissue. Pt tolerated debridement well. Pt advised to keep dressing dry and intact until next visit, unless he/she has home health.   iodosorb applied to wound (s)  Home health orders placed? No    Football dressing applied to pts right foot by, MA under my direct supervision. Pt tolerated dressing well.   RTC in 1 weeks or sooner if any new pedal problems should arise or if condition worsens.

## 2024-01-18 ENCOUNTER — OFFICE VISIT (OUTPATIENT)
Dept: PODIATRY | Facility: CLINIC | Age: 64
End: 2024-01-18
Payer: MEDICARE

## 2024-01-18 DIAGNOSIS — L97.511 ULCER OF RIGHT FOOT, LIMITED TO BREAKDOWN OF SKIN: Primary | ICD-10-CM

## 2024-01-18 PROCEDURE — 99499 UNLISTED E&M SERVICE: CPT | Mod: HCNC,S$GLB,, | Performed by: PODIATRIST

## 2024-01-18 PROCEDURE — 97597 DBRDMT OPN WND 1ST 20 CM/<: CPT | Mod: HCNC,S$GLB,, | Performed by: PODIATRIST

## 2024-01-18 NOTE — PROGRESS NOTES
Subjective:      Patient ID: Fermin Henson is a 63 y.o. male.    Chief Complaint: Foot Ulcer    Fermin is a 63 y.o. male who presents to the clinic for evaluation and treatment of high risk feet. Fermin has a past medical history of Benign essential HTN (12/4/2023), Diabetes mellitus, type 2, and Kidney stones. The patient's chief complaint is foot ulcer, right foot. This patient has documented high risk feet requiring routine maintenance secondary to peripheral neuropathy. Pt is here today for wound care. Pt denies any new issues.       PCP: See Jean-Baptiste MD    Date Last Seen by PCP:   Chief Complaint   Patient presents with    Foot Ulcer         Current shoe gear:  Affected Foot: Football and Darco shoe on the affected foot     Unaffected Foot: Tennis shoes    History of Trauma: negative  Sign of Infection: none    Hemoglobin A1C   Date Value Ref Range Status   05/29/2023 6.7 (H) 4.0 - 5.6 % Final     Comment:     ADA Screening Guidelines:  5.7-6.4%  Consistent with prediabetes  >or=6.5%  Consistent with diabetes    High levels of fetal hemoglobin interfere with the HbA1C  assay. Heterozygous hemoglobin variants (HbS, HgC, etc)do  not significantly interfere with this assay.   However, presence of multiple variants may affect accuracy.     12/05/2022 6.6 (H) 4.0 - 5.6 % Final     Comment:     ADA Screening Guidelines:  5.7-6.4%  Consistent with prediabetes  >or=6.5%  Consistent with diabetes    High levels of fetal hemoglobin interfere with the HbA1C  assay. Heterozygous hemoglobin variants (HbS, HgC, etc)do  not significantly interfere with this assay.   However, presence of multiple variants may affect accuracy.     04/12/2022 6.4 (H) 4.0 - 5.6 % Final     Comment:     ADA Screening Guidelines:  5.7-6.4%  Consistent with prediabetes  >or=6.5%  Consistent with diabetes    High levels of fetal hemoglobin interfere with the HbA1C  assay. Heterozygous hemoglobin variants (HbS, HgC, etc)do  not significantly  interfere with this assay.   However, presence of multiple variants may affect accuracy.         Review of Systems   Constitutional: Negative for chills, fever and malaise/fatigue.   HENT:  Negative for hearing loss.    Cardiovascular:  Negative for claudication.   Respiratory:  Negative for shortness of breath.    Skin:  Positive for unusual hair distribution. Negative for flushing and rash.   Musculoskeletal:  Negative for joint pain and myalgias.   Gastrointestinal:  Negative for nausea and vomiting.   Neurological:  Positive for numbness, paresthesias and sensory change. Negative for loss of balance.   Psychiatric/Behavioral:  Negative for altered mental status.    Allergic/Immunologic: Negative for hives.           Objective:      Physical Exam  Vitals reviewed.   Constitutional:       Appearance: He is well-developed.   Cardiovascular:      Pulses: Normal pulses.   Musculoskeletal:      Right ankle: Decreased range of motion. Abnormal pulse.      Right Achilles Tendon: Neil's test negative.      Left ankle: Decreased range of motion. Abnormal pulse.      Left Achilles Tendon: Neil's test negative.      Right foot: Decreased range of motion. Prominent metatarsal heads present.      Left foot: Decreased range of motion.      Comments: Right digit amps 1-3   Feet:      Right foot:      Protective Sensation: 5 sites tested.  2 sites sensed.      Left foot:      Protective Sensation: 5 sites tested.  2 sites sensed.   Skin:     General: Skin is dry.      Capillary Refill: Capillary refill takes more than 3 seconds.   Neurological:      Mental Status: He is alert.      Sensory: Sensory deficit present.      Comments: diminished sensation noted to b/L lower extremities   Psychiatric:         Behavior: Behavior normal. Behavior is cooperative.                   Ulceration  Location: right foot  Measurements: 0.5x 0.5x 0.1cm  Drainage: serous  Wound base: granular  SOI: none    Assessment:       Encounter  Diagnosis   Name Primary?    Ulcer of right foot, limited to breakdown of skin Yes         Plan:       Fermin was seen today for foot ulcer.    Diagnoses and all orders for this visit:    Ulcer of right foot, limited to breakdown of skin      I counseled the patient on his conditions, their implications and medical management.    Ulceration on right foot debrided through dermis/epidermis tissue using an tissue nipper. Ulceration debrided down to healthy tissue. Pt tolerated debridement well. Pt advised to keep dressing dry and intact until next visit, unless he/she has home health.   iodosorb applied to wound (s)  Home health orders placed? No    DSD dressing applied to pts right foot by, MA under my direct supervision. Pt tolerated dressing well. Instructions on dressing changes given to pt.   RTC in 2 weeks or sooner if any new pedal problems should arise or if condition worsens.

## 2024-02-01 ENCOUNTER — OFFICE VISIT (OUTPATIENT)
Dept: PODIATRY | Facility: CLINIC | Age: 64
End: 2024-02-01
Payer: MEDICARE

## 2024-02-01 VITALS
SYSTOLIC BLOOD PRESSURE: 127 MMHG | DIASTOLIC BLOOD PRESSURE: 69 MMHG | HEIGHT: 72 IN | BODY MASS INDEX: 31.14 KG/M2 | HEART RATE: 86 BPM | WEIGHT: 229.94 LBS

## 2024-02-01 DIAGNOSIS — E11.49 TYPE II DIABETES MELLITUS WITH NEUROLOGICAL MANIFESTATIONS: ICD-10-CM

## 2024-02-01 DIAGNOSIS — L97.511 ULCER OF RIGHT FOOT, LIMITED TO BREAKDOWN OF SKIN: Primary | ICD-10-CM

## 2024-02-01 PROCEDURE — 97597 DBRDMT OPN WND 1ST 20 CM/<: CPT | Mod: HCNC,S$GLB,, | Performed by: PODIATRIST

## 2024-02-01 PROCEDURE — 99999 PR PBB SHADOW E&M-EST. PATIENT-LVL II: CPT | Mod: PBBFAC,HCNC,, | Performed by: PODIATRIST

## 2024-02-01 PROCEDURE — 99499 UNLISTED E&M SERVICE: CPT | Mod: HCNC,S$GLB,, | Performed by: PODIATRIST

## 2024-02-01 RX ORDER — INFLUENZA A VIRUS A/VICTORIA/4897/2022 IVR-238 (H1N1) ANTIGEN (FORMALDEHYDE INACTIVATED), INFLUENZA A VIRUS A/DARWIN/6/2021 IVR-227 (H3N2) ANTIGEN (FORMALDEHYDE INACTIVATED), INFLUENZA B VIRUS B/AUSTRIA/1359417/2021 BVR-26 ANTIGEN (FORMALDEHYDE INACTIVATED), INFLUENZA B VIRUS B/PHUKET/3073/2013 BVR-1B ANTIGEN (FORMALDEHYDE INACTIVATED) 15; 15; 15; 15 UG/.5ML; UG/.5ML; UG/.5ML; UG/.5ML
INJECTION, SUSPENSION INTRAMUSCULAR
COMMUNITY
Start: 2023-10-12

## 2024-02-01 RX ORDER — PNEUMOCOCCAL 20-VALENT CONJUGATE VACCINE 2.2; 2.2; 2.2; 2.2; 2.2; 2.2; 2.2; 2.2; 2.2; 2.2; 2.2; 2.2; 2.2; 2.2; 2.2; 2.2; 4.4; 2.2; 2.2; 2.2 UG/.5ML; UG/.5ML; UG/.5ML; UG/.5ML; UG/.5ML; UG/.5ML; UG/.5ML; UG/.5ML; UG/.5ML; UG/.5ML; UG/.5ML; UG/.5ML; UG/.5ML; UG/.5ML; UG/.5ML; UG/.5ML; UG/.5ML; UG/.5ML; UG/.5ML; UG/.5ML
INJECTION, SUSPENSION INTRAMUSCULAR
COMMUNITY
Start: 2023-12-04

## 2024-02-01 RX ORDER — TRIAMCINOLONE ACETONIDE 40 MG/ML
INJECTION, SUSPENSION INTRA-ARTICULAR; INTRAMUSCULAR
COMMUNITY
Start: 2023-12-05

## 2024-02-01 RX ORDER — TETANUS TOXOID, REDUCED DIPHTHERIA TOXOID AND ACELLULAR PERTUSSIS VACCINE, ADSORBED 5; 2.5; 8; 8; 2.5 [IU]/.5ML; [IU]/.5ML; UG/.5ML; UG/.5ML; UG/.5ML
SUSPENSION INTRAMUSCULAR
COMMUNITY
Start: 2023-12-09

## 2024-02-01 NOTE — PROGRESS NOTES
Subjective:      Patient ID: Fermin Henson is a 63 y.o. male.    Chief Complaint: Foot Ulcer (L foot ulcer)    Fermin is a 63 y.o. male who presents to the clinic for evaluation and treatment of high risk feet. Fermin has a past medical history of Benign essential HTN (12/4/2023), Diabetes mellitus, type 2, and Kidney stones. The patient's chief complaint is foot ulcer, right foot. This patient has documented high risk feet requiring routine maintenance secondary to peripheral neuropathy. Pt is here today for wound care. Pt denies any new issues.       PCP: See Jean-Baptiste MD    Date Last Seen by PCP:   Chief Complaint   Patient presents with    Foot Ulcer     L foot ulcer         Current shoe gear:  Affected Foot: Football and Darco shoe on the affected foot     Unaffected Foot: Tennis shoes    History of Trauma: negative  Sign of Infection: none    Hemoglobin A1C   Date Value Ref Range Status   05/29/2023 6.7 (H) 4.0 - 5.6 % Final     Comment:     ADA Screening Guidelines:  5.7-6.4%  Consistent with prediabetes  >or=6.5%  Consistent with diabetes    High levels of fetal hemoglobin interfere with the HbA1C  assay. Heterozygous hemoglobin variants (HbS, HgC, etc)do  not significantly interfere with this assay.   However, presence of multiple variants may affect accuracy.     12/05/2022 6.6 (H) 4.0 - 5.6 % Final     Comment:     ADA Screening Guidelines:  5.7-6.4%  Consistent with prediabetes  >or=6.5%  Consistent with diabetes    High levels of fetal hemoglobin interfere with the HbA1C  assay. Heterozygous hemoglobin variants (HbS, HgC, etc)do  not significantly interfere with this assay.   However, presence of multiple variants may affect accuracy.     04/12/2022 6.4 (H) 4.0 - 5.6 % Final     Comment:     ADA Screening Guidelines:  5.7-6.4%  Consistent with prediabetes  >or=6.5%  Consistent with diabetes    High levels of fetal hemoglobin interfere with the HbA1C  assay. Heterozygous hemoglobin variants (HbS,  HgC, etc)do  not significantly interfere with this assay.   However, presence of multiple variants may affect accuracy.         Review of Systems   Constitutional: Negative for chills, fever and malaise/fatigue.   HENT:  Negative for hearing loss.    Cardiovascular:  Negative for claudication.   Respiratory:  Negative for shortness of breath.    Skin:  Positive for unusual hair distribution. Negative for flushing and rash.   Musculoskeletal:  Negative for joint pain and myalgias.   Gastrointestinal:  Negative for nausea and vomiting.   Neurological:  Positive for numbness, paresthesias and sensory change. Negative for loss of balance.   Psychiatric/Behavioral:  Negative for altered mental status.    Allergic/Immunologic: Negative for hives.           Objective:      Physical Exam  Vitals reviewed.   Constitutional:       Appearance: He is well-developed.   Cardiovascular:      Pulses: Normal pulses.   Musculoskeletal:      Right ankle: Decreased range of motion. Abnormal pulse.      Right Achilles Tendon: Neil's test negative.      Left ankle: Decreased range of motion. Abnormal pulse.      Left Achilles Tendon: Neil's test negative.      Right foot: Decreased range of motion. Prominent metatarsal heads present.      Left foot: Decreased range of motion.      Comments: Right digit amps 1-3   Feet:      Right foot:      Protective Sensation: 5 sites tested.  2 sites sensed.      Left foot:      Protective Sensation: 5 sites tested.  2 sites sensed.   Skin:     General: Skin is dry.      Capillary Refill: Capillary refill takes more than 3 seconds.   Neurological:      Mental Status: He is alert and oriented to person, place, and time.      Sensory: Sensory deficit present.      Comments: diminished sensation noted to b/L lower extremities   Psychiatric:         Behavior: Behavior normal. Behavior is cooperative.                   Ulceration  Location: right foot  Measurements: 0.4x 0.3x 0.1cm  Drainage:  serous  Wound base: granular  SOI: none    Assessment:       Encounter Diagnoses   Name Primary?    Ulcer of right foot, limited to breakdown of skin Yes    Type II diabetes mellitus with neurological manifestations          Plan:       Fermin was seen today for foot ulcer.    Diagnoses and all orders for this visit:    Ulcer of right foot, limited to breakdown of skin    Type II diabetes mellitus with neurological manifestations      I counseled the patient on his conditions, their implications and medical management.    Ulceration on right foot debrided through dermis/epidermis tissue using an tissue nipper. Ulceration debrided down to healthy tissue. Pt tolerated debridement well. Pt advised to keep dressing dry and intact until next visit, unless he/she has home health.   iodosorb applied to wound (s)  Home health orders placed? No    DSD dressing applied to pts right foot by, MA under my direct supervision. Pt tolerated dressing well. Instructions on dressing changes given to pt.   RTC in 2 weeks or sooner if any new pedal problems should arise or if condition worsens.

## 2024-02-02 DIAGNOSIS — R80.9 TYPE 2 DIABETES MELLITUS WITH MICROALBUMINURIA, WITHOUT LONG-TERM CURRENT USE OF INSULIN: ICD-10-CM

## 2024-02-02 DIAGNOSIS — E11.42 TYPE 2 DIABETES MELLITUS WITH DIABETIC POLYNEUROPATHY, WITHOUT LONG-TERM CURRENT USE OF INSULIN: ICD-10-CM

## 2024-02-02 DIAGNOSIS — E11.29 TYPE 2 DIABETES MELLITUS WITH MICROALBUMINURIA, WITHOUT LONG-TERM CURRENT USE OF INSULIN: ICD-10-CM

## 2024-02-02 NOTE — TELEPHONE ENCOUNTER
No care due was identified.  Health [facility] Embedded Care Due Messages. Reference number: [id number].   2/02/2024 2:33:19 PM CST

## 2024-02-03 NOTE — TELEPHONE ENCOUNTER
Refill Routing Note   Medication(s) are not appropriate for processing by [facility] for the following reason(s):        Required labs outdated    [facility] action(s):  Defer               Appointments  past 12m or future 3m with PCP    Date Provider   Last Visit   12/4/2023 See Jean-Baptiste MD   Next Visit   6/10/2024 See Jean-Baptiste MD   ED visits in past 90 days: 0        Note composed:10:29 PM 02/02/2024

## 2024-02-05 RX ORDER — EMPAGLIFLOZIN 10 MG/1
10 TABLET, FILM COATED ORAL
Qty: 90 TABLET | Refills: 0 | Status: SHIPPED | OUTPATIENT
Start: 2024-02-05 | End: 2024-04-18

## 2024-02-08 ENCOUNTER — LAB VISIT (OUTPATIENT)
Dept: LAB | Facility: HOSPITAL | Age: 64
End: 2024-02-08
Attending: FAMILY MEDICINE
Payer: MEDICARE

## 2024-02-08 DIAGNOSIS — Z12.5 PROSTATE CANCER SCREENING: ICD-10-CM

## 2024-02-08 DIAGNOSIS — E11.3393 CONTROLLED TYPE 2 DIABETES MELLITUS WITH BOTH EYES AFFECTED BY MODERATE NONPROLIFERATIVE RETINOPATHY WITHOUT MACULAR EDEMA, WITHOUT LONG-TERM CURRENT USE OF INSULIN: ICD-10-CM

## 2024-02-08 DIAGNOSIS — Z00.00 VISIT FOR WELL MAN HEALTH CHECK: ICD-10-CM

## 2024-02-08 DIAGNOSIS — E11.42 TYPE 2 DIABETES MELLITUS WITH DIABETIC POLYNEUROPATHY, WITHOUT LONG-TERM CURRENT USE OF INSULIN: ICD-10-CM

## 2024-02-08 LAB
ALBUMIN SERPL BCP-MCNC: 4 G/DL (ref 3.5–5.2)
ALP SERPL-CCNC: 50 U/L (ref 55–135)
ALT SERPL W/O P-5'-P-CCNC: 20 U/L (ref 10–44)
ANION GAP SERPL CALC-SCNC: 8 MMOL/L (ref 8–16)
AST SERPL-CCNC: 18 U/L (ref 10–40)
BASOPHILS # BLD AUTO: 0.06 K/UL (ref 0–0.2)
BASOPHILS NFR BLD: 1 % (ref 0–1.9)
BILIRUB SERPL-MCNC: 0.6 MG/DL (ref 0.1–1)
BUN SERPL-MCNC: 14 MG/DL (ref 8–23)
CALCIUM SERPL-MCNC: 9.8 MG/DL (ref 8.7–10.5)
CHLORIDE SERPL-SCNC: 108 MMOL/L (ref 95–110)
CHOLEST SERPL-MCNC: 130 MG/DL (ref 120–199)
CHOLEST/HDLC SERPL: 2.5 {RATIO} (ref 2–5)
CO2 SERPL-SCNC: 26 MMOL/L (ref 23–29)
COMPLEXED PSA SERPL-MCNC: 0.54 NG/ML (ref 0–4)
CREAT SERPL-MCNC: 0.9 MG/DL (ref 0.5–1.4)
DIFFERENTIAL METHOD BLD: ABNORMAL
EOSINOPHIL # BLD AUTO: 0.5 K/UL (ref 0–0.5)
EOSINOPHIL NFR BLD: 8.5 % (ref 0–8)
ERYTHROCYTE [DISTWIDTH] IN BLOOD BY AUTOMATED COUNT: 13.6 % (ref 11.5–14.5)
EST. GFR  (NO RACE VARIABLE): >60 ML/MIN/1.73 M^2
ESTIMATED AVG GLUCOSE: 137 MG/DL (ref 68–131)
GLUCOSE SERPL-MCNC: 138 MG/DL (ref 70–110)
HBA1C MFR BLD: 6.4 % (ref 4–5.6)
HCT VFR BLD AUTO: 42 % (ref 40–54)
HDLC SERPL-MCNC: 53 MG/DL (ref 40–75)
HDLC SERPL: 40.8 % (ref 20–50)
HGB BLD-MCNC: 13.8 G/DL (ref 14–18)
IMM GRANULOCYTES # BLD AUTO: 0.01 K/UL (ref 0–0.04)
IMM GRANULOCYTES NFR BLD AUTO: 0.2 % (ref 0–0.5)
LDLC SERPL CALC-MCNC: 59.2 MG/DL (ref 63–159)
LYMPHOCYTES # BLD AUTO: 1.7 K/UL (ref 1–4.8)
LYMPHOCYTES NFR BLD: 28.8 % (ref 18–48)
MCH RBC QN AUTO: 31.7 PG (ref 27–31)
MCHC RBC AUTO-ENTMCNC: 32.9 G/DL (ref 32–36)
MCV RBC AUTO: 97 FL (ref 82–98)
MONOCYTES # BLD AUTO: 0.5 K/UL (ref 0.3–1)
MONOCYTES NFR BLD: 8 % (ref 4–15)
NEUTROPHILS # BLD AUTO: 3.2 K/UL (ref 1.8–7.7)
NEUTROPHILS NFR BLD: 53.5 % (ref 38–73)
NONHDLC SERPL-MCNC: 77 MG/DL
NRBC BLD-RTO: 0 /100 WBC
PLATELET # BLD AUTO: 222 K/UL (ref 150–450)
PMV BLD AUTO: 9.3 FL (ref 9.2–12.9)
POTASSIUM SERPL-SCNC: 4.7 MMOL/L (ref 3.5–5.1)
PROT SERPL-MCNC: 7.4 G/DL (ref 6–8.4)
RBC # BLD AUTO: 4.35 M/UL (ref 4.6–6.2)
SODIUM SERPL-SCNC: 142 MMOL/L (ref 136–145)
TRIGL SERPL-MCNC: 89 MG/DL (ref 30–150)
WBC # BLD AUTO: 6 K/UL (ref 3.9–12.7)

## 2024-02-08 PROCEDURE — 83036 HEMOGLOBIN GLYCOSYLATED A1C: CPT | Mod: HCNC | Performed by: FAMILY MEDICINE

## 2024-02-08 PROCEDURE — 84153 ASSAY OF PSA TOTAL: CPT | Mod: HCNC | Performed by: FAMILY MEDICINE

## 2024-02-08 PROCEDURE — 80061 LIPID PANEL: CPT | Mod: HCNC | Performed by: FAMILY MEDICINE

## 2024-02-08 PROCEDURE — 36415 COLL VENOUS BLD VENIPUNCTURE: CPT | Mod: HCNC | Performed by: FAMILY MEDICINE

## 2024-02-08 PROCEDURE — 85025 COMPLETE CBC W/AUTO DIFF WBC: CPT | Mod: HCNC | Performed by: FAMILY MEDICINE

## 2024-02-08 PROCEDURE — 80053 COMPREHEN METABOLIC PANEL: CPT | Mod: HCNC | Performed by: FAMILY MEDICINE

## 2024-02-15 ENCOUNTER — OFFICE VISIT (OUTPATIENT)
Dept: PODIATRY | Facility: CLINIC | Age: 64
End: 2024-02-15
Payer: MEDICARE

## 2024-02-15 ENCOUNTER — TELEPHONE (OUTPATIENT)
Dept: PODIATRY | Facility: CLINIC | Age: 64
End: 2024-02-15
Payer: MEDICARE

## 2024-02-15 VITALS
HEART RATE: 82 BPM | HEIGHT: 72 IN | WEIGHT: 229.94 LBS | DIASTOLIC BLOOD PRESSURE: 76 MMHG | BODY MASS INDEX: 31.14 KG/M2 | SYSTOLIC BLOOD PRESSURE: 136 MMHG

## 2024-02-15 DIAGNOSIS — L97.512 FOOT ULCER, RIGHT, WITH FAT LAYER EXPOSED: ICD-10-CM

## 2024-02-15 DIAGNOSIS — E11.49 TYPE II DIABETES MELLITUS WITH NEUROLOGICAL MANIFESTATIONS: Primary | ICD-10-CM

## 2024-02-15 PROCEDURE — 3008F BODY MASS INDEX DOCD: CPT | Mod: HCNC,CPTII,S$GLB, | Performed by: PODIATRIST

## 2024-02-15 PROCEDURE — 3075F SYST BP GE 130 - 139MM HG: CPT | Mod: HCNC,CPTII,S$GLB, | Performed by: PODIATRIST

## 2024-02-15 PROCEDURE — 99999 PR PBB SHADOW E&M-EST. PATIENT-LVL IV: CPT | Mod: PBBFAC,HCNC,, | Performed by: PODIATRIST

## 2024-02-15 PROCEDURE — 1160F RVW MEDS BY RX/DR IN RCRD: CPT | Mod: HCNC,CPTII,S$GLB, | Performed by: PODIATRIST

## 2024-02-15 PROCEDURE — 1126F AMNT PAIN NOTED NONE PRSNT: CPT | Mod: HCNC,CPTII,S$GLB, | Performed by: PODIATRIST

## 2024-02-15 PROCEDURE — 3078F DIAST BP <80 MM HG: CPT | Mod: HCNC,CPTII,S$GLB, | Performed by: PODIATRIST

## 2024-02-15 PROCEDURE — 4010F ACE/ARB THERAPY RXD/TAKEN: CPT | Mod: HCNC,CPTII,S$GLB, | Performed by: PODIATRIST

## 2024-02-15 PROCEDURE — 1159F MED LIST DOCD IN RCRD: CPT | Mod: HCNC,CPTII,S$GLB, | Performed by: PODIATRIST

## 2024-02-15 PROCEDURE — 11042 DBRDMT SUBQ TIS 1ST 20SQCM/<: CPT | Mod: HCNC,S$GLB,, | Performed by: PODIATRIST

## 2024-02-15 PROCEDURE — 3044F HG A1C LEVEL LT 7.0%: CPT | Mod: HCNC,CPTII,S$GLB, | Performed by: PODIATRIST

## 2024-02-15 PROCEDURE — 99214 OFFICE O/P EST MOD 30 MIN: CPT | Mod: HCNC,25,S$GLB, | Performed by: PODIATRIST

## 2024-02-15 NOTE — PROGRESS NOTES
Subjective:      Patient ID: Fermin Henson is a 63 y.o. male.    Chief Complaint: Foot Ulcer (Right foot ulcer) and Diabetes Mellitus    Fermin is a 63 y.o. male who presents to the clinic for evaluation and treatment of high risk feet. Fermin has a past medical history of Benign essential HTN (12/4/2023), Diabetes mellitus, type 2, and Kidney stones. The patient's chief complaint is foot ulcer, right foot. This patient has documented high risk feet requiring routine maintenance secondary to peripheral neuropathy. Pt is here today for wound care. Pt denies any new issues.       PCP: See Jean-Baptiste MD    Date Last Seen by PCP:   Chief Complaint   Patient presents with    Foot Ulcer     Right foot ulcer    Diabetes Mellitus         Current shoe gear:  Affected Foot: Football and Darco shoe on the affected foot     Unaffected Foot: Tennis shoes    History of Trauma: negative  Sign of Infection: none    Hemoglobin A1C   Date Value Ref Range Status   02/08/2024 6.4 (H) 4.0 - 5.6 % Final     Comment:     ADA Screening Guidelines:  5.7-6.4%  Consistent with prediabetes  >or=6.5%  Consistent with diabetes    High levels of fetal hemoglobin interfere with the HbA1C  assay. Heterozygous hemoglobin variants (HbS, HgC, etc)do  not significantly interfere with this assay.   However, presence of multiple variants may affect accuracy.     05/29/2023 6.7 (H) 4.0 - 5.6 % Final     Comment:     ADA Screening Guidelines:  5.7-6.4%  Consistent with prediabetes  >or=6.5%  Consistent with diabetes    High levels of fetal hemoglobin interfere with the HbA1C  assay. Heterozygous hemoglobin variants (HbS, HgC, etc)do  not significantly interfere with this assay.   However, presence of multiple variants may affect accuracy.     12/05/2022 6.6 (H) 4.0 - 5.6 % Final     Comment:     ADA Screening Guidelines:  5.7-6.4%  Consistent with prediabetes  >or=6.5%  Consistent with diabetes    High levels of fetal hemoglobin interfere with the  HbA1C  assay. Heterozygous hemoglobin variants (HbS, HgC, etc)do  not significantly interfere with this assay.   However, presence of multiple variants may affect accuracy.         Review of Systems   Constitutional: Negative for chills, fever and malaise/fatigue.   HENT:  Negative for hearing loss.    Cardiovascular:  Negative for claudication.   Respiratory:  Negative for shortness of breath.    Skin:  Positive for unusual hair distribution. Negative for flushing and rash.   Musculoskeletal:  Negative for joint pain and myalgias.   Gastrointestinal:  Negative for nausea and vomiting.   Neurological:  Positive for numbness, paresthesias and sensory change. Negative for loss of balance.   Psychiatric/Behavioral:  Negative for altered mental status.    Allergic/Immunologic: Negative for hives.           Objective:      Physical Exam  Vitals reviewed.   Constitutional:       Appearance: He is well-developed.   Cardiovascular:      Pulses: Normal pulses.   Musculoskeletal:      Right ankle: Decreased range of motion. Abnormal pulse.      Right Achilles Tendon: Neil's test negative.      Left ankle: Decreased range of motion. Abnormal pulse.      Left Achilles Tendon: Neil's test negative.      Right foot: Decreased range of motion. Prominent metatarsal heads present.      Left foot: Decreased range of motion.      Comments: Right digit amps 1-3   Feet:      Right foot:      Protective Sensation: 5 sites tested.  2 sites sensed.      Left foot:      Protective Sensation: 5 sites tested.  2 sites sensed.   Skin:     General: Skin is dry.      Capillary Refill: Capillary refill takes more than 3 seconds.   Neurological:      Mental Status: He is alert and oriented to person, place, and time.      Sensory: Sensory deficit present.      Comments: diminished sensation noted to b/L lower extremities   Psychiatric:         Behavior: Behavior normal. Behavior is cooperative.                         Ulceration  Location:  right foot TMA stump  Measurements: 0.3 x 0.2 x 0.1cm  Drainage: serous  Wound base: granular  SOI: none    Assessment:       Encounter Diagnoses   Name Primary?    Type II diabetes mellitus with neurological manifestations Yes    Foot ulcer, right, with fat layer exposed            Plan:       Fermin was seen today for foot ulcer and diabetes mellitus.    Diagnoses and all orders for this visit:    Type II diabetes mellitus with neurological manifestations    Foot ulcer, right, with fat layer exposed        I counseled the patient on his conditions, their implications and medical management.    Ulceration on right foot debrided through subq tissue using an 3mm curette and #15 scalpel. Ulceration debrided down to healthy tissue. Pt tolerated debridement well. Pt advised to keep dressing dry and intact until next visit, unless he/she has home health. See procedure note.     Liz applied directly to wound bed after cleansing with alcohol. Applied Foam, football dressing to affected area. Dressing and foot/wound is to stay clean, dry and dressing to stay intact until follow up. Darco shoe dispensed and applied to the affected foot. Patient was advised to wear Darco shoe for ambulation at ALL times. Advised not to get dressing wet, remove dressing until follow up or walk directly on football dressing.     Home health orders placed? No    DSD dressing applied to pts right foot by, MA under my direct supervision. Pt tolerated dressing well. Instructions on dressing changes given to pt.     RTC in 2 weeks or sooner if any new pedal problems should arise or if condition worsens.

## 2024-02-15 NOTE — TELEPHONE ENCOUNTER
----- Message from Saurav Davis sent at 2/15/2024  3:17 PM CST -----  Regarding: Appt  Contact: [phone]  Pt calling to speak with someone in provider office regards rescheduling appt on 2/29 at 10:15 am for a later time. Please call pt back at  [phone]

## 2024-02-16 NOTE — PROCEDURES
"Wound Debridement    Date/Time: 2/15/2024 10:00 AM    Performed by: Iveth Perez DPM  Authorized by: Iveth Perez DPM    Time out: Immediately prior to procedure a "time out" was called to verify the correct patient, procedure, equipment, support staff and site/side marked as required.    Consent Done?:  Yes (Verbal)    Preparation: Patient was prepped and draped with clean technique    Local anesthesia used?: No      Wound Details:    Location:  Right foot    Debridement - 1st Wound - Specific Location: TMA stump.    Type of Debridement:  Excisional       Length (cm):  0.3       Area (sq cm):  0.06       Width (cm):  0.2       Percent Debrided (%):  100       Depth (cm):  0.1       Total Area Debrided (sq cm):  0.06    Depth of debridement:  Subcutaneous tissue    Tissue debrided:  Dermis, Epidermis and Subcutaneous    Devitalized tissue debrided:  Biofilm, Callus and Fibrin    Instruments:  Curette  Bleeding:  Minimal  Hemostasis Achieved: Yes  Method Used:  Pressure  Patient tolerance:  Patient tolerated the procedure well with no immediate complications  1st Wound Pain Assessment: 0    "

## 2024-02-27 ENCOUNTER — TELEPHONE (OUTPATIENT)
Dept: PODIATRY | Facility: CLINIC | Age: 64
End: 2024-02-27
Payer: MEDICARE

## 2024-02-28 ENCOUNTER — OFFICE VISIT (OUTPATIENT)
Dept: PODIATRY | Facility: CLINIC | Age: 64
End: 2024-02-28
Payer: MEDICARE

## 2024-02-28 VITALS
WEIGHT: 229 LBS | RESPIRATION RATE: 19 BRPM | HEART RATE: 97 BPM | BODY MASS INDEX: 31.02 KG/M2 | HEIGHT: 72 IN | SYSTOLIC BLOOD PRESSURE: 124 MMHG | DIASTOLIC BLOOD PRESSURE: 81 MMHG

## 2024-02-28 DIAGNOSIS — Z87.2 HEALED FOOT ULCER: Primary | ICD-10-CM

## 2024-02-28 PROCEDURE — 1159F MED LIST DOCD IN RCRD: CPT | Mod: HCNC,CPTII,S$GLB, | Performed by: PODIATRIST

## 2024-02-28 PROCEDURE — 99213 OFFICE O/P EST LOW 20 MIN: CPT | Mod: HCNC,S$GLB,, | Performed by: PODIATRIST

## 2024-02-28 PROCEDURE — 99999 PR PBB SHADOW E&M-EST. PATIENT-LVL III: CPT | Mod: PBBFAC,HCNC,, | Performed by: PODIATRIST

## 2024-02-28 PROCEDURE — 4010F ACE/ARB THERAPY RXD/TAKEN: CPT | Mod: HCNC,CPTII,S$GLB, | Performed by: PODIATRIST

## 2024-02-28 PROCEDURE — 3074F SYST BP LT 130 MM HG: CPT | Mod: HCNC,CPTII,S$GLB, | Performed by: PODIATRIST

## 2024-02-28 PROCEDURE — 3079F DIAST BP 80-89 MM HG: CPT | Mod: HCNC,CPTII,S$GLB, | Performed by: PODIATRIST

## 2024-02-28 PROCEDURE — 1126F AMNT PAIN NOTED NONE PRSNT: CPT | Mod: HCNC,CPTII,S$GLB, | Performed by: PODIATRIST

## 2024-02-28 PROCEDURE — 3044F HG A1C LEVEL LT 7.0%: CPT | Mod: HCNC,CPTII,S$GLB, | Performed by: PODIATRIST

## 2024-02-28 PROCEDURE — 3008F BODY MASS INDEX DOCD: CPT | Mod: HCNC,CPTII,S$GLB, | Performed by: PODIATRIST

## 2024-02-28 NOTE — PROGRESS NOTES
Subjective:      Patient ID: Fermin Henson is a 64 y.o. male.    Chief Complaint: Wound Care (Check on a healed ulcer )    Fermin is a 64 y.o. male who presents to the clinic for evaluation and treatment of high risk feet. Fermin has a past medical history of Benign essential HTN (12/4/2023), Diabetes mellitus, type 2, and Kidney stones. The patient's chief complaint is foot ulcer, right foot. This patient has documented high risk feet requiring routine maintenance secondary to peripheral neuropathy. Pt is here today for wound care. Pt denies any new issues.       PCP: See Jean-Baptiste MD    Date Last Seen by PCP:   Chief Complaint   Patient presents with    Wound Care     Check on a healed ulcer          Current shoe gear:  Affected Foot: Football and Darco shoe on the affected foot     Unaffected Foot: Tennis shoes    History of Trauma: negative  Sign of Infection: none    Hemoglobin A1C   Date Value Ref Range Status   02/08/2024 6.4 (H) 4.0 - 5.6 % Final     Comment:     ADA Screening Guidelines:  5.7-6.4%  Consistent with prediabetes  >or=6.5%  Consistent with diabetes    High levels of fetal hemoglobin interfere with the HbA1C  assay. Heterozygous hemoglobin variants (HbS, HgC, etc)do  not significantly interfere with this assay.   However, presence of multiple variants may affect accuracy.     05/29/2023 6.7 (H) 4.0 - 5.6 % Final     Comment:     ADA Screening Guidelines:  5.7-6.4%  Consistent with prediabetes  >or=6.5%  Consistent with diabetes    High levels of fetal hemoglobin interfere with the HbA1C  assay. Heterozygous hemoglobin variants (HbS, HgC, etc)do  not significantly interfere with this assay.   However, presence of multiple variants may affect accuracy.     12/05/2022 6.6 (H) 4.0 - 5.6 % Final     Comment:     ADA Screening Guidelines:  5.7-6.4%  Consistent with prediabetes  >or=6.5%  Consistent with diabetes    High levels of fetal hemoglobin interfere with the HbA1C  assay. Heterozygous  hemoglobin variants (HbS, HgC, etc)do  not significantly interfere with this assay.   However, presence of multiple variants may affect accuracy.         Review of Systems   Constitutional: Negative for chills, fever and malaise/fatigue.   HENT:  Negative for hearing loss.    Cardiovascular:  Negative for claudication.   Respiratory:  Negative for shortness of breath.    Skin:  Positive for color change and unusual hair distribution. Negative for flushing and rash.   Musculoskeletal:  Negative for joint pain and myalgias.   Gastrointestinal:  Negative for nausea and vomiting.   Neurological:  Positive for numbness, paresthesias and sensory change. Negative for loss of balance.   Psychiatric/Behavioral:  Negative for altered mental status.    Allergic/Immunologic: Negative for hives.           Objective:      Physical Exam  Vitals reviewed.   Constitutional:       Appearance: He is well-developed.   Cardiovascular:      Pulses: Normal pulses.   Musculoskeletal:      Right ankle: Decreased range of motion. Abnormal pulse.      Right Achilles Tendon: Neil's test negative.      Left ankle: Decreased range of motion. Abnormal pulse.      Left Achilles Tendon: Neil's test negative.      Right foot: Decreased range of motion. Prominent metatarsal heads present.      Left foot: Decreased range of motion.      Comments: Right digit amps 1-3   Feet:      Right foot:      Protective Sensation: 5 sites tested.  2 sites sensed.      Left foot:      Protective Sensation: 5 sites tested.  2 sites sensed.   Skin:     General: Skin is dry.      Capillary Refill: Capillary refill takes more than 3 seconds.   Neurological:      Mental Status: He is alert and oriented to person, place, and time.      Sensory: Sensory deficit present.      Comments: diminished sensation noted to b/L lower extremities   Psychiatric:         Behavior: Behavior normal. Behavior is cooperative.                   Ulceration  Location: right  foot  Measurements:healed    Hypertropic skin area noted to right dorsal foot, no openings      Assessment:       Encounter Diagnosis   Name Primary?    Healed foot ulcer Yes         Plan:       Fermin was seen today for wound care.    Diagnoses and all orders for this visit:    Healed foot ulcer      I counseled the patient on his conditions, their implications and medical management.    Pt advised that his ulceration is healed  Pt advised to contact [employer] about his diabetic shoes    Discussed DM foot care:  Wear comfortable, proper fitting shoes. Wash feet daily. Dry well. After drying, apply moisturizer to feet (no lotion to webspaces). Inspect feet daily for skin breaks, blisters, swelling, or redness. Wear cotton socks (preferably white)  Change socks every day. Do NOT walk barefoot. Do NOT use heating pads or warm/hot water soaks      - Discussed importance of daily moisturizer to the feet such as Gold bonds diabetic foot cream     - Patient is high risk for developing lower extremity issues secondary to diabetes     - Advised the patient that fungus likes warm, dark, and moist environments such as bathrooms, gyms, and pools. Advised to spray shower, shower mat , and any shoes w/ Lysol periodically  RTC in 6 weeks or sooner if any new pedal problems should arise or if condition worsens.

## 2024-03-02 DIAGNOSIS — E11.9 TYPE 2 DIABETES MELLITUS WITHOUT COMPLICATION, WITHOUT LONG-TERM CURRENT USE OF INSULIN: ICD-10-CM

## 2024-03-02 RX ORDER — PIOGLITAZONEHYDROCHLORIDE 15 MG/1
15 TABLET ORAL
Qty: 90 TABLET | Refills: 1 | Status: SHIPPED | OUTPATIENT
Start: 2024-03-02

## 2024-03-02 NOTE — TELEPHONE ENCOUNTER
No care due was identified.  [facility] Embedded Care Due Messages. Reference number: [id number].   3/02/2024 2:29:21 AM CST

## 2024-03-02 NOTE — TELEPHONE ENCOUNTER
Refill Authorization Note     Refill Decision Note   Fermin Henson  is requesting a refill authorization.  Brief Assessment and Rationale for Refill:  Approve     Medication Therapy Plan:       Medication Reconciliation Completed: No   Comments:     No Care Gaps recommended.     Note composed:10:54 AM 03/02/2024

## 2024-03-04 ENCOUNTER — TELEPHONE (OUTPATIENT)
Dept: PODIATRY | Facility: CLINIC | Age: 64
End: 2024-03-04
Payer: MEDICARE

## 2024-03-04 NOTE — TELEPHONE ENCOUNTER
Patient called to check on the status of his paperwork from [employer], advise patient that paperwork will be fax as soon as Dr Roy fills it out. That if he encounter any problems in the future to please call back. Patient verbalized understating.

## 2024-03-04 NOTE — TELEPHONE ENCOUNTER
----- Message from Shy Brewer sent at 3/4/2024 10:02 AM CST -----  Regarding: Pt Advice  Contact: pt@[phone]  Pt requesting a call back to discuss plan of care for shoes Rx   please call pt @[phone]

## 2024-03-11 ENCOUNTER — TELEPHONE (OUTPATIENT)
Dept: PODIATRY | Facility: CLINIC | Age: 64
End: 2024-03-11
Payer: MEDICARE

## 2024-03-11 NOTE — TELEPHONE ENCOUNTER
Per patient request I faxed over [facility] shoe Rx due to [employer] closing down. Fermin gave verbal understanding of the shoe Rx being sent over to [employer] along with the last office note from Dr lebron office. I gave fermin the address along with their contact number [phone] to call the office with any questions or concerns regarding the shoe Rx being filled.

## 2024-04-04 ENCOUNTER — TELEPHONE (OUTPATIENT)
Dept: PODIATRY | Facility: CLINIC | Age: 64
End: 2024-04-04
Payer: MEDICARE

## 2024-04-04 ENCOUNTER — TELEPHONE (OUTPATIENT)
Dept: FAMILY MEDICINE | Facility: CLINIC | Age: 64
End: 2024-04-04
Payer: MEDICARE

## 2024-04-04 NOTE — TELEPHONE ENCOUNTER
Nicole Joseph Staff; ANGELA Cui Staff  Caller: Unspecified (Today, 11:16 AM)  Name of Who is Calling:  Pt called        What is the request in detail:  The pt talked [employer] about his shoes Rx and he wants to know if this office has received any form in regards to his care . Please advise        Can the clinic reply by [facility]:  no        What Number to Call Back [facility] not [facility]: Telephone Information:  Mobile          [phone]      I called patient he will call his PCP order for update on paperwork as [employer] requested.

## 2024-04-04 NOTE — TELEPHONE ENCOUNTER
----- Message from See Jay LPN sent at 4/4/2024 11:52 AM CDT -----  Regarding: FW: orthotics    ----- Message -----  From: Nicole Joseph  Sent: 4/4/2024  11:21 AM CDT  To: Jerilyn Cui Staff; Manuela Pimentel Staff  Subject: orthotics                                        Name of Who is Calling:  Pt called        What is the request in detail:  The pt talked [employer] about his shoes Rx and he wants to know if this office has received any form in regards to his care . Please advise         Can the clinic reply by [facility]:  no        What Number to Call Back if not in [facility]: Telephone Information:  Mobile          [phone]

## 2024-04-04 NOTE — TELEPHONE ENCOUNTER
Spoke with pt about the paper work I never receive paper work for him therefore he call orthopedic office and give them the correct fax number [phone]   Thanks

## 2024-04-12 ENCOUNTER — TELEPHONE (OUTPATIENT)
Dept: PODIATRY | Facility: CLINIC | Age: 64
End: 2024-04-12
Payer: MEDICARE

## 2024-04-16 ENCOUNTER — OFFICE VISIT (OUTPATIENT)
Dept: PODIATRY | Facility: CLINIC | Age: 64
End: 2024-04-16
Payer: MEDICARE

## 2024-04-16 VITALS
WEIGHT: 229.06 LBS | DIASTOLIC BLOOD PRESSURE: 77 MMHG | HEIGHT: 72 IN | SYSTOLIC BLOOD PRESSURE: 122 MMHG | HEART RATE: 101 BPM | BODY MASS INDEX: 31.03 KG/M2

## 2024-04-16 DIAGNOSIS — B35.1 ONYCHOMYCOSIS DUE TO DERMATOPHYTE: ICD-10-CM

## 2024-04-16 DIAGNOSIS — E11.49 TYPE II DIABETES MELLITUS WITH NEUROLOGICAL MANIFESTATIONS: Primary | ICD-10-CM

## 2024-04-16 DIAGNOSIS — Z89.411 RIGHT GREAT TOE AMPUTEE: ICD-10-CM

## 2024-04-16 DIAGNOSIS — Z89.421 H/O AMPUTATION OF LESSER TOE, RIGHT: ICD-10-CM

## 2024-04-16 DIAGNOSIS — L84 CORN OR CALLUS: ICD-10-CM

## 2024-04-16 PROCEDURE — 99499 UNLISTED E&M SERVICE: CPT | Mod: HCNC,S$GLB,, | Performed by: PODIATRIST

## 2024-04-16 PROCEDURE — 11721 DEBRIDE NAIL 6 OR MORE: CPT | Mod: Q9,59,HCNC,S$GLB | Performed by: PODIATRIST

## 2024-04-16 PROCEDURE — 11056 PARNG/CUTG B9 HYPRKR LES 2-4: CPT | Mod: Q9,HCNC,S$GLB, | Performed by: PODIATRIST

## 2024-04-16 PROCEDURE — 99999 PR PBB SHADOW E&M-EST. PATIENT-LVL III: CPT | Mod: PBBFAC,HCNC,, | Performed by: PODIATRIST

## 2024-04-16 NOTE — PROGRESS NOTES
Subjective:      Patient ID: Fermin Henson is a 64 y.o. male.    Chief Complaint: Follow-up (Right foot) and Diabetes Mellitus (PCP- 10/17/2023/CARMELA Sherman)    Fermin is a 64 y.o. male who presents to the clinic for evaluation and treatment of high risk feet. Fermin has a past medical history of Benign essential HTN (12/4/2023), Diabetes mellitus, type 2, and Kidney stones. The patient's chief complaint is foot ulcer, right foot. This patient has documented high risk feet requiring routine maintenance secondary to peripheral neuropathy. Pt is here today for wound care. Pt denies any new issues.       PCP: See Jean-Baptiste MD    Date Last Seen by PCP:   Chief Complaint   Patient presents with    Follow-up     Right foot    Diabetes Mellitus     PCP- 10/17/2023  CARMELA Sherman         Current shoe gear:  Affected Foot: Football and Darco shoe on the affected foot     Unaffected Foot: Tennis shoes    History of Trauma: negative  Sign of Infection: none    Hemoglobin A1C   Date Value Ref Range Status   02/08/2024 6.4 (H) 4.0 - 5.6 % Final     Comment:     ADA Screening Guidelines:  5.7-6.4%  Consistent with prediabetes  >or=6.5%  Consistent with diabetes    High levels of fetal hemoglobin interfere with the HbA1C  assay. Heterozygous hemoglobin variants (HbS, HgC, etc)do  not significantly interfere with this assay.   However, presence of multiple variants may affect accuracy.     05/29/2023 6.7 (H) 4.0 - 5.6 % Final     Comment:     ADA Screening Guidelines:  5.7-6.4%  Consistent with prediabetes  >or=6.5%  Consistent with diabetes    High levels of fetal hemoglobin interfere with the HbA1C  assay. Heterozygous hemoglobin variants (HbS, HgC, etc)do  not significantly interfere with this assay.   However, presence of multiple variants may affect accuracy.     12/05/2022 6.6 (H) 4.0 - 5.6 % Final     Comment:     ADA Screening Guidelines:  5.7-6.4%  Consistent with prediabetes  >or=6.5%  Consistent with  diabetes    High levels of fetal hemoglobin interfere with the HbA1C  assay. Heterozygous hemoglobin variants (HbS, HgC, etc)do  not significantly interfere with this assay.   However, presence of multiple variants may affect accuracy.         Review of Systems   Constitutional: Negative for chills, fever and malaise/fatigue.   HENT:  Negative for hearing loss.    Cardiovascular:  Negative for claudication.   Respiratory:  Negative for shortness of breath.    Skin:  Positive for color change and unusual hair distribution. Negative for flushing and rash.   Musculoskeletal:  Negative for joint pain and myalgias.   Gastrointestinal:  Negative for nausea and vomiting.   Neurological:  Positive for numbness, paresthesias and sensory change. Negative for loss of balance.   Psychiatric/Behavioral:  Negative for altered mental status.    Allergic/Immunologic: Negative for hives.           Objective:      Physical Exam  Vitals reviewed.   Constitutional:       Appearance: He is well-developed.   Cardiovascular:      Pulses:           Dorsalis pedis pulses are 1+ on the right side and 1+ on the left side.      Comments: PT pulses diminished b/l.   Musculoskeletal:      Right ankle: Decreased range of motion. Abnormal pulse.      Right Achilles Tendon: Neil's test negative.      Left ankle: Decreased range of motion. Abnormal pulse.      Left Achilles Tendon: Neil's test negative.      Right foot: Decreased range of motion. Prominent metatarsal heads present.      Left foot: Decreased range of motion.      Comments: Right digit amps 1-3   Feet:      Right foot:      Protective Sensation: 5 sites tested.  2 sites sensed.      Left foot:      Protective Sensation: 5 sites tested.  2 sites sensed.   Skin:     General: Skin is dry.      Capillary Refill: Capillary refill takes more than 3 seconds.      Comments: No open lesions, lacerations or wounds noted. Nails are thickened, elongated, discolored yellow/brown with  subungual debris and brittleness to R 3-5 and L 1-5. Interdigital spaces clean, dry and intact b/l. No erythema noted to b/l foot. Skin texture thin, atrophic, dry. Pedal hair diminished. Toes cool to touch b/l. Hyperkeratotic lesion noted to plantar R 1st met head, plantar medial L hallux IPJ. Skin lines present to lesion/s. No signs of deep tissue injury.       Neurological:      Mental Status: He is alert and oriented to person, place, and time.      Sensory: Sensory deficit present.      Comments: diminished sensation noted to b/L lower extremities   Psychiatric:         Behavior: Behavior normal. Behavior is cooperative.          Assessment:       Encounter Diagnoses   Name Primary?    Type II diabetes mellitus with neurological manifestations Yes    Right great toe amputee     H/O amputation of lesser toe, right     Onychomycosis due to dermatophyte     Corn or callus            Plan:       Fermin was seen today for follow-up and diabetes mellitus.    Diagnoses and all orders for this visit:    Type II diabetes mellitus with neurological manifestations    Right great toe amputee    H/O amputation of lesser toe, right    Onychomycosis due to dermatophyte    Corn or callus        I counseled the patient on his conditions, their implications and medical management.     - Shoe inspection. Diabetic Foot Education. Patient reminded of the importance of good nutrition and blood sugar control to help prevent podiatric complications of diabetes. Patient instructed on proper foot hygeine. We discussed wearing proper shoe gear, daily foot inspections, never walking without protective shoe gear, caution putting sharp instruments to feet     - Discussed DM foot care:  Wear comfortable, proper fitting shoes. Wash feet daily. Dry well. After drying, apply moisturizer to feet (no lotion to webspaces). Inspect feet daily for skin breaks, blisters, swelling, or redness. Wear cotton socks (preferably white)  Change socks every  day. Do NOT walk barefoot. Do NOT use heating pads or warm/hot water soaks     With patient's permission, nails were aggressively reduced and debrided 1-5 b/l, removing all offending nail and debris. Patient tolerated this well and no blood was drawn. Patient reports relief following the procedure.     The affected area was cleansed with an alcohol prep pad. Next, utilizing a 5mm curette, the hyperkeratotic tissues were trimmed from plantar R 1st met head, L hallux, down to appropriate level of skin. Care was taken to remove any nucleated core from the center of the lesion. No pinpoint bleeding was encountered. The patient tolerated relief following this procedure.     Discussed regular and routine moisturizer to skin of both feet to help improve dry skin. Advised to apply twice daily until resolution of symptoms. Avoid between toes.     Working on getting DM shoes ([employer])    RTC 2-3 months, sooner PRN

## 2024-04-18 DIAGNOSIS — E11.42 TYPE 2 DIABETES MELLITUS WITH DIABETIC POLYNEUROPATHY, WITHOUT LONG-TERM CURRENT USE OF INSULIN: ICD-10-CM

## 2024-04-18 DIAGNOSIS — I10 BENIGN ESSENTIAL HTN: ICD-10-CM

## 2024-04-18 DIAGNOSIS — R80.9 TYPE 2 DIABETES MELLITUS WITH MICROALBUMINURIA, WITHOUT LONG-TERM CURRENT USE OF INSULIN: ICD-10-CM

## 2024-04-18 DIAGNOSIS — E11.29 TYPE 2 DIABETES MELLITUS WITH MICROALBUMINURIA, WITHOUT LONG-TERM CURRENT USE OF INSULIN: ICD-10-CM

## 2024-04-18 RX ORDER — LOSARTAN POTASSIUM 50 MG/1
50 TABLET ORAL
Qty: 90 TABLET | Refills: 2 | Status: SHIPPED | OUTPATIENT
Start: 2024-04-18 | End: 2024-06-10 | Stop reason: SDUPTHER

## 2024-04-18 RX ORDER — EMPAGLIFLOZIN 10 MG/1
10 TABLET, FILM COATED ORAL
Qty: 90 TABLET | Refills: 1 | Status: SHIPPED | OUTPATIENT
Start: 2024-04-18 | End: 2024-06-10 | Stop reason: SDUPTHER

## 2024-04-18 NOTE — TELEPHONE ENCOUNTER
Refill Decision Note   Fermin Henson  is requesting a refill authorization.  Brief Assessment and Rationale for Refill:  Approve     Medication Therapy Plan:         Pharmacist review requested: Yes   Extended chart review required: Yes   Comments:     Note composed:5:46 PM 04/18/2024

## 2024-04-18 NOTE — TELEPHONE ENCOUNTER
No care due was identified.  [facility] Embedded Care Due Messages. Reference number: [id number].   4/18/2024 2:18:36 AM CDT

## 2024-04-18 NOTE — TELEPHONE ENCOUNTER
Refill Routing Note   Medication(s) are not appropriate for processing by [facility] for the following reason(s):      Drug-disease interaction    [facility] action(s):  Defer  Approve Care Due:  None identified     Medication Therapy Plan: JARDIANCE and H/O amputation of lesser toe, right; Hx of amputation of lesser toe, right; Right great toe amputee    Pharmacist review requested: Yes     Appointments  past 12m or future 3m with PCP    Date Provider   Last Visit   12/4/2023 See Jean-Baptiste MD   Next Visit   6/10/2024 See Jean-Baptiste MD   ED visits in past 90 days: 0        Note composed:5:17 PM 04/18/2024

## 2024-05-16 DIAGNOSIS — L97.509 TYPE 2 DIABETES WITH SKIN ULCER OF FOOT: ICD-10-CM

## 2024-05-16 DIAGNOSIS — E11.42 TYPE 2 DIABETES MELLITUS WITH DIABETIC POLYNEUROPATHY, WITHOUT LONG-TERM CURRENT USE OF INSULIN: ICD-10-CM

## 2024-05-16 DIAGNOSIS — E11.621 TYPE 2 DIABETES WITH SKIN ULCER OF FOOT: ICD-10-CM

## 2024-05-16 RX ORDER — METFORMIN HYDROCHLORIDE 850 MG/1
TABLET ORAL
Qty: 270 TABLET | Refills: 0 | Status: SHIPPED | OUTPATIENT
Start: 2024-05-16 | End: 2024-06-10 | Stop reason: SDUPTHER

## 2024-05-16 NOTE — TELEPHONE ENCOUNTER
Provider Staff:  Action required for this patient    Requires labs      Please see care gap opportunities below in Care Due Message.    Thanks!  [facility]     Appointments      Date Provider   Last Visit   12/4/2023 See Jean-Baptiste MD   Next Visit   6/10/2024 Jerilyn, See RAND MD     Refill Decision Note   Fermin Henson  is requesting a refill authorization.  Brief Assessment and Rationale for Refill:  Approve     Medication Therapy Plan:         Comments:     Note composed:7:27 AM 05/16/2024

## 2024-05-16 NOTE — TELEPHONE ENCOUNTER
Care Due:                  Date            Visit Type   Department     Provider  --------------------------------------------------------------------------------                                EP -         [facility]                              PRIMARY      MED/ INTERNAL  Last Visit: 12-      CARE ([facility])   MED/ PEDS      See A  Page                              EP [facility]                              PRIMARY      MED/ INTERNAL  Next Visit: 06-      CARE ([facility])   MED/ PEDS      See A  Page                                                            Last  Test          Frequency    Reason                     Performed    Due Date  --------------------------------------------------------------------------------    HBA1C.......  6 months...  JARDIANCE, metFORMIN,      02- 08-                             pioglitazone.............    [employer] Embedded Care Due Messages. Reference number: [id number].   5/16/2024 12:42:03 AM CDT

## 2024-05-21 ENCOUNTER — TELEPHONE (OUTPATIENT)
Dept: FAMILY MEDICINE | Facility: CLINIC | Age: 64
End: 2024-05-21
Payer: MEDICARE

## 2024-05-23 ENCOUNTER — TELEPHONE (OUTPATIENT)
Dept: FAMILY MEDICINE | Facility: CLINIC | Age: 64
End: 2024-05-23
Payer: MEDICARE

## 2024-05-23 NOTE — TELEPHONE ENCOUNTER
Phone pt informed him that his diabetic shoe paper work has been fax to Brittanie Bartlett to [phone]  Done vs

## 2024-05-29 DIAGNOSIS — E11.42 TYPE 2 DIABETES MELLITUS WITH DIABETIC POLYNEUROPATHY, WITHOUT LONG-TERM CURRENT USE OF INSULIN: ICD-10-CM

## 2024-05-29 RX ORDER — SIMVASTATIN 20 MG/1
20 TABLET, FILM COATED ORAL NIGHTLY
Qty: 90 TABLET | Refills: 1 | Status: SHIPPED | OUTPATIENT
Start: 2024-05-29 | End: 2024-06-10 | Stop reason: SDUPTHER

## 2024-05-29 NOTE — TELEPHONE ENCOUNTER
Refill Decision Note   Fermin Henson  is requesting a refill authorization.  Brief Assessment and Rationale for Refill:  Approve     Medication Therapy Plan:         Comments:     Note composed:6:52 AM 05/29/2024

## 2024-05-29 NOTE — TELEPHONE ENCOUNTER
No care due was identified.  Health [facility] Embedded Care Due Messages. Reference number: [id number].   5/29/2024 1:02:22 AM CDT

## 2024-06-06 ENCOUNTER — TELEPHONE (OUTPATIENT)
Dept: PODIATRY | Facility: CLINIC | Age: 64
End: 2024-06-06
Payer: MEDICARE

## 2024-06-06 NOTE — TELEPHONE ENCOUNTER
Spoke with patient to help with rescheduling appointment voice understanding to new appointment date and time.

## 2024-06-10 ENCOUNTER — OFFICE VISIT (OUTPATIENT)
Dept: FAMILY MEDICINE | Facility: CLINIC | Age: 64
End: 2024-06-10
Payer: MEDICARE

## 2024-06-10 VITALS
BODY MASS INDEX: 31.49 KG/M2 | SYSTOLIC BLOOD PRESSURE: 130 MMHG | WEIGHT: 232.5 LBS | DIASTOLIC BLOOD PRESSURE: 72 MMHG | TEMPERATURE: 98 F | HEART RATE: 97 BPM | OXYGEN SATURATION: 96 % | RESPIRATION RATE: 18 BRPM | HEIGHT: 72 IN

## 2024-06-10 DIAGNOSIS — E66.09 CLASS 1 OBESITY DUE TO EXCESS CALORIES WITH SERIOUS COMORBIDITY AND BODY MASS INDEX (BMI) OF 30.0 TO 30.9 IN ADULT: Primary | ICD-10-CM

## 2024-06-10 DIAGNOSIS — E11.29 TYPE 2 DIABETES MELLITUS WITH MICROALBUMINURIA, WITHOUT LONG-TERM CURRENT USE OF INSULIN: ICD-10-CM

## 2024-06-10 DIAGNOSIS — R80.9 TYPE 2 DIABETES MELLITUS WITH MICROALBUMINURIA, WITHOUT LONG-TERM CURRENT USE OF INSULIN: ICD-10-CM

## 2024-06-10 DIAGNOSIS — E11.3393 CONTROLLED TYPE 2 DIABETES MELLITUS WITH BOTH EYES AFFECTED BY MODERATE NONPROLIFERATIVE RETINOPATHY WITHOUT MACULAR EDEMA, WITHOUT LONG-TERM CURRENT USE OF INSULIN: ICD-10-CM

## 2024-06-10 DIAGNOSIS — L97.509 TYPE 2 DIABETES WITH SKIN ULCER OF FOOT: ICD-10-CM

## 2024-06-10 DIAGNOSIS — E11.621 TYPE 2 DIABETES WITH SKIN ULCER OF FOOT: ICD-10-CM

## 2024-06-10 DIAGNOSIS — I10 BENIGN ESSENTIAL HTN: ICD-10-CM

## 2024-06-10 DIAGNOSIS — E11.42 TYPE 2 DIABETES MELLITUS WITH DIABETIC POLYNEUROPATHY, WITHOUT LONG-TERM CURRENT USE OF INSULIN: ICD-10-CM

## 2024-06-10 PROCEDURE — 99214 OFFICE O/P EST MOD 30 MIN: CPT | Mod: HCNC,S$GLB,, | Performed by: FAMILY MEDICINE

## 2024-06-10 PROCEDURE — 99999 PR PBB SHADOW E&M-EST. PATIENT-LVL IV: CPT | Mod: PBBFAC,HCNC,, | Performed by: FAMILY MEDICINE

## 2024-06-10 PROCEDURE — 3044F HG A1C LEVEL LT 7.0%: CPT | Mod: HCNC,CPTII,S$GLB, | Performed by: FAMILY MEDICINE

## 2024-06-10 PROCEDURE — 1160F RVW MEDS BY RX/DR IN RCRD: CPT | Mod: HCNC,CPTII,S$GLB, | Performed by: FAMILY MEDICINE

## 2024-06-10 PROCEDURE — 1126F AMNT PAIN NOTED NONE PRSNT: CPT | Mod: HCNC,CPTII,S$GLB, | Performed by: FAMILY MEDICINE

## 2024-06-10 PROCEDURE — 3078F DIAST BP <80 MM HG: CPT | Mod: HCNC,CPTII,S$GLB, | Performed by: FAMILY MEDICINE

## 2024-06-10 PROCEDURE — 3075F SYST BP GE 130 - 139MM HG: CPT | Mod: HCNC,CPTII,S$GLB, | Performed by: FAMILY MEDICINE

## 2024-06-10 PROCEDURE — 4010F ACE/ARB THERAPY RXD/TAKEN: CPT | Mod: HCNC,CPTII,S$GLB, | Performed by: FAMILY MEDICINE

## 2024-06-10 PROCEDURE — 1159F MED LIST DOCD IN RCRD: CPT | Mod: HCNC,CPTII,S$GLB, | Performed by: FAMILY MEDICINE

## 2024-06-10 PROCEDURE — 1101F PT FALLS ASSESS-DOCD LE1/YR: CPT | Mod: HCNC,CPTII,S$GLB, | Performed by: FAMILY MEDICINE

## 2024-06-10 PROCEDURE — 3288F FALL RISK ASSESSMENT DOCD: CPT | Mod: HCNC,CPTII,S$GLB, | Performed by: FAMILY MEDICINE

## 2024-06-10 PROCEDURE — G2211 COMPLEX E/M VISIT ADD ON: HCPCS | Mod: HCNC,S$GLB,, | Performed by: FAMILY MEDICINE

## 2024-06-10 PROCEDURE — 3008F BODY MASS INDEX DOCD: CPT | Mod: HCNC,CPTII,S$GLB, | Performed by: FAMILY MEDICINE

## 2024-06-10 RX ORDER — METFORMIN HYDROCHLORIDE 850 MG/1
TABLET ORAL
Qty: 270 TABLET | Refills: 0 | Status: SHIPPED | OUTPATIENT
Start: 2024-06-10

## 2024-06-10 RX ORDER — LOSARTAN POTASSIUM 50 MG/1
50 TABLET ORAL DAILY
Qty: 90 TABLET | Refills: 3 | Status: SHIPPED | OUTPATIENT
Start: 2024-06-10

## 2024-06-10 RX ORDER — SIMVASTATIN 20 MG/1
20 TABLET, FILM COATED ORAL NIGHTLY
Qty: 90 TABLET | Refills: 1 | Status: SHIPPED | OUTPATIENT
Start: 2024-06-10

## 2024-06-10 NOTE — PROGRESS NOTES
Routine Office Visit    Patient Name: Fermin Metcalf Peer    : 1960  MRN: [id number]    Subjective:  Fermin is a 64 y.o. male who presents today for:    1. Type 2 dm  Patient presenting today for follow up of type 2 DM.  He has been taking his medication as prescribed.  No hypoglycemia.  He was being treated for an ulcer at [facility], but states it has healed ans I currently waiting for new diabetic shoes.  He has had several toe amputations as well.  He has neuropathy, but states this is a long standing issue.      Past Medical History  Past Medical History:   Diagnosis Date    Benign essential HTN 2023    Diabetes mellitus, type 2     Kidney stones        Past Surgical History  Past Surgical History:   Procedure Laterality Date    EXTRACORPOREAL SHOCK WAVE LITHOTRIPSY Right 2019    Procedure: LITHOTRIPSY, ESWL;  Surgeon: WHITNEY Bryant MD;  Location: [facility];  Service: Urology;  Laterality: Right;  RN PREOP 3/25/2019---NEED H/P-----KUB    eye  surgeries      several     TOE AMPUTATION      several toes on R foot       Family History  Family History   Problem Relation Name Age of Onset    No Known Problems Mother      Diabetes Father      Diabetes Sister      Drug abuse Brother         Social History  Social History     Socioeconomic History    Marital status: Single    Number of children: 0   Tobacco Use    Smoking status: Never     Passive exposure: Never    Smokeless tobacco: Never   Substance and Sexual Activity    Alcohol use: No    Drug use: No    Sexual activity: Not Currently       Current Medications  Current Outpatient Medications on File Prior to Visit   Medication Sig Dispense Refill    antiox #8/om3/dha/epa/lut/zeax (PRESERVISION AREDS 2, OMEGA-3, ORAL) Take by mouth.      BOOSTRIX TDAP 2.5-8-5 Lf-mcg-Lf/0.5mL Syrg injection       cinnamon bark 500 mg capsule Take 500 mg by mouth once daily.      ferrous sulfate 325 mg (65 mg iron) Tab tablet Take 325 mg by mouth daily with breakfast.       fish oil-omega-3 fatty acids 300-1,000 mg capsule Take by mouth once daily.      FLUAD QUAD 2023-24,65Y UP,,PF, 60 mcg (15 mcg x 4)/0.5 mL Syrg       KENALOG 40 mg/mL injection       melatonin 10 mg Cap Take by mouth.      multivitamin (THERAGRAN) per tablet Take 1 tablet by mouth once daily.      pioglitazone (ACTOS) 15 MG tablet TAKE 1 TABLET EVERY DAY 90 tablet 1    PREVNAR 20, PF, 0.5 mL Syrg injection       [DISCONTINUED] JARDIANCE 10 mg tablet TAKE 1 TABLET EVERY DAY 90 tablet 1    [DISCONTINUED] losartan (COZAAR) 50 MG tablet TAKE 1 TABLET EVERY DAY 90 tablet 2    [DISCONTINUED] metFORMIN (GLUCOPHAGE) 850 MG tablet TAKE 2 TABLETS BY MOUTH ONCE DAILY IN THE MORNING AND 1 TABLET WITH EVENING MEAL 270 tablet 0    [DISCONTINUED] simvastatin (ZOCOR) 20 MG tablet TAKE 1 TABLET EVERY EVENING 90 tablet 1    clindamycin (CLEOCIN) 300 MG capsule Take 1 capsule (300 mg total) by mouth 3 (three) times daily. (Patient not taking: Reported on 2/28/2024) 30 capsule 0     No current facility-administered medications on file prior to visit.       Allergies   Review of patient's allergies indicates:  No Known Allergies    Review of Systems (Pertinent positives)  Review of Systems   Constitutional: Negative.    HENT: Negative.     Eyes: Negative.    Respiratory: Negative.     Cardiovascular: Negative.    Gastrointestinal: Negative.    Musculoskeletal: Negative.    Skin:         +right dfu managed by me in wound care     Neurological:  Positive for sensory change. Negative for focal weakness.         /72   Pulse 97   Temp 97.8 °F (36.6 °C) (Oral)   Resp 18   Ht 6' (1.829 m)   Wt 105.4 kg (232 lb 7.6 oz)   SpO2 96%   BMI 31.53 kg/m²     GENERAL APPEARANCE: in no apparent distress and well developed and well nourished  HEENT: PERRL, EOMI, Sclera clear, anicteric, Oropharynx clear, no lesions, Neck supple with midline trachea  NECK: normal, supple, no adenopathy, thyroid normal in size  RESPIRATORY: appears well,  vitals normal, no respiratory distress, acyanotic, normal RR, chest clear, no wheezing, crepitations, rhonchi, normal symmetric air entry  HEART: regular rate and rhythm, S1, S2 normal, no murmur, click, rub or gallop.    ABDOMEN: abdomen is soft without tenderness, no masses, no hernias, no organomegaly, no rebound, no guarding.  NEUROLOGIC: normal without focal findings, CN II-XII are intact.    Extremities: warm/well perfused.  No abnormal hair patterns.  No clubbing, cyanosis or edema.    SKIN: right foot wrapped, so cannot evaluate ulcers today.  Foot exam done at wound care and positive for neuropathy bilaterally and ulcer to right plantar  PSYCH: Alert, oriented x 3, thought content appropriate, speech normal, pleasant and cooperative, good eye contact, well groomed    Protective Sensation (w/ 10 gram monofilament):  Right: Absent  Left: Absent    Visual Inspection:  Callus -  Bilateral    Pedal Pulses:   Right: Diminished  Left: Diminished    Posterior tibialis:   Right:Present  Left: Present      Assessment/Plan:  Fermin Henson is a 64 y.o. male who presents today for :    Fermin was seen today for follow-up and diabetes.    Diagnoses and all orders for this visit:    Type 2 diabetes mellitus with diabetic polyneuropathy, without long-term current use of insulin  -     Comprehensive Metabolic Panel; Future  -     Hemoglobin A1C; Future  - Last a1c stable with current regimen    Type 2 diabetes mellitus with microalbuminuria, without long-term current use of insulin  -     Comprehensive Metabolic Panel; Future  -     Hemoglobin A1C; Future  - Labs to be done today  - Microalbumin due, so will get ordered in 2 months    Class 1 obesity due to excess calories with serious comorbidity and body mass index (BMI) of 30.0 to 30.9 in adult  -     CBC Auto Differential; Future  - Encouraged weight loss to improve overall health  - He is to increase lean protein intake as well as fiber    Controlled type 2 diabetes  mellitus with both 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

## 2024-06-27 ENCOUNTER — OFFICE VISIT (OUTPATIENT)
Dept: PODIATRY | Facility: CLINIC | Age: 64
End: 2024-06-27
Payer: MEDICARE

## 2024-06-27 VITALS
RESPIRATION RATE: 18 BRPM | HEART RATE: 80 BPM | DIASTOLIC BLOOD PRESSURE: 81 MMHG | WEIGHT: 231.5 LBS | HEIGHT: 72 IN | SYSTOLIC BLOOD PRESSURE: 135 MMHG | BODY MASS INDEX: 31.36 KG/M2

## 2024-06-27 DIAGNOSIS — E11.49 TYPE II DIABETES MELLITUS WITH NEUROLOGICAL MANIFESTATIONS: Primary | ICD-10-CM

## 2024-06-27 PROCEDURE — 99999 PR PBB SHADOW E&M-EST. PATIENT-LVL III: CPT | Mod: PBBFAC,HCNC,, | Performed by: PODIATRIST

## 2024-06-27 PROCEDURE — 3079F DIAST BP 80-89 MM HG: CPT | Mod: HCNC,CPTII,S$GLB, | Performed by: PODIATRIST

## 2024-06-27 PROCEDURE — 99213 OFFICE O/P EST LOW 20 MIN: CPT | Mod: HCNC,S$GLB,, | Performed by: PODIATRIST

## 2024-06-27 PROCEDURE — 1126F AMNT PAIN NOTED NONE PRSNT: CPT | Mod: HCNC,CPTII,S$GLB, | Performed by: PODIATRIST

## 2024-06-27 PROCEDURE — 1159F MED LIST DOCD IN RCRD: CPT | Mod: HCNC,CPTII,S$GLB, | Performed by: PODIATRIST

## 2024-06-27 PROCEDURE — 3075F SYST BP GE 130 - 139MM HG: CPT | Mod: HCNC,CPTII,S$GLB, | Performed by: PODIATRIST

## 2024-06-27 PROCEDURE — 3008F BODY MASS INDEX DOCD: CPT | Mod: HCNC,CPTII,S$GLB, | Performed by: PODIATRIST

## 2024-06-27 PROCEDURE — 4010F ACE/ARB THERAPY RXD/TAKEN: CPT | Mod: HCNC,CPTII,S$GLB, | Performed by: PODIATRIST

## 2024-06-27 PROCEDURE — 3044F HG A1C LEVEL LT 7.0%: CPT | Mod: HCNC,CPTII,S$GLB, | Performed by: PODIATRIST

## 2024-06-27 NOTE — PROGRESS NOTES
Subjective:      Patient ID: Fermin Henson is a 64 y.o. male.    Chief Complaint: Diabetic Foot Exam (Roma Vega MA at 6/10/2024)    Fermin is a 64 y.o. male who presents to the clinic for evaluation and treatment of high risk feet. Fermin has a past medical history of Benign essential HTN (12/4/2023), Diabetes mellitus, type 2, and Kidney stones. The patient's chief complaint is foot ulcer, right foot. This patient has documented high risk feet requiring routine maintenance secondary to peripheral neuropathy. Pt is here today for follow up foot care. Pt denies any new issues.       PCP: See Jean-Baptiste MD    Date Last Seen by PCP:   Chief Complaint   Patient presents with    Diabetic Foot Exam     Roma Vega MA at 6/10/2024         Current shoe gear:  Affected Foot: Extra depth shoes with custome accommodative insoles     Unaffected Foot: Extra depth shoes with custome accommodative insoles    History of Trauma: negative  Sign of Infection: none    Hemoglobin A1C   Date Value Ref Range Status   02/08/2024 6.4 (H) 4.0 - 5.6 % Final     Comment:     ADA Screening Guidelines:  5.7-6.4%  Consistent with prediabetes  >or=6.5%  Consistent with diabetes    High levels of fetal hemoglobin interfere with the HbA1C  assay. Heterozygous hemoglobin variants (HbS, HgC, etc)do  not significantly interfere with this assay.   However, presence of multiple variants may affect accuracy.     05/29/2023 6.7 (H) 4.0 - 5.6 % Final     Comment:     ADA Screening Guidelines:  5.7-6.4%  Consistent with prediabetes  >or=6.5%  Consistent with diabetes    High levels of fetal hemoglobin interfere with the HbA1C  assay. Heterozygous hemoglobin variants (HbS, HgC, etc)do  not significantly interfere with this assay.   However, presence of multiple variants may affect accuracy.     12/05/2022 6.6 (H) 4.0 - 5.6 % Final     Comment:     ADA Screening Guidelines:  5.7-6.4%  Consistent with prediabetes  >or=6.5%  Consistent  with diabetes    High levels of fetal hemoglobin interfere with the HbA1C  assay. Heterozygous hemoglobin variants (HbS, HgC, etc)do  not significantly interfere with this assay.   However, presence of multiple variants may affect accuracy.         Review of Systems   Constitutional: Negative for chills, fever and malaise/fatigue.   HENT:  Negative for hearing loss.    Cardiovascular:  Negative for claudication.   Respiratory:  Negative for shortness of breath.    Skin:  Positive for color change and unusual hair distribution. Negative for flushing and rash.   Musculoskeletal:  Negative for joint pain and myalgias.   Gastrointestinal:  Negative for nausea and vomiting.   Neurological:  Positive for numbness, paresthesias and sensory change. Negative for loss of balance.   Psychiatric/Behavioral:  Negative for altered mental status.    Allergic/Immunologic: Negative for hives.           Objective:      Physical Exam  Vitals reviewed.   Constitutional:       Appearance: He is well-developed.   Cardiovascular:      Pulses: Normal pulses.   Musculoskeletal:      Right ankle: Decreased range of motion. Abnormal pulse.      Right Achilles Tendon: Neli's test negative.      Left ankle: Decreased range of motion. Abnormal pulse.      Left Achilles Tendon: Neil's test negative.      Right foot: Decreased range of motion. Prominent metatarsal heads present.      Left foot: Decreased range of motion.      Comments: Right digit amps 1-3   Feet:      Right foot:      Protective Sensation: 5 sites tested.  2 sites sensed.      Left foot:      Protective Sensation: 5 sites tested.  2 sites sensed.   Skin:     General: Skin is dry.      Capillary Refill: Capillary refill takes more than 3 seconds.   Neurological:      Mental Status: He is alert and oriented to person, place, and time.      Sensory: Sensory deficit present.      Comments: diminished sensation noted to b/L lower extremities   Psychiatric:         Behavior:  Behavior normal. Behavior is cooperative.                   Ulceration  Location: right foot  Measurements:healed  Minimal HKT buildup  Nails short  Hypertropic skin area noted to right dorsal foot, no openings      Assessment:       Encounter Diagnosis   Name Primary?    Type II diabetes mellitus with neurological manifestations Yes         Plan:       Fermin was seen today for diabetic foot exam.    Diagnoses and all orders for this visit:    Type II diabetes mellitus with neurological manifestations      I counseled the patient on his conditions, their implications and medical management.    Pt advised to contact [employer] about his diabetic shoes    Discussed DM foot care:  Wear comfortable, proper fitting shoes. Wash feet daily. Dry well. After drying, apply moisturizer to feet (no lotion to webspaces). Inspect feet daily for skin breaks, blisters, swelling, or redness. Wear cotton socks (preferably white)  Change socks every day. Do NOT walk barefoot. Do NOT use heating pads or warm/hot water soaks      - Discussed importance of daily moisturizer to the feet such as Gold bonds diabetic foot cream     - Patient is high risk for developing lower extremity issues secondary to diabetes     - Advised the patient that fungus likes warm, dark, and moist environments such as bathrooms, gyms, and pools. Advised to spray shower, shower mat , and any shoes w/ Lysol periodically  RTC in 9 weeks or sooner if any new pedal problems should arise or if condition worsens.

## 2024-07-28 DIAGNOSIS — E11.9 TYPE 2 DIABETES MELLITUS WITHOUT COMPLICATION, WITHOUT LONG-TERM CURRENT USE OF INSULIN: ICD-10-CM

## 2024-07-28 NOTE — TELEPHONE ENCOUNTER
Care Due:                  Date            Visit Type   Department     Provider  --------------------------------------------------------------------------------                                EP [facility]                              PRIMARY      MED/ INTERNAL  Last Visit: 06-      CARE ([facility])   MED/ PEDS      See A  Page                              [facility]                              PRIMARY      MED/ INTERNAL  Next Visit: 12-      CARE ([facility])   MED/ PEDS      See A  Page                                                            Last  Test          Frequency    Reason                     Performed    Due Date  --------------------------------------------------------------------------------    HBA1C.......  6 months...  empagliflozin, metFORMIN,   02- 08-                             pioglitazone.............    [employer] Embedded Care Due Messages. Reference number: [id number].   7/28/2024 1:50:27 AM CDT

## 2024-07-29 RX ORDER — PIOGLITAZONEHYDROCHLORIDE 15 MG/1
15 TABLET ORAL
Qty: 90 TABLET | Refills: 0 | Status: SHIPPED | OUTPATIENT
Start: 2024-07-29

## 2024-07-29 NOTE — TELEPHONE ENCOUNTER
Refill Decision Note   Fermin Henson  is requesting a refill authorization.  Brief Assessment and Rationale for Refill:  Approve     Medication Therapy Plan:         Comments:     Note composed:12:21 PM 07/29/2024

## 2024-08-09 ENCOUNTER — LAB VISIT (OUTPATIENT)
Dept: LAB | Facility: HOSPITAL | Age: 64
End: 2024-08-09
Attending: FAMILY MEDICINE
Payer: MEDICARE

## 2024-08-09 DIAGNOSIS — E11.42 TYPE 2 DIABETES MELLITUS WITH DIABETIC POLYNEUROPATHY, WITHOUT LONG-TERM CURRENT USE OF INSULIN: ICD-10-CM

## 2024-08-09 DIAGNOSIS — E11.29 TYPE 2 DIABETES MELLITUS WITH MICROALBUMINURIA, WITHOUT LONG-TERM CURRENT USE OF INSULIN: ICD-10-CM

## 2024-08-09 DIAGNOSIS — L97.509 TYPE 2 DIABETES WITH SKIN ULCER OF FOOT: ICD-10-CM

## 2024-08-09 DIAGNOSIS — E11.621 TYPE 2 DIABETES WITH SKIN ULCER OF FOOT: ICD-10-CM

## 2024-08-09 DIAGNOSIS — R80.9 TYPE 2 DIABETES MELLITUS WITH MICROALBUMINURIA, WITHOUT LONG-TERM CURRENT USE OF INSULIN: ICD-10-CM

## 2024-08-09 LAB
ALBUMIN SERPL BCP-MCNC: 4 G/DL (ref 3.5–5.2)
ALP SERPL-CCNC: 60 U/L (ref 55–135)
ALT SERPL W/O P-5'-P-CCNC: 20 U/L (ref 10–44)
ANION GAP SERPL CALC-SCNC: 9 MMOL/L (ref 8–16)
AST SERPL-CCNC: 20 U/L (ref 10–40)
BILIRUB SERPL-MCNC: 0.5 MG/DL (ref 0.1–1)
BUN SERPL-MCNC: 15 MG/DL (ref 8–23)
CALCIUM SERPL-MCNC: 9.8 MG/DL (ref 8.7–10.5)
CHLORIDE SERPL-SCNC: 106 MMOL/L (ref 95–110)
CO2 SERPL-SCNC: 25 MMOL/L (ref 23–29)
CREAT SERPL-MCNC: 0.9 MG/DL (ref 0.5–1.4)
EST. GFR  (NO RACE VARIABLE): >60 ML/MIN/1.73 M^2
ESTIMATED AVG GLUCOSE: 154 MG/DL (ref 68–131)
GLUCOSE SERPL-MCNC: 134 MG/DL (ref 70–110)
HBA1C MFR BLD: 7 % (ref 4–5.6)
POTASSIUM SERPL-SCNC: 4.3 MMOL/L (ref 3.5–5.1)
PROT SERPL-MCNC: 7.5 G/DL (ref 6–8.4)
SODIUM SERPL-SCNC: 140 MMOL/L (ref 136–145)

## 2024-08-09 PROCEDURE — 83036 HEMOGLOBIN GLYCOSYLATED A1C: CPT | Mod: HCNC | Performed by: FAMILY MEDICINE

## 2024-08-09 PROCEDURE — 80053 COMPREHEN METABOLIC PANEL: CPT | Mod: HCNC | Performed by: FAMILY MEDICINE

## 2024-08-09 PROCEDURE — 36415 COLL VENOUS BLD VENIPUNCTURE: CPT | Mod: HCNC,PO | Performed by: FAMILY MEDICINE

## 2024-08-30 ENCOUNTER — OFFICE VISIT (OUTPATIENT)
Dept: PODIATRY | Facility: CLINIC | Age: 64
End: 2024-08-30
Payer: MEDICARE

## 2024-08-30 VITALS
HEIGHT: 72 IN | HEART RATE: 93 BPM | SYSTOLIC BLOOD PRESSURE: 136 MMHG | WEIGHT: 233.69 LBS | BODY MASS INDEX: 31.65 KG/M2 | DIASTOLIC BLOOD PRESSURE: 76 MMHG

## 2024-08-30 DIAGNOSIS — E11.49 TYPE II DIABETES MELLITUS WITH NEUROLOGICAL MANIFESTATIONS: Primary | ICD-10-CM

## 2024-08-30 PROCEDURE — 99999 PR PBB SHADOW E&M-EST. PATIENT-LVL III: CPT | Mod: PBBFAC,HCNC,, | Performed by: PODIATRIST

## 2024-08-30 NOTE — PROGRESS NOTES
Subjective:      Patient ID: Fermin Henson is a 64 y.o. male.    Chief Complaint: Diabetic Foot Exam (6/10/24 - See Jean-Baptiste MD, PCP)    Fermin is a 64 y.o. male who presents to the clinic for evaluation and treatment of high risk feet. Fermin has a past medical history of Benign essential HTN (12/4/2023), Diabetes mellitus, type 2, and Kidney stones. The patient's chief complaint is foot ulcer, right foot. This patient has documented high risk feet requiring routine maintenance secondary to peripheral neuropathy. Pt is here today for follow up foot care. Pt denies any new issues. Pt has received new diabetic shoes. Has been wearing them without any issues      PCP: See Jean-Baptiste MD    Date Last Seen by PCP:   Chief Complaint   Patient presents with    Diabetic Foot Exam     6/10/24 - See Jean-Baptiste MD, PCP         Current shoe gear:  Affected Foot: Extra depth shoes with custome accommodative insoles     Unaffected Foot: Extra depth shoes with custome accommodative insoles    History of Trauma: negative  Sign of Infection: none    Hemoglobin A1C   Date Value Ref Range Status   08/09/2024 7.0 (H) 4.0 - 5.6 % Final     Comment:     ADA Screening Guidelines:  5.7-6.4%  Consistent with prediabetes  >or=6.5%  Consistent with diabetes    High levels of fetal hemoglobin interfere with the HbA1C  assay. Heterozygous hemoglobin variants (HbS, HgC, etc)do  not significantly interfere with this assay.   However, presence of multiple variants may affect accuracy.     02/08/2024 6.4 (H) 4.0 - 5.6 % Final     Comment:     ADA Screening Guidelines:  5.7-6.4%  Consistent with prediabetes  >or=6.5%  Consistent with diabetes    High levels of fetal hemoglobin interfere with the HbA1C  assay. Heterozygous hemoglobin variants (HbS, HgC, etc)do  not significantly interfere with this assay.   However, presence of multiple variants may affect accuracy.     05/29/2023 6.7 (H) 4.0 - 5.6 % Final     Comment:     ADA Screening  Guidelines:  5.7-6.4%  Consistent with prediabetes  >or=6.5%  Consistent with diabetes    High levels of fetal hemoglobin interfere with the HbA1C  assay. Heterozygous hemoglobin variants (HbS, HgC, etc)do  not significantly interfere with this assay.   However, presence of multiple variants may affect accuracy.         Review of Systems   Constitutional: Negative for chills, fever and malaise/fatigue.   HENT:  Negative for hearing loss.    Cardiovascular:  Negative for claudication.   Respiratory:  Negative for shortness of breath.    Skin:  Positive for color change and unusual hair distribution. Negative for flushing and rash.   Musculoskeletal:  Negative for joint pain and myalgias.   Gastrointestinal:  Negative for nausea and vomiting.   Neurological:  Positive for numbness, paresthesias and sensory change. Negative for loss of balance.   Psychiatric/Behavioral:  Negative for altered mental status.    Allergic/Immunologic: Negative for hives.           Objective:      Physical Exam  Vitals reviewed.   Constitutional:       Appearance: He is well-developed.   Cardiovascular:      Pulses: Normal pulses.   Musculoskeletal:      Right ankle: Decreased range of motion. Abnormal pulse.      Right Achilles Tendon: Neil's test negative.      Left ankle: Decreased range of motion. Abnormal pulse.      Left Achilles Tendon: Neil's test negative.      Right foot: Decreased range of motion. Prominent metatarsal heads present.      Left foot: Decreased range of motion.      Comments: Right digit amps 1-3   Feet:      Right foot:      Protective Sensation: 5 sites tested.  2 sites sensed.      Left foot:      Protective Sensation: 5 sites tested.  2 sites sensed.   Skin:     General: Skin is dry.      Capillary Refill: Capillary refill takes more than 3 seconds.   Neurological:      Mental Status: He is alert and oriented to person, place, and time.      Sensory: Sensory deficit present.      Comments: diminished  sensation noted to b/L lower extremities   Psychiatric:         Behavior: Behavior normal. Behavior is cooperative.                   Ulceration  Location: right foot  Measurements:healed  Minimal HKT buildup  Nails short  Hypertropic skin area noted to right dorsal foot, no openings      Assessment:       Encounter Diagnosis   Name Primary?    Type II diabetes mellitus with neurological manifestations Yes         Plan:       Fermin was seen today for diabetic foot exam.    Diagnoses and all orders for this visit:    Type II diabetes mellitus with neurological manifestations      I counseled the patient on his conditions, their implications and medical management.      Discussed DM foot care:  Wear comfortable, proper fitting shoes. Wash feet daily. Dry well. After drying, apply moisturizer to feet (no lotion to webspaces). Inspect feet daily for skin breaks, blisters, swelling, or redness. Wear cotton socks (preferably white)  Change socks every day. Do NOT walk barefoot. Do NOT use heating pads or warm/hot water soaks      - Discussed importance of daily moisturizer to the feet such as Gold bonds diabetic foot cream     - Patient is high risk for developing lower extremity issues secondary to diabetes     - Advised the patient that fungus likes warm, dark, and moist environments such as bathrooms, gyms, and pools. Advised to spray shower, shower mat , and any shoes w/ Lysol periodically  RTC in 9 weeks or sooner if any new pedal problems should arise or if condition worsens.

## 2024-10-04 DIAGNOSIS — L97.509 TYPE 2 DIABETES WITH SKIN ULCER OF FOOT: ICD-10-CM

## 2024-10-04 DIAGNOSIS — E11.621 TYPE 2 DIABETES WITH SKIN ULCER OF FOOT: ICD-10-CM

## 2024-10-04 DIAGNOSIS — E11.42 TYPE 2 DIABETES MELLITUS WITH DIABETIC POLYNEUROPATHY, WITHOUT LONG-TERM CURRENT USE OF INSULIN: ICD-10-CM

## 2024-10-04 RX ORDER — METFORMIN HYDROCHLORIDE 850 MG/1
TABLET ORAL
Qty: 270 TABLET | Refills: 0 | Status: SHIPPED | OUTPATIENT
Start: 2024-10-04

## 2024-10-04 NOTE — TELEPHONE ENCOUNTER
No care due was identified.  [facility] Embedded Care Due Messages. Reference number: [id number].   10/04/2024 5:03:12 AM CDT

## 2024-10-04 NOTE — TELEPHONE ENCOUNTER
Refill Decision Note   Fermin Henson  is requesting a refill authorization.  Brief Assessment and Rationale for Refill:  Approve     Medication Therapy Plan:         Comments:     Note composed:10:46 AM 10/04/2024

## 2024-10-10 DIAGNOSIS — E11.9 TYPE 2 DIABETES MELLITUS WITHOUT COMPLICATION, WITHOUT LONG-TERM CURRENT USE OF INSULIN: ICD-10-CM

## 2024-10-10 RX ORDER — PIOGLITAZONEHYDROCHLORIDE 15 MG/1
15 TABLET ORAL
Qty: 90 TABLET | Refills: 1 | Status: SHIPPED | OUTPATIENT
Start: 2024-10-10

## 2024-10-10 NOTE — TELEPHONE ENCOUNTER
Refill Decision Note   Fermin Henson  is requesting a refill authorization.  Brief Assessment and Rationale for Refill:  Approve     Medication Therapy Plan:         Comments:     Note composed:11:04 AM 10/10/2024

## 2024-10-10 NOTE — TELEPHONE ENCOUNTER
No care due was identified.  Health [facility] Embedded Care Due Messages. Reference number: [id number].   10/10/2024 1:26:19 AM CDT

## 2024-10-21 ENCOUNTER — OFFICE VISIT (OUTPATIENT)
Dept: URGENT CARE | Facility: CLINIC | Age: 64
End: 2024-10-21
Payer: MEDICARE

## 2024-10-21 VITALS
DIASTOLIC BLOOD PRESSURE: 78 MMHG | TEMPERATURE: 98 F | RESPIRATION RATE: 18 BRPM | BODY MASS INDEX: 31.65 KG/M2 | SYSTOLIC BLOOD PRESSURE: 132 MMHG | WEIGHT: 233.69 LBS | HEART RATE: 105 BPM | OXYGEN SATURATION: 96 % | HEIGHT: 72 IN

## 2024-10-21 DIAGNOSIS — L73.1 INGROWN HAIR: ICD-10-CM

## 2024-10-21 DIAGNOSIS — L03.221 CELLULITIS OF NECK: Primary | ICD-10-CM

## 2024-10-21 DIAGNOSIS — L02.92 FURUNCLE: ICD-10-CM

## 2024-10-21 PROCEDURE — 99213 OFFICE O/P EST LOW 20 MIN: CPT | Mod: S$GLB,,, | Performed by: NURSE PRACTITIONER

## 2024-10-21 RX ORDER — IBUPROFEN 600 MG/1
600 TABLET ORAL EVERY 6 HOURS PRN
Qty: 20 TABLET | Refills: 0 | Status: SHIPPED | OUTPATIENT
Start: 2024-10-21

## 2024-10-21 RX ORDER — CEPHALEXIN 500 MG/1
500 CAPSULE ORAL 4 TIMES DAILY
Qty: 28 CAPSULE | Refills: 0 | Status: SHIPPED | OUTPATIENT
Start: 2024-10-21 | End: 2024-10-28

## 2024-10-21 RX ORDER — MUPIROCIN 20 MG/G
OINTMENT TOPICAL 3 TIMES DAILY
Qty: 30 G | Refills: 0 | Status: SHIPPED | OUTPATIENT
Start: 2024-10-21

## 2024-10-21 NOTE — PATIENT INSTRUCTIONS
Referral ordered for dermatology.  Call [phone] to schedule appt     -Start antibiotics today.    - Wash daily with soap and water. Apply antibiotic ointment three times daily    - Drink plenty of fluids.         - Pain recommendations:  Alternate Tylenol or Ibuprofen as directed for pain. Avoid tylenol if you have a history of liver disease. Do not take ibuprofen if you have a history of GI bleeding, kidney disease, or if you take blood thinners.  Take ibuprofen 600-800 mg every 6-8 hours for pain and inflammation.  You can also take Tylenol/acetaminophen 650-1000 mg every 6-8 hours for added pain relief.     - You, the patient, will arrange for follow up care as instructed.   - You can call [phone] or [phone] to help schedule an appointment with the appropriate provider.    - You must understand that you have received an Urgent Care treatment only and that you may be released before all of your medical problems are known or treated.     - If your condition worsens or fails to improve we recommend that you receive another evaluation at the ER immediately or contact your PCP to discuss your concerns.

## 2024-10-21 NOTE — PROGRESS NOTES
Subjective:      Patient ID: Fermin Henson is a 64 y.o. male.    Vitals:  height is 6' (1.829 m) and weight is 106 kg (233 lb 11 oz). His oral temperature is 98 °F (36.7 °C). His blood pressure is 132/78 and his pulse is 105. His respiration is 18 and oxygen saturation is 96%.     Chief Complaint: Neck Pain    Pt present with neck pain, mass on back of neck. Sx started 1 week ago. Tx include nothing at home.     Provider note begins here:  Patient is a 64-year-old male who comes in with complaints lump to the back of his neck.  He reports it felt like it was a pimple initially.  It is tender to touch.  He is unable to see it so unsure what it looks like.  He reports he has something similar to his right axilla about a month ago but improved on its own.    Neck Pain   This is a recurrent problem. The current episode started 1 to 4 weeks ago. The problem occurs constantly. The problem has been unchanged. The pain is associated with nothing. The quality of the pain is described as aching. The pain is at a severity of 3/10. The pain is mild. Nothing aggravates the symptoms. He has tried nothing for the symptoms.       Neck: Positive for neck pain.      Objective:     Physical Exam   Constitutional: obesity  Neck:     erythema (7x4cm furuncle to back of head near neck. Attempted to i&d but no drainage noted. Erythema surrounding area. No warmth) present.  Cardiovascular: Normal heart sounds.   Pulmonary/Chest: Effort normal and breath sounds normal. No stridor. No respiratory distress. He has no wheezes. He has no rhonchi. He has no rales. He exhibits no tenderness.   Abdominal: Normal appearance.   Neurological: He is alert.       Assessment:     1. Cellulitis of neck    2. Ingrown hair    3. Furuncle        Plan:       Cellulitis of neck  -     cephALEXin (KEFLEX) 500 MG capsule; Take 1 capsule (500 mg total) by mouth 4 (four) times daily. for 7 days  Dispense: 28 capsule; Refill: 0  -     mupirocin (BACTROBAN) 2 %  ointment; Apply topically 3 (three) times daily.  Dispense: 30 g; Refill: 0  -     ibuprofen (ADVIL,MOTRIN) 600 MG tablet; Take 1 tablet (600 mg total) by mouth every 6 (six) hours as needed for Pain.  Dispense: 20 tablet; Refill: 0    Ingrown hair  -     mupirocin (BACTROBAN) 2 % ointment; Apply topically 3 (three) times daily.  Dispense: 30 g; Refill: 0    Furuncle  -     Ambulatory referral/consult to Dermatology             Patient Instructions   Referral ordered for dermatology.  Call [phone] to schedule appt     -Start antibiotics today.    - Wash daily with soap and water. Apply antibiotic ointment three times daily    - Drink plenty of fluids.         - Pain recommendations:  Alternate Tylenol or Ibuprofen as directed for pain. Avoid tylenol if you have a history of liver disease. Do not take ibuprofen if you have a history of GI bleeding, kidney disease, or if you take blood thinners.  Take ibuprofen 600-800 mg every 6-8 hours for pain and inflammation.  You can also take Tylenol/acetaminophen 650-1000 mg every 6-8 hours for added pain relief.     - You, the patient, will arrange for follow up care as instructed.   - You can call [phone] or [phone] to help schedule an appointment with the appropriate provider.    - You must understand that you have received an Urgent Care treatment only and that you may be released before all of your medical problems are known or treated.     - If your condition worsens or fails to improve we recommend that you receive another evaluation at the ER immediately or contact your PCP to discuss your concerns.

## 2024-10-25 ENCOUNTER — TELEPHONE (OUTPATIENT)
Dept: PODIATRY | Facility: CLINIC | Age: 64
End: 2024-10-25
Payer: MEDICARE

## 2024-10-31 ENCOUNTER — TELEPHONE (OUTPATIENT)
Dept: PODIATRY | Facility: CLINIC | Age: 64
End: 2024-10-31
Payer: MEDICARE

## 2024-11-01 ENCOUNTER — OFFICE VISIT (OUTPATIENT)
Dept: PODIATRY | Facility: CLINIC | Age: 64
End: 2024-11-01
Payer: MEDICARE

## 2024-11-01 VITALS
WEIGHT: 231.25 LBS | HEART RATE: 114 BPM | DIASTOLIC BLOOD PRESSURE: 80 MMHG | HEIGHT: 72 IN | BODY MASS INDEX: 31.32 KG/M2 | SYSTOLIC BLOOD PRESSURE: 131 MMHG

## 2024-11-01 DIAGNOSIS — L03.032 CELLULITIS OF TOE OF LEFT FOOT: Primary | ICD-10-CM

## 2024-11-01 PROCEDURE — 99999 PR PBB SHADOW E&M-EST. PATIENT-LVL III: CPT | Mod: PBBFAC,HCNC,, | Performed by: PODIATRIST

## 2024-11-01 RX ORDER — SULFAMETHOXAZOLE AND TRIMETHOPRIM 800; 160 MG/1; MG/1
1 TABLET ORAL 2 TIMES DAILY
Qty: 28 TABLET | Refills: 0 | Status: SHIPPED | OUTPATIENT
Start: 2024-11-01

## 2024-11-08 ENCOUNTER — OFFICE VISIT (OUTPATIENT)
Dept: PODIATRY | Facility: CLINIC | Age: 64
End: 2024-11-08
Payer: MEDICARE

## 2024-11-08 VITALS
BODY MASS INDEX: 31.36 KG/M2 | SYSTOLIC BLOOD PRESSURE: 124 MMHG | HEIGHT: 72 IN | HEART RATE: 108 BPM | DIASTOLIC BLOOD PRESSURE: 75 MMHG

## 2024-11-08 DIAGNOSIS — L03.032 CELLULITIS OF TOE OF LEFT FOOT: Primary | ICD-10-CM

## 2024-11-08 PROCEDURE — 99999 PR PBB SHADOW E&M-EST. PATIENT-LVL III: CPT | Mod: PBBFAC,HCNC,, | Performed by: PODIATRIST

## 2024-11-08 NOTE — PROGRESS NOTES
Subjective:      Patient ID: Fermin Henson is a 64 y.o. male.    Chief Complaint: Wound Check (Left great, no drainage)    Fermin is a 64 y.o. male who presents to the clinic for evaluation and treatment of high risk feet. Fermin has a past medical history of Benign essential HTN (12/4/2023), Diabetes mellitus, type 2, and Kidney stones. The patient's chief complaint is foot ulcer, right foot. This patient has documented high risk feet requiring routine maintenance secondary to peripheral neuropathy. Pt states he has been taking abx and dressing changes.       PCP: See Jean-Baptiste MD    Date Last Seen by PCP:   Chief Complaint   Patient presents with    Wound Check     Left great, no drainage         Current shoe gear:  Affected Foot:  darco      Unaffected Foot: Extra depth shoes with custome accommodative insoles    History of Trauma: negative  Sign of Infection: none    Hemoglobin A1C   Date Value Ref Range Status   08/09/2024 7.0 (H) 4.0 - 5.6 % Final     Comment:     ADA Screening Guidelines:  5.7-6.4%  Consistent with prediabetes  >or=6.5%  Consistent with diabetes    High levels of fetal hemoglobin interfere with the HbA1C  assay. Heterozygous hemoglobin variants (HbS, HgC, etc)do  not significantly interfere with this assay.   However, presence of multiple variants may affect accuracy.     02/08/2024 6.4 (H) 4.0 - 5.6 % Final     Comment:     ADA Screening Guidelines:  5.7-6.4%  Consistent with prediabetes  >or=6.5%  Consistent with diabetes    High levels of fetal hemoglobin interfere with the HbA1C  assay. Heterozygous hemoglobin variants (HbS, HgC, etc)do  not significantly interfere with this assay.   However, presence of multiple variants may affect accuracy.     05/29/2023 6.7 (H) 4.0 - 5.6 % Final     Comment:     ADA Screening Guidelines:  5.7-6.4%  Consistent with prediabetes  >or=6.5%  Consistent with diabetes    High levels of fetal hemoglobin interfere with the HbA1C  assay. Heterozygous  hemoglobin variants (HbS, HgC, etc)do  not significantly interfere with this assay.   However, presence of multiple variants may affect accuracy.         Review of Systems   Constitutional: Negative for chills, fever and malaise/fatigue.   HENT:  Negative for hearing loss.    Cardiovascular:  Negative for claudication.   Respiratory:  Negative for shortness of breath.    Skin:  Positive for color change and unusual hair distribution. Negative for flushing and rash.   Musculoskeletal:  Negative for joint pain and myalgias.   Gastrointestinal:  Negative for nausea and vomiting.   Neurological:  Positive for numbness, paresthesias and sensory change. Negative for loss of balance.   Psychiatric/Behavioral:  Negative for altered mental status.    Allergic/Immunologic: Negative for hives.           Objective:      Physical Exam  Vitals reviewed.   Constitutional:       Appearance: He is well-developed.   Cardiovascular:      Pulses: Normal pulses.   Musculoskeletal:      Right ankle: Decreased range of motion. Abnormal pulse.      Right Achilles Tendon: Neil's test negative.      Left ankle: Decreased range of motion. Abnormal pulse.      Left Achilles Tendon: Neil's test negative.      Right foot: Decreased range of motion. Prominent metatarsal heads present.      Left foot: Decreased range of motion.      Comments: Right digit amps 1-3   Feet:      Right foot:      Protective Sensation: 5 sites tested.  2 sites sensed.      Left foot:      Protective Sensation: 5 sites tested.  2 sites sensed.   Skin:     General: Skin is dry.      Capillary Refill: Capillary refill takes more than 3 seconds.      Findings: No erythema.      Comments: Left hallux, erythema resolved  Right 4th digit, some localized erythema, resolved   Neurological:      Mental Status: He is alert and oriented to person, place, and time.      Sensory: Sensory deficit present.      Comments: diminished sensation noted to b/L lower extremities    Psychiatric:         Behavior: Behavior normal. Behavior is cooperative.                       Assessment:       Encounter Diagnosis   Name Primary?    Cellulitis of toe of left foot Yes         Plan:       Fermin was seen today for wound check.    Diagnoses and all orders for this visit:    Cellulitis of toe of left foot      I counseled the patient on his conditions, their implications and medical management.      Pt advised to complete abx  DSD applied to toes with instructions on changes    RTC in 2 weeks or sooner if any new pedal problems arise, any signs of infection occur, or if condition worsens.

## 2024-11-19 ENCOUNTER — TELEPHONE (OUTPATIENT)
Dept: PODIATRY | Facility: CLINIC | Age: 64
End: 2024-11-19
Payer: MEDICARE

## 2024-11-20 ENCOUNTER — OFFICE VISIT (OUTPATIENT)
Dept: PODIATRY | Facility: CLINIC | Age: 64
End: 2024-11-20
Payer: MEDICARE

## 2024-11-20 VITALS
HEART RATE: 112 BPM | BODY MASS INDEX: 31.92 KG/M2 | HEIGHT: 72 IN | DIASTOLIC BLOOD PRESSURE: 71 MMHG | WEIGHT: 235.69 LBS | SYSTOLIC BLOOD PRESSURE: 113 MMHG

## 2024-11-20 DIAGNOSIS — Z87.2 HEALED FOOT ULCER: ICD-10-CM

## 2024-11-20 DIAGNOSIS — L84 CORN OR CALLUS: Primary | ICD-10-CM

## 2024-11-20 DIAGNOSIS — Z89.421 HX OF AMPUTATION OF LESSER TOE, RIGHT: ICD-10-CM

## 2024-11-20 PROCEDURE — 4010F ACE/ARB THERAPY RXD/TAKEN: CPT | Mod: HCNC,CPTII,S$GLB, | Performed by: PODIATRIST

## 2024-11-20 PROCEDURE — 3066F NEPHROPATHY DOC TX: CPT | Mod: HCNC,CPTII,S$GLB, | Performed by: PODIATRIST

## 2024-11-20 PROCEDURE — 3008F BODY MASS INDEX DOCD: CPT | Mod: HCNC,CPTII,S$GLB, | Performed by: PODIATRIST

## 2024-11-20 PROCEDURE — 3051F HG A1C>EQUAL 7.0%<8.0%: CPT | Mod: HCNC,CPTII,S$GLB, | Performed by: PODIATRIST

## 2024-11-20 PROCEDURE — 99999 PR PBB SHADOW E&M-EST. PATIENT-LVL III: CPT | Mod: PBBFAC,HCNC,, | Performed by: PODIATRIST

## 2024-11-20 PROCEDURE — 3074F SYST BP LT 130 MM HG: CPT | Mod: HCNC,CPTII,S$GLB, | Performed by: PODIATRIST

## 2024-11-20 PROCEDURE — 3061F NEG MICROALBUMINURIA REV: CPT | Mod: HCNC,CPTII,S$GLB, | Performed by: PODIATRIST

## 2024-11-20 PROCEDURE — 1126F AMNT PAIN NOTED NONE PRSNT: CPT | Mod: HCNC,CPTII,S$GLB, | Performed by: PODIATRIST

## 2024-11-20 PROCEDURE — 1159F MED LIST DOCD IN RCRD: CPT | Mod: HCNC,CPTII,S$GLB, | Performed by: PODIATRIST

## 2024-11-20 PROCEDURE — 3078F DIAST BP <80 MM HG: CPT | Mod: HCNC,CPTII,S$GLB, | Performed by: PODIATRIST

## 2024-11-20 PROCEDURE — 1101F PT FALLS ASSESS-DOCD LE1/YR: CPT | Mod: HCNC,CPTII,S$GLB, | Performed by: PODIATRIST

## 2024-11-20 PROCEDURE — 99213 OFFICE O/P EST LOW 20 MIN: CPT | Mod: HCNC,S$GLB,, | Performed by: PODIATRIST

## 2024-11-20 PROCEDURE — 3288F FALL RISK ASSESSMENT DOCD: CPT | Mod: HCNC,CPTII,S$GLB, | Performed by: PODIATRIST

## 2024-11-20 NOTE — PROGRESS NOTES
Subjective:      Patient ID: Fermin Henson is a 64 y.o. male.    Chief Complaint: Diabetes Mellitus ( / See Jean-Baptiste MD 06/10/2024///) and Wound Care (RIGHT FOOT)    Fermin is a 64 y.o. male who presents to the clinic for evaluation and treatment of high risk feet. Fermin has a past medical history of Benign essential HTN (12/4/2023), Diabetes mellitus, type 2, and Kidney stones. The patient's chief complaint is foot ulcer, right foot. This patient has documented high risk feet requiring routine maintenance secondary to peripheral neuropathy. Pt states he finished his antibiotics.  Patient states his ulcer is healed.  No other complaints or new concerns.  No systemic signs of infection.      PCP: See Jean-Baptiste MD    Date Last Seen by PCP:   Chief Complaint   Patient presents with    Diabetes Mellitus         See Jean-Baptiste MD 06/10/2024          Wound Care     RIGHT FOOT         Current shoe gear:  Affected Foot:  darco      Unaffected Foot: Extra depth shoes with custome accommodative insoles    History of Trauma: negative  Sign of Infection: none    Hemoglobin A1C   Date Value Ref Range Status   08/09/2024 7.0 (H) 4.0 - 5.6 % Final     Comment:     ADA Screening Guidelines:  5.7-6.4%  Consistent with prediabetes  >or=6.5%  Consistent with diabetes    High levels of fetal hemoglobin interfere with the HbA1C  assay. Heterozygous hemoglobin variants (HbS, HgC, etc)do  not significantly interfere with this assay.   However, presence of multiple variants may affect accuracy.     02/08/2024 6.4 (H) 4.0 - 5.6 % Final     Comment:     ADA Screening Guidelines:  5.7-6.4%  Consistent with prediabetes  >or=6.5%  Consistent with diabetes    High levels of fetal hemoglobin interfere with the HbA1C  assay. Heterozygous hemoglobin variants (HbS, HgC, etc)do  not significantly interfere with this assay.   However, presence of multiple variants may affect accuracy.     05/29/2023 6.7 (H) 4.0 - 5.6 % Final      Comment:     ADA Screening Guidelines:  5.7-6.4%  Consistent with prediabetes  >or=6.5%  Consistent with diabetes    High levels of fetal hemoglobin interfere with the HbA1C  assay. Heterozygous hemoglobin variants (HbS, HgC, etc)do  not significantly interfere with this assay.   However, presence of multiple variants may affect accuracy.         Review of Systems   Constitutional: Negative for chills, fever and malaise/fatigue.   HENT:  Negative for hearing loss.    Cardiovascular:  Negative for claudication.   Respiratory:  Negative for shortness of breath.    Skin:  Negative for flushing and rash.   Musculoskeletal:  Negative for joint pain and myalgias.   Gastrointestinal:  Negative for nausea and vomiting.   Neurological:  Positive for numbness, paresthesias and sensory change. Negative for loss of balance.   Psychiatric/Behavioral:  Negative for altered mental status.    Allergic/Immunologic: Negative for hives.           Objective:      Physical Exam  Vitals reviewed.   Constitutional:       Appearance: He is well-developed.   Cardiovascular:      Pulses: Normal pulses.   Musculoskeletal:      Right ankle: Decreased range of motion. Abnormal pulse.      Right Achilles Tendon: Neil's test negative.      Left ankle: Decreased range of motion. Abnormal pulse.      Left Achilles Tendon: Neil's test negative.      Right foot: Decreased range of motion. Prominent metatarsal heads present.      Left foot: Decreased range of motion.      Comments: Right digit amps 1-3   Feet:      Right foot:      Protective Sensation: 5 sites tested.  2 sites sensed.      Left foot:      Protective Sensation: 5 sites tested.  2 sites sensed.   Skin:     General: Skin is dry.      Capillary Refill: Capillary refill takes more than 3 seconds.      Findings: No erythema.      Comments: Left hallux, erythema resolved  Right 4th digit, some localized erythema, resolved   Neurological:      Mental Status: He is alert and oriented  to person, place, and time.      Sensory: Sensory deficit present.      Comments: diminished sensation noted to b/L lower extremities   Psychiatric:         Behavior: Behavior normal. Behavior is cooperative.                       Assessment:       Encounter Diagnoses   Name Primary?    Corn or callus Yes    Healed foot ulcer     Hx of amputation of lesser toe, right            Plan:       Fermin was seen today for diabetes mellitus and wound care.    Diagnoses and all orders for this visit:    Corn or callus    Healed foot ulcer    Hx of amputation of lesser toe, right        I counseled the patient on his conditions, their implications and medical management.    DSD applied to toes with instructions on changes    RTC PRN or sooner if any new pedal problems arise, any signs of infection occur, or if condition worsens.

## 2024-12-02 ENCOUNTER — TELEPHONE (OUTPATIENT)
Dept: FAMILY MEDICINE | Facility: CLINIC | Age: 64
End: 2024-12-02
Payer: MEDICARE

## 2024-12-02 NOTE — TELEPHONE ENCOUNTER
phone pt to [facility] appt per  pt has been recsh from 12/11/24 to 12/23/24 @ 2:00 pm  will send a my chart message  and mail out new appt letter if you need refill  on your medication please send me a my chart message /call the offce vs

## 2024-12-12 ENCOUNTER — OFFICE VISIT (OUTPATIENT)
Dept: PODIATRY | Facility: CLINIC | Age: 64
End: 2024-12-12
Payer: MEDICARE

## 2024-12-12 VITALS
HEART RATE: 97 BPM | BODY MASS INDEX: 31.96 KG/M2 | SYSTOLIC BLOOD PRESSURE: 143 MMHG | DIASTOLIC BLOOD PRESSURE: 78 MMHG | HEIGHT: 72 IN

## 2024-12-12 DIAGNOSIS — E11.49 TYPE II DIABETES MELLITUS WITH NEUROLOGICAL MANIFESTATIONS: ICD-10-CM

## 2024-12-12 DIAGNOSIS — Z87.2 HEALED FOOT ULCER: Primary | ICD-10-CM

## 2024-12-12 PROCEDURE — 99999 PR PBB SHADOW E&M-EST. PATIENT-LVL III: CPT | Mod: PBBFAC,HCNC,, | Performed by: PODIATRIST

## 2024-12-12 NOTE — PROGRESS NOTES
Subjective:      Patient ID: Fermin Henson is a 64 y.o. male.    Chief Complaint: Wound Care (left)    Fermin is a 64 y.o. male who presents to the clinic for evaluation and treatment of high risk feet. Fermin has a past medical history of Benign essential HTN (12/4/2023), Diabetes mellitus, type 2, and Kidney stones. The patient's chief complaint is foot ulcer, right foot. This patient has documented high risk feet requiring routine maintenance secondary to peripheral neuropathy. Pt presents today for foot check.  Patient states his ulcer is healed.  No other complaints or new concerns.  No systemic signs of infection.      PCP: See Jean-Baptiste MD    Date Last Seen by PCP:   Chief Complaint   Patient presents with    Wound Care     left         Current shoe gear:  Affected Foot:  darco      Unaffected Foot: Extra depth shoes with custome accommodative insoles    History of Trauma: negative  Sign of Infection: none    Hemoglobin A1C   Date Value Ref Range Status   08/09/2024 7.0 (H) 4.0 - 5.6 % Final     Comment:     ADA Screening Guidelines:  5.7-6.4%  Consistent with prediabetes  >or=6.5%  Consistent with diabetes    High levels of fetal hemoglobin interfere with the HbA1C  assay. Heterozygous hemoglobin variants (HbS, HgC, etc)do  not significantly interfere with this assay.   However, presence of multiple variants may affect accuracy.     02/08/2024 6.4 (H) 4.0 - 5.6 % Final     Comment:     ADA Screening Guidelines:  5.7-6.4%  Consistent with prediabetes  >or=6.5%  Consistent with diabetes    High levels of fetal hemoglobin interfere with the HbA1C  assay. Heterozygous hemoglobin variants (HbS, HgC, etc)do  not significantly interfere with this assay.   However, presence of multiple variants may affect accuracy.     05/29/2023 6.7 (H) 4.0 - 5.6 % Final     Comment:     ADA Screening Guidelines:  5.7-6.4%  Consistent with prediabetes  >or=6.5%  Consistent with diabetes    High levels of fetal hemoglobin  interfere with the HbA1C  assay. Heterozygous hemoglobin variants (HbS, HgC, etc)do  not significantly interfere with this assay.   However, presence of multiple variants may affect accuracy.         Review of Systems   Constitutional: Negative for chills, fever and malaise/fatigue.   HENT:  Negative for hearing loss.    Cardiovascular:  Negative for claudication.   Respiratory:  Negative for shortness of breath.    Skin:  Positive for color change, poor wound healing and unusual hair distribution. Negative for flushing and rash.   Musculoskeletal:  Negative for joint pain and myalgias.   Gastrointestinal:  Negative for nausea and vomiting.   Neurological:  Positive for numbness, paresthesias and sensory change. Negative for loss of balance.   Psychiatric/Behavioral:  Negative for altered mental status.    Allergic/Immunologic: Negative for hives.           Objective:      Physical Exam  Vitals reviewed.   Constitutional:       Appearance: He is well-developed.   Cardiovascular:      Pulses: Normal pulses.   Musculoskeletal:      Right ankle: Decreased range of motion. Abnormal pulse.      Right Achilles Tendon: Neil's test negative.      Left ankle: Decreased range of motion. Abnormal pulse.      Left Achilles Tendon: Neil's test negative.      Right foot: Decreased range of motion. Prominent metatarsal heads present.      Left foot: Decreased range of motion.      Comments: Right digit amps 1-3   Feet:      Right foot:      Protective Sensation: 5 sites tested.  2 sites sensed.      Left foot:      Protective Sensation: 5 sites tested.  2 sites sensed.   Skin:     General: Skin is dry.      Capillary Refill: Capillary refill takes more than 3 seconds.      Findings: No erythema or wound.   Neurological:      Mental Status: He is alert and oriented to person, place, and time.      Sensory: Sensory deficit present.      Comments: diminished sensation noted to b/L lower extremities   Psychiatric:          Behavior: Behavior normal. Behavior is cooperative.                     Assessment:       Encounter Diagnoses   Name Primary?    Healed foot ulcer Yes    Type II diabetes mellitus with neurological manifestations            Plan:       Fermin was seen today for wound care.    Diagnoses and all orders for this visit:    Healed foot ulcer    Type II diabetes mellitus with neurological manifestations        I counseled the patient on his conditions, their implications and medical management.    Pt states wound has remained healed    Discussed DM foot care:  Wear comfortable, proper fitting shoes. Wash feet daily. Dry well. After drying, apply moisturizer to feet (no lotion to webspaces). Inspect feet daily for skin breaks, blisters, swelling, or redness. Wear cotton socks (preferably white)  Change socks every day. Do NOT walk barefoot. Do NOT use heating pads or warm/hot water soaks      - Discussed importance of daily moisturizer to the feet such as Gold [employer] diabetic foot cream     - Patient is high risk for developing lower extremity issues secondary to diabetes     - Advised the patient that fungus likes warm, dark, and moist environments such as bathrooms, gyms, and pools. Advised to spray shower, shower mat , and any shoes w/ Lysol periodically          RTC in 9 weeks or sooner if any new pedal problems should arise or if condition worsens.

## 2024-12-16 ENCOUNTER — TELEPHONE (OUTPATIENT)
Dept: FAMILY MEDICINE | Facility: CLINIC | Age: 64
End: 2024-12-16
Payer: MEDICARE

## 2024-12-16 NOTE — TELEPHONE ENCOUNTER
----- Message from EsmeVerdezynegiovanni sent at 12/16/2024 10:53 AM CST -----   Name of Who is Calling:     What is the request in detail:  patient request call back in reference to rescheduled appointment / patient state new time is not good for him  Please contact to further discuss and advise      Can the clinic reply by [facility]:     What Number to Call Back if not in [facility]:   [phone]

## 2024-12-20 DIAGNOSIS — L97.509 TYPE 2 DIABETES WITH SKIN ULCER OF FOOT: ICD-10-CM

## 2024-12-20 DIAGNOSIS — E11.42 TYPE 2 DIABETES MELLITUS WITH DIABETIC POLYNEUROPATHY, WITHOUT LONG-TERM CURRENT USE OF INSULIN: ICD-10-CM

## 2024-12-20 DIAGNOSIS — E11.621 TYPE 2 DIABETES WITH SKIN ULCER OF FOOT: ICD-10-CM

## 2024-12-20 RX ORDER — METFORMIN HYDROCHLORIDE 850 MG/1
TABLET ORAL
Qty: 270 TABLET | Refills: 0 | Status: SHIPPED | OUTPATIENT
Start: 2024-12-20

## 2024-12-20 NOTE — TELEPHONE ENCOUNTER
No care due was identified.  Health [facility] Embedded Care Due Messages. Reference number: [id number].   12/20/2024 1:17:57 AM CST

## 2024-12-20 NOTE — TELEPHONE ENCOUNTER
Refill Decision Note   Fermin Henson  is requesting a refill authorization.  Brief Assessment and Rationale for Refill:  Approve     Medication Therapy Plan:         Comments:     Note composed:7:01 AM 12/20/2024

## 2024-12-29 DIAGNOSIS — E11.42 TYPE 2 DIABETES MELLITUS WITH DIABETIC POLYNEUROPATHY, WITHOUT LONG-TERM CURRENT USE OF INSULIN: ICD-10-CM

## 2024-12-29 NOTE — TELEPHONE ENCOUNTER
No care due was identified.  Health [facility] Embedded Care Due Messages. Reference number: [id number].   12/29/2024 12:58:37 PM CST

## 2024-12-30 RX ORDER — SIMVASTATIN 20 MG/1
20 TABLET, FILM COATED ORAL NIGHTLY
Qty: 90 TABLET | Refills: 0 | Status: SHIPPED | OUTPATIENT
Start: 2024-12-30

## 2024-12-30 NOTE — TELEPHONE ENCOUNTER
Refill Decision Note   Fermin Henson  is requesting a refill authorization.  Brief Assessment and Rationale for Refill:  Approve     Medication Therapy Plan:         Comments:     Note composed:1:07 PM 12/30/2024

## 2025-02-07 ENCOUNTER — TELEPHONE (OUTPATIENT)
Dept: FAMILY MEDICINE | Facility: CLINIC | Age: 65
End: 2025-02-07
Payer: MEDICARE

## 2025-02-07 NOTE — TELEPHONE ENCOUNTER
phone pt to [facility] appt per  pt has been recsh from 02/13/25 to 02/19/25 @ 9:30 am   with Dr.Page ANGELINA Geiger will send a my chart message  you will come to the same place where you see Dr. Jean-Baptiste. Vs   Thanks

## 2025-02-14 ENCOUNTER — PATIENT MESSAGE (OUTPATIENT)
Dept: PODIATRY | Facility: CLINIC | Age: 65
End: 2025-02-14
Payer: MEDICARE

## 2025-02-18 ENCOUNTER — OFFICE VISIT (OUTPATIENT)
Dept: PODIATRY | Facility: CLINIC | Age: 65
End: 2025-02-18
Payer: MEDICARE

## 2025-02-18 VITALS
HEIGHT: 72 IN | SYSTOLIC BLOOD PRESSURE: 133 MMHG | DIASTOLIC BLOOD PRESSURE: 73 MMHG | HEART RATE: 92 BPM | BODY MASS INDEX: 30.16 KG/M2 | WEIGHT: 222.69 LBS

## 2025-02-18 DIAGNOSIS — L97.512 FOOT ULCER, RIGHT, WITH FAT LAYER EXPOSED: Primary | ICD-10-CM

## 2025-02-18 DIAGNOSIS — E11.49 TYPE II DIABETES MELLITUS WITH NEUROLOGICAL MANIFESTATIONS: ICD-10-CM

## 2025-02-18 NOTE — PROGRESS NOTES
Subjective:      Patient ID: Fermin Henson is a 64 y.o. male.    Chief Complaint: Diabetes Mellitus (6/10/24 - See Jean-Baptiste MD, PCP, has a f/u 2/19/2025)    Fermin is a 64 y.o. male who presents to the clinic for evaluation and treatment of high risk feet. Fermin has a past medical history of Benign essential HTN (12/4/2023), Diabetes mellitus, type 2, and Kidney stones. The patient's chief complaint is foot ulcer, right foot. This patient has documented high risk feet requiring routine maintenance secondary to peripheral neuropathy. Pt is here today for follow up foot care.         PCP: See Jean-Baptiste MD    Date Last Seen by PCP:   Chief Complaint   Patient presents with    Diabetes Mellitus     6/10/24 - See Jean-Baptiste MD, PCP, has a f/u 2/19/2025         Current shoe gear:  Affected Foot: Extra depth shoes with custome accommodative insoles     Unaffected Foot: Extra depth shoes with custome accommodative insoles    History of Trauma: negative  Sign of Infection: none    Hemoglobin A1C   Date Value Ref Range Status   08/09/2024 7.0 (H) 4.0 - 5.6 % Final     Comment:     ADA Screening Guidelines:  5.7-6.4%  Consistent with prediabetes  >or=6.5%  Consistent with diabetes    High levels of fetal hemoglobin interfere with the HbA1C  assay. Heterozygous hemoglobin variants (HbS, HgC, etc)do  not significantly interfere with this assay.   However, presence of multiple variants may affect accuracy.     02/08/2024 6.4 (H) 4.0 - 5.6 % Final     Comment:     ADA Screening Guidelines:  5.7-6.4%  Consistent with prediabetes  >or=6.5%  Consistent with diabetes    High levels of fetal hemoglobin interfere with the HbA1C  assay. Heterozygous hemoglobin variants (HbS, HgC, etc)do  not significantly interfere with this assay.   However, presence of multiple variants may affect accuracy.     05/29/2023 6.7 (H) 4.0 - 5.6 % Final     Comment:     ADA Screening Guidelines:  5.7-6.4%  Consistent with prediabetes  >or=6.5%   Consistent with diabetes    High levels of fetal hemoglobin interfere with the HbA1C  assay. Heterozygous hemoglobin variants (HbS, HgC, etc)do  not significantly interfere with this assay.   However, presence of multiple variants may affect accuracy.         Review of Systems   Constitutional: Negative for chills, fever and malaise/fatigue.   HENT:  Negative for hearing loss.    Cardiovascular:  Negative for claudication.   Respiratory:  Negative for shortness of breath.    Skin:  Positive for color change and unusual hair distribution. Negative for flushing and rash.   Musculoskeletal:  Negative for joint pain and myalgias.   Gastrointestinal:  Negative for nausea and vomiting.   Neurological:  Positive for numbness, paresthesias and sensory change. Negative for loss of balance.   Psychiatric/Behavioral:  Negative for altered mental status.    Allergic/Immunologic: Negative for hives.           Objective:      Physical Exam  Vitals reviewed.   Constitutional:       Appearance: He is well-developed.   Cardiovascular:      Pulses: Normal pulses.   Musculoskeletal:      Right ankle: Decreased range of motion. Abnormal pulse.      Right Achilles Tendon: Neil's test negative.      Left ankle: Decreased range of motion. Abnormal pulse.      Left Achilles Tendon: Neil's test negative.      Right foot: Decreased range of motion. Prominent metatarsal heads present.      Left foot: Decreased range of motion.      Comments: Right digit amps 1-3   Feet:      Right foot:      Protective Sensation: 5 sites tested.  2 sites sensed.      Left foot:      Protective Sensation: 5 sites tested.  2 sites sensed.   Skin:     General: Skin is dry.      Capillary Refill: Capillary refill takes more than 3 seconds.   Neurological:      Mental Status: He is alert and oriented to person, place, and time.      Sensory: Sensory deficit present.      Comments: diminished sensation noted to b/L lower extremities   Psychiatric:          Behavior: Behavior normal. Behavior is cooperative.                   Ulceration  Location: right 5th digit  Measurements: 0.4x 0.4x 0.2cm  Drainage: none  Wound base: fibrogranular  SOI: none    5th digit surrounded by HKT tissue        Assessment:       Encounter Diagnoses   Name Primary?    Foot ulcer, right, with fat layer exposed Yes    Type II diabetes mellitus with neurological manifestations          Plan:       Fermin was seen today for diabetes mellitus.    Diagnoses and all orders for this visit:    Foot ulcer, right, with fat layer exposed    Type II diabetes mellitus with neurological manifestations      I counseled the patient on his conditions, their implications and medical management.      Ulceration on right toe debrided through sub-q tissue using an tissue nipper. Ulceration debrided down to healthy tissue. Pt tolerated debridement well.     Football dressing applied to pts right foot by medical assistant under my direct supervision. Pt tolerated dressing well.     - Patient is high risk for developing lower extremity issues secondary to diabetes     - Advised the patient that fungus likes warm, dark, and moist environments such as bathrooms, gyms, and pools. Advised to spray shower, shower mat , and any shoes w/ Lysol periodically  RTC in 9 weeks or sooner if any new pedal problems should arise or if condition worsens.

## 2025-02-19 ENCOUNTER — LAB VISIT (OUTPATIENT)
Dept: LAB | Facility: HOSPITAL | Age: 65
End: 2025-02-19
Payer: MEDICARE

## 2025-02-19 ENCOUNTER — OFFICE VISIT (OUTPATIENT)
Dept: FAMILY MEDICINE | Facility: CLINIC | Age: 65
End: 2025-02-19
Payer: MEDICARE

## 2025-02-19 ENCOUNTER — RESULTS FOLLOW-UP (OUTPATIENT)
Dept: FAMILY MEDICINE | Facility: CLINIC | Age: 65
End: 2025-02-19

## 2025-02-19 VITALS
TEMPERATURE: 98 F | HEART RATE: 104 BPM | DIASTOLIC BLOOD PRESSURE: 60 MMHG | SYSTOLIC BLOOD PRESSURE: 110 MMHG | WEIGHT: 220.88 LBS | OXYGEN SATURATION: 99 % | BODY MASS INDEX: 29.92 KG/M2 | HEIGHT: 72 IN

## 2025-02-19 DIAGNOSIS — I10 BENIGN ESSENTIAL HTN: ICD-10-CM

## 2025-02-19 DIAGNOSIS — Z12.5 PROSTATE CANCER SCREENING ENCOUNTER, OPTIONS AND RISKS DISCUSSED: ICD-10-CM

## 2025-02-19 DIAGNOSIS — R29.818 SUSPECTED SLEEP APNEA: ICD-10-CM

## 2025-02-19 DIAGNOSIS — E11.42 TYPE 2 DIABETES MELLITUS WITH DIABETIC POLYNEUROPATHY, WITHOUT LONG-TERM CURRENT USE OF INSULIN: ICD-10-CM

## 2025-02-19 DIAGNOSIS — E11.29 TYPE 2 DIABETES MELLITUS WITH MICROALBUMINURIA, WITHOUT LONG-TERM CURRENT USE OF INSULIN: ICD-10-CM

## 2025-02-19 DIAGNOSIS — R80.9 TYPE 2 DIABETES MELLITUS WITH MICROALBUMINURIA, WITHOUT LONG-TERM CURRENT USE OF INSULIN: ICD-10-CM

## 2025-02-19 DIAGNOSIS — E11.621 TYPE 2 DIABETES WITH SKIN ULCER OF FOOT: ICD-10-CM

## 2025-02-19 DIAGNOSIS — L97.509 TYPE 2 DIABETES WITH SKIN ULCER OF FOOT: ICD-10-CM

## 2025-02-19 DIAGNOSIS — Z00.00 ENCOUNTER FOR ANNUAL HEALTH EXAMINATION: Primary | ICD-10-CM

## 2025-02-19 DIAGNOSIS — Z00.00 ENCOUNTER FOR ANNUAL HEALTH EXAMINATION: ICD-10-CM

## 2025-02-19 DIAGNOSIS — R06.83 SNORING: ICD-10-CM

## 2025-02-19 DIAGNOSIS — Z23 NEEDS FLU SHOT: ICD-10-CM

## 2025-02-19 DIAGNOSIS — F51.12 INSUFFICIENT SLEEP SYNDROME: ICD-10-CM

## 2025-02-19 LAB
ALBUMIN SERPL BCP-MCNC: 4.1 G/DL (ref 3.5–5.2)
ALP SERPL-CCNC: 52 U/L (ref 40–150)
ALT SERPL W/O P-5'-P-CCNC: 16 U/L (ref 10–44)
ANION GAP SERPL CALC-SCNC: 6 MMOL/L (ref 8–16)
AST SERPL-CCNC: 19 U/L (ref 10–40)
BASOPHILS # BLD AUTO: 0.05 K/UL (ref 0–0.2)
BASOPHILS NFR BLD: 0.7 % (ref 0–1.9)
BILIRUB SERPL-MCNC: 0.4 MG/DL (ref 0.1–1)
BUN SERPL-MCNC: 16 MG/DL (ref 8–23)
CALCIUM SERPL-MCNC: 9.5 MG/DL (ref 8.7–10.5)
CHLORIDE SERPL-SCNC: 106 MMOL/L (ref 95–110)
CHOLEST SERPL-MCNC: 119 MG/DL (ref 120–199)
CHOLEST/HDLC SERPL: 2.4 {RATIO} (ref 2–5)
CO2 SERPL-SCNC: 27 MMOL/L (ref 23–29)
COMPLEXED PSA SERPL-MCNC: 0.42 NG/ML (ref 0–4)
CREAT SERPL-MCNC: 0.8 MG/DL (ref 0.5–1.4)
DIFFERENTIAL METHOD BLD: ABNORMAL
EOSINOPHIL # BLD AUTO: 0.6 K/UL (ref 0–0.5)
EOSINOPHIL NFR BLD: 8.5 % (ref 0–8)
ERYTHROCYTE [DISTWIDTH] IN BLOOD BY AUTOMATED COUNT: 14.4 % (ref 11.5–14.5)
EST. GFR  (NO RACE VARIABLE): >60 ML/MIN/1.73 M^2
ESTIMATED AVG GLUCOSE: 140 MG/DL (ref 68–131)
GLUCOSE SERPL-MCNC: 128 MG/DL (ref 70–110)
HBA1C MFR BLD: 6.5 % (ref 4–5.6)
HCT VFR BLD AUTO: 41.4 % (ref 40–54)
HDLC SERPL-MCNC: 49 MG/DL (ref 40–75)
HDLC SERPL: 41.2 % (ref 20–50)
HGB BLD-MCNC: 12.9 G/DL (ref 14–18)
IMM GRANULOCYTES # BLD AUTO: 0.02 K/UL (ref 0–0.04)
IMM GRANULOCYTES NFR BLD AUTO: 0.3 % (ref 0–0.5)
LDLC SERPL CALC-MCNC: 55.6 MG/DL (ref 63–159)
LYMPHOCYTES # BLD AUTO: 1.3 K/UL (ref 1–4.8)
LYMPHOCYTES NFR BLD: 18.7 % (ref 18–48)
MCH RBC QN AUTO: 30.9 PG (ref 27–31)
MCHC RBC AUTO-ENTMCNC: 31.2 G/DL (ref 32–36)
MCV RBC AUTO: 99 FL (ref 82–98)
MONOCYTES # BLD AUTO: 0.6 K/UL (ref 0.3–1)
MONOCYTES NFR BLD: 9.3 % (ref 4–15)
NEUTROPHILS # BLD AUTO: 4.2 K/UL (ref 1.8–7.7)
NEUTROPHILS NFR BLD: 62.5 % (ref 38–73)
NONHDLC SERPL-MCNC: 70 MG/DL
NRBC BLD-RTO: 0 /100 WBC
PLATELET # BLD AUTO: 226 K/UL (ref 150–450)
PMV BLD AUTO: 9.7 FL (ref 9.2–12.9)
POTASSIUM SERPL-SCNC: 4.6 MMOL/L (ref 3.5–5.1)
PROT SERPL-MCNC: 7.6 G/DL (ref 6–8.4)
RBC # BLD AUTO: 4.18 M/UL (ref 4.6–6.2)
SODIUM SERPL-SCNC: 139 MMOL/L (ref 136–145)
TRIGL SERPL-MCNC: 72 MG/DL (ref 30–150)
WBC # BLD AUTO: 6.7 K/UL (ref 3.9–12.7)

## 2025-02-19 PROCEDURE — 80061 LIPID PANEL: CPT | Mod: HCNC

## 2025-02-19 PROCEDURE — 80053 COMPREHEN METABOLIC PANEL: CPT | Mod: HCNC

## 2025-02-19 PROCEDURE — 83036 HEMOGLOBIN GLYCOSYLATED A1C: CPT | Mod: HCNC

## 2025-02-19 PROCEDURE — 36415 COLL VENOUS BLD VENIPUNCTURE: CPT | Mod: HCNC,PO

## 2025-02-19 PROCEDURE — 84153 ASSAY OF PSA TOTAL: CPT | Mod: HCNC

## 2025-02-19 PROCEDURE — 85025 COMPLETE CBC W/AUTO DIFF WBC: CPT | Mod: HCNC

## 2025-02-19 NOTE — ASSESSMENT & PLAN NOTE
Patient endorses frequent nocturia, frequent daytime somnolence, poor sleep, and nightly snoring.  Has never been evaluated for sleep apnea  -- discussed the anatomy and pathophysiology of sleep apnea in the multiple risks associated with having it untreated  -- we will order home sleep study today - patient given number to call to have this set up

## 2025-02-19 NOTE — ASSESSMENT & PLAN NOTE
-- Pressure today is 110/60.   -- Current regimen includes losartan 50  -- Continue current treatment plan as above.  No need to change medications at this time.

## 2025-02-19 NOTE — ASSESSMENT & PLAN NOTE
See photo and description in physical exam below  Patient saw Podiatry yesterday and had what appears to be a stage II ulcer debrided in clinic  Scheduled to see Podiatry next week  I did recommend gently cleaning the area at least once per day with soap and water, and using medical grade maneuver honey underneath a Band-Aid in the interim

## 2025-02-19 NOTE — ASSESSMENT & PLAN NOTE
Last appointment with Dr. Jean-Baptiste 06/2024.  We will perform annual exam today and schedule f/u with Dr. Jean-Baptiste for routine medical exam in six-months

## 2025-02-19 NOTE — ASSESSMENT & PLAN NOTE
Lab Results   Component Value Date    HGBA1C 7.0 (H) 08/09/2024   -- current regimen includes: metformin 850, pioglitazone 15, Jardiance 10  -- pt reports tolerating metformin well without any AE  -- pt is due for foot exam - will order/perform today - xxxxxx

## 2025-02-19 NOTE — PROGRESS NOTES
Assessment & Plan     Encounter for annual health examination  Last appointment with Dr. Jean-Baptiste 06/2024.  We will perform annual exam today and schedule f/u with Dr. Jean-Baptiste for routine medical exam in six-months  Orders:  -     CBC Auto Differential; Future; Expected date: 02/19/2025  -     Comprehensive Metabolic Panel; Future; Expected date: 02/19/2025  -     Lipid Panel; Future; Expected date: 02/19/2025  -     Hemoglobin A1C; Future; Expected date: 02/19/2025      Type 2 diabetes mellitus with diabetic polyneuropathy, without long-term current use of insulin  Type 2 diabetes mellitus with microalbuminuria, without long-term current use of insulin  Lab Results   Component Value Date    HGBA1C 7.0 (H) 08/09/2024   -- current regimen includes: metformin 850, pioglitazone 15, Jardiance 10  -- pt reports tolerating metformin well without any AE  -- pt is due for foot exam - will order/perform today - xxxxxx  Orders:  -     CBC Auto Differential; Future; Expected date: 02/19/2025  -     Comprehensive Metabolic Panel; Future; Expected date: 02/19/2025  -     Lipid Panel; Future; Expected date: 02/19/2025  -     Hemoglobin A1C; Future; Expected date: 02/19/2025      Benign essential HTN  -- Pressure today is 110/60.   -- Current regimen includes losartan 50  -- Continue current treatment plan as above.  No need to change medications at this time.  Orders:  -     CBC Auto Differential; Future; Expected date: 02/19/2025  -     Comprehensive Metabolic Panel; Future; Expected date: 02/19/2025      Suspected sleep apnea  Snoring  Insufficient sleep syndrome  Patient endorses frequent nocturia, frequent daytime somnolence, poor sleep, and nightly snoring.  Has never been evaluated for sleep apnea  -- discussed the anatomy and pathophysiology of sleep apnea in the multiple risks associated with having it untreated  -- we will order home sleep study today - patient given number to call to have this set up  Orders:  -     Home  Sleep Study; Future    Type 2 diabetes with skin ulcer of foot  See photo and description in physical exam below  Patient saw Podiatry yesterday and had what appears to be a stage II ulcer debrided in clinic  Scheduled to see Podiatry next week  I did recommend gently cleaning the area at least once per day with soap and water, and using medical grade maneuver honey underneath a Band-Aid in the interim      Prostate cancer screening encounter, options and risks discussed  -     PSA, Screening; Future; Expected date: 02/19/2025    Needs flu shot  -     influenza (Flulaval, Fluzone, Fluarix) 45 mcg/0.5 mL IM vaccine (> or = 6 mo) 0.5 mL         HPI   Fermin Henson is a 64 y.o. male with multiple medical diagnoses as listed in the medical history and problem list who presents for an annual physical exam, and for various acute and chronic conditions as detailed above.    ROS:  Patient denies any CP, SOB, abdominal pain, fever, chills, hematuria, hematochezia, melena  ______________________________________________________________________    Preventative Health Screening      Tobacco use: None     Alcohol use: 0 drinks per week    Substance use: none    Estimated body mass index is Body mass index is 29.96 kg/m². kg/m²     Exercise: minimal - walks and rides bike intermittently.  Patient PROMISES to add 1 minute of squats every other day- discussed holding onto a table or counter for added stability.  Counseled patient on the many benefits of regular exercise, including improved cardio-pulmonary health, stronger bones and muscles, enhanced mood, increased energy levels and improved sleep quality, as well as reduced risk of heart disease, stroke, type 2 diabetes, certain cancers, osteoporosis, falls in older adults, and premature death.      Sleep  Endorses nocturia intermittently. + snoring.  + daytime somnolence  The problem of sleep insufficiency was discussed at length. Chronic sleep insufficiency increases risk of  "cardiovascular disease, metabolic disease, impaired immune function, mental health disorders, accidents and overall mortality.  Avoidance of caffeine sources is strongly encouraged, especially after noon. Exercise as above. Sleep hygiene was reviewed in detail.     Depression  Over the past two weeks, have you felt down, depressed, or hopeless? No   Over the past two weeks, have you felt decreased interest or pleasure in doing things? No     Intimate partner violence screening  "Do you feel safe in your current relationship?" N/a  "Have you ever been in a relationship in which your partner frightened you or hurt you?" No    Reproductive Health  STD screening in last year: declined  HIV screening: reviewed     Chronic Disease Screening    The 10-year ASCVD risk score (Joaquín LARIOS, et al., 2019) is: 16.7%    Values used to calculate the score:      Age: 64 years      Sex: Male      Is Non- : No      Diabetic: Yes      Tobacco smoker: No      Systolic Blood Pressure: 110 mmHg      Is BP treated: Yes      HDL Cholesterol: 53 mg/dL      Total Cholesterol: 130 mg/dL  CHD Risk Factors: advanced age (older than 55 for men, 65 for women), diabetes mellitus, male gender, and sedentary lifestyle    Dyslipidemia screening needed: reviewed   T2DM screening needed: reviewed   Colonoscopy needed: reviewed   PSA needed: reviewed   AAA screening needed:reviewed       HLD SCREENING  Screen men 35 years and older, and men 20 to 34 years of age who have cardiovascular risk factors for dyslipidemia  T2DM SCREENING  Screen men with a sustained blood pressure greater than 135/80 mm Hg for T2DM  CRCA SCREENING  Begin screening colonoscopies at 50 years of age in men of average risk, and continue until 75 years of age; offer fecal occult blood testing every year, flexible sigmoidoscopy every five years combined with fecal occult blood testing every three years, or colonoscopy every 10 years   PROSTATE " SCREENING  The American Urological Association recommends offering PSA testing and digital rectal examination to well-informed men beginning at 40 years of age and continuing until life expectancy is less than 10 years  AAA SCREENING  Screen once with ultrasonography in men 65 to 75 years of age if they have a family history or have smoked at least 100 cigarettes in their lifetime    Living Will/POA: No - paperwork provided this visit, patient will review and return at next visit.    Follow Up: With Dr. Jean-Baptiste in 6 months     Health Maintenance     Health Maintenance         Date Due Completion Date    RSV Vaccine (Age 60+ and Pregnant patients) (1 - Risk 60-74 years 1-dose series) Never done ---    Influenza Vaccine (1) 09/01/2024 10/12/2023    COVID-19 Vaccine (1 - 2024-25 season) Never done ---    Lipid Panel 02/08/2025 2/8/2024    Hemoglobin A1c 02/09/2025 8/9/2024    Diabetes Urine Screening 08/09/2025 8/9/2024    Diabetic Eye Exam 09/05/2025 9/5/2024    Colorectal Cancer Screening 10/03/2025 10/3/2022    Foot Exam 02/18/2026 2/18/2025 (Done)    Override on 2/18/2025: Done (diffuse sensory deficit noted by podiatrist)    Override on 1/28/2019: Done    Low Dose Statin 02/19/2026 2/19/2025    TETANUS VACCINE 12/09/2033 12/9/2023                 Physical Exam   Vital signs reviewed.   Body mass index is 29.96 kg/m².   General:  Well-developed, well-nourished. NAD.  Skin:  Warm, dry. No rashes or lesions except as noted below in extremity exam. No palmar erythema. Cap refill <2s bilaterally  Head:  NC/AT   Eyes:  Conjunctivae w/o exudates or hemorrhage.  Non-icteric sclerae.  Ears:  External ears w/o swelling or erythema.  Nose:  Nares are patent bilaterally w/o rhinorrhea or epistaxis  Mouth:  Mucosa is pink and moist.  No nodules or lesions noted.  No tonsillar swelling or exudates.  Neck:  Supple w/o adenopathy or nuchal rigidity.  Trachea is midline.   Heart:  Normal S1 & S2.  No extra heart sounds.  No pulsus  alternans.  Lungs:  CTAB without rales, rhonchi or wheezing.  Breathing comfortably on RA.  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  There is a stage II ulcer to the distal 5th phalanx of the right foot which was debrided by Podiatry yesterday.  No sign of active infection.  -- PT pulses intact bilaterally  -- Additionally, there is what appears to be a keloid on the superior aspect of the right foot which patient states originated after a remote procedure and has consisted of an expanding scar.  Neuro:  A&O4.  Negative Braden's sign.  Strength 5/5 in all extremities   + diffuse sensory deficits in bilateral feet consistent with diabetic neuropathy  Psychiatric:  Appropriate affect.           History     Past Medical History:  Past Medical History:   Diagnosis Date    Benign essential HTN 12/4/2023    Diabetes mellitus, type 2     Kidney stones        Past Surgical History:  Past Surgical History:   Procedure Laterality Date    EXTRACORPOREAL SHOCK WAVE LITHOTRIPSY Right 4/1/2019    Procedure: LITHOTRIPSY, ESWL;  Surgeon: WHITNEY Bryant MD;  Location: [facility];  Service: Urology;  Laterality: Right;  RN PREOP 3/25/2019---NEED H/P-----KUB    eye  surgeries      several     TOE AMPUTATION      several toes on R foot       Social History:  Social History[1]    Family History:  Family History   Problem Relation Name Age of Onset    No Known Problems Mother      Diabetes Father      Diabetes Sister      Drug abuse Brother         Allergies and Medications: (updated and reviewed)  Review of patient's allergies indicates:  No Known Allergies  Current Medications[2]    Patient Care Team:  See Jean-Baptiste MD as PCP - General (Family Medicine)  Taran Bloom MD as Consulting Physician (Ophthalmology)  See Jean-Baptiste MD as Wound Care (Wound Care)  Niharika Orona LPN as Licensed  Practical Nurse      Juanjo Arciniega PA-C  Family Medicine  [facility]         - The patient is given an After Visit Summary that lists all medications with directions, allergies, education, orders placed during this encounter and follow-up instructions.      - I have reviewed the patient's medical information including past medical, family, and social history sections including the medications and allergies.      - We discussed the patient's current medications.     This note was created by combination of typed  and MModal dictation.  Transcription errors may be present.  If there are any questions, please contact me.                        [1]   Social History  Socioeconomic History    Marital status: Single    Number of children: 0   Tobacco Use    Smoking status: Never     Passive exposure: Never    Smokeless tobacco: Never   Substance and Sexual Activity    Alcohol use: No    Drug use: No    Sexual activity: Not Currently     Social Drivers of Health     Financial Resource Strain: Patient Declined (12/16/2024)    Overall Financial Resource Strain (CARDIA)     Difficulty of Paying Living Expenses: Patient declined   Food Insecurity: No Food Insecurity (12/16/2024)    Hunger Vital Sign     Worried About Running Out of Food in the Last Year: Never true     Ran Out of Food in the Last Year: Never true   Physical Activity: Unknown (12/16/2024)    Exercise Vital Sign     Days of Exercise per Week: Patient declined   Stress: Stress Concern Present (12/16/2024)    [employer] - Occupational Stress Questionnaire     Feeling of Stress : To some extent   Housing Stability: Unknown (12/16/2024)    Housing Stability Vital Sign     Unable to Pay for Housing in the Last Year: No   [2]   Current Outpatient Medications   Medication Sig Dispense Refill    antiox #8/om3/dha/epa/lut/zeax (PRESERVISION AREDS 2, OMEGA-3, ORAL) Take by mouth.      cinnamon bark 500 mg capsule  Take 500 mg by mouth once daily.      ferrous sulfate 325 mg (65 mg iron) Tab tablet Take 325 mg by mouth daily with breakfast.      fish oil-omega-3 fatty acids 300-1,000 mg capsule Take by mouth once daily.      ibuprofen (ADVIL,MOTRIN) 600 MG tablet Take 1 tablet (600 mg total) by mouth every 6 (six) hours as needed for Pain. 20 tablet 0    JARDIANCE 10 mg tablet TAKE 1 TABLET EVERY DAY 90 tablet 0    losartan (COZAAR) 50 MG tablet Take 1 tablet (50 mg total) by mouth once daily. 90 tablet 3    melatonin 10 mg Cap Take by mouth.      metFORMIN (GLUCOPHAGE) 850 MG tablet TAKE 2 TABLETS IN THE MORNING AND 1 TABLET WITH EVENING MEAL 270 tablet 0    multivitamin (THERAGRAN) per tablet Take 1 tablet by mouth once daily.      mupirocin (BACTROBAN) 2 % ointment Apply topically 3 (three) times daily. 30 g 0    pioglitazone (ACTOS) 15 MG tablet TAKE 1 TABLET EVERY DAY 90 tablet 1    simvastatin (ZOCOR) 20 MG tablet TAKE 1 TABLET EVERY EVENING 90 tablet 0     Current Facility-Administered Medications   Medication Dose Route Frequency Provider Last Rate Last Admin    influenza (Flulaval, Fluzone, Fluarix) 45 mcg/0.5 mL IM vaccine (> or = 6 mo) 0.5 mL  0.5 mL Intramuscular 1 time in Clinic/HOD Juanjo Arciniega PA-C

## 2025-02-19 NOTE — PROGRESS NOTES
Patient tolerated FLU vaccine. Patient given VIS,and advised to remain in clinic for 15 mins. to monitor for adverse reactions.

## 2025-02-19 NOTE — PATIENT INSTRUCTIONS
Thank you for seeing me today.    Foot Ulcer  Recommend Manuka honey underneath a bandaid (from pharmacy) to the ulcer to be gently cleaned with soap and water daily.      Sleep Study --- [phone]    Please don't hesitate to seek emergency care if you develop any new or worsening symptoms.

## 2025-02-25 ENCOUNTER — HOSPITAL ENCOUNTER (OUTPATIENT)
Dept: SLEEP MEDICINE | Facility: HOSPITAL | Age: 65
Discharge: HOME | End: 2025-02-25
Attending: PSYCHIATRY & NEUROLOGY
Payer: MEDICARE

## 2025-02-25 DIAGNOSIS — R06.83 SNORING: ICD-10-CM

## 2025-02-25 DIAGNOSIS — F51.12 INSUFFICIENT SLEEP SYNDROME: ICD-10-CM

## 2025-02-25 PROCEDURE — 95800 SLP STDY UNATTENDED: CPT | Mod: HCNC

## 2025-02-25 NOTE — PROGRESS NOTES
Per physician orders, patient was given home sleep testing device and instructed on how to apply the device before going to bed tonight. I sized the device and showed the patient using a mirror how the device fits and what it should look like so they can use a mirror when putting it on themselves at home. We reviewed the instruction booklet. Patient verbalized understanding of the instructions and teach back complete. Patient was instructed to return the device the next day between the hours of 5:00am and 9:00am in the drop box that is located to the left and you walk into the main lobby of the hospital. I also educated the patient on sleep apnea, treatments options for sleep apnea, and what to expect after the home sleep study is complete.

## 2025-02-26 ENCOUNTER — TELEPHONE (OUTPATIENT)
Dept: PODIATRY | Facility: CLINIC | Age: 65
End: 2025-02-26
Payer: MEDICARE

## 2025-03-03 DIAGNOSIS — L97.509 TYPE 2 DIABETES WITH SKIN ULCER OF FOOT: ICD-10-CM

## 2025-03-03 DIAGNOSIS — E11.621 TYPE 2 DIABETES WITH SKIN ULCER OF FOOT: ICD-10-CM

## 2025-03-03 DIAGNOSIS — E11.42 TYPE 2 DIABETES MELLITUS WITH DIABETIC POLYNEUROPATHY, WITHOUT LONG-TERM CURRENT USE OF INSULIN: ICD-10-CM

## 2025-03-03 RX ORDER — METFORMIN HYDROCHLORIDE 850 MG/1
TABLET ORAL
Qty: 270 TABLET | Refills: 0 | Status: SHIPPED | OUTPATIENT
Start: 2025-03-03

## 2025-03-03 NOTE — TELEPHONE ENCOUNTER
No care due was identified.  [facility] Embedded Care Due Messages. Reference number: [id number].   3/03/2025 2:04:35 AM CST

## 2025-03-03 NOTE — TELEPHONE ENCOUNTER
Refill Decision Note   Fermin Henson  is requesting a refill authorization.  Brief Assessment and Rationale for Refill:  Approve     Medication Therapy Plan:         Comments:     Note composed:7:21 AM 03/03/2025

## 2025-03-05 ENCOUNTER — TELEPHONE (OUTPATIENT)
Dept: ADMINISTRATIVE | Facility: CLINIC | Age: 65
End: 2025-03-05
Payer: MEDICARE

## 2025-03-05 ENCOUNTER — OFFICE VISIT (OUTPATIENT)
Dept: PODIATRY | Facility: CLINIC | Age: 65
End: 2025-03-05
Payer: MEDICARE

## 2025-03-05 DIAGNOSIS — Z87.2 HEALED FOOT ULCER: Primary | ICD-10-CM

## 2025-03-05 DIAGNOSIS — E11.49 TYPE II DIABETES MELLITUS WITH NEUROLOGICAL MANIFESTATIONS: ICD-10-CM

## 2025-03-05 NOTE — PROGRESS NOTES
Subjective:      Patient ID: Fermin Henson is a 65 y.o. male.    Chief Complaint: No chief complaint on file.    Fermin is a 65 y.o. male who presents to the clinic for evaluation and treatment of high risk feet. Fermin has a past medical history of Benign essential HTN (12/4/2023), Diabetes mellitus, type 2, and Kidney stones. The patient's chief complaint is foot ulcer, right foot. This patient has documented high risk feet requiring routine maintenance secondary to peripheral neuropathy. Pt is here today for follow up foot care.         PCP: See Jean-Baptiste MD    Date Last Seen by PCP:   No chief complaint on file.        Current shoe gear:  Affected Foot: Extra depth shoes with custome accommodative insoles     Unaffected Foot: Extra depth shoes with custome accommodative insoles    History of Trauma: negative  Sign of Infection: none    Hemoglobin A1C   Date Value Ref Range Status   02/19/2025 6.5 (H) 4.0 - 5.6 % Final     Comment:     ADA Screening Guidelines:  5.7-6.4%  Consistent with prediabetes  >or=6.5%  Consistent with diabetes    High levels of fetal hemoglobin interfere with the HbA1C  assay. Heterozygous hemoglobin variants (HbS, HgC, etc)do  not significantly interfere with this assay.   However, presence of multiple variants may affect accuracy.     08/09/2024 7.0 (H) 4.0 - 5.6 % Final     Comment:     ADA Screening Guidelines:  5.7-6.4%  Consistent with prediabetes  >or=6.5%  Consistent with diabetes    High levels of fetal hemoglobin interfere with the HbA1C  assay. Heterozygous hemoglobin variants (HbS, HgC, etc)do  not significantly interfere with this assay.   However, presence of multiple variants may affect accuracy.     02/08/2024 6.4 (H) 4.0 - 5.6 % Final     Comment:     ADA Screening Guidelines:  5.7-6.4%  Consistent with prediabetes  >or=6.5%  Consistent with diabetes    High levels of fetal hemoglobin interfere with the HbA1C  assay. Heterozygous hemoglobin variants (HbS, HgC,  etc)do  not significantly interfere with this assay.   However, presence of multiple variants may affect accuracy.         Review of Systems   Constitutional: Negative for chills, fever and malaise/fatigue.   HENT:  Negative for hearing loss.    Cardiovascular:  Negative for claudication.   Respiratory:  Negative for shortness of breath.    Skin:  Positive for color change and unusual hair distribution. Negative for flushing and rash.   Musculoskeletal:  Negative for joint pain and myalgias.   Gastrointestinal:  Negative for nausea and vomiting.   Neurological:  Positive for numbness, paresthesias and sensory change. Negative for loss of balance.   Psychiatric/Behavioral:  Negative for altered mental status.    Allergic/Immunologic: Negative for hives.           Objective:      Physical Exam  Vitals reviewed.   Constitutional:       Appearance: He is well-developed.   Cardiovascular:      Pulses: Normal pulses.   Musculoskeletal:      Right ankle: Decreased range of motion. Abnormal pulse.      Right Achilles Tendon: Neil's test negative.      Left ankle: Decreased range of motion. Abnormal pulse.      Left Achilles Tendon: Neil's test negative.      Right foot: Decreased range of motion. Prominent metatarsal heads present.      Left foot: Decreased range of motion.      Comments: Right digit amps 1-3   Feet:      Right foot:      Protective Sensation: 5 sites tested.  2 sites sensed.      Left foot:      Protective Sensation: 5 sites tested.  2 sites sensed.   Skin:     General: Skin is dry.      Capillary Refill: Capillary refill takes more than 3 seconds.   Neurological:      Mental Status: He is alert and oriented to person, place, and time.      Sensory: Sensory deficit present.      Comments: diminished sensation noted to b/L lower extremities   Psychiatric:         Behavior: Behavior normal. Behavior is cooperative.                   Ulceration  Location: right 5th digit  Measurements:  healed      Assessment:       Encounter Diagnoses   Name Primary?    Healed foot ulcer Yes    Type II diabetes mellitus with neurological manifestations          Plan:       Diagnoses and all orders for this visit:    Healed foot ulcer    Type II diabetes mellitus with neurological manifestations      I counseled the patient on his conditions, their implications and medical management.    Pt advised ulceration is healed but he should wear shoes that don't cause friction    Pt advised to monitor daily    - Patient is high risk for developing lower extremity issues secondary to diabetes     - Advised the patient that fungus likes warm, dark, and moist environments such as bathrooms, gyms, and pools. Advised to spray shower, shower mat , and any shoes w/ Lysol periodically  RTC in 4 weeks or sooner if any new pedal problems should arise or if condition worsens.

## 2025-03-07 PROBLEM — G47.33 OSA (OBSTRUCTIVE SLEEP APNEA): Status: ACTIVE | Noted: 2025-03-07

## 2025-03-11 ENCOUNTER — PATIENT MESSAGE (OUTPATIENT)
Dept: FAMILY MEDICINE | Facility: CLINIC | Age: 65
End: 2025-03-11
Payer: MEDICARE

## 2025-03-11 DIAGNOSIS — G47.33 OSA (OBSTRUCTIVE SLEEP APNEA): Primary | ICD-10-CM

## 2025-03-11 NOTE — PROGRESS NOTES
[facility] SLEEP STUDY REPORT        PHYSICIAN INTERPRETATION AND COMMENTS: Findings are consistent with moderate, obstructive sleep apnea (KELLEY) (G47.33), by overall AHI (apnea hypopnea index). However, findings on this study suggest that the degree of sleep disordered breathing is in the severe range, when RDI is measured. This study was technically adequate to allow for interpretation.    CLINICAL HISTORY: 65 year old male presented with: 18.25 inch neck, BMI of 28.9, an Lyle sleepiness score of 0, and history of diabetes. Based on the clinical history, the patient has a high pre-test probability of having Moderate KELLEY.    SLEEP STUDY FINDINGS: Patient underwent a 1 night Home Sleep Test and by behavioral criteria, slept for approximately 6.6 hours, with a sleep latency of 6 minutes and a sleep efficiency of 96%. Moderate sleep disordered breathing (AHI=19) is noted based on a 4% hypopnea desaturation criteria. When considering more subtle measures of sleep disordered breathing, the overall respiratory disturbance index is severe (RDI=32) based on a 1% hypopnea desaturation criteria with  confirmation by surrogate arousal indicators. The apneas/hypopneas are accompanied by minimal oxygen desaturation (percent time below 90% SpO2: 2%, Min SpO2: 78%). The average desaturation across all sleep disordered breathing events is 3.4%. Snoring occurs for 25.3% (30 dB) of the study, 20.3% is very loud. The mean pulse rate is 73.5 BPM, with frequent pulse rate variability (46 events with >= 6 BPM increase/decrease per hour).    TREATMENT CONSIDERATIONS: Consider trial of Auto-titrating CPAP 6-20 cm, mask of patient's choice, and heated humidification. If patient has difficulty with CPAP adherence or ongoing KELLEY symptoms or despite CPAP adherence, then consider an in-lab titration sleep study in order to determine optimal fixed CPAP setting. Alternatively consider oral appliance fitted by a dentist specializing  in these devices, or surgical consultation for uvulopalatopharyngoplasty (UPPP)  for treatment of obstructive sleep apnea.    DISEASE MANAGEMENT CONSIDERATIONS: Definitive treatment for KELLEY is recommended. Consider Sleep Clinic referral for KELLEY management.

## 2025-03-13 DIAGNOSIS — E11.42 TYPE 2 DIABETES MELLITUS WITH DIABETIC POLYNEUROPATHY, WITHOUT LONG-TERM CURRENT USE OF INSULIN: ICD-10-CM

## 2025-03-13 RX ORDER — SIMVASTATIN 20 MG/1
20 TABLET, FILM COATED ORAL NIGHTLY
Qty: 90 TABLET | Refills: 0 | Status: SHIPPED | OUTPATIENT
Start: 2025-03-13

## 2025-03-13 NOTE — TELEPHONE ENCOUNTER
No care due was identified.  [facility] Embedded Care Due Messages. Reference number: [id number].   3/13/2025 1:40:22 AM CDT

## 2025-03-13 NOTE — TELEPHONE ENCOUNTER
Refill Decision Note   Fermin Henson  is requesting a refill authorization.  Brief Assessment and Rationale for Refill:  Approve     Medication Therapy Plan:        Comments:     Note composed:8:22 AM 03/13/2025

## 2025-03-24 DIAGNOSIS — Z00.00 ENCOUNTER FOR MEDICARE ANNUAL WELLNESS EXAM: ICD-10-CM

## 2025-04-17 DIAGNOSIS — E11.42 TYPE 2 DIABETES MELLITUS WITH DIABETIC POLYNEUROPATHY, WITHOUT LONG-TERM CURRENT USE OF INSULIN: ICD-10-CM

## 2025-04-17 DIAGNOSIS — I10 BENIGN ESSENTIAL HTN: ICD-10-CM

## 2025-04-17 DIAGNOSIS — E11.29 TYPE 2 DIABETES MELLITUS WITH MICROALBUMINURIA, WITHOUT LONG-TERM CURRENT USE OF INSULIN: ICD-10-CM

## 2025-04-17 DIAGNOSIS — R80.9 TYPE 2 DIABETES MELLITUS WITH MICROALBUMINURIA, WITHOUT LONG-TERM CURRENT USE OF INSULIN: ICD-10-CM

## 2025-04-17 RX ORDER — LOSARTAN POTASSIUM 50 MG/1
50 TABLET ORAL
Qty: 90 TABLET | Refills: 0 | Status: SHIPPED | OUTPATIENT
Start: 2025-04-17

## 2025-04-17 RX ORDER — EMPAGLIFLOZIN 10 MG/1
10 TABLET, FILM COATED ORAL
Qty: 90 TABLET | Refills: 0 | Status: SHIPPED | OUTPATIENT
Start: 2025-04-17

## 2025-04-17 NOTE — TELEPHONE ENCOUNTER
Refill Decision Note   Fermin Henson  is requesting a refill authorization.  Brief Assessment and Rationale for Refill:  Approve     Medication Therapy Plan:         Pharmacist review requested: Yes   Comments:     Note composed:9:23 AM 04/17/2025

## 2025-04-17 NOTE — TELEPHONE ENCOUNTER
No care due was identified.  [facility] Embedded Care Due Messages. Reference number: [id number].   4/17/2025 1:06:45 AM CDT

## 2025-04-17 NOTE — TELEPHONE ENCOUNTER
Refill Routing Note   Medication(s) are not appropriate for processing by [facility] for the following reason(s):        Drug-disease interaction    [facility] action(s):  Approve  Defer      Medication Therapy Plan: Drug-Disease: JARDIANCE and Type 2 diabetes with skin ulcer of foot; Drug-Disease: JARDIANCE and Hx of amputation of lesser toe, right; Right great toe amputee    Pharmacist review requested: Yes     Appointments  past 12m or future 3m with PCP    Date Provider   Last Visit   6/10/2024 See Jean-Baptiste MD   Next Visit   8/20/2025 See Jean-Baptiste MD   ED visits in past 90 days: 0        Note composed:7:45 AM 04/17/2025

## 2025-05-18 ENCOUNTER — HOSPITAL ENCOUNTER (EMERGENCY)
Facility: HOSPITAL | Age: 65
Discharge: HOME | End: 2025-05-18
Attending: STUDENT IN AN ORGANIZED HEALTH CARE EDUCATION/TRAINING PROGRAM
Payer: MEDICARE

## 2025-05-18 VITALS
DIASTOLIC BLOOD PRESSURE: 68 MMHG | TEMPERATURE: 98 F | HEART RATE: 101 BPM | OXYGEN SATURATION: 97 % | HEIGHT: 72 IN | SYSTOLIC BLOOD PRESSURE: 145 MMHG | BODY MASS INDEX: 29.8 KG/M2 | WEIGHT: 220 LBS | RESPIRATION RATE: 18 BRPM

## 2025-05-18 DIAGNOSIS — M25.519 SHOULDER PAIN: ICD-10-CM

## 2025-05-18 DIAGNOSIS — S46.911A STRAIN OF RIGHT SHOULDER, INITIAL ENCOUNTER: Primary | ICD-10-CM

## 2025-05-18 PROBLEM — H35.033 HYPERTENSIVE RETINOPATHY, BILATERAL: Status: ACTIVE | Noted: 2023-06-02

## 2025-05-18 PROCEDURE — 99284 EMERGENCY DEPT VISIT MOD MDM: CPT | Mod: 25,HCNC

## 2025-05-18 PROCEDURE — 96372 THER/PROPH/DIAG INJ SC/IM: CPT | Performed by: PHYSICIAN ASSISTANT

## 2025-05-18 PROCEDURE — 63600175 PHARM REV CODE 636 W HCPCS: Mod: JZ,TB,HCNC | Performed by: PHYSICIAN ASSISTANT

## 2025-05-18 PROCEDURE — 93005 ELECTROCARDIOGRAM TRACING: CPT | Mod: HCNC

## 2025-05-18 PROCEDURE — 93010 ELECTROCARDIOGRAM REPORT: CPT | Mod: HCNC,,, | Performed by: INTERNAL MEDICINE

## 2025-05-18 RX ORDER — LIDOCAINE 50 MG/G
1 PATCH TOPICAL DAILY
Qty: 6 PATCH | Refills: 0 | Status: SHIPPED | OUTPATIENT
Start: 2025-05-18

## 2025-05-18 RX ORDER — MELOXICAM 7.5 MG/1
7.5 TABLET ORAL DAILY
Qty: 12 TABLET | Refills: 0 | Status: SHIPPED | OUTPATIENT
Start: 2025-05-18

## 2025-05-18 RX ORDER — KETOROLAC TROMETHAMINE 30 MG/ML
15 INJECTION, SOLUTION INTRAMUSCULAR; INTRAVENOUS
Status: COMPLETED | OUTPATIENT
Start: 2025-05-18 | End: 2025-05-18

## 2025-05-18 RX ORDER — ORPHENADRINE CITRATE 100 MG/1
100 TABLET, EXTENDED RELEASE ORAL 2 TIMES DAILY
Qty: 10 TABLET | Refills: 0 | Status: SHIPPED | OUTPATIENT
Start: 2025-05-18 | End: 2025-05-23

## 2025-05-18 RX ADMIN — KETOROLAC TROMETHAMINE 15 MG: 30 INJECTION, SOLUTION INTRAMUSCULAR; INTRAVENOUS at 10:05

## 2025-05-18 NOTE — DISCHARGE INSTRUCTIONS
Thank you for coming to our Emergency Department today. It is important to remember that some problems or medical conditions are difficult to diagnose and may not be found or addressed during your Emergency Department visit.  These conditions often start with non-specific symptoms and can only be diagnosed on follow up visits with your primary care physician or specialist when the symptoms continue or change. Please remember that all medical conditions can change, and we cannot predict how you will be feeling tomorrow or the next day. Return to the ER with any questions/concerns, new/concerning symptoms, worsening or failure to improve.       Be sure to follow up with your primary care doctor and review all labs/imaging/tests that were performed during your ER visit with them. It is very common for us to identify non-emergent incidental findings which must be followed up with your primary care physician.  Some labs/imaging/tests may be outside of the normal range, and require non-emergent follow-up and/or further investigation/treatment/procedures/testing to help diagnose/exclude/prevent complications or other potentially serious medical conditions. Some abnormalities may not have been discussed or addressed during your ER visit.     An ER visit does not replace a primary care visit, and many screening tests or follow-up tests cannot be ordered by an ER doctor or performed by the ER. Some tests may even require pre-approval.    If you do not have a primary care doctor, you may contact the one listed on your discharge paperwork or you may also call the [facility] Appointment Desk at [phone] , or [address] [place] [address] at  [phone] to schedule an appointment, or establish care with a primary care doctor or even a specialist and to obtain information about local resources. It is important to your health that you have a primary care doctor.    Please take all medications as directed. We have done our best to select  a medication for you that will treat your condition however, all medications may potentially have side-effects and it is impossible to predict which medications may give you side-effects or what those side-effects (if any) those medications may give you.  If you feel that you are having a negative effect or side-effect of any medication you should stop taking those medications immediately and seek medical attention. If you feel that you are having a life-threatening reaction call 911.        Do not drive, swim, climb to height, take a bath, operate heavy machinery, drink alcohol or take potentially sedating medications, sign any legal documents or make any important decisions for 24 hours if you have received any pain medications, sedatives or mood altering drugs during your ER visit or within 24 hours of taking them if they have been prescribed to you.     You can find additional resources for Dentists, hearing aids, durable medical equipment, low cost pharmacies and other resources at [email or web address]

## 2025-05-18 NOTE — ED PROVIDER NOTES
Encounter Date: 5/18/2025    SCRIBE #1 NOTE: I, Anibal Zavala, am scribing for, and in the presence of,  Kurt Beauchamp PA-C. I have scribed the following portions of the note - Other sections scribed: HPI, ROS.       History     Chief Complaint   Patient presents with    Shoulder Pain     Pt to ER with reports of right shoulder pain worsening since Tuesday. Pt reports was moving heavy boxes. Pt reports pain is worse at night. No CP, SOB      Fermin Henson is a 65 y.o. male, with a PMHx of DM type 2, HTN, who presents to the ED with presents to the ED with complaints of right shoulder pain that began Tuesday evening and has progressively worsened. 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 Patient reports the pain is described as a dull ache, intermittent, and worsens particularly at night. Patient denies any recent falls or direct trauma. Patient reports he took Advil without relief. Patient notes the pain varies with movement-sometimes absent, but certain motions provoke discomfort. Primary care provider is Dr. Jean-Baptiste. No other exacerbating or alleviating factors. Patient denies other associated symptoms. NKDA         The history is provided by the patient. No  was used.     Review of patient's allergies indicates:  No Known Allergies  Past Medical History:   Diagnosis Date    Benign essential HTN 12/4/2023    Diabetes mellitus, type 2     Kidney stones      Past Surgical History:   Procedure Laterality Date    EXTRACORPOREAL SHOCK WAVE LITHOTRIPSY Right 4/1/2019    Procedure: LITHOTRIPSY, ESWL;  Surgeon: WHITNEY Bryant MD;  Location: [facility];  Service: Urology;  Laterality: Right;  RN PREOP 3/25/2019---NEED H/P-----KUB    eye  surgeries      several     TOE AMPUTATION      several toes on R foot     Family History   Problem Relation Name Age of Onset    No Known  Problems Mother      Diabetes Father      Diabetes Sister      Drug abuse Brother       Social History[1]  Review of Systems   Constitutional:  Negative for fever.   HENT:  Negative for congestion, sore throat and trouble swallowing.    Respiratory:  Negative for cough and shortness of breath.    Cardiovascular:  Negative for chest pain.   Gastrointestinal:  Negative for abdominal pain, constipation, diarrhea, nausea and vomiting.   Genitourinary:  Negative for dysuria, flank pain, frequency and urgency.   Musculoskeletal:  Positive for myalgias (right shoulder). Negative for back pain.   Skin:  Negative for rash.   Neurological:  Negative for headaches.   All other systems reviewed and are negative.      Physical Exam     Initial Vitals [05/18/25 1026]   BP Pulse Resp Temp SpO2   (!) 145/68 101 18 97.7 °F (36.5 °C) 97 %      MAP       --         Physical Exam    Nursing note and vitals reviewed.  Constitutional: He appears well-developed and well-nourished. He is not diaphoretic. No distress.   HENT:   Head: Normocephalic and atraumatic.   Nose: Nose normal.   Eyes: Conjunctivae and EOM are normal. Right eye exhibits no discharge. Left eye exhibits no discharge.   Neck: No tracheal deviation present. No JVD present.   Normal range of motion.  Cardiovascular:  Normal rate and regular rhythm.           Pulmonary/Chest: No accessory muscle usage or stridor. No tachypnea. No respiratory distress.   Musculoskeletal:         General: Normal range of motion.      Cervical back: Normal range of motion.      Comments: Mild positional tenderness over the right lateral trapezius musculature without bony tenderness of the right shoulder region.  No bruising or other skin changes.  No clavicular tenderness or asymmetry.  No tenting.  No C-spine tenderness.  Full ROM of shoulders and C-spine.  Radial pulses 2+ and equal.  Equal  strength.     Neurological: He is alert and oriented to person, place, and time. He displays no  tremor. He displays no seizure activity. Coordination and gait normal.   Skin: Skin is warm and dry. No pallor.         ED Course   Procedures  Labs Reviewed - No data to display  EKG Readings: (Independently Interpreted)   Initial Reading: No STEMI. Rhythm: Normal Sinus Rhythm. Heart Rate: 100. Axis: Normal.       Imaging Results    None          Medications   ketorolac injection 15 mg (15 mg Intramuscular Given 5/18/25 1051)     Medical Decision Making  Myofascial strain following heavy lifting.  May have a component of tendinitis.  Could also possibly have subacromial bursitis although this seems less likely today.  Atraumatic and patient has no bony tenderness.  No strong indication for screening x-ray at this time.  No signs of vascular injury.  Not consistent with septic joint.  No radicular symptoms.  No weakness or evidence of stroke.  No cardiopulmonary symptoms and EKG reassuring.  Supportive measures.  Return precautions.    Risk  Prescription drug management.            Scribe Attestation:   Scribe #1: I performed the above scribed service and the documentation accurately describes the services I performed. I attest to the accuracy of the note.                           I, Kurt Beauchamp PA-C, personally performed the services described in this documentation. All medical record entries made by the scribe were at my direction and in my presence. I have reviewed the chart and agree that the record reflects my personal performance and is accurate and complete.      DISCLAIMER: This note was prepared with [employer] voice recognition transcription software. Garbled syntax, mangled pronouns, and other bizarre constructions may be attributed to that software system.     Clinical Impression:  Final diagnoses:  [S46.568Z] Strain of right shoulder, initial encounter (Primary)          ED Disposition Condition    Discharge Stable          ED Prescriptions       Medication Sig Dispense Start Date End Date Auth.  Provider    orphenadrine (NORFLEX) 100 mg tablet Take 1 tablet (100 mg total) by mouth 2 (two) times daily. for 5 days 10 tablet 5/18/2025 5/23/2025 Kurt Beauchamp PA-C    meloxicam (MOBIC) 7.5 MG tablet Take 1 tablet (7.5 mg total) by mouth once daily. 12 tablet 5/18/2025 -- Kurt Beauchamp PA-C    LIDOcaine (LIDODERM) 5 % Place 1 patch onto the skin once daily. Remove & Discard patch within 12 hours or as directed by MD. May use 4% formulation if more affordable for patient. 6 patch 5/18/2025 -- Kurt Beauchamp PA-C          Follow-up Information       Follow up With Specialties Details Why Contact Info    See Jean-Baptiste MD Family Medicine, Wound Care Schedule an appointment as soon as possible for a visit in 1 day For re-evaluation [address] [facility] [address]  [phone]      [facility] Orthopedics Schedule an appointment as soon as possible for a visit in 1 day For further evaluation of your orthopedic injury [address] [facility] [place] [address] [phone]    [facility] - Emergency Dept Emergency Medicine Go to  If symptoms worsen or new symptoms develop [address] [facility] [place] [address] [phone]                 [1]   Social History  Tobacco Use    Smoking status: Never     Passive exposure: Never    Smokeless tobacco: Never   Substance Use Topics    Alcohol use: No    Drug use: No        Kurt Beauchamp PA-C  05/18/25 120

## 2025-05-18 NOTE — ED NOTES
Fermin Henson, a 65 y.o. male presents to the ED w/ complaint of right shoulder pain.     Triage note:  Chief Complaint   Patient presents with    Shoulder Pain     Pt to ER with reports of right shoulder pain worsening since Tuesday. Pt reports was moving heavy boxes. Pt reports pain is worse at night. No CP, SOB      Review of patient's allergies indicates:  No Known Allergies  Past Medical History:   Diagnosis Date    Benign essential HTN 12/4/2023    Diabetes mellitus, type 2     Kidney stones

## 2025-05-19 ENCOUNTER — PATIENT OUTREACH (OUTPATIENT)
Facility: OTHER | Age: 65
End: 2025-05-19
Payer: MEDICARE

## 2025-05-19 LAB
OHS QRS DURATION: 68 MS
OHS QTC CALCULATION: 430 MS

## 2025-05-19 NOTE — PROGRESS NOTES
Nicki Garcia  ED Navigator  Emergency Department    Project: [facility] ED Navigator  Role: Community Health Worker    Date: 05/19/2025  Patient Name: Fermin Henson  MRN: [id number]  PCP: See Jean-Baptiste MD    Assessment:     Fermin Henson is a 65 y.o. male who has presented to ED for right shoulder pain. Patient has visited the ED 1 times in the past 3 months. Patient did not contact PCP.     ED Navigator Initial Assessment    ED Navigator Enrollment Documentation  Consent to Services  Does patient consent to completing the assessment?: Yes  Contact  Method of Initial Contact: Phone  Transportation  Insurance Coverage  Do you have coverage/adequate coverage?: Yes  Specialist Appointment  Did the patient come to the ED to see a specialist?: No  Does the patient have a pending specialist referral?: No  Does the patient have a specialist appointment made?: No  PCP Follow Up Appointment  Medications  Is patient able to afford medication?: Yes  Psychological  Food  Communication/Education  Other Financial Concerns  Other Social Barriers/Concerns  Primary Barrier         Social History     Socioeconomic History    Marital status: Single    Number of children: 0   Tobacco Use    Smoking status: Never     Passive exposure: Never    Smokeless tobacco: Never   Substance and Sexual Activity    Alcohol use: No    Drug use: No    Sexual activity: Not Currently     Social Drivers of Health     Financial Resource Strain: Patient Declined (12/16/2024)    Overall Financial Resource Strain (CARDIA)     Difficulty of Paying Living Expenses: Patient declined   Food Insecurity: No Food Insecurity (12/16/2024)    Hunger Vital Sign     Worried About Running Out of Food in the Last Year: Never true     Ran Out of Food in the Last Year: Never true   Physical Activity: Unknown (12/16/2024)    Exercise Vital Sign     Days of Exercise per Week: Patient declined   Stress: Stress Concern Present (12/16/2024)    [place] [employer]  - Occupational Stress Questionnaire     Feeling of Stress : To some extent   Housing Stability: Unknown (12/16/2024)    Housing Stability Vital Sign     Unable to Pay for Housing in the Last Year: No       Plan:   Patient was contacted for post ED discharge navigation. They have an appointment scheduled with Dr. See Jean-Baptiste on 5/28/25 at 10:00. ED navigator will remind patient of appointment

## 2025-05-21 ENCOUNTER — TELEPHONE (OUTPATIENT)
Dept: PODIATRY | Facility: CLINIC | Age: 65
End: 2025-05-21
Payer: MEDICARE

## 2025-05-21 ENCOUNTER — OFFICE VISIT (OUTPATIENT)
Dept: PODIATRY | Facility: CLINIC | Age: 65
End: 2025-05-21
Payer: MEDICARE

## 2025-05-21 VITALS — BODY MASS INDEX: 29.32 KG/M2 | WEIGHT: 216.5 LBS | HEIGHT: 72 IN

## 2025-05-21 DIAGNOSIS — M79.672 PAIN IN BOTH FEET: ICD-10-CM

## 2025-05-21 DIAGNOSIS — L97.512 FOOT ULCER, RIGHT, WITH FAT LAYER EXPOSED: ICD-10-CM

## 2025-05-21 DIAGNOSIS — M79.671 PAIN IN BOTH FEET: ICD-10-CM

## 2025-05-21 DIAGNOSIS — E11.49 TYPE II DIABETES MELLITUS WITH NEUROLOGICAL MANIFESTATIONS: Primary | ICD-10-CM

## 2025-05-21 PROCEDURE — 99213 OFFICE O/P EST LOW 20 MIN: CPT | Mod: 25,HCNC,S$GLB, | Performed by: PODIATRIST

## 2025-05-21 PROCEDURE — 4010F ACE/ARB THERAPY RXD/TAKEN: CPT | Mod: CPTII,HCNC,S$GLB, | Performed by: PODIATRIST

## 2025-05-21 PROCEDURE — 1126F AMNT PAIN NOTED NONE PRSNT: CPT | Mod: CPTII,HCNC,S$GLB, | Performed by: PODIATRIST

## 2025-05-21 PROCEDURE — 99999 PR PBB SHADOW E&M-EST. PATIENT-LVL III: CPT | Mod: PBBFAC,HCNC,, | Performed by: PODIATRIST

## 2025-05-21 PROCEDURE — 3008F BODY MASS INDEX DOCD: CPT | Mod: CPTII,HCNC,S$GLB, | Performed by: PODIATRIST

## 2025-05-21 PROCEDURE — 11042 DBRDMT SUBQ TIS 1ST 20SQCM/<: CPT | Mod: HCNC,S$GLB,, | Performed by: PODIATRIST

## 2025-05-21 PROCEDURE — 3044F HG A1C LEVEL LT 7.0%: CPT | Mod: CPTII,HCNC,S$GLB, | Performed by: PODIATRIST

## 2025-05-21 PROCEDURE — 1101F PT FALLS ASSESS-DOCD LE1/YR: CPT | Mod: CPTII,HCNC,S$GLB, | Performed by: PODIATRIST

## 2025-05-21 PROCEDURE — 1159F MED LIST DOCD IN RCRD: CPT | Mod: CPTII,HCNC,S$GLB, | Performed by: PODIATRIST

## 2025-05-21 PROCEDURE — 3288F FALL RISK ASSESSMENT DOCD: CPT | Mod: CPTII,HCNC,S$GLB, | Performed by: PODIATRIST

## 2025-05-21 RX ORDER — SULFAMETHOXAZOLE AND TRIMETHOPRIM 400; 80 MG/1; MG/1
1 TABLET ORAL 2 TIMES DAILY
Qty: 20 TABLET | Refills: 0 | Status: SHIPPED | OUTPATIENT
Start: 2025-05-21

## 2025-05-21 NOTE — TELEPHONE ENCOUNTER
I spoke with patient scheduled to see Dr. Hartman on today at 1:15 for blister. No slots for Dr. Roy.

## 2025-05-22 NOTE — PROGRESS NOTES
Subjective:      Patient ID: Fermin Henson is a 65 y.o. male.    Chief Complaint: Wound Care (Right foot) and Diabetes Mellitus (/See Jean-Baptiste MD  06/10/2024/)    Fermin is a 65 y.o. male who presents to the clinic for evaluation and treatment of high risk feet. Fermin has a past medical history of Benign essential HTN (12/4/2023), Diabetes mellitus, type 2, and Kidney stones. The patient's chief complaint is foot ulcer, right foot. This patient has documented high risk feet requiring routine maintenance secondary to peripheral neuropathy.  Patient presents today for a new issue of right foot wound/blistering after unloading a Pod while moving recently.      PCP: See Jean-Baptiste MD    Date Last Seen by PCP:   Chief Complaint   Patient presents with    Wound Care     Right foot    Diabetes Mellitus       See Jean-Baptiste MD  06/10/2024           Current shoe gear:  Affected Foot: Extra depth shoes with custome accommodative insoles     Unaffected Foot: Extra depth shoes with custome accommodative insoles    History of Trauma: negative  Sign of Infection: none    Hemoglobin A1C   Date Value Ref Range Status   02/19/2025 6.5 (H) 4.0 - 5.6 % Final     Comment:     ADA Screening Guidelines:  5.7-6.4%  Consistent with prediabetes  >or=6.5%  Consistent with diabetes    High levels of fetal hemoglobin interfere with the HbA1C  assay. Heterozygous hemoglobin variants (HbS, HgC, etc)do  not significantly interfere with this assay.   However, presence of multiple variants may affect accuracy.     08/09/2024 7.0 (H) 4.0 - 5.6 % Final     Comment:     ADA Screening Guidelines:  5.7-6.4%  Consistent with prediabetes  >or=6.5%  Consistent with diabetes    High levels of fetal hemoglobin interfere with the HbA1C  assay. Heterozygous hemoglobin variants (HbS, HgC, etc)do  not significantly interfere with this assay.   However, presence of multiple variants may affect accuracy.     02/08/2024 6.4 (H) 4.0 - 5.6 % Final      Comment:     ADA Screening Guidelines:  5.7-6.4%  Consistent with prediabetes  >or=6.5%  Consistent with diabetes    High levels of fetal hemoglobin interfere with the HbA1C  assay. Heterozygous hemoglobin variants (HbS, HgC, etc)do  not significantly interfere with this assay.   However, presence of multiple variants may affect accuracy.         Review of Systems   Constitutional: Negative for chills, fever and malaise/fatigue.   HENT:  Negative for hearing loss.    Cardiovascular:  Negative for claudication.   Respiratory:  Negative for shortness of breath.    Skin:  Positive for color change and unusual hair distribution. Negative for flushing and rash.   Musculoskeletal:  Negative for joint pain and myalgias.   Gastrointestinal:  Negative for nausea and vomiting.   Neurological:  Positive for numbness, paresthesias and sensory change. Negative for loss of balance.   Psychiatric/Behavioral:  Negative for altered mental status.    Allergic/Immunologic: Negative for hives.           Objective:      Physical Exam  Vitals reviewed.   Constitutional:       Appearance: He is well-developed.   Cardiovascular:      Pulses: Normal pulses.   Musculoskeletal:      Right ankle: Decreased range of motion. Abnormal pulse.      Right Achilles Tendon: Neil's test negative.      Left ankle: Decreased range of motion. Abnormal pulse.      Left Achilles Tendon: Neil's test negative.      Right foot: Decreased range of motion. Prominent metatarsal heads present.      Left foot: Decreased range of motion.      Comments: Right digit amps 1-3   Feet:      Right foot:      Protective Sensation: 5 sites tested.  2 sites sensed.      Left foot:      Protective Sensation: 5 sites tested.  2 sites sensed.   Skin:     General: Skin is dry.      Capillary Refill: Capillary refill takes more than 3 seconds.   Neurological:      Mental Status: He is alert and oriented to person, place, and time.      Sensory: Sensory deficit present.     "  Comments: diminished sensation noted to b/L lower extremities   Psychiatric:         Behavior: Behavior normal. Behavior is cooperative.       Plantar foot sub 1st and 2nd MPJ is noted to have a large blister/deroofed.  Sub 1st MPJ there is also 1.5 x 1.0 x 0.1 cm ulceration.  These are post debridement measurements into the level of the subcutaneous tissue.            Ulceration  Location: right 5th digit  Measurements: healed      Assessment:       Encounter Diagnoses   Name Primary?    Type II diabetes mellitus with neurological manifestations Yes    Foot ulcer, right, with fat layer exposed     Pain in both feet          Plan:       Fermin was seen today for wound care and diabetes mellitus.    Diagnoses and all orders for this visit:    Type II diabetes mellitus with neurological manifestations    Foot ulcer, right, with fat layer exposed    Pain in both feet    Other orders  -     sulfamethoxazole-trimethoprim 400-80mg (BACTRIM,SEPTRA) 400-80 mg per tablet; Take 1 tablet by mouth 2 (two) times daily.      I counseled the patient on his conditions, their implications and medical management.    Wound Debridement    Performed by: Ashwin Hartman DPM   Authorized by: Patient    Time out: Immediately prior to procedure a "time out" was called to verify the correct patient, procedure, equipment, support staff and site/side marked as required.   Consent Done?:  Yes (Verbal)  Local anesthesia used?: No       Wound Details:    Location:  Plantar right foot     Type of Debridement:  Excisional       Length (cm):  1.5       Width (cm):  1.0       Depth (cm):  0.1       Percent Debrided (%):  100             Depth of debridement:  Subcutaneous tissue    Tissue debrided:  Dermis, Epidermis and Subcutaneous    Devitalized tissue debrided:  Biofilm, Callus and Necrotic/Eschar    Instruments:  Blade, Curette and Nippers     Bleeding:  Minimal  Hemostasis Achieved: Yes    Method Used:  Pressure  Patient tolerance:  Patient " tolerated the procedure well with no immediate complications    Adequate vitamin supplementation, protein intake, and hydration - discussed with patient.   Wound care discussed in detail.  Football dressing/Medihoney/nonadherent dressing/Darco shoe applied and dispensed.  Follow-up in 1 week.

## 2025-05-23 DIAGNOSIS — L97.509 TYPE 2 DIABETES WITH SKIN ULCER OF FOOT: ICD-10-CM

## 2025-05-23 DIAGNOSIS — E11.621 TYPE 2 DIABETES WITH SKIN ULCER OF FOOT: ICD-10-CM

## 2025-05-23 DIAGNOSIS — E11.42 TYPE 2 DIABETES MELLITUS WITH DIABETIC POLYNEUROPATHY, WITHOUT LONG-TERM CURRENT USE OF INSULIN: ICD-10-CM

## 2025-05-23 RX ORDER — METFORMIN HYDROCHLORIDE 850 MG/1
TABLET ORAL
Qty: 270 TABLET | Refills: 3 | Status: SHIPPED | OUTPATIENT
Start: 2025-05-23

## 2025-05-23 RX ORDER — SIMVASTATIN 20 MG/1
20 TABLET, FILM COATED ORAL NIGHTLY
Qty: 90 TABLET | Refills: 3 | Status: SHIPPED | OUTPATIENT
Start: 2025-05-23

## 2025-05-23 NOTE — TELEPHONE ENCOUNTER
Care Due:                  Date            Visit Type   Department     Provider  --------------------------------------------------------------------------------                                EP -         [facility]                              PRIMARY      / INTERNAL MED  Last Visit: 06-      CARE ([facility])   / PEDS         See A  Page                                           [facility]     / INTERNAL MED  Next Visit: 05-      FOLLOW UP    / PEDS         See A  Page                                                            Last  Test          Frequency    Reason                     Performed    Due Date  --------------------------------------------------------------------------------    HBA1C.......  6 months...  Sparkle MICHAEL,      02- 08-                             pioglitazone.............    [employer] Embedded Care Due Messages. Reference number: [id number].   5/23/2025 2:29:42 AM CDT

## 2025-05-28 ENCOUNTER — HOSPITAL ENCOUNTER (OUTPATIENT)
Dept: RADIOLOGY | Facility: HOSPITAL | Age: 65
Discharge: HOME | End: 2025-05-28
Attending: FAMILY MEDICINE
Payer: MEDICARE

## 2025-05-28 ENCOUNTER — OFFICE VISIT (OUTPATIENT)
Dept: FAMILY MEDICINE | Facility: CLINIC | Age: 65
End: 2025-05-28
Payer: MEDICARE

## 2025-05-28 VITALS
SYSTOLIC BLOOD PRESSURE: 130 MMHG | HEIGHT: 72 IN | WEIGHT: 210.44 LBS | BODY MASS INDEX: 28.5 KG/M2 | RESPIRATION RATE: 18 BRPM | HEART RATE: 101 BPM | DIASTOLIC BLOOD PRESSURE: 62 MMHG | OXYGEN SATURATION: 97 % | TEMPERATURE: 98 F

## 2025-05-28 DIAGNOSIS — M54.12 CERVICAL RADICULOPATHY: Primary | ICD-10-CM

## 2025-05-28 DIAGNOSIS — E11.319 CONTROLLED TYPE 2 DIABETES MELLITUS WITH RETINOPATHY OF BOTH EYES, WITHOUT LONG-TERM CURRENT USE OF INSULIN, MACULAR EDEMA PRESENCE UNSPECIFIED, UNSPECIFIED RETINOPATHY SEVERITY: ICD-10-CM

## 2025-05-28 DIAGNOSIS — M54.12 CERVICAL RADICULOPATHY: ICD-10-CM

## 2025-05-28 DIAGNOSIS — M25.511 ACUTE PAIN OF RIGHT SHOULDER: ICD-10-CM

## 2025-05-28 DIAGNOSIS — I10 BENIGN ESSENTIAL HTN: ICD-10-CM

## 2025-05-28 PROCEDURE — 73030 X-RAY EXAM OF SHOULDER: CPT | Mod: TC,FY,PO,RT

## 2025-05-28 PROCEDURE — 1125F AMNT PAIN NOTED PAIN PRSNT: CPT | Mod: CPTII,S$GLB,, | Performed by: FAMILY MEDICINE

## 2025-05-28 PROCEDURE — 3075F SYST BP GE 130 - 139MM HG: CPT | Mod: CPTII,S$GLB,, | Performed by: FAMILY MEDICINE

## 2025-05-28 PROCEDURE — 1159F MED LIST DOCD IN RCRD: CPT | Mod: CPTII,S$GLB,, | Performed by: FAMILY MEDICINE

## 2025-05-28 PROCEDURE — 4010F ACE/ARB THERAPY RXD/TAKEN: CPT | Mod: CPTII,S$GLB,, | Performed by: FAMILY MEDICINE

## 2025-05-28 PROCEDURE — 3044F HG A1C LEVEL LT 7.0%: CPT | Mod: CPTII,S$GLB,, | Performed by: FAMILY MEDICINE

## 2025-05-28 PROCEDURE — 1160F RVW MEDS BY RX/DR IN RCRD: CPT | Mod: CPTII,S$GLB,, | Performed by: FAMILY MEDICINE

## 2025-05-28 PROCEDURE — 72040 X-RAY EXAM NECK SPINE 2-3 VW: CPT | Mod: 26,,, | Performed by: RADIOLOGY

## 2025-05-28 PROCEDURE — 73030 X-RAY EXAM OF SHOULDER: CPT | Mod: 26,RT,, | Performed by: RADIOLOGY

## 2025-05-28 PROCEDURE — 1101F PT FALLS ASSESS-DOCD LE1/YR: CPT | Mod: CPTII,S$GLB,, | Performed by: FAMILY MEDICINE

## 2025-05-28 PROCEDURE — 3008F BODY MASS INDEX DOCD: CPT | Mod: CPTII,S$GLB,, | Performed by: FAMILY MEDICINE

## 2025-05-28 PROCEDURE — 3078F DIAST BP <80 MM HG: CPT | Mod: CPTII,S$GLB,, | Performed by: FAMILY MEDICINE

## 2025-05-28 PROCEDURE — 3288F FALL RISK ASSESSMENT DOCD: CPT | Mod: CPTII,S$GLB,, | Performed by: FAMILY MEDICINE

## 2025-05-28 PROCEDURE — 72040 X-RAY EXAM NECK SPINE 2-3 VW: CPT | Mod: TC,FY,PO

## 2025-05-28 PROCEDURE — G2211 COMPLEX E/M VISIT ADD ON: HCPCS | Mod: S$GLB,,, | Performed by: FAMILY MEDICINE

## 2025-05-28 PROCEDURE — 99999 PR PBB SHADOW E&M-EST. PATIENT-LVL V: CPT | Mod: PBBFAC,,, | Performed by: FAMILY MEDICINE

## 2025-05-28 PROCEDURE — 99214 OFFICE O/P EST MOD 30 MIN: CPT | Mod: S$GLB,,, | Performed by: FAMILY MEDICINE

## 2025-05-28 RX ORDER — METHOCARBAMOL 750 MG/1
750 TABLET, FILM COATED ORAL NIGHTLY
Qty: 30 TABLET | Refills: 0 | Status: SHIPPED | OUTPATIENT
Start: 2025-05-28 | End: 2025-06-27

## 2025-05-28 RX ORDER — PIOGLITAZONE 15 MG/1
15 TABLET ORAL
Qty: 90 TABLET | Refills: 1 | Status: CANCELLED | OUTPATIENT
Start: 2025-05-28

## 2025-05-28 RX ORDER — LOSARTAN POTASSIUM 50 MG/1
50 TABLET ORAL
Qty: 90 TABLET | Refills: 0 | Status: CANCELLED | OUTPATIENT
Start: 2025-05-28

## 2025-05-28 RX ORDER — SIMVASTATIN 20 MG/1
20 TABLET, FILM COATED ORAL NIGHTLY
Qty: 90 TABLET | Refills: 3 | Status: CANCELLED | OUTPATIENT
Start: 2025-05-28

## 2025-05-28 RX ORDER — GABAPENTIN 300 MG/1
300 CAPSULE ORAL 3 TIMES DAILY
Qty: 90 CAPSULE | Refills: 0 | Status: SHIPPED | OUTPATIENT
Start: 2025-05-28 | End: 2026-05-28

## 2025-05-28 RX ORDER — METFORMIN HYDROCHLORIDE 850 MG/1
TABLET ORAL
Qty: 270 TABLET | Refills: 3 | Status: CANCELLED | OUTPATIENT
Start: 2025-05-28

## 2025-05-28 NOTE — PROGRESS NOTES
"Routine Office Visit     Patient Name: Fermin Metcalf Peer    : 1960  MRN: [id number]    Subjective     History of Present Illness            ED follow up  Patient presenting today for follow up of ED visit for right shoulder and arm pain.  He states he has been getting a recurring "sensation" in the arm, but can't describe the sensation.  He also feels the arm is weaker than before.  This all happened after he was doing manual labor in his back yard.  He denies any neck pain but has had some numbness/tingling in his right finger tips.  He has not had this before.  He reports that the muscle relaxers given to him have not helped the pain and he has had to sleep on his left side because when he lays on the right side it hurts.    Review of Systems   Constitutional: Negative.    HENT: Negative.     Eyes: Negative.    Respiratory: Negative.     Cardiovascular: Negative.    Gastrointestinal: Negative.    Musculoskeletal:  Positive for joint pain and myalgias.   Skin: Negative.    Neurological:  Positive for tingling and focal weakness.         Objective     /62   Pulse 101   Temp 98.1 °F (36.7 °C) (Oral)   Resp 18   Ht 6' (1.829 m)   Wt 95.5 kg (210 lb 6.9 oz)   SpO2 97%   BMI 28.54 kg/m²   Physical Exam  Constitutional:       Appearance: He is not ill-appearing or diaphoretic.   HENT:      Head: Normocephalic and atraumatic.      Right Ear: External ear normal.      Left Ear: External ear normal.      Mouth/Throat:      Mouth: Mucous membranes are moist.      Pharynx: Oropharynx is clear.   Cardiovascular:      Rate and Rhythm: Normal rate and regular rhythm.   Pulmonary:      Effort: No respiratory distress.   Skin:     General: Skin is warm.      Findings: No rash.   Neurological:      Mental Status: He is alert and oriented to person, place, and time.      Sensory: Sensory deficit present.      Motor: No weakness.           Assessment     Assessment & Plan            1. Cervical radiculopathy  X-Ray " Cervical Spine AP And Lateral    gabapentin (NEURONTIN) 300 MG capsule    methocarbamoL (ROBAXIN) 750 MG Tab  - xrays of neck to be done today given distribution of paresthesias and pain  - he was instructed to not drive or operate machinery while taking gabapentin due to sedative effects      2. Benign essential HTN  - controlled on current regimen      3. Acute pain of right shoulder  X-ray Shoulder 2 or More Views Right    methocarbamoL (ROBAXIN) 750 MG Tab  - possible impingement causing his symptoms  - will refer to ortho If needed      4. 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

## 2025-05-29 ENCOUNTER — RESULTS FOLLOW-UP (OUTPATIENT)
Dept: FAMILY MEDICINE | Facility: CLINIC | Age: 65
End: 2025-05-29

## 2025-05-29 ENCOUNTER — OFFICE VISIT (OUTPATIENT)
Dept: PODIATRY | Facility: CLINIC | Age: 65
End: 2025-05-29
Payer: MEDICARE

## 2025-05-29 VITALS — SYSTOLIC BLOOD PRESSURE: 122 MMHG | DIASTOLIC BLOOD PRESSURE: 71 MMHG | BODY MASS INDEX: 28.54 KG/M2 | HEIGHT: 72 IN

## 2025-05-29 DIAGNOSIS — L97.512 FOOT ULCER, RIGHT, WITH FAT LAYER EXPOSED: ICD-10-CM

## 2025-05-29 DIAGNOSIS — E11.49 TYPE II DIABETES MELLITUS WITH NEUROLOGICAL MANIFESTATIONS: Primary | ICD-10-CM

## 2025-05-29 PROCEDURE — 3008F BODY MASS INDEX DOCD: CPT | Mod: CPTII,S$GLB,, | Performed by: PODIATRIST

## 2025-05-29 PROCEDURE — 3074F SYST BP LT 130 MM HG: CPT | Mod: CPTII,S$GLB,, | Performed by: PODIATRIST

## 2025-05-29 PROCEDURE — 1126F AMNT PAIN NOTED NONE PRSNT: CPT | Mod: CPTII,S$GLB,, | Performed by: PODIATRIST

## 2025-05-29 PROCEDURE — 3288F FALL RISK ASSESSMENT DOCD: CPT | Mod: CPTII,S$GLB,, | Performed by: PODIATRIST

## 2025-05-29 PROCEDURE — 3078F DIAST BP <80 MM HG: CPT | Mod: CPTII,S$GLB,, | Performed by: PODIATRIST

## 2025-05-29 PROCEDURE — 4010F ACE/ARB THERAPY RXD/TAKEN: CPT | Mod: CPTII,S$GLB,, | Performed by: PODIATRIST

## 2025-05-29 PROCEDURE — 1159F MED LIST DOCD IN RCRD: CPT | Mod: CPTII,S$GLB,, | Performed by: PODIATRIST

## 2025-05-29 PROCEDURE — 3044F HG A1C LEVEL LT 7.0%: CPT | Mod: CPTII,S$GLB,, | Performed by: PODIATRIST

## 2025-05-29 PROCEDURE — 99999 PR PBB SHADOW E&M-EST. PATIENT-LVL III: CPT | Mod: PBBFAC,,, | Performed by: PODIATRIST

## 2025-05-29 PROCEDURE — 99213 OFFICE O/P EST LOW 20 MIN: CPT | Mod: 25,S$GLB,, | Performed by: PODIATRIST

## 2025-05-29 PROCEDURE — 11042 DBRDMT SUBQ TIS 1ST 20SQCM/<: CPT | Mod: S$GLB,,, | Performed by: PODIATRIST

## 2025-05-29 PROCEDURE — 1101F PT FALLS ASSESS-DOCD LE1/YR: CPT | Mod: CPTII,S$GLB,, | Performed by: PODIATRIST

## 2025-05-31 NOTE — PROGRESS NOTES
Subjective:      Patient ID: Fermin Henson is a 65 y.o. male.    Chief Complaint: Wound Care (Right foot wound ) and Diabetes Mellitus (See Jean-Baptiste MD 05/28/2025)    Fermin is a 65 y.o. male who presents to the clinic for evaluation and treatment of high risk feet. Fermin has a past medical history of Benign essential HTN (12/4/2023), Diabetes mellitus, type 2, and Kidney stones. The patient's chief complaint is foot ulcer, right foot. This patient has documented high risk feet requiring routine maintenance secondary to peripheral neuropathy.  Patient presents today for a new issue of right foot wound/blistering after unloading a Pod while moving recently.  Doing well/no new complaints today.      PCP: See Jean-Baptiste MD    Date Last Seen by PCP:   Chief Complaint   Patient presents with    Wound Care     Right foot wound     Diabetes Mellitus     See Jean-Baptiste MD 05/28/2025         Current shoe gear:  Affected Foot: Extra depth shoes with custome accommodative insoles     Unaffected Foot: Extra depth shoes with custome accommodative insoles    History of Trauma: negative  Sign of Infection: none    Hemoglobin A1C   Date Value Ref Range Status   02/19/2025 6.5 (H) 4.0 - 5.6 % Final     Comment:     ADA Screening Guidelines:  5.7-6.4%  Consistent with prediabetes  >or=6.5%  Consistent with diabetes    High levels of fetal hemoglobin interfere with the HbA1C  assay. Heterozygous hemoglobin variants (HbS, HgC, etc)do  not significantly interfere with this assay.   However, presence of multiple variants may affect accuracy.     08/09/2024 7.0 (H) 4.0 - 5.6 % Final     Comment:     ADA Screening Guidelines:  5.7-6.4%  Consistent with prediabetes  >or=6.5%  Consistent with diabetes    High levels of fetal hemoglobin interfere with the HbA1C  assay. Heterozygous hemoglobin variants (HbS, HgC, etc)do  not significantly interfere with this assay.   However, presence of multiple variants may affect accuracy.      02/08/2024 6.4 (H) 4.0 - 5.6 % Final     Comment:     ADA Screening Guidelines:  5.7-6.4%  Consistent with prediabetes  >or=6.5%  Consistent with diabetes    High levels of fetal hemoglobin interfere with the HbA1C  assay. Heterozygous hemoglobin variants (HbS, HgC, etc)do  not significantly interfere with this assay.   However, presence of multiple variants may affect accuracy.         Review of Systems   Constitutional: Negative for chills, fever and malaise/fatigue.   HENT:  Negative for hearing loss.    Cardiovascular:  Negative for claudication.   Respiratory:  Negative for shortness of breath.    Skin:  Positive for color change and unusual hair distribution. Negative for flushing and rash.   Musculoskeletal:  Negative for joint pain and myalgias.   Gastrointestinal:  Negative for nausea and vomiting.   Neurological:  Positive for numbness, paresthesias and sensory change. Negative for loss of balance.   Psychiatric/Behavioral:  Negative for altered mental status.    Allergic/Immunologic: Negative for hives.           Objective:      Physical Exam  Vitals reviewed.   Constitutional:       Appearance: He is well-developed.   Cardiovascular:      Pulses: Normal pulses.   Musculoskeletal:      Right ankle: Decreased range of motion. Abnormal pulse.      Right Achilles Tendon: Neil's test negative.      Left ankle: Decreased range of motion. Abnormal pulse.      Left Achilles Tendon: Neil's test negative.      Right foot: Decreased range of motion. Prominent metatarsal heads present.      Left foot: Decreased range of motion.      Comments: Right digit amps 1-3   Feet:      Right foot:      Protective Sensation: 5 sites tested.  2 sites sensed.      Left foot:      Protective Sensation: 5 sites tested.  2 sites sensed.   Skin:     General: Skin is dry.      Capillary Refill: Capillary refill takes more than 3 seconds.   Neurological:      Mental Status: He is alert and oriented to person, place, and  "time.      Sensory: Sensory deficit present.      Comments: diminished sensation noted to b/L lower extremities   Psychiatric:         Behavior: Behavior normal. Behavior is cooperative.       Plantar foot sub 1st and 2nd MPJ is noted to have a large blister/deroofed.  Sub 1st MPJ there is also 1.2 x 1.0 x 0.1 cm ulceration.  These are post debridement measurements into the level of the subcutaneous tissue.    Much less erythema/edema today.    Assessment:       Encounter Diagnoses   Name Primary?    Type II diabetes mellitus with neurological manifestations Yes    Foot ulcer, right, with fat layer exposed          Plan:       Fermin was seen today for wound care and diabetes mellitus.    Diagnoses and all orders for this visit:    Type II diabetes mellitus with neurological manifestations    Foot ulcer, right, with fat layer exposed      I counseled the patient on his conditions, their implications and medical management.    Wound Debridement    Performed by: Ashwin Hartman DPM   Authorized by: Patient    Time out: Immediately prior to procedure a "time out" was called to verify the correct patient, procedure, equipment, support staff and site/side marked as required.   Consent Done?:  Yes (Verbal)  Local anesthesia used?: No       Wound Details:    Location:  Plantar right foot     Type of Debridement:  Excisional       Length (cm):  1.2       Width (cm):  1.0       Depth (cm):  0.1       Percent Debrided (%):  100             Depth of debridement:  Subcutaneous tissue    Tissue debrided:  Dermis, Epidermis and Subcutaneous    Devitalized tissue debrided:  Biofilm, Callus and Necrotic/Eschar    Instruments:  Blade, Curette and Nippers     Bleeding:  Minimal  Hemostasis Achieved: Yes    Method Used:  Pressure  Patient tolerance:  Patient tolerated the procedure well with no immediate complications    Adequate vitamin supplementation, protein intake, and hydration - discussed with patient.     Wound care discussed " in detail.  Football dressing/Medihoney/nonadherent dressing/Darco shoe applied and dispensed.  Follow-up in 1 week.

## 2025-06-05 ENCOUNTER — OFFICE VISIT (OUTPATIENT)
Dept: PODIATRY | Facility: CLINIC | Age: 65
End: 2025-06-05
Payer: MEDICARE

## 2025-06-05 VITALS
DIASTOLIC BLOOD PRESSURE: 78 MMHG | HEIGHT: 72 IN | SYSTOLIC BLOOD PRESSURE: 128 MMHG | HEART RATE: 108 BPM | BODY MASS INDEX: 28.54 KG/M2

## 2025-06-05 DIAGNOSIS — L97.512 FOOT ULCER, RIGHT, WITH FAT LAYER EXPOSED: ICD-10-CM

## 2025-06-05 DIAGNOSIS — E11.49 TYPE II DIABETES MELLITUS WITH NEUROLOGICAL MANIFESTATIONS: Primary | ICD-10-CM

## 2025-06-05 PROCEDURE — 3008F BODY MASS INDEX DOCD: CPT | Mod: CPTII,S$GLB,, | Performed by: PODIATRIST

## 2025-06-05 PROCEDURE — 1126F AMNT PAIN NOTED NONE PRSNT: CPT | Mod: CPTII,S$GLB,, | Performed by: PODIATRIST

## 2025-06-05 PROCEDURE — 11042 DBRDMT SUBQ TIS 1ST 20SQCM/<: CPT | Mod: S$GLB,,, | Performed by: PODIATRIST

## 2025-06-05 PROCEDURE — 3044F HG A1C LEVEL LT 7.0%: CPT | Mod: CPTII,S$GLB,, | Performed by: PODIATRIST

## 2025-06-05 PROCEDURE — 3078F DIAST BP <80 MM HG: CPT | Mod: CPTII,S$GLB,, | Performed by: PODIATRIST

## 2025-06-05 PROCEDURE — 1159F MED LIST DOCD IN RCRD: CPT | Mod: CPTII,S$GLB,, | Performed by: PODIATRIST

## 2025-06-05 PROCEDURE — 1160F RVW MEDS BY RX/DR IN RCRD: CPT | Mod: CPTII,S$GLB,, | Performed by: PODIATRIST

## 2025-06-05 PROCEDURE — 99999 PR PBB SHADOW E&M-EST. PATIENT-LVL III: CPT | Mod: PBBFAC,,, | Performed by: PODIATRIST

## 2025-06-05 PROCEDURE — 3074F SYST BP LT 130 MM HG: CPT | Mod: CPTII,S$GLB,, | Performed by: PODIATRIST

## 2025-06-05 PROCEDURE — 1101F PT FALLS ASSESS-DOCD LE1/YR: CPT | Mod: CPTII,S$GLB,, | Performed by: PODIATRIST

## 2025-06-05 PROCEDURE — 4010F ACE/ARB THERAPY RXD/TAKEN: CPT | Mod: CPTII,S$GLB,, | Performed by: PODIATRIST

## 2025-06-05 PROCEDURE — 3288F FALL RISK ASSESSMENT DOCD: CPT | Mod: CPTII,S$GLB,, | Performed by: PODIATRIST

## 2025-06-05 PROCEDURE — 99213 OFFICE O/P EST LOW 20 MIN: CPT | Mod: 25,S$GLB,, | Performed by: PODIATRIST

## 2025-06-09 NOTE — PROGRESS NOTES
Subjective:      Patient ID: Fermin Henson is a 65 y.o. male.    Chief Complaint: Diabetes Mellitus (Pcp See Jean-Baptiste MD 05/28/2025/ ) and Wound Care (Right foot)    Fermin is a 65 y.o. male who presents to the clinic for evaluation and treatment of high risk feet. Fermin has a past medical history of Benign essential HTN (12/4/2023), Diabetes mellitus, type 2, and Kidney stones. The patient's chief complaint is foot ulcer, right foot. This patient has documented high risk feet requiring routine maintenance secondary to peripheral neuropathy.  Patient presents today for a new issue of right foot wound/blistering after unloading a Pod while moving recently.  Doing well/no new complaints today.      PCP: See Jean-Baptiste MD    Date Last Seen by PCP:   Chief Complaint   Patient presents with    Diabetes Mellitus     Pcp See Jean-Baptiste MD 05/28/2025       Wound Care     Right foot         Current shoe gear:  Affected Foot: Extra depth shoes with custome accommodative insoles     Unaffected Foot: Extra depth shoes with custome accommodative insoles    History of Trauma: negative  Sign of Infection: none    Hemoglobin A1C   Date Value Ref Range Status   02/19/2025 6.5 (H) 4.0 - 5.6 % Final     Comment:     ADA Screening Guidelines:  5.7-6.4%  Consistent with prediabetes  >or=6.5%  Consistent with diabetes    High levels of fetal hemoglobin interfere with the HbA1C  assay. Heterozygous hemoglobin variants (HbS, HgC, etc)do  not significantly interfere with this assay.   However, presence of multiple variants may affect accuracy.     08/09/2024 7.0 (H) 4.0 - 5.6 % Final     Comment:     ADA Screening Guidelines:  5.7-6.4%  Consistent with prediabetes  >or=6.5%  Consistent with diabetes    High levels of fetal hemoglobin interfere with the HbA1C  assay. Heterozygous hemoglobin variants (HbS, HgC, etc)do  not significantly interfere with this assay.   However, presence of multiple variants may affect accuracy.      02/08/2024 6.4 (H) 4.0 - 5.6 % Final     Comment:     ADA Screening Guidelines:  5.7-6.4%  Consistent with prediabetes  >or=6.5%  Consistent with diabetes    High levels of fetal hemoglobin interfere with the HbA1C  assay. Heterozygous hemoglobin variants (HbS, HgC, etc)do  not significantly interfere with this assay.   However, presence of multiple variants may affect accuracy.         Review of Systems   Constitutional: Negative for chills, decreased appetite, fever and malaise/fatigue.   HENT:  Negative for congestion, hearing loss, nosebleeds and tinnitus.    Eyes:  Negative for double vision, pain, photophobia and visual disturbance.   Cardiovascular:  Negative for chest pain, claudication, cyanosis and leg swelling.   Respiratory:  Negative for cough, hemoptysis, shortness of breath and wheezing.    Endocrine: Negative for cold intolerance and heat intolerance.   Hematologic/Lymphatic: Negative for adenopathy and bleeding problem.   Skin:  Positive for color change and unusual hair distribution. Negative for dry skin, flushing, itching, nail changes, rash and suspicious lesions.   Musculoskeletal:  Negative for arthritis, joint pain, myalgias and stiffness.   Gastrointestinal:  Negative for abdominal pain, jaundice, nausea and vomiting.   Genitourinary:  Negative for dysuria, frequency and hematuria.   Neurological:  Positive for numbness, paresthesias and sensory change. Negative for difficulty with concentration and loss of balance.   Psychiatric/Behavioral:  Negative for altered mental status, hallucinations and suicidal ideas. The patient is not nervous/anxious.    Allergic/Immunologic: Negative for environmental allergies, hives and persistent infections.           Objective:      Physical Exam  Vitals reviewed.   Constitutional:       Appearance: He is well-developed.   Cardiovascular:      Pulses: Normal pulses.   Musculoskeletal:      Right ankle: Decreased range of motion. Abnormal pulse.      Right  "Achilles Tendon: Neil's test negative.      Left ankle: Decreased range of motion. Abnormal pulse.      Left Achilles Tendon: Neil's test negative.      Right foot: Decreased range of motion. Prominent metatarsal heads present.      Left foot: Decreased range of motion.      Comments: Right digit amps 1-3   Feet:      Right foot:      Protective Sensation: 5 sites tested.  2 sites sensed.      Left foot:      Protective Sensation: 5 sites tested.  2 sites sensed.   Skin:     General: Skin is dry.      Capillary Refill: Capillary refill takes more than 3 seconds.   Neurological:      Mental Status: He is alert and oriented to person, place, and time.      Sensory: Sensory deficit present.      Comments: diminished sensation noted to b/L lower extremities   Psychiatric:         Behavior: Behavior normal. Behavior is cooperative.       Plantar foot sub 1st and 2nd MPJ is noted to have a large blister/deroofed.  Sub 1st MPJ there is also 1.0 x 1.0 x 0.1 cm ulceration.  These are post debridement measurements into the level of the subcutaneous tissue.    Much less erythema/edema today.    Assessment:       Encounter Diagnoses   Name Primary?    Type II diabetes mellitus with neurological manifestations Yes    Foot ulcer, right, with fat layer exposed          Plan:       Fermin was seen today for diabetes mellitus and wound care.    Diagnoses and all orders for this visit:    Type II diabetes mellitus with neurological manifestations    Foot ulcer, right, with fat layer exposed      I counseled the patient on his conditions, their implications and medical management.    Wound Debridement    Performed by: Ashwin Hartman DPM   Authorized by: Patient    Time out: Immediately prior to procedure a "time out" was called to verify the correct patient, procedure, equipment, support staff and site/side marked as required.   Consent Done?:  Yes (Verbal)  Local anesthesia used?: No       Wound Details:    Location:  " Plantar right foot     Type of Debridement:  Excisional       Length (cm):  1.0       Width (cm):  1.0       Depth (cm):  0.1       Percent Debrided (%):  100             Depth of debridement:  Subcutaneous tissue    Tissue debrided:  Dermis, Epidermis and Subcutaneous    Devitalized tissue debrided:  Biofilm, Callus and Necrotic/Eschar    Instruments:  Blade, Curette and Nippers     Bleeding:  Minimal  Hemostasis Achieved: Yes    Method Used:  Pressure  Patient tolerance:  Patient tolerated the procedure well with no immediate complications    Adequate vitamin supplementation, protein intake, and hydration - discussed with patient.     Wound care discussed in detail.  Football dressing/Medihoney/nonadherent dressing/Darco shoe applied and dispensed.  Follow-up in 1 week.

## 2025-06-16 ENCOUNTER — OFFICE VISIT (OUTPATIENT)
Dept: PODIATRY | Facility: CLINIC | Age: 65
End: 2025-06-16
Payer: MEDICARE

## 2025-06-16 VITALS
DIASTOLIC BLOOD PRESSURE: 82 MMHG | SYSTOLIC BLOOD PRESSURE: 126 MMHG | BODY MASS INDEX: 28.54 KG/M2 | HEIGHT: 72 IN | HEART RATE: 86 BPM

## 2025-06-16 DIAGNOSIS — E11.49 TYPE II DIABETES MELLITUS WITH NEUROLOGICAL MANIFESTATIONS: Primary | ICD-10-CM

## 2025-06-16 DIAGNOSIS — L97.512 FOOT ULCER, RIGHT, WITH FAT LAYER EXPOSED: ICD-10-CM

## 2025-06-16 DIAGNOSIS — Z89.421 H/O AMPUTATION OF LESSER TOE, RIGHT: ICD-10-CM

## 2025-06-16 PROCEDURE — 1159F MED LIST DOCD IN RCRD: CPT | Mod: CPTII,HCNC,S$GLB, | Performed by: PODIATRIST

## 2025-06-16 PROCEDURE — 3288F FALL RISK ASSESSMENT DOCD: CPT | Mod: CPTII,HCNC,S$GLB, | Performed by: PODIATRIST

## 2025-06-16 PROCEDURE — 3008F BODY MASS INDEX DOCD: CPT | Mod: CPTII,HCNC,S$GLB, | Performed by: PODIATRIST

## 2025-06-16 PROCEDURE — 1126F AMNT PAIN NOTED NONE PRSNT: CPT | Mod: CPTII,HCNC,S$GLB, | Performed by: PODIATRIST

## 2025-06-16 PROCEDURE — 99999 PR PBB SHADOW E&M-EST. PATIENT-LVL III: CPT | Mod: PBBFAC,,, | Performed by: PODIATRIST

## 2025-06-16 PROCEDURE — 11042 DBRDMT SUBQ TIS 1ST 20SQCM/<: CPT | Mod: HCNC,S$GLB,, | Performed by: PODIATRIST

## 2025-06-16 PROCEDURE — 1101F PT FALLS ASSESS-DOCD LE1/YR: CPT | Mod: CPTII,HCNC,S$GLB, | Performed by: PODIATRIST

## 2025-06-16 PROCEDURE — 1160F RVW MEDS BY RX/DR IN RCRD: CPT | Mod: CPTII,HCNC,S$GLB, | Performed by: PODIATRIST

## 2025-06-16 PROCEDURE — 3079F DIAST BP 80-89 MM HG: CPT | Mod: CPTII,HCNC,S$GLB, | Performed by: PODIATRIST

## 2025-06-16 PROCEDURE — 3074F SYST BP LT 130 MM HG: CPT | Mod: CPTII,HCNC,S$GLB, | Performed by: PODIATRIST

## 2025-06-16 PROCEDURE — 4010F ACE/ARB THERAPY RXD/TAKEN: CPT | Mod: CPTII,HCNC,S$GLB, | Performed by: PODIATRIST

## 2025-06-16 PROCEDURE — 99213 OFFICE O/P EST LOW 20 MIN: CPT | Mod: 25,HCNC,S$GLB, | Performed by: PODIATRIST

## 2025-06-16 PROCEDURE — 3044F HG A1C LEVEL LT 7.0%: CPT | Mod: CPTII,HCNC,S$GLB, | Performed by: PODIATRIST

## 2025-06-16 NOTE — PROGRESS NOTES
Subjective:      Patient ID: Fermin Henson is a 65 y.o. male.    Chief Complaint: No chief complaint on file.    Fermin is a 65 y.o. male who presents to the clinic for evaluation and treatment of high risk feet. Fermin has a past medical history of Benign essential HTN (12/4/2023), Diabetes mellitus, type 2, and Kidney stones. The patient's chief complaint is foot ulcer, right foot. This patient has documented high risk feet requiring routine maintenance secondary to peripheral neuropathy.  Patient presents today for a new issue of right foot wound/blistering after unloading a Pod while moving recently.  Doing well/no new complaints today.    6/16:  Patient presents to clinic for R plantar foot wound follow up.    PCP: See Jean-Baptiste MD    Date Last Seen by PCP:   No chief complaint on file.    Current shoe gear:  Affected Foot: Extra depth shoes with custome accommodative insoles     Unaffected Foot: Extra depth shoes with custome accommodative insoles  History of Trauma: negative  Sign of Infection: none    Hemoglobin A1C   Date Value Ref Range Status   02/19/2025 6.5 (H) 4.0 - 5.6 % Final     Comment:     ADA Screening Guidelines:  5.7-6.4%  Consistent with prediabetes  >or=6.5%  Consistent with diabetes    High levels of fetal hemoglobin interfere with the HbA1C  assay. Heterozygous hemoglobin variants (HbS, HgC, etc)do  not significantly interfere with this assay.   However, presence of multiple variants may affect accuracy.     08/09/2024 7.0 (H) 4.0 - 5.6 % Final     Comment:     ADA Screening Guidelines:  5.7-6.4%  Consistent with prediabetes  >or=6.5%  Consistent with diabetes    High levels of fetal hemoglobin interfere with the HbA1C  assay. Heterozygous hemoglobin variants (HbS, HgC, etc)do  not significantly interfere with this assay.   However, presence of multiple variants may affect accuracy.     02/08/2024 6.4 (H) 4.0 - 5.6 % Final     Comment:     ADA Screening Guidelines:  5.7-6.4%   Consistent with prediabetes  >or=6.5%  Consistent with diabetes    High levels of fetal hemoglobin interfere with the HbA1C  assay. Heterozygous hemoglobin variants (HbS, HgC, etc)do  not significantly interfere with this assay.   However, presence of multiple variants may affect accuracy.       Review of Systems   Constitutional: Negative for chills, decreased appetite, fever and malaise/fatigue.   HENT:  Negative for congestion, hearing loss, nosebleeds and tinnitus.    Eyes:  Negative for double vision, pain, photophobia and visual disturbance.   Cardiovascular:  Negative for chest pain, claudication, cyanosis and leg swelling.   Respiratory:  Negative for cough, hemoptysis, shortness of breath and wheezing.    Endocrine: Negative for cold intolerance and heat intolerance.   Hematologic/Lymphatic: Negative for adenopathy and bleeding problem.   Skin:  Positive for color change and unusual hair distribution. Negative for dry skin, flushing, itching, nail changes, rash and suspicious lesions.   Musculoskeletal:  Negative for arthritis, joint pain, myalgias and stiffness.   Gastrointestinal:  Negative for abdominal pain, jaundice, nausea and vomiting.   Genitourinary:  Negative for dysuria, frequency and hematuria.   Neurological:  Positive for numbness, paresthesias and sensory change. Negative for difficulty with concentration and loss of balance.   Psychiatric/Behavioral:  Negative for altered mental status, hallucinations and suicidal ideas. The patient is not nervous/anxious.    Allergic/Immunologic: Negative for environmental allergies, hives and persistent infections.         Objective:      Physical Exam  Vitals reviewed.   Constitutional:       Appearance: He is well-developed.   Cardiovascular:      Pulses: Normal pulses.   Musculoskeletal:      Right ankle: Decreased range of motion. Abnormal pulse.      Right Achilles Tendon: Neil's test negative.      Left ankle: Decreased range of motion. Abnormal  "pulse.      Left Achilles Tendon: Neil's test negative.      Right foot: Decreased range of motion. Prominent metatarsal heads present.      Left foot: Decreased range of motion.      Comments: Right digit amps 1-3   Feet:      Right foot:      Protective Sensation: 5 sites tested.  2 sites sensed.      Left foot:      Protective Sensation: 5 sites tested.  2 sites sensed.   Skin:     General: Skin is dry.      Capillary Refill: Capillary refill takes more than 3 seconds.   Neurological:      Mental Status: He is alert and oriented to person, place, and time.      Sensory: Sensory deficit present.      Comments: diminished sensation noted to b/L lower extremities   Psychiatric:         Behavior: Behavior normal. Behavior is cooperative.       Plantar foot sub 1st and 2nd MPJ is noted to have a large blister/deroofed.  Sub 1st MPJ there is also 1.0 x 1.0 x 0.1 cm ulceration.  These are post debridement measurements into the level of the subcutaneous tissue.    Much less erythema/edema today.    Assessment:       No diagnosis found.        Plan:       There are no diagnoses linked to this encounter.    I counseled the patient on his conditions, their implications and medical management.    Wound Debridement    Performed by: Ashwin Hartman DPM   Authorized by: Patient    Time out: Immediately prior to procedure a "time out" was called to verify the correct patient, procedure, equipment, support staff and site/side marked as required.   Consent Done?:  Yes (Verbal)  Local anesthesia used?: No       Wound Details:    Location:  Plantar right foot     Type of Debridement:  Excisional       Length (cm):  1.0       Width (cm):  1.0       Depth (cm):  0.1       Percent Debrided (%):  100             Depth of debridement:  Subcutaneous tissue    Tissue debrided:  Dermis, Epidermis and Subcutaneous    Devitalized tissue debrided:  Biofilm, Callus and Necrotic/Eschar    Instruments:  Blade, Curette and Nippers   "   Bleeding:  Minimal  Hemostasis Achieved: Yes    Method Used:  Pressure  Patient tolerance:  Patient tolerated the procedure well with no immediate complications    Adequate vitamin supplementation, protein intake, and hydration - discussed with patient.     Wound care discussed in detail.  Football dressing/Medihoney/nonadherent dressing/Darco shoe applied and dispensed.  Follow-up in 1 week.    Puar Goel DPM   Podiatric Medicine & Surgery  [facility]

## 2025-06-18 ENCOUNTER — OFFICE VISIT (OUTPATIENT)
Dept: OPHTHALMOLOGY | Facility: CLINIC | Age: 65
End: 2025-06-18
Attending: OPHTHALMOLOGY
Payer: MEDICARE

## 2025-06-18 ENCOUNTER — CLINICAL SUPPORT (OUTPATIENT)
Dept: OPHTHALMOLOGY | Facility: CLINIC | Age: 65
End: 2025-06-18
Payer: MEDICARE

## 2025-06-18 DIAGNOSIS — H43.393 VITREOUS FLOATERS OF BOTH EYES: ICD-10-CM

## 2025-06-18 DIAGNOSIS — H35.372 EPIRETINAL MEMBRANE (ERM) OF LEFT EYE: Primary | ICD-10-CM

## 2025-06-18 DIAGNOSIS — H43.813 PVD (POSTERIOR VITREOUS DETACHMENT), BOTH EYES: ICD-10-CM

## 2025-06-18 DIAGNOSIS — E11.3393 TYPE 2 DIABETES MELLITUS WITH BOTH EYES AFFECTED BY MODERATE NONPROLIFERATIVE RETINOPATHY WITHOUT MACULAR EDEMA, WITHOUT LONG-TERM CURRENT USE OF INSULIN: ICD-10-CM

## 2025-06-18 PROCEDURE — 1160F RVW MEDS BY RX/DR IN RCRD: CPT | Mod: CPTII,HCNC,S$GLB, | Performed by: OPHTHALMOLOGY

## 2025-06-18 PROCEDURE — 2022F DILAT RTA XM EVC RTNOPTHY: CPT | Mod: CPTII,HCNC,S$GLB, | Performed by: OPHTHALMOLOGY

## 2025-06-18 PROCEDURE — 3288F FALL RISK ASSESSMENT DOCD: CPT | Mod: CPTII,HCNC,S$GLB, | Performed by: OPHTHALMOLOGY

## 2025-06-18 PROCEDURE — 1159F MED LIST DOCD IN RCRD: CPT | Mod: CPTII,HCNC,S$GLB, | Performed by: OPHTHALMOLOGY

## 2025-06-18 PROCEDURE — 4010F ACE/ARB THERAPY RXD/TAKEN: CPT | Mod: CPTII,HCNC,S$GLB, | Performed by: OPHTHALMOLOGY

## 2025-06-18 PROCEDURE — 92201 OPSCPY EXTND RTA DRAW UNI/BI: CPT | Mod: HCNC,S$GLB,, | Performed by: OPHTHALMOLOGY

## 2025-06-18 PROCEDURE — 3044F HG A1C LEVEL LT 7.0%: CPT | Mod: CPTII,HCNC,S$GLB, | Performed by: OPHTHALMOLOGY

## 2025-06-18 PROCEDURE — 92004 COMPRE OPH EXAM NEW PT 1/>: CPT | Mod: HCNC,S$GLB,, | Performed by: OPHTHALMOLOGY

## 2025-06-18 PROCEDURE — 1101F PT FALLS ASSESS-DOCD LE1/YR: CPT | Mod: CPTII,HCNC,S$GLB, | Performed by: OPHTHALMOLOGY

## 2025-06-18 PROCEDURE — 92134 CPTRZ OPH DX IMG PST SGM RTA: CPT | Mod: HCNC,S$GLB,, | Performed by: OPHTHALMOLOGY

## 2025-06-18 PROCEDURE — 99999 PR PBB SHADOW E&M-EST. PATIENT-LVL III: CPT | Mod: PBBFAC,HCNC,, | Performed by: OPHTHALMOLOGY

## 2025-06-18 NOTE — PROGRESS NOTES
"Subjective:       Patient ID: Fermin Henson is a 65 y.o. male      Chief Complaint   Patient presents with    NEW PATIENT TO RETINA CLINIC     Diabetic Eye Exam     History of Present Illness  HPI    New patient to retina clinic   Ref by Dr Jean-Baptiste     Vision overall stable, maybe a little worse.  OD seems worse than OS since   cataract surgery  No flashes/pain OU, occ floaters/"filaments"    Pt was seeing Dr Bloom, had received injections over years, last visit with   Dr Bloom around October, pt thinks may have had injection then    DM since age 47, no insulin  Last edited by Ryan Qureshi MD on 6/18/2025  4:42 PM.        Imaging:    See report    Assessment/Plan:     1. Type 2 diabetes mellitus with both eyes affected by moderate nonproliferative retinopathy without macular edema, without long-term current use of insulin  Min cystoid spaces on OCT OS, appear more c/w traction.  Va 20/30 and no injection since at least October 2024    Observe    Diabetic Retinopathy discussed in detail, all questions answered  Stressed importance of good BS/BP/Chol Control  RTC immediately PRN any vision changes, sena blurry vision, missing vision, floaters, distortions, etc      - Ambulatory referral/consult to Ophthalmology  - Posterior Segment OCT Retina-Both eyes    2. Epiretinal membrane (ERM) of left eye (Primary)  Excellent Va  Observe    - Posterior Segment OCT Retina-Both eyes    3. Vitreous floaters of both eyes  4. PVD (posterior vitreous detachment), both eyes  SO droplets OS, h/o injections  Not symptomatic  No breaks  Observe    Follow up in about 3 months (around 9/18/2025), or if symptoms worsen or fail to improve, for Comprehensive Examination (DILATE OU), OCT Mac.       "

## 2025-06-29 DIAGNOSIS — E11.29 TYPE 2 DIABETES MELLITUS WITH MICROALBUMINURIA, WITHOUT LONG-TERM CURRENT USE OF INSULIN: ICD-10-CM

## 2025-06-29 DIAGNOSIS — E11.42 TYPE 2 DIABETES MELLITUS WITH DIABETIC POLYNEUROPATHY, WITHOUT LONG-TERM CURRENT USE OF INSULIN: ICD-10-CM

## 2025-06-29 DIAGNOSIS — R80.9 TYPE 2 DIABETES MELLITUS WITH MICROALBUMINURIA, WITHOUT LONG-TERM CURRENT USE OF INSULIN: ICD-10-CM

## 2025-06-29 DIAGNOSIS — I10 BENIGN ESSENTIAL HTN: ICD-10-CM

## 2025-06-29 NOTE — TELEPHONE ENCOUNTER
No care due was identified.  [facility] Embedded Care Due Messages. Reference number: [id number].   6/29/2025 12:18:40 PM CDT

## 2025-06-30 RX ORDER — LOSARTAN POTASSIUM 50 MG/1
50 TABLET ORAL
Qty: 90 TABLET | Refills: 2 | Status: SHIPPED | OUTPATIENT
Start: 2025-06-30

## 2025-06-30 RX ORDER — EMPAGLIFLOZIN 10 MG/1
10 TABLET, FILM COATED ORAL
Qty: 90 TABLET | Refills: 0 | Status: SHIPPED | OUTPATIENT
Start: 2025-06-30

## 2025-06-30 NOTE — TELEPHONE ENCOUNTER
Refill Decision Note   Fermin Henson  is requesting a refill authorization.  Brief Assessment and Rationale for Refill:  Approve     Medication Therapy Plan:        Pharmacist review requested: Yes   Extended chart review required: Yes   Comments:     Note composed:4:17 PM 06/30/2025

## 2025-06-30 NOTE — TELEPHONE ENCOUNTER
Refill Routing Note   Medication(s) are not appropriate for processing by [facility] for the following reason(s):        Drug-disease interaction: JARDIANCE and H/O amputation of lesser toe, right; Hx of amputation of lesser toe, right; Right great toe amputee/Type 2 diabetes with skin ulcer of foot    [facility] action(s):  Approve  Defer             Pharmacist review requested: Yes     Appointments  past 12m or future 3m with PCP    Date Provider   Last Visit   5/28/2025 See Jean-Baptiste MD   Next Visit   8/20/2025 See Jean-Baptiste MD   ED visits in past 90 days: 1        Note composed:4:10 PM 06/30/2025

## 2025-07-01 ENCOUNTER — OFFICE VISIT (OUTPATIENT)
Dept: FAMILY MEDICINE | Facility: CLINIC | Age: 65
End: 2025-07-01
Payer: MEDICARE

## 2025-07-01 ENCOUNTER — TELEPHONE (OUTPATIENT)
Dept: PODIATRY | Facility: CLINIC | Age: 65
End: 2025-07-01
Payer: MEDICARE

## 2025-07-01 VITALS
WEIGHT: 212 LBS | OXYGEN SATURATION: 96 % | DIASTOLIC BLOOD PRESSURE: 62 MMHG | TEMPERATURE: 98 F | SYSTOLIC BLOOD PRESSURE: 128 MMHG | BODY MASS INDEX: 28.71 KG/M2 | HEIGHT: 72 IN | HEART RATE: 109 BPM

## 2025-07-01 DIAGNOSIS — E11.3313 TYPE 2 DIABETES MELLITUS WITH BOTH EYES AFFECTED BY MODERATE NONPROLIFERATIVE RETINOPATHY AND MACULAR EDEMA, WITHOUT LONG-TERM CURRENT USE OF INSULIN: ICD-10-CM

## 2025-07-01 DIAGNOSIS — Z00.00 ENCOUNTER FOR MEDICARE ANNUAL WELLNESS EXAM: Primary | ICD-10-CM

## 2025-07-01 DIAGNOSIS — G47.33 OSA (OBSTRUCTIVE SLEEP APNEA): ICD-10-CM

## 2025-07-01 DIAGNOSIS — Z74.09 OTHER REDUCED MOBILITY: ICD-10-CM

## 2025-07-01 DIAGNOSIS — Z71.89 ADVANCE DIRECTIVE DISCUSSED WITH PATIENT: ICD-10-CM

## 2025-07-01 DIAGNOSIS — E11.3393 CONTROLLED TYPE 2 DIABETES MELLITUS WITH BOTH EYES AFFECTED BY MODERATE NONPROLIFERATIVE RETINOPATHY WITHOUT MACULAR EDEMA, WITHOUT LONG-TERM CURRENT USE OF INSULIN: ICD-10-CM

## 2025-07-01 PROBLEM — Z89.431 ACQUIRED ABSENCE OF RIGHT FOOT: Status: ACTIVE | Noted: 2025-07-01

## 2025-07-01 PROCEDURE — 4010F ACE/ARB THERAPY RXD/TAKEN: CPT | Mod: CPTII,HCNC,S$GLB,

## 2025-07-01 PROCEDURE — 99999 PR PBB SHADOW E&M-EST. PATIENT-LVL V: CPT | Mod: PBBFAC,HCNC,,

## 2025-07-01 PROCEDURE — 3044F HG A1C LEVEL LT 7.0%: CPT | Mod: CPTII,HCNC,S$GLB,

## 2025-07-01 PROCEDURE — G9919 SCRN ND POS ND PROV OF REC: HCPCS | Mod: CPTII,HCNC,S$GLB,

## 2025-07-01 PROCEDURE — 1158F ADVNC CARE PLAN TLK DOCD: CPT | Mod: CPTII,HCNC,S$GLB,

## 2025-07-01 PROCEDURE — 1170F FXNL STATUS ASSESSED: CPT | Mod: CPTII,HCNC,S$GLB,

## 2025-07-01 PROCEDURE — 1160F RVW MEDS BY RX/DR IN RCRD: CPT | Mod: CPTII,HCNC,S$GLB,

## 2025-07-01 PROCEDURE — 1159F MED LIST DOCD IN RCRD: CPT | Mod: CPTII,HCNC,S$GLB,

## 2025-07-01 PROCEDURE — 3078F DIAST BP <80 MM HG: CPT | Mod: CPTII,HCNC,S$GLB,

## 2025-07-01 PROCEDURE — G0439 PPPS, SUBSEQ VISIT: HCPCS | Mod: HCNC,S$GLB,,

## 2025-07-01 PROCEDURE — 3074F SYST BP LT 130 MM HG: CPT | Mod: CPTII,HCNC,S$GLB,

## 2025-07-01 PROCEDURE — 1125F AMNT PAIN NOTED PAIN PRSNT: CPT | Mod: CPTII,HCNC,S$GLB,

## 2025-07-01 NOTE — PROGRESS NOTES
Fermin Henson presented for a follow-up Medicare AWV today. The following components were reviewed and updated:    Medical history  Family History  Social history  Allergies and Current Medications  Health Risk Assessment  Health Maintenance  Care Team    **See Completed Assessments for Annual Wellness visit with in the encounter summary    The following assessments were completed:  Depression Screening  Cognitive function Screening  Timed Get Up Test  Whisper Test      Opioid documentation:  Patient does not have a current opioid prescription.          Vitals:    07/01/25 1453   BP: 128/62   Pulse: 109   Temp: 98.4 °F (36.9 °C)   TempSrc: Oral   SpO2: 96%   Weight: 96.2 kg (211 lb 15.6 oz)   Height: 6' (1.829 m)     Body mass index is 28.75 kg/m².        Physical Exam   Vital signs reviewed.   Body mass index is 28.75 kg/m².   General:  Well-developed, well-nourished. NAD.   Skin:  Warm, dry.   Eyes:  Conjunctivae w/o exudates or hemorrhage.  Non-icteric sclerae.    Heart:  Normal S1 & S2.  No extra heart sounds.    Lungs:  CTAB without rales, rhonchi or wheezing.  Breathing comfortably on RA.  Extremities:  Radial pulses 2+ and symmetric  Neuro:  A&O4.  No obvious focal deficits. Negative Braden's sign.    Psychiatric:  Appropriate affect.    Clock:       Assessment & Plan     Encounter for Medicare annual wellness exam  Pt was seen today for an Annual Wellness visit.  Healthcare maintenance and screening recommendations were discussed and updated as indicated.  Patient asked to return in one year for routine AWV.  -- Next PCP appointment 8/2025   -     Referral to Enhanced Annual Wellness Visit (eAWV) W+1    Advance directive discussed with patient  I offered to discuss advanced care planning, including how to pick a person who would make decisions for you if you were unable to make them for yourself, called a health care power of , and what kind of decisions you might make such as use of life sustaining  treatments such as ventilators and tube feeding when faced with a life limiting illness recorded on a living will that they will need to know. (How you want to be cared for as you near the end of your natural life)  -- Patient is interested in learning more about how to make advanced directives.  I provided them paperwork and discussed the rationale and logistics of its completion and return at next OV.        Type 2 diabetes mellitus with both eyes affected by moderate nonproliferative retinopathy and macular edema, without long-term current use of insulin  Controlled type 2 diabetes mellitus with both eyes affected by moderate nonproliferative retinopathy without macular edema, without long-term current use of insulin  Lab Results   Component Value Date    HGBA1C 6.5 (H) 02/19/2025   -- current regimen includes: metformin 850, pioglitazone 15, Jardiance 10  -- Continue current treatment plan as above.  No need to change medications at this time. No additional workup or intervention indicated at this time.      Other reduced mobility  -- Wearing a boot due to right foot ulcer.  Podiatry managing this issues.  Denies any falls      KELLEY  Discussed the imperative to treat KELLEY which is now confirmed by [facility]AT. He will make sleep medicine appointment soon. Referral already placed       Provided Fermin with a 5-10 year written screening schedule and personal prevention plan. Recommendations were developed using the USPSTF age appropriate recommendations. Education, counseling, and referrals were provided as needed.  After Visit Summary printed and given to patient which includes a list of additional screenings\tests needed.      Juanjo Arciniega PA-C

## 2025-07-01 NOTE — PATIENT INSTRUCTIONS
Counseling and Referral of Other Preventative  (Italic type indicates deductible and co-insurance are waived)    Patient Name: Fermin Henson  Today's Date: 7/1/2025    Please call the Referral Desk during regular business hours to schedule Sleep Medicine appointment at your earliest convenience:  [phone]    Magnesium:  You might also try supplementing with magnesium glycinate (about 2-300 mg) or magnesium oxide (about 4-500 mg) about 1 hour before bed to promote muscle relaxation and improve sleep quality.  Magnesium supplementation can also help promote bowel regularity, and has also been shown to alleviate certain idiopathic headaches.    Podiatry / Wound Care  Reach out to Ashwin Hartman DPM  about getting an appointment far sooner than 7/30 - please don't hesitate to reach out to Dr. Jean-Baptiste if this cannot be bumped up.        Health Maintenance         Date Due Completion Date    RSV Vaccine (Age 60+ and Pregnant patients) (1 - Risk 60-74 years 1-dose series) Never done ---    COVID-19 Vaccine (1 - 2024-25 season) 05/28/2026 (Originally 9/1/2024) ---    Diabetes Urine Screening 08/09/2025 8/9/2024    Hemoglobin A1c 08/19/2025 2/19/2025    Influenza Vaccine (1) 09/01/2025 2/19/2025    Colorectal Cancer Screening 10/03/2025 10/3/2022    Lipid Panel 02/19/2026 2/19/2025    Foot Exam 05/29/2026 5/29/2025    Override on 2/18/2025: Done (diffuse sensory deficit noted by podiatrist)    Override on 1/28/2019: Done    Low Dose Statin 06/18/2026 6/18/2025    Diabetic Eye Exam 06/18/2026 6/18/2025    TETANUS VACCINE 12/09/2033 12/9/2023          No orders of the defined types were placed in this encounter.      The following information is provided to all patients.  This information is to help you find resources for any of the problems found today that may be affecting your health:                  Living healthy guide: www.[facility].gov      Understanding Diabetes: www.diabetes.org      Eating healthy:  www.cdc.gov/healthyweight      CDC home safety checklist: www.cdc.gov/steadi/patient.html      Agency on Aging: www.goea.[place].gov      Alcoholics anonymous (AA): www.aa.org      Physical Activity: www.shai.nih.gov/lv7mdvw      Tobacco use: www.quitwithusla.org

## 2025-07-01 NOTE — ASSESSMENT & PLAN NOTE
Discussed the imperative to treat KELLEY which is now confirmed by HSAT.  He will make sleep medicine appointment soon.  Referral already placed

## 2025-07-07 ENCOUNTER — TELEPHONE (OUTPATIENT)
Dept: PODIATRY | Facility: CLINIC | Age: 65
End: 2025-07-07
Payer: MEDICARE

## 2025-07-07 NOTE — TELEPHONE ENCOUNTER
Copied from CRM #[id number]. Topic: General Inquiry - Patient Advice  >> Jul 7, 2025  9:49 AM Ladi wrote:  Type:  Needs Medical Advice    Who Called: Fermin called in regards to finding out can he got 43 days without dressing on his wound until his next appt with provider scheduled Wedn 7/30  Would the patient rather a call back or a response via [facility]? Call back   Best Call Back Number: pt [phone]   Additional Information:      I called patient appointment scheduled for  7/8 at 11:30 wound care appointment scheduled with Dr. Hartman

## 2025-07-08 ENCOUNTER — OFFICE VISIT (OUTPATIENT)
Dept: PODIATRY | Facility: CLINIC | Age: 65
End: 2025-07-08
Payer: MEDICARE

## 2025-07-08 VITALS
HEIGHT: 72 IN | BODY MASS INDEX: 28.75 KG/M2 | DIASTOLIC BLOOD PRESSURE: 76 MMHG | SYSTOLIC BLOOD PRESSURE: 132 MMHG | HEART RATE: 89 BPM

## 2025-07-08 DIAGNOSIS — L97.512 FOOT ULCER, RIGHT, WITH FAT LAYER EXPOSED: ICD-10-CM

## 2025-07-08 DIAGNOSIS — E11.49 TYPE II DIABETES MELLITUS WITH NEUROLOGICAL MANIFESTATIONS: Primary | ICD-10-CM

## 2025-07-08 DIAGNOSIS — Z89.421 H/O AMPUTATION OF LESSER TOE, RIGHT: ICD-10-CM

## 2025-07-08 DIAGNOSIS — M79.671 PAIN IN BOTH FEET: ICD-10-CM

## 2025-07-08 DIAGNOSIS — M79.672 PAIN IN BOTH FEET: ICD-10-CM

## 2025-07-08 PROCEDURE — 99213 OFFICE O/P EST LOW 20 MIN: CPT | Mod: 25,HCNC,S$GLB, | Performed by: PODIATRIST

## 2025-07-08 PROCEDURE — 1160F RVW MEDS BY RX/DR IN RCRD: CPT | Mod: CPTII,HCNC,S$GLB, | Performed by: PODIATRIST

## 2025-07-08 PROCEDURE — 1159F MED LIST DOCD IN RCRD: CPT | Mod: CPTII,HCNC,S$GLB, | Performed by: PODIATRIST

## 2025-07-08 PROCEDURE — 3044F HG A1C LEVEL LT 7.0%: CPT | Mod: CPTII,HCNC,S$GLB, | Performed by: PODIATRIST

## 2025-07-08 PROCEDURE — 99999 PR PBB SHADOW E&M-EST. PATIENT-LVL III: CPT | Mod: PBBFAC,HCNC,, | Performed by: PODIATRIST

## 2025-07-08 PROCEDURE — 11042 DBRDMT SUBQ TIS 1ST 20SQCM/<: CPT | Mod: HCNC,S$GLB,, | Performed by: PODIATRIST

## 2025-07-08 PROCEDURE — 1126F AMNT PAIN NOTED NONE PRSNT: CPT | Mod: CPTII,HCNC,S$GLB, | Performed by: PODIATRIST

## 2025-07-08 PROCEDURE — 3078F DIAST BP <80 MM HG: CPT | Mod: CPTII,HCNC,S$GLB, | Performed by: PODIATRIST

## 2025-07-08 PROCEDURE — 4010F ACE/ARB THERAPY RXD/TAKEN: CPT | Mod: CPTII,HCNC,S$GLB, | Performed by: PODIATRIST

## 2025-07-08 PROCEDURE — 3008F BODY MASS INDEX DOCD: CPT | Mod: CPTII,HCNC,S$GLB, | Performed by: PODIATRIST

## 2025-07-08 PROCEDURE — 3075F SYST BP GE 130 - 139MM HG: CPT | Mod: CPTII,HCNC,S$GLB, | Performed by: PODIATRIST

## 2025-07-08 NOTE — PROGRESS NOTES
Subjective:      Patient ID: Fermin Henson is a 65 y.o. male.    Chief Complaint: Wound Care (Right foot) and Diabetes Mellitus    Fermin is a 65 y.o. male who presents to the clinic for evaluation and treatment of high risk feet. Fermin has a past medical history of Benign essential HTN (12/4/2023), Diabetes mellitus, type 2, and Kidney stones. The patient's chief complaint is foot ulcer, right foot. This patient has documented high risk feet requiring routine maintenance secondary to peripheral neuropathy.  Patient presents today for a new issue of right foot wound/blistering after unloading a Pod while moving recently.  Doing well/no new complaints today.    6/16:  Patient presents to clinic for R plantar foot wound follow up.    7/8:  Patient presents to the clinic for R plantar foot wound follow up.  Patient is reporting new redness on the top of his R foot.  Denies noticing itchiness.  Denies fevers, chills, nausea, vomiting.  Denies SOB or chest pain.  Denies any other pedal complaints.    PCP: See Jean-Baptiste MD    Date Last Seen by PCP:   Chief Complaint   Patient presents with    Wound Care     Right foot    Diabetes Mellitus     Current shoe gear:  Affected Foot: Extra depth shoes with custome accommodative insoles     Unaffected Foot: Extra depth shoes with custome accommodative insoles  History of Trauma: negative  Sign of Infection: none    Hemoglobin A1C   Date Value Ref Range Status   02/19/2025 6.5 (H) 4.0 - 5.6 % Final     Comment:     ADA Screening Guidelines:  5.7-6.4%  Consistent with prediabetes  >or=6.5%  Consistent with diabetes    High levels of fetal hemoglobin interfere with the HbA1C  assay. Heterozygous hemoglobin variants (HbS, HgC, etc)do  not significantly interfere with this assay.   However, presence of multiple variants may affect accuracy.     08/09/2024 7.0 (H) 4.0 - 5.6 % Final     Comment:     ADA Screening Guidelines:  5.7-6.4%  Consistent with prediabetes  >or=6.5%   Consistent with diabetes    High levels of fetal hemoglobin interfere with the HbA1C  assay. Heterozygous hemoglobin variants (HbS, HgC, etc)do  not significantly interfere with this assay.   However, presence of multiple variants may affect accuracy.     02/08/2024 6.4 (H) 4.0 - 5.6 % Final     Comment:     ADA Screening Guidelines:  5.7-6.4%  Consistent with prediabetes  >or=6.5%  Consistent with diabetes    High levels of fetal hemoglobin interfere with the HbA1C  assay. Heterozygous hemoglobin variants (HbS, HgC, etc)do  not significantly interfere with this assay.   However, presence of multiple variants may affect accuracy.       Review of Systems   Constitutional: Negative for chills, decreased appetite, fever and malaise/fatigue.   HENT:  Negative for congestion, hearing loss, nosebleeds and tinnitus.    Eyes:  Negative for double vision, pain, photophobia and visual disturbance.   Cardiovascular:  Negative for chest pain, claudication, cyanosis and leg swelling.   Respiratory:  Negative for cough, hemoptysis, shortness of breath and wheezing.    Endocrine: Negative for cold intolerance and heat intolerance.   Hematologic/Lymphatic: Negative for adenopathy and bleeding problem.   Skin:  Positive for color change and unusual hair distribution. Negative for dry skin, flushing, itching, nail changes, rash and suspicious lesions.   Musculoskeletal:  Negative for arthritis, joint pain, myalgias and stiffness.   Gastrointestinal:  Negative for abdominal pain, jaundice, nausea and vomiting.   Genitourinary:  Negative for dysuria, frequency and hematuria.   Neurological:  Positive for numbness, paresthesias and sensory change. Negative for difficulty with concentration and loss of balance.   Psychiatric/Behavioral:  Negative for altered mental status, hallucinations and suicidal ideas. The patient is not nervous/anxious.    Allergic/Immunologic: Negative for environmental allergies, hives and persistent infections.          Objective:      Physical Exam  Vitals reviewed.   Constitutional:       Appearance: He is well-developed.   Cardiovascular:      Pulses: Normal pulses.   Musculoskeletal:      Right ankle: Decreased range of motion. Abnormal pulse.      Right Achilles Tendon: Neil's test negative.      Left ankle: Decreased range of motion. Abnormal pulse.      Left Achilles Tendon: Neil's test negative.      Right foot: Decreased range of motion. Prominent metatarsal heads present.      Left foot: Decreased range of motion.      Comments: Right digit amps 1-3   Feet:      Right foot:      Protective Sensation: 5 sites tested.  2 sites sensed.      Left foot:      Protective Sensation: 5 sites tested.  2 sites sensed.   Skin:     General: Skin is dry.      Capillary Refill: Capillary refill takes more than 3 seconds.   Neurological:      Mental Status: He is alert and oriented to person, place, and time.      Sensory: Sensory deficit present.      Comments: diminished sensation noted to b/L lower extremities   Psychiatric:         Behavior: Behavior normal. Behavior is cooperative.       Plantar R foot sub 1st and 2nd MPJ wound.  Sub 1st MPJ there is also 1.0 x 1.0 x 0.1 cm ulceration.  These are post debridement measurements into the level of the subcutaneous tissue.    No erythema or swelling noted today    Redness noted to dorsal R foot.  No pain to palpation.  No warmth, maceration, or drainage noted.    Assessment:       Encounter Diagnoses   Name Primary?    Type II diabetes mellitus with neurological manifestations Yes    Foot ulcer, right, with fat layer exposed     H/O amputation of lesser toe, right     Pain in both feet          Plan:       Fermin was seen today for wound care and diabetes mellitus.    Diagnoses and all orders for this visit:    Type II diabetes mellitus with neurological manifestations    Foot ulcer, right, with fat layer exposed    H/O amputation of lesser toe, right    Pain in both  "feet      I counseled the patient on his conditions, their implications and medical management.    Wound Debridement    Date/Time: 7/8/2025 11:30 AM    Performed by: Pura Goel DPM  Authorized by: Ashwin Hartman DPM    Time out: Immediately prior to procedure a "time out" was called to verify the correct patient, procedure, equipment, support staff and site/side marked as required.    Consent Done?:  Yes (Verbal)    Preparation: Patient was prepped and draped with clean technique    Local anesthesia used?: No      Wound Details:    Location:  Right foot    Location:  Right Plantar    Type of Debridement:  Excisional       Length (cm):  1       Width (cm):  1       Depth (cm):  0.2       Area (sq cm):  0.79       Percent Debrided (%):  100       Total Area Debrided (sq cm):  0.79    Depth of debridement:  Subcutaneous tissue    Tissue debrided:  Subcutaneous, Dermis and Epidermis    Devitalized tissue debrided:  Callus, Biofilm and Slough    Instruments:  Curette, Forceps, Nippers and Blade  Bleeding:  None  Patient tolerance:  Patient tolerated the procedure well with no immediate complications    -R foot wounds debrided today.  Full procedure note above  -R foot dressed with topical ointment on the dorsal foot followed by Hydrofera wound over the dorsal foot and plantar wound followed by cast padding, and Coban  -WBAT in Darco shoe to R foot.  -RTC when 2 weeks    Pura Goel DPM   Podiatric Medicine & Surgery  [facility]                             "

## 2025-07-21 ENCOUNTER — OFFICE VISIT (OUTPATIENT)
Dept: PODIATRY | Facility: CLINIC | Age: 65
End: 2025-07-21
Payer: MEDICARE

## 2025-07-21 VITALS
SYSTOLIC BLOOD PRESSURE: 133 MMHG | HEART RATE: 96 BPM | DIASTOLIC BLOOD PRESSURE: 75 MMHG | BODY MASS INDEX: 28.75 KG/M2 | HEIGHT: 72 IN

## 2025-07-21 DIAGNOSIS — L97.512 FOOT ULCER, RIGHT, WITH FAT LAYER EXPOSED: ICD-10-CM

## 2025-07-21 DIAGNOSIS — E11.49 TYPE II DIABETES MELLITUS WITH NEUROLOGICAL MANIFESTATIONS: Primary | ICD-10-CM

## 2025-07-21 DIAGNOSIS — M79.671 PAIN IN BOTH FEET: ICD-10-CM

## 2025-07-21 DIAGNOSIS — L84 CORN OR CALLUS: ICD-10-CM

## 2025-07-21 DIAGNOSIS — B35.1 ONYCHOMYCOSIS DUE TO DERMATOPHYTE: ICD-10-CM

## 2025-07-21 DIAGNOSIS — M79.672 PAIN IN BOTH FEET: ICD-10-CM

## 2025-07-21 PROCEDURE — 3044F HG A1C LEVEL LT 7.0%: CPT | Mod: CPTII,HCNC,S$GLB, | Performed by: PODIATRIST

## 2025-07-21 PROCEDURE — 11056 PARNG/CUTG B9 HYPRKR LES 2-4: CPT | Mod: XS,Q9,HCNC,S$GLB | Performed by: PODIATRIST

## 2025-07-21 PROCEDURE — 3078F DIAST BP <80 MM HG: CPT | Mod: CPTII,HCNC,S$GLB, | Performed by: PODIATRIST

## 2025-07-21 PROCEDURE — 11721 DEBRIDE NAIL 6 OR MORE: CPT | Mod: Q9,XS,HCNC,S$GLB | Performed by: PODIATRIST

## 2025-07-21 PROCEDURE — 99213 OFFICE O/P EST LOW 20 MIN: CPT | Mod: 25,HCNC,S$GLB, | Performed by: PODIATRIST

## 2025-07-21 PROCEDURE — 3008F BODY MASS INDEX DOCD: CPT | Mod: CPTII,HCNC,S$GLB, | Performed by: PODIATRIST

## 2025-07-21 PROCEDURE — 1126F AMNT PAIN NOTED NONE PRSNT: CPT | Mod: CPTII,HCNC,S$GLB, | Performed by: PODIATRIST

## 2025-07-21 PROCEDURE — 3075F SYST BP GE 130 - 139MM HG: CPT | Mod: CPTII,HCNC,S$GLB, | Performed by: PODIATRIST

## 2025-07-21 PROCEDURE — 11042 DBRDMT SUBQ TIS 1ST 20SQCM/<: CPT | Mod: HCNC,S$GLB,, | Performed by: PODIATRIST

## 2025-07-21 PROCEDURE — 99999 PR PBB SHADOW E&M-EST. PATIENT-LVL III: CPT | Mod: PBBFAC,HCNC,, | Performed by: PODIATRIST

## 2025-07-21 PROCEDURE — 4010F ACE/ARB THERAPY RXD/TAKEN: CPT | Mod: CPTII,HCNC,S$GLB, | Performed by: PODIATRIST

## 2025-07-21 PROCEDURE — 1159F MED LIST DOCD IN RCRD: CPT | Mod: CPTII,HCNC,S$GLB, | Performed by: PODIATRIST

## 2025-07-22 NOTE — PROGRESS NOTES
Subjective:      Patient ID: Fermin Henson is a 65 y.o. male.    Chief Complaint: Wound Care (Right foot) and Diabetes Mellitus    Fermin is a 65 y.o. male who presents to the clinic for evaluation and treatment of high risk feet. Fermin has a past medical history of Benign essential HTN (12/4/2023), Diabetes mellitus, type 2, and Kidney stones. The patient's chief complaint is foot ulcer, right foot. This patient has documented high risk feet requiring routine maintenance secondary to peripheral neuropathy.  Patient presents today for a new issue of right foot wound/blistering after unloading a Pod while moving recently.  Doing well/no new complaints today.  Patient presents to the clinic for R plantar foot wound follow up.  Patient is reporting new redness on the top of his R foot.  Denies noticing itchiness.  Denies fevers, chills, nausea, vomiting.  Denies SOB or chest pain.  Denies any other pedal complaints.    PCP: See Jean-Baptiste MD    Date Last Seen by PCP:   Chief Complaint   Patient presents with    Wound Care     Right foot    Diabetes Mellitus     Current shoe gear:  Affected Foot: Extra depth shoes with custome accommodative insoles     Unaffected Foot: Extra depth shoes with custome accommodative insoles  History of Trauma: negative  Sign of Infection: none    Hemoglobin A1C   Date Value Ref Range Status   02/19/2025 6.5 (H) 4.0 - 5.6 % Final     Comment:     ADA Screening Guidelines:  5.7-6.4%  Consistent with prediabetes  >or=6.5%  Consistent with diabetes    High levels of fetal hemoglobin interfere with the HbA1C  assay. Heterozygous hemoglobin variants (HbS, HgC, etc)do  not significantly interfere with this assay.   However, presence of multiple variants may affect accuracy.     08/09/2024 7.0 (H) 4.0 - 5.6 % Final     Comment:     ADA Screening Guidelines:  5.7-6.4%  Consistent with prediabetes  >or=6.5%  Consistent with diabetes    High levels of fetal hemoglobin interfere with the  HbA1C  assay. Heterozygous hemoglobin variants (HbS, HgC, etc)do  not significantly interfere with this assay.   However, presence of multiple variants may affect accuracy.     02/08/2024 6.4 (H) 4.0 - 5.6 % Final     Comment:     ADA Screening Guidelines:  5.7-6.4%  Consistent with prediabetes  >or=6.5%  Consistent with diabetes    High levels of fetal hemoglobin interfere with the HbA1C  assay. Heterozygous hemoglobin variants (HbS, HgC, etc)do  not significantly interfere with this assay.   However, presence of multiple variants may affect accuracy.       Review of Systems   Constitutional: Negative for chills, decreased appetite, fever and malaise/fatigue.   HENT:  Negative for congestion, hearing loss, nosebleeds and tinnitus.    Eyes:  Negative for double vision, pain, photophobia and visual disturbance.   Cardiovascular:  Negative for chest pain, claudication, cyanosis and leg swelling.   Respiratory:  Negative for cough, hemoptysis, shortness of breath and wheezing.    Endocrine: Negative for cold intolerance and heat intolerance.   Hematologic/Lymphatic: Negative for adenopathy and bleeding problem.   Skin:  Positive for color change and unusual hair distribution. Negative for dry skin, flushing, itching, nail changes, rash and suspicious lesions.   Musculoskeletal:  Negative for arthritis, joint pain, myalgias and stiffness.   Gastrointestinal:  Negative for abdominal pain, jaundice, nausea and vomiting.   Genitourinary:  Negative for dysuria, frequency and hematuria.   Neurological:  Positive for numbness, paresthesias and sensory change. Negative for difficulty with concentration and loss of balance.   Psychiatric/Behavioral:  Negative for altered mental status, hallucinations and suicidal ideas. The patient is not nervous/anxious.    Allergic/Immunologic: Negative for environmental allergies, hives and persistent infections.         Objective:      Physical Exam  Vitals reviewed.   Constitutional:        Appearance: He is well-developed.   Cardiovascular:      Pulses: Normal pulses.   Musculoskeletal:      Right ankle: Decreased range of motion. Abnormal pulse.      Right Achilles Tendon: Neil's test negative.      Left ankle: Decreased range of motion. Abnormal pulse.      Left Achilles Tendon: Neil's test negative.      Right foot: Decreased range of motion. Prominent metatarsal heads present.      Left foot: Decreased range of motion.      Comments: Right digit amps 1-3   Feet:      Right foot:      Protective Sensation: 5 sites tested.  2 sites sensed.      Left foot:      Protective Sensation: 5 sites tested.  2 sites sensed.   Skin:     General: Skin is dry.      Capillary Refill: Capillary refill takes more than 3 seconds.   Neurological:      Mental Status: He is alert and oriented to person, place, and time.      Sensory: Sensory deficit present.      Comments: diminished sensation noted to b/L lower extremities   Psychiatric:         Behavior: Behavior normal. Behavior is cooperative.     Plantar R foot sub 1st and 2nd MPJ wound.  Sub 1st MPJ there is also 0.3 x 0.2 x 0.1 cm ulceration.  These are post debridement measurements into the level of the subcutaneous tissue.    No erythema or swelling noted today    Redness noted to dorsal R foot.  No pain to palpation.  No warmth, maceration, or drainage noted.    Long, thickened, discolored  toenails 1-5 with subungual debris  Bilateral sub 1st MPJ hyperkeratotic tissue with tenderness noted.  Assessment:       Encounter Diagnoses   Name Primary?    Type II diabetes mellitus with neurological manifestations Yes    Pain in both feet     Corn or callus     Onychomycosis due to dermatophyte     Foot ulcer, right, with fat layer exposed          Plan:       Fermin was seen today for wound care and diabetes mellitus.    Diagnoses and all orders for this visit:    Type II diabetes mellitus with neurological manifestations    Pain in both feet    Corn or  "callus    Onychomycosis due to dermatophyte    Foot ulcer, right, with fat layer exposed    Other orders  -     Wound Debridement      I counseled the patient on his conditions, their implications and medical management.    Decision making:  Chronic illnesses discussed in detail, previous records/notes and imaging independently reviewed, prescription drug management performed in addition to lengthy discussion regarding both conservative and surgical treatment options.    Routine Foot Care    Performed by:  Ashwin Hartman. ALIA  Authorized by:  Patient     Consent Done?:  Yes (Verbal)     Nail Care Type:  Debride  Location(s): All  (Left 1st Toe, Left 3rd Toe, Left 2nd Toe, Left 4th Toe, Left 5th Toe, Right 1st Toe, Right 2nd Toe, Right 3rd Toe, Right 4th Toe and Right 5th Toe)  Patient tolerance:  Patient tolerated the procedure well with no immediate complications     With patient's permission, the toenails mentioned above were aggressively reduced and debrided using a nail nipper, removing all offending nail and debris. The patient will continue to monitor the areas daily, inspect the feet, wear protective shoe gear when ambulatory, and moisturizer to maintain skin integrity.      Callus Care Type: Debride    With patient's permission, the calluses/hyperkeratotic lesions mentioned above were aggressively reduced and debrided using a number 15 blade. The patient will continue to monitor the areas daily, inspect the feet, wear protective shoe gear when ambulatory, and moisturizer to maintain skin integrity.       Wound Debridement    Date/Time: 7/21/2025 2:30 PM    Performed by: Ashwin Hartman DPM  Authorized by: Ashwin Hartman DPM    Time out: Immediately prior to procedure a "time out" was called to verify the correct patient, procedure, equipment, support staff and site/side marked as required.    Consent Done?:  Yes (Verbal)    Preparation: Patient was prepped and draped with clean technique    Local " anesthesia used?: No      Wound Details:    Location:  Right foot    Type of Debridement:  Excisional       Length (cm):  0.3       Width (cm):  0.2       Depth (cm):  0.1       Area (sq cm):  0.05       Percent Debrided (%):  100       Total Area Debrided (sq cm):  0.05    Depth of debridement:  Subcutaneous tissue    Tissue debrided:  Subcutaneous, Dermis and Epidermis    Devitalized tissue debrided:  Callus, Biofilm and Slough    Instruments:  Curette, Forceps, Nippers and Blade  Bleeding:  None  Patient tolerance:  Patient tolerated the procedure well with no immediate complications    -R foot wounds debrided today.  Full procedure note above  -R foot dressed with topical ointment on the dorsal foot followed by Hydrofera wound over the dorsal foot and plantar wound followed by cast padding, and Coban  -WBAT in Darco shoe to R foot.  -RTC when 2 weeks

## 2025-07-30 ENCOUNTER — OFFICE VISIT (OUTPATIENT)
Dept: PODIATRY | Facility: CLINIC | Age: 65
End: 2025-07-30
Payer: MEDICARE

## 2025-07-30 VITALS
DIASTOLIC BLOOD PRESSURE: 75 MMHG | BODY MASS INDEX: 29.31 KG/M2 | HEART RATE: 101 BPM | WEIGHT: 216.38 LBS | SYSTOLIC BLOOD PRESSURE: 122 MMHG | HEIGHT: 72 IN

## 2025-07-30 DIAGNOSIS — M79.671 PAIN IN BOTH FEET: ICD-10-CM

## 2025-07-30 DIAGNOSIS — M79.672 PAIN IN BOTH FEET: ICD-10-CM

## 2025-07-30 DIAGNOSIS — L97.512 FOOT ULCER, RIGHT, WITH FAT LAYER EXPOSED: ICD-10-CM

## 2025-07-30 DIAGNOSIS — E11.49 TYPE II DIABETES MELLITUS WITH NEUROLOGICAL MANIFESTATIONS: Primary | ICD-10-CM

## 2025-07-30 PROCEDURE — 99999 PR PBB SHADOW E&M-EST. PATIENT-LVL III: CPT | Mod: PBBFAC,HCNC,, | Performed by: PODIATRIST

## 2025-07-30 NOTE — PROGRESS NOTES
Subjective:      Patient ID: Fermin Henson is a 65 y.o. male.    Chief Complaint: Wound Care (Both feet. Mainly Right foot. Last pcp appointment 7/1/2025.)    Fermin is a 65 y.o. male who presents to the clinic for evaluation and treatment of high risk feet. Fermin has a past medical history of Benign essential HTN (12/4/2023), Diabetes mellitus, type 2, and Kidney stones. The patient's chief complaint is foot ulcer, right foot. This patient has documented high risk feet requiring routine maintenance secondary to peripheral neuropathy.  Patient presents today for a new issue of right foot wound/blistering after unloading a Pod while moving recently.  Doing well/no new complaints today.  Patient presents to the clinic for R plantar foot wound follow up.  Oral improving however still has a single wound to the bottom of the right foot.    PCP: See Jean-Baptiste MD    Date Last Seen by PCP:   Chief Complaint   Patient presents with    Wound Care     Both feet. Mainly Right foot. Last pcp appointment 7/1/2025.     Current shoe gear:  Affected Foot: Extra depth shoes with custome accommodative insoles     Unaffected Foot: Extra depth shoes with custome accommodative insoles  History of Trauma: negative  Sign of Infection: none    Hemoglobin A1C   Date Value Ref Range Status   02/19/2025 6.5 (H) 4.0 - 5.6 % Final     Comment:     ADA Screening Guidelines:  5.7-6.4%  Consistent with prediabetes  >or=6.5%  Consistent with diabetes    High levels of fetal hemoglobin interfere with the HbA1C  assay. Heterozygous hemoglobin variants (HbS, HgC, etc)do  not significantly interfere with this assay.   However, presence of multiple variants may affect accuracy.     08/09/2024 7.0 (H) 4.0 - 5.6 % Final     Comment:     ADA Screening Guidelines:  5.7-6.4%  Consistent with prediabetes  >or=6.5%  Consistent with diabetes    High levels of fetal hemoglobin interfere with the HbA1C  assay. Heterozygous hemoglobin variants (HbS, HgC,  etc)do  not significantly interfere with this assay.   However, presence of multiple variants may affect accuracy.     02/08/2024 6.4 (H) 4.0 - 5.6 % Final     Comment:     ADA Screening Guidelines:  5.7-6.4%  Consistent with prediabetes  >or=6.5%  Consistent with diabetes    High levels of fetal hemoglobin interfere with the HbA1C  assay. Heterozygous hemoglobin variants (HbS, HgC, etc)do  not significantly interfere with this assay.   However, presence of multiple variants may affect accuracy.       Review of Systems   Constitutional: Negative for chills, decreased appetite, fever and malaise/fatigue.   HENT:  Negative for congestion, hearing loss, nosebleeds and tinnitus.    Eyes:  Negative for double vision, pain, photophobia and visual disturbance.   Cardiovascular:  Negative for chest pain, claudication, cyanosis and leg swelling.   Respiratory:  Negative for cough, hemoptysis, shortness of breath and wheezing.    Endocrine: Negative for cold intolerance and heat intolerance.   Hematologic/Lymphatic: Negative for adenopathy and bleeding problem.   Skin:  Positive for color change and unusual hair distribution. Negative for dry skin, flushing, itching, nail changes, rash and suspicious lesions.   Musculoskeletal:  Negative for arthritis, joint pain, myalgias and stiffness.   Gastrointestinal:  Negative for abdominal pain, jaundice, nausea and vomiting.   Genitourinary:  Negative for dysuria, frequency and hematuria.   Neurological:  Positive for numbness, paresthesias and sensory change. Negative for difficulty with concentration and loss of balance.   Psychiatric/Behavioral:  Negative for altered mental status, hallucinations and suicidal ideas. The patient is not nervous/anxious.    Allergic/Immunologic: Negative for environmental allergies, hives and persistent infections.         Objective:      Physical Exam  Vitals reviewed.   Constitutional:       Appearance: He is well-developed.   Cardiovascular:       Pulses: Normal pulses.   Musculoskeletal:      Right ankle: Decreased range of motion. Abnormal pulse.      Right Achilles Tendon: Neil's test negative.      Left ankle: Decreased range of motion. Abnormal pulse.      Left Achilles Tendon: Neil's test negative.      Right foot: Decreased range of motion. Prominent metatarsal heads present.      Left foot: Decreased range of motion.      Comments: Right digit amps 1-3   Feet:      Right foot:      Protective Sensation: 5 sites tested.  2 sites sensed.      Left foot:      Protective Sensation: 5 sites tested.  2 sites sensed.   Skin:     General: Skin is dry.      Capillary Refill: Capillary refill takes more than 3 seconds.      Comments: Right plantar foot wound noted measuring 2 cm x 1 cm x 0.1 cm post debridement to the subcutaneous tissue.   Neurological:      Mental Status: He is alert and oriented to person, place, and time.      Sensory: Sensory deficit present.      Comments: diminished sensation noted to b/L lower extremities   Psychiatric:         Behavior: Behavior normal. Behavior is cooperative.       No erythema or swelling noted today    Redness noted to dorsal R foot.  No pain to palpation.  No warmth, maceration, or drainage noted.    Assessment:       Encounter Diagnoses   Name Primary?    Type II diabetes mellitus with neurological manifestations Yes    Pain in both feet     Foot ulcer, right, with fat layer exposed          Plan:       Fermin was seen today for wound care.    Diagnoses and all orders for this visit:    Type II diabetes mellitus with neurological manifestations    Pain in both feet    Foot ulcer, right, with fat layer exposed    Other orders  -     Wound Debridement      I counseled the patient on his conditions, their implications and medical management.    Decision making:  Chronic illnesses discussed in detail, previous records/notes and imaging independently reviewed, prescription drug management performed in addition  "to lengthy discussion regarding both conservative and surgical treatment options.        Wound Debridement    Date/Time: 7/30/2025 1:30 PM    Performed by: Ashwin Hartman DPM  Authorized by: Ashwin Hartman DPM    Time out: Immediately prior to procedure a "time out" was called to verify the correct patient, procedure, equipment, support staff and site/side marked as required.    Local anesthesia used?: No      Wound Details:    Location:  Right foot    Type of Debridement:  Excisional       Length (cm):  2       Width (cm):  1       Depth (cm):  0.1       Area (sq cm):  1.57       Percent Debrided (%):  100       Total Area Debrided (sq cm):  1.57    Depth of debridement:  Subcutaneous tissue    Tissue debrided:  Subcutaneous, Dermis and Epidermis    Devitalized tissue debrided:  Callus, Biofilm and Slough    Instruments:  Curette, Forceps, Nippers and Blade  Bleeding:  None  Patient tolerance:  Patient tolerated the procedure well with no immediate complications    -R foot wounds debrided today.  Full procedure note above  -R foot dressed with topical ointment on the dorsal foot followed by Hydrofera wound over the dorsal foot and plantar wound followed by cast padding, and Coban  -WBAT in Darco shoe to R foot.  -RTC in 1 week.                                   "

## 2025-08-06 ENCOUNTER — OFFICE VISIT (OUTPATIENT)
Dept: PODIATRY | Facility: CLINIC | Age: 65
End: 2025-08-06
Payer: MEDICARE

## 2025-08-06 VITALS
BODY MASS INDEX: 29.35 KG/M2 | DIASTOLIC BLOOD PRESSURE: 86 MMHG | HEART RATE: 106 BPM | HEIGHT: 72 IN | SYSTOLIC BLOOD PRESSURE: 139 MMHG

## 2025-08-06 DIAGNOSIS — E11.49 TYPE II DIABETES MELLITUS WITH NEUROLOGICAL MANIFESTATIONS: Primary | ICD-10-CM

## 2025-08-06 DIAGNOSIS — L97.512 FOOT ULCER, RIGHT, WITH FAT LAYER EXPOSED: ICD-10-CM

## 2025-08-06 PROCEDURE — 3008F BODY MASS INDEX DOCD: CPT | Mod: CPTII,HCNC,S$GLB, | Performed by: PODIATRIST

## 2025-08-06 PROCEDURE — 1159F MED LIST DOCD IN RCRD: CPT | Mod: CPTII,HCNC,S$GLB, | Performed by: PODIATRIST

## 2025-08-06 PROCEDURE — 99999 PR PBB SHADOW E&M-EST. PATIENT-LVL IV: CPT | Mod: PBBFAC,HCNC,, | Performed by: PODIATRIST

## 2025-08-06 PROCEDURE — 1126F AMNT PAIN NOTED NONE PRSNT: CPT | Mod: CPTII,HCNC,S$GLB, | Performed by: PODIATRIST

## 2025-08-06 PROCEDURE — 1160F RVW MEDS BY RX/DR IN RCRD: CPT | Mod: CPTII,HCNC,S$GLB, | Performed by: PODIATRIST

## 2025-08-06 PROCEDURE — 3075F SYST BP GE 130 - 139MM HG: CPT | Mod: CPTII,HCNC,S$GLB, | Performed by: PODIATRIST

## 2025-08-06 PROCEDURE — 99213 OFFICE O/P EST LOW 20 MIN: CPT | Mod: 25,HCNC,S$GLB, | Performed by: PODIATRIST

## 2025-08-06 PROCEDURE — 11042 DBRDMT SUBQ TIS 1ST 20SQCM/<: CPT | Mod: HCNC,GC,S$GLB, | Performed by: PODIATRIST

## 2025-08-06 PROCEDURE — 3079F DIAST BP 80-89 MM HG: CPT | Mod: CPTII,HCNC,S$GLB, | Performed by: PODIATRIST

## 2025-08-06 PROCEDURE — 4010F ACE/ARB THERAPY RXD/TAKEN: CPT | Mod: CPTII,HCNC,S$GLB, | Performed by: PODIATRIST

## 2025-08-06 PROCEDURE — 3044F HG A1C LEVEL LT 7.0%: CPT | Mod: CPTII,HCNC,S$GLB, | Performed by: PODIATRIST

## 2025-08-07 NOTE — PROGRESS NOTES
Subjective:      Patient ID: Fermin Henson is a 65 y.o. male.    Chief Complaint: Wound Care (Right foot) and Diabetes Mellitus (PCP- 05/28/2025/See Jean-Baptiste MD)    Fermin is a 65 y.o. male who presents to the clinic for evaluation and treatment of high risk feet. Fermin has a past medical history of Benign essential HTN (12/4/2023), Diabetes mellitus, type 2, and Kidney stones. The patient's chief complaint is foot ulcer, right foot. This patient has documented high risk feet requiring routine maintenance secondary to peripheral neuropathy.  Patient presents today for a new issue of right foot wound/blistering after unloading a Pod while moving recently.  Doing well/no new complaints today.  Patient presents to the clinic for R plantar foot wound follow up.  Oral improving however still has a single wound to the bottom of the right foot.    PCP: See Jean-Baptiste MD    Date Last Seen by PCP:   Chief Complaint   Patient presents with    Wound Care     Right foot    Diabetes Mellitus     PCP- 05/28/2025  See Jean-Baptiste MD     Current shoe gear:  Affected Foot: Extra depth shoes with custome accommodative insoles     Unaffected Foot: Extra depth shoes with custome accommodative insoles  History of Trauma: negative  Sign of Infection: none    Hemoglobin A1C   Date Value Ref Range Status   02/19/2025 6.5 (H) 4.0 - 5.6 % Final     Comment:     ADA Screening Guidelines:  5.7-6.4%  Consistent with prediabetes  >or=6.5%  Consistent with diabetes    High levels of fetal hemoglobin interfere with the HbA1C  assay. Heterozygous hemoglobin variants (HbS, HgC, etc)do  not significantly interfere with this assay.   However, presence of multiple variants may affect accuracy.     08/09/2024 7.0 (H) 4.0 - 5.6 % Final     Comment:     ADA Screening Guidelines:  5.7-6.4%  Consistent with prediabetes  >or=6.5%  Consistent with diabetes    High levels of fetal hemoglobin interfere with the HbA1C  assay. Heterozygous hemoglobin  variants (HbS, HgC, etc)do  not significantly interfere with this assay.   However, presence of multiple variants may affect accuracy.     02/08/2024 6.4 (H) 4.0 - 5.6 % Final     Comment:     ADA Screening Guidelines:  5.7-6.4%  Consistent with prediabetes  >or=6.5%  Consistent with diabetes    High levels of fetal hemoglobin interfere with the HbA1C  assay. Heterozygous hemoglobin variants (HbS, HgC, etc)do  not significantly interfere with this assay.   However, presence of multiple variants may affect accuracy.       Review of Systems   Constitutional: Negative for chills, decreased appetite, fever and malaise/fatigue.   HENT:  Negative for congestion, hearing loss, nosebleeds and tinnitus.    Eyes:  Negative for double vision, pain, photophobia and visual disturbance.   Cardiovascular:  Negative for chest pain, claudication, cyanosis and leg swelling.   Respiratory:  Negative for cough, hemoptysis, shortness of breath and wheezing.    Endocrine: Negative for cold intolerance and heat intolerance.   Hematologic/Lymphatic: Negative for adenopathy and bleeding problem.   Skin:  Positive for color change and unusual hair distribution. Negative for dry skin, flushing, itching, nail changes, rash and suspicious lesions.   Musculoskeletal:  Negative for arthritis, joint pain, myalgias and stiffness.   Gastrointestinal:  Negative for abdominal pain, jaundice, nausea and vomiting.   Genitourinary:  Negative for dysuria, frequency and hematuria.   Neurological:  Positive for numbness, paresthesias and sensory change. Negative for difficulty with concentration and loss of balance.   Psychiatric/Behavioral:  Negative for altered mental status, hallucinations and suicidal ideas. The patient is not nervous/anxious.    Allergic/Immunologic: Negative for environmental allergies, hives and persistent infections.         Objective:      Physical Exam  Vitals reviewed.   Constitutional:       Appearance: He is well-developed.    Cardiovascular:      Pulses: Normal pulses.   Musculoskeletal:      Right ankle: Decreased range of motion. Abnormal pulse.      Right Achilles Tendon: Neil's test negative.      Left ankle: Decreased range of motion. Abnormal pulse.      Left Achilles Tendon: Neil's test negative.      Right foot: Decreased range of motion. Prominent metatarsal heads present.      Left foot: Decreased range of motion.      Comments: Right digit amps 1-3   Feet:      Right foot:      Protective Sensation: 5 sites tested.  2 sites sensed.      Left foot:      Protective Sensation: 5 sites tested.  2 sites sensed.   Skin:     General: Skin is dry.      Capillary Refill: Capillary refill takes more than 3 seconds.   Neurological:      Mental Status: He is alert and oriented to person, place, and time.      Sensory: Sensory deficit present.      Comments: diminished sensation noted to b/L lower extremities   Psychiatric:         Behavior: Behavior normal. Behavior is cooperative.       8/6:  Wound measuring 0.5 x 1.3 x 0.1 cm.  Wound base mostly granular tissue with surrounding hyperkeratotic.  No swelling, erythema, maceration, or drainage noted.  No tenderness to palpation.              Assessment:       Encounter Diagnoses   Name Primary?    Type II diabetes mellitus with neurological manifestations Yes    Foot ulcer, right, with fat layer exposed          Plan:       Fermin was seen today for wound care and diabetes mellitus.    Diagnoses and all orders for this visit:    Type II diabetes mellitus with neurological manifestations  -     Ambulatory referral/consult to Home Health; Future    Foot ulcer, right, with fat layer exposed  -     Ambulatory referral/consult to Home Health; Future    Other orders  -     Wound Debridement      I counseled the patient on his conditions, their implications and medical management.    Decision making:  Chronic illnesses discussed in detail, previous records/notes and imaging independently  "reviewed, prescription drug management performed in addition to lengthy discussion regarding both conservative and surgical treatment options.    R plantar wound debrided down to healthy viable tissue.  Full procedure note below    R foot dressed with Liz, iodoform, gauze, Kerlix, and ACE    WBAT in Darco shoe    RTC 1 week    Pura Goel DPM   Podiatric Medicine & Surgery  [facility]      Wound Debridement    Date/Time: 8/6/2025 10:15 AM    Performed by: Pura Goel DPM  Authorized by: Pura Goel DPM    Time out: Immediately prior to procedure a "time out" was called to verify the correct patient, procedure, equipment, support staff and site/side marked as required.    Local anesthesia used?: No      Wound Details:    Location:  Right foot    Type of Debridement:  Excisional       Length (cm):  0.5       Width (cm):  1.2       Depth (cm):  0.1       Area (sq cm):  0.47       Percent Debrided (%):  100       Total Area Debrided (sq cm):  0.47    Depth of debridement:  Subcutaneous tissue    Tissue debrided:  Subcutaneous, Dermis and Epidermis    Devitalized tissue debrided:  Callus, Biofilm and Slough    Instruments:  Curette, Forceps, Nippers and Blade  Bleeding:  None  Patient tolerance:  Patient tolerated the procedure well with no immediate complications                                       "

## 2025-08-20 ENCOUNTER — LAB VISIT (OUTPATIENT)
Dept: LAB | Facility: HOSPITAL | Age: 65
End: 2025-08-20
Attending: FAMILY MEDICINE
Payer: MEDICARE

## 2025-08-20 ENCOUNTER — OFFICE VISIT (OUTPATIENT)
Dept: FAMILY MEDICINE | Facility: CLINIC | Age: 65
End: 2025-08-20
Payer: MEDICARE

## 2025-08-20 VITALS
TEMPERATURE: 98 F | WEIGHT: 212.75 LBS | BODY MASS INDEX: 28.82 KG/M2 | HEIGHT: 72 IN | OXYGEN SATURATION: 97 % | SYSTOLIC BLOOD PRESSURE: 130 MMHG | HEART RATE: 67 BPM | DIASTOLIC BLOOD PRESSURE: 68 MMHG

## 2025-08-20 DIAGNOSIS — E11.42 TYPE 2 DIABETES MELLITUS WITH DIABETIC POLYNEUROPATHY, WITHOUT LONG-TERM CURRENT USE OF INSULIN: ICD-10-CM

## 2025-08-20 DIAGNOSIS — E11.3393 CONTROLLED TYPE 2 DIABETES MELLITUS WITH BOTH EYES AFFECTED BY MODERATE NONPROLIFERATIVE RETINOPATHY WITHOUT MACULAR EDEMA, WITHOUT LONG-TERM CURRENT USE OF INSULIN: Primary | ICD-10-CM

## 2025-08-20 DIAGNOSIS — E11.3313 TYPE 2 DIABETES MELLITUS WITH BOTH EYES AFFECTED BY MODERATE NONPROLIFERATIVE RETINOPATHY AND MACULAR EDEMA, WITHOUT LONG-TERM CURRENT USE OF INSULIN: ICD-10-CM

## 2025-08-20 DIAGNOSIS — R06.83 PRIMARY SNORING: ICD-10-CM

## 2025-08-20 DIAGNOSIS — M79.641 RIGHT HAND PAIN: Primary | ICD-10-CM

## 2025-08-20 DIAGNOSIS — M65.331 TRIGGER MIDDLE FINGER OF RIGHT HAND: ICD-10-CM

## 2025-08-20 DIAGNOSIS — E11.3393 CONTROLLED TYPE 2 DIABETES MELLITUS WITH BOTH EYES AFFECTED BY MODERATE NONPROLIFERATIVE RETINOPATHY WITHOUT MACULAR EDEMA, WITHOUT LONG-TERM CURRENT USE OF INSULIN: ICD-10-CM

## 2025-08-20 LAB
ALBUMIN SERPL BCP-MCNC: 4 G/DL (ref 3.5–5.2)
ALP SERPL-CCNC: 57 UNIT/L (ref 40–150)
ALT SERPL W/O P-5'-P-CCNC: 19 UNIT/L (ref 0–55)
ANION GAP (OHS): 12 MMOL/L (ref 8–16)
AST SERPL-CCNC: 22 UNIT/L (ref 0–50)
BILIRUB SERPL-MCNC: 0.5 MG/DL (ref 0.1–1)
BUN SERPL-MCNC: 13 MG/DL (ref 8–23)
CALCIUM SERPL-MCNC: 9.2 MG/DL (ref 8.7–10.5)
CHLORIDE SERPL-SCNC: 103 MMOL/L (ref 95–110)
CO2 SERPL-SCNC: 24 MMOL/L (ref 23–29)
CREAT SERPL-MCNC: 0.9 MG/DL (ref 0.5–1.4)
EAG (OHS): 140 MG/DL (ref 68–131)
GFR SERPLBLD CREATININE-BSD FMLA CKD-EPI: >60 ML/MIN/1.73/M2
GLUCOSE SERPL-MCNC: 205 MG/DL (ref 70–110)
HBA1C MFR BLD: 6.5 % (ref 4–5.6)
POTASSIUM SERPL-SCNC: 3.9 MMOL/L (ref 3.5–5.1)
PROT SERPL-MCNC: 7.2 GM/DL (ref 6–8.4)
SODIUM SERPL-SCNC: 139 MMOL/L (ref 136–145)

## 2025-08-20 PROCEDURE — 80053 COMPREHEN METABOLIC PANEL: CPT | Mod: HCNC

## 2025-08-20 PROCEDURE — 3078F DIAST BP <80 MM HG: CPT | Mod: CPTII,HCNC,S$GLB, | Performed by: FAMILY MEDICINE

## 2025-08-20 PROCEDURE — 1160F RVW MEDS BY RX/DR IN RCRD: CPT | Mod: CPTII,HCNC,S$GLB, | Performed by: FAMILY MEDICINE

## 2025-08-20 PROCEDURE — 1159F MED LIST DOCD IN RCRD: CPT | Mod: CPTII,HCNC,S$GLB, | Performed by: FAMILY MEDICINE

## 2025-08-20 PROCEDURE — 99999 PR PBB SHADOW E&M-EST. PATIENT-LVL V: CPT | Mod: PBBFAC,HCNC,, | Performed by: FAMILY MEDICINE

## 2025-08-20 PROCEDURE — 3008F BODY MASS INDEX DOCD: CPT | Mod: CPTII,HCNC,S$GLB, | Performed by: FAMILY MEDICINE

## 2025-08-20 PROCEDURE — 1101F PT FALLS ASSESS-DOCD LE1/YR: CPT | Mod: CPTII,HCNC,S$GLB, | Performed by: FAMILY MEDICINE

## 2025-08-20 PROCEDURE — 3044F HG A1C LEVEL LT 7.0%: CPT | Mod: CPTII,HCNC,S$GLB, | Performed by: FAMILY MEDICINE

## 2025-08-20 PROCEDURE — 36415 COLL VENOUS BLD VENIPUNCTURE: CPT | Mod: HCNC,PO

## 2025-08-20 PROCEDURE — 83036 HEMOGLOBIN GLYCOSYLATED A1C: CPT | Mod: HCNC

## 2025-08-20 PROCEDURE — 3075F SYST BP GE 130 - 139MM HG: CPT | Mod: CPTII,HCNC,S$GLB, | Performed by: FAMILY MEDICINE

## 2025-08-20 PROCEDURE — 4010F ACE/ARB THERAPY RXD/TAKEN: CPT | Mod: CPTII,HCNC,S$GLB, | Performed by: FAMILY MEDICINE

## 2025-08-20 PROCEDURE — 1126F AMNT PAIN NOTED NONE PRSNT: CPT | Mod: CPTII,HCNC,S$GLB, | Performed by: FAMILY MEDICINE

## 2025-08-20 PROCEDURE — 99214 OFFICE O/P EST MOD 30 MIN: CPT | Mod: HCNC,S$GLB,, | Performed by: FAMILY MEDICINE

## 2025-08-20 PROCEDURE — 3288F FALL RISK ASSESSMENT DOCD: CPT | Mod: CPTII,HCNC,S$GLB, | Performed by: FAMILY MEDICINE

## 2025-08-21 ENCOUNTER — APPOINTMENT (OUTPATIENT)
Dept: RADIOLOGY | Facility: HOSPITAL | Age: 65
End: 2025-08-21
Attending: ORTHOPAEDIC SURGERY
Payer: MEDICARE

## 2025-08-21 ENCOUNTER — OFFICE VISIT (OUTPATIENT)
Dept: ORTHOPEDICS | Facility: CLINIC | Age: 65
End: 2025-08-21
Attending: ORTHOPAEDIC SURGERY
Payer: MEDICARE

## 2025-08-21 DIAGNOSIS — M79.641 RIGHT HAND PAIN: ICD-10-CM

## 2025-08-21 DIAGNOSIS — M65.331 TRIGGER MIDDLE FINGER OF RIGHT HAND: Primary | ICD-10-CM

## 2025-08-21 PROCEDURE — 73130 X-RAY EXAM OF HAND: CPT | Mod: TC,HCNC,PN,RT

## 2025-08-21 PROCEDURE — 1159F MED LIST DOCD IN RCRD: CPT | Mod: CPTII,HCNC,S$GLB, | Performed by: ORTHOPAEDIC SURGERY

## 2025-08-21 PROCEDURE — 99213 OFFICE O/P EST LOW 20 MIN: CPT | Mod: 25,HCNC,S$GLB, | Performed by: ORTHOPAEDIC SURGERY

## 2025-08-21 PROCEDURE — 1101F PT FALLS ASSESS-DOCD LE1/YR: CPT | Mod: CPTII,HCNC,S$GLB, | Performed by: ORTHOPAEDIC SURGERY

## 2025-08-21 PROCEDURE — 73130 X-RAY EXAM OF HAND: CPT | Mod: 26,HCNC,RT, | Performed by: RADIOLOGY

## 2025-08-21 PROCEDURE — 3288F FALL RISK ASSESSMENT DOCD: CPT | Mod: CPTII,HCNC,S$GLB, | Performed by: ORTHOPAEDIC SURGERY

## 2025-08-21 PROCEDURE — 1126F AMNT PAIN NOTED NONE PRSNT: CPT | Mod: CPTII,HCNC,S$GLB, | Performed by: ORTHOPAEDIC SURGERY

## 2025-08-21 PROCEDURE — 4010F ACE/ARB THERAPY RXD/TAKEN: CPT | Mod: CPTII,HCNC,S$GLB, | Performed by: ORTHOPAEDIC SURGERY

## 2025-08-21 PROCEDURE — 20550 NJX 1 TENDON SHEATH/LIGAMENT: CPT | Mod: HCNC,RT,S$GLB, | Performed by: ORTHOPAEDIC SURGERY

## 2025-08-21 PROCEDURE — 1160F RVW MEDS BY RX/DR IN RCRD: CPT | Mod: CPTII,HCNC,S$GLB, | Performed by: ORTHOPAEDIC SURGERY

## 2025-08-21 PROCEDURE — 99999 PR PBB SHADOW E&M-EST. PATIENT-LVL III: CPT | Mod: PBBFAC,HCNC,, | Performed by: ORTHOPAEDIC SURGERY

## 2025-08-21 PROCEDURE — 3044F HG A1C LEVEL LT 7.0%: CPT | Mod: CPTII,HCNC,S$GLB, | Performed by: ORTHOPAEDIC SURGERY

## 2025-08-21 RX ADMIN — TRIAMCINOLONE ACETONIDE 20 MG: 40 INJECTION, SUSPENSION INTRA-ARTICULAR; INTRAMUSCULAR at 03:08

## 2025-08-25 ENCOUNTER — OFFICE VISIT (OUTPATIENT)
Dept: PODIATRY | Facility: CLINIC | Age: 65
End: 2025-08-25
Payer: MEDICARE

## 2025-08-25 VITALS
DIASTOLIC BLOOD PRESSURE: 74 MMHG | BODY MASS INDEX: 28.85 KG/M2 | HEIGHT: 72 IN | SYSTOLIC BLOOD PRESSURE: 126 MMHG | HEART RATE: 113 BPM

## 2025-08-25 DIAGNOSIS — E11.49 TYPE II DIABETES MELLITUS WITH NEUROLOGICAL MANIFESTATIONS: Primary | ICD-10-CM

## 2025-08-25 DIAGNOSIS — Z89.421 HX OF AMPUTATION OF LESSER TOE, RIGHT: ICD-10-CM

## 2025-08-25 DIAGNOSIS — L97.512 FOOT ULCER, RIGHT, WITH FAT LAYER EXPOSED: ICD-10-CM

## 2025-08-25 PROCEDURE — 99999 PR PBB SHADOW E&M-EST. PATIENT-LVL III: CPT | Mod: PBBFAC,HCNC,, | Performed by: PODIATRIST

## 2025-08-25 RX ORDER — TRIAMCINOLONE ACETONIDE 40 MG/ML
20 INJECTION, SUSPENSION INTRA-ARTICULAR; INTRAMUSCULAR
Status: DISCONTINUED | OUTPATIENT
Start: 2025-08-21 | End: 2025-08-25 | Stop reason: HOSPADM

## (undated) DEVICE — SUPPORT ULNA NERVE PROTECTOR

## (undated) DEVICE — BLANKET UPPER BODY 78.7X29.9IN